# Patient Record
Sex: FEMALE | Race: OTHER | HISPANIC OR LATINO | Employment: OTHER | URBAN - METROPOLITAN AREA
[De-identification: names, ages, dates, MRNs, and addresses within clinical notes are randomized per-mention and may not be internally consistent; named-entity substitution may affect disease eponyms.]

---

## 2017-12-20 ENCOUNTER — APPOINTMENT (EMERGENCY)
Dept: RADIOLOGY | Facility: HOSPITAL | Age: 82
DRG: 637 | End: 2017-12-20
Payer: MEDICARE

## 2017-12-20 ENCOUNTER — APPOINTMENT (EMERGENCY)
Dept: CT IMAGING | Facility: HOSPITAL | Age: 82
DRG: 637 | End: 2017-12-20
Payer: MEDICARE

## 2017-12-20 ENCOUNTER — HOSPITAL ENCOUNTER (INPATIENT)
Facility: HOSPITAL | Age: 82
LOS: 2 days | Discharge: HOME HEALTH | DRG: 637 | End: 2017-12-22
Attending: EMERGENCY MEDICINE | Admitting: INTERNAL MEDICINE
Payer: MEDICARE

## 2017-12-20 DIAGNOSIS — T68.XXXA HYPOTHERMIA, INITIAL ENCOUNTER: ICD-10-CM

## 2017-12-20 DIAGNOSIS — E16.2 HYPOGLYCEMIA: Primary | ICD-10-CM

## 2017-12-20 PROBLEM — I10 HYPERTENSION: Status: ACTIVE | Noted: 2017-12-20

## 2017-12-20 PROBLEM — E11.9 TYPE 2 DIABETES MELLITUS (HCC): Status: ACTIVE | Noted: 2017-12-20

## 2017-12-20 PROBLEM — G93.41 ACUTE METABOLIC ENCEPHALOPATHY: Status: ACTIVE | Noted: 2017-12-20

## 2017-12-20 LAB
ALBUMIN SERPL BCP-MCNC: 3.3 G/DL (ref 3.5–5)
ALP SERPL-CCNC: 75 U/L (ref 46–116)
ALT SERPL W P-5'-P-CCNC: 21 U/L (ref 12–78)
AMMONIA PLAS-SCNC: 22 UMOL/L (ref 11–35)
AMPHETAMINES SERPL QL SCN: NEGATIVE
ANION GAP BLD CALC-SCNC: 15 MMOL/L (ref 4–13)
ANION GAP SERPL CALCULATED.3IONS-SCNC: 5 MMOL/L (ref 4–13)
APAP SERPL-MCNC: <2 UG/ML (ref 10–30)
APTT PPP: 26 SECONDS (ref 23–35)
AST SERPL W P-5'-P-CCNC: 18 U/L (ref 5–45)
ATRIAL RATE: 60 BPM
BACTERIA UR QL AUTO: ABNORMAL /HPF
BARBITURATES UR QL: NEGATIVE
BASE EX.OXY STD BLDV CALC-SCNC: 83.3 % (ref 60–80)
BASE EXCESS BLDV CALC-SCNC: 0.8 MMOL/L
BASOPHILS # BLD AUTO: 0.05 THOUSANDS/ΜL (ref 0–0.1)
BASOPHILS NFR BLD AUTO: 0 % (ref 0–1)
BENZODIAZ UR QL: NEGATIVE
BILIRUB SERPL-MCNC: 0.39 MG/DL (ref 0.2–1)
BILIRUB UR QL STRIP: NEGATIVE
BUN BLD-MCNC: 19 MG/DL (ref 5–25)
BUN SERPL-MCNC: 17 MG/DL (ref 5–25)
CA-I BLD-SCNC: 1.22 MMOL/L (ref 1.12–1.32)
CALCIUM SERPL-MCNC: 8.9 MG/DL (ref 8.3–10.1)
CHLORIDE BLD-SCNC: 98 MMOL/L (ref 100–108)
CHLORIDE SERPL-SCNC: 102 MMOL/L (ref 100–108)
CK MB SERPL-MCNC: 1.6 % (ref 0–2.5)
CK MB SERPL-MCNC: 4.3 NG/ML (ref 0–5)
CK SERPL-CCNC: 263 U/L (ref 26–192)
CLARITY UR: CLEAR
CO2 SERPL-SCNC: 29 MMOL/L (ref 21–32)
COCAINE UR QL: NEGATIVE
COLOR UR: YELLOW
CREAT BLD-MCNC: 1.2 MG/DL (ref 0.6–1.3)
CREAT SERPL-MCNC: 1.14 MG/DL (ref 0.6–1.3)
EOSINOPHIL # BLD AUTO: 0.18 THOUSAND/ΜL (ref 0–0.61)
EOSINOPHIL NFR BLD AUTO: 2 % (ref 0–6)
ERYTHROCYTE [DISTWIDTH] IN BLOOD BY AUTOMATED COUNT: 14.5 % (ref 11.6–15.1)
ETHANOL SERPL-MCNC: <3 MG/DL (ref 0–3)
GFR SERPL CREATININE-BSD FRML MDRD: 42 ML/MIN/1.73SQ M
GFR SERPL CREATININE-BSD FRML MDRD: 45 ML/MIN/1.73SQ M
GLUCOSE SERPL-MCNC: 107 MG/DL (ref 65–140)
GLUCOSE SERPL-MCNC: 110 MG/DL (ref 65–140)
GLUCOSE SERPL-MCNC: 113 MG/DL (ref 65–140)
GLUCOSE SERPL-MCNC: 113 MG/DL (ref 65–140)
GLUCOSE SERPL-MCNC: 119 MG/DL (ref 65–140)
GLUCOSE SERPL-MCNC: 128 MG/DL (ref 65–140)
GLUCOSE SERPL-MCNC: 153 MG/DL (ref 65–140)
GLUCOSE SERPL-MCNC: 75 MG/DL (ref 65–140)
GLUCOSE SERPL-MCNC: 77 MG/DL (ref 65–140)
GLUCOSE SERPL-MCNC: 83 MG/DL (ref 65–140)
GLUCOSE SERPL-MCNC: <20 MG/DL (ref 65–140)
GLUCOSE UR STRIP-MCNC: ABNORMAL MG/DL
HCO3 BLDV-SCNC: 26.7 MMOL/L (ref 24–30)
HCT VFR BLD AUTO: 42.6 % (ref 34.8–46.1)
HCT VFR BLD CALC: 42 % (ref 34.8–46.1)
HGB BLD-MCNC: 14.3 G/DL (ref 11.5–15.4)
HGB BLDA-MCNC: 14.3 G/DL (ref 11.5–15.4)
HGB UR QL STRIP.AUTO: ABNORMAL
INR PPP: 1.05 (ref 0.86–1.16)
KETONES UR STRIP-MCNC: NEGATIVE MG/DL
LACTATE SERPL-SCNC: 1.4 MMOL/L (ref 0.5–2)
LEUKOCYTE ESTERASE UR QL STRIP: NEGATIVE
LIPASE SERPL-CCNC: 121 U/L (ref 73–393)
LYMPHOCYTES # BLD AUTO: 2.29 THOUSANDS/ΜL (ref 0.6–4.47)
LYMPHOCYTES NFR BLD AUTO: 19 % (ref 14–44)
MCH RBC QN AUTO: 31 PG (ref 26.8–34.3)
MCHC RBC AUTO-ENTMCNC: 33.6 G/DL (ref 31.4–37.4)
MCV RBC AUTO: 92 FL (ref 82–98)
METHADONE UR QL: NEGATIVE
MONOCYTES # BLD AUTO: 0.9 THOUSAND/ΜL (ref 0.17–1.22)
MONOCYTES NFR BLD AUTO: 8 % (ref 4–12)
NEUTROPHILS # BLD AUTO: 8.62 THOUSANDS/ΜL (ref 1.85–7.62)
NEUTS SEG NFR BLD AUTO: 71 % (ref 43–75)
NITRITE UR QL STRIP: NEGATIVE
NON-SQ EPI CELLS URNS QL MICRO: ABNORMAL /HPF
NRBC BLD AUTO-RTO: 0 /100 WBCS
NT-PROBNP SERPL-MCNC: 430 PG/ML
O2 CT BLDV-SCNC: 18.2 ML/DL
OPIATES UR QL SCN: NEGATIVE
P AXIS: 36 DEGREES
PCO2 BLD: 29 MMOL/L (ref 21–32)
PCO2 BLDV: 47 MM HG (ref 42–50)
PCP UR QL: NEGATIVE
PH BLDV: 7.37 [PH] (ref 7.3–7.4)
PH UR STRIP.AUTO: 7.5 [PH] (ref 4.5–8)
PLATELET # BLD AUTO: 193 THOUSANDS/UL (ref 149–390)
PLATELET # BLD AUTO: 207 THOUSANDS/UL (ref 149–390)
PMV BLD AUTO: 11.1 FL (ref 8.9–12.7)
PMV BLD AUTO: 11.3 FL (ref 8.9–12.7)
PO2 BLDV: 48.6 MM HG (ref 35–45)
POTASSIUM BLD-SCNC: 3.6 MMOL/L (ref 3.5–5.3)
POTASSIUM SERPL-SCNC: 3.9 MMOL/L (ref 3.5–5.3)
PR INTERVAL: 120 MS
PROT SERPL-MCNC: 5.9 G/DL (ref 6.4–8.2)
PROT UR STRIP-MCNC: ABNORMAL MG/DL
PROTHROMBIN TIME: 13.7 SECONDS (ref 12.1–14.4)
QRS AXIS: 26 DEGREES
QRSD INTERVAL: 86 MS
QT INTERVAL: 420 MS
QTC INTERVAL: 420 MS
RBC # BLD AUTO: 4.61 MILLION/UL (ref 3.81–5.12)
RBC #/AREA URNS AUTO: ABNORMAL /HPF
SALICYLATES SERPL-MCNC: <3 MG/DL (ref 3–20)
SODIUM BLD-SCNC: 137 MMOL/L (ref 136–145)
SODIUM SERPL-SCNC: 136 MMOL/L (ref 136–145)
SP GR UR STRIP.AUTO: 1.02 (ref 1–1.03)
SPECIMEN SOURCE: ABNORMAL
SPECIMEN SOURCE: NORMAL
T WAVE AXIS: 89 DEGREES
THC UR QL: NEGATIVE
TROPONIN I BLD-MCNC: 0 NG/ML (ref 0–0.08)
TROPONIN I SERPL-MCNC: 0.02 NG/ML
TSH SERPL DL<=0.05 MIU/L-ACNC: 1.26 UIU/ML (ref 0.36–3.74)
UROBILINOGEN UR QL STRIP.AUTO: 0.2 E.U./DL
VENTRICULAR RATE: 60 BPM
WBC # BLD AUTO: 12.04 THOUSAND/UL (ref 4.31–10.16)
WBC #/AREA URNS AUTO: ABNORMAL /HPF

## 2017-12-20 PROCEDURE — 80320 DRUG SCREEN QUANTALCOHOLS: CPT | Performed by: EMERGENCY MEDICINE

## 2017-12-20 PROCEDURE — 71010 HB CHEST X-RAY 1 VIEW FRONTAL (PORTABLE): CPT

## 2017-12-20 PROCEDURE — 84443 ASSAY THYROID STIM HORMONE: CPT | Performed by: EMERGENCY MEDICINE

## 2017-12-20 PROCEDURE — 85025 COMPLETE CBC W/AUTO DIFF WBC: CPT | Performed by: EMERGENCY MEDICINE

## 2017-12-20 PROCEDURE — 82948 REAGENT STRIP/BLOOD GLUCOSE: CPT

## 2017-12-20 PROCEDURE — 83605 ASSAY OF LACTIC ACID: CPT | Performed by: EMERGENCY MEDICINE

## 2017-12-20 PROCEDURE — 82550 ASSAY OF CK (CPK): CPT | Performed by: EMERGENCY MEDICINE

## 2017-12-20 PROCEDURE — 99285 EMERGENCY DEPT VISIT HI MDM: CPT

## 2017-12-20 PROCEDURE — 87040 BLOOD CULTURE FOR BACTERIA: CPT | Performed by: EMERGENCY MEDICINE

## 2017-12-20 PROCEDURE — 80053 COMPREHEN METABOLIC PANEL: CPT | Performed by: EMERGENCY MEDICINE

## 2017-12-20 PROCEDURE — 82805 BLOOD GASES W/O2 SATURATION: CPT | Performed by: EMERGENCY MEDICINE

## 2017-12-20 PROCEDURE — 85049 AUTOMATED PLATELET COUNT: CPT | Performed by: NURSE PRACTITIONER

## 2017-12-20 PROCEDURE — 96361 HYDRATE IV INFUSION ADD-ON: CPT

## 2017-12-20 PROCEDURE — 83880 ASSAY OF NATRIURETIC PEPTIDE: CPT | Performed by: EMERGENCY MEDICINE

## 2017-12-20 PROCEDURE — 80047 BASIC METABLC PNL IONIZED CA: CPT

## 2017-12-20 PROCEDURE — 81003 URINALYSIS AUTO W/O SCOPE: CPT

## 2017-12-20 PROCEDURE — 70450 CT HEAD/BRAIN W/O DYE: CPT

## 2017-12-20 PROCEDURE — 85730 THROMBOPLASTIN TIME PARTIAL: CPT | Performed by: EMERGENCY MEDICINE

## 2017-12-20 PROCEDURE — 82140 ASSAY OF AMMONIA: CPT | Performed by: EMERGENCY MEDICINE

## 2017-12-20 PROCEDURE — 84484 ASSAY OF TROPONIN QUANT: CPT | Performed by: NURSE PRACTITIONER

## 2017-12-20 PROCEDURE — 36415 COLL VENOUS BLD VENIPUNCTURE: CPT | Performed by: EMERGENCY MEDICINE

## 2017-12-20 PROCEDURE — 81001 URINALYSIS AUTO W/SCOPE: CPT

## 2017-12-20 PROCEDURE — 83690 ASSAY OF LIPASE: CPT | Performed by: EMERGENCY MEDICINE

## 2017-12-20 PROCEDURE — 96360 HYDRATION IV INFUSION INIT: CPT

## 2017-12-20 PROCEDURE — 80329 ANALGESICS NON-OPIOID 1 OR 2: CPT | Performed by: EMERGENCY MEDICINE

## 2017-12-20 PROCEDURE — 93005 ELECTROCARDIOGRAM TRACING: CPT

## 2017-12-20 PROCEDURE — 80307 DRUG TEST PRSMV CHEM ANLYZR: CPT | Performed by: NURSE PRACTITIONER

## 2017-12-20 PROCEDURE — 85014 HEMATOCRIT: CPT

## 2017-12-20 PROCEDURE — 96374 THER/PROPH/DIAG INJ IV PUSH: CPT

## 2017-12-20 PROCEDURE — 85610 PROTHROMBIN TIME: CPT | Performed by: EMERGENCY MEDICINE

## 2017-12-20 PROCEDURE — 82553 CREATINE MB FRACTION: CPT | Performed by: EMERGENCY MEDICINE

## 2017-12-20 PROCEDURE — 81002 URINALYSIS NONAUTO W/O SCOPE: CPT | Performed by: EMERGENCY MEDICINE

## 2017-12-20 PROCEDURE — 84484 ASSAY OF TROPONIN QUANT: CPT

## 2017-12-20 RX ORDER — PRAVASTATIN SODIUM 40 MG
40 TABLET ORAL
Status: DISCONTINUED | OUTPATIENT
Start: 2017-12-20 | End: 2017-12-22 | Stop reason: HOSPADM

## 2017-12-20 RX ORDER — DEXTROSE MONOHYDRATE 25 G/50ML
50 INJECTION, SOLUTION INTRAVENOUS ONCE
Status: COMPLETED | OUTPATIENT
Start: 2017-12-20 | End: 2017-12-20

## 2017-12-20 RX ORDER — HYDRALAZINE HYDROCHLORIDE 20 MG/ML
10 INJECTION INTRAMUSCULAR; INTRAVENOUS ONCE
Status: DISCONTINUED | OUTPATIENT
Start: 2017-12-20 | End: 2017-12-22 | Stop reason: HOSPADM

## 2017-12-20 RX ORDER — FUROSEMIDE 40 MG/1
40 TABLET ORAL DAILY
Status: DISCONTINUED | OUTPATIENT
Start: 2017-12-21 | End: 2017-12-22 | Stop reason: HOSPADM

## 2017-12-20 RX ORDER — ASPIRIN 81 MG/1
81 TABLET ORAL DAILY
COMMUNITY
End: 2018-07-05 | Stop reason: SDUPTHER

## 2017-12-20 RX ORDER — TRAMADOL HYDROCHLORIDE 50 MG/1
50 TABLET ORAL EVERY 6 HOURS PRN
COMMUNITY
End: 2018-07-05

## 2017-12-20 RX ORDER — BUDESONIDE AND FORMOTEROL FUMARATE DIHYDRATE 160; 4.5 UG/1; UG/1
2 AEROSOL RESPIRATORY (INHALATION) 2 TIMES DAILY
Status: DISCONTINUED | OUTPATIENT
Start: 2017-12-20 | End: 2017-12-22 | Stop reason: HOSPADM

## 2017-12-20 RX ORDER — POLYMYXIN B SULFATE AND TRIMETHOPRIM 1; 10000 MG/ML; [USP'U]/ML
1 SOLUTION OPHTHALMIC EVERY 4 HOURS
COMMUNITY
End: 2018-08-07 | Stop reason: ALTCHOICE

## 2017-12-20 RX ORDER — CARVEDILOL 6.25 MG/1
6.25 TABLET ORAL 2 TIMES DAILY WITH MEALS
Status: DISCONTINUED | OUTPATIENT
Start: 2017-12-21 | End: 2017-12-22 | Stop reason: HOSPADM

## 2017-12-20 RX ORDER — CARVEDILOL 6.25 MG/1
6.25 TABLET ORAL 2 TIMES DAILY WITH MEALS
COMMUNITY
End: 2018-08-07 | Stop reason: ALTCHOICE

## 2017-12-20 RX ORDER — FUROSEMIDE 40 MG/1
40 TABLET ORAL DAILY
COMMUNITY
End: 2018-07-05 | Stop reason: SDUPTHER

## 2017-12-20 RX ORDER — CLOPIDOGREL BISULFATE 75 MG/1
75 TABLET ORAL DAILY
Status: DISCONTINUED | OUTPATIENT
Start: 2017-12-21 | End: 2017-12-22 | Stop reason: HOSPADM

## 2017-12-20 RX ORDER — DEXTROSE MONOHYDRATE 25 G/50ML
INJECTION, SOLUTION INTRAVENOUS
Status: COMPLETED
Start: 2017-12-20 | End: 2017-12-20

## 2017-12-20 RX ORDER — CLOPIDOGREL BISULFATE 75 MG/1
75 TABLET ORAL DAILY
COMMUNITY
End: 2018-08-07 | Stop reason: ALTCHOICE

## 2017-12-20 RX ORDER — ASPIRIN 81 MG/1
81 TABLET ORAL DAILY
Status: DISCONTINUED | OUTPATIENT
Start: 2017-12-21 | End: 2017-12-22 | Stop reason: HOSPADM

## 2017-12-20 RX ORDER — DEXTROSE MONOHYDRATE 100 MG/ML
50 INJECTION, SOLUTION INTRAVENOUS CONTINUOUS
Status: DISCONTINUED | OUTPATIENT
Start: 2017-12-20 | End: 2017-12-21

## 2017-12-20 RX ORDER — ONDANSETRON 2 MG/ML
4 INJECTION INTRAMUSCULAR; INTRAVENOUS EVERY 6 HOURS PRN
Status: DISCONTINUED | OUTPATIENT
Start: 2017-12-20 | End: 2017-12-22 | Stop reason: HOSPADM

## 2017-12-20 RX ORDER — HEPARIN SODIUM 5000 [USP'U]/ML
5000 INJECTION, SOLUTION INTRAVENOUS; SUBCUTANEOUS EVERY 8 HOURS SCHEDULED
Status: DISCONTINUED | OUTPATIENT
Start: 2017-12-20 | End: 2017-12-22 | Stop reason: HOSPADM

## 2017-12-20 RX ORDER — LISINOPRIL 20 MG/1
40 TABLET ORAL DAILY
Status: DISCONTINUED | OUTPATIENT
Start: 2017-12-21 | End: 2017-12-22 | Stop reason: HOSPADM

## 2017-12-20 RX ORDER — PRAVASTATIN SODIUM 40 MG
40 TABLET ORAL DAILY
COMMUNITY
End: 2018-07-05 | Stop reason: SDUPTHER

## 2017-12-20 RX ORDER — BUDESONIDE AND FORMOTEROL FUMARATE DIHYDRATE 160; 4.5 UG/1; UG/1
1-2 AEROSOL RESPIRATORY (INHALATION) 2 TIMES DAILY
COMMUNITY
End: 2018-08-07 | Stop reason: ALTCHOICE

## 2017-12-20 RX ORDER — HYDRALAZINE HYDROCHLORIDE 20 MG/ML
10 INJECTION INTRAMUSCULAR; INTRAVENOUS EVERY 6 HOURS PRN
Status: DISCONTINUED | OUTPATIENT
Start: 2017-12-20 | End: 2017-12-22 | Stop reason: HOSPADM

## 2017-12-20 RX ORDER — LISINOPRIL 40 MG/1
40 TABLET ORAL DAILY
COMMUNITY
End: 2018-07-05 | Stop reason: CLARIF

## 2017-12-20 RX ADMIN — HEPARIN SODIUM 5000 UNITS: 5000 INJECTION, SOLUTION INTRAVENOUS; SUBCUTANEOUS at 22:09

## 2017-12-20 RX ADMIN — SODIUM CHLORIDE 1000 ML: 0.9 INJECTION, SOLUTION INTRAVENOUS at 15:04

## 2017-12-20 RX ADMIN — DEXTROSE 50 ML/HR: 10 SOLUTION INTRAVENOUS at 15:14

## 2017-12-20 RX ADMIN — DEXTROSE MONOHYDRATE: 25 INJECTION, SOLUTION INTRAVENOUS at 16:29

## 2017-12-20 RX ADMIN — PRAVASTATIN SODIUM 40 MG: 40 TABLET ORAL at 22:09

## 2017-12-20 RX ADMIN — DEXTROSE MONOHYDRATE: 500 INJECTION PARENTERAL at 16:29

## 2017-12-20 NOTE — ED ATTENDING ATTESTATION
Agustina Bernard MD, saw and evaluated the patient  I have discussed the patient with the resident/non-physician practitioner and agree with the resident's/non-physician practitioner's findings, Plan of Care, and MDM as documented in the resident's/non-physician practitioner's note, except where noted  All available labs and Radiology studies were reviewed  At this point I agree with the current assessment done in the Emergency Department  I have conducted an independent evaluation of this patient a history and physical is as follows:  Patient with hx DM, Type 1, comes in with complaint of altered mental status, was noted to have hypoglycemia, EMS gave the D50W, blood sugar came up above 200; patient, to be alert, no focal neuro deficit, lungs clear, abdomen soft nontender, vital signs stable  Labs and CT scan chest x-ray done, no acute intracranial abnormality, patient developed recurrent hypoglycemia requiring IV dextrose, and hypothermia, septic workup did not show any acute abnormality, normal lactic acid, normal white blood cell count is no clear source of sepsis  Hypothermia possibly related to hypoglycemia  We will admit to ICU as patient required frequent blood sugar monitoring and continuous dextrose infusion  Critical Care Time  The patient presented with a condition in which there was a high probability of imminent or life-threatening deterioration, and critical care services (excluding separately billable procedures) totalled 30-74 minutes          Procedures

## 2017-12-20 NOTE — ED PROVIDER NOTES
daily   UNKNOWN TO PATIENT   Yes Yes   Sig: Administer 1 drop into both ears 2 (two) times a day Ear drops-cerumenolytic   aspirin (ECOTRIN LOW STRENGTH) 81 mg EC tablet   Yes Yes   Sig: Take 81 mg by mouth daily   budesonide-formoterol (SYMBICORT) 160-4 5 mcg/act inhaler   Yes Yes   Sig: Inhale 1-2 puffs 2 (two) times a day   carvedilol (COREG) 6 25 mg tablet   Yes Yes   Sig: Take 6 25 mg by mouth 2 (two) times a day with meals   clopidogrel (PLAVIX) 75 mg tablet   Yes Yes   Sig: Take 75 mg by mouth daily   furosemide (LASIX) 40 mg tablet   Yes Yes   Sig: Take 40 mg by mouth daily   insulin aspart (NovoLOG) 100 units/mL injection   Yes Yes   Sig: Inject 2 Units under the skin 2 (two) times a day with meals   lisinopril (ZESTRIL) 40 mg tablet   Yes Yes   Sig: Take 40 mg by mouth daily   polymyxin b-trimethoprim (POLYTRIM) ophthalmic solution   Yes Yes   Sig: Administer 1 drop to both eyes every 4 (four) hours   pravastatin (PRAVACHOL) 40 mg tablet   Yes Yes   Sig: Take 40 mg by mouth daily   sitaGLIPtin (JANUVIA) 50 mg tablet   Yes Yes   Sig: Take 50 mg by mouth daily   traMADol (ULTRAM) 50 mg tablet   Yes Yes   Sig: Take 50 mg by mouth every 6 (six) hours as needed for moderate pain      Facility-Administered Medications: None       Past Medical History:   Diagnosis Date    Alzheimer's dementia     Diabetes mellitus ([address] )     Hyperlipidemia     Hypertension     Kidney stones        Past Surgical History:   Procedure Laterality Date    APPENDECTOMY      LITHOTRIPSY      TONSILLECTOMY         Family History   Problem Relation Age of Onset    Diabetes Mother      I have reviewed and agree with the history as documented      Social History   Substance Use Topics    Smoking status: Current Every Day Smoker     Packs/day: 1 00     Years: 40 00     Types: Cigarettes    Smokeless tobacco: Never Used      Comment: states she quit but family living with her states she smokes    Alcohol use Yes      Comment: OCCASIONAL        Review of Systems    Constitutional: Negative for appetite change, chills and fever  HENT: Negative for congestion, rhinorrhea and sore throat  Eyes: Negative for photophobia, pain and visual disturbance  Respiratory: Negative for cough, chest tightness and shortness of breath  Cardiovascular: Negative for chest pain, palpitations and leg swelling  Gastrointestinal: Negative for abdominal pain, diarrhea, nausea and vomiting  Genitourinary: Negative for dysuria, flank pain and hematuria  Musculoskeletal: Negative for back pain, neck pain and neck stiffness  Skin: Negative for color change, rash and wound  Neurological: Negative for dizziness, numbness  positive for headaches  All other systems reviewed and are negative      Physical Exam  ED Triage Vitals   Temperature Pulse Respirations Blood Pressure SpO2   12/20/17 1505 12/20/17 1452 12/20/17 1452 12/20/17 1452 12/20/17 1452   (!) 96 6 °F (35 9 °C) 64 16 (!) 190/91 98 %      Temp Source Heart Rate Source Patient Position - Orthostatic VS BP Location FiO2 (%)   12/20/17 1505 12/20/17 1452 12/20/17 1452 12/20/17 1452 --   Rectal Monitor Lying Right arm       Pain Score       12/20/17 2147       No Pain           Orthostatic Vital Signs  Vitals:    12/21/17 1100 12/21/17 1200 12/21/17 1300 12/21/17 1400   BP: 116/67 100/72 107/60 114/60   Pulse: 76 62 60 64   Patient Position - Orthostatic VS:           Physical Exam  /60   Pulse 64   Temp 97 7 °F (36 5 °C) (Temporal)   Resp 20   Ht 5' 7" (1 702 m)   Wt 69 1 kg (152 lb 5 4 oz)   LMP  (LMP Unknown)   SpO2 95%   BMI 23 86 kg/m²     General Appearance:  Sleepy but easy arousable, cooperative, no distress, appears stated age   Head:  Normocephalic, without obvious abnormality, atraumatic   Eyes:  PERRL, conjunctiva/corneas clear, EOM's intact       Nose: Nares normal, septum midline,mucosa normal, no drainage or sinus tenderness   Throat: Lips, mucosa, and tongue normal; teeth and gums normal   Neck: Supple, symmetrical, trachea midline, no adenopathy   Back:   Symmetric, no curvature, ROM normal, no CVA tenderness   Lungs:   Clear to auscultation bilaterally, respirations unlabored   Heart:  Regular rate and rhythm, S1 and S2 normal, no murmur, rub, or gallop   Abdomen:   Soft, non-tender, bowel sounds active all four quadrants   Pelvic: Deferred   Extremities: Extremities normal, atraumatic, no cyanosis or edema, no tenderness on palpation of lower extremities   Pulses: 2+ and symmetric   Skin: Skin color, texture, turgor normal, no rashes or lesions   Neurologic:      Psychiatric: Moves all extremities, 5/5 strength in all extremities, equal on both sides  Decreased sensation to bilateral lower extremities which is chronic    Sleepy but arousable by voice         ED Medications  Medications   hydrALAZINE (APRESOLINE) injection 10 mg (0 mg Intravenous Hold 12/20/17 1631)   aspirin (ECOTRIN LOW STRENGTH) EC tablet 81 mg (81 mg Oral Given 12/21/17 0842)   budesonide-formoterol (SYMBICORT) 160-4 5 mcg/act inhaler 2 puff (2 puffs Inhalation Not Given 12/20/17 2205)   carvedilol (COREG) tablet 6 25 mg (6 25 mg Oral Given 12/21/17 0842)   clopidogrel (PLAVIX) tablet 75 mg (75 mg Oral Given 12/21/17 0842)   furosemide (LASIX) tablet 40 mg (40 mg Oral Given 12/21/17 0842)   Linaclotide CAPS 1 capsule (1 capsule Oral Not Given 12/21/17 1145)   lisinopril (ZESTRIL) tablet 40 mg (40 mg Oral Given 12/21/17 0842)   pravastatin (PRAVACHOL) tablet 40 mg (40 mg Oral Given 12/20/17 2209)   heparin (porcine) subcutaneous injection 5,000 Units (5,000 Units Subcutaneous Given 12/21/17 0639)   ondansetron (ZOFRAN) injection 4 mg (not administered)   hydrALAZINE (APRESOLINE) injection 10 mg (not administered)   sodium chloride 0 9 % bolus 1,000 mL (0 mL Intravenous Stopped 12/20/17 1606)   dextrose 50 % IV solution 50 mL ( Intravenous Given 12/20/17 1629)   morphine injection 1 mg (1 mg Intravenous Given 12/21/17 0322)       Diagnostic Studies  Results Reviewed     Procedure Component Value Units Date/Time    Rapid drug screen, urine [[id number]]  (Normal) Collected:  12/20/17 1827    Lab Status:  Final result Specimen:  Urine Updated:  12/20/17 2108     Amph/Meth UR Negative     Barbiturate Ur Negative     Benzodiazepine Urine Negative     Cocaine Urine Negative     Methadone Urine Negative     Opiate Urine Negative     PCP Ur Negative     THC Urine Negative    Narrative:         FOR MEDICAL PURPOSES ONLY  IF CONFIRMATION NEEDED PLEASE CONTACT THE LAB WITHIN 5 DAYS      Drug Screen Cutoff Levels:  AMPHETAMINE/METHAMPHETAMINES  1000 ng/mL  BARBITURATES     200 ng/mL  BENZODIAZEPINES     200 ng/mL  COCAINE      300 ng/mL  METHADONE      300 ng/mL  OPIATES      300 ng/mL  PHENCYCLIDINE     25 ng/mL  THC       50 ng/mL    Fingerstick Glucose (POCT) [[id number]]  (Normal) Collected:  12/20/17 2019    Lab Status:  Final result Updated:  12/20/17 2040     POC Glucose 107 mg/dl     Fingerstick Glucose (POCT) [[id number]]  (Normal) Collected:  12/20/17 1843    Lab Status:  Final result Updated:  12/20/17 1845     POC Glucose 75 mg/dl     Urine Microscopic [[id number]]  (Abnormal) Collected:  12/20/17 1827    Lab Status:  Final result Specimen:  Urine from Urine, Other Updated:  12/20/17 1824     RBC, UA 1-2 (A) /hpf      WBC, UA 1-2 (A) /hpf      Epithelial Cells Moderate (A) /hpf      Bacteria, UA None Seen /hpf     POCT urinalysis dipstick [[id number]]  (Abnormal) Resulted:  12/20/17 1810    Lab Status:  Final result Specimen:  Urine Updated:  12/20/17 1810    ED Urine Macroscopic [[id number]]  (Abnormal) Collected:  12/20/17 1827    Lab Status:  Final result Specimen:  Urine Updated:  12/20/17 1809     Color, UA Yellow     Clarity, UA Clear     pH, UA 7 5     Leukocytes, UA Negative     Nitrite, UA Negative     Protein,  (2+) (A) mg/dl      Glucose,  (1/10%) (A) mg/dl      Ketones, UA Negative mg/dl Urobilinogen, UA 0 2 E U /dl      Bilirubin, UA Negative     Blood, UA Trace (A)     Specific [place], UA 1 020    Narrative:       CLINITEK RESULT    Fingerstick Glucose (POCT) [[id number]]  (Normal) Collected:  12/20/17 1701    Lab Status:  Final result Updated:  12/20/17 1714     POC Glucose 119 mg/dl     Fingerstick Glucose (POCT) [[id number]]  (Abnormal) Collected:  12/20/17 1616    Lab Status:  Final result Updated:  12/20/17 1646     POC Glucose <20 (LL) mg/dl     Fingerstick Glucose (POCT) [[id number]]  (Normal) Collected:  12/20/17 1451    Lab Status:  Final result Updated:  12/20/17 1646     POC Glucose 110 mg/dl     B-type natriuretic peptide [[id number]]  (Normal) Collected:  12/20/17 1502    Lab Status:  Final result Specimen:  Blood from Arm, Left Updated:  12/20/17 1611     NT-proBNP 430 pg/mL     Acetaminophen level [[id number]]  (Abnormal) Collected:  12/20/17 1502    Lab Status:  Final result Specimen:  Blood from Arm, Left Updated:  12/20/17 1601     Acetaminophen Level <2 (L) ug/mL     TSH [[id number]]  (Normal) Collected:  12/20/17 1502    Lab Status:  Final result Specimen:  Blood from Arm, Left Updated:  12/20/17 1546     TSH 3RD GENERATON 1 259 uIU/mL     Narrative:         Patients undergoing fluorescein dye angiography may retain small amounts of fluorescein in the body for 48-72 hours post procedure  Samples containing fluorescein can produce falsely depressed TSH values  If the patient had this procedure,a specimen should be resubmitted post fluorescein clearance            The recommended reference ranges for TSH during pregnancy are as follows:  First trimester 0 1 to 2 5 uIU/mL  Second trimester  0 2 to 3 0 uIU/mL  Third trimester 0 3 to 3 0 uIU/m      Lipase [[id number]]  (Normal) Collected:  12/20/17 1502    Lab Status:  Final result Specimen:  Blood from Arm, Left Updated:  12/20/17 1546     Lipase 780 u/L     Salicylate level [[id number]]  (Abnormal) Collected:  12/20/17 1502    Lab Status: Final result Specimen:  Blood from Arm, Left Updated:  32/38/00 0682     Salicylate Lvl <3 (L) mg/dL     CKMB [[id number]]  (Normal) Collected:  12/20/17 1502    Lab Status:  Final result Specimen:  Blood from Arm, Left Updated:  12/20/17 1546     CK-MB Index 1 6 %      CK-MB FRACTION 4 3 ng/mL     CK Total with Reflex CKMB [[id number]]  (Abnormal) Collected:  12/20/17 1502    Lab Status:  Final result Specimen:  Blood from Arm, Left Updated:  12/20/17 1545     Total  (H) U/L     Ethanol [[id number]]  (Normal) Collected:  12/20/17 1502    Lab Status:  Final result Specimen:  Blood from Arm, Left Updated:  12/20/17 1533     Ethanol Lvl <3 mg/dL     Comprehensive metabolic panel [[id number]]  (Abnormal) Collected:  12/20/17 1502    Lab Status:  Final result Specimen:  Blood from Arm, Left Updated:  12/20/17 1532     Sodium 136 mmol/L      Potassium 3 9 mmol/L      Chloride 102 mmol/L      CO2 29 mmol/L      Anion Gap 5 mmol/L      BUN 17 mg/dL      Creatinine 1 14 mg/dL      Glucose 113 mg/dL      Calcium 8 9 mg/dL      AST 18 U/L      ALT 21 U/L      Alkaline Phosphatase 75 U/L      Total Protein 5 9 (L) g/dL      Albumin 3 3 (L) g/dL      Total Bilirubin 0 39 mg/dL      eGFR 45 ml/min/1 73sq m     Narrative:         National Kidney Disease Education Program recommendations are as follows:  GFR calculation is accurate only with a steady state creatinine  Chronic Kidney disease less than 60 ml/min/1 73 sq  meters  Kidney failure less than 15 ml/min/1 73 sq  meters  Ammonia [[id number]]  (Normal) Collected:  12/20/17 1502    Lab Status:  Final result Specimen:  Blood from Arm, Left Updated:  12/20/17 1532     Ammonia 22 umol/L     Lactic acid, plasma [[id number]]  (Normal) Collected:  12/20/17 1502    Lab Status:  Final result Specimen:  Blood from Arm, Left Updated:  12/20/17 1527     LACTIC ACID 1 4 mmol/L     Narrative:         Result may be elevated if tourniquet was used during collection      Volodymyr Loomis [[id number]] (Normal) Collected:  12/20/17 1502    Lab Status:  Final result Specimen:  Blood from Arm, Left Updated:  12/20/17 1525     Protime 13 7 seconds      INR 1 05    APTT [[id number]]  (Normal) Collected:  12/20/17 1502    Lab Status:  Final result Specimen:  Blood from Arm, Left Updated:  12/20/17 1525     PTT 26 seconds     Narrative: Therapeutic Heparin Range = 60-90 seconds    CBC and differential [[id number]]  (Abnormal) Collected:  12/20/17 1503    Lab Status:  Final result Specimen:  Blood from Arm, Left Updated:  12/20/17 1515     WBC 12 04 (H) Thousand/uL      RBC 4 61 Million/uL      Hemoglobin 14 3 g/dL      Hematocrit 42 6 %      MCV 92 fL      MCH 31 0 pg      MCHC 33 6 g/dL      RDW 14 5 %      MPV 11 3 fL      Platelets 929 Thousands/uL      nRBC 0 /100 WBCs      Neutrophils Relative 71 %      Lymphocytes Relative 19 %      Monocytes Relative 8 %      Eosinophils Relative 2 %      Basophils Relative 0 %      Neutrophils Absolute 8 62 (H) Thousands/µL      Lymphocytes Absolute 2 29 Thousands/µL      Monocytes Absolute 0 90 Thousand/µL      Eosinophils Absolute 0 18 Thousand/µL      Basophils Absolute 0 05 Thousands/µL     Blood gas, venous [[id number]]  (Abnormal) Collected:  12/20/17 1503    Lab Status:  Final result Specimen:  Blood from Arm, Left Updated:  12/20/17 1513     pH, Esdras 7 372     pCO2, Esdras 47 0 mm Hg      pO2, Esdras 48 6 (H) mm Hg      HCO3, Esdras 26 7 mmol/L      Base Excess, Esdras 0 8 mmol/L      O2 Content, Esdras 18 2 ml/dL      O2 HGB, VENOUS 83 3 (H) %     Blood culture #1 [[id number]] Collected:  12/20/17 1455    Lab Status: In process Specimen:  Blood from Arm, Left Updated:  12/20/17 1506    Blood culture #2 [[id number]] Collected:  12/20/17 1500    Lab Status:   In process Specimen:  Blood from Arm, Left Updated:  12/20/17 1506    POCT troponin [[id number]]  (Normal) Collected:  12/20/17 1450    Lab Status:  Final result Updated:  12/20/17 1504     POC Troponin I 0 00 ng/ml      Specimen Type VENOUS    Narrative:         [employer] i-Stat handheld analyzer 99% cutoff is > 0 08ng/mL in network Emergency Departments    o cTnI 99% cutoff is useful only when applied to patients in the clinical setting of myocardial ischemia  o cTnI 99% cutoff should be interpreted in the context of clinical history, ECG findings and possibly cardiac imaging to establish correct diagnosis  o cTnI 99% cutoff may be suggestive but clearly not indicative of a coronary event without the clinical setting of myocardial ischemia  POCT Chem 8+ [[id number]]  (Abnormal) Collected:  12/20/17 1454    Lab Status:  Final result Updated:  12/20/17 1458     SODIUM, I-STAT 137 mmol/l      Potassium, i-STAT 3 6 mmol/L      Chloride, istat 98 (L) mmol/L      CO2, i-STAT 29 mmol/L      Anion Gap, Istat 15 (H) mmol/L      Calcium, Ionized i-STAT 1 22 mmol/L      BUN, I-STAT 19 mg/dl      Creatinine, i-STAT 1 2 mg/dl      eGFR 42 ml/min/1 73sq m      Glucose, i-STAT 113 mg/dl      Hct, i-STAT 42 %      Hgb, i-STAT 14 3 g/dl      Specimen Type VENOUS                 CT head without contrast   Final Result by Trish Sears MD (12/20 1534)      No acute intracranial abnormality  Mild right periorbital swelling is suggested  Significance uncertain  Workstation performed: [id number]         XR chest 1 view portable   ED Interpretation by Broderick Lundy MD (12/20 1521)   No consolidation, no effusions, right hilar congestion      Final Result by Karolina Caballero MD (12/20 4711)      No active pulmonary disease           Workstation performed: [id number]               Procedures  ECG 12 Lead Documentation  Date/Time: 12/20/2017 3:09 PM  Performed by: Gillian Caballero  Authorized by: Noreen Hernandez     Indications / Diagnosis:  Unresponsiveness  ECG reviewed by me, the ED Provider: yes    Patient location:  ED  Previous ECG:     Previous ECG:  Compared to current    Comparison ECG info:  Twave inversions, ST changes  Interpretation: Interpretation: non-specific    Rate:     ECG rate:  60    ECG rate assessment: normal    Rhythm:     Rhythm: sinus rhythm    Ectopy:     Ectopy: none    QRS:     QRS axis:  Normal    QRS intervals:  Normal  Conduction:     Conduction: normal    ST segments:     ST segments:  Abnormal    Depression:  V4 and V5  T waves:     T waves: inverted      Inverted:  V4, V5 and V6          Phone Consults  ED Phone Contact    ED Course  ED Course as of Dec 21 1445   Wed Dec 20, 2017   1537 XR chest 1 view portable         MDM   Patient with altered mental status secondary to hypoglycemia  Patient is more alert now, however she still sleepy  We will get a CT scan of head to evaluate for intracranial hemorrhage  We will check, panel, check cardiac workup, will check for possible infectious source with blood cultures, urinalysis and culture, chest x-ray  Repeat glucose checks  CritCare Time    Disposition  Final diagnoses:   Hypoglycemia   Hypothermia, initial encounter     Time reflects when diagnosis was documented in both MDM as applicable and the Disposition within this note     Time User Action Codes Description Comment    12/20/2017  4:42 PM Naa Garcia Add [E16 2] Hypoglycemia     12/20/2017  4:42 PM Poppy Wagner  XXXA] Hypothermia, initial encounter       ED Disposition     ED Disposition Condition Comment    Admit  Case was discussed with Critical Care and the patient's admission status was agreed to be Admission Status: inpatient status to the service of Dr Darcy Stevenson          Follow-up Information    None       Current Discharge Medication List      CONTINUE these medications which have NOT CHANGED    Details   aspirin (ECOTRIN LOW STRENGTH) 81 mg EC tablet Take 81 mg by mouth daily      budesonide-formoterol (SYMBICORT) 160-4 5 mcg/act inhaler Inhale 1-2 puffs 2 (two) times a day      carvedilol (COREG) 6 25 mg tablet Take 6 25 mg by mouth 2 (two) times a day with meals      clopidogrel (PLAVIX) 75 mg tablet Take 75 mg by mouth daily      furosemide (LASIX) 40 mg tablet Take 40 mg by mouth daily      insulin aspart (NovoLOG) 100 units/mL injection Inject 2 Units under the skin 2 (two) times a day with meals      Linaclotide (LINZESS) 290 MCG CAPS Take 290 mcg by mouth daily      lisinopril (ZESTRIL) 40 mg tablet Take 40 mg by mouth daily      polymyxin b-trimethoprim (POLYTRIM) ophthalmic solution Administer 1 drop to both eyes every 4 (four) hours      pravastatin (PRAVACHOL) 40 mg tablet Take 40 mg by mouth daily      sitaGLIPtin (JANUVIA) 50 mg tablet Take 50 mg by mouth daily      traMADol (ULTRAM) 50 mg tablet Take 50 mg by mouth every 6 (six) hours as needed for moderate pain      UNKNOWN TO PATIENT Administer 1 drop into both ears 2 (two) times a day Ear drops-cerumenolytic           No discharge procedures on file  ED Provider  Attending physically available and evaluated Kacey Vasquez  I managed the patient along with the ED Attending      Electronically Signed by         Len Lan MD  Resident  12/21/17 3118

## 2017-12-20 NOTE — H&P
History & Physical Exam - Critical Care   Emerson Ramires 81 y o  female MRN: [id number]  Unit/Bed#: ED 32 Encounter: [id number]      Assessment/Plan:  1  Acute metabolic encephalopathy due to hypoglycemia  · Will admit patient to stepdown unit for closer monitoring, will require greater than 2 midnight hospitalization stay  · Encephalopathy thought to be related to hypoglycemia  Patient has history of dementia and providing own medication  There was documentation from EMS that insulin and needles were found surrounding patient- unsure if overdose of insulin  · Check urine drug screen  · Will need case management consult due to medication confusion  2  Hypoglycemia  · On Januvia and Insulin at home, took medications this morning  · Hold off on medications  · Check blood glucose q 1 hour  · Continue on D 10 infusion until blood glucose improves and able to tolerate PO diet  3  Diabetes mellitus type 2  · Hold on Januvia and SSI coverage    4  Hypertension  · Will resume on home Lisinopril and Coreg  5  Dementia  · Hold on ultram until mental status improves  · May need more assistance at home with care  6  Tobacco abuse  · Smoking cessation      _____________________________________________________________________      HPI:    Emerson Ramires is a 81 y o  female who presents to the emergency department after being found unresponsive by family members  Patient is Japanese speaking only history is obtain by family members at bedside via translation  They state that she was in her normal state of health this morning with no complaints  Around 1 pm they went to make food and the patient started complaining that she was not feeling well, but did not describe symptoms  Shortly after when they looked back at her she was making movements with her arms that they describe as rhythmic  At that time they mentioned her eyes were open but she was unresponsive, denies incontinence    EMS was activated when they arrived they noted a blood glucose of 27  300 ml of D 10 was administered and on recheck blood glucose improved to 200's  Prior to arrival to the ER was noted to drop to 79, then in the ER was less than 20  Was started on a D10 infusion with improvement of blood glucose  Initially when her blood glucose improved she remained with an altered mental status and fluctuating consciousness  Now her mental status is improved and she is able to state that she is in the hospital   She does have a past medical history significant for hypertension, diabetes mellitus type 2, dementia, and hyperlipidemia  Family admits she takes her medications on her own but does have a visiting nurse at times  When asked if patient is able to take her medications appropriately they admit she is able to but has a history of dementia  The patient herself is complaining of chronic shortness of breath, but no acute issues  Review of Systems:  Review of Systems   Constitutional: Negative  Negative for chills and fever  HENT: Negative  Eyes: Negative  Respiratory: Positive for shortness of breath  Cardiovascular: Negative  Negative for chest pain  Gastrointestinal: Negative  Negative for abdominal pain, diarrhea, nausea and vomiting  Endocrine: Negative  Genitourinary: Negative  Negative for frequency and urgency  Musculoskeletal: Negative  Allergic/Immunologic: Negative  Neurological: Negative  Negative for dizziness  Hematological: Negative  Psychiatric/Behavioral: Negative  Full 14 point review of systems was performed  Aside from what was mentioned in the HPI, it is otherwise negative        Historical Information   Past Medical History:   Diagnosis Date    Alzheimer's dementia     Diabetes mellitus ([facility] [address] )     Hyperlipidemia     Hypertension     Kidney stones      Past Surgical History:   Procedure Laterality Date    APPENDECTOMY      LITHOTRIPSY      TONSILLECTOMY       Social History   History   Alcohol Use  Yes     Comment: OCCASIONAL     History   Drug Use No     History   Smoking Status    Current Every Day Smoker    Packs/day: 1 00    Years: 40 00    Types: Cigarettes   Smokeless Tobacco    Never Used     Comment: states she quit but family living with her states she smokes       Family History:   Family History   Problem Relation Age of Onset    Diabetes Mother        Medications:  Pertinent medications were reviewed    hydrALAZINE 10 mg Intravenous Once         Allergies   Allergen Reactions    Acetaminophen     Oxycodone          Vitals:   /67   Pulse (!) 54   Temp (!) 94 9 °F (34 9 °C) (Rectal)   Resp 16   Wt 73 5 kg (162 lb 0 6 oz)   LMP  (LMP Unknown)   SpO2 94%   There is no height or weight on file to calculate BMI  SpO2: 94 %,   SpO2 Activity: At Rest,   O2 Device: None (Room air)      Intake/Output Summary (Last 24 hours) at 12/20/17 1757  Last data filed at 12/20/17 1606   Gross per 24 hour   Intake             1300 ml   Output                0 ml   Net             1300 ml     Invasive Devices     Peripheral Intravenous Line            Peripheral IV 12/20/17 Left Antecubital less than 1 day    Peripheral IV 12/20/17 Right Antecubital less than 1 day                Physical Exam:  Gen: Alert and oriented to person, unable to provide year  HEENT: PERRL, EOMI, normocephalic, atraumatic, o/p clear  Neck/lymph: Supple, no JVD, no adenopathy  Chest: CTA  Cor: RRR, no murmurs, rubs, or gallops  Abd: Soft, non-tender, non-distended, bowel sounds presetn  Ext: ARGUELLES, no edema  Neuro: CN II-XII grossly intact  Skin: Warm, dry, right buttock stage I-II, questionable skin tear      Diagnostic Data:  Lab: I have personally reviewed pertinent lab results  ,   CBC:    Results from last 7 days  Lab Units 12/20/17  1503   WBC Thousand/uL 12 04*   HEMOGLOBIN g/dL 14 3   HEMATOCRIT % 42 6   PLATELETS Thousands/uL 193      CMP: Lab Results   Component Value Date     12/20/2017    K 3 9 12/20/2017  12/20/2017    CO2 29 12/20/2017    ANIONGAP 5 12/20/2017    BUN 17 12/20/2017    CREATININE 1 14 12/20/2017    GLUCOSE 113 12/20/2017    GLUCOSE 113 12/20/2017    CALCIUM 8 9 12/20/2017    AST 18 12/20/2017    ALT 21 12/20/2017    ALKPHOS 75 12/20/2017    PROT 5 9 (L) 12/20/2017    ALBUMIN 3 3 (L) 12/20/2017    BILITOT 0 39 12/20/2017    EGFR 45 12/20/2017    EGFR 42 12/20/2017   ,   PT/INR:   Lab Results   Component Value Date    INR 1 05 12/20/2017   ,   Troponin: No results found for: TROPONINI,   Magnesium: No components found for: MAG,  Phosphorous: No results found for: PHOS    ABG: No results found for: PHART, LWO7LGK, PO2ART, BOY1XDS, E8AJFCWL, BEART, SOURCE,     Microbiology:      Imaging: I have personally reviewed the pertinent imaging studies on the PACS system  Ct head: no intracranial abnormality  Mild right periorbital swelling    Chest x-ray: No acute pulmonary disease    EKG/telemetry/Echo:    EKG: T wave inversion AVL, V1-V2      VTE Prophylaxis: Heparin    Code Status: No Order    Portions of the record may have been created with voice recognition software  Occasional wrong word or "sound a like" substitutions may have occurred due to the inherent limitations of voice recognition software  Read the chart carefully and recognize, using context, where substitutions have occurred

## 2017-12-21 LAB
ANION GAP SERPL CALCULATED.3IONS-SCNC: 4 MMOL/L (ref 4–13)
BASOPHILS # BLD AUTO: 0.06 THOUSANDS/ΜL (ref 0–0.1)
BASOPHILS NFR BLD AUTO: 1 % (ref 0–1)
BUN SERPL-MCNC: 12 MG/DL (ref 5–25)
CALCIUM SERPL-MCNC: 8.6 MG/DL (ref 8.3–10.1)
CHLORIDE SERPL-SCNC: 104 MMOL/L (ref 100–108)
CO2 SERPL-SCNC: 29 MMOL/L (ref 21–32)
CREAT SERPL-MCNC: 1.15 MG/DL (ref 0.6–1.3)
EOSINOPHIL # BLD AUTO: 0.31 THOUSAND/ΜL (ref 0–0.61)
EOSINOPHIL NFR BLD AUTO: 3 % (ref 0–6)
ERYTHROCYTE [DISTWIDTH] IN BLOOD BY AUTOMATED COUNT: 14.6 % (ref 11.6–15.1)
GFR SERPL CREATININE-BSD FRML MDRD: 45 ML/MIN/1.73SQ M
GLUCOSE SERPL-MCNC: 122 MG/DL (ref 65–140)
GLUCOSE SERPL-MCNC: 123 MG/DL (ref 65–140)
GLUCOSE SERPL-MCNC: 123 MG/DL (ref 65–140)
GLUCOSE SERPL-MCNC: 125 MG/DL (ref 65–140)
GLUCOSE SERPL-MCNC: 134 MG/DL (ref 65–140)
GLUCOSE SERPL-MCNC: 134 MG/DL (ref 65–140)
GLUCOSE SERPL-MCNC: 136 MG/DL (ref 65–140)
GLUCOSE SERPL-MCNC: 144 MG/DL (ref 65–140)
GLUCOSE SERPL-MCNC: 167 MG/DL (ref 65–140)
GLUCOSE SERPL-MCNC: 209 MG/DL (ref 65–140)
GLUCOSE SERPL-MCNC: 228 MG/DL (ref 65–140)
GLUCOSE SERPL-MCNC: 237 MG/DL (ref 65–140)
GLUCOSE SERPL-MCNC: 300 MG/DL (ref 65–140)
HCT VFR BLD AUTO: 42 % (ref 34.8–46.1)
HGB BLD-MCNC: 14.1 G/DL (ref 11.5–15.4)
LYMPHOCYTES # BLD AUTO: 3.01 THOUSANDS/ΜL (ref 0.6–4.47)
LYMPHOCYTES NFR BLD AUTO: 25 % (ref 14–44)
MCH RBC QN AUTO: 31.2 PG (ref 26.8–34.3)
MCHC RBC AUTO-ENTMCNC: 33.6 G/DL (ref 31.4–37.4)
MCV RBC AUTO: 93 FL (ref 82–98)
MONOCYTES # BLD AUTO: 0.88 THOUSAND/ΜL (ref 0.17–1.22)
MONOCYTES NFR BLD AUTO: 7 % (ref 4–12)
NEUTROPHILS # BLD AUTO: 7.83 THOUSANDS/ΜL (ref 1.85–7.62)
NEUTS SEG NFR BLD AUTO: 64 % (ref 43–75)
NRBC BLD AUTO-RTO: 0 /100 WBCS
PLATELET # BLD AUTO: 199 THOUSANDS/UL (ref 149–390)
PMV BLD AUTO: 11.1 FL (ref 8.9–12.7)
POTASSIUM SERPL-SCNC: 4.2 MMOL/L (ref 3.5–5.3)
RBC # BLD AUTO: 4.52 MILLION/UL (ref 3.81–5.12)
SODIUM SERPL-SCNC: 137 MMOL/L (ref 136–145)
TROPONIN I SERPL-MCNC: 0.02 NG/ML
TROPONIN I SERPL-MCNC: <0.02 NG/ML
WBC # BLD AUTO: 12.09 THOUSAND/UL (ref 4.31–10.16)

## 2017-12-21 PROCEDURE — 82948 REAGENT STRIP/BLOOD GLUCOSE: CPT

## 2017-12-21 PROCEDURE — 80048 BASIC METABOLIC PNL TOTAL CA: CPT | Performed by: NURSE PRACTITIONER

## 2017-12-21 PROCEDURE — 85025 COMPLETE CBC W/AUTO DIFF WBC: CPT | Performed by: NURSE PRACTITIONER

## 2017-12-21 PROCEDURE — 84484 ASSAY OF TROPONIN QUANT: CPT | Performed by: NURSE PRACTITIONER

## 2017-12-21 RX ORDER — MORPHINE SULFATE 2 MG/ML
1 INJECTION, SOLUTION INTRAMUSCULAR; INTRAVENOUS ONCE
Status: COMPLETED | OUTPATIENT
Start: 2017-12-21 | End: 2017-12-21

## 2017-12-21 RX ADMIN — MORPHINE SULFATE 1 MG: 2 INJECTION, SOLUTION INTRAMUSCULAR; INTRAVENOUS at 03:22

## 2017-12-21 RX ADMIN — HEPARIN SODIUM 5000 UNITS: 5000 INJECTION, SOLUTION INTRAVENOUS; SUBCUTANEOUS at 06:39

## 2017-12-21 RX ADMIN — HEPARIN SODIUM 5000 UNITS: 5000 INJECTION, SOLUTION INTRAVENOUS; SUBCUTANEOUS at 15:20

## 2017-12-21 RX ADMIN — ASPIRIN 81 MG: 81 TABLET ORAL at 08:42

## 2017-12-21 RX ADMIN — LISINOPRIL 40 MG: 20 TABLET ORAL at 08:42

## 2017-12-21 RX ADMIN — BUDESONIDE AND FORMOTEROL FUMARATE DIHYDRATE 2 PUFF: 160; 4.5 AEROSOL RESPIRATORY (INHALATION) at 17:03

## 2017-12-21 RX ADMIN — INSULIN LISPRO 2 UNITS: 100 INJECTION, SOLUTION INTRAVENOUS; SUBCUTANEOUS at 21:18

## 2017-12-21 RX ADMIN — FUROSEMIDE 40 MG: 40 TABLET ORAL at 08:42

## 2017-12-21 RX ADMIN — CLOPIDOGREL BISULFATE 75 MG: 75 TABLET ORAL at 08:42

## 2017-12-21 RX ADMIN — CARVEDILOL 6.25 MG: 6.25 TABLET, FILM COATED ORAL at 08:42

## 2017-12-21 RX ADMIN — HEPARIN SODIUM 5000 UNITS: 5000 INJECTION, SOLUTION INTRAVENOUS; SUBCUTANEOUS at 21:19

## 2017-12-21 RX ADMIN — PRAVASTATIN SODIUM 40 MG: 40 TABLET ORAL at 17:00

## 2017-12-21 RX ADMIN — CARVEDILOL 6.25 MG: 6.25 TABLET, FILM COATED ORAL at 17:00

## 2017-12-21 NOTE — PROGRESS NOTES
Progress Note - Critical Care   Danielle Borjas 81 y o  female MRN: [id number]  Unit/Bed#: ICU 13 Encounter: [id number]    Assessment/Plan:  1  Acute metabolic encephalopathy secondary to hypoglycemia  · Improved with normalization of blood sugar  Per family patient appears back to her  2  Hypoglycemia, suspect secondary to accidental medication overdose  · Improved  Patient initially on D10 infusion which has now been stopped  She is tolerating a regular diet and her blood sugar has been stable  3  Dm type 2  · Currently holding medications due to hypoglycemia  If blood sugar remains stable will restart home regimen consisting of Januvia and NovoLog  4  Hypertension  · Continue home regimen of lisinopril and Coreg  5  Dementia  · Apparently the patient has been managing her own medications at home  Case management consult has been placed to assess needs given concern over medication confusion and possible accidental overdose  6  Current smoker  · Encourage cessation    _____________________________________________________________________    HPI/24hr events:   Afebrile  Mental status improved  Blood sugar stable  No acute events overnight  Medications:    aspirin 81 mg Oral Daily   budesonide-formoterol 2 puff Inhalation BID   carvedilol 6 25 mg Oral BID With Meals   clopidogrel 75 mg Oral Daily   furosemide 40 mg Oral Daily   heparin (porcine) 5,000 Units Subcutaneous Q8H [address]   hydrALAZINE 10 mg Intravenous Once   Linaclotide 290 mcg Oral Daily   lisinopril 40 mg Oral Daily   pravastatin 40 mg Oral After Dinner         dextrose 50 mL/hr Last Rate: Stopped (12/21/17 0009)         Physical exam:  Vitals: Body mass index is 25 35 kg/m²  Blood pressure 136/89, pulse 66, temperature 98 1 °F (36 7 °C), temperature source Temporal, resp  rate 17, height 5' 5" (1 651 m), weight 69 1 kg (152 lb 5 4 oz), SpO2 97 %  ,  Temp  Min: 94 9 °F (34 9 °C)  Max: 98 2 °F (36 8 °C)  IBW: 57 kg    SpO2: 97 %  SpO2 Activity: At Rest  O2 Device: None (Room air)      Intake/Output Summary (Last 24 hours) at 12/21/17 0524  Last data filed at 12/21/17 0145   Gross per 24 hour   Intake          1961 67 ml   Output              750 ml   Net          1211 67 ml       Invasive/non-invasive ventilation settings:   Respiratory    Lab Data (Last 4 hours)    None         O2/Vent Data (Last 4 hours)    None              Invasive Devices     Peripheral Intravenous Line            Peripheral IV 12/20/17 Right Antecubital 1 day    Peripheral IV 12/20/17 Left Antecubital less than 1 day                  Physical Exam:  Gen:  Sleeping but easily arousable, no acute distress  HEENT:  Atraumatic, normocephalic, extraocular movements intact, pupils 4 mm equal and reactive, oropharynx clear  Neck:  Supple, trachea midline, no JVD, no lymphadenopathy  Chest:  Diminished with fine bibasilar crackles posteriorly  Cor:  Single S1/S2, no murmurs, rubs, gallops, regular rate and rhythm  Abd:  Soft, nontender, nondistended, bowel sounds normoactive  Ext:  No edema, no clubbing or cyanosis  Neuro:  Oriented x3, cranial nerves 2-12 grossly intact, following commands, no focal deficits  Skin:  Warm, dry, abrasion/blister to right buttock POA      Diagnostic Data:  Lab: I have personally reviewed pertinent lab results     CBC:     Results from last 7 days  Lab Units 12/21/17  0457 12/20/17  2047 12/20/17  1503 12/20/17  1454   WBC Thousand/uL 12 09*  --  12 04*  --    HEMOGLOBIN g/dL 14 1  --  14 3  --    I STAT HEMOGLOBIN g/dl  --   --   --  14 3   HEMATOCRIT % 42 0  --  42 6  --    PLATELETS Thousands/uL 199 207 193  --        CMP:     Results from last 7 days  Lab Units 12/21/17  0457 12/20/17  1502 12/20/17  1454   SODIUM mmol/L 137 136  --    POTASSIUM mmol/L 4 2 3 9  --    CHLORIDE mmol/L 104 102  --    CO2 mmol/L 29 29  --    BUN mg/dL 12 17  --    CREATININE mg/dL 1 15 1 14  --    CALCIUM mg/dL 8 6 8 9  --    TOTAL PROTEIN g/dL  --  5 9*  --    BILIRUBIN TOTAL mg/dL  --  0 39  --    ALK PHOS U/L  --  75  --    ALT U/L  --  21  --    AST U/L  --  18  --    GLUCOSE RANDOM mg/dL 134 113  --    GLUCOSE, ISTAT mg/dl  --   --  113     PT/INR:   Lab Results   Component Value Date    INR 1 05 12/20/2017   ,   Magnesium:     Phosphorous:       Microbiology:  Blood cultures x2 - pending    Imaging:  None new    Cardiac lab/EKG/telemetry/ECHO:   Sinus rhythm on telemetry    VTE Prophylaxis: Heparin, SCDs    Code Status: Level 1 - Full Code    Marvelyn Beauvais Spatzer, CRNP    Portions of the record may have been created with voice recognition software  Occasional wrong word or "sound a like" substitutions may have occurred due to the inherent limitations of voice recognition software  Read the chart carefully and recognize, using context, where substitutions have occurred

## 2017-12-21 NOTE — SOCIAL WORK
CM called the patients daughter, Faustino Edwards [phone], to discuss patients home situation  [id number]  Per Louisa Sierra, the patient was cooking at home alone while the other daughter was out at an appointment  Usually the patient is never alone, however, Anahy's car is not working right now so she is unable to stay with the patient when her sister has to step out of the home  The patient was recently discharged from [facility] [phone] on 12/15  Unfortunately, Louisa Sierra was unable to provide much information to CM  CM attempted to call the patients listed home telephone number [phone] to discuss home set up, however the phone went straight to VM  CM did not leave a VM as both daughters are [address]  speaking only  CM following

## 2017-12-21 NOTE — PLAN OF CARE
DISCHARGE PLANNING     Discharge to home or other facility with appropriate resources Progressing        Knowledge Deficit     Patient/family/caregiver demonstrates understanding of disease process, treatment plan, medications, and discharge instructions Progressing        METABOLIC, FLUID AND ELECTROLYTES - ADULT     Electrolytes maintained within normal limits Progressing     Fluid balance maintained Progressing     Glucose maintained within target range Progressing        MUSCULOSKELETAL - ADULT     Maintain or return mobility to safest level of function Progressing        NEUROSENSORY - ADULT     Achieves stable or improved neurological status Progressing        PAIN - ADULT     Verbalizes/displays adequate comfort level or baseline comfort level Progressing        Potential for Falls     Patient will remain free of falls Progressing        Prexisting or High Potential for Compromised Skin Integrity     Skin integrity is maintained or improved Progressing        SAFETY ADULT     Maintain or return to baseline ADL function Progressing     Maintain or return mobility status to optimal level Progressing

## 2017-12-21 NOTE — CASE MANAGEMENT
Initial Clinical Review    Admission: Date/Time/Statement: 12/20/17 @ 1644     Orders Placed This Encounter   Procedures    Inpatient Admission (expected length of stay for this patient is greater than two midnights)     Standing Status:   Standing     Number of Occurrences:   1     Order Specific Question:   Admitting Physician     Answer:   Laurel Tian     Order Specific Question:   Level of Care     Answer:   Level 1 Stepdown [13]     Order Specific Question:   Bed Type     Answer:   Maddyn [4]     Order Specific Question:   Estimated length of stay     Answer:   More than 2 Midnights     Order Specific Question:   Certification     Answer:   I certify that inpatient services are medically necessary for this patient for a duration of greater than two midnights  See H&P and MD Progress Notes for additional information about the patient's course of treatment  ED: Date/Time/Mode of Arrival:   ED Arrival Information     Expected Arrival Acuity Means of Arrival Escorted By Service Admission Type    - 12/20/2017 14:46 Emergent Ambulance [facility] [facility] Critical Care/ICU Emergency    Arrival Complaint    Unrepsonsive          Chief Complaint:   Chief Complaint   Patient presents with    Hypoglycemia - Symptomatic     pt comes from home  family was last seen fine earlier this am  family left for 2 hours and when they came back saw pt in couch twitching  they checked her blood sugar and it was 27  EMS called pt received  total 300ml of D10  pt also noted to be hypothermic with temperature of 94 9       History of Illness:   Carson Belcher is a 81 y o  female who presents to the emergency department after being found unresponsive by family members  Patient is Kenyan speaking only history is obtain by family members at bedside via translation  They state that she was in her normal state of health this morning with no complaints   Around 1 pm they went to make food and the patient started complaining that she was not feeling well, but did not describe symptoms  Shortly after when they looked back at her she was making movements with her arms that they describe as rhythmic  At that time they mentioned her eyes were open but she was unresponsive, denies incontinence  EMS was activated when they arrived they noted a blood glucose of 27  300 ml of D 10 was administered and on recheck blood glucose improved to 200's  Prior to arrival to the ER was noted to drop to 79, then in the ER was less than 20  Was started on a D10 infusion with improvement of blood glucose  Initially when her blood glucose improved she remained with an altered mental status and fluctuating consciousness  Now her mental status is improved and she is able to state that she is in the hospital   She does have a past medical history significant for hypertension, diabetes mellitus type 2, dementia, and hyperlipidemia  Family admits she takes her medications on her own but does have a visiting nurse at times  When asked if patient is able to take her medications appropriately they admit she is able to but has a history of dementia  The patient herself is complaining of chronic shortness of breath, but no acute issues         ED Vital Signs:   ED Triage Vitals   Temperature Pulse Respirations Blood Pressure SpO2   12/20/17 1505 12/20/17 1452 12/20/17 1452 12/20/17 1452 12/20/17 1452   (!) 96 6 °F (35 9 °C) 64 16 (!) 190/91 98 %      Temp Source Heart Rate Source Patient Position - Orthostatic VS BP Location FiO2 (%)   12/20/17 1505 12/20/17 1452 12/20/17 1452 12/20/17 1452 --   Rectal Monitor Lying Right arm       Pain Score       12/20/17 2147       No Pain        Wt Readings from Last 1 Encounters:   12/20/17 69 1 kg (152 lb 5 4 oz)       Vital Signs (abnormal):    above    Abnormal Labs/Diagnostic Test Results:    Total CK    263  Albumin    3 3  WBC   12 04  ABG    PO2    48 6        O2  hgb    83 3  Ct  Head;  No acute  Intracranial abnormality  CXR:  NAD    ED Treatment:   Medication Administration from 12/20/2017 1446 to 12/20/2017 2032       Date/Time Order Dose Route Action Action by Comments     12/20/2017 1606 sodium chloride 0 9 % bolus 1,000 mL 0 mL Intravenous Stopped Nita Ward RN      12/20/2017 1504 sodium chloride 0 9 % bolus 1,000 mL 1,000 mL Intravenous New Bag Bryn Sheridan, ROYER      12/20/2017 1620 dextrose infusion 10 % 100 mL/hr Intravenous Rate/Dose Change Darryl Nogueira RN      12/20/2017 1514 dextrose infusion 10 % 50 mL/hr Intravenous [address] Jovon Arenas RN      12/20/2017 1631 hydrALAZINE (APRESOLINE) injection 10 mg 0 mg Intravenous Hold Christos Qiu RN Per Dr Lucrecia Segal to hold at this time due to pts BP being 145/69     12/20/2017 1629 dextrose 50 % IV solution 50 mL   Intravenous Given Christos Qiu RN per Dr Lucrecia Segal to administer whole amp  Past Medical/Surgical History: Active Ambulatory Problems     Diagnosis Date Noted    No Active Ambulatory Problems     Resolved Ambulatory Problems     Diagnosis Date Noted    No Resolved Ambulatory Problems     Past Medical History:   Diagnosis Date    Alzheimer's dementia     Diabetes mellitus ([facility] [address]     Hyperlipidemia     Hypertension     Kidney stones        Admitting Diagnosis: Hypoglycemia [E16 2]  Hyperglycemia [R73 9]  Hypothermia, initial encounter [T68  XXXA]    Age/Sex: 81 y o  female    Assessment/Plan:   Acute metabolic encephalopathy due to hypoglycemia  ? Will admit patient to stepdown unit for closer monitoring, will require greater than 2 midnight hospitalization stay  ? Encephalopathy thought to be related to hypoglycemia  Patient has history of dementia and providing own medication  There was documentation from EMS that insulin and needles were found surrounding patient- unsure if overdose of insulin  ? Check urine drug screen  ? Will need case management consult due to medication confusion  2  Hypoglycemia  ?  On Januvia and Insulin at home, took medications this morning  ? Hold off on medications  ? Check blood glucose q 1 hour  ? Continue on D 10 infusion until blood glucose improves and able to tolerate PO diet  3  Diabetes mellitus type 2  ? Hold on Januvia and SSI coverage                        4  Hypertension  ? Will resume on home Lisinopril and Coreg  5  Dementia  ? Hold on ultram until mental status improves  ? May need more assistance at home with care  6  Tobacco abuse  ?  Smoking cessation       Admission Orders:   IP    12/20    @   9524  Scheduled Meds:   aspirin 81 mg Oral Daily   budesonide-formoterol 2 puff Inhalation BID   carvedilol 6 25 mg Oral BID With Meals   clopidogrel 75 mg Oral Daily   furosemide 40 mg Oral Daily   heparin (porcine) 5,000 Units Subcutaneous Q8H [address]   hydrALAZINE 10 mg Intravenous Once   Linaclotide 290 mcg Oral Daily   lisinopril 40 mg Oral Daily   pravastatin 40 mg Oral After Dinner     Continuous Infusions:    PRN Meds: hydrALAZINE    ondansetron    Reg diet  Serial troponin  Neuro checks  Q 2 hrs

## 2017-12-22 VITALS
WEIGHT: 152.34 LBS | SYSTOLIC BLOOD PRESSURE: 117 MMHG | RESPIRATION RATE: 20 BRPM | DIASTOLIC BLOOD PRESSURE: 64 MMHG | HEART RATE: 70 BPM | TEMPERATURE: 98.7 F | OXYGEN SATURATION: 100 % | HEIGHT: 67 IN | BODY MASS INDEX: 23.91 KG/M2

## 2017-12-22 LAB
ANION GAP SERPL CALCULATED.3IONS-SCNC: 7 MMOL/L (ref 4–13)
BUN SERPL-MCNC: 20 MG/DL (ref 5–25)
CALCIUM SERPL-MCNC: 9 MG/DL (ref 8.3–10.1)
CHLORIDE SERPL-SCNC: 101 MMOL/L (ref 100–108)
CO2 SERPL-SCNC: 30 MMOL/L (ref 21–32)
CREAT SERPL-MCNC: 1.32 MG/DL (ref 0.6–1.3)
GFR SERPL CREATININE-BSD FRML MDRD: 38 ML/MIN/1.73SQ M
GLUCOSE SERPL-MCNC: 168 MG/DL (ref 65–140)
GLUCOSE SERPL-MCNC: 195 MG/DL (ref 65–140)
GLUCOSE SERPL-MCNC: 267 MG/DL (ref 65–140)
POTASSIUM SERPL-SCNC: 4.5 MMOL/L (ref 3.5–5.3)
SODIUM SERPL-SCNC: 138 MMOL/L (ref 136–145)

## 2017-12-22 PROCEDURE — 80048 BASIC METABOLIC PNL TOTAL CA: CPT | Performed by: NURSE PRACTITIONER

## 2017-12-22 PROCEDURE — 82948 REAGENT STRIP/BLOOD GLUCOSE: CPT

## 2017-12-22 RX ADMIN — FUROSEMIDE 40 MG: 40 TABLET ORAL at 09:15

## 2017-12-22 RX ADMIN — LISINOPRIL 40 MG: 20 TABLET ORAL at 11:01

## 2017-12-22 RX ADMIN — INSULIN LISPRO 1 UNITS: 100 INJECTION, SOLUTION INTRAVENOUS; SUBCUTANEOUS at 09:01

## 2017-12-22 RX ADMIN — HEPARIN SODIUM 5000 UNITS: 5000 INJECTION, SOLUTION INTRAVENOUS; SUBCUTANEOUS at 06:04

## 2017-12-22 RX ADMIN — CLOPIDOGREL BISULFATE 75 MG: 75 TABLET ORAL at 09:16

## 2017-12-22 RX ADMIN — INSULIN LISPRO 2 UNITS: 100 INJECTION, SOLUTION INTRAVENOUS; SUBCUTANEOUS at 11:39

## 2017-12-22 RX ADMIN — ASPIRIN 81 MG: 81 TABLET ORAL at 09:15

## 2017-12-22 RX ADMIN — BUDESONIDE AND FORMOTEROL FUMARATE DIHYDRATE 2 PUFF: 160; 4.5 AEROSOL RESPIRATORY (INHALATION) at 09:16

## 2017-12-22 RX ADMIN — CARVEDILOL 6.25 MG: 6.25 TABLET, FILM COATED ORAL at 09:15

## 2017-12-22 NOTE — DISCHARGE SUMMARY
Discharge Summary - [facility]    Patient Information: Lois Egan 81 y o  female MRN: [id number]  Unit/Bed#: ICU 13 Encounter: [id number]    Discharging Physician / Practitioner: Brina Ham MD  PCP: Cristina Paredes MD  Admission Date: 12/20/2017  Discharge Date: 12/22/17    Reason for Admission:  Acute metabolic encephalopathy due to hypoglycemia    Discharge Diagnoses:  Hypoglycemia    Principal Problem:    Hypoglycemia  Active Problems:    Acute metabolic encephalopathy    Type 2 diabetes mellitus ([facility] [address] )    Hypertension  Resolved Problems:    * No resolved hospital problems  *      Consultations During Hospital Stay:  · Critical care  Procedures Performed:     Xr Chest 1 View Portable    Result Date: 12/20/2017  Narrative: CHEST INDICATION:  Altered mental status COMPARISON:  March 3, 2014 and prior VIEWS:   AP frontal IMAGES:  1 FINDINGS:     The heart mediastinum are stable  Hilar regions unremarkable  The lungs are clear  No pneumothorax or pleural effusion  Visualized osseous structures appear within normal limits for the patient's age  Impression: No active pulmonary disease  Workstation performed: [id number]     Ct Head Without Contrast    Result Date: 12/20/2017  Narrative: CT BRAIN - WITHOUT CONTRAST INDICATION:  Altered mental status COMPARISON:  3/3/2014 TECHNIQUE:  CT examination of the brain was performed  In addition to axial images, coronal reformatted images were created and submitted for interpretation  Radiation dose length product (DLP) for this visit:  1025 mGy-cm   This examination, like all CT scans performed in the [facility], was performed utilizing techniques to minimize radiation dose exposure, including the use of iterative reconstruction and automated exposure control  IMAGE QUALITY:  Diagnostic   FINDINGS:  PARENCHYMA:  Decreased attenuation is noted in the supratentorial white matter demonstrating an appearance most consistent with moderate microangiopathic change  No intracranial mass, mass effect or midline shift  No CT signs of acute infarction  There is no parenchymal hemorrhage  VENTRICLES AND EXTRA-AXIAL SPACES:  Normal for patient's age  VISUALIZED ORBITS AND PARANASAL SINUSES:  Orbits intact  There seems to be some right periorbital swelling  Sinuses are clear  CALVARIUM AND EXTRACRANIAL SOFT TISSUES:   Normal      Impression: No acute intracranial abnormality  Mild right periorbital swelling is suggested  Significance uncertain  Workstation performed: [id number]         Significant Findings:     · 27-year-old female after being found unresponsive by family members  Prior to presentation patient had been complaining of not feeling well  Days then saw arrhythmic arm movements and noticed that her eyes were open but she was unresponsive but denied any incontinence  EMS was called noted blood sugar on arrival of 27 in this known type 2 diabetic on insulin and Januvia  She received D10 with resultant blood sugars in the 200s and transported to ED where on presentation blood sugar was 79  Again noted further depressed blood sugar thereafter at down to 20 and received another D10 infusion  On arrival to intensive care unit patient reached baseline cognition and has stable vitals  On further history obtained by myself interview in the following morning as were unable to obtain family members for transport last night for discharge apparent that the patient may have taken a an overdose of her insulin unknown quantity  Patient apparently also has [facility] on board  Already  But family members admit that the patient takes medications on her own  · After observation overnight in the ICU she maintained stable blood sugars in the high 100 to 200 range in the absence of Januvia or insulin maintenance other than coverage    She is not considered in deemed stable for discharge but feel we have to make arrangements for ongoing visiting nurse associations at least in the short-term reason more frequent visits  And home health aide and  along with nursing will be contracted to see  · She can resume her dosage of Januvia 50 mg daily and insulin NovoLog 2 units with meals have instructed her not to adjust her insulin dosing based on blood sugars until evaluated by [facility]  · There is no change in other medications including lisinopril 40 mg daily, Linzess 290 mcg for ruled bowel, Lasix 40 mg daily, Plavix 75 mg daily, carvedilol 6 25 mg b i d  in Symbicort inhaler along with aspirin as prior    Incidental Findings:   · * none    Test Results Pending at Discharge (will require follow up): · None     Outpatient Tests Requested:  · None    Complications:  None    Hospital Course:     Joe Hinds is a 81 y o  female patient who originally presented to the hospital on 12/20/2017 due period of unresponsiveness  to please see above significant findings for hospital course and treatment plan      Condition at Discharge: good     Discharge Day Visit / Exam:     * Please refer to separate progress for these details *    Discharge instructions/Information to patient and family:   See after visit summary for information provided to patient and family  Provisions for Follow-Up Care:  See after visit summary for information related to follow-up care and any pertinent home health orders  Disposition:     Home with [facility] Services (Reminder: Complete face to face encounter)    For Discharges to [facility]:   · Not Applicable to this Patient - Not Applicable to this Patient      Discharge Statement:  I spent 56* minutes discharging the patient  This time was spent on the day of discharge  I had direct contact with the patient on the day of discharge   Greater than 50% of the total time was spent examining patient, answering all patient questions, arranging and discussing plan of care with patient as well as directly providing post-discharge instructions  Additional time then spent on discharge activities  Discharge Medications:  See after visit summary for reconciled discharge medications provided to patient and family  ** Please Note: Dragon 360 Dictation voice to text software may have been used in the creation of this document   **

## 2017-12-22 NOTE — DISCHARGE INSTR - APPOINTMENTS
[address]  Phone: [phone]  Fax: [address] [facility]  Phone: [phone]

## 2017-12-22 NOTE — SOCIAL WORK
CM met with the patient and her family today to address home concerns  The patient lives with her daughter in a single story apartment, 12 steps to enter  She is independent with adls and ambulation  Her family transports her to medical appointments  PCP is with [facility]  Pharmacy is also through [facility]  We discussed dangers of the patient being left home alone, per the granddaughter Amaya Novoa [phone] she will switch around her work schedule to help caring for her grandmother at home  There are concerns over meds being mixed up and not taken correctly  CM sent a referral to [facility] to provide medication teaching further at home  [facility] closed to new referrals  The patient was set up with [facility]  The granddaughter was agreeable to an Aging referral which was placed today with [facility], CM spoke with Jayant Eaton in intake  CM also provided the family with information on [facility]  Family to transport home

## 2017-12-25 LAB
BACTERIA BLD CULT: NORMAL
BACTERIA BLD CULT: NORMAL

## 2018-02-06 ENCOUNTER — DOCTOR'S OFFICE (OUTPATIENT)
Dept: URBAN - METROPOLITAN AREA CLINIC 136 | Facility: CLINIC | Age: 83
Setting detail: OPHTHALMOLOGY
End: 2018-02-06
Payer: COMMERCIAL

## 2018-02-06 ENCOUNTER — RX ONLY (RX ONLY)
Age: 83
End: 2018-02-06

## 2018-02-06 DIAGNOSIS — H35.371: ICD-10-CM

## 2018-02-06 DIAGNOSIS — H52.4: ICD-10-CM

## 2018-02-06 DIAGNOSIS — H40.89: ICD-10-CM

## 2018-02-06 PROCEDURE — 92020 GONIOSCOPY: CPT | Performed by: OPTOMETRIST

## 2018-02-06 PROCEDURE — 92015 DETERMINE REFRACTIVE STATE: CPT | Performed by: OPTOMETRIST

## 2018-02-06 PROCEDURE — 92004 COMPRE OPH EXAM NEW PT 1/>: CPT | Performed by: OPTOMETRIST

## 2018-02-06 ASSESSMENT — REFRACTION_MANIFEST
OS_VA1: 20/50
OS_VA2: 20/
OS_VA3: 20/
OD_VA3: 20/
OD_VA3: 20/
OS_ADD: +3.00
OU_VA: 20/
OD_VA1: 20/600
OS_CYLINDER: -3.00
OD_VA1: 20/
OD_VA1: 20/
OD_VA2: 20/
OS_AXIS: 090
OS_VA2: 20/
OS_VA1: 20/
OD_VA3: 20/
OS_SPHERE: +3.00
OS_VA3: 20/
OU_VA: 20/
OD_VA2: 20/
OD_VA2: 20/
OD_CYLINDER: -2.50
OD_AXIS: 085
OS_VA3: 20/
OU_VA: 20/50
OS_VA2: 20/
OS_VA1: 20/
OD_SPHERE: +1.00
OD_ADD: +3.00

## 2018-02-06 ASSESSMENT — CONFRONTATIONAL VISUAL FIELD TEST (CVF)
OS_COMMENTS: UNABLE
OD_COMMENTS: UNABLE

## 2018-02-06 ASSESSMENT — SPHEQUIV_DERIVED
OD_SPHEQUIV: -0.25
OS_SPHEQUIV: 1.5

## 2018-02-08 ASSESSMENT — REFRACTION_CURRENTRX
OD_OVR_VA: 20/
OS_OVR_VA: 20/
OS_OVR_VA: 20/
OD_OVR_VA: 20/
OS_OVR_VA: 20/
OD_OVR_VA: 20/

## 2018-02-08 ASSESSMENT — KERATOMETRY
OS_K2POWER_DIOPTERS: 47.74
OD_AXISANGLE_DEGREES: 47.25
OS_AXISANGLE_DEGREES: 096
OD_K2POWER_DIOPTERS: 48.00
OS_K1POWER_DIOPTERS: 46.25
OD_K1POWER_DIOPTERS: 46.50

## 2018-02-08 ASSESSMENT — REFRACTION_AUTOREFRACTION
OS_AXIS: 091
OD_SPHERE: +1.00
OS_SPHERE: +3.00
OD_AXIS: 085
OS_CYLINDER: -3.50
OD_CYLINDER: -3.50

## 2018-02-08 ASSESSMENT — AXIALLENGTH_DERIVED
OD_AL: 22.5544
OS_AL: 21.94

## 2018-02-08 ASSESSMENT — SPHEQUIV_DERIVED
OS_SPHEQUIV: 1.25
OD_SPHEQUIV: -0.75

## 2018-02-08 ASSESSMENT — VISUAL ACUITY
OS_BCVA: 20/400
OD_BCVA: 20/60

## 2018-04-18 LAB
ABSOL LYMPHOCYTES (HISTORICAL): 2.2 K/UL (ref 0.5–4)
ALBUMIN SERPL BCP-MCNC: 3.3 G/DL (ref 3–5.2)
ALP SERPL-CCNC: 98 U/L (ref 43–122)
ALT SERPL W P-5'-P-CCNC: 41 U/L (ref 9–52)
ANION GAP SERPL CALCULATED.3IONS-SCNC: 9 MMOL/L (ref 5–14)
AST SERPL W P-5'-P-CCNC: 15 U/L (ref 14–36)
BASOPHILS # BLD AUTO: 0 K/UL (ref 0–0.1)
BASOPHILS # BLD AUTO: 1 % (ref 0–1)
BILIRUB SERPL-MCNC: 0.3 MG/DL
BUN SERPL-MCNC: 15 MG/DL (ref 5–25)
CALCIUM SERPL-MCNC: 9.1 MG/DL (ref 8.4–10.2)
CHLORIDE SERPL-SCNC: 90 MEQ/L (ref 97–108)
CHOLEST SERPL-MCNC: 140 MG/DL
CHOLEST/HDLC SERPL: 3.9 {RATIO}
CO2 SERPL-SCNC: 29 MMOL/L (ref 22–30)
CREATINE, SERUM (HISTORICAL): 0.95 MG/DL (ref 0.6–1.2)
DEPRECATED RDW RBC AUTO: 16.2 %
EGFR (HISTORICAL): 56 ML/MIN/1.73 M2
EOSINOPHIL # BLD AUTO: 0.2 K/UL (ref 0–0.4)
EOSINOPHIL NFR BLD AUTO: 2 % (ref 0–6)
GLUCOSE SERPL-MCNC: 465 MG/DL (ref 70–99)
HCT VFR BLD AUTO: 40.8 % (ref 36–46)
HDLC SERPL-MCNC: 36 MG/DL
HGB BLD-MCNC: 13.4 G/DL (ref 12–16)
LDL/HDL RATIO (HISTORICAL): 2
LDLC SERPL CALC-MCNC: 72 MG/DL
LYMPHOCYTES NFR BLD AUTO: 24 % (ref 25–45)
MCH RBC QN AUTO: 31 PG (ref 26–34)
MCHC RBC AUTO-ENTMCNC: 33 % (ref 31–36)
MCV RBC AUTO: 94 FL (ref 80–100)
MONOCYTES # BLD AUTO: 0.4 K/UL (ref 0.2–0.9)
MONOCYTES NFR BLD AUTO: 5 % (ref 1–10)
NEUTROPHILS ABS COUNT (HISTORICAL): 6.2 K/UL (ref 1.8–7.8)
NEUTS SEG NFR BLD AUTO: 68 % (ref 45–65)
PLATELET # BLD AUTO: 222 K/MCL (ref 150–450)
POTASSIUM SERPL-SCNC: 5 MEQ/L (ref 3.6–5)
RBC # BLD AUTO: 4.33 M/MCL (ref 4–5.2)
SODIUM SERPL-SCNC: 129 MEQ/L (ref 137–147)
TOTAL PROTEIN (HISTORICAL): 5.5 G/DL (ref 5.9–8.4)
TRIGL SERPL-MCNC: 160 MG/DL
TSH SERPL DL<=0.05 MIU/L-ACNC: 2.09 UIU/ML (ref 0.47–4.68)
VLDLC SERPL CALC-MCNC: 32 MG/DL (ref 0–40)
WBC # BLD AUTO: 9.1 K/MCL (ref 4.5–11)

## 2018-04-19 LAB
EST. AVERAGE GLUCOSE BLD GHB EST-MCNC: 346 MG/DL
HBA1C MFR BLD HPLC: 13.7 %

## 2018-05-09 ENCOUNTER — CONVERSION ENCOUNTER (OUTPATIENT)
Dept: MAMMOGRAPHY | Facility: CLINIC | Age: 83
End: 2018-05-09

## 2018-06-07 ENCOUNTER — RX ONLY (RX ONLY)
Age: 83
End: 2018-06-07

## 2018-06-07 ENCOUNTER — DOCTOR'S OFFICE (OUTPATIENT)
Dept: URBAN - METROPOLITAN AREA CLINIC 136 | Facility: CLINIC | Age: 83
Setting detail: OPHTHALMOLOGY
End: 2018-06-07
Payer: COMMERCIAL

## 2018-06-07 DIAGNOSIS — H35.379: ICD-10-CM

## 2018-06-07 DIAGNOSIS — Z79.4: ICD-10-CM

## 2018-06-07 DIAGNOSIS — H40.89: ICD-10-CM

## 2018-06-07 DIAGNOSIS — H34.11: ICD-10-CM

## 2018-06-07 DIAGNOSIS — E11.9: ICD-10-CM

## 2018-06-07 PROCEDURE — 92134 CPTRZ OPH DX IMG PST SGM RTA: CPT | Performed by: OPHTHALMOLOGY

## 2018-06-07 PROCEDURE — 92014 COMPRE OPH EXAM EST PT 1/>: CPT | Performed by: OPHTHALMOLOGY

## 2018-06-07 PROCEDURE — 67028 INJECTION EYE DRUG: CPT | Performed by: OPHTHALMOLOGY

## 2018-06-07 PROCEDURE — 92133 CPTRZD OPH DX IMG PST SGM ON: CPT | Performed by: OPHTHALMOLOGY

## 2018-06-07 PROCEDURE — 92020 GONIOSCOPY: CPT | Performed by: OPHTHALMOLOGY

## 2018-06-07 ASSESSMENT — CONFRONTATIONAL VISUAL FIELD TEST (CVF)
OD_COMMENTS: UNABLE
OS_COMMENTS: UNABLE

## 2018-06-11 ENCOUNTER — RX ONLY (RX ONLY)
Age: 83
End: 2018-06-11

## 2018-06-11 ASSESSMENT — REFRACTION_MANIFEST
OS_VA2: 20/
OS_VA3: 20/
OU_VA: 20/50
OD_VA2: 20/
OD_VA1: 20/
OS_ADD: +3.00
OS_VA3: 20/
OD_AXIS: 085
OU_VA: 20/
OD_SPHERE: +1.00
OD_VA3: 20/
OS_VA2: 20/
OS_VA1: 20/50
OS_SPHERE: +3.00
OS_AXIS: 090
OD_VA2: 20/
OS_VA1: 20/
OD_CYLINDER: -2.50
OS_VA2: 20/
OD_VA2: 20/
OU_VA: 20/
OS_VA1: 20/
OD_VA3: 20/
OS_VA3: 20/
OD_VA1: 20/600
OD_VA3: 20/
OD_ADD: +3.00
OD_VA1: 20/
OS_CYLINDER: -3.00

## 2018-06-11 ASSESSMENT — AXIALLENGTH_DERIVED
OS_AL: 21.99
OD_AL: 22.3778
OD_AL: 23.1483
OS_AL: 21.86

## 2018-06-11 ASSESSMENT — REFRACTION_CURRENTRX
OS_OVR_VA: 20/
OD_OVR_VA: 20/
OS_OVR_VA: 20/
OD_OVR_VA: 20/
OS_OVR_VA: 20/
OD_OVR_VA: 20/

## 2018-06-11 ASSESSMENT — SPHEQUIV_DERIVED
OS_SPHEQUIV: 1.125
OD_SPHEQUIV: -2.375
OS_SPHEQUIV: 1.5
OD_SPHEQUIV: -0.25

## 2018-06-11 ASSESSMENT — KERATOMETRY
OD_K1POWER_DIOPTERS: 46.50
OS_K2POWER_DIOPTERS: 47.74
OD_K2POWER_DIOPTERS: 48.00
OS_K1POWER_DIOPTERS: 46.25
OS_AXISANGLE_DEGREES: 096
OD_AXISANGLE_DEGREES: 47.25

## 2018-06-11 ASSESSMENT — REFRACTION_AUTOREFRACTION
OS_AXIS: 098
OS_SPHERE: +2.75
OD_SPHERE: -1.00
OS_CYLINDER: -3.25
OD_AXIS: 060
OD_CYLINDER: -2.75

## 2018-06-11 ASSESSMENT — VISUAL ACUITY
OD_BCVA: 20/40-2
OS_BCVA: 20/NLP

## 2018-06-14 ENCOUNTER — DOCTOR'S OFFICE (OUTPATIENT)
Dept: URBAN - METROPOLITAN AREA CLINIC 136 | Facility: CLINIC | Age: 83
Setting detail: OPHTHALMOLOGY
End: 2018-06-14
Payer: COMMERCIAL

## 2018-06-14 DIAGNOSIS — F03.91: ICD-10-CM

## 2018-06-14 DIAGNOSIS — H40.89: ICD-10-CM

## 2018-06-14 DIAGNOSIS — E11.9: ICD-10-CM

## 2018-06-14 DIAGNOSIS — H35.379: ICD-10-CM

## 2018-06-14 DIAGNOSIS — H34.11: ICD-10-CM

## 2018-06-14 DIAGNOSIS — Z79.4: ICD-10-CM

## 2018-06-14 PROCEDURE — 92012 INTRM OPH EXAM EST PATIENT: CPT | Performed by: OPHTHALMOLOGY

## 2018-06-14 ASSESSMENT — CONFRONTATIONAL VISUAL FIELD TEST (CVF)
OS_COMMENTS: UNABLE
OD_COMMENTS: UNABLE

## 2018-06-15 ASSESSMENT — REFRACTION_MANIFEST
OS_VA2: 20/
OD_CYLINDER: -2.50
OD_ADD: +3.00
OU_VA: 20/
OS_VA1: 20/
OS_ADD: +3.00
OD_VA1: 20/
OU_VA: 20/50
OD_VA3: 20/
OD_VA2: 20/
OS_AXIS: 090
OS_VA2: 20/
OD_VA1: 20/600
OU_VA: 20/
OS_CYLINDER: -3.00
OD_VA1: 20/
OS_VA3: 20/
OD_VA2: 20/
OD_VA3: 20/
OS_VA1: 20/
OS_VA1: 20/50
OS_VA3: 20/
OD_VA2: 20/
OD_VA3: 20/
OS_VA2: 20/
OS_SPHERE: +3.00
OD_AXIS: 085
OD_SPHERE: +1.00
OS_VA3: 20/

## 2018-06-15 ASSESSMENT — SPHEQUIV_DERIVED
OS_SPHEQUIV: 1.125
OS_SPHEQUIV: 1.5
OD_SPHEQUIV: -0.25
OD_SPHEQUIV: -2.375

## 2018-06-15 ASSESSMENT — REFRACTION_AUTOREFRACTION
OS_AXIS: 098
OD_SPHERE: -1.00
OD_AXIS: 060
OS_SPHERE: +2.75
OD_CYLINDER: -2.75
OS_CYLINDER: -3.25

## 2018-06-15 ASSESSMENT — REFRACTION_CURRENTRX
OS_OVR_VA: 20/
OS_OVR_VA: 20/
OD_OVR_VA: 20/
OS_OVR_VA: 20/

## 2018-06-15 ASSESSMENT — VISUAL ACUITY
OD_BCVA: 20/60
OS_BCVA: 20/NLP

## 2018-06-19 LAB
ANION GAP SERPL CALCULATED.3IONS-SCNC: 9 MMOL/L (ref 5–14)
BUN SERPL-MCNC: 28 MG/DL (ref 5–25)
CALCIUM SERPL-MCNC: 9.4 MG/DL (ref 8.4–10.2)
CHLORIDE SERPL-SCNC: 101 MEQ/L (ref 97–108)
CO2 SERPL-SCNC: 29 MMOL/L (ref 22–30)
CREATINE, SERUM (HISTORICAL): 1.26 MG/DL (ref 0.6–1.2)
EGFR (HISTORICAL): 41 ML/MIN/1.73 M2
GLUCOSE SERPL-MCNC: 136 MG/DL (ref 70–99)
POTASSIUM SERPL-SCNC: 5.2 MEQ/L (ref 3.6–5)
SODIUM SERPL-SCNC: 139 MEQ/L (ref 137–147)

## 2018-07-05 ENCOUNTER — OFFICE VISIT (OUTPATIENT)
Dept: FAMILY MEDICINE CLINIC | Facility: CLINIC | Age: 83
End: 2018-07-05
Payer: MEDICARE

## 2018-07-05 VITALS
BODY MASS INDEX: 25.44 KG/M2 | SYSTOLIC BLOOD PRESSURE: 120 MMHG | OXYGEN SATURATION: 96 % | RESPIRATION RATE: 16 BRPM | WEIGHT: 149 LBS | HEART RATE: 73 BPM | TEMPERATURE: 97 F | DIASTOLIC BLOOD PRESSURE: 60 MMHG | HEIGHT: 64 IN

## 2018-07-05 DIAGNOSIS — Z79.4 TYPE 2 DIABETES MELLITUS WITH DIABETIC NEPHROPATHY, WITH LONG-TERM CURRENT USE OF INSULIN (HCC): Primary | ICD-10-CM

## 2018-07-05 DIAGNOSIS — E11.21 TYPE 2 DIABETES MELLITUS WITH DIABETIC NEPHROPATHY, WITH LONG-TERM CURRENT USE OF INSULIN (HCC): Primary | ICD-10-CM

## 2018-07-05 DIAGNOSIS — G93.41 ACUTE METABOLIC ENCEPHALOPATHY: ICD-10-CM

## 2018-07-05 PROCEDURE — 99214 OFFICE O/P EST MOD 30 MIN: CPT | Performed by: FAMILY MEDICINE

## 2018-07-05 RX ORDER — CARVEDILOL 6.25 MG/1
1 TABLET ORAL EVERY 12 HOURS
COMMUNITY
Start: 2018-02-12 | End: 2018-07-05 | Stop reason: SDUPTHER

## 2018-07-05 RX ORDER — AMITRIPTYLINE HYDROCHLORIDE 50 MG/1
1 TABLET, FILM COATED ORAL EVERY 24 HOURS
COMMUNITY
Start: 2018-02-12 | End: 2018-07-05

## 2018-07-05 RX ORDER — PREGABALIN 100 MG/1
1 CAPSULE ORAL EVERY 12 HOURS
COMMUNITY
Start: 2018-04-18 | End: 2018-07-05 | Stop reason: ALTCHOICE

## 2018-07-05 RX ORDER — BRIMONIDINE TARTRATE 1.5 MG/ML
SOLUTION/ DROPS OPHTHALMIC
Refills: 6 | COMMUNITY
Start: 2018-07-03 | End: 2018-08-07 | Stop reason: SDUPTHER

## 2018-07-05 RX ORDER — ATORVASTATIN CALCIUM 40 MG/1
1 TABLET, FILM COATED ORAL EVERY 24 HOURS
COMMUNITY
Start: 2018-02-12 | End: 2018-08-07 | Stop reason: SDUPTHER

## 2018-07-05 RX ORDER — FUROSEMIDE 20 MG/1
1 TABLET ORAL
Refills: 1 | COMMUNITY
Start: 2018-06-19 | End: 2018-08-07 | Stop reason: ALTCHOICE

## 2018-07-05 RX ORDER — LANCETS 32 GAUGE
EACH MISCELLANEOUS
COMMUNITY
Start: 2018-02-12 | End: 2018-08-13 | Stop reason: SDUPTHER

## 2018-07-05 RX ORDER — ACETAZOLAMIDE 500 MG/1
CAPSULE, EXTENDED RELEASE ORAL
Refills: 3 | COMMUNITY
Start: 2018-06-15 | End: 2018-07-05

## 2018-07-05 RX ORDER — ASPIRIN 81 MG/1
1 TABLET, CHEWABLE ORAL EVERY 24 HOURS
COMMUNITY
Start: 2018-02-12 | End: 2018-08-07 | Stop reason: SDUPTHER

## 2018-07-05 RX ORDER — INSULIN ASPART 100 [IU]/ML
INJECTION, SOLUTION INTRAVENOUS; SUBCUTANEOUS
Refills: 3 | COMMUNITY
Start: 2018-06-04 | End: 2018-08-07 | Stop reason: SDUPTHER

## 2018-07-05 RX ORDER — OMEPRAZOLE 20 MG/1
1 CAPSULE, DELAYED RELEASE ORAL
COMMUNITY
Start: 2018-02-12 | End: 2018-08-07 | Stop reason: SDUPTHER

## 2018-07-05 RX ORDER — PEN NEEDLE, DIABETIC 32GX 5/32"
NEEDLE, DISPOSABLE MISCELLANEOUS
Refills: 5 | COMMUNITY
Start: 2018-05-20 | End: 2018-08-13 | Stop reason: SDUPTHER

## 2018-07-05 NOTE — ASSESSMENT & PLAN NOTE
Lab Results   Component Value Date    HGBA1C 13 7 (H) 04/18/2018       No results for input(s): POCGLU in the last 72 hours  Blood Sugar Average: Last 72 hrs:  170        Her blood sugars seems bit been in better control continue with the same treatment, there is no change for diabetes at this time

## 2018-07-05 NOTE — PROGRESS NOTES
Assessment/Plan:    Type 2 diabetes mellitus (HCC)  Lab Results   Component Value Date    HGBA1C 13 7 (H) 04/18/2018       No results for input(s): POCGLU in the last 72 hours  Blood Sugar Average: Last 72 hrs:  170  Her blood sugars seems bit been in better control continue with the same treatment, there is no change for diabetes at this time      Acute metabolic encephalopathy   This possible related to combination of medications like Lyrica and amitriptyline  I am discontinue those to medications and reassess her in one week  Other medication discontinued at this time ais acethazolamide, and diclofenac  Diagnoses and all orders for this visit:    Type 2 diabetes mellitus with diabetic nephropathy, with long-term current use of insulin (HCC)    Acute metabolic encephalopathy    Other orders  -     Discontinue: acetaZOLAMIDE (DIAMOX) 500 mg capsule; TAKE ONE CAPSULE BY MOUTH TWICE DAILY ROSITA TOMASA CAPSULA POR VIA ORAL DOS VECES AL MAC  -     Discontinue: amitriptyline (ELAVIL) 50 mg tablet; Take 1 tablet by mouth every 24 hours  -     aspirin (ASPIRIN 81) 81 mg chewable tablet; Chew 1 tablet every 24 hours  -     atorvastatin (LIPITOR) 40 mg tablet; Take 1 tablet by mouth every 24 hours  -     ipratropium (ATROVENT HFA) 17 mcg/act inhaler; Every 6 hours  -     ALPHAGAN P 0 15 % ophthalmic solution; INSTILL 1 DROP INTO BOTH EYES TWICE DAILY INSTILL 1 DROP EN AMBOS OJOS DOS VECES AL MAC  -     Discontinue: diclofenac sodium (VOLTAREN) 50 mg EC tablet; Take 1 tablet by mouth Every 12 hours  -     furosemide (LASIX) 20 mg tablet;  Take 1 tablet by mouth  -     NOVOLOG FLEXPEN 100 units/mL injection pen; INJECT BY SUBCUTANEOUSLY 10 UNITS 3 TIMES A DAY WITH A MEAL INJECT BY SUBCUTANEOUSLY 10 UNITS JONAH VECES AL MAC WITH A MEAL  -     GLOBAL EASY GLIDE PEN NEEDLES 32G X 4 MM MISC; USE TO INJECT INSULIN 5 TIMES A DAY USE A / HASTA INJECT INSULIN 5 VECES A DAY  -     Discontinue: pregabalin (LYRICA) 100 mg capsule; Take 1 capsule by mouth Every 12 hours  -     omeprazole (PriLOSEC) 20 mg delayed release capsule; Take 1 capsule by mouth  -     denosumab (PROLIA) 60 mg/mL; inject 1 milliliter by subcutaneous route  every 6 months in the upper arm, upper thigh or abdomen  -     Discontinue: carvedilol (COREG) 6 25 mg tablet; Take 1 tablet by mouth Every 12 hours  -     Discontinue: sitaGLIPtin (JANUVIA) 50 mg tablet; Take 1 tablet by mouth every 24 hours  -     EASY TOUCH LANCETS 32G MISC; CHECK 3 times per DAY  -     glucose blood test strip; check blood sugar by Subcutaneous route 4 times per day, every am fasting and 2 hrs after each meal   -     Discontinue: Insulin Pen Needle 32G X 4 MM MISC; USE TO INJECT INSULIN 5 TIMES A DAYUSE A / HASTA INJECT INSULIN 5 VECES A DAY          Subjective:      Patient ID: Nadene Ganser is a 81 y o  female  Pt brought as a walk in by family  PT having multiple falls at home  Pt has problem with gait  Family states pt having increased memory loss and weakness in her legs  Her daughter states that this problem is going on for two weeks after change of medication which include with increased doses of Lyrica and I in amitriptyline to her regular regimen she is taking both of the medications in a m  And p m  And also taking in Voltaren 50 mg twice a day  She is a diabetic patient and I review her records of home blood sugar which seems very well controlled  She had an episode of 66 in I explained to the family that lower blood sugar was more than higher numbers of 200 to 300 hundreds for that episode of hypoglycemia can cause brain damage, More than hyperglycemia which not at the range of hyperosmolar state  Diabetes   She presents for her follow-up diabetic visit  She has type 2 diabetes mellitus  Hypoglycemia symptoms include confusion and mood changes  Associated symptoms include weakness  Symptoms are worsening   Risk factors for coronary artery disease include dyslipidemia and diabetes mellitus  The following portions of the patient's history were reviewed and updated as appropriate: allergies, current medications, past family history, past surgical history and problem list     Review of Systems   Constitutional: Positive for activity change and fever  Musculoskeletal: Positive for gait problem (  She is unable to walk,  wheelchair-bound today)  Neurological: Positive for weakness  Psychiatric/Behavioral: Positive for confusion  Objective:      /60 (BP Location: Left arm, Patient Position: Sitting, Cuff Size: Standard)   Pulse 73   Temp (!) 97 °F (36 1 °C) (Oral)   Resp 16   Ht 5' 4" (1 626 m)   Wt 67 6 kg (149 lb)   LMP  (LMP Unknown)   SpO2 96%   Breastfeeding? No   BMI 25 58 kg/m²          Physical Exam   Constitutional: She appears well-developed and well-nourished  She appears lethargic  Cardiovascular: Normal rate, regular rhythm and normal heart sounds  Neurological: She appears lethargic  No cranial nerve deficit or sensory deficit  She exhibits abnormal muscle tone ( she has a low tone, Her strength is four but symmetrical)  Gait ( unable to stand ) abnormal    Skin: Skin is warm and dry  Psychiatric:    She is not herself anymore which she is to be frightful  She has some comments today that he make any sense she states having no pain but she  is disoriented

## 2018-07-05 NOTE — ASSESSMENT & PLAN NOTE
This possible related to combination of medications like Lyrica and amitriptyline  I am discontinue those to medications and reassess her in one week  Other medication discontinued at this time ais acethazolamide, and diclofenac

## 2018-07-08 ENCOUNTER — APPOINTMENT (EMERGENCY)
Dept: RADIOLOGY | Facility: HOSPITAL | Age: 83
End: 2018-07-08
Payer: MEDICARE

## 2018-07-08 ENCOUNTER — HOSPITAL ENCOUNTER (EMERGENCY)
Facility: HOSPITAL | Age: 83
Discharge: HOME | End: 2018-07-08
Attending: EMERGENCY MEDICINE | Admitting: EMERGENCY MEDICINE
Payer: MEDICARE

## 2018-07-08 ENCOUNTER — APPOINTMENT (EMERGENCY)
Dept: CT IMAGING | Facility: HOSPITAL | Age: 83
End: 2018-07-08
Payer: MEDICARE

## 2018-07-08 VITALS
WEIGHT: 140 LBS | SYSTOLIC BLOOD PRESSURE: 162 MMHG | TEMPERATURE: 97.5 F | DIASTOLIC BLOOD PRESSURE: 96 MMHG | RESPIRATION RATE: 16 BRPM | HEART RATE: 79 BPM | BODY MASS INDEX: 24.03 KG/M2 | OXYGEN SATURATION: 98 %

## 2018-07-08 DIAGNOSIS — F05: ICD-10-CM

## 2018-07-08 DIAGNOSIS — R51.9 HEADACHE: Primary | ICD-10-CM

## 2018-07-08 DIAGNOSIS — G30.9: ICD-10-CM

## 2018-07-08 LAB
ALBUMIN SERPL BCP-MCNC: 4.1 G/DL (ref 3–5.2)
ALP SERPL-CCNC: 122 U/L (ref 43–122)
ALT SERPL W P-5'-P-CCNC: 31 U/L (ref 9–52)
ANION GAP SERPL CALCULATED.3IONS-SCNC: 12 MMOL/L (ref 5–14)
APTT PPP: 24 SECONDS (ref 23–34)
AST SERPL W P-5'-P-CCNC: 23 U/L (ref 14–36)
BACTERIA UR QL AUTO: ABNORMAL /HPF
BASOPHILS # BLD AUTO: 0 THOUSANDS/ΜL (ref 0–0.1)
BASOPHILS NFR BLD AUTO: 0 % (ref 0–1)
BILIRUB SERPL-MCNC: 0.5 MG/DL
BILIRUB UR QL STRIP: NEGATIVE
BUN SERPL-MCNC: 24 MG/DL (ref 5–25)
CALCIUM SERPL-MCNC: 9.4 MG/DL (ref 8.4–10.2)
CHLORIDE SERPL-SCNC: 103 MMOL/L (ref 97–108)
CLARITY UR: CLEAR
CO2 SERPL-SCNC: 23 MMOL/L (ref 22–30)
COLOR UR: YELLOW
CREAT SERPL-MCNC: 1.13 MG/DL (ref 0.6–1.2)
EOSINOPHIL # BLD AUTO: 0 THOUSAND/ΜL (ref 0–0.4)
EOSINOPHIL NFR BLD AUTO: 0 % (ref 0–6)
ERYTHROCYTE [DISTWIDTH] IN BLOOD BY AUTOMATED COUNT: 14.2 %
ERYTHROCYTE [SEDIMENTATION RATE] IN BLOOD: 33 MM/HOUR (ref 1–20)
GFR SERPL CREATININE-BSD FRML MDRD: 46 ML/MIN/1.73SQ M
GLUCOSE SERPL-MCNC: 118 MG/DL (ref 70–99)
GLUCOSE UR STRIP-MCNC: NEGATIVE MG/DL
HCT VFR BLD AUTO: 45.2 % (ref 36–46)
HGB BLD-MCNC: 15 G/DL (ref 12–16)
HGB UR QL STRIP.AUTO: NEGATIVE
INR PPP: 1.05 (ref 0.89–1.1)
KETONES UR STRIP-MCNC: NEGATIVE MG/DL
LEUKOCYTE ESTERASE UR QL STRIP: NEGATIVE
LYMPHOCYTES # BLD AUTO: 2 THOUSANDS/ΜL (ref 0.5–4)
LYMPHOCYTES NFR BLD AUTO: 16 % (ref 20–50)
MCH RBC QN AUTO: 31.1 PG (ref 26–34)
MCHC RBC AUTO-ENTMCNC: 33.2 G/DL (ref 31–36)
MCV RBC AUTO: 94 FL (ref 80–100)
MONOCYTES # BLD AUTO: 0.6 THOUSAND/ΜL (ref 0.2–0.9)
MONOCYTES NFR BLD AUTO: 5 % (ref 1–10)
NEUTROPHILS # BLD AUTO: 9.4 THOUSANDS/ΜL (ref 1.8–7.8)
NEUTS SEG NFR BLD AUTO: 78 % (ref 45–65)
NITRITE UR QL STRIP: NEGATIVE
NON-SQ EPI CELLS URNS QL MICRO: ABNORMAL /HPF
PH UR STRIP.AUTO: 7 [PH] (ref 4.5–8)
PLATELET # BLD AUTO: 175 THOUSANDS/UL (ref 150–450)
PMV BLD AUTO: 10.5 FL (ref 8.9–12.7)
POTASSIUM SERPL-SCNC: 4.1 MMOL/L (ref 3.6–5)
PROT SERPL-MCNC: 7.3 G/DL (ref 5.9–8.4)
PROT UR STRIP-MCNC: ABNORMAL MG/DL
PROTHROMBIN TIME: 11.1 SECONDS (ref 9.5–11.6)
RBC # BLD AUTO: 4.83 MILLION/UL (ref 4–5.2)
RBC #/AREA URNS AUTO: ABNORMAL /HPF
SODIUM SERPL-SCNC: 138 MMOL/L (ref 137–147)
SP GR UR STRIP.AUTO: 1.01 (ref 1–1.04)
UROBILINOGEN UA: NEGATIVE MG/DL
WBC # BLD AUTO: 12.1 THOUSAND/UL (ref 4.5–11)
WBC #/AREA URNS AUTO: ABNORMAL /HPF

## 2018-07-08 PROCEDURE — 99284 EMERGENCY DEPT VISIT MOD MDM: CPT

## 2018-07-08 PROCEDURE — 85730 THROMBOPLASTIN TIME PARTIAL: CPT | Performed by: EMERGENCY MEDICINE

## 2018-07-08 PROCEDURE — 81001 URINALYSIS AUTO W/SCOPE: CPT | Performed by: EMERGENCY MEDICINE

## 2018-07-08 PROCEDURE — 96360 HYDRATION IV INFUSION INIT: CPT

## 2018-07-08 PROCEDURE — 85652 RBC SED RATE AUTOMATED: CPT | Performed by: EMERGENCY MEDICINE

## 2018-07-08 PROCEDURE — 71045 X-RAY EXAM CHEST 1 VIEW: CPT

## 2018-07-08 PROCEDURE — 36415 COLL VENOUS BLD VENIPUNCTURE: CPT | Performed by: EMERGENCY MEDICINE

## 2018-07-08 PROCEDURE — 80053 COMPREHEN METABOLIC PANEL: CPT | Performed by: EMERGENCY MEDICINE

## 2018-07-08 PROCEDURE — 85610 PROTHROMBIN TIME: CPT | Performed by: EMERGENCY MEDICINE

## 2018-07-08 PROCEDURE — 85025 COMPLETE CBC W/AUTO DIFF WBC: CPT | Performed by: EMERGENCY MEDICINE

## 2018-07-08 PROCEDURE — 96361 HYDRATE IV INFUSION ADD-ON: CPT

## 2018-07-08 PROCEDURE — 70450 CT HEAD/BRAIN W/O DYE: CPT

## 2018-07-08 RX ORDER — SODIUM CHLORIDE 9 MG/ML
125 INJECTION, SOLUTION INTRAVENOUS CONTINUOUS
Status: DISCONTINUED | OUTPATIENT
Start: 2018-07-08 | End: 2018-07-08 | Stop reason: HOSPADM

## 2018-07-08 RX ORDER — PREGABALIN 100 MG/1
50 CAPSULE ORAL 3 TIMES DAILY
COMMUNITY
End: 2018-08-07 | Stop reason: SDUPTHER

## 2018-07-08 RX ORDER — LISINOPRIL 10 MG/1
TABLET ORAL DAILY
COMMUNITY
End: 2018-08-07 | Stop reason: SDUPTHER

## 2018-07-08 RX ADMIN — SODIUM CHLORIDE 125 ML/HR: 9 INJECTION, SOLUTION INTRAVENOUS at 14:16

## 2018-07-08 NOTE — ED NOTES
Attempt x 1 for IV to [facility] - unsuccessful - blood obtained - then unable to advance cath      Maury Jon, ROYER  07/08/18 [id number]

## 2018-07-08 NOTE — ED PROVIDER NOTES
History  Chief Complaint   Patient presents with    Vomiting     via  pt  states that she has chronic  pain to rt  side of forehead  - is being treated for " rt eye loss and some medication that is eating the rt side of her  brain "  2 days ago started with vomiting  - pt  moaning on arrival      Reportedly having vomiting and right-sided headache  Initiate a unable to obtain accurate history secondary to language barier  Initially only the patient herself and patient's sister was at the bedside present in [facility] spoke [place]  History provided by:  Patient and relative (Patient's sister who only speaks Czech)  History limited by: Initially language barrier  Vomiting   Severity:  Unable to specify  Timing:  Constant  Quality:  Unable to specify  Progression:  Unable to specify  Recent urination:  Decreased  Relieved by:  Nothing  Worsened by:  Nothing  Ineffective treatments:  None tried  Associated symptoms: headaches    Associated symptoms: no abdominal pain, no arthralgias, no chills, no cough, no diarrhea, no fever, no myalgias, no sore throat and no URI    Risk factors: diabetes    Risk factors: no alcohol use, not pregnant, no prior abdominal surgery, no sick contacts, no suspect food intake and no travel to endemic areas        Prior to Admission Medications   Prescriptions Last Dose Informant Patient Reported? Taking?    ALPHAGAN P 0 15 % ophthalmic solution   Yes No   Sig: INSTILL 1 DROP INTO BOTH EYES TWICE DAILY INSTILL 1 DROP EN AMBOS OJOS DOS VECES AL MAC   EASY TOUCH LANCETS 32G MISC   Yes No   Sig: CHECK 3 times per DAY   GLOBAL EASY GLIDE PEN NEEDLES 32G X 4 MM MISC   Yes No   Sig: USE TO INJECT INSULIN 5 TIMES A DAY USE A / HASTA INJECT INSULIN 5 VECES A DAY   NOVOLOG FLEXPEN 100 units/mL injection pen   Yes No   Sig: INJECT BY SUBCUTANEOUSLY 10 UNITS 3 TIMES A DAY WITH A MEAL INJECT BY SUBCUTANEOUSLY 10 UNITS JONAH VECES AL MAC WITH A MEAL   aspirin (ASPIRIN 81) 81 mg chewable tablet   Yes Yes   Sig: Chew 1 tablet every 24 hours   atorvastatin (LIPITOR) 40 mg tablet   Yes Yes   Sig: Take 1 tablet by mouth every 24 hours   budesonide-formoterol (SYMBICORT) 160-4 5 mcg/act inhaler   Yes No   Sig: Inhale 1-2 puffs 2 (two) times a day   carvedilol (COREG) 6 25 mg tablet   Yes Yes   Sig: Take 6 25 mg by mouth 2 (two) times a day with meals   clopidogrel (PLAVIX) 75 mg tablet   Yes No   Sig: Take 75 mg by mouth daily   denosumab (PROLIA) 60 mg/mL   Yes No   Sig: inject 1 milliliter by subcutaneous route  every 6 months in the upper arm, upper thigh or abdomen   furosemide (LASIX) 20 mg tablet   Yes No   Sig: Take 1 tablet by mouth   glucose blood test strip   Yes No   Sig: check blood sugar by Subcutaneous route 4 times per day, every am fasting and 2 hrs after each meal    ipratropium (ATROVENT HFA) 17 mcg/act inhaler   Yes Yes   Sig: Every 6 hours   lisinopril (ZESTRIL) 10 mg tablet   Yes Yes   Sig: Take by mouth daily   omeprazole (PriLOSEC) 20 mg delayed release capsule   Yes Yes   Sig: Take 1 capsule by mouth   polymyxin b-trimethoprim (POLYTRIM) ophthalmic solution   Yes No   Sig: Administer 1 drop to both eyes every 4 (four) hours   pregabalin (LYRICA) 100 mg capsule   Yes Yes   Sig: Take 50 mg by mouth 3 (three) times a day   sitaGLIPtin (JANUVIA) 50 mg tablet   Yes Yes   Sig: Take 50 mg by mouth daily      Facility-Administered Medications: None       Past Medical History:   Diagnosis Date    Diabetes mellitus ([facility] )     Hyperlipidemia     Hyperlipidemia     Hypertension     Kidney stones     Osteoporosis        Past Surgical History:   Procedure Laterality Date    APPENDECTOMY      LITHOTRIPSY      TONSILLECTOMY         Family History   Problem Relation Age of Onset    Diabetes Mother      I have reviewed and agree with the history as documented      Social History   Substance Use Topics    Smoking status: Former Smoker     Packs/day: 1 00     Years: 40 00     Types: Cigarettes    Smokeless tobacco: Never Used      Comment: states she quit but family living with her states she smokes    Alcohol use No      Comment: OCCASIONAL        Review of Systems   Constitutional: Negative  Negative for chills and fever  HENT: Negative  Negative for sore throat  Eyes: Negative  Respiratory: Negative  Negative for cough  Cardiovascular: Negative  Gastrointestinal: Positive for vomiting  Negative for abdominal pain and diarrhea  Endocrine: Negative  Genitourinary: Negative  Musculoskeletal: Negative  Negative for arthralgias and myalgias  Skin: Negative  Allergic/Immunologic: Negative  Neurological: Positive for headaches  Hematological: Negative  Psychiatric/Behavioral: Negative  Physical Exam  Physical Exam   Constitutional: She is oriented to person, place, and time  She appears well-developed and well-nourished  She appears distressed (appears in distress)  HENT:   Head: Normocephalic and atraumatic  Right Ear: External ear normal    Left Ear: External ear normal    Nose: Nose normal    Mouth/Throat: Oropharynx is clear and moist    Eyes: Conjunctivae and EOM are normal  Pupils are equal, round, and reactive to light  Neck: Normal range of motion  Neck supple  No JVD present  No tracheal deviation present  No thyromegaly present  Cardiovascular: Normal rate, regular rhythm, normal heart sounds and intact distal pulses  Exam reveals no gallop and no friction rub  No murmur heard  Pulmonary/Chest: Effort normal and breath sounds normal  No stridor  No respiratory distress  She has no wheezes  She has no rales  She exhibits no tenderness  Abdominal: Soft  Bowel sounds are normal  She exhibits no distension and no mass  There is no tenderness  There is no rebound and no guarding  No hernia  Genitourinary: Rectal exam shows guaiac negative stool  Musculoskeletal: Normal range of motion   She exhibits no edema, tenderness or deformity  Lymphadenopathy:     She has no cervical adenopathy  Neurological: She is alert and oriented to person, place, and time  She displays normal reflexes  No cranial nerve deficit or sensory deficit  She exhibits normal muscle tone  Coordination normal    Skin: Skin is warm and dry  Capillary refill takes less than 2 seconds  No rash noted  She is not diaphoretic  No erythema  No pallor  Psychiatric: She has a normal mood and affect  Her behavior is normal  Judgment and thought content normal    Nursing note and vitals reviewed        Vital Signs  ED Triage Vitals [07/08/18 1333]   Temperature Pulse Respirations Blood Pressure SpO2   97 5 °F (36 4 °C) 79 16 162/96 98 %      Temp Source Heart Rate Source Patient Position - Orthostatic VS BP Location FiO2 (%)   Temporal Monitor Sitting Left arm --      Pain Score       --           Vitals:    07/08/18 1333   BP: 162/96   Pulse: 79   Patient Position - Orthostatic VS: Sitting       Visual Acuity      ED Medications  Medications   sodium chloride 0 9 % infusion (0 mL/hr Intravenous Stopped 7/8/18 1710)       Diagnostic Studies  Results Reviewed     Procedure Component Value Units Date/Time    Urine Microscopic [[id number]]  (Abnormal) Collected:  07/08/18 1615    Lab Status:  Final result Specimen:  Urine from Urine, Clean Catch Updated:  07/08/18 1648     RBC, UA 0-1 (A) /hpf      WBC, UA 0-1 (A) /hpf      Epithelial Cells Occasional /hpf      Bacteria, UA None Seen /hpf     UA w Reflex to Microscopic [[id number]]  (Abnormal) Collected:  07/08/18 1615    Lab Status:  Final result Specimen:  Urine from Urine, Clean Catch Updated:  07/08/18 1636     Color, UA Yellow     Clarity, UA Clear     Specific Gravity, UA 1 010     pH, UA 7 0     Leukocytes, UA Negative     Nitrite, UA Negative     Protein,  (2+) (A) mg/dl      Glucose, UA Negative mg/dl      Ketones, UA Negative mg/dl      Bilirubin, UA Negative     Blood, UA Negative     UROBILINOGEN UA Negative mg/dL     Comprehensive metabolic panel [[id number]]  (Abnormal) Collected:  07/08/18 1542    Lab Status:  Final result Specimen:  Blood from Arm, Right Updated:  07/08/18 1618     Sodium 138 mmol/L      Potassium 4 1 mmol/L      Chloride 103 mmol/L      CO2 23 mmol/L      Anion Gap 12 mmol/L      BUN 24 mg/dL      Creatinine 1 13 mg/dL      Glucose 118 (H) mg/dL      Calcium 9 4 mg/dL      AST 23 U/L      ALT 31 U/L      Alkaline Phosphatase 122 U/L      Total Protein 7 3 g/dL      Albumin 4 1 g/dL      Total Bilirubin 0 50 mg/dL      eGFR 46 (L) ml/min/1 73sq m     Narrative:         National Kidney Disease Education Program recommendations are as follows:  GFR calculation is accurate only with a steady state creatinine  Chronic Kidney disease less than 60 ml/min/1 73 sq  meters  Kidney failure less than 15 ml/min/1 73 sq  meters      Protime-INR [[id number]]  (Normal) Collected:  07/08/18 1403    Lab Status:  Final result Specimen:  Blood from Arm, Right Updated:  07/08/18 1455     Protime 11 1 seconds      INR 1 05    Narrative:       INR:  ,PROTIME:      APTT [[id number]]  (Normal) Collected:  07/08/18 1403    Lab Status:  Final result Specimen:  Blood from Arm, Right Updated:  07/08/18 1455     PTT 24 seconds     Narrative:       PTT:      Sedimentation rate, automated [[id number]]  (Abnormal) Collected:  07/08/18 1403    Lab Status:  Final result Specimen:  Blood from Arm, Right Updated:  07/08/18 1438     Sed Rate 33 (H) mm/hour     CBC and differential [[id number]]  (Abnormal) Collected:  07/08/18 1403    Lab Status:  Final result Specimen:  Blood from Arm, Right Updated:  07/08/18 1433     WBC 12 10 (H) Thousand/uL      RBC 4 83 Million/uL      Hemoglobin 15 0 g/dL      Hematocrit 45 2 %      MCV 94 fL      MCH 31 1 pg      MCHC 33 2 g/dL      RDW 14 2 %      MPV 10 5 fL      Platelets 289 Thousands/uL      Neutrophils Relative 78 (H) %      Lymphocytes Relative 16 (L) %      Monocytes Relative 5 %      Eosinophils Relative 0 %      Basophils Relative 0 %      Neutrophils Absolute 9 40 (H) Thousands/µL      Lymphocytes Absolute 2 00 Thousands/µL      Monocytes Absolute 0 60 Thousand/µL      Eosinophils Absolute 0 00 Thousand/µL      Basophils Absolute 0 00 Thousands/µL                  CT head without contrast   Final Result by Guillermina Collins MD (07/08 1444)      No acute intracranial abnormality  Stable CT appearance of the brain  No intracranial hemorrhage  Workstation performed: [id number]         XR chest 1 view portable    (Results Pending)              Procedures  Procedures       Phone Contacts  ED Phone Contact    ED Course  ED Course as of Jul 08 1731   Catrina Zepeda Jul 08, 2018   1605 Spoke with the patient's daughter was at the bedside  She is fluent in both [place] and [address]  Patient's daughter knows her mother well  States that her mom says she is doing well at the moment  Her mother has severe Alzheimer's disease  She states that the condition has been worsening over time  Mother thinks that she is talking to her son who is passed away  Patient is self apparently has lost vision in the right eye for approximately 1/3 to 1/2 months now  So this is not a new condition  Informed the daughter wall the current results clean the CT scan report  Will check patient's urine to make sure that this is not the source of any other issues today  Daughter is in agreement and is very happy and comfortable taking her home today  1642 Discuss the results of the urinalysis also with the daughter  Mother still doing well  Sleep problem that the patient has sister took the patient's clothes home  Will discharge the patient home in the hospital gown                                  [facility]  CritCare Time    Disposition  Final diagnoses:   Headache   Alzheimer's disease with delirium     Time reflects when diagnosis was documented in both MDM as applicable and the Disposition within this note     Time User Action Codes Description Comment    7/8/2018  4:41 PM Lili Farris Add [R51] Headache     7/8/2018  4:45 PM Lili Farris Add [G30 9,  F05] Alzheimer's disease with delirium       ED Disposition     ED Disposition Condition Comment    Discharge  Kari Gave discharge to home/self care      Condition at discharge: Stable        Follow-up Information     Follow up With Specialties Details Why Contact Info    Lawrence Yun MD Family Medicine Schedule an appointment as soon as possible for a visit  [address] [facility] [address]  [address] [place] [address]            Discharge Medication List as of 7/8/2018  4:45 PM      CONTINUE these medications which have NOT CHANGED    Details   aspirin (ASPIRIN 81) 81 mg chewable tablet Chew 1 tablet every 24 hours, Starting Mon 2/12/2018, Historical Med      atorvastatin (LIPITOR) 40 mg tablet Take 1 tablet by mouth every 24 hours, Starting Mon 2/12/2018, Historical Med      carvedilol (COREG) 6 25 mg tablet Take 6 25 mg by mouth 2 (two) times a day with meals, Historical Med      ipratropium (ATROVENT HFA) 17 mcg/act inhaler Every 6 hours, Starting Mon 5/14/2018, Historical Med      lisinopril (ZESTRIL) 10 mg tablet Take by mouth daily, Historical Med      omeprazole (PriLOSEC) 20 mg delayed release capsule Take 1 capsule by mouth, Starting Mon 2/12/2018, Historical Med      pregabalin (LYRICA) 100 mg capsule Take 50 mg by mouth 3 (three) times a day, Historical Med      sitaGLIPtin (JANUVIA) 50 mg tablet Take 50 mg by mouth daily, Historical Med      ALPHAGAN P 0 15 % ophthalmic solution INSTILL 1 DROP INTO BOTH EYES TWICE DAILY INSTILL 1 DROP EN AMBOS OJOS DOS VECES AL MAC, Historical Med      budesonide-formoterol (SYMBICORT) 160-4 5 mcg/act inhaler Inhale 1-2 puffs 2 (two) times a day, Historical Med      clopidogrel (PLAVIX) 75 mg tablet Take 75 mg by mouth daily, Historical Med      denosumab (PROLIA) 60 mg/mL inject 1 milliliter by subcutaneous route  every 6 months in the upper arm, upper thigh or abdomen, Historical Med      EASY TOUCH LANCETS 32G MISC CHECK 3 times per DAY, Historical Med      furosemide (LASIX) 20 mg tablet Take 1 tablet by mouth, Starting Tue 6/19/2018, Historical Med      GLOBAL EASY GLIDE PEN NEEDLES 32G X 4 MM MISC USE TO INJECT INSULIN 5 TIMES A DAY USE A / HASTA INJECT INSULIN 5 VECES A DAY, Historical Med      glucose blood test strip check blood sugar by Subcutaneous route 4 times per day, every am fasting and 2 hrs after each meal , Historical Med      NOVOLOG FLEXPEN 100 units/mL injection pen INJECT BY SUBCUTANEOUSLY 10 UNITS 3 TIMES A DAY WITH A MEAL INJECT BY SUBCUTANEOUSLY 10 UNITS JONAH VECES AL MAC WITH A MEAL, Historical Med      polymyxin b-trimethoprim (POLYTRIM) ophthalmic solution Administer 1 drop to both eyes every 4 (four) hours, Historical Med           No discharge procedures on file      ED Provider  Electronically Signed by           June Baltazar MD  07/08/18 0276

## 2018-07-08 NOTE — DISCHARGE INSTRUCTIONS
Dolor de richard serafin   LO QUE NECESITA SABER:   El dolor de [place] serafin es un dolor o molestia que comienza de repeshahla y GERARD rápidamente  Usted puede tener un dolor de richard serafin sólo cuando siente estrés o come ciertos alimentos  Otro tipo dolor de richard serafin puede producirse todos los días y a veces varias veces al día  INSTRUCCIONES SOBRE EL ESTRELLA HOSPITALARIA:   Regrese a la jessy de emergencias si:   · Usted tiene dolor intenso  · Usted tiene entumecimiento en un lado de [place] jessy o cuerpo  · Usted tiene un dolor de richard que ocurre después de un golpe en la richard, ghazal caída u otro trauma  · Tiene dolor de [place], está olvidadizo o confundido o tiene dificultad para hablar  · Tiene dolor de [place], rigidez en el amrik y [place]  Pregúntele a [place] Berneice Penta vitaminas y minerales son adecuados para usted  · Usted tiene un dolor de richard ely y está vomitando  · Usted tiene dolor de richard todos los días y no se [place] aun después de tratarlo  · [place] zane de [facility] u ocurren nuevos síntomas cuando tiene dolor de [place]  · Usted tiene preguntas o inquietudes acerca de [place] condición o cuidado  Medicamentos:  Es posible que usted necesite alguno de los siguientes:  · Un medicamento con receta para el dolor  podrían ser Jim Clutter  El medicamento que recomienda [place] médico dependerá del tipo de dolor de richard que tenga  Usted necesitará rob medicamentos para el dolor de richard según las indicaciones para evitar un problema llamado dolor de richard de rebote  Estos zane de [place] ocurren con el uso regular de analgésicos para los trastornos de dolor de [place]  · AINEs (Analgésicos antiinflamatorios no esteroides) dank el ibuprofeno, ayudan a disminuir la inflamación, el dolor y la [place]  [place] medicamento esta disponible con o sin ghazal receta médica  Los AINEs pueden causar sangrado estomacal o problemas renales en ciertas personas   Si usted danae un medicamento anticoagulante, siempre pregúntele a [place] médico si los MINESH son seguros para usted  Siempre katerina la etiqueta de [place]ak medicamento y [place] instrucciones  · El acetaminofén  [place] el dolor y baja la fiebre  Está disponible sin receta médica  Pregunte la cantidad y la frecuencia con que debe tomarlos  [address]  Katerina las etiquetas de todos los demás medicamentos que esté usando para saber si también contienen acetaminofén, o pregunte a [place] médico o farmacéutico  El acetaminofén puede causar daño en el hígado cuando no se danae de forma correcta  No use más de 3 gramos (3,000 miligramos) en total de acetaminofeno en un día  · Antidepresivos  se pueden administrar para algunos tipos de zane de [place]  · [place] indu medicamentos dank se le haya indicado  Consulte con [place] médico si usted deyanira que [place] medicamento no le está ayudando o si presenta efectos secundarios  Infórmele si es alérgico a cualquier medicamento  Mantenga ghazal lista actualizada de los [place], las vitaminas y los productos herbales que danae  Incluya los siguientes datos de los medicamentos: cantidad, frecuencia y motivo de administración  Traiga con usted la lista o los envases de la píldoras a indu citas de seguimiento  Lleve la lista de los medicamentos con usted en reese de ghazal emergencia  El manejo de [place] síntomas:   · Aplique hielo o calor  en la janneth donde [place] hijo siente el dolor de richard  Utilice un paquete (compresa) de hielo o calor  Para un paquete de hielo, también puede colocar hielo molido en ghazal bolsa plástica  Cubra el paquete de hielo o la bolsa con ghazal toalla pequeña antes de aplicarla en la piel  Tanto el hielo dank el calor ayudan a reducir el dolor, y el calor también contribuye a reducir los C [employer]  Aplique calor elvis 20 a 30 minutos cada 2 horas  Aplique hielo elvis 15 a 20 minutos cada hora  Aplique calor o hielo elvis el tiempo y la cantidad de días que se le indique   Usted puede alternar el calor y el hielo  · Relaje indu músculos  Acuéstese en ghazal posición cómoda y cierre indu ojos  Relaje indu músculos lentamente  Comience por los dedos de los pies y avance hacia arriba al vida de [place] cuerpo  · Registre en un diario indu zane de [place]  Escriba cuándo comienzan y terminan indu migrañas  Kyree Eisenmenger y qué estaba haciendo cuando comenzó la migraña  Registre lo que comió y lo que tomó las 24 horas previas al comienzo de [place] migraña  [place] dolor y dónde le duele: Lleve un registro de lo que hizo para tratar ol[place] Kali Sly y si obtuvo un resultado satisfactorio  Cómo prevenir un dolor de richard serafin:   · Evite cualquier cosa que provoque un dolor de richard serafin  Los ejemplos incluyen la exposición a sustancias químicas, las grandes altitudes o no dormir lo suficiente  Loren ghazal rutina para dormir  Acuéstese y [place] días a la misma hora  No utilice aparatos electrónicos antes de acostarse  Pueden provocarle un dolor de richard o impedirle dormir eliel  · No fume  La nicotina y otras sustancias químicas en los cigarrillos y puros pueden desencadenar un dolor de richard serafin o [place]  Pida información a [place] médico si usted actualmente fuma y necesita ayuda para dejar de fumar  Los cigarrillos electrónicos o tabaco sin humo todavía contienen nicotina  Consulte con [place] médico antes de [employer]  · Limite el consumo de alcohol según le indicaron  El alcohol puede provocar un dolor de richard serafin o empeorarlo  Si usted tiene zane de [place] de racimo, no ryan alcohol elvis un episodio  Para otros tipos de zane de [place], pregúntele a [place] proveedor de atención médica si es seguro para usted beber alcohol  Pregunte cuál es la cantidad gordon que puede beber y con qué frecuencia  · Ejercítese según indicaciones  El ejercicio puede reducir la tensión y [place] a aliviar el dolor de [place]   Propóngase hacer 30 minutos de [place] todos los días de la [place]  [place] médico puede ayudarle a crear un plan de ejercicios  · Consuma alimentos saludables y variados  [place] [address] frutas, verduras, productos lácteos bajos en grasa, brandee [place], pescado y frijoles cocidos  [place] médico o dietista puede ayudarle a crear planes de comidas si desea evitar los alimentos que provocan zane de [place]  Acuda a indu consultas de control con [place] médico según le indicaron  Traiga ol[place]stead registro de zane de richard con usted cuando visite a ol[place] médico  Anote indu preguntas para que se acuerde de hacerlas elvis indu visitas  © 2017 [address] [employer] Information is for End User's use only and may not be sold, redistributed or otherwise used for commercial purposes  All illustrations and images included in CareNotes® are the copyrighted property of A D A M , Inc  or Kal Strong  Esta información es sólo para uso en educación  [place] intención no es darle un consejo médico sobre enfermedades o tratamientos  Colsulte con ol[place] Ann Monika farmacéutico antes de seguir cualquier régimen médico para saber si es seguro y efectivo para usted

## 2018-07-12 ENCOUNTER — DOCTOR'S OFFICE (OUTPATIENT)
Dept: URBAN - METROPOLITAN AREA CLINIC 136 | Facility: CLINIC | Age: 83
Setting detail: OPHTHALMOLOGY
End: 2018-07-12
Payer: COMMERCIAL

## 2018-07-12 DIAGNOSIS — Z79.4: ICD-10-CM

## 2018-07-12 DIAGNOSIS — H35.379: ICD-10-CM

## 2018-07-12 DIAGNOSIS — F03.91: ICD-10-CM

## 2018-07-12 DIAGNOSIS — H40.89: ICD-10-CM

## 2018-07-12 DIAGNOSIS — H34.11: ICD-10-CM

## 2018-07-12 DIAGNOSIS — E11.9: ICD-10-CM

## 2018-07-12 PROCEDURE — 92134 CPTRZ OPH DX IMG PST SGM RTA: CPT | Performed by: OPHTHALMOLOGY

## 2018-07-12 PROCEDURE — 92014 COMPRE OPH EXAM EST PT 1/>: CPT | Performed by: OPHTHALMOLOGY

## 2018-07-12 ASSESSMENT — CONFRONTATIONAL VISUAL FIELD TEST (CVF)
OS_COMMENTS: UNABLE
OD_COMMENTS: UNABLE

## 2018-07-13 ASSESSMENT — REFRACTION_MANIFEST
OD_SPHERE: +1.00
OD_VA3: 20/
OU_VA: 20/50
OD_VA2: 20/
OS_VA3: 20/
OS_VA3: 20/
OS_VA1: 20/
OS_SPHERE: +3.00
OU_VA: 20/
OD_AXIS: 085
OS_VA2: 20/
OS_VA2: 20/
OD_VA1: 20/
OD_VA1: 20/
OD_VA2: 20/
OS_VA3: 20/
OS_VA1: 20/
OD_CYLINDER: -2.50
OD_ADD: +3.00
OS_AXIS: 090
OD_VA2: 20/
OS_CYLINDER: -3.00
OD_VA3: 20/
OD_VA3: 20/
OU_VA: 20/
OS_ADD: +3.00
OS_VA2: 20/
OS_VA1: 20/50
OD_VA1: 20/600

## 2018-07-13 ASSESSMENT — REFRACTION_AUTOREFRACTION
OD_SPHERE: -1.00
OD_AXIS: 060
OD_CYLINDER: -2.75
OS_AXIS: 098
OS_CYLINDER: -3.25
OS_SPHERE: +2.75

## 2018-07-13 ASSESSMENT — VISUAL ACUITY
OD_BCVA: 20/50-2
OS_BCVA: 20/NLP

## 2018-07-13 ASSESSMENT — REFRACTION_CURRENTRX
OS_OVR_VA: 20/
OD_OVR_VA: 20/
OS_OVR_VA: 20/
OD_OVR_VA: 20/
OD_OVR_VA: 20/
OS_OVR_VA: 20/

## 2018-07-13 ASSESSMENT — SPHEQUIV_DERIVED
OS_SPHEQUIV: 1.5
OD_SPHEQUIV: -0.25
OD_SPHEQUIV: -2.375
OS_SPHEQUIV: 1.125

## 2018-07-19 ENCOUNTER — TELEPHONE (OUTPATIENT)
Dept: FAMILY MEDICINE CLINIC | Facility: CLINIC | Age: 83
End: 2018-07-19

## 2018-07-19 NOTE — TELEPHONE ENCOUNTER
Pts vising nursed called stating yesterday morning the pts BS was 350 and this morning it was 370  I asked how the pt was taking her insulin and she stated 25 units BID  I informed the nurse that the Dr Laine Whitt changed her dosage to 10 units TID   After speaking with Pj Madrid I let the visiting nurse know she has to start following the directions given at her last OV and to call back if her BS continues to range in those high numbers

## 2018-08-07 ENCOUNTER — OFFICE VISIT (OUTPATIENT)
Dept: FAMILY MEDICINE CLINIC | Facility: CLINIC | Age: 83
End: 2018-08-07
Payer: MEDICARE

## 2018-08-07 VITALS
DIASTOLIC BLOOD PRESSURE: 70 MMHG | OXYGEN SATURATION: 97 % | HEIGHT: 64 IN | HEART RATE: 96 BPM | WEIGHT: 143 LBS | RESPIRATION RATE: 16 BRPM | BODY MASS INDEX: 24.41 KG/M2 | TEMPERATURE: 98.1 F | SYSTOLIC BLOOD PRESSURE: 140 MMHG

## 2018-08-07 DIAGNOSIS — J44.9 COPD MIXED TYPE (HCC): ICD-10-CM

## 2018-08-07 DIAGNOSIS — D72.829 LEUKOCYTOSIS, UNSPECIFIED TYPE: Primary | ICD-10-CM

## 2018-08-07 DIAGNOSIS — Z79.4 TYPE 2 DIABETES MELLITUS WITH HYPERGLYCEMIA, WITH LONG-TERM CURRENT USE OF INSULIN (HCC): ICD-10-CM

## 2018-08-07 DIAGNOSIS — E11.65 TYPE 2 DIABETES MELLITUS WITH HYPERGLYCEMIA, WITH LONG-TERM CURRENT USE OF INSULIN (HCC): ICD-10-CM

## 2018-08-07 DIAGNOSIS — E11.21 TYPE 2 DIABETES MELLITUS WITH DIABETIC NEPHROPATHY, WITH LONG-TERM CURRENT USE OF INSULIN (HCC): ICD-10-CM

## 2018-08-07 DIAGNOSIS — E88.9 PERIPHERAL NEUROPATHY DUE TO METABOLIC DISORDER (HCC): ICD-10-CM

## 2018-08-07 DIAGNOSIS — G63 PERIPHERAL NEUROPATHY DUE TO METABOLIC DISORDER (HCC): ICD-10-CM

## 2018-08-07 DIAGNOSIS — K29.60 OTHER GASTRITIS WITHOUT HEMORRHAGE, UNSPECIFIED CHRONICITY: ICD-10-CM

## 2018-08-07 DIAGNOSIS — Z79.4 TYPE 2 DIABETES MELLITUS WITH DIABETIC NEPHROPATHY, WITH LONG-TERM CURRENT USE OF INSULIN (HCC): ICD-10-CM

## 2018-08-07 PROBLEM — K29.70 GASTRITIS WITHOUT BLEEDING: Status: ACTIVE | Noted: 2018-08-07

## 2018-08-07 PROCEDURE — 99214 OFFICE O/P EST MOD 30 MIN: CPT | Performed by: FAMILY MEDICINE

## 2018-08-07 RX ORDER — AMITRIPTYLINE HYDROCHLORIDE 50 MG/1
1 TABLET, FILM COATED ORAL EVERY 24 HOURS
COMMUNITY
Start: 2018-02-12 | End: 2018-08-07 | Stop reason: ALTCHOICE

## 2018-08-07 RX ORDER — ASPIRIN 81 MG/1
81 TABLET, CHEWABLE ORAL EVERY 24 HOURS
Qty: 30 TABLET | Refills: 5 | Status: SHIPPED | OUTPATIENT
Start: 2018-08-07 | End: 2018-08-13 | Stop reason: SDUPTHER

## 2018-08-07 RX ORDER — BRIMONIDINE TARTRATE/TIMOLOL 0.2%-0.5%
DROPS OPHTHALMIC (EYE)
Refills: 0 | COMMUNITY
Start: 2018-07-14 | End: 2018-08-13 | Stop reason: SDUPTHER

## 2018-08-07 RX ORDER — OMEPRAZOLE 20 MG/1
20 CAPSULE, DELAYED RELEASE ORAL DAILY
Qty: 30 CAPSULE | Refills: 5 | Status: SHIPPED | OUTPATIENT
Start: 2018-08-07 | End: 2018-08-13 | Stop reason: SDUPTHER

## 2018-08-07 RX ORDER — INSULIN ASPART 100 [IU]/ML
INJECTION, SOLUTION INTRAVENOUS; SUBCUTANEOUS
Qty: 9 PEN | Refills: 5 | Status: SHIPPED | OUTPATIENT
Start: 2018-08-07 | End: 2018-08-13 | Stop reason: SDUPTHER

## 2018-08-07 RX ORDER — LISINOPRIL 10 MG/1
10 TABLET ORAL DAILY
Qty: 30 TABLET | Refills: 5 | Status: SHIPPED | OUTPATIENT
Start: 2018-08-07 | End: 2018-08-13 | Stop reason: SDUPTHER

## 2018-08-07 RX ORDER — DORZOLAMIDE HCL 20 MG/ML
SOLUTION/ DROPS OPHTHALMIC
Refills: 0 | COMMUNITY
Start: 2018-07-14 | End: 2018-11-13 | Stop reason: ALTCHOICE

## 2018-08-07 RX ORDER — ATORVASTATIN CALCIUM 40 MG/1
40 TABLET, FILM COATED ORAL EVERY 24 HOURS
Qty: 30 TABLET | Refills: 5 | Status: SHIPPED | OUTPATIENT
Start: 2018-08-07 | End: 2018-08-13 | Stop reason: SDUPTHER

## 2018-08-07 RX ORDER — PREGABALIN 100 MG/1
CAPSULE ORAL
Qty: 60 CAPSULE | Refills: 5 | Status: SHIPPED | OUTPATIENT
Start: 2018-08-07 | End: 2018-08-13 | Stop reason: SDUPTHER

## 2018-08-07 NOTE — ASSESSMENT & PLAN NOTE
Lab Results   Component Value Date    HGBA1C 13 7 (H) 04/18/2018       No results for input(s): POCGLU in the last 72 hours  Blood Sugar Average: Last 72 hrs:   very poor control blood sugar with hemoglobin A1c of 13 7    Titrating insulin to 25 U with each meal

## 2018-08-07 NOTE — PROGRESS NOTES
Assessment/Plan:    Type 2 diabetes mellitus (HCC)  Lab Results   Component Value Date    HGBA1C 13 7 (H) 04/18/2018       No results for input(s): POCGLU in the last 72 hours  Blood Sugar Average: Last 72 hrs:   very poor control blood sugar with hemoglobin A1c of 13 7  Titrating insulin to 25 U with each meal        Diagnoses and all orders for this visit:    Leukocytosis, unspecified type  -     CBC and differential; Future    Type 2 diabetes mellitus with diabetic nephropathy, with long-term current use of insulin ([facility])  -     Hemoglobin A1C; Future    COPD mixed type ([facility] [address] )  Comments:  She is stable in current condition, we continue with the same treatment  Orders:  -     ipratropium (ATROVENT HFA) 17 mcg/act inhaler; Inhale 2 puffs 4 (four) times a day    Type 2 diabetes mellitus with hyperglycemia, with long-term current use of insulin ([facility])  -     sitaGLIPtin (JANUVIA) 50 mg tablet; Take 1 tablet (50 mg total) by mouth daily  -     NOVOLOG FLEXPEN 100 units/mL injection pen; UNITS 3 TIMES A DAY WITH MEALS  -     lisinopril (ZESTRIL) 10 mg tablet; Take 1 tablet (10 mg total) by mouth daily  -     aspirin (ASPIRIN 81) 81 mg chewable tablet; Chew 1 tablet (81 mg total) every 24 hours  -     atorvastatin (LIPITOR) 40 mg tablet; Take 1 tablet (40 mg total) by mouth every 24 hours    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 [facility])  Comments:  Patient is suffering of peripheral neuropathy secondary to diabetes  Left legs at is worse in the right  She has poor compliance with the therapy  Orders:  -     pregabalin (LYRICA) 100 mg capsule; ONE CAPSULE TWICE A DAY    Other gastritis without hemorrhage, unspecified chronicity  -     omeprazole (PriLOSEC) 20 mg delayed release capsule; Take 1 capsule (20 mg total) by mouth daily    Other orders  -     Discontinue: amitriptyline (ELAVIL) 50 mg tablet;  Take 1 tablet by mouth every 24 hours  -     COMBIGAN 0 2-0 5 %; instill 1 drop into right eye twice a day  -     dorzolamide (TRUSOPT) 2 % ophthalmic solution; instill 1 drop into right eye twice a day          Subjective:      Patient ID: Briana Bonilla is a 81 y o  female  Pt here post ER for headaches  Per pt headache has resolved  PT having lower back pain radiating to the legs  The following portions of the patient's history were reviewed and updated as appropriate: allergies, current medications, past family history, past medical history, past social history, past surgical history and problem list     Review of Systems   Musculoskeletal: Positive for back pain and gait problem  Psychiatric/Behavioral: Positive for confusion  Objective:      /70 (BP Location: Left arm, Patient Position: Sitting, Cuff Size: Standard)   Pulse 96   Temp 98 1 °F (36 7 °C) (Oral)   Resp 16   Ht 5' 4" (1 626 m)   Wt 64 9 kg (143 lb)   LMP  (LMP Unknown)   SpO2 97%   Breastfeeding? No   BMI 24 55 kg/m²          Physical Exam   Constitutional: She appears well-developed and well-nourished  Cardiovascular: Normal rate, regular rhythm and normal heart sounds  Pulmonary/Chest: Effort normal and breath sounds normal    Abdominal: Soft  Bowel sounds are normal    Musculoskeletal: She exhibits tenderness  Neurological: She is alert  Skin: Skin is warm and dry

## 2018-08-08 ENCOUNTER — RX ONLY (RX ONLY)
Age: 83
End: 2018-08-08

## 2018-08-08 ENCOUNTER — DOCTOR'S OFFICE (OUTPATIENT)
Dept: URBAN - METROPOLITAN AREA CLINIC 136 | Facility: CLINIC | Age: 83
Setting detail: OPHTHALMOLOGY
End: 2018-08-08
Payer: COMMERCIAL

## 2018-08-08 DIAGNOSIS — F03.91: ICD-10-CM

## 2018-08-08 DIAGNOSIS — Z79.4: ICD-10-CM

## 2018-08-08 DIAGNOSIS — H40.89: ICD-10-CM

## 2018-08-08 DIAGNOSIS — H34.11: ICD-10-CM

## 2018-08-08 DIAGNOSIS — E11.9: ICD-10-CM

## 2018-08-08 DIAGNOSIS — H35.379: ICD-10-CM

## 2018-08-08 PROCEDURE — 92014 COMPRE OPH EXAM EST PT 1/>: CPT | Performed by: OPHTHALMOLOGY

## 2018-08-08 ASSESSMENT — REFRACTION_MANIFEST
OD_VA1: 20/
OD_CYLINDER: -2.50
OD_VA1: 20/600
OS_VA2: 20/
OS_ADD: +3.00
OD_VA3: 20/
OD_AXIS: 085
OD_VA2: 20/
OS_SPHERE: +3.00
OD_ADD: +3.00
OD_VA1: 20/
OS_CYLINDER: -3.00
OD_VA3: 20/
OU_VA: 20/
OS_VA3: 20/
OS_VA1: 20/
OS_VA1: 20/50
OD_VA2: 20/
OS_VA3: 20/
OU_VA: 20/
OD_VA3: 20/
OU_VA: 20/50
OD_VA2: 20/
OS_VA2: 20/
OS_VA1: 20/
OD_SPHERE: +1.00
OS_VA2: 20/
OS_VA3: 20/
OS_AXIS: 090

## 2018-08-08 ASSESSMENT — VISUAL ACUITY
OS_BCVA: 20/NLP
OD_BCVA: 20/50-2

## 2018-08-08 ASSESSMENT — REFRACTION_CURRENTRX
OD_OVR_VA: 20/
OS_OVR_VA: 20/
OS_OVR_VA: 20/
OD_OVR_VA: 20/
OD_OVR_VA: 20/
OS_OVR_VA: 20/

## 2018-08-08 ASSESSMENT — REFRACTION_AUTOREFRACTION
OD_SPHERE: -1.00
OS_AXIS: 098
OD_CYLINDER: -2.75
OS_SPHERE: +2.75
OD_AXIS: 060
OS_CYLINDER: -3.25

## 2018-08-08 ASSESSMENT — SPHEQUIV_DERIVED
OS_SPHEQUIV: 1.125
OD_SPHEQUIV: -0.25
OD_SPHEQUIV: -2.375
OS_SPHEQUIV: 1.5

## 2018-08-09 ENCOUNTER — APPOINTMENT (OUTPATIENT)
Dept: LAB | Facility: HOSPITAL | Age: 83
End: 2018-08-09
Payer: MEDICARE

## 2018-08-09 ENCOUNTER — TRANSCRIBE ORDERS (OUTPATIENT)
Dept: ADMINISTRATIVE | Facility: HOSPITAL | Age: 83
End: 2018-08-09

## 2018-08-09 DIAGNOSIS — E11.21 TYPE 2 DIABETES MELLITUS WITH DIABETIC NEPHROPATHY, WITH LONG-TERM CURRENT USE OF INSULIN (HCC): ICD-10-CM

## 2018-08-09 DIAGNOSIS — Z79.4 TYPE 2 DIABETES MELLITUS WITH DIABETIC NEPHROPATHY, WITH LONG-TERM CURRENT USE OF INSULIN (HCC): ICD-10-CM

## 2018-08-09 DIAGNOSIS — D72.829 LEUKOCYTOSIS, UNSPECIFIED TYPE: ICD-10-CM

## 2018-08-09 LAB
BASOPHILS # BLD AUTO: 0.1 THOUSANDS/ΜL (ref 0–0.1)
BASOPHILS NFR BLD AUTO: 1 % (ref 0–1)
EOSINOPHIL # BLD AUTO: 0.2 THOUSAND/ΜL (ref 0–0.4)
EOSINOPHIL NFR BLD AUTO: 2 % (ref 0–6)
ERYTHROCYTE [DISTWIDTH] IN BLOOD BY AUTOMATED COUNT: 14.6 %
EST. AVERAGE GLUCOSE BLD GHB EST-MCNC: 237 MG/DL
HBA1C MFR BLD: 9.9 % (ref 4.2–6.3)
HCT VFR BLD AUTO: 42.6 % (ref 36–46)
HGB BLD-MCNC: 14.3 G/DL (ref 12–16)
LYMPHOCYTES # BLD AUTO: 2 THOUSANDS/ΜL (ref 0.5–4)
LYMPHOCYTES NFR BLD AUTO: 25 % (ref 20–50)
MCH RBC QN AUTO: 31.2 PG (ref 26–34)
MCHC RBC AUTO-ENTMCNC: 33.6 G/DL (ref 31–36)
MCV RBC AUTO: 93 FL (ref 80–100)
MONOCYTES # BLD AUTO: 0.4 THOUSAND/ΜL (ref 0.2–0.9)
MONOCYTES NFR BLD AUTO: 5 % (ref 1–10)
NEUTROPHILS # BLD AUTO: 5.4 THOUSANDS/ΜL (ref 1.8–7.8)
NEUTS SEG NFR BLD AUTO: 67 % (ref 45–65)
PLATELET # BLD AUTO: 194 THOUSANDS/UL (ref 150–450)
PMV BLD AUTO: 9.5 FL (ref 8.9–12.7)
RBC # BLD AUTO: 4.59 MILLION/UL (ref 4–5.2)
WBC # BLD AUTO: 8 THOUSAND/UL (ref 4.5–11)

## 2018-08-09 PROCEDURE — 83036 HEMOGLOBIN GLYCOSYLATED A1C: CPT

## 2018-08-09 PROCEDURE — 36415 COLL VENOUS BLD VENIPUNCTURE: CPT

## 2018-08-09 PROCEDURE — 85025 COMPLETE CBC W/AUTO DIFF WBC: CPT

## 2018-08-13 ENCOUNTER — CONSULT (OUTPATIENT)
Dept: FAMILY MEDICINE CLINIC | Facility: CLINIC | Age: 83
End: 2018-08-13
Payer: MEDICARE

## 2018-08-13 VITALS
SYSTOLIC BLOOD PRESSURE: 130 MMHG | DIASTOLIC BLOOD PRESSURE: 70 MMHG | HEIGHT: 64 IN | WEIGHT: 141 LBS | BODY MASS INDEX: 24.07 KG/M2 | TEMPERATURE: 98.1 F | HEART RATE: 83 BPM | RESPIRATION RATE: 16 BRPM | OXYGEN SATURATION: 97 %

## 2018-08-13 DIAGNOSIS — H54.7: ICD-10-CM

## 2018-08-13 DIAGNOSIS — E11.65 TYPE 2 DIABETES MELLITUS WITH HYPERGLYCEMIA, WITH LONG-TERM CURRENT USE OF INSULIN (HCC): ICD-10-CM

## 2018-08-13 DIAGNOSIS — E08.3299: ICD-10-CM

## 2018-08-13 DIAGNOSIS — M54.42 CHRONIC BILATERAL LOW BACK PAIN WITH BILATERAL SCIATICA: ICD-10-CM

## 2018-08-13 DIAGNOSIS — M54.41 CHRONIC BILATERAL LOW BACK PAIN WITH BILATERAL SCIATICA: ICD-10-CM

## 2018-08-13 DIAGNOSIS — G89.29 CHRONIC BILATERAL LOW BACK PAIN WITH BILATERAL SCIATICA: ICD-10-CM

## 2018-08-13 DIAGNOSIS — G63 PERIPHERAL NEUROPATHY DUE TO METABOLIC DISORDER (HCC): ICD-10-CM

## 2018-08-13 DIAGNOSIS — K29.60 OTHER GASTRITIS WITHOUT HEMORRHAGE, UNSPECIFIED CHRONICITY: ICD-10-CM

## 2018-08-13 DIAGNOSIS — E88.9 PERIPHERAL NEUROPATHY DUE TO METABOLIC DISORDER (HCC): ICD-10-CM

## 2018-08-13 DIAGNOSIS — J44.9 COPD MIXED TYPE (HCC): ICD-10-CM

## 2018-08-13 DIAGNOSIS — R26.9 GAIT DIFFICULTY: ICD-10-CM

## 2018-08-13 DIAGNOSIS — Z79.4 TYPE 2 DIABETES MELLITUS WITH HYPERGLYCEMIA, WITH LONG-TERM CURRENT USE OF INSULIN (HCC): ICD-10-CM

## 2018-08-13 DIAGNOSIS — Z01.818 PREOP EXAMINATION: Primary | ICD-10-CM

## 2018-08-13 PROCEDURE — 99214 OFFICE O/P EST MOD 30 MIN: CPT | Performed by: FAMILY MEDICINE

## 2018-08-13 RX ORDER — PREDNISOLONE ACETATE 10 MG/ML
SUSPENSION/ DROPS OPHTHALMIC
Refills: 0 | COMMUNITY
Start: 2018-08-08 | End: 2018-11-13 | Stop reason: ALTCHOICE

## 2018-08-13 RX ORDER — ATORVASTATIN CALCIUM 40 MG/1
40 TABLET, FILM COATED ORAL EVERY 24 HOURS
Qty: 30 TABLET | Refills: 0 | Status: SHIPPED | OUTPATIENT
Start: 2018-08-13 | End: 2018-11-13 | Stop reason: SDUPTHER

## 2018-08-13 RX ORDER — INSULIN ASPART 100 [IU]/ML
INJECTION, SOLUTION INTRAVENOUS; SUBCUTANEOUS
Qty: 9 PEN | Refills: 5 | Status: SHIPPED | OUTPATIENT
Start: 2018-08-13 | End: 2018-08-13 | Stop reason: SDUPTHER

## 2018-08-13 RX ORDER — LISINOPRIL 10 MG/1
10 TABLET ORAL DAILY
Qty: 30 TABLET | Refills: 5 | Status: SHIPPED | OUTPATIENT
Start: 2018-08-13 | End: 2018-11-13 | Stop reason: SDUPTHER

## 2018-08-13 RX ORDER — ASPIRIN 81 MG/1
81 TABLET, CHEWABLE ORAL EVERY 24 HOURS
Qty: 30 TABLET | Refills: 0 | Status: SHIPPED | OUTPATIENT
Start: 2018-08-13 | End: 2018-11-07 | Stop reason: SDUPTHER

## 2018-08-13 RX ORDER — PREGABALIN 100 MG/1
CAPSULE ORAL
Qty: 60 CAPSULE | Refills: 5 | Status: SHIPPED | OUTPATIENT
Start: 2018-08-13 | End: 2018-11-13 | Stop reason: SDUPTHER

## 2018-08-13 RX ORDER — OMEPRAZOLE 20 MG/1
20 CAPSULE, DELAYED RELEASE ORAL DAILY
Qty: 30 CAPSULE | Refills: 5 | Status: SHIPPED | OUTPATIENT
Start: 2018-08-13 | End: 2018-11-13 | Stop reason: SDUPTHER

## 2018-08-13 RX ORDER — PEN NEEDLE, DIABETIC 32GX 5/32"
NEEDLE, DISPOSABLE MISCELLANEOUS
Qty: 100 EACH | Refills: 5 | Status: SHIPPED | OUTPATIENT
Start: 2018-08-13 | End: 2018-10-11 | Stop reason: SDUPTHER

## 2018-08-13 RX ORDER — MORPHINE SULFATE 30 MG/1
30 CAPSULE, EXTENDED RELEASE ORAL DAILY
Qty: 30 CAPSULE | Refills: 0 | Status: SHIPPED | OUTPATIENT
Start: 2018-08-13 | End: 2018-08-16 | Stop reason: ALTCHOICE

## 2018-08-13 RX ORDER — BRIMONIDINE TARTRATE 1.5 MG/ML
SOLUTION/ DROPS OPHTHALMIC
Refills: 6 | COMMUNITY
Start: 2018-08-10 | End: 2018-08-13 | Stop reason: SDUPTHER

## 2018-08-13 RX ORDER — LANCETS 32 GAUGE
EACH MISCELLANEOUS
Qty: 100 EACH | Refills: 5 | Status: SHIPPED | OUTPATIENT
Start: 2018-08-13 | End: 2018-11-13 | Stop reason: SDUPTHER

## 2018-08-13 RX ORDER — INSULIN ASPART 100 [IU]/ML
INJECTION, SOLUTION INTRAVENOUS; SUBCUTANEOUS
Qty: 9 PEN | Refills: 5 | Status: SHIPPED | OUTPATIENT
Start: 2018-08-13 | End: 2018-11-13 | Stop reason: SDUPTHER

## 2018-08-13 NOTE — PROGRESS NOTES
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 ([facility] )  Lab Results   Component Value Date    HGBA1C 9 9 (H) 08/09/2018       No results for input(s): POCGLU in the last 72 hours  The goal is to reduce hemoglobin A1c below seven  She has will compliance with the therapy  Titrating insulin, locating regarding the need for compliance  Patient is medically clear for right eye  Micropulse Cyclophotocoagulation  Gastritis without bleeding  She will continue on PPIs treatment and reassess in one month  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 (HCC)  Continue on pregabalin  Preop examination  Patient is medically clear for right eye surgery  Chronic bilateral low back pain with bilateral sciatica  To her complains of chronic low back pain with sciatica, and after injections done by Pain Management, a trial of morphine to start since failure acetaminophen and oxycodone in the past   Pain is disrupting her daily life in daily activities causing insomnia and depression  Diagnoses and all orders for this visit:    Preop examination  -     POCT ECG    COPD mixed type ([facility] )  Comments:  She is stable in current condition, we continue with the same treatment  Orders:  -     ipratropium (ATROVENT HFA) 17 mcg/act inhaler; Inhale 2 puffs 4 (four) times a day    Other gastritis without hemorrhage, unspecified chronicity  -     omeprazole (PriLOSEC) 20 mg delayed release capsule; Take 1 capsule (20 mg total) by mouth daily    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 [facility])  Comments:  Patient is suffering of peripheral neuropathy secondary to diabetes  Left legs at is worse in the right  She has poor compliance with the therapy  Orders:  -     pregabalin (LYRICA) 100 mg capsule; ONE CAPSULE TWICE A DAY  -     morphine (AVINza) 30 MG 24 hr capsule;  Take 1 capsule (30 mg total) by mouth daily Max Daily Amount: 30 mg    Type 2 diabetes mellitus with hyperglycemia, with long-term current use of insulin (HCC)  -     sitaGLIPtin (JANUVIA) 50 mg tablet; Take 1 tablet (50 mg total) by mouth daily  -     Discontinue: NOVOLOG FLEXPEN 100 units/mL injection pen; UNITS 3 TIMES A DAY WITH MEALS  -     lisinopril (ZESTRIL) 10 mg tablet; Take 1 tablet (10 mg total) by mouth daily  -     atorvastatin (LIPITOR) 40 mg tablet; Take 1 tablet (40 mg total) by mouth every 24 hours  -     aspirin (ASPIRIN 81) 81 mg chewable tablet; Chew 1 tablet (81 mg total) every 24 hours  -     glucose blood test strip; To use 4 times a day  -     GLOBAL EASY GLIDE PEN NEEDLES 32G X 4 MM MISC; To use 4 times a day  -     EASY TOUCH LANCETS 32G MISC; To use 4 times a day  -     insulin degludec (TRESIBA FLEXTOUCH) 100 units/mL injection pen; To use 30 U at bedtime   -     NOVOLOG FLEXPEN 100 units/mL injection pen; 30 UNITS 3 TIMES A DAY WITH MEALS    Gait difficulty    Chronic bilateral low back pain with bilateral sciatica  -     morphine (AVINza) 30 MG 24 hr capsule; Take 1 capsule (30 mg total) by mouth daily Max Daily Amount: 30 mg    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 ([address] )    Other orders  -     Discontinue: ALPHAGAN P 0 15 % ophthalmic solution; INSTILL 1 DROP INTO BOTH EYES TWICE DAILY INSTILL 1 DROP EN AMBOS OJOS DOS VECES AL MAC  -     prednisoLONE acetate (PRED FORTE) 1 % ophthalmic suspension; INSTILL 1 DROP IN RIGHT EYE FOUR TIMES DAILY AFTER SURGERY          Subjective:      Patient ID: Binh Galvez is a 81 y o  female  PT having right eye micropulse cyclophotocoagulation done DR Malia Wylie  Pt here for clearance  Surgery 8/23/18  She is a diabetic patient was complaining today of severe low back pain with radiation down both legs, this is a chronic problem that she has been going on the treatment for          The following portions of the patient's history were reviewed and updated as appropriate: allergies, current medications, past family history, past medical history, past social history, past surgical history and problem list     Review of Systems   Constitutional: Positive for unexpected weight change  Negative for fatigue and fever  HENT: Negative for sore throat and trouble swallowing  Eyes: Negative for photophobia  Respiratory: Negative for cough, chest tightness, shortness of breath and wheezing  Cardiovascular: Negative for chest pain, palpitations and leg swelling  Gastrointestinal: Negative for abdominal pain, blood in stool, nausea and vomiting  Genitourinary: Negative for hematuria  Neurological: Negative for dizziness, syncope, light-headedness and headaches  Hematological: Does not bruise/bleed easily  Objective:      /70 (BP Location: Left arm, Patient Position: Sitting, Cuff Size: Standard)   Pulse 83   Temp 98 1 °F (36 7 °C) (Oral)   Resp 16   Ht 5' 4" (1 626 m)   Wt 64 kg (141 lb)   LMP  (LMP Unknown)   SpO2 97%   Breastfeeding? No   BMI 24 20 kg/m²          Physical Exam   Constitutional:   Obese looking  HENT:   Head: Normocephalic  Eyes: EOM are normal  Pupils are equal, round, and reactive to light  Neck: Neck supple  Cardiovascular: Normal rate and regular rhythm  Pulmonary/Chest: Effort normal    Abdominal: Soft  Musculoskeletal: Normal range of motion  Neurological: She is alert  Skin: Skin is warm and dry  Psychiatric: She has a normal mood and affect   Her behavior is normal

## 2018-08-14 PROBLEM — M54.41 CHRONIC BILATERAL LOW BACK PAIN WITH BILATERAL SCIATICA: Status: ACTIVE | Noted: 2018-08-14

## 2018-08-14 PROBLEM — M54.42 CHRONIC BILATERAL LOW BACK PAIN WITH BILATERAL SCIATICA: Status: ACTIVE | Noted: 2018-08-14

## 2018-08-14 PROBLEM — G89.29 CHRONIC BILATERAL LOW BACK PAIN WITH BILATERAL SCIATICA: Status: ACTIVE | Noted: 2018-08-14

## 2018-08-14 PROBLEM — H54.7: Status: ACTIVE | Noted: 2018-08-14

## 2018-08-14 PROBLEM — E08.3299: Status: ACTIVE | Noted: 2018-08-14

## 2018-08-14 PROBLEM — E11.9 TYPE 2 DIABETES MELLITUS (HCC): Status: RESOLVED | Noted: 2017-12-20 | Resolved: 2018-08-14

## 2018-08-14 PROCEDURE — 93000 ELECTROCARDIOGRAM COMPLETE: CPT | Performed by: FAMILY MEDICINE

## 2018-08-14 NOTE — ASSESSMENT & PLAN NOTE
To her complains of chronic low back pain with sciatica, and after injections done by Pain Management, a trial of morphine to start since failure acetaminophen and oxycodone in the past   Pain is disrupting her daily life in daily activities causing insomnia and depression

## 2018-08-14 NOTE — ASSESSMENT & PLAN NOTE
Lab Results   Component Value Date    HGBA1C 9 9 (H) 08/09/2018       No results for input(s): POCGLU in the last 72 hours  The goal is to reduce hemoglobin A1c below seven  She has will compliance with the therapy  Titrating insulin, locating regarding the need for compliance  Patient is medically clear for right eye  Micropulse Cyclophotocoagulation

## 2018-08-16 ENCOUNTER — TELEPHONE (OUTPATIENT)
Dept: FAMILY MEDICINE CLINIC | Facility: CLINIC | Age: 83
End: 2018-08-16

## 2018-08-16 DIAGNOSIS — G62.9 NEUROPATHY: Primary | ICD-10-CM

## 2018-08-16 RX ORDER — HYDROCODONE BITARTRATE AND ACETAMINOPHEN 5; 325 MG/1; MG/1
TABLET ORAL
Qty: 15 TABLET | Refills: 0 | Status: SHIPPED | OUTPATIENT
Start: 2018-08-16 | End: 2018-09-04 | Stop reason: SDUPTHER

## 2018-08-23 ENCOUNTER — AMBUL SURGICAL CARE (OUTPATIENT)
Dept: URBAN - METROPOLITAN AREA SURGERY 32 | Facility: SURGERY | Age: 83
Setting detail: OPHTHALMOLOGY
End: 2018-08-23
Payer: COMMERCIAL

## 2018-08-23 DIAGNOSIS — H40.89: ICD-10-CM

## 2018-08-23 PROCEDURE — 66710 CILIARY TRANSSLERAL THERAPY: CPT | Performed by: OPHTHALMOLOGY

## 2018-08-23 PROCEDURE — G8918 PT W/O PREOP ORDER IV AB PRO: HCPCS | Performed by: OPHTHALMOLOGY

## 2018-08-23 PROCEDURE — G8907 PT DOC NO EVENTS ON DISCHARG: HCPCS | Performed by: OPHTHALMOLOGY

## 2018-08-24 ENCOUNTER — DOCTOR'S OFFICE (OUTPATIENT)
Dept: URBAN - METROPOLITAN AREA CLINIC 136 | Facility: CLINIC | Age: 83
Setting detail: OPHTHALMOLOGY
End: 2018-08-24

## 2018-08-24 ENCOUNTER — RX ONLY (RX ONLY)
Age: 83
End: 2018-08-24

## 2018-08-24 DIAGNOSIS — H40.89: ICD-10-CM

## 2018-08-24 PROCEDURE — 99024 POSTOP FOLLOW-UP VISIT: CPT | Performed by: PHYSICIAN ASSISTANT

## 2018-08-27 ASSESSMENT — REFRACTION_MANIFEST
OU_VA: 20/50
OS_VA3: 20/
OS_VA3: 20/
OS_ADD: +3.00
OS_VA1: 20/50
OD_VA3: 20/
OD_ADD: +3.00
OD_VA1: 20/600
OS_AXIS: 090
OU_VA: 20/
OS_VA1: 20/
OS_CYLINDER: -3.00
OS_SPHERE: +3.00
OD_CYLINDER: -2.50
OS_VA2: 20/
OD_VA2: 20/
OD_VA3: 20/
OD_AXIS: 085
OD_VA1: 20/
OD_SPHERE: +1.00
OS_VA2: 20/
OD_VA2: 20/

## 2018-08-27 ASSESSMENT — REFRACTION_CURRENTRX
OS_OVR_VA: 20/
OD_OVR_VA: 20/

## 2018-08-27 ASSESSMENT — REFRACTION_AUTOREFRACTION
OD_CYLINDER: -2.75
OD_AXIS: 060
OD_SPHERE: -1.00
OS_CYLINDER: -3.25
OS_AXIS: 098
OS_SPHERE: +2.75

## 2018-08-27 ASSESSMENT — SPHEQUIV_DERIVED
OD_SPHEQUIV: -0.25
OS_SPHEQUIV: 1.125
OD_SPHEQUIV: -2.375
OS_SPHEQUIV: 1.5

## 2018-08-27 ASSESSMENT — VISUAL ACUITY
OS_BCVA: 20/NLP
OD_BCVA: 20/50-2

## 2018-09-04 ENCOUNTER — OFFICE VISIT (OUTPATIENT)
Dept: FAMILY MEDICINE CLINIC | Facility: CLINIC | Age: 83
End: 2018-09-04
Payer: MEDICARE

## 2018-09-04 VITALS
HEIGHT: 64 IN | SYSTOLIC BLOOD PRESSURE: 140 MMHG | HEART RATE: 78 BPM | WEIGHT: 150 LBS | DIASTOLIC BLOOD PRESSURE: 70 MMHG | RESPIRATION RATE: 16 BRPM | TEMPERATURE: 98 F | BODY MASS INDEX: 25.61 KG/M2 | OXYGEN SATURATION: 97 %

## 2018-09-04 DIAGNOSIS — M54.42 CHRONIC BILATERAL LOW BACK PAIN WITH BILATERAL SCIATICA: ICD-10-CM

## 2018-09-04 DIAGNOSIS — G89.29 CHRONIC BILATERAL LOW BACK PAIN WITH BILATERAL SCIATICA: ICD-10-CM

## 2018-09-04 DIAGNOSIS — G62.9 NEUROPATHY: ICD-10-CM

## 2018-09-04 DIAGNOSIS — M54.41 CHRONIC BILATERAL LOW BACK PAIN WITH BILATERAL SCIATICA: ICD-10-CM

## 2018-09-04 DIAGNOSIS — R26.9 GAIT DIFFICULTY: Primary | ICD-10-CM

## 2018-09-04 PROCEDURE — 99214 OFFICE O/P EST MOD 30 MIN: CPT | Performed by: FAMILY MEDICINE

## 2018-09-04 RX ORDER — HYDROCODONE BITARTRATE AND ACETAMINOPHEN 5; 325 MG/1; MG/1
TABLET ORAL
Qty: 60 TABLET | Refills: 0 | Status: SHIPPED | OUTPATIENT
Start: 2018-09-04 | End: 2018-10-02 | Stop reason: ALTCHOICE

## 2018-09-04 RX ORDER — BRIMONIDINE TARTRATE 1.5 MG/ML
SOLUTION/ DROPS OPHTHALMIC
Refills: 6 | COMMUNITY
Start: 2018-08-28 | End: 2019-01-22 | Stop reason: ALTCHOICE

## 2018-09-04 RX ORDER — BRIMONIDINE TARTRATE/TIMOLOL 0.2%-0.5%
DROPS OPHTHALMIC (EYE)
Refills: 0 | COMMUNITY
Start: 2018-09-01 | End: 2018-11-13 | Stop reason: ALTCHOICE

## 2018-09-04 NOTE — PROGRESS NOTES
Assessment/Plan:    Chronic bilateral low back pain with bilateral sciatica  Patient finish course of injection with pain management, this is  A derangement of Lumbar disc with clinically radiculopath pain in her right  Due to severity of pain I am deciding given her a narcotic treatment, The side effect and possible dependency on this treatment was analized with patient and her Daughter Mary  I reviewed [address] and i spoke with patient regarding use of narcotic, current issues with diversion and abuse of drugs  Patient understood the facts of missuse of prescribed medication may cause death of herself or other in diversion is the case  We will monitor medications with a ramdonized call to bring pills to be counted and make sure there is no diversion  Urine screnn also might be requested  Diagnoses and all orders for this visit:    Gait difficulty    Chronic bilateral low back pain with bilateral sciatica    Neuropathy  -     HYDROcodone-acetaminophen (NORCO) 5-325 mg per tablet; Take one tablet p o  In the morning and night as need for severe pain    Other orders  -     ALPHAGAN P 0 15 % ophthalmic solution; INSTILL 1 DROP INTO BOTH EYES TWICE DAILY INSTILL 1 DROP EN AMBOS OJOS DOS VECES AL MAC  -     COMBIGAN 0 2-0 5 %; Subjective:      Patient ID: Binh Galvez is a 81 y o  female  PT walked in to office with complaints of severe bilateral leg pain from the hips down  Leg Pain    There was no injury mechanism  The pain is present in the left leg and right leg  The quality of the pain is described as shooting and aching  The pain is at a severity of 6/10  The pain is severe  The pain has been worsening since onset  Associated symptoms include an inability to bear weight and a loss of motion  She reports no foreign bodies present  The symptoms are aggravated by movement  The treatment provided no relief         The following portions of the patient's history were reviewed and updated as appropriate: allergies, current medications, past family history, past medical history, past social history, past surgical history and problem list     Review of Systems   Constitutional: Positive for unexpected weight change (gaining weight)  Negative for fatigue and fever  HENT: Negative for sore throat and trouble swallowing  Eyes: Negative for photophobia  Respiratory: Negative for cough, chest tightness, shortness of breath and wheezing  Cardiovascular: Negative for chest pain, palpitations and leg swelling  Gastrointestinal: Negative for abdominal pain, blood in stool, nausea and vomiting  Genitourinary: Negative for hematuria  Musculoskeletal: Positive for arthralgias, back pain, gait problem and myalgias  Bilateral leg pain   Neurological: Negative for dizziness, syncope, light-headedness and headaches  Hematological: Does not bruise/bleed easily  Objective:      /70 (BP Location: Left arm, Patient Position: Sitting, Cuff Size: Standard)   Pulse 78   Temp 98 °F (36 7 °C) (Oral)   Resp 16   Ht 5' 4" (1 626 m)   Wt 68 kg (150 lb)   LMP  (LMP Unknown)   SpO2 97%   Breastfeeding? No   BMI 25 75 kg/m²          Physical Exam   Constitutional: She is oriented to person, place, and time  She appears well-developed and well-nourished  Cardiovascular: Normal rate, regular rhythm and normal heart sounds  Pulmonary/Chest: Effort normal and breath sounds normal    Abdominal: Soft  Bowel sounds are normal    Musculoskeletal: She exhibits edema (mild in her LE's  ) and tenderness (of right leg with weakness, difficulty of walking  )  Neurological: She is alert and oriented to person, place, and time  She displays abnormal reflex (right patellar is +, left side is +++  )  Skin: Skin is warm and dry  Psychiatric: She has a normal mood and affect   Her behavior is normal  Judgment and thought content normal

## 2018-09-06 NOTE — ASSESSMENT & PLAN NOTE
Patient finish course of injection with pain management, this is  A derangement of Lumbar disc with clinically radiculopath pain in her right  Due to severity of pain I am deciding given her a narcotic treatment, The side effect and possible dependency on this treatment was analized with patient and her Daughter Mary  I reviewed [address] and i spoke with patient regarding use of narcotic, current issues with diversion and abuse of drugs  Patient understood the facts of missuse of prescribed medication may cause death of herself or other in diversion is the case  We will monitor medications with a ramdonized call to bring pills to be counted and make sure there is no diversion  Urine screnn also might be requested

## 2018-09-11 ENCOUNTER — DOCTOR'S OFFICE (OUTPATIENT)
Dept: URBAN - METROPOLITAN AREA CLINIC 136 | Facility: CLINIC | Age: 83
Setting detail: OPHTHALMOLOGY
End: 2018-09-11
Payer: COMMERCIAL

## 2018-09-11 DIAGNOSIS — H40.89: ICD-10-CM

## 2018-09-11 PROCEDURE — 99024 POSTOP FOLLOW-UP VISIT: CPT | Performed by: OPHTHALMOLOGY

## 2018-09-11 ASSESSMENT — REFRACTION_MANIFEST
OS_VA2: 20/
OU_VA: 20/
OD_VA3: 20/
OD_VA1: 20/
OD_CYLINDER: -2.50
OS_VA3: 20/
OD_AXIS: 085
OS_ADD: +3.00
OS_VA1: 20/
OD_VA1: 20/600
OS_AXIS: 090
OS_VA2: 20/
OS_CYLINDER: -3.00
OD_ADD: +3.00
OS_SPHERE: +3.00
OD_VA2: 20/
OS_VA3: 20/
OD_VA3: 20/
OD_SPHERE: +1.00
OD_VA2: 20/
OS_VA1: 20/50
OU_VA: 20/50

## 2018-09-11 ASSESSMENT — REFRACTION_CURRENTRX
OS_OVR_VA: 20/
OD_OVR_VA: 20/
OS_OVR_VA: 20/
OD_OVR_VA: 20/
OD_OVR_VA: 20/
OS_OVR_VA: 20/

## 2018-09-11 ASSESSMENT — REFRACTION_AUTOREFRACTION
OS_AXIS: 098
OS_CYLINDER: -3.25
OS_SPHERE: +2.75
OD_CYLINDER: -2.75
OD_SPHERE: -1.00
OD_AXIS: 060

## 2018-09-11 ASSESSMENT — VISUAL ACUITY
OD_BCVA: 20/50-2
OS_BCVA: 20/NLP

## 2018-09-11 ASSESSMENT — SPHEQUIV_DERIVED
OD_SPHEQUIV: -2.375
OS_SPHEQUIV: 1.5
OD_SPHEQUIV: -0.25
OS_SPHEQUIV: 1.125

## 2018-10-01 ENCOUNTER — RX ONLY (RX ONLY)
Age: 83
End: 2018-10-01

## 2018-10-01 ENCOUNTER — DOCTOR'S OFFICE (OUTPATIENT)
Dept: URBAN - METROPOLITAN AREA CLINIC 136 | Facility: CLINIC | Age: 83
Setting detail: OPHTHALMOLOGY
End: 2018-10-01
Payer: COMMERCIAL

## 2018-10-01 DIAGNOSIS — H40.89: ICD-10-CM

## 2018-10-01 DIAGNOSIS — H02.002: ICD-10-CM

## 2018-10-01 PROCEDURE — 99024 POSTOP FOLLOW-UP VISIT: CPT | Performed by: OPHTHALMOLOGY

## 2018-10-01 ASSESSMENT — VISUAL ACUITY
OS_BCVA: 20/NLP
OD_BCVA: 20/70

## 2018-10-01 ASSESSMENT — REFRACTION_CURRENTRX
OD_OVR_VA: 20/
OS_OVR_VA: 20/

## 2018-10-01 ASSESSMENT — REFRACTION_MANIFEST
OD_SPHERE: +1.00
OS_CYLINDER: -3.00
OU_VA: 20/50
OS_AXIS: 090
OS_ADD: +3.00
OD_VA2: 20/
OS_VA1: 20/
OS_VA1: 20/50
OD_AXIS: 085
OS_VA3: 20/
OD_CYLINDER: -2.50
OD_VA3: 20/
OD_VA1: 20/600
OD_VA3: 20/
OS_VA2: 20/
OD_ADD: +3.00
OU_VA: 20/
OS_VA2: 20/
OS_VA3: 20/
OD_VA2: 20/
OD_VA1: 20/
OS_SPHERE: +3.00

## 2018-10-01 ASSESSMENT — REFRACTION_AUTOREFRACTION
OS_SPHERE: +2.75
OD_SPHERE: -1.00
OS_AXIS: 098
OS_CYLINDER: -3.25
OD_AXIS: 060
OD_CYLINDER: -2.75

## 2018-10-01 ASSESSMENT — SPHEQUIV_DERIVED
OS_SPHEQUIV: 1.5
OS_SPHEQUIV: 1.125
OD_SPHEQUIV: -2.375
OD_SPHEQUIV: -0.25

## 2018-10-01 ASSESSMENT — CONFRONTATIONAL VISUAL FIELD TEST (CVF)
OS_FINDINGS: FULL
OD_FINDINGS: FULL

## 2018-10-02 ENCOUNTER — OFFICE VISIT (OUTPATIENT)
Dept: FAMILY MEDICINE CLINIC | Facility: CLINIC | Age: 83
End: 2018-10-02
Payer: MEDICARE

## 2018-10-02 VITALS
RESPIRATION RATE: 16 BRPM | OXYGEN SATURATION: 96 % | HEIGHT: 64 IN | HEART RATE: 71 BPM | BODY MASS INDEX: 25.95 KG/M2 | TEMPERATURE: 98 F | SYSTOLIC BLOOD PRESSURE: 160 MMHG | WEIGHT: 152 LBS | DIASTOLIC BLOOD PRESSURE: 70 MMHG

## 2018-10-02 DIAGNOSIS — M54.42 CHRONIC BILATERAL LOW BACK PAIN WITH BILATERAL SCIATICA: ICD-10-CM

## 2018-10-02 DIAGNOSIS — Z23 NEEDS FLU SHOT: Primary | ICD-10-CM

## 2018-10-02 DIAGNOSIS — M54.41 CHRONIC BILATERAL LOW BACK PAIN WITH BILATERAL SCIATICA: ICD-10-CM

## 2018-10-02 DIAGNOSIS — G89.29 CHRONIC BILATERAL LOW BACK PAIN WITH BILATERAL SCIATICA: ICD-10-CM

## 2018-10-02 PROCEDURE — 90662 IIV NO PRSV INCREASED AG IM: CPT | Performed by: FAMILY MEDICINE

## 2018-10-02 PROCEDURE — G0008 ADMIN INFLUENZA VIRUS VAC: HCPCS | Performed by: FAMILY MEDICINE

## 2018-10-02 PROCEDURE — 99213 OFFICE O/P EST LOW 20 MIN: CPT | Performed by: FAMILY MEDICINE

## 2018-10-02 PROCEDURE — 96372 THER/PROPH/DIAG INJ SC/IM: CPT | Performed by: FAMILY MEDICINE

## 2018-10-02 RX ORDER — MORPHINE SULFATE 15 MG/1
TABLET ORAL
Qty: 30 TABLET | Refills: 0 | Status: SHIPPED | OUTPATIENT
Start: 2018-10-02 | End: 2018-10-12 | Stop reason: SINTOL

## 2018-10-02 NOTE — PROGRESS NOTES
Assessment/Plan:  1  Needs flu shot    - influenza vaccine, 7080-7282, high-dose, PF 0 5 mL, for patients 65 yr+ (FLUZONE HIGH-DOSE)    2  Chronic bilateral low back pain with bilateral sciatica   multiple the times of injections and medications the past had been failing to help her with worsening pain that she is suffering, this is related to degenerative disc disease and radiated neuropathy  She also has no pulses in lower extremities and needs to follow up with vascular surgeon as before  As per Dr Akhil Dhaliwal there was not no more studies needed treatment  - morphine (MSIR) 15 mg tablet; 1 tablet twice a day as needed for severe pain  Dispense: 30 tablet; Refill: 0    No problem-specific Assessment & Plan notes found for this encounter  Diagnoses and all orders for this visit:    Needs flu shot  -     influenza vaccine, 5079-9940, high-dose, PF 0 5 mL, for patients 65 yr+ (FLUZONE HIGH-DOSE)          Subjective:      Patient ID: Joanna Mckinney is a 81 y o  female  HPI    The following portions of the patient's history were reviewed and updated as appropriate: allergies, current medications, past family history, past medical history, past social history, past surgical history and problem list     Review of Systems   Constitutional: Negative for fatigue, fever and unexpected weight change  HENT: Negative for sore throat and trouble swallowing  Eyes: Negative for photophobia  Respiratory: Negative for cough, chest tightness, shortness of breath and wheezing  Cardiovascular: Negative for chest pain, palpitations and leg swelling  Gastrointestinal: Negative for abdominal pain, blood in stool, nausea and vomiting  Genitourinary: Negative for hematuria  Musculoskeletal: Positive for arthralgias, back pain (  Lower back pain with radicular pain in both sides) and myalgias  Neurological: Negative for dizziness, syncope, light-headedness and headaches     Hematological: Does not bruise/bleed easily  Objective:      /70 (BP Location: Left arm, Patient Position: Sitting, Cuff Size: Standard)   Pulse 71   Temp 98 °F (36 7 °C) (Oral)   Resp 16   Ht 5' 4" (1 626 m)   Wt 68 9 kg (152 lb)   LMP  (LMP Unknown)   SpO2 96%   Breastfeeding? No   BMI 26 09 kg/m²          Physical Exam   HENT:   Head: Normocephalic  Eyes: Pupils are equal, round, and reactive to light  EOM are normal    Neck: Neck supple  Cardiovascular: Normal rate and regular rhythm  Pulmonary/Chest: Effort normal    Abdominal: Soft  Musculoskeletal: Normal range of motion  She exhibits tenderness ( tenderness to passive motion of legs increase in the lower back  Weakness of both legs  Lack of   Sensation the left leg )  Neurological: She is alert  Skin: Skin is warm and dry  Psychiatric: She has a normal mood and affect   Her behavior is normal

## 2018-10-11 RX ORDER — PEN NEEDLE, DIABETIC 33 GX5/32"
NEEDLE, DISPOSABLE MISCELLANEOUS
Refills: 5 | COMMUNITY
Start: 2018-10-10 | End: 2018-11-13 | Stop reason: SDUPTHER

## 2018-10-12 ENCOUNTER — OFFICE VISIT (OUTPATIENT)
Dept: FAMILY MEDICINE CLINIC | Facility: CLINIC | Age: 83
End: 2018-10-12
Payer: MEDICARE

## 2018-10-12 VITALS
SYSTOLIC BLOOD PRESSURE: 120 MMHG | RESPIRATION RATE: 16 BRPM | WEIGHT: 150 LBS | TEMPERATURE: 98.1 F | DIASTOLIC BLOOD PRESSURE: 70 MMHG | HEART RATE: 76 BPM | HEIGHT: 64 IN | OXYGEN SATURATION: 96 % | BODY MASS INDEX: 25.61 KG/M2

## 2018-10-12 DIAGNOSIS — M54.41 CHRONIC RIGHT-SIDED LOW BACK PAIN WITH RIGHT-SIDED SCIATICA: ICD-10-CM

## 2018-10-12 DIAGNOSIS — E16.2 HYPOGLYCEMIA: Primary | ICD-10-CM

## 2018-10-12 DIAGNOSIS — G89.29 CHRONIC RIGHT-SIDED LOW BACK PAIN WITH RIGHT-SIDED SCIATICA: ICD-10-CM

## 2018-10-12 DIAGNOSIS — I73.9 PAD (PERIPHERAL ARTERY DISEASE) (HCC): ICD-10-CM

## 2018-10-12 LAB — SL AMB POCT GLUCOSE BLD: 131

## 2018-10-12 PROCEDURE — 99215 OFFICE O/P EST HI 40 MIN: CPT | Performed by: FAMILY MEDICINE

## 2018-10-12 PROCEDURE — 82948 REAGENT STRIP/BLOOD GLUCOSE: CPT | Performed by: FAMILY MEDICINE

## 2018-10-12 RX ORDER — IBUPROFEN 800 MG/1
800 TABLET ORAL EVERY 8 HOURS PRN
Qty: 90 TABLET | Refills: 0 | Status: SHIPPED | OUTPATIENT
Start: 2018-10-12 | End: 2018-11-13 | Stop reason: ALTCHOICE

## 2018-10-12 RX ORDER — PENTOXIFYLLINE 400 MG/1
400 TABLET, EXTENDED RELEASE ORAL
Qty: 90 TABLET | Refills: 0 | Status: SHIPPED | OUTPATIENT
Start: 2018-10-12 | End: 2018-11-13 | Stop reason: SDUPTHER

## 2018-10-12 RX ORDER — KETOROLAC TROMETHAMINE 30 MG/ML
30 INJECTION, SOLUTION INTRAMUSCULAR; INTRAVENOUS ONCE
Status: SHIPPED | OUTPATIENT
Start: 2018-10-12 | End: 2018-10-17

## 2018-10-12 NOTE — PROGRESS NOTES
Assessment/Plan:  1  Hypoglycemia  Patient has poor intake, his lower back with radicular pain over the leg is worsening and her PAD is also worsening, She has no complication from PAD but is pulseless in bot feet  I Asked patient to have more frequent meals and continue same treatment, use insuline only if she eats  - POCT blood glucose    2  Chronic right-sided low back pain with right-sided sciatica  I gave her a injection of Ketorolac in office, IM  She has lot of side effect on all Narcotic, except tramadol  We will attempt to control her severe pain again with additional NSAID's and tramadol combo  She has difficulty walking, she uses a can and her Home attendance help with safety     - ibuprofen (MOTRIN) 800 mg tablet; Take 1 tablet (800 mg total) by mouth every 8 (eight) hours as needed for mild pain  Dispense: 90 tablet; Refill: 0    3  PAD (peripheral artery disease) ([address] )  As above, worsening, poor prognosiss, follows up with Vascular surgeon in 3 months  Adding a trial of :  - pentoxifylline (TRENtal) 400 mg ER tablet; Take 1 tablet (400 mg total) by mouth 3 (three) times a day with meals  Dispense: 90 tablet; Refill: 0  4  Upper Respiratory Disease:  Common cold, to use home remedies and tylenol, call if worsening  I gave instruction to home care giver to watch any signs of hypoglycemia  Her Daughter in admited in the Hospital for "cancer", she was the one that was taking care of patient during her free time  No problem-specific Assessment & Plan notes found for this encounter  Diagnoses and all orders for this visit:    Hypoglycemia  -     POCT blood glucose      126     Subjective:      Patient ID: Christiano Josue is a 81 y o  female  Pt here with complaints of increased leg pain      Cough   This is a new problem  The current episode started in the past 7 days  The problem has been unchanged  The cough is non-productive  Associated symptoms include headaches   Pertinent negatives include no chest pain, fever, sore throat, shortness of breath or wheezing  The symptoms are aggravated by lying down  She has tried nothing for the symptoms  Leg Pain    There was no injury mechanism  The pain is present in the left leg and right leg  The quality of the pain is described as aching  The pain is at a severity of 6/10  The pain is severe  The pain has been constant since onset  Associated symptoms include an inability to bear weight  The symptoms are aggravated by movement  Diabetes   Hypoglycemia symptoms include confusion, headaches, nervousness/anxiousness (due to her daughter in [facility] going under studies for cancer ) and pallor  Pertinent negatives for hypoglycemia include no dizziness  Pertinent negatives for diabetes include no chest pain and no fatigue  The following portions of the patient's history were reviewed and updated as appropriate: allergies, current medications, past family history, past medical history, past social history, past surgical history and problem list     Review of Systems   Constitutional: Negative for fatigue, fever and unexpected weight change  HENT: Positive for congestion  Negative for sore throat and trouble swallowing  Eyes: Negative for photophobia  Respiratory: Positive for cough (no chills  )  Negative for chest tightness, shortness of breath and wheezing  Cardiovascular: Negative for chest pain, palpitations and leg swelling  Gastrointestinal: Negative for abdominal pain, blood in stool, nausea and vomiting  Genitourinary: Negative for hematuria  Musculoskeletal: Positive for back pain (worsening) and gait problem  Bilateral leg pain   Skin: Positive for pallor  Neurological: Positive for headaches  Negative for dizziness, syncope and light-headedness  Hematological: Does not bruise/bleed easily  Psychiatric/Behavioral: Positive for confusion  Negative for hallucinations   The patient is nervous/anxious (due to her daughter in [facility] going under studies for cancer  )  Objective:      /70 (BP Location: Left arm, Patient Position: Sitting, Cuff Size: Standard)   Pulse 76   Temp 98 1 °F (36 7 °C) (Oral)   Resp 16   Ht 5' 4" (1 626 m)   Wt 68 kg (150 lb)   LMP  (LMP Unknown)   SpO2 96%   Breastfeeding? No   BMI 25 75 kg/m²          Physical Exam   Constitutional: She is oriented to person, place, and time  Conically ill looking    HENT:   Head: Normocephalic  Eyes: Pupils are equal, round, and reactive to light  EOM are normal    Neck: Neck supple  Cardiovascular: Normal rate, regular rhythm and normal heart sounds  Pulse pedial are absent bilaterally  Pulmonary/Chest: Effort normal and breath sounds normal    Abdominal: Soft  Bowel sounds are normal    Musculoskeletal: Normal range of motion  She exhibits tenderness (difficlty to exam due to pain, to pressor in sacro-iliac joint or lower of right side pain worsened radiating down posteriorly to her right knee  She unable to walk without assistance  Range of motion of back poor  )  Neurological: She is alert and oriented to person, place, and time  She has normal reflexes  Skin: Skin is warm and dry  Psychiatric: She has a normal mood and affect   Her behavior is normal  Judgment and thought content normal

## 2018-10-12 NOTE — PATIENT INSTRUCTIONS
Enfermedad arterial periférica   CUIDADO AMBULATORIO:   La enfermedad arterial periférica (EAP)  se refiere a las arterias estrechas, débiles u obstruidas  Puede afectar cualquier arteria fuera del corazón y el cerebro  La enfermedad arterial periférica generalmente es el resultado de la acumulación de grasmary y [place], (también se le denomina placa), alrededor de las aquino de la arteria  Ghazal inflamación, un coágulo de kris o un crecimiento anormal de las células puede también obstruir las arterias  La enfermedad arterial periférica impide el flujo normal de kris a indu piernas y brazos  Usted corre el riesgo de ghazal amputación si la falta de circulación de la kris impide que las heridas cicatricen o produce grangrena (muerte del tejido)  Sin tratamiento, la enfermedad arterial periférica también puede causar un ataque cardíaco o un derrame cerebral   Los síntomas comunes incluyen:  La enfermedad arterial periférica leve por lo general no causa síntomas  A medida que el tiempo pasa y la enfermedad sandeep GEE puede tener cualquiera de lo siguiente:  · Dolor o calambres en las piernas o en las caderas mientras usted camina     · Ghazal sensación de entumecimiento, debilidad o pesadez en indu piernas     · La piel de indu piernas está seca, escamosa, enrojecida o pálida     · Uñas gruesas o quebradizas o la caída del vello en indu brazos y piernas     · Llagas en los pies que no cicatrizan     · Sensación quemante o dolorosa en indu pies o dedos de los pies mientras está en reposo (se puede empeorar al acostarse)  Llame al 911 en reese de presentar lo siguiente:   · Usted tiene alguno de los siguientes signos de un ataque cardíaco:      ¨ Estornudos, presión, o dolor en [place] pecho que dura mas de 5 minutos o regresa      ¨ Malestar o dolor en ol[place] espalda, amrik, mandíbula, abdomen, o brazo     ¨ Dificultad para respirar    ¨ Náuseas o vómito    ¨ Siente un desvanecimiento o tiene sudores fríos especialmente en el Polina [place]u para respirar  · Usted tiene alguno de los siguientes signos de derrame cerebral:      ¨ Adormecimiento o caída de un lado de olmstead jessy     ¨ Debilidad en un brazo o ghazal pierna    ¨ Confusión o debilidad para hablar    ¨ Mareos o dolor de richard intenso, o pérdida de la visión  Busque atención médica de inmediato si:   · Usted tiene llagas o heridas que no cicatrizan  · Usted nota que la piel en [place] brazos o piernas tiene ghazal tonalidad maurice o está descolorida  · [place] piel se siente fría al tacto  Pregúntele a olmstead [place] vitaminas y minerales son adecuados para usted  · Usted tiene [employer] piernas cuando camina 1/8 yudelka (200 metros) o menos, incluso con [place]  · [place] piernas están enrojecidas, resecas o pálidas, incluso con el tratamiento  · Usted tiene preguntas o inquietudes acerca de ol[place]stead condición o cuidado  El tratamiento para la enfermedad arterial periférica  puede ayudar a reducir el riesgo de un ataque cardíaco, un accidente cerebrovascular o ghazal amputación  Usted podría necesitar más de sorin de los siguientes:  · Medicamentos,  que se administran para prevenir coágulos de kris y reducir el riesgo de un ataque cardíaco o accidente cerebrovascular  Es posible que le administren medicamento para evitar que empeore la enfermedad arterial periférica  · Un programa de ejercicio supervisado  lo ayuda a mantenerse [employer] de la antonio cotidiana y podría prevenir la discapacidad  Los médicos le van ayudar a caminar o realizar ejercicios de resistencia en un entorno seguro 3 veces a la semana elvis 30 a 60 minutos  Usted va hacer esto por varios meses, luego hace la transición de caminar por sí mismo  · Angioplastia  es un procedimiento para abrir [place] arteria para que la kris pueda circular normalmente  Un catéter, es ghazal sonda diminuta, que se [place] para introducir un globo en olmstead arteria   El globo se infla para abrir [place] arteria bloqueada y luego se procede a extraerlo  Un tubo de andre metálica que se conoce dank stent se coloca en el interior de la arteria para mantenerla abierta  · Cirugía de bypass o derivación  se utiliza para crear ghazal nueva conexión a ol[place] arteria con ghazal vena de otra parte de olmstead cuerpo, o un injerto artificial  La vena o el injerto se conecta a [place] arteria por encima o por debajo de la obstrucción  Lo cual permite que la kris fluya alrededor del tramo de la arteria bloqueada  Controle y evite la enfermedad arterial periférica:   · El control de la enfermedad arterial periférica:Camine de 30 a 60 minutos por lo menos 4 veces a la semana  [place] médico también podría remitirlo a un programa de ejercicio supervisado  El programa ayuda a aumentar la distancia que usted puede caminar sin sentir dolor  El programa lo ayuda a mantenerse [employer] actividades de la antonio cotidiana y podría prevenir la discapacidad provocada por la enfermedad arterial periférica  · No fume  La nicotina y otros químicos en los cigarrillos y cigarros pueden empeorar la enfermedad arterial periférica  El tabaquismo también aumenta el riesgo de un ataque cardíaco o accidente cerebrovascular  Pida información a olmstead médico si usted actualmente fuma y necesita ayuda para dejar de fumar  Los cigarrillos electrónicos o tabaco sin humo todavía contienen nicotina  Consulte con olmstead médico antes de [employer]  · Controle otras afecciones de [place] indu medicamentos según lo indicado  Siga las instrucciones de ol[place] médico si usted sufre de hipertensión o colesterol alto  Cuide de indu pies y revise indu niveles de azúcar en la kris según indicaciones y si tiene diabetes  · Consuma alimentos saludables para olmstead corazón  Consuma granos enteros, frutas y vegetales diariamente  Limite la sal y los alimentos altos en grasas  Consulte a olmstead médico si desea obtener más información acerca de ghazal dieta saludable para el corazón  Pregunte si es necesario que baje de [place]  [place] médico puede ayudarle a crear un plan para bajar de peso de manera saludable  Acuda a indu consultas de control con olmstead médico según le indicaron  Anote indu preguntas para que se acuerde de hacerlas elvis indu visitas  © 2017 [address] Information is for End User's use only and may not be sold, redistributed or otherwise used for commercial purposes  All illustrations and images included in CareNotes® are the copyrighted property of A D A M , Inc  or Kal Strong  Esta información es sólo para uso en educación  [place] intención no es darle un consejo médico sobre enfermedades o tratamientos  Colsulte con olmstead [place] farmacéutico antes de seguir cualquier régimen médico para saber si es seguro y efectivo para usted  Astringent (On the skin)   Relieves minor skin irritations  Brand Name(s): A E R, A E R Traveler, [facility] and the [facility], [place] mGaadi [address], [place] Witch Hazel Formula, [place], Good Neighbor Medicated Wipes, [facility] Hygienic Cleansing Pad, Good Sense Medicated Pads, Crunchyrolls Boro-Packs, Hazeletes, Hemorrhoidal Hygiene, Pedi-Boro Soak Paks, [employer] Care Medicated Pads, [facility] Hemorrhoid Relief Medicated Wipes   There may be other brand names for this medicine  When This Medicine Should Not Be Used: You should not use this medicine if you have had an allergic reaction to any of the ingredients  How to Use This Medicine:   Liquid, Pad, Ointment, Lotion, Fizzy Tablet, Powder, Packet  · This medicine is for use on the skin only  Do not get it in your eyes, nose, or mouth  If it does get on these areas, rinse it off right away  Do not use this medicine inside your rectum unless your doctor tells you to  Ask your pharmacist if you are not sure what body areas you can use this medicine on    · Follow the instructions on the medicine label if you are using this medicine without a prescription  · Wash your hands with soap and water before and after you use this medicine  · Dissolve the effervescent tablet or powder in water before using it  Most tablets and powders must be dissolved in at least 12 ounces (1 1/2 cups) of water  Follow the directions on the package  Ask your pharmacist if you have any questions  · Do not cover the treated area with a bandage unless directed by your doctor  If a dose is missed:   · Apply a dose as soon as you can  If it is almost time for your next dose, wait until then and apply a regular dose  Do not apply extra medicine to make up for a missed dose  How to Store and Dispose of This Medicine:   · Store the medicine in a closed container at room temperature, away from heat, moisture, and direct light  · Ask your pharmacist or doctor how to dispose of the medicine container and any leftover or  medicine  · Keep all medicine out of the reach of children  Never share your medicine with anyone  Drugs and Foods to Avoid:   Ask your doctor or pharmacist before using any other medicine, including over-the-counter medicines, vitamins, and herbal products  · Do not put cosmetics or skin care products on the treated skin  Warnings While Using This Medicine:   · Call your doctor if your symptoms do not improve or if they get worse  · Do not use this medicine to treat a skin problem your doctor has not examined  · You should not use this medicine more often or in larger amounts than your doctor ordered  Possible Side Effects While Using This Medicine: If you notice these less serious side effects, talk with your doctor:  · New or continued redness, swelling, burning, or itching  · Skin dryness  If you notice other side effects that you think are caused by this medicine, tell your doctor  Call your doctor for medical advice about side effects   You may report side effects to FDA at 6-382-FDA-8735  ©  260 [address]unes Information is for End User's use only and may not be sold, redistributed or otherwise used for commercial purposes  The above information is an  only  It is not intended as medical advice for individual conditions or treatments  Talk to your doctor, nurse or pharmacist before following any medical regimen to see if it is safe and effective for you

## 2018-10-23 ENCOUNTER — OFFICE VISIT (OUTPATIENT)
Dept: FAMILY MEDICINE CLINIC | Facility: CLINIC | Age: 83
End: 2018-10-23
Payer: MEDICARE

## 2018-10-23 VITALS
SYSTOLIC BLOOD PRESSURE: 160 MMHG | DIASTOLIC BLOOD PRESSURE: 80 MMHG | BODY MASS INDEX: 26.12 KG/M2 | WEIGHT: 153 LBS | OXYGEN SATURATION: 98 % | HEIGHT: 64 IN | RESPIRATION RATE: 16 BRPM | HEART RATE: 78 BPM | TEMPERATURE: 98 F

## 2018-10-23 DIAGNOSIS — M25.551 HIP PAIN, ACUTE, RIGHT: ICD-10-CM

## 2018-10-23 DIAGNOSIS — R26.9 GAIT DIFFICULTY: Primary | ICD-10-CM

## 2018-10-23 PROCEDURE — 99213 OFFICE O/P EST LOW 20 MIN: CPT | Performed by: FAMILY MEDICINE

## 2018-10-23 PROCEDURE — 20610 DRAIN/INJ JOINT/BURSA W/O US: CPT | Performed by: FAMILY MEDICINE

## 2018-10-23 RX ORDER — TRIAMCINOLONE ACETONIDE 40 MG/ML
40 INJECTION, SUSPENSION INTRA-ARTICULAR; INTRAMUSCULAR
Status: COMPLETED | OUTPATIENT
Start: 2018-10-23 | End: 2018-10-23

## 2018-10-23 RX ORDER — LIDOCAINE HYDROCHLORIDE 10 MG/ML
2 INJECTION, SOLUTION INFILTRATION; PERINEURAL
Status: COMPLETED | OUTPATIENT
Start: 2018-10-23 | End: 2018-10-23

## 2018-10-23 RX ADMIN — TRIAMCINOLONE ACETONIDE 40 MG: 40 INJECTION, SUSPENSION INTRA-ARTICULAR; INTRAMUSCULAR at 14:56

## 2018-10-23 RX ADMIN — LIDOCAINE HYDROCHLORIDE 2 ML: 10 INJECTION, SOLUTION INFILTRATION; PERINEURAL at 14:56

## 2018-10-23 NOTE — PATIENT INSTRUCTIONS
Dolor de Antony Sweet   LO QUE NECESITA SABER:   ¿Qué provoca el dolor de cadera? El dolor de cadera puede ser causado por varias condiciones, dank la bursitis, artritis o un esguince en el músculo o tendón  Es posible que usted presente inflamación en los sacos llenos de líquido que protegen a indu músculos y tendones  El dolor de cadera también puede ser a causa de un problema en la parte inferior de la espalda  El dolor de cadera podría ser causado por un trauma, la práctica de deportes o correr  [place] dolor podría comenzar en olmstead Alpha Darrick y pasarse a olmstead muslo, glúteo o pily  ¿Cómo se trata el dolor de cadera? Es posible que usted necesite radiografías para asegurarse que no haya huesos fracturados  Es posible que le den AINEs para ayudar a disminuir el dolor y la inflamación  ¿Cómo puedo controlar el dolor de cadera? · Descanse  olmstead cadera lesionada para que pueda sanar  Posiblemente necesite evitar poner peso sobre olmstead cadera elvis al menos 48 horas  Regrese a indu actividades cotidianas según las indicaciones  · El hielo  a la lesión por 20 minutos cada 4 horas o según le indicaron  Use un paquete de hielo o ponga hielo molido dentro de [employer]  Cúbrala con ghazal toalla para proteger olmstead piel  El hielo ayuda a evitar daño al tejido y a disminuir la inflamación y el dolor  · Eleve  olmstead cadera lesionada arriba del nivel de olmstead corazón con la mayor frecuencia posible  [place] va a disminuir inflamación y el dolor  Si es posible, apoye olmstead cadera y pierna sobre almohadas o cobijas para mantener el área elevada cómodamente  · Mantenga un peso saludable  El peso corporal adicional puede provocar presión y dolor en las articulaciones de olmstead Evalina Fallen y tobillo  Consulte con olmstead médico cuánto debería pesar  Pida que le ayude a crear un plan para bajar de peso si usted tiene sobrepeso  · Use dispositivos de apoyo dank se le indique  Es posible que usted necesite usar un bastón o Jason moore   Los dispositivos ortopédicos ayudan a disminuir el dolor y la presión en olmstead cadera cuando usted camina  Pregunte a olmstead médico por más [employer] dispositivos de asistencia y cómo se usan de forma correcta  ¿Cuándo vinay buscar atención inmediata? · [place] dolor empeora  · Usted tiene entumecimiento en indu piernas o dedos de los pies  · Usted no puede poner Arnulfo Lesa olmstead cadera o no puede moverla  ¿Cuándo vinay comunicarme con mi médico?   · Usted tiene fiebre  · [place] dolor no disminuye, aún después del [address]  · Usted tiene preguntas o inquietudes acerca de olmstead condición o cuidado  ACUERDOS SOBRE OLMSTEAD CUIDADO:   Usted tiene el derecho de ayudar a planear olmstead cuidado  Aprenda todo lo que pueda sobre olmstead condición y dank darle tratamiento  Discuta indu opciones de tratamiento con indu médicos para decidir el cuidado que usted desea recibir  Usted siempre tiene el derecho de rechazar el tratamiento  Esta información es sólo para uso en educación  [place] intención no es darle un consejo médico sobre enfermedades o tratamientos  Colsulte con olmstead [place]id Pinta farmacéutico antes de seguir cualquier régimen médico para saber si es seguro y efectivo para usted  © 2017 [facility] Information is for End User's use only and may not be sold, redistributed or otherwise used for commercial purposes  All illustrations and images included in CareNotes® are the copyrighted property of A D A M , Inc  or Kal Strong

## 2018-10-23 NOTE — PROGRESS NOTES
Assessment/Plan:  1  Gait difficulty  IS SECONDARY TO BILATERAL HIP PAIN  RIGHT WORSE THAN LEFT  2  Hip pain, acute, right    Inject the right hip with 80 mg of triamcinolone and 2 cc of lidocaine 2%    No problem-specific Assessment & Plan notes found for this encounter  There are no diagnoses linked to this encounter  Subjective:      Patient ID: Adolfo Lujan is a 81 y o  female  Pt walked in to office with complaints of severe lower back pain radiating down her legs  Back Pain   This is a chronic problem  The current episode started more than 1 year ago  The problem occurs constantly  The problem has been gradually worsening since onset  The pain is present in the lumbar spine and gluteal  The quality of the pain is described as shooting, aching and burning  The pain radiates to the right thigh and left thigh  The pain is at a severity of 8/10  The pain is severe  The symptoms are aggravated by position, sitting, twisting, lying down, standing and bending  Pertinent negatives include no abdominal pain, chest pain, fever or headaches  Risk factors include history of osteoporosis  The treatment provided no relief  Diabetes   Pertinent negatives for hypoglycemia include no dizziness or headaches  Pertinent negatives for diabetes include no chest pain and no fatigue  The following portions of the patient's history were reviewed and updated as appropriate: allergies, current medications, past family history, past medical history, past social history, past surgical history and problem list     Review of Systems   Constitutional: Negative for fatigue, fever and unexpected weight change  HENT: Negative for sore throat and trouble swallowing  Eyes: Negative for photophobia  Respiratory: Negative for cough, chest tightness, shortness of breath and wheezing  Cardiovascular: Negative for chest pain, palpitations and leg swelling     Gastrointestinal: Negative for abdominal pain, blood in stool, nausea and vomiting  Genitourinary: Negative for hematuria  Musculoskeletal: Positive for arthralgias (Bilateral hips pain) and back pain  Neurological: Negative for dizziness, syncope, light-headedness and headaches  Hematological: Does not bruise/bleed easily  Objective:      /80 (BP Location: Left arm, Patient Position: Sitting, Cuff Size: Standard)   Pulse 78   Temp 98 °F (36 7 °C) (Oral)   Resp 16   Ht 5' 4" (1 626 m)   Wt 69 4 kg (153 lb)   LMP  (LMP Unknown)   SpO2 98%   Breastfeeding? No   BMI 26 26 kg/m²          Physical Exam   Constitutional: She is oriented to person, place, and time  She appears well-developed and well-nourished  Cardiovascular: Normal rate, regular rhythm and normal heart sounds  Pulmonary/Chest: Effort normal and breath sounds normal    Abdominal: Soft  Bowel sounds are normal    Musculoskeletal: She exhibits tenderness (Difficulty walking, she needs a wheelchair to come in and out of this office  The range of motion of the hips are very decreased there are clicks in the right hip and very tender to the passive exam )  Neurological: She is alert and oriented to person, place, and time  She has normal reflexes  Skin: Skin is warm and dry  Psychiatric: She has a normal mood and affect   Her behavior is normal  Judgment and thought content normal      Large joint arthrocentesis  Date/Time: 10/23/2018 2:56 PM  Consent given by: patient  Supporting Documentation  Indications: pain   Procedure Details  Location: hip - R hip joint  Needle size: 25 G  Ultrasound guidance: no  Medications administered: 2 mL lidocaine 1 %; 40 mg triamcinolone acetonide 40 mg/mL    Patient tolerance: patient tolerated the procedure well with no immediate complications  Dressing:  Sterile dressing applied

## 2018-10-24 DIAGNOSIS — M81.0 AGE-RELATED OSTEOPOROSIS WITHOUT CURRENT PATHOLOGICAL FRACTURE: Primary | ICD-10-CM

## 2018-10-29 ENCOUNTER — PROCEDURE VISIT (OUTPATIENT)
Dept: FAMILY MEDICINE CLINIC | Facility: CLINIC | Age: 83
End: 2018-10-29
Payer: MEDICARE

## 2018-10-29 VITALS
RESPIRATION RATE: 16 BRPM | HEIGHT: 64 IN | OXYGEN SATURATION: 98 % | SYSTOLIC BLOOD PRESSURE: 140 MMHG | HEART RATE: 78 BPM | WEIGHT: 152 LBS | DIASTOLIC BLOOD PRESSURE: 60 MMHG | TEMPERATURE: 98 F | BODY MASS INDEX: 25.95 KG/M2

## 2018-10-29 DIAGNOSIS — G89.29 CHRONIC BILATERAL LOW BACK PAIN WITH BILATERAL SCIATICA: Primary | ICD-10-CM

## 2018-10-29 DIAGNOSIS — M54.42 CHRONIC BILATERAL LOW BACK PAIN WITH BILATERAL SCIATICA: Primary | ICD-10-CM

## 2018-10-29 DIAGNOSIS — M16.9 HIP ARTHROSIS: ICD-10-CM

## 2018-10-29 DIAGNOSIS — M54.41 CHRONIC BILATERAL LOW BACK PAIN WITH BILATERAL SCIATICA: Primary | ICD-10-CM

## 2018-10-29 PROCEDURE — 99213 OFFICE O/P EST LOW 20 MIN: CPT | Performed by: FAMILY MEDICINE

## 2018-10-29 RX ORDER — OXYCODONE HYDROCHLORIDE 5 MG/1
5 TABLET ORAL EVERY 6 HOURS PRN
Qty: 30 TABLET | Refills: 0 | Status: SHIPPED | OUTPATIENT
Start: 2018-10-29 | End: 2018-11-13 | Stop reason: SDUPTHER

## 2018-10-29 NOTE — PATIENT INSTRUCTIONS
Oxicodona , liberación rápida (Por la boca)   Se [place] para tratar el dolor de moderado a severo  Farida medicamento es un narcótico para el dolor  [place](s) : Oxaydo, Oxy IR, Roxicodone   Existen muchas otras marcas de [place]  Farida medicamento no debe ser usado cuando:   Farida medicamento no es adecuado para todas las personas  No lo use si ha tenido elida reacción alérgica a la oxicodona, la codeína, la hidrocodona, la dihidrocodeína o la morfina, o si usted sufre de un bloqueo estomacal o intestinal   Forma de usar farida medicamento:   [place], L[place]qu[place], Tableta  · Normal indu medicamentos dank se le haya indicado  Es probable que sea necesario cambiar olmstead dosis varias veces hasta encontrar la que funciona mejor para usted  · Elida sobredosis puede ser Ether Dennison  Siga las instrucciones con cuidado para no rob [facility] cantidad del medicamento de elida deonte vez  · Solución oral: Mida el líquido oral con richelle Savage para uso oral o taza especialmente marcadas para medir medicamentos  · Tableta de Oxaydo®: Trague la tableta entera con elida cantidad suficiente de agua  No la parta, triture, mastique, ni la disuelva  No humedezca la tableta antes de ponérsela en la boca  · [place] debe venir con Margarita Mcgregor Guía del medicamento  Solicite elida copia con olmstead farmacéutico en reese de no tener la guía  · Si olvida elida dosis: Si olvida elida dosis de olmstead medicamento, tómelo lo más pronto posible  Si es hernandez la hora para olmstead próxima dosis, espere hasta entonces para rob olmstead dosis regular  No use medicamento adicional para reponer la dosis olvidada  · Guarde el medicamento en un recipiente cerrado a temperatura ambiente y alejado del calor, la humedad y la joe directa  Guarde farida medicamento en un lugar seguro para prevenir que otros lo tomen  Consulte con olmstead farmacéutico sobre la mejor forma de botar el medicamento que no haya usado    Medicamentos y [employer] que debe evitar:   Consulte con olmstead médico o farmacéutico antes de usar Belen Donaldo, [employer] Stuart que compra sin receta médica, las vitaminas y los productos herbales  · No use farida medicamento si usted ha usado un inhibidor de la monoaminooxidasa (IMAO) en los últimos 14 días  · Algunos medicamentos pueden afectar la eficacia de oxicodona  Informe a olmstead médico si está usando cualquiera de los siguientes:  ¨ Amiodarona, Cegdel, eritromicina, ketoconazol, fenitoína, quinidina, rifampicina, ritonavir  ¨ Diurético  ¨ Medicamentos para tratar la depresión o la ansiedad  ¨ Medicamento para tratar las migrañas  ¨ Medicamentos con fenotiazina  · Informe a olmstead médico si usted [place] cualquier cosa que le provoca sueño  Valeen Law son medicamentos para alergia o medicamentos narcóticos para el dolor y el alcohol  Informe a olmstead médico si usted [employer] buprenorfina, butorfanol, nalbufina, pentazocina o un relajante muscular  · No consuma alcohol mientras esté   Precauciones elvis el uso de farida medicamento:   · Informe a olmstead médico si usted está embarazada o amamantando, o si tiene enfermedad renal, enfermedad hepática, enfermedad cardíaca, presión arterial baja, enfermedad pulmonar o problemas respiratorios (dank asma, EPOC), escoliosis, agrandamiento de la próstata o dificultad para orinar, hipotiroidismo, enfermedad de [place]elo, problemas en la vesícula biliar o el páncreas, o problemas digestivos  Infórmele a olmstead médico si usted tiene antecedentes de lesiones en la richard, tumores cerebrales, problemas mentales, convulsiones o adicción a las drogas o el alcohol  · Farida medicamento puede causar los siguientes problemas:  ¨ Mayor riesgo de sobredosis, que puede conducir a la muerte  ¨ La depresión respiratoria (problema respiratorio grave que puede ser mortal)  ¨ Síndrome de la serotonina, cuando se [place] con otros medicamentos  · [place] puede causar que usted sienta nadia shook o James   No maneje un vehículo ni reinier ninguna tarea que pueda ser peligrosa hasta que usted sepa cómo lo afecta farida medicamento  Siéntese o acuéstese si se siente mareado  Póngase de pie con cuidado  · Farida medicamento puede convertirse en un hábito  No use más de la dosis prescrita  Llame a olmstead médico si usted siente que el medicamento no le está funcionando  · No suspenda el uso de farida medicamento súbitamente  [place] médico necesitará disminuir olmstead dosis poco a poco antes de suspender el medicamento por completo  · Farida medicamento puede causar el estreñimiente, especialmente si usted lo [place] elvis [employer]  Pregúntele a olmstead médico si usted también debería usar un laxante para prevenir y tratar el estreñimiento  [place] muchos líquidos para evitar el estreñimiento  · Puede que farida medicamento cause infertilidad  Hable con olmstead médico antes de usar farida medicamento si usted planea tener hijos  · Guarde todos los medicamentos fuera del alcance de los niños  Nunca comparta indu medicamentos con [employer]    Efectos secundarios que pueden presentarse elvis el uso de farida medicamento:   Consulte inmediatamente con el médico si nota cualquiera de estos efectos secundarios:  · Reacción alérgica: Comezón o ronchas, hinchazón del baltazar o las lulu, hinchazón u hormigueo en la boca o garganta, opresión en el pecho, dificultad para respirar  · Ansiedad, inquietud, ritmo cardíaco acelerado, fiebre, transpiración, espasmos musculares, estremecimientos, náusea, vómito, diarrea, scott o escuchar cosas inexistentes  · Labios, uñas o piel azulado, dificultad para respirar  · Mareo y debilidad extremos, respiración superficial, ritmo cardíaco lento, sudor, piel fría o pegajosa, convulsiones  · Desvanecimientos, mareos, desmayos  · Estreñimiento severo, dolor estomacal  Consulte con el médico si nota los siguientes efectos secundarios menos graves:   · Estreñimiento leve  · Somnolencia, cansancio  Consulte con el médico si nota otros efectos secundarios que deyanira son causados por deepak medicamento  Llame a gomez médico para consultarle Kendell Agustin puede notificar indu efectos secundarios al FDA al [id number]  © 2017 [address] Information is for End User's use only and may not be sold, redistributed or otherwise used for commercial purposes  Esta información es sólo para uso en educación  Gomez intención no es darle un consejo médico sobre enfermedades o tratamientos  Colsulte con gomez [place] Yo farmacéutico antes de seguir cualquier régimen médico para saber si es seguro y efectivo para usted

## 2018-10-29 NOTE — PROGRESS NOTES
Assessment/Plan:  1  Chronic bilateral low back pain with bilateral sciatica  Worsening,   - oxyCODONE (ROXICODONE) 5 mg immediate release tablet; Take 1 tablet (5 mg total) by mouth every 6 (six) hours as needed for moderate pain Max Daily Amount: 20 mg  Dispense: 30 tablet; Refill: 0      2  Hip arthrosis, right  Right hip injected  - Large joint arthrocentesis    No problem-specific Assessment & Plan notes found for this encounter  There are no diagnoses linked to this encounter  Subjective:      Patient ID: Drew Mallory is a 81 y o  female  Pt here with complaints of lower back and bilateral leg pain  Pt is no better since last week      Back Pain   This is a chronic problem  The current episode started more than 1 year ago  The problem occurs constantly  The problem has been gradually worsening since onset  The pain is present in the lumbar spine and gluteal  The quality of the pain is described as aching and shooting  The pain radiates to the left thigh and right thigh  The pain is at a severity of 8/10  The pain is severe  The pain is the same all the time  The symptoms are aggravated by bending, lying down, sitting, position, standing and twisting  Associated symptoms include leg pain  Pertinent negatives include no abdominal pain, chest pain, fever or headaches  Risk factors include history of osteoporosis  She has tried analgesics for the symptoms  The treatment provided no relief  Leg Pain    There was no injury mechanism  The pain is present in the left leg and right leg  The quality of the pain is described as burning, aching and shooting  The pain is at a severity of 8/10  The pain is severe  Associated symptoms include an inability to bear weight  She reports no foreign bodies present  The symptoms are aggravated by movement         The following portions of the patient's history were reviewed and updated as appropriate: allergies, current medications, past family history, past medical history, past social history, past surgical history and problem list     Review of Systems   Constitutional: Negative for fatigue, fever and unexpected weight change  HENT: Negative for sore throat and trouble swallowing  Eyes: Negative for photophobia  Respiratory: Negative for cough, chest tightness, shortness of breath and wheezing  Cardiovascular: Negative for chest pain, palpitations and leg swelling  Gastrointestinal: Negative for abdominal pain, blood in stool, nausea and vomiting  Genitourinary: Negative for hematuria  Musculoskeletal: Positive for back pain, gait problem, joint swelling and myalgias  Bilateral leg pain   Neurological: Negative for dizziness, syncope, light-headedness and headaches  Hematological: Does not bruise/bleed easily  Objective:      /60 (BP Location: Left arm, Patient Position: Sitting, Cuff Size: Standard)   Pulse 78   Temp 98 °F (36 7 °C) (Oral)   Resp 16   Ht 5' 4" (1 626 m)   Wt 68 9 kg (152 lb)   LMP  (LMP Unknown)   SpO2 98%   Breastfeeding? No   BMI 26 09 kg/m²            Physical Exam   Constitutional: She is oriented to person, place, and time  She appears well-developed and well-nourished  Cardiovascular: Normal rate, regular rhythm and normal heart sounds  Pulmonary/Chest: Effort normal and breath sounds normal    Abdominal: Soft  Bowel sounds are normal    Musculoskeletal: She exhibits tenderness (unable to exam, patient in severe pain  She has maxim pain ofn right hip )  Neurological: She is alert and oriented to person, place, and time  She has normal reflexes  Skin: Skin is warm and dry  Psychiatric: She has a normal mood and affect   Her behavior is normal  Judgment and thought content normal      Large joint arthrocentesis  Date/Time: 11/1/2018 6:55 AM  Consent given by: patient  Supporting Documentation  Indications: pain   Procedure Details  Location: hip - R hip joint  Needle size: 25 G  Ultrasound guidance: no  Medications administered: 40 mg triamcinolone acetonide 40 mg/mL    Patient tolerance: patient tolerated the procedure well with no immediate complications  Dressing:  Sterile dressing applied

## 2018-11-01 PROCEDURE — 20610 DRAIN/INJ JOINT/BURSA W/O US: CPT | Performed by: FAMILY MEDICINE

## 2018-11-01 RX ORDER — TRIAMCINOLONE ACETONIDE 40 MG/ML
40 INJECTION, SUSPENSION INTRA-ARTICULAR; INTRAMUSCULAR
Status: COMPLETED | OUTPATIENT
Start: 2018-11-01 | End: 2018-11-01

## 2018-11-01 RX ADMIN — TRIAMCINOLONE ACETONIDE 40 MG: 40 INJECTION, SUSPENSION INTRA-ARTICULAR; INTRAMUSCULAR at 06:55

## 2018-11-05 ENCOUNTER — OFFICE VISIT (OUTPATIENT)
Dept: FAMILY MEDICINE CLINIC | Facility: CLINIC | Age: 83
End: 2018-11-05
Payer: MEDICARE

## 2018-11-05 VITALS
WEIGHT: 145 LBS | HEIGHT: 64 IN | TEMPERATURE: 98 F | HEART RATE: 87 BPM | SYSTOLIC BLOOD PRESSURE: 140 MMHG | DIASTOLIC BLOOD PRESSURE: 70 MMHG | BODY MASS INDEX: 24.75 KG/M2 | RESPIRATION RATE: 16 BRPM | OXYGEN SATURATION: 96 %

## 2018-11-05 DIAGNOSIS — M54.41 CHRONIC BILATERAL LOW BACK PAIN WITH BILATERAL SCIATICA: ICD-10-CM

## 2018-11-05 DIAGNOSIS — G89.29 CHRONIC BILATERAL LOW BACK PAIN WITH BILATERAL SCIATICA: ICD-10-CM

## 2018-11-05 DIAGNOSIS — L20.9 ATOPIC DERMATITIS, UNSPECIFIED TYPE: ICD-10-CM

## 2018-11-05 DIAGNOSIS — M54.42 CHRONIC BILATERAL LOW BACK PAIN WITH BILATERAL SCIATICA: ICD-10-CM

## 2018-11-05 DIAGNOSIS — M81.0 AGE-RELATED OSTEOPOROSIS WITHOUT CURRENT PATHOLOGICAL FRACTURE: Primary | ICD-10-CM

## 2018-11-05 PROCEDURE — 96372 THER/PROPH/DIAG INJ SC/IM: CPT | Performed by: FAMILY MEDICINE

## 2018-11-05 PROCEDURE — 99213 OFFICE O/P EST LOW 20 MIN: CPT | Performed by: FAMILY MEDICINE

## 2018-11-05 RX ORDER — HYDROXYZINE HYDROCHLORIDE 10 MG/1
10 TABLET, FILM COATED ORAL 3 TIMES DAILY
Qty: 90 TABLET | Refills: 0 | Status: SHIPPED | OUTPATIENT
Start: 2018-11-05 | End: 2018-11-13 | Stop reason: ALTCHOICE

## 2018-11-05 NOTE — PATIENT INSTRUCTIONS
Oxicodona , liberación rápida (Por la boca)   Se [place] para tratar el dolor de moderado a severo  Farida medicamento es un narcótico para el dolor  [place](s) : Oxaydo, Oxy IR, Roxicodone   Existen muchas otras marcas de [facility]  Farida medicamento no debe ser usado cuando:   Farida medicamento no es adecuado para todas las personas  No lo use si ha tenido elida reacción alérgica a la oxicodona, la codeína, la hidrocodona, la dihidrocodeína o la morfina, o si usted sufre de un bloqueo estomacal o intestinal   Forma de usar farida medicamento:   [place], Líquido, Tableta  · Normal indu medicamentos dank se le haya indicado  Es probable que sea necesario cambiar [place] dosis varias veces hasta encontrar la que funciona mejor para usted  · Elida sobredosis puede ser Easter Dashawn  Siga las instrucciones con cuidado para no rob [facility] ([facility]) cantidad del medicamento de elida deonte vez  · Solución oral: Mida el líquido oral con [place], jeringa para uso oral o taza especialmente marcadas para medir medicamentos  · Tableta de Oxaydo®: [place] la tableta entera con elida cantidad suficiente de agua  No la parta, triture, mastique, ni la disuelva  No humedezca la tableta antes de ponérsela en la boca  · [facility] debe venir con [place] Guía del medicamento  Solicite elida copia con olmstead farmacéutico en reese de no tener la guía  · Si olvida elida dosis: Si olvida elida dosis de olmstead medicamento, tómelo lo más pronto posible  Si es hernandez la hora para olmstead próxima dosis, espere hasta entonces para rob olmstead dosis regular  No use medicamento adicional para reponer la dosis olvidada  · Guarde el medicamento en un recipiente cerrado a temperatura ambiente y alejado del calor, la humedad y la joe directa  Guarde farida medicamento en un lugar seguro para prevenir que otros lo tomen  Consulte con olmstead farmacéutico sobre la mejor forma de botar el medicamento que no haya usado    Medicamentos y [employer] que debe evitar:   Consulte con olmstead médico o farmacéutico antes de usar Belen Donaldo, [employer] que compra sin receta médica, las vitaminas y los productos herbales  · No use farida medicamento si usted ha usado un inhibidor de la monoaminooxidasa (IMAO) en los últimos 14 días  · Algunos medicamentos pueden afectar la eficacia de oxicodona  Informe a olmstead médico si está usando cualquiera de los siguientes:  ¨ Amiodarona, Cegdel, eritromicina, ketoconazol, fenitoína, quinidina, rifampicina, ritonavir  ¨ Diurético  ¨ Medicamentos para tratar la depresión o la ansiedad  ¨ Medicamento para tratar las migrañas  ¨ Medicamentos con fenotiazina  · Informe a olmstead médico si usted [place] cualquier cosa que le provoca sueño  [employer] son medicamentos para alergia o medicamentos narcóticos para el dolor y el alcohol  Informe a olmstead médico si usted [employer] buprenorfina, butorfanol, nalbufina, pentazocina o un relajante muscular  · No consuma alcohol mientras esté   Precauciones elvis el uso de farida medicamento:   · Informe a olmstead médico si usted está embarazada o amamantando, o si tiene enfermedad renal, enfermedad hepática, enfermedad cardíaca, presión arterial baja, enfermedad pulmonar o problemas respiratorios (dank asma, EPOC), escoliosis, agrandamiento de la próstata o dificultad para orinar, hipotiroidismo, enfermedad de [place], problemas en la vesícula biliar o el páncreas, o problemas digestivos  Infórmele a olmstead médico si usted tiene antecedentes de lesiones en la richard, tumores cerebrales, problemas mentales, convulsiones o adicción a las drogas o el alcohol  · Farida medicamento puede causar los siguientes problemas:  ¨ Mayor riesgo de sobredosis, que puede conducir a la muerte  ¨ La depresión respiratoria (problema respiratorio grave que puede ser mortal)  ¨ Síndrome de la serotonina, cuando se [place] con otros medicamentos  · [employer] puede causar que usted sienta mareos, nadia o James   No maneje un vehículo ni reinier ninguna tarea que pueda ser peligrosa hasta que usted sepa cómo lo afecta farida medicamento  Siéntese o acuéstese si se siente mareado  Póngase de pie con cuidado  · Farida medicamento puede convertirse en un hábito  No use más de la dosis prescrita  Llame a olmstead médico si usted siente que el medicamento no le está funcionando  · No suspenda el uso de farida medicamento súbitamente  [place] médico necesitará disminuir olmstead dosis poco a poco antes de suspender el medicamento por completo  · Farida medicamento puede causar el estreñimiente, especialmente si usted lo [place] elvis [employer]  Pregúntele a olmstead médico si usted también debería usar un laxante para prevenir y tratar el estreñimiento  [place] muchos líquidos para evitar el estreñimiento  · Puede que farida medicamento cause infertilidad  Hable con olmstead médico antes de usar farida medicamento si usted planea tener hijos  · Guarde todos los medicamentos fuera del alcance de los niños  Nunca comparta indu medicamentos con [employer]    Efectos secundarios que pueden presentarse elvis el uso de farida medicamento:   Consulte inmediatamente con el médico si nota cualquiera de estos efectos secundarios:  · Reacción alérgica: Comezón o ronchas, hinchazón del baltazar o las lulu, hinchazón u hormigueo en la boca o garganta, opresión en el pecho, dificultad para respirar  · Ansiedad, inquietud, ritmo cardíaco acelerado, fiebre, transpiración, espasmos musculares, estremecimientos, náusea, vómito, diarrea, scott o escuchar cosas inexistentes  · Labios, uñas o piel azulado, dificultad para respirar  · Mareo y debilidad extremos, respiración superficial, ritmo cardíaco lento, sudor, piel fría o pegajosa, convulsiones  · Desvanecimientos, mareos, desmayos  · Estreñimiento severo, dolor estomacal  Consulte con el médico si nota los siguientes efectos secundarios menos graves:   · Estreñimiento leve  · Somnolencia, cansancio  Consulte con el médico si nota otros efectos secundarios que deyanira son causados por deepak medicamento  Llame a gomez médico para consultarle Kendell Agustin puede notificar indu efectos secundarios al FDA al [id number]  © 2017 [address] Information is for End User's use only and may not be sold, redistributed or otherwise used for commercial purposes  Esta información es sólo para uso en educación  [place]omez intención no es darle un consejo médico sobre enfermedades o tratamientos  Colsulte con gomez Carol Ann Revering farmacéutico antes de seguir cualquier régimen médico para saber si es seguro y efectivo para usted

## 2018-11-05 NOTE — PROGRESS NOTES
Assessment/Plan:  1  Age-related osteoporosis without current pathological fracture   she got prolia  today as treatment for osteoporosis  - denosumab (PROLIA) subcutaneous injection 60 mg; Inject 1 mL (60 mg total) under the skin once     2  Atopic dermatitis, unspecified type  Related to the use of oxycodone, patient is improving greatly with this medication and will continue the same on to further evaluation  She will need to take:  - hydrOXYzine HCL (ATARAX) 10 mg tablet; Take 1 tablet (10 mg total) by mouth 3 (three) times a day  Dispense: 90 tablet; Refill: 0  3  Chronic bilateral low back pain with bilateral sciatica  After multiple treatment patient has been improved under oxycodone 5 mg twice a day or once a day  But she is getting reaction of dermatitis with pre to see and rash  I am treating that reaction but asking the patient to take the oxycodone along with hydroxyzine  I will follow her up in 12 days  The next visit will be related to control of her back pain       Diagnoses and all orders for this visit:    Age-related osteoporosis without current pathological fracture  -     denosumab (PROLIA) subcutaneous injection 60 mg; Inject 1 mL (60 mg total) under the skin once     Atopic dermatitis, unspecified type  -     hydrOXYzine HCL (ATARAX) 10 mg tablet; Take 1 tablet (10 mg total) by mouth 3 (three) times a day    Chronic bilateral low back pain with bilateral sciatica          Subjective:      Patient ID: Jackie Sawant is a 81 y o  female  PT here with complaints of continued hip pain and a rash  Hip Pain    There was no injury mechanism  The pain is present in the right hip  The pain is at a severity of 6/10  The pain is moderate  The pain has been improving since onset  Associated symptoms include an inability to bear weight  The symptoms are aggravated by movement  Rash   This is a new problem  The current episode started in the past 7 days  The problem is unchanged   The rash is diffuse  The rash is characterized by redness and itchiness  She was exposed to a new medication  Past treatments include nothing  The following portions of the patient's history were reviewed and updated as appropriate: allergies, current medications, past family history, past medical history, past social history, past surgical history and problem list     Review of Systems   Constitutional: Positive for unexpected weight change  Musculoskeletal: Positive for back pain  Skin: Positive for rash  Objective:      /70 (BP Location: Left arm, Patient Position: Sitting, Cuff Size: Standard)   Pulse 87   Temp 98 °F (36 7 °C) (Oral)   Resp 16   Ht 5' 4" (1 626 m)   Wt 65 8 kg (145 lb)   LMP  (LMP Unknown)   SpO2 96%   Breastfeeding? No   BMI 24 89 kg/m²          Physical Exam   Constitutional: She is oriented to person, place, and time  She appears distressed  Cardiovascular: Normal rate, regular rhythm and normal heart sounds  Pulmonary/Chest: Effort normal and breath sounds normal    Abdominal: Soft  Bowel sounds are normal    Musculoskeletal: She exhibits tenderness (Of lumbar spine pain improved from before)  Neurological: She is alert and oriented to person, place, and time  She has normal reflexes  Skin: Skin is warm and dry  Rash noted  Psychiatric: She has a normal mood and affect   Her behavior is normal  Judgment and thought content normal

## 2018-11-05 NOTE — ASSESSMENT & PLAN NOTE
After multiple treatment patient has been improved under oxycodone 5 mg twice a day or once a day  But she is getting reaction of dermatitis with pre to see and rash  I am treating that reaction but asking the patient to take the oxycodone along with hydroxyzine  I will follow her up in 12 days    The next visit will be related to control of her back pain

## 2018-11-07 DIAGNOSIS — E11.65 TYPE 2 DIABETES MELLITUS WITH HYPERGLYCEMIA, WITH LONG-TERM CURRENT USE OF INSULIN (HCC): ICD-10-CM

## 2018-11-07 DIAGNOSIS — Z79.4 TYPE 2 DIABETES MELLITUS WITH HYPERGLYCEMIA, WITH LONG-TERM CURRENT USE OF INSULIN (HCC): ICD-10-CM

## 2018-11-07 RX ORDER — ASPIRIN 81 MG/1
81 TABLET, CHEWABLE ORAL DAILY
Qty: 90 TABLET | Refills: 0 | Status: SHIPPED | OUTPATIENT
Start: 2018-11-07 | End: 2018-11-13 | Stop reason: SDUPTHER

## 2018-11-08 ENCOUNTER — DOCTOR'S OFFICE (OUTPATIENT)
Dept: URBAN - METROPOLITAN AREA CLINIC 136 | Facility: CLINIC | Age: 83
Setting detail: OPHTHALMOLOGY
End: 2018-11-08
Payer: COMMERCIAL

## 2018-11-08 DIAGNOSIS — H40.89: ICD-10-CM

## 2018-11-08 PROCEDURE — 99024 POSTOP FOLLOW-UP VISIT: CPT | Performed by: OPTOMETRIST

## 2018-11-08 ASSESSMENT — CONFRONTATIONAL VISUAL FIELD TEST (CVF)
OD_FINDINGS: FULL
OS_FINDINGS: FULL

## 2018-11-08 ASSESSMENT — REFRACTION_MANIFEST
OS_VA1: 20/
OS_VA2: 20/
OD_SPHERE: +1.00
OS_VA2: 20/
OD_ADD: +3.00
OS_ADD: +3.00
OD_VA2: 20/
OS_CYLINDER: -3.00
OD_VA1: 20/
OS_VA3: 20/
OD_VA3: 20/
OD_AXIS: 085
OD_VA1: 20/600
OS_VA1: 20/50
OD_VA3: 20/
OS_AXIS: 090
OU_VA: 20/
OD_VA2: 20/
OU_VA: 20/50
OS_VA3: 20/
OD_CYLINDER: -2.50
OS_SPHERE: +3.00

## 2018-11-08 ASSESSMENT — REFRACTION_AUTOREFRACTION
OS_SPHERE: +2.75
OS_AXIS: 098
OS_CYLINDER: -3.25
OD_SPHERE: -1.00
OD_AXIS: 060
OD_CYLINDER: -2.75

## 2018-11-08 ASSESSMENT — REFRACTION_CURRENTRX
OD_OVR_VA: 20/
OS_OVR_VA: 20/
OD_OVR_VA: 20/
OD_OVR_VA: 20/

## 2018-11-08 ASSESSMENT — SPHEQUIV_DERIVED
OS_SPHEQUIV: 1.125
OS_SPHEQUIV: 1.5
OD_SPHEQUIV: -0.25
OD_SPHEQUIV: -2.375

## 2018-11-08 ASSESSMENT — VISUAL ACUITY
OS_BCVA: 20/NLP
OD_BCVA: 20/70

## 2018-11-09 ENCOUNTER — DOCTOR'S OFFICE (OUTPATIENT)
Dept: URBAN - METROPOLITAN AREA CLINIC 136 | Facility: CLINIC | Age: 83
Setting detail: OPHTHALMOLOGY
End: 2018-11-09
Payer: COMMERCIAL

## 2018-11-09 DIAGNOSIS — H40.89: ICD-10-CM

## 2018-11-09 PROCEDURE — 99024 POSTOP FOLLOW-UP VISIT: CPT | Performed by: OPTOMETRIST

## 2018-11-09 ASSESSMENT — REFRACTION_AUTOREFRACTION
OS_AXIS: 098
OS_SPHERE: +2.75
OD_CYLINDER: -2.75
OS_CYLINDER: -3.25
OD_SPHERE: -1.00
OD_AXIS: 060

## 2018-11-09 ASSESSMENT — REFRACTION_MANIFEST
OS_VA2: 20/
OD_VA3: 20/
OS_VA2: 20/
OU_VA: 20/
OD_VA2: 20/
OS_VA1: 20/
OU_VA: 20/50
OD_VA2: 20/
OS_AXIS: 090
OS_VA3: 20/
OD_CYLINDER: -2.50
OS_SPHERE: +3.00
OS_CYLINDER: -3.00
OD_VA1: 20/600
OD_AXIS: 085
OS_VA1: 20/50
OD_SPHERE: +1.00
OD_ADD: +3.00
OD_VA1: 20/
OS_ADD: +3.00
OS_VA3: 20/
OD_VA3: 20/

## 2018-11-09 ASSESSMENT — SPHEQUIV_DERIVED
OD_SPHEQUIV: -2.375
OS_SPHEQUIV: 1.5
OS_SPHEQUIV: 1.125
OD_SPHEQUIV: -0.25

## 2018-11-09 ASSESSMENT — REFRACTION_CURRENTRX
OS_OVR_VA: 20/
OD_OVR_VA: 20/
OS_OVR_VA: 20/
OS_OVR_VA: 20/

## 2018-11-09 ASSESSMENT — VISUAL ACUITY
OS_BCVA: 20/NLP
OD_BCVA: 20/70

## 2018-11-09 ASSESSMENT — CONFRONTATIONAL VISUAL FIELD TEST (CVF)
OD_FINDINGS: FULL
OS_FINDINGS: FULL

## 2018-11-13 ENCOUNTER — OFFICE VISIT (OUTPATIENT)
Dept: FAMILY MEDICINE CLINIC | Facility: CLINIC | Age: 83
End: 2018-11-13
Payer: MEDICARE

## 2018-11-13 VITALS
DIASTOLIC BLOOD PRESSURE: 70 MMHG | HEART RATE: 107 BPM | SYSTOLIC BLOOD PRESSURE: 140 MMHG | HEIGHT: 64 IN | TEMPERATURE: 98.2 F | OXYGEN SATURATION: 99 % | RESPIRATION RATE: 16 BRPM | BODY MASS INDEX: 24.24 KG/M2 | WEIGHT: 142 LBS

## 2018-11-13 DIAGNOSIS — G63 PERIPHERAL NEUROPATHY DUE TO METABOLIC DISORDER (HCC): ICD-10-CM

## 2018-11-13 DIAGNOSIS — J44.9 COPD MIXED TYPE (HCC): ICD-10-CM

## 2018-11-13 DIAGNOSIS — K29.60 OTHER GASTRITIS WITHOUT HEMORRHAGE, UNSPECIFIED CHRONICITY: ICD-10-CM

## 2018-11-13 DIAGNOSIS — I73.9 PAD (PERIPHERAL ARTERY DISEASE) (HCC): ICD-10-CM

## 2018-11-13 DIAGNOSIS — Z79.4 TYPE 2 DIABETES MELLITUS WITH HYPERGLYCEMIA, WITH LONG-TERM CURRENT USE OF INSULIN (HCC): ICD-10-CM

## 2018-11-13 DIAGNOSIS — E88.9 PERIPHERAL NEUROPATHY DUE TO METABOLIC DISORDER (HCC): ICD-10-CM

## 2018-11-13 DIAGNOSIS — M54.41 CHRONIC BILATERAL LOW BACK PAIN WITH BILATERAL SCIATICA: ICD-10-CM

## 2018-11-13 DIAGNOSIS — M54.42 CHRONIC BILATERAL LOW BACK PAIN WITH BILATERAL SCIATICA: ICD-10-CM

## 2018-11-13 DIAGNOSIS — G25.81 RESTLESS LEGS SYNDROME: Primary | ICD-10-CM

## 2018-11-13 DIAGNOSIS — E11.65 TYPE 2 DIABETES MELLITUS WITH HYPERGLYCEMIA, WITH LONG-TERM CURRENT USE OF INSULIN (HCC): ICD-10-CM

## 2018-11-13 DIAGNOSIS — G89.29 CHRONIC BILATERAL LOW BACK PAIN WITH BILATERAL SCIATICA: ICD-10-CM

## 2018-11-13 PROCEDURE — 99215 OFFICE O/P EST HI 40 MIN: CPT | Performed by: FAMILY MEDICINE

## 2018-11-13 RX ORDER — ASPIRIN 81 MG/1
81 TABLET, CHEWABLE ORAL DAILY
Qty: 90 TABLET | Refills: 3 | Status: SHIPPED | OUTPATIENT
Start: 2018-11-13 | End: 2019-01-22 | Stop reason: SDUPTHER

## 2018-11-13 RX ORDER — OMEPRAZOLE 20 MG/1
20 CAPSULE, DELAYED RELEASE ORAL DAILY
Qty: 90 CAPSULE | Refills: 3 | Status: SHIPPED | OUTPATIENT
Start: 2018-11-13 | End: 2019-02-05 | Stop reason: ALTCHOICE

## 2018-11-13 RX ORDER — LISINOPRIL 10 MG/1
10 TABLET ORAL DAILY
Qty: 90 TABLET | Refills: 3 | Status: SHIPPED | OUTPATIENT
Start: 2018-11-13 | End: 2019-02-05 | Stop reason: SDUPTHER

## 2018-11-13 RX ORDER — PENTOXIFYLLINE 400 MG/1
400 TABLET, EXTENDED RELEASE ORAL
Qty: 270 TABLET | Refills: 3 | Status: SHIPPED | OUTPATIENT
Start: 2018-11-13 | End: 2019-11-25 | Stop reason: SDUPTHER

## 2018-11-13 RX ORDER — OXYCODONE HYDROCHLORIDE 5 MG/1
5 TABLET ORAL EVERY 4 HOURS PRN
Qty: 150 TABLET | Refills: 0 | Status: SHIPPED | OUTPATIENT
Start: 2018-11-13 | End: 2018-12-20 | Stop reason: SDUPTHER

## 2018-11-13 RX ORDER — ROPINIROLE 0.25 MG/1
0.5 TABLET, FILM COATED ORAL
Qty: 90 TABLET | Refills: 3 | Status: SHIPPED | OUTPATIENT
Start: 2018-11-13 | End: 2019-01-07 | Stop reason: HOSPADM

## 2018-11-13 RX ORDER — LANCETS 32 GAUGE
EACH MISCELLANEOUS
Qty: 300 EACH | Refills: 3 | Status: SHIPPED | OUTPATIENT
Start: 2018-11-13 | End: 2019-06-11 | Stop reason: ALTCHOICE

## 2018-11-13 RX ORDER — ATORVASTATIN CALCIUM 40 MG/1
40 TABLET, FILM COATED ORAL EVERY 24 HOURS
Qty: 90 TABLET | Refills: 3 | Status: SHIPPED | OUTPATIENT
Start: 2018-11-13 | End: 2019-09-24 | Stop reason: SDUPTHER

## 2018-11-13 RX ORDER — PREGABALIN 100 MG/1
CAPSULE ORAL
Qty: 180 CAPSULE | Refills: 3 | Status: SHIPPED | OUTPATIENT
Start: 2018-11-13 | End: 2019-01-07 | Stop reason: HOSPADM

## 2018-11-13 RX ORDER — INSULIN ASPART 100 [IU]/ML
INJECTION, SOLUTION INTRAVENOUS; SUBCUTANEOUS
Qty: 25 PEN | Refills: 0 | Status: ON HOLD | OUTPATIENT
Start: 2018-11-13 | End: 2019-01-04

## 2018-11-13 RX ORDER — PEN NEEDLE, DIABETIC 33 GX5/32"
NEEDLE, DISPOSABLE MISCELLANEOUS
Qty: 300 EACH | Refills: 3 | Status: SHIPPED | OUTPATIENT
Start: 2018-11-13 | End: 2019-06-11 | Stop reason: ALTCHOICE

## 2018-11-13 NOTE — PATIENT INSTRUCTIONS
Restless legs  Síndrome de las piernas inquietas (SPI)   CUIDADO AMBULATORIO:   El síndrome de las piernas inquietas (SPI)  es ghazal condición que causa ghazal urgencia muy rebekah de  las piernas y los pies  También puede tener sensación de hormigueo, arrastramiento, picazón o sensación punzante en las piernas  Puede tener molestias o dolor  El movimiento calma los síntomas por un corto [place]  El SPI generalmente empeora al final del día y por las noches  [place] síntomas pueden incluso ir y venir por días o semanas a la vez y hasta empeorar elvis periodos de estrés  Pregúntele a [place] vitaminas y minerales son adecuados para usted  · No puede dormir debido a amer[place] síntomas  · [place] síntomas están empeorando  · Usted tiene preguntas o inquietudes acerca de [place] condición o cuidado  Medicamentos:   · Medicamentos,  podrían ayudar a disminuir los síntomas del SPI  · [place] am[place] medicamentos dank se le haya indicado  Consulte con [place] médico si usted deyanira que [place] medicamento no le está ayudando o si presenta efectos secundarios  Infórmele si es alérgico a algún medicamento  Mantenga ghazal lista actualizada de los [employer], las vitaminas y los productos herbales que danae  Incluya los siguientes datos de los medicamentos: cantidad, frecuencia y motivo de administración  Traiga con usted la lista o los envases de la píldoras a america citas de seguimiento  Lleve la lista de los medicamentos con usted en reese de ghazal emergencia  [place] de [place] síntomas:   · Mantenga amer[place] piernas cálidas  Use calcetines gruesos o ghazal cobija eléctrica  También puede ser de [place] rob un baño de tyler caliente o hacerse un masaje en las piernas antes de [place]  · Realice actividad física con regularidad  La actividad física moderada dank caminar y estrechar los músculos podrían aliviar america síntomas  Pregunte a [place] médico acerca del mejor plan de ejercicio para usted  · Duerma lo suficiente    Bridget Soler temprano para dormir lo necesario  Acuéstese y levántese de gomez cama al mismo tiempo todos los días  · No ryan cafeína ni alcohol en la noche  No fume ni use productos de tabaco en la noche  La cafeína, el alcohol y el tabaco pueden impedir que usted duerma eliel  Acuda a indu consultas de control con gomez médico según le indicaron  Anote indu preguntas para que se acuerde de hacerlas elvis indu visitas  © 2017 [address] Information is for End User's use only and may not be sold, redistributed or otherwise used for commercial purposes  All illustrations and images included in CareNotes® are the copyrighted property of A D A M , Inc  or Kal Strong  Esta información es sólo para uso en educación  Gomez intención no es darle un consejo médico sobre enfermedades o tratamientos  Colsulte con gomez Classie Oliver farmacéutico antes de seguir cualquier régimen médico para saber si es seguro y efectivo para usted

## 2018-11-13 NOTE — PROGRESS NOTES
Assessment/Plan:  1  Chronic bilateral low back pain with bilateral sciatica  Condition is stable with current treatment, to  continue the same  I reviewed [address] and i spoke with patient regarding use of narcotic, current issues with diversion and abuse of drugs  Patient understood the facts of missuse of prescribed medication may cause death of herself or other in diversion is the case  We will monitor medications with a ramdonized call to bring pills to be counted and make sure there is no diversion  Urine screnn also might be requested  - oxyCODONE (ROXICODONE) 5 mg immediate release tablet; Take 1 tablet (5 mg total) by mouth every 4 (four) hours as needed for moderate pain Max Daily Amount: 30 mg  Dispense: 150 tablet; Refill: 0    2  Restless legs syndrome  Patient has chronic persistent restless legs syndrome wi symptoms occur in the daily basis  Ropirinole is a dopamine agonist agent that  can help patient to improve the night symptoms  Patient had no history of alcohol of drug abuse  Patient renal function is in normal limits  Patient's  comorbidity may be related to his present problem  There is no history of anemia of iron deficiency                        - rOPINIRole (REQUIP) 0 25 mg tablet; Take 2 tablets (0 5 mg total) by mouth daily at bedtime  Dispense: 90 tablet; Refill: 3    3  COPD mixed type ([address] )  Condition is stable with current treatment, to  continue the same    - ipratropium (ATROVENT HFA) 17 mcg/act inhaler; Inhale 2 puffs 4 (four) times a day Ninety day supplies please  Dispense: 3 Inhaler; Refill: 3    4  Other gastritis without hemorrhage, unspecified chronicity  Condition is stable with current treatment, to  continue the same    - omeprazole (PriLOSEC) 20 mg delayed release capsule; Take 1 capsule (20 mg total) by mouth daily Ninety day supplies please  Dispense: 90 capsule; Refill: 3    5   PAD (peripheral artery disease) ([facility] [address] )  Condition is stable with current treatment, to  continue the same    - pentoxifylline (TRENtal) 400 mg ER tablet; Take 1 tablet (400 mg total) by mouth 3 (three) times a day with meals Ninety day supplies please  Dispense: 270 tablet; Refill: 3    6  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 ([facility])  Symptoms of neuropathy are stable will continue same treatment  - pregabalin (LYRICA) 100 mg capsule; ONE CAPSULE TWICE A DAY  Ninety day supplies please  Dispense: 180 capsule; Refill: 3    7  Type 2 diabetes mellitus with hyperglycemia, with long-term current use of insulin ([facility])  Advised the patient about compliance with the therapy no changes were made today   - CLEVER CHOICE COMFORT EZ 33G X 4 MM MISC; Ninety day supplies please  Dispense: 300 each; Refill: 3  - insulin degludec (TRESIBA FLEXTOUCH) 100 units/mL injection pen; To use 30 U at bedtime  Ninety day supplies please  Dispense: 9 pen; Refill: 3  - sitaGLIPtin (JANUVIA) 50 mg tablet; Take 1 tablet (50 mg total) by mouth daily Ninety day supplies please  Dispense: 90 tablet; Refill: 3  - aspirin (ASPIRIN 81) 81 mg chewable tablet; Chew 1 tablet (81 mg total) daily Ninety day supplies please  Dispense: 90 tablet; Refill: 3  - NOVOLOG FLEXPEN 100 units/mL injection pen; 30 UNITS 3 TIMES A DAY WITH MEALS    Ninety day supplies please  Dispense: 25 pen; Refill: 0  - glucose blood test strip; To use 4 times a day  Ninety day supplies please  Dispense: 300 each; Refill: 3  - atorvastatin (LIPITOR) 40 mg tablet; Take 1 tablet (40 mg total) by mouth every 24 hours  Dispense: 90 tablet; Refill: 3  - EASY TOUCH LANCETS 32G MISC; To use 4 times a day  Dispense: 300 each; Refill: 3  - lisinopril (ZESTRIL) 10 mg tablet; Take 1 tablet (10 mg total) by mouth daily  Dispense: 90 tablet; Refill: 3    No problem-specific Assessment & Plan notes found for this encounter  There are no diagnoses linked to this encounter        Subjective: Patient ID: Adolfo Lujan is a 81 y o  female  Patient is here for follow-up of right hip pain and neuropathy  She also states continue symptoms of restless legs mostly during the night has been going on for three month  Will review patient's records about renal or liver impairment, the level of anion and anemia  And decide her chronic conditions of diabetes and neuropathy we in find explanation for her restless leg syndrome in the past   Patient has a polypharmacy and adding a agent was discussed in the past with patient and patient's daughter  She continues with increased trouble sometimes related to legs multiple and uncontrollable symptoms  She is traveling to the [facility] in the next few days and wants to have 90 day supplies of all her medications  She is upset about daughter suffering of timus neoplasma  The following portions of the patient's history were reviewed and updated as appropriate: allergies, current medications, past family history, past medical history, past social history, past surgical history and problem list     Review of Systems   Constitutional: Negative for fatigue, fever and unexpected weight change  HENT: Negative for sore throat and trouble swallowing  Eyes: Negative for photophobia  Respiratory: Negative for cough, chest tightness, shortness of breath and wheezing  Cardiovascular: Negative for chest pain, palpitations and leg swelling  Gastrointestinal: Negative for abdominal pain, blood in stool, nausea and vomiting  Genitourinary: Negative for hematuria  Musculoskeletal: Positive for arthralgias, back pain, gait problem and myalgias (and restless legs during nights  )  Neurological: Negative for dizziness, syncope, light-headedness and headaches  Hematological: Does not bruise/bleed easily           Objective:      /70 (BP Location: Left arm, Patient Position: Sitting, Cuff Size: Standard)   Pulse (!) 107   Temp 98 2 °F (36 8 °C) (Oral)   Resp 16   Ht 5' 4" (1 626 m)   Wt 64 4 kg (142 lb)   LMP  (LMP Unknown)   SpO2 99%   Breastfeeding? No   BMI 24 37 kg/m²          Physical Exam   HENT:   Head: Normocephalic  Eyes: Pupils are equal, round, and reactive to light  EOM are normal    Neck: Neck supple  Cardiovascular: Normal rate and regular rhythm  Pulmonary/Chest: Effort normal    Abdominal: Soft  Musculoskeletal: Normal range of motion  Neurological: She is alert  Skin: Skin is warm and dry  Psychiatric: She has a normal mood and affect   Her behavior is normal

## 2018-11-14 ENCOUNTER — RX ONLY (RX ONLY)
Age: 83
End: 2018-11-14

## 2018-11-14 ENCOUNTER — DOCTOR'S OFFICE (OUTPATIENT)
Dept: URBAN - METROPOLITAN AREA CLINIC 136 | Facility: CLINIC | Age: 83
Setting detail: OPHTHALMOLOGY
End: 2018-11-14
Payer: COMMERCIAL

## 2018-11-14 DIAGNOSIS — H34.11: ICD-10-CM

## 2018-11-14 DIAGNOSIS — H40.89: ICD-10-CM

## 2018-11-14 PROCEDURE — 99024 POSTOP FOLLOW-UP VISIT: CPT | Performed by: OPHTHALMOLOGY

## 2018-11-14 ASSESSMENT — REFRACTION_MANIFEST
OD_VA1: 20/
OD_VA3: 20/
OS_CYLINDER: -3.00
OS_SPHERE: +3.00
OD_AXIS: 085
OS_VA3: 20/
OD_CYLINDER: -2.50
OS_ADD: +3.00
OU_VA: 20/
OU_VA: 20/50
OS_AXIS: 090
OS_VA3: 20/
OD_VA3: 20/
OD_SPHERE: +1.00
OS_VA1: 20/50
OS_VA1: 20/
OD_VA2: 20/
OS_VA2: 20/
OD_ADD: +3.00
OS_VA2: 20/
OD_VA2: 20/
OD_VA1: 20/600

## 2018-11-14 ASSESSMENT — REFRACTION_CURRENTRX
OS_OVR_VA: 20/
OD_OVR_VA: 20/
OD_OVR_VA: 20/
OS_OVR_VA: 20/
OD_OVR_VA: 20/
OS_OVR_VA: 20/

## 2018-11-14 ASSESSMENT — VISUAL ACUITY
OD_BCVA: 20/80
OS_BCVA: 20/NLP

## 2018-11-14 ASSESSMENT — KERATOMETRY
OD_K1POWER_DIOPTERS: 46.50
OS_K1POWER_DIOPTERS: 46.25
OD_K2POWER_DIOPTERS: 48.00
OD_AXISANGLE_DEGREES: 47.25
OS_K2POWER_DIOPTERS: 47.74
OS_AXISANGLE_DEGREES: 096

## 2018-11-14 ASSESSMENT — SPHEQUIV_DERIVED
OD_SPHEQUIV: -0.875
OS_SPHEQUIV: 1.5
OD_SPHEQUIV: -0.25
OS_SPHEQUIV: 1.5

## 2018-11-14 ASSESSMENT — REFRACTION_AUTOREFRACTION
OS_SPHERE: +3.25
OD_AXIS: 77
OS_CYLINDER: -3.50
OS_AXIS: 95
OD_SPHERE: +1.75
OD_CYLINDER: -5.25

## 2018-11-14 ASSESSMENT — AXIALLENGTH_DERIVED
OD_AL: 22.599
OD_AL: 22.3778
OS_AL: 21.86
OS_AL: 21.86

## 2018-11-14 ASSESSMENT — LID POSITION - ENTROPION: OD_ENTROPION: RLL

## 2018-12-20 ENCOUNTER — OFFICE VISIT (OUTPATIENT)
Dept: FAMILY MEDICINE CLINIC | Facility: CLINIC | Age: 83
End: 2018-12-20
Payer: MEDICARE

## 2018-12-20 ENCOUNTER — TRANSCRIBE ORDERS (OUTPATIENT)
Dept: ADMINISTRATIVE | Facility: HOSPITAL | Age: 83
End: 2018-12-20

## 2018-12-20 ENCOUNTER — APPOINTMENT (OUTPATIENT)
Dept: LAB | Facility: HOSPITAL | Age: 83
End: 2018-12-20
Payer: MEDICARE

## 2018-12-20 VITALS
TEMPERATURE: 98 F | HEART RATE: 80 BPM | OXYGEN SATURATION: 98 % | WEIGHT: 143 LBS | HEIGHT: 64 IN | BODY MASS INDEX: 24.41 KG/M2 | SYSTOLIC BLOOD PRESSURE: 140 MMHG | DIASTOLIC BLOOD PRESSURE: 80 MMHG | RESPIRATION RATE: 16 BRPM

## 2018-12-20 DIAGNOSIS — I10 ESSENTIAL HYPERTENSION, BENIGN: ICD-10-CM

## 2018-12-20 DIAGNOSIS — G89.29 CHRONIC BILATERAL LOW BACK PAIN WITH BILATERAL SCIATICA: ICD-10-CM

## 2018-12-20 DIAGNOSIS — E11.42 TYPE 2 DIABETES MELLITUS WITH DIABETIC POLYNEUROPATHY, WITH LONG-TERM CURRENT USE OF INSULIN (HCC): Primary | ICD-10-CM

## 2018-12-20 DIAGNOSIS — M54.42 CHRONIC BILATERAL LOW BACK PAIN WITH BILATERAL SCIATICA: ICD-10-CM

## 2018-12-20 DIAGNOSIS — Z79.4 TYPE 2 DIABETES MELLITUS WITH DIABETIC POLYNEUROPATHY, WITH LONG-TERM CURRENT USE OF INSULIN (HCC): ICD-10-CM

## 2018-12-20 DIAGNOSIS — M54.41 CHRONIC BILATERAL LOW BACK PAIN WITH BILATERAL SCIATICA: ICD-10-CM

## 2018-12-20 DIAGNOSIS — Z79.4 TYPE 2 DIABETES MELLITUS WITH DIABETIC POLYNEUROPATHY, WITH LONG-TERM CURRENT USE OF INSULIN (HCC): Primary | ICD-10-CM

## 2018-12-20 DIAGNOSIS — E11.42 TYPE 2 DIABETES MELLITUS WITH DIABETIC POLYNEUROPATHY, WITH LONG-TERM CURRENT USE OF INSULIN (HCC): ICD-10-CM

## 2018-12-20 LAB
ALBUMIN SERPL BCP-MCNC: 4 G/DL (ref 3–5.2)
ALP SERPL-CCNC: 115 U/L (ref 43–122)
ALT SERPL W P-5'-P-CCNC: 44 U/L (ref 9–52)
ANION GAP SERPL CALCULATED.3IONS-SCNC: 9 MMOL/L (ref 5–14)
AST SERPL W P-5'-P-CCNC: 22 U/L (ref 14–36)
BASOPHILS # BLD AUTO: 0.1 THOUSANDS/ΜL (ref 0–0.1)
BASOPHILS NFR BLD AUTO: 1 % (ref 0–1)
BILIRUB SERPL-MCNC: 0.7 MG/DL
BUN SERPL-MCNC: 21 MG/DL (ref 5–25)
CALCIUM SERPL-MCNC: 9.8 MG/DL (ref 8.4–10.2)
CHLORIDE SERPL-SCNC: 98 MMOL/L (ref 97–108)
CHOLEST SERPL-MCNC: 170 MG/DL
CO2 SERPL-SCNC: 29 MMOL/L (ref 22–30)
CREAT SERPL-MCNC: 1.16 MG/DL (ref 0.6–1.2)
CREAT UR-MCNC: 56.5 MG/DL
EOSINOPHIL # BLD AUTO: 0.2 THOUSAND/ΜL (ref 0–0.4)
EOSINOPHIL NFR BLD AUTO: 2 % (ref 0–6)
ERYTHROCYTE [DISTWIDTH] IN BLOOD BY AUTOMATED COUNT: 16.3 %
EST. AVERAGE GLUCOSE BLD GHB EST-MCNC: 197 MG/DL
GFR SERPL CREATININE-BSD FRML MDRD: 44 ML/MIN/1.73SQ M
GLUCOSE SERPL-MCNC: 260 MG/DL (ref 70–99)
HBA1C MFR BLD: 8.5 % (ref 4.2–6.3)
HCT VFR BLD AUTO: 44.7 % (ref 36–46)
HDLC SERPL-MCNC: 48 MG/DL (ref 40–59)
HGB BLD-MCNC: 14.3 G/DL (ref 12–16)
LDLC SERPL CALC-MCNC: 89 MG/DL
LYMPHOCYTES # BLD AUTO: 2.2 THOUSANDS/ΜL (ref 0.5–4)
LYMPHOCYTES NFR BLD AUTO: 21 % (ref 25–45)
MCH RBC QN AUTO: 30.6 PG (ref 26–34)
MCHC RBC AUTO-ENTMCNC: 32.1 G/DL (ref 31–36)
MCV RBC AUTO: 96 FL (ref 80–100)
MICROALBUMIN UR-MCNC: 192 MG/L (ref 0–20)
MICROALBUMIN/CREAT 24H UR: 340 MG/G CREATININE (ref 0–30)
MONOCYTES # BLD AUTO: 0.7 THOUSAND/ΜL (ref 0.2–0.9)
MONOCYTES NFR BLD AUTO: 6 % (ref 1–10)
NEUTROPHILS # BLD AUTO: 7.6 THOUSANDS/ΜL (ref 1.8–7.8)
NEUTS SEG NFR BLD AUTO: 71 % (ref 45–65)
NONHDLC SERPL-MCNC: 122 MG/DL
PLATELET # BLD AUTO: 175 THOUSANDS/UL (ref 150–450)
PMV BLD AUTO: 10.6 FL (ref 8.9–12.7)
POTASSIUM SERPL-SCNC: 5.5 MMOL/L (ref 3.6–5)
PROT SERPL-MCNC: 6.9 G/DL (ref 5.9–8.4)
RBC # BLD AUTO: 4.68 MILLION/UL (ref 4–5.2)
SODIUM SERPL-SCNC: 136 MMOL/L (ref 137–147)
TRIGL SERPL-MCNC: 165 MG/DL
WBC # BLD AUTO: 10.8 THOUSAND/UL (ref 4.5–11)

## 2018-12-20 PROCEDURE — 80061 LIPID PANEL: CPT

## 2018-12-20 PROCEDURE — 82570 ASSAY OF URINE CREATININE: CPT | Performed by: FAMILY MEDICINE

## 2018-12-20 PROCEDURE — 36415 COLL VENOUS BLD VENIPUNCTURE: CPT

## 2018-12-20 PROCEDURE — 80053 COMPREHEN METABOLIC PANEL: CPT

## 2018-12-20 PROCEDURE — 82043 UR ALBUMIN QUANTITATIVE: CPT | Performed by: FAMILY MEDICINE

## 2018-12-20 PROCEDURE — 85025 COMPLETE CBC W/AUTO DIFF WBC: CPT

## 2018-12-20 PROCEDURE — 99214 OFFICE O/P EST MOD 30 MIN: CPT | Performed by: FAMILY MEDICINE

## 2018-12-20 PROCEDURE — 83036 HEMOGLOBIN GLYCOSYLATED A1C: CPT

## 2018-12-20 RX ORDER — DORZOLAMIDE HCL 20 MG/ML
SOLUTION/ DROPS OPHTHALMIC
Refills: 0 | COMMUNITY
Start: 2018-11-14 | End: 2019-01-22 | Stop reason: SDUPTHER

## 2018-12-20 RX ORDER — BRIMONIDINE TARTRATE/TIMOLOL 0.2%-0.5%
DROPS OPHTHALMIC (EYE)
Refills: 0 | COMMUNITY
Start: 2018-11-15 | End: 2019-01-22 | Stop reason: ALTCHOICE

## 2018-12-20 RX ORDER — OXYCODONE HYDROCHLORIDE 5 MG/1
5 TABLET ORAL 2 TIMES DAILY
Qty: 60 TABLET | Refills: 0 | Status: SHIPPED | OUTPATIENT
Start: 2018-12-20 | End: 2019-01-07 | Stop reason: HOSPADM

## 2018-12-20 NOTE — PROGRESS NOTES
Assessment/Plan:  1  Chronic bilateral low back pain with bilateral sciatica  This is a radiculopathy from back damage  - oxyCODONE (ROXICODONE) 5 mg immediate release tablet; Take 1 tablet (5 mg total) by mouth 2 (two) times a day Max Daily Amount: 10 mg  Dispense: 60 tablet; Refill: 0    2  Type 2 diabetes mellitus with diabetic polyneuropathy, with long-term current use of insulin ([facility])  For control diabetes encourage the use insulin and p  o  medication as before  High risk for complications his predicted  - HEMOGLOBIN A1C W/ EAG ESTIMATION; Future  - Microalbumin / creatinine urine ratio    3  Essential hypertension, benign  Blood pressure is suboptimal control, questions about compliance raced during this visit  - CBC and differential; Future  - Comprehensive metabolic panel; Future  - Lipid panel; Future    No problem-specific Assessment & Plan notes found for this encounter  Diagnoses and all orders for this visit:    Type 2 diabetes mellitus with diabetic polyneuropathy, with long-term current use of insulin ([facility] [address] )  -     HEMOGLOBIN A1C W/ EAG ESTIMATION; Future  -     Microalbumin / creatinine urine ratio    Chronic bilateral low back pain with bilateral sciatica  -     oxyCODONE (ROXICODONE) 5 mg immediate release tablet; Take 1 tablet (5 mg total) by mouth 2 (two) times a day Max Daily Amount: 10 mg    Essential hypertension, benign  -     CBC and differential; Future  -     Comprehensive metabolic panel; Future  -     Lipid panel; Future    Other orders  -     COMBIGAN 0 2-0 5 %;   -     dorzolamide (TRUSOPT) 2 % ophthalmic solution; instill 1 drop into right eye twice a day          Subjective:      Patient ID: Drew Mallory is a 81 y o  female  Patient with back pain and lower extremities pain 8/10 for the past two weeks  Worsening after returning from [facility]  She has both lower extremities change of temperature feeling, she reports legs as"ice"    She is a diabetic patient is suffering also for hypertension  Poor compliance with the therapy as we recommended the past         The following portions of the patient's history were reviewed and updated as appropriate: allergies, current medications, past family history, past medical history, past social history, past surgical history and problem list     Review of Systems   Constitutional: Negative for fatigue, fever and unexpected weight change  HENT: Negative for sore throat and trouble swallowing  Eyes: Negative for photophobia  Respiratory: Negative for cough, chest tightness, shortness of breath and wheezing  Cardiovascular: Negative for chest pain, palpitations and leg swelling  Gastrointestinal: Negative for abdominal pain, blood in stool, nausea and vomiting  Genitourinary: Negative for hematuria  Neurological: Negative for dizziness, syncope, light-headedness and headaches  Hematological: Does not bruise/bleed easily  Objective:      /80 (BP Location: Left arm, Patient Position: Sitting, Cuff Size: Standard)   Pulse 80   Temp 98 °F (36 7 °C) (Oral)   Resp 16   Ht 5' 4" (1 626 m)   Wt 64 9 kg (143 lb)   LMP  (LMP Unknown)   SpO2 98%   Breastfeeding? No   BMI 24 55 kg/m²          Physical Exam   HENT:   Head: Normocephalic  Eyes: Pupils are equal, round, and reactive to light  EOM are normal    Neck: Neck supple  Cardiovascular: Normal rate and regular rhythm  Pulmonary/Chest: Effort normal    Abdominal: Soft  Musculoskeletal: Normal range of motion  Neurological: She is alert  Skin: Skin is warm and dry  Psychiatric: She has a normal mood and affect   Her behavior is normal

## 2018-12-30 ENCOUNTER — APPOINTMENT (INPATIENT)
Dept: RADIOLOGY | Facility: HOSPITAL | Age: 83
DRG: 872 | End: 2018-12-30
Payer: MEDICARE

## 2018-12-30 ENCOUNTER — HOSPITAL ENCOUNTER (INPATIENT)
Facility: HOSPITAL | Age: 83
LOS: 8 days | Discharge: HOME HEALTH | DRG: 872 | End: 2019-01-07
Attending: EMERGENCY MEDICINE | Admitting: INTERNAL MEDICINE
Payer: MEDICARE

## 2018-12-30 ENCOUNTER — APPOINTMENT (INPATIENT)
Dept: NON INVASIVE DIAGNOSTICS | Facility: HOSPITAL | Age: 83
DRG: 872 | End: 2018-12-30
Payer: MEDICARE

## 2018-12-30 ENCOUNTER — APPOINTMENT (EMERGENCY)
Dept: RADIOLOGY | Facility: HOSPITAL | Age: 83
DRG: 872 | End: 2018-12-30
Payer: MEDICARE

## 2018-12-30 DIAGNOSIS — H54.7: ICD-10-CM

## 2018-12-30 DIAGNOSIS — E08.3299: ICD-10-CM

## 2018-12-30 DIAGNOSIS — L02.414 ABSCESS OF ARM, LEFT: ICD-10-CM

## 2018-12-30 DIAGNOSIS — Z79.4 TYPE 2 DIABETES MELLITUS WITH HYPERGLYCEMIA, WITH LONG-TERM CURRENT USE OF INSULIN (HCC): ICD-10-CM

## 2018-12-30 DIAGNOSIS — E11.65 TYPE 2 DIABETES MELLITUS WITH HYPERGLYCEMIA, WITH LONG-TERM CURRENT USE OF INSULIN (HCC): ICD-10-CM

## 2018-12-30 DIAGNOSIS — L03.114 LEFT ARM CELLULITIS: Primary | ICD-10-CM
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
## 2018-12-30 PROBLEM — A41.9 SEPSIS (HCC): Status: ACTIVE | Noted: 2018-12-30

## 2018-12-30 PROBLEM — M79.606 LEG PAIN: Status: ACTIVE | Noted: 2018-12-30

## 2018-12-30 PROBLEM — M79.604 PAIN OF RIGHT LOWER EXTREMITY: Status: ACTIVE | Noted: 2018-12-30

## 2018-12-30 PROBLEM — N17.9 ACUTE RENAL FAILURE SUPERIMPOSED ON STAGE 3 CHRONIC KIDNEY DISEASE (HCC): Status: ACTIVE | Noted: 2018-12-30

## 2018-12-30 PROBLEM — E16.2 HYPOGLYCEMIA: Status: RESOLVED | Noted: 2017-12-20 | Resolved: 2018-12-30

## 2018-12-30 PROBLEM — N18.30 ACUTE RENAL FAILURE SUPERIMPOSED ON STAGE 3 CHRONIC KIDNEY DISEASE (HCC): Status: ACTIVE | Noted: 2018-12-30

## 2018-12-30 LAB
ALBUMIN SERPL BCP-MCNC: 2.6 G/DL (ref 3.5–5)
ALP SERPL-CCNC: 135 U/L (ref 46–116)
ALT SERPL W P-5'-P-CCNC: 23 U/L (ref 12–78)
ANION GAP SERPL CALCULATED.3IONS-SCNC: 8 MMOL/L (ref 4–13)
ANION GAP SERPL CALCULATED.3IONS-SCNC: 9 MMOL/L (ref 4–13)
APTT PPP: 24 SECONDS (ref 26–38)
AST SERPL W P-5'-P-CCNC: 11 U/L (ref 5–45)
BACTERIA UR QL AUTO: NORMAL /HPF
BASOPHILS # BLD AUTO: 0.09 THOUSANDS/ΜL (ref 0–0.1)
BASOPHILS NFR BLD AUTO: 1 % (ref 0–1)
BILIRUB SERPL-MCNC: 0.62 MG/DL (ref 0.2–1)
BILIRUB UR QL STRIP: NEGATIVE
BUN SERPL-MCNC: 13 MG/DL (ref 5–25)
BUN SERPL-MCNC: 19 MG/DL (ref 5–25)
CALCIUM SERPL-MCNC: 5.9 MG/DL (ref 8.3–10.1)
CALCIUM SERPL-MCNC: 9 MG/DL (ref 8.3–10.1)
CHLORIDE SERPL-SCNC: 112 MMOL/L (ref 100–108)
CHLORIDE SERPL-SCNC: 97 MMOL/L (ref 100–108)
CLARITY UR: CLEAR
CO2 SERPL-SCNC: 19 MMOL/L (ref 21–32)
CO2 SERPL-SCNC: 25 MMOL/L (ref 21–32)
COLOR UR: YELLOW
CREAT SERPL-MCNC: 0.86 MG/DL (ref 0.6–1.3)
CREAT SERPL-MCNC: 1.55 MG/DL (ref 0.6–1.3)
EOSINOPHIL # BLD AUTO: 0.1 THOUSAND/ΜL (ref 0–0.61)
EOSINOPHIL NFR BLD AUTO: 1 % (ref 0–6)
ERYTHROCYTE [DISTWIDTH] IN BLOOD BY AUTOMATED COUNT: 14.8 % (ref 11.6–15.1)
GFR SERPL CREATININE-BSD FRML MDRD: 31 ML/MIN/1.73SQ M
GFR SERPL CREATININE-BSD FRML MDRD: 63 ML/MIN/1.73SQ M
GLUCOSE SERPL-MCNC: 185 MG/DL (ref 65–140)
GLUCOSE SERPL-MCNC: 248 MG/DL (ref 65–140)
GLUCOSE SERPL-MCNC: 296 MG/DL (ref 65–140)
GLUCOSE SERPL-MCNC: 426 MG/DL (ref 65–140)
GLUCOSE SERPL-MCNC: 493 MG/DL (ref 65–140)
GLUCOSE UR STRIP-MCNC: ABNORMAL MG/DL
HCT VFR BLD AUTO: 37.6 % (ref 34.8–46.1)
HGB BLD-MCNC: 12.3 G/DL (ref 11.5–15.4)
HGB UR QL STRIP.AUTO: NEGATIVE
HYALINE CASTS #/AREA URNS LPF: NORMAL /LPF
IMM GRANULOCYTES # BLD AUTO: 0.22 THOUSAND/UL (ref 0–0.2)
IMM GRANULOCYTES NFR BLD AUTO: 2 % (ref 0–2)
INR PPP: 1.06 (ref 0.86–1.17)
KETONES UR STRIP-MCNC: NEGATIVE MG/DL
LACTATE SERPL-SCNC: 1.4 MMOL/L (ref 0.5–2)
LACTATE SERPL-SCNC: 1.4 MMOL/L (ref 0.5–2)
LEUKOCYTE ESTERASE UR QL STRIP: ABNORMAL
LYMPHOCYTES # BLD AUTO: 1.71 THOUSANDS/ΜL (ref 0.6–4.47)
LYMPHOCYTES NFR BLD AUTO: 12 % (ref 14–44)
MAGNESIUM SERPL-MCNC: 1.1 MG/DL (ref 1.6–2.6)
MCH RBC QN AUTO: 30.8 PG (ref 26.8–34.3)
MCHC RBC AUTO-ENTMCNC: 32.7 G/DL (ref 31.4–37.4)
MCV RBC AUTO: 94 FL (ref 82–98)
MONOCYTES # BLD AUTO: 1.09 THOUSAND/ΜL (ref 0.17–1.22)
MONOCYTES NFR BLD AUTO: 8 % (ref 4–12)
NEUTROPHILS # BLD AUTO: 10.67 THOUSANDS/ΜL (ref 1.85–7.62)
NEUTS SEG NFR BLD AUTO: 76 % (ref 43–75)
NITRITE UR QL STRIP: NEGATIVE
NON-SQ EPI CELLS URNS QL MICRO: NORMAL /HPF
NRBC BLD AUTO-RTO: 0 /100 WBCS
PH UR STRIP.AUTO: 6 [PH] (ref 4.5–8)
PHOSPHATE SERPL-MCNC: 1.7 MG/DL (ref 2.3–4.1)
PLATELET # BLD AUTO: 193 THOUSANDS/UL (ref 149–390)
PLATELET # BLD AUTO: 242 THOUSANDS/UL (ref 149–390)
PMV BLD AUTO: 10.9 FL (ref 8.9–12.7)
PMV BLD AUTO: 11.5 FL (ref 8.9–12.7)
POTASSIUM SERPL-SCNC: 3.4 MMOL/L (ref 3.5–5.3)
POTASSIUM SERPL-SCNC: 4.6 MMOL/L (ref 3.5–5.3)
PROCALCITONIN SERPL-MCNC: 0.1 NG/ML
PROCALCITONIN SERPL-MCNC: 0.2 NG/ML
PROT SERPL-MCNC: 6.6 G/DL (ref 6.4–8.2)
PROT UR STRIP-MCNC: ABNORMAL MG/DL
PROTHROMBIN TIME: 13.9 SECONDS (ref 11.8–14.2)
RBC # BLD AUTO: 4 MILLION/UL (ref 3.81–5.12)
RBC #/AREA URNS AUTO: NORMAL /HPF
SODIUM SERPL-SCNC: 131 MMOL/L (ref 136–145)
SODIUM SERPL-SCNC: 139 MMOL/L (ref 136–145)
SP GR UR STRIP.AUTO: 1.02 (ref 1–1.03)
UROBILINOGEN UR QL STRIP.AUTO: 1 E.U./DL
WBC # BLD AUTO: 13.88 THOUSAND/UL (ref 4.31–10.16)
WBC #/AREA URNS AUTO: NORMAL /HPF

## 2018-12-30 PROCEDURE — 83735 ASSAY OF MAGNESIUM: CPT | Performed by: INTERNAL MEDICINE

## 2018-12-30 PROCEDURE — 84100 ASSAY OF PHOSPHORUS: CPT | Performed by: INTERNAL MEDICINE

## 2018-12-30 PROCEDURE — 73090 X-RAY EXAM OF FOREARM: CPT

## 2018-12-30 PROCEDURE — 99223 1ST HOSP IP/OBS HIGH 75: CPT | Performed by: INTERNAL MEDICINE

## 2018-12-30 PROCEDURE — 85025 COMPLETE CBC W/AUTO DIFF WBC: CPT | Performed by: EMERGENCY MEDICINE

## 2018-12-30 PROCEDURE — 84145 PROCALCITONIN (PCT): CPT | Performed by: INTERNAL MEDICINE

## 2018-12-30 PROCEDURE — 85610 PROTHROMBIN TIME: CPT | Performed by: EMERGENCY MEDICINE

## 2018-12-30 PROCEDURE — 87040 BLOOD CULTURE FOR BACTERIA: CPT | Performed by: EMERGENCY MEDICINE

## 2018-12-30 PROCEDURE — 73502 X-RAY EXAM HIP UNI 2-3 VIEWS: CPT

## 2018-12-30 PROCEDURE — 83605 ASSAY OF LACTIC ACID: CPT | Performed by: EMERGENCY MEDICINE

## 2018-12-30 PROCEDURE — 96367 TX/PROPH/DG ADDL SEQ IV INF: CPT

## 2018-12-30 PROCEDURE — 73080 X-RAY EXAM OF ELBOW: CPT

## 2018-12-30 PROCEDURE — 81001 URINALYSIS AUTO W/SCOPE: CPT | Performed by: EMERGENCY MEDICINE

## 2018-12-30 PROCEDURE — 96365 THER/PROPH/DIAG IV INF INIT: CPT

## 2018-12-30 PROCEDURE — 93005 ELECTROCARDIOGRAM TRACING: CPT

## 2018-12-30 PROCEDURE — 80048 BASIC METABOLIC PNL TOTAL CA: CPT | Performed by: INTERNAL MEDICINE

## 2018-12-30 PROCEDURE — 85049 AUTOMATED PLATELET COUNT: CPT | Performed by: INTERNAL MEDICINE

## 2018-12-30 PROCEDURE — 80053 COMPREHEN METABOLIC PANEL: CPT | Performed by: EMERGENCY MEDICINE

## 2018-12-30 PROCEDURE — 93971 EXTREMITY STUDY: CPT

## 2018-12-30 PROCEDURE — 36415 COLL VENOUS BLD VENIPUNCTURE: CPT | Performed by: EMERGENCY MEDICINE

## 2018-12-30 PROCEDURE — 85730 THROMBOPLASTIN TIME PARTIAL: CPT | Performed by: EMERGENCY MEDICINE

## 2018-12-30 PROCEDURE — 73200 CT UPPER EXTREMITY W/O DYE: CPT

## 2018-12-30 PROCEDURE — 99285 EMERGENCY DEPT VISIT HI MDM: CPT

## 2018-12-30 PROCEDURE — 82948 REAGENT STRIP/BLOOD GLUCOSE: CPT

## 2018-12-30 PROCEDURE — 84145 PROCALCITONIN (PCT): CPT | Performed by: EMERGENCY MEDICINE

## 2018-12-30 RX ORDER — POTASSIUM CHLORIDE 20 MEQ/1
40 TABLET, EXTENDED RELEASE ORAL ONCE
Status: COMPLETED | OUTPATIENT
Start: 2018-12-30 | End: 2018-12-31

## 2018-12-30 RX ORDER — 0.9 % SODIUM CHLORIDE 0.9 %
3 VIAL (ML) INJECTION AS NEEDED
Status: DISCONTINUED | OUTPATIENT
Start: 2018-12-30 | End: 2018-12-30

## 2018-12-30 RX ORDER — SODIUM CHLORIDE 9 MG/ML
75 INJECTION, SOLUTION INTRAVENOUS CONTINUOUS
Status: DISCONTINUED | OUTPATIENT
Start: 2018-12-30 | End: 2019-01-01

## 2018-12-30 RX ORDER — ONDANSETRON 2 MG/ML
4 INJECTION INTRAMUSCULAR; INTRAVENOUS EVERY 4 HOURS PRN
Status: DISCONTINUED | OUTPATIENT
Start: 2018-12-30 | End: 2019-01-07 | Stop reason: HOSPADM

## 2018-12-30 RX ORDER — SENNOSIDES 8.6 MG
1 TABLET ORAL DAILY
Status: DISCONTINUED | OUTPATIENT
Start: 2018-12-31 | End: 2019-01-07 | Stop reason: HOSPADM

## 2018-12-30 RX ORDER — DORZOLAMIDE HCL 20 MG/ML
1 SOLUTION/ DROPS OPHTHALMIC 2 TIMES DAILY
Status: DISCONTINUED | OUTPATIENT
Start: 2018-12-30 | End: 2019-01-07 | Stop reason: HOSPADM

## 2018-12-30 RX ORDER — DOCUSATE SODIUM 100 MG/1
100 CAPSULE, LIQUID FILLED ORAL 2 TIMES DAILY
Status: DISCONTINUED | OUTPATIENT
Start: 2018-12-30 | End: 2019-01-07 | Stop reason: HOSPADM

## 2018-12-30 RX ORDER — VANCOMYCIN HYDROCHLORIDE 1 G/200ML
15 INJECTION, SOLUTION INTRAVENOUS EVERY 24 HOURS
Status: DISCONTINUED | OUTPATIENT
Start: 2018-12-30 | End: 2018-12-30

## 2018-12-30 RX ORDER — BRIMONIDINE TARTRATE 0.15 %
1 DROPS OPHTHALMIC (EYE) 2 TIMES DAILY
Status: DISCONTINUED | OUTPATIENT
Start: 2018-12-30 | End: 2019-01-07 | Stop reason: HOSPADM

## 2018-12-30 RX ORDER — ATORVASTATIN CALCIUM 40 MG/1
40 TABLET, FILM COATED ORAL EVERY 24 HOURS
Status: DISCONTINUED | OUTPATIENT
Start: 2018-12-31 | End: 2019-01-07 | Stop reason: HOSPADM

## 2018-12-30 RX ORDER — VANCOMYCIN HYDROCHLORIDE 1 G/200ML
15 INJECTION, SOLUTION INTRAVENOUS ONCE
Status: COMPLETED | OUTPATIENT
Start: 2018-12-30 | End: 2018-12-30

## 2018-12-30 RX ORDER — PANTOPRAZOLE SODIUM 20 MG/1
20 TABLET, DELAYED RELEASE ORAL
Status: DISCONTINUED | OUTPATIENT
Start: 2018-12-31 | End: 2019-01-07 | Stop reason: HOSPADM

## 2018-12-30 RX ORDER — BRIMONIDINE TARTRATE, TIMOLOL MALEATE 2; 5 MG/ML; MG/ML
1 SOLUTION/ DROPS OPHTHALMIC EVERY 12 HOURS SCHEDULED
Status: DISCONTINUED | OUTPATIENT
Start: 2018-12-30 | End: 2018-12-30

## 2018-12-30 RX ORDER — OXYCODONE HYDROCHLORIDE 5 MG/1
5 TABLET ORAL EVERY 4 HOURS PRN
Status: DISCONTINUED | OUTPATIENT
Start: 2018-12-30 | End: 2019-01-07 | Stop reason: HOSPADM

## 2018-12-30 RX ORDER — HEPARIN SODIUM 5000 [USP'U]/ML
5000 INJECTION, SOLUTION INTRAVENOUS; SUBCUTANEOUS EVERY 8 HOURS SCHEDULED
Status: DISCONTINUED | OUTPATIENT
Start: 2018-12-30 | End: 2019-01-07 | Stop reason: HOSPADM

## 2018-12-30 RX ORDER — ROPINIROLE 0.25 MG/1
0.5 TABLET, FILM COATED ORAL
Status: DISCONTINUED | OUTPATIENT
Start: 2018-12-30 | End: 2019-01-07 | Stop reason: HOSPADM

## 2018-12-30 RX ORDER — MAGNESIUM SULFATE HEPTAHYDRATE 40 MG/ML
4 INJECTION, SOLUTION INTRAVENOUS ONCE
Status: COMPLETED | OUTPATIENT
Start: 2018-12-30 | End: 2018-12-31

## 2018-12-30 RX ORDER — PREGABALIN 100 MG/1
100 CAPSULE ORAL DAILY
Status: DISCONTINUED | OUTPATIENT
Start: 2018-12-31 | End: 2019-01-07 | Stop reason: HOSPADM

## 2018-12-30 RX ORDER — ASPIRIN 81 MG/1
81 TABLET, CHEWABLE ORAL DAILY
Status: DISCONTINUED | OUTPATIENT
Start: 2018-12-31 | End: 2019-01-07 | Stop reason: HOSPADM

## 2018-12-30 RX ORDER — MAGNESIUM SULFATE HEPTAHYDRATE 40 MG/ML
2 INJECTION, SOLUTION INTRAVENOUS ONCE
Status: COMPLETED | OUTPATIENT
Start: 2018-12-30 | End: 2018-12-31

## 2018-12-30 RX ORDER — LIDOCAINE 50 MG/G
1 PATCH TOPICAL DAILY
Status: DISCONTINUED | OUTPATIENT
Start: 2018-12-31 | End: 2019-01-07 | Stop reason: HOSPADM

## 2018-12-30 RX ORDER — OXYCODONE HYDROCHLORIDE 5 MG/1
2.5 TABLET ORAL EVERY 4 HOURS PRN
Status: DISCONTINUED | OUTPATIENT
Start: 2018-12-30 | End: 2019-01-07 | Stop reason: HOSPADM

## 2018-12-30 RX ORDER — TIMOLOL MALEATE 5 MG/ML
1 SOLUTION/ DROPS OPHTHALMIC 2 TIMES DAILY
Status: DISCONTINUED | OUTPATIENT
Start: 2018-12-30 | End: 2019-01-07 | Stop reason: HOSPADM

## 2018-12-30 RX ADMIN — HEPARIN SODIUM 5000 UNITS: 5000 INJECTION INTRAVENOUS; SUBCUTANEOUS at 21:14

## 2018-12-30 RX ADMIN — ROPINIROLE 0.5 MG: 0.25 TABLET, FILM COATED ORAL at 21:14

## 2018-12-30 RX ADMIN — TIMOLOL MALEATE 1 DROP: 5 SOLUTION/ DROPS OPHTHALMIC at 21:14

## 2018-12-30 RX ADMIN — SODIUM CHLORIDE 12 UNITS/HR: 9 INJECTION, SOLUTION INTRAVENOUS at 20:12

## 2018-12-30 RX ADMIN — SODIUM PHOSPHATE, MONOBASIC, MONOHYDRATE 21 MMOL: 276; 142 INJECTION, SOLUTION INTRAVENOUS at 23:48

## 2018-12-30 RX ADMIN — CEFTRIAXONE SODIUM 1000 MG: 10 INJECTION, POWDER, FOR SOLUTION INTRAVENOUS at 14:51

## 2018-12-30 RX ADMIN — SODIUM CHLORIDE 75 ML/HR: 0.9 INJECTION, SOLUTION INTRAVENOUS at 17:13

## 2018-12-30 RX ADMIN — DOCUSATE SODIUM 100 MG: 100 CAPSULE, LIQUID FILLED ORAL at 21:13

## 2018-12-30 RX ADMIN — VANCOMYCIN HYDROCHLORIDE 1000 MG: 1 INJECTION, SOLUTION INTRAVENOUS at 15:48

## 2018-12-30 RX ADMIN — BRIMONIDINE TARTRATE 1 DROP: 1.5 SOLUTION OPHTHALMIC at 21:13

## 2018-12-30 RX ADMIN — CALCIUM GLUCONATE 1 G: 98 INJECTION, SOLUTION INTRAVENOUS at 23:47

## 2018-12-30 RX ADMIN — MAGNESIUM SULFATE IN WATER 2 G: 40 INJECTION, SOLUTION INTRAVENOUS at 23:47

## 2018-12-30 NOTE — ASSESSMENT & PLAN NOTE
Hold lisinopril secondary to acute kidney injury  For now will monitor only  Could add hydralazine p r n  If blood pressure remains elevated or consider to switch to p o   Amlodipine

## 2018-12-30 NOTE — ASSESSMENT & PLAN NOTE
Status post what seems to be mechanical fall at home    X-ray done in the emergency department without any clear fracture  Fall precautions, PT- OT  Consult orthopedic  Patient reported multiple intolerance to opiates, prescribed oxycodone 2 5 and 5 mg based upon pain scale and premedicated with Zofran  Consult geriatrician for pain control

## 2018-12-30 NOTE — ASSESSMENT & PLAN NOTE
Likely related to hypovolemia secondary to sepsis together with hyperglycemia  Normal saline 75 cc an hour  BMP in the morning  Hold lisinopril

## 2018-12-30 NOTE — H&P
H&P- Mati Lozada 1935, 81 y o  female MRN: [id number]    Unit/Bed#: ED 10 Encounter: [id number]    Primary Care Provider: Artis Cabrera MD   Date and time admitted to hospital: 12/30/2018 12:20 PM        Type 2 diabetes mellitus with hyperglycemia, with long-term current use of insulin [facility])   Assessment & Plan    Lab Results   Component Value Date    HGBA1C 8 5 (H) 12/20/2018       No results for input(s): POCGLU in the last 72 hours  Blood Sugar Average: Last 72 hrs:   with severe hyperglycemia likely related to sepsis, for now keep the patient on insulin drip non DKA protocol and consult Endocrinology  Carb controlled diet     Accident due to mechanical fall without injury   Assessment & Plan    Fall precautions, PT and OT     Cellulitis of left upper extremity   Assessment & Plan    Please refer to sepsis     Acute renal failure superimposed on stage 3 chronic kidney disease ([facility] [address] )   Assessment & Plan    Likely related to hypovolemia secondary to sepsis together with hyperglycemia  Normal saline 75 cc an hour  BMP in the morning  Hold lisinopril     Pain of right lower extremity   Assessment & Plan    Status post what seems to be mechanical fall at home  X-ray done in the emergency department without any clear fracture  Fall precautions, PT- OT  Consult orthopedic  Patient reported multiple intolerance to opiates, prescribed oxycodone 2 5 and 5 mg based upon pain scale and premedicated with Zofran  Consult geriatrician for pain control     Hypertension   Assessment & Plan    Hold lisinopril secondary to acute kidney injury  For now will monitor only  Could add hydralazine p r n  If blood pressure remains elevated or consider to switch to p o  Amlodipine     * Sepsis ([facility] [address] )   Assessment & Plan    Present on admission, tachycardia and elevated white count with extensive left upper extremity cellulitis involving the elbow  Clear induration at the posterior part of the elbow with associated hematoma?   The cellulitis involves the area going from the  to about an inch and a how for below the axilla  Obtain CT scan of left upper extremity  Consult orthopedic  For now continue with vancomycin, if no improvement in the morning consider to consult Infectious Disease or consider Infectious Disease consult if deep tissue infection on the CT scan  Elevate the arm for now  Monitor clinically  Vascular Doppler to rule out DVT           VTE Prophylaxis: Heparin  / sequential compression device   Code Status: full code  POLST: POLST is not applicable to this patient  Discussion with family:  Daughter, grandson    Anticipated Length of Stay:  Patient will be admitted on an Inpatient basis with an anticipated length of stay of  greater than 2 midnights  Justification for Hospital Stay:  Refer to above    Total Time for Visit, including Counseling / Coordination of Care: 1 hour  Greater than 50% of this total time spent on direct patient counseling and coordination of care  Chief Complaint:   Unable to obtain, patient currently in pain    History of Present Illness:    Lily Amezquita is a 81 y o  female who presents with right leg pain  History difficult to obtain, patient speaks only Greek in currently she is in pain  History is obtained by grandson over the phone  Patient is history of diabetes mellitus, hypertension, COPD, glaucoma, diabetic retinopathy in came to the hospital today for evaluation of right hip pain after mechanical fall at home  In the ER she was found to have right hip pain with associated left upper extremity swelling, erythema, tenderness in keeping with cellulitis  As far as the family is aware patient had no trauma to the left upper extremity, no previous skin lesions  Unclear if fever or chills at home  No other history can be obtained even with cooperation of the family        Review of Systems:    Review of Systems   Unable to perform ROS: Acuity of condition       Past Medical and Surgical History:     Past Medical History:   Diagnosis Date    COPD (chronic obstructive pulmonary disease) ([facility] [address] [facility]     Diabetes mellitus ([facility] [address] [facility]     Hyperlipidemia     Hyperlipidemia     Hypertension     Kidney stones     Osteoporosis        Past Surgical History:   Procedure Laterality Date    APPENDECTOMY      HAND SURGERY Right     LITHOTRIPSY      MASS EXCISION      [place] of back wall mass    TONSILLECTOMY      TONSILLECTOMY         Meds/Allergies:    Prior to Admission medications    Medication Sig Start Date End Date Taking? Authorizing Provider   ALPHAGAN P 0 15 % ophthalmic solution INSTILL 1 DROP INTO BOTH EYES TWICE DAILY INSTILL 1 DROP EN AMBOS OJOS DOS VECES AL MAC 8/28/18  Yes Historical Provider, MD   aspirin (ASPIRIN 81) 81 mg chewable tablet Chew 1 tablet (81 mg total) daily Ninety day supplies please 11/13/18  Yes Carlos Eudardo Stewart MD   atorvastatin (LIPITOR) 40 mg tablet Take 1 tablet (40 mg total) by mouth every 24 hours 11/13/18  Yes Carlos Eduardo Stewart MD   CLEVER CHOICE COMFORT EZ 33G X 4 MM MISC Ninety day supplies please 11/13/18  Yes Carlos Eduardo Stewart MD   COMBIGAN 0 2-0 5 %  11/15/18  Yes Historical Provider, MD   dorzolamide (TRUSOPT) 2 % ophthalmic solution instill 1 drop into right eye twice a day 11/14/18  Yes Historical Provider, MD   EASY TOUCH LANCETS 32G MISC To use 4 times a day 11/13/18  Yes Tahir Myers MD   glucose blood test strip To use 4 times a day  Ninety day supplies please 11/13/18  Yes Tahir Myers MD   insulin degludec (TRESIBA FLEXTOUCH) 100 units/mL injection pen To use 30 U at bedtime      Ninety day supplies please 11/13/18  Yes Carlos Eduardo Stewart MD   ipratropium (ATROVENT HFA) 17 mcg/act inhaler Inhale 2 puffs 4 (four) times a day Ninety day supplies please 11/13/18  Yes Carlos Eduardo Stewart MD   lisinopril (ZESTRIL) 10 mg tablet Take 1 tablet (10 mg total) by mouth daily 11/13/18  Yes Tahir Myers MD   NOVOLOG FLEXPEN 100 units/mL injection pen 30 UNITS 3 TIMES A DAY WITH MEALS    Ninety day supplies please 11/13/18  Yes Spike Akers MD   omeprazole (PriLOSEC) 20 mg delayed release capsule Take 1 capsule (20 mg total) by mouth daily Ninety day supplies please 11/13/18  Yes Spike Akers MD   oxyCODONE (ROXICODONE) 5 mg immediate release tablet Take 1 tablet (5 mg total) by mouth 2 (two) times a day Max Daily Amount: 10 mg 12/20/18  Yes Spike Akers MD   pentoxifylline (TRENtal) 400 mg ER tablet Take 1 tablet (400 mg total) by mouth 3 (three) times a day with meals Ninety day supplies please 11/13/18  Yes Spike Akers MD   pregabalin (LYRICA) 100 mg capsule ONE CAPSULE TWICE A DAY  Ninety day supplies please 11/13/18  Yes Spike Akers MD   rOPINIRole (REQUIP) 0 25 mg tablet Take 2 tablets (0 5 mg total) by mouth daily at bedtime 11/13/18  Yes Spike Akers MD   sitaGLIPtin (JANUVIA) 50 mg tablet Take 1 tablet (50 mg total) by mouth daily Ninety day supplies please 11/13/18  Yes Spike Akers MD         Allergies: Allergies   Allergen Reactions    Acetaminophen      Listen on patient's paperwork from [facility] and Staffing  Family unable to confirm      Morphine And Related Vomiting    Oxycodone GI Intolerance     Listed on patient's paperwork from [facility] and Staffing   Family unable to confirm      Tramadol Vomiting and Abdominal Pain     Other       Social History:     Marital Status: Single     Substance Use History:   History   Alcohol Use    Yes     Comment: OCCASIONAL     History   Smoking Status    Former Smoker    Packs/day: 1 00    Years: 40 00    Types: Cigarettes    Quit date: 1/1/2017   Smokeless Tobacco    Never Used     Comment: states she quit but family living with her states she smokes     History   Drug Use No       Family History:    non-contributory    Physical Exam:     Vitals:   Blood Pressure: 159/77 (12/30/18 1630)  Pulse: 96 (12/30/18 1630)  Temperature: 98 1 °F (36 7 °C) (12/30/18 1219)  Temp Source: Oral (12/30/18 1219)  Respirations: 18 (12/30/18 1630)  Weight - Scale: 64 9 kg (143 lb 1 3 oz) (12/30/18 1218)  SpO2: 100 % (12/30/18 1630)    Physical Exam   Constitutional: She appears well-developed  In pain but not in distress   Cardiovascular: Normal rate, regular rhythm and normal heart sounds  Exam reveals no friction rub  No murmur heard  Pulmonary/Chest: Effort normal  No respiratory distress  She has no wheezes  She has no rales  Abdominal: Soft  She exhibits no distension  There is no tenderness  There is no rebound  Musculoskeletal: She exhibits edema (Edema, erythema and warmth involving the left upper extremity from the wrist to about 1 hospice inches below the axilla, post him the rated involved area is in the posterior portion of the elbow)  Neurological: She is alert  Difficult to assess, patient is in pain   Skin: There is erythema  Additional Data:     Lab Results: I have personally reviewed pertinent reports  Results from last 7 days  Lab Units 12/30/18  1315   WBC Thousand/uL 13 88*   HEMOGLOBIN g/dL 12 3   HEMATOCRIT % 37 6   PLATELETS Thousands/uL 242   NEUTROS PCT % 76*   LYMPHS PCT % 12*   MONOS PCT % 8   EOS PCT % 1       Results from last 7 days  Lab Units 12/30/18  1315   SODIUM mmol/L 131*   POTASSIUM mmol/L 4 6   CHLORIDE mmol/L 97*   CO2 mmol/L 25   BUN mg/dL 19   CREATININE mg/dL 1 55*   ANION GAP mmol/L 9   CALCIUM mg/dL 9 0   ALBUMIN g/dL 2 6*   TOTAL BILIRUBIN mg/dL 0 62   ALK PHOS U/L 135*   ALT U/L 23   AST U/L 11   GLUCOSE RANDOM mg/dL 426*       Results from last 7 days  Lab Units 12/30/18  1315   INR  1 06               Results from last 7 days  Lab Units 12/30/18  1419   LACTIC ACID mmol/L 1 4   PROCALCITONIN ng/ml 0 20       Imaging: I have personally reviewed pertinent reports        XR hip/pelv 2-3 vws right   ED Interpretation by Leia Jenkins MD (12/30 8847)   No fracture      XR elbow 3+ views LEFT   ED Interpretation by Mary Robison MD (12/30 1452)   No fracture      XR forearm 2 views LEFT   ED Interpretation by Mary Robison MD (12/30 1452)   No fracture      CT forearm left wo contrast    (Results Pending)   VAS upper limb venous duplex scan, unilateral/limited    (Results Pending)       EKG, Pathology, and Other Studies Reviewed on Admission:   · EKG:  Obtain baseline now    Allscripts / Epic Records Reviewed: Yes     ** Please Note: This note has been constructed using a voice recognition system   **

## 2018-12-30 NOTE — ASSESSMENT & PLAN NOTE
Present on admission, tachycardia and elevated white count with extensive left upper extremity cellulitis involving the elbow  Clear induration at the posterior part of the elbow with associated hematoma?   The cellulitis involves the area going from the  to about an inch and a how for below the axilla  Obtain CT scan of left upper extremity  Consult orthopedic  For now continue with vancomycin, if no improvement in the morning consider to consult Infectious Disease or consider Infectious Disease consult if deep tissue infection on the CT scan  Elevate the arm for now  Monitor clinically  Vascular Doppler to rule out DVT

## 2018-12-30 NOTE — ED ATTENDING ATTESTATION
Luis Johnson MD, saw and evaluated the patient  I have discussed the patient with the resident/non-physician practitioner and agree with the resident's/non-physician practitioner's findings, Plan of Care, and MDM as documented in the resident's/non-physician practitioner's note, except where noted  All available labs and Radiology studies were reviewed  At this point I agree with the current assessment done in the Emergency Department  I have conducted an independent evaluation of this patient including a focused history and a physical exam     36-year-old female, presenting to the emergency department for evaluation of left arm erythema and pain  The patient stated that she fell approximately 1 week ago, mechanical fall, did not hit her head and had no loss of consciousness  The patient had a hematoma at the left elbow, and starting 3-4 days ago developed progressive redness and pain of the left forearm  Patient denies any fevers, chest pain, cough, shortness of breath, abdominal pain, nausea, vomiting, diarrhea  Ten systems reviewed negative except as noted in the history of present illness  The patient is resting comfortably on a stretcher in no acute respiratory distress  The patient appears nontoxic  HEENT reveals moist mucous membranes  Head is normocephalic and atraumatic  Conjunctiva and sclera are normal  Neck is nontender and supple with full range of motion to flexion, extension, lateral rotation  No meningismus appreciated  No masses are appreciated  Lungs are clear to auscultation bilaterally without any wheezes, rales or rhonchi  Heart is regular rate and rhythm without any murmurs, rubs or gallops  Abdomen is soft and nontender without any rebound or guarding  The patient has moderate swelling posterior over the left elbow  The patient has diffuse erythema of the left forearm  The patient has full active and passive range of motion of the fingers on the left hand  Bilateral lower extremities were ranged without significant pain on range of motion  Patient is awake, alert, and oriented x3  The patient has normal interaction  Motor is 5 out of 5 bilateral upper lower extremities  Assessment and plan:  Patient has erythema of the left forearm with multiple SIRS criteria, indicating sepsis  Patient will be administered antibiotics, x-ray, lab work, admission for IV antibiotics      Labs Reviewed   CBC AND DIFFERENTIAL - Abnormal        Result Value Ref Range Status    WBC 13 88 (*) 4 31 - 10 16 Thousand/uL Final    RBC 4 00  3 81 - 5 12 Million/uL Final    Hemoglobin 12 3  11 5 - 15 4 g/dL Final    Hematocrit 37 6  34 8 - 46 1 % Final    MCV 94  82 - 98 fL Final    MCH 30 8  26 8 - 34 3 pg Final    MCHC 32 7  31 4 - 37 4 g/dL Final    RDW 14 8  11 6 - 15 1 % Final    MPV 11 5  8 9 - 12 7 fL Final    Platelets 614  538 - 390 Thousands/uL Final    nRBC 0  /100 WBCs Final    Neutrophils Relative 76 (*) 43 - 75 % Final    Immat GRANS % 2  0 - 2 % Final    Lymphocytes Relative 12 (*) 14 - 44 % Final    Monocytes Relative 8  4 - 12 % Final    Eosinophils Relative 1  0 - 6 % Final    Basophils Relative 1  0 - 1 % Final    Neutrophils Absolute 10 67 (*) 1 85 - 7 62 Thousands/µL Final    Immature Grans Absolute 0 22 (*) 0 00 - 0 20 Thousand/uL Final    Lymphocytes Absolute 1 71  0 60 - 4 47 Thousands/µL Final    Monocytes Absolute 1 09  0 17 - 1 22 Thousand/µL Final    Eosinophils Absolute 0 10  0 00 - 0 61 Thousand/µL Final    Basophils Absolute 0 09  0 00 - 0 10 Thousands/µL Final   APTT - Abnormal     PTT 24 (*) 26 - 38 seconds Final    Comment: Therapeutic Heparin Range =  60-90 seconds   COMPREHENSIVE METABOLIC PANEL - Abnormal     Sodium 131 (*) 136 - 145 mmol/L Final    Potassium 4 6  3 5 - 5 3 mmol/L Final    Chloride 97 (*) 100 - 108 mmol/L Final    CO2 25  21 - 32 mmol/L Final    ANION GAP 9  4 - 13 mmol/L Final    BUN 19  5 - 25 mg/dL Final    Creatinine 1 55 (*) 0 60 - 1 30 mg/dL Final    Comment: Standardized to IDMS reference method    Glucose 426 (*) 65 - 140 mg/dL Final    Comment:   If the patient is fasting, the ADA then defines impaired fasting glucose as > 100 mg/dL and diabetes as > or equal to 123 mg/dL  Specimen collection should occur prior to Sulfasalazine administration due to the potential for falsely depressed results  Specimen collection should occur prior to Sulfapyridine administration due to the potential for falsely elevated results  Calcium 9 0  8 3 - 10 1 mg/dL Final    AST 11  5 - 45 U/L Final    Comment:   Specimen collection should occur prior to Sulfasalazine administration due to the potential for falsely depressed results  ALT 23  12 - 78 U/L Final    Comment:   Specimen collection should occur prior to Sulfasalazine and/or Sulfapyridine administration due to the potential for falsely depressed results  Alkaline Phosphatase 135 (*) 46 - 116 U/L Final    Total Protein 6 6  6 4 - 8 2 g/dL Final    Albumin 2 6 (*) 3 5 - 5 0 g/dL Final    Total Bilirubin 0 62  0 20 - 1 00 mg/dL Final    eGFR 31  ml/min/1 73sq m Final    Narrative:     National Kidney Disease Education Program recommendations are as follows:  GFR calculation is accurate only with a steady state creatinine  Chronic Kidney disease less than 60 ml/min/1 73 sq  meters  Kidney failure less than 15 ml/min/1 73 sq  meters  PROTIME-INR - Normal    Protime 13 9  11 8 - 14 2 seconds Final    INR 1 06  0 86 - 1 17 Final   LACTIC ACID, PLASMA - Normal    LACTIC ACID 1 4  0 5 - 2 0 mmol/L Final    Narrative:     Result may be elevated if tourniquet was used during collection     PROCALCITONIN TEST - Normal    Procalcitonin 0 20  <=0 25 ng/ml Final    Comment: Suspected Lower Respiratory Tract Infection (LRTI):  - LESS than or EQUAL to 0 25 ng/mL:   low likelihood for bacterial LRTI; antibiotics DISCOURAGED   - GREATER than 0 25 ng/mL:   increased likelihood for bacterial LRTI; antibiotics ENCOURAGED  Suspected Sepsis:  - Strongly consider initiating antibiotics in ALL UNSTABLE patients  - LESS than or EQUAL to 0 5 ng/mL:   low likelihood for bacterial sepsis; antibiotics DISCOURAGED   - GREATER than 0 5 ng/mL:   increased likelihood for bacterial sepsis; antibiotics ENCOURAGED   - GREATER than 2 ng/mL:   high risk for severe sepsis / septic shock; antibiotics strongly ENCOURAGED  Decisions on antibiotic use should not be based solely on Procalcitonin (PCT) levels  If PCT is low but uncertainty exists with stopping antibiotics, repeat PCT in 6-24 hours to confirm the low level  If antibiotics are administered (regardless if initial PCT was high or low), repeat PCT every 1-2 days to consider early antibiotic cessation (when GREATER than 80% decrease from the peak OR when PCT drops below designated cutoffs, whichever comes first), so long as the infection is NOT one that typically requires prolonged treatment durations (e g , bone/joint infections, endocarditis, Staph  aureus bacteremia)      Situations of FALSE-POSITIVE Procalcitonin values:  1) Newborns < 67 hours old  2) Massive stress from severe trauma / burns, major surgery, acute pancreatitis, cardiogenic / hemorrhagic shock, sickle cell crisis, or other organ perfusion abnormalities  3) Malaria and some Candidal infections  4) Treatment with agents that stimulate cytokines (e g , OKT3, anti-lymphocyte globulins, alemtuzumab, IL-2, granulocyte transfusion [NOT GCSFs])  5) Chronic renal disease causes elevated baseline levels (consider GREATER than 0 75 ng/mL as an abnormal cut-off); initiating HD/CRRT may cause transient decreases  6) Paraneoplastic syndromes from medullary thyroid or SCLC, some forms of vasculitis, and acute [id number] disease    Situations of FALSE-NEGATIVE Procalcitonin values:  1) Too early in clinical course for PCT to have reached its peak (may repeat in 6-24 hours to confirm low level)  2) Localized infection WITHOUT systemic (SIRS / sepsis) response (e g , an abscess, osteomyelitis, cystitis)  3) Mycobacteria (e g , Tuberculosis, MAC)  4) Cystic fibrosis exacerbations     BLOOD CULTURE   BLOOD CULTURE   LACTIC ACID, PLASMA   UA W REFLEX TO MICROSCOPIC WITH REFLEX TO CULTURE   PROCALCITONIN TEST   BASIC METABOLIC PANEL   MAGNESIUM   PHOSPHORUS   PLATELET COUNT       XR hip/pelv 2-3 vws right   ED Interpretation   No fracture      XR elbow 3+ views LEFT   ED Interpretation   No fracture      XR forearm 2 views LEFT   ED Interpretation   No fracture      VAS upper limb venous duplex scan, unilateral/limited    (Results Pending)   CT forearm left wo contrast    (Results Pending)     DVT US negative per tech

## 2018-12-30 NOTE — CONSULTS
Orthopedics   Cyndee Ren 81 y o  female MRN: [id number]  Unit/Bed#: Cat Scan      Chief Complaint:   left arm pain     HPI:   81 y  o female who presented sp fall who initially complained of r hip pain and also left arm erythema and pain  Patient is only Serbian speaking so history is obtained from use of  phone  Patient however is poor historian and unable to provide many answers  Patient with left arm swelling and redness for undetermined period of time, patient denies fever and chills at home, she denies numbness/tingling of the extremity  Patient denies any recent trauma to the arm  Patient denies any numbness/tingling of the right leg as well        Review Of Systems:   · Skin: Normal  · Neuro: See HPI  · Musculoskeletal: See HPI  · 14 point review of systems R hip pain    Past Medical History:   Past Medical History:   Diagnosis Date    COPD [facility]chronic obstructive pulmonary disease) ([facility] )     Diabetes mellitus ([facility] )     Hyperlipidemia     Hyperlipidemia     Hypertension     Kidney stones     Osteoporosis        Past Surgical History:   Past Surgical History:   Procedure Laterality Date    APPENDECTOMY      HAND SURGERY Right     LITHOTRIPSY      MASS EXCISION      remova of back wall mass    TONSILLECTOMY      TONSILLECTOMY         Family History:  Family history reviewed and non-contributory  Family History   Problem Relation Age of Onset    Diabetes Mother        Social History:  Social History     Social History    Marital status: Single     Spouse name: N/A    Number of children: N/A    Years of education: N/A     Social History Main Topics    Smoking status: Former Smoker     Packs/day: 1 00     Years: 40 00     Types: Cigarettes     Quit date: 1/1/2017    Smokeless tobacco: Never Used      Comment: states she quit but family living with her states she smokes    Alcohol use Yes      Comment: OCCASIONAL    Drug use: No    Sexual activity: Yes     Partners: Male     Other Topics Concern    None     Social History Narrative    None       Allergies: Allergies   Allergen Reactions    Acetaminophen      Listen on patient's paperwork from [facility] and Staffing  Family unable to confirm      Morphine And Related Vomiting    Oxycodone GI Intolerance     Listed on patient's paperwork from [facility] and Staffing   Family unable to confirm      Tramadol Vomiting and Abdominal Pain     Other           Labs:    0  Lab Value Date/Time   HCT 37 6 12/30/2018 1315   HCT 44 7 12/20/2018 1352   HCT 42 6 08/09/2018 0904   HCT 40 8 04/18/2018 1844   HCT 42 6 03/04/2014 0600   HCT 43 5 03/03/2014 2005   HGB 12 3 12/30/2018 1315   HGB 14 3 12/20/2018 1352   HGB 14 3 08/09/2018 0904   HGB 13 4 04/18/2018 1844   HGB 14 3 03/04/2014 0600   HGB 14 9 03/03/2014 2005   INR 1 06 12/30/2018 1315   INR 1 03 03/03/2014 2005   WBC 13 88 (H) 12/30/2018 1315   WBC 10 80 12/20/2018 1352   WBC 8 00 08/09/2018 0904   WBC 9 1 04/18/2018 1844   WBC 10 08 03/04/2014 0600   WBC 11 86 (H) 03/03/2014 2005   ESR 33 (H) 07/08/2018 1403       Meds:    Current Facility-Administered Medications:     [START ON 12/31/2018] aspirin chewable tablet 81 mg, 81 mg, Oral, Daily, Eugune Libman, MD    atorvastatin (LIPITOR) tablet 40 mg, 40 mg, Oral, Q24H, Eugune Libman, MD    brimonidine (ALPHAGAN P) 0 15 % ophthalmic solution 1 drop, 1 drop, Both Eyes, BID, Eugune Libman, MD    brimonidine-timolol (COMBIGAN) 0 2-0 5 % ophthalmic solution 1 drop, 1 drop, Right Eye, Q12H [facility], Eugune Libman, MD    docusate sodium (COLACE) capsule 100 mg, 100 mg, Oral, BID, Eugune Libman, MD    dorzolamide (TRUSOPT) ophthalmic solution 1 drop, 1 drop, Right Eye, BID, Eugune Libman, MD    heparin (porcine) subcutaneous injection 5,000 Units, 5,000 Units, Subcutaneous, Q8H [address] **AND** Platelet count, , , Once, Eugune Libman, MD    ipratropium (ATROVENT HFA) inhaler 2 puff, 2 puff, Inhalation, 4x Daily, MD Luis Allison ON 12/31/2018] lidocaine (LIDODERM) 5 % patch 1 patch, 1 patch, Topical, Daily, Gabbie Jessica MD    naloxone (NARCAN) 0 04 mg/mL syringe 0 04 mg, 0 04 mg, Intravenous, Q1MIN PRN, Gabbie Jessica MD    [facility]ansetron [facility]) injection 4 mg, 4 mg, Intravenous, Q4H PRN, Gabbie Jessica MD    oxyCODONE (ROXICODONE) IR tablet 2 5 mg, 2 5 mg, Oral, Q4H PRN, Gabbie Jessica MD    oxyCODONE (ROXICODONE) IR tablet 5 mg, 5 mg, Oral, Q4H PRN, MD Luis Allison ON 12/31/2018] pantoprazole (PROTONIX) EC tablet 20 mg, 20 mg, Oral, Early Morning, MD Luis Allison ON 12/31/2018] pregabalin (LYRICA) capsule 100 mg, 100 mg, Oral, Daily, Gabbie Jessica MD    rOPINIRole (REQUIP) tablet 0 5 mg, 0 5 mg, Oral, HS, MD Luis Allison ON 12/31/2018] senna (SENOKOT) tablet 8 6 mg, 1 tablet, Oral, Daily, Gabbie Jessica MD    sodium chloride 0 9 % infusion, 75 mL/hr, Intravenous, Continuous, Gabbie Jessica MD, Last Rate: 75 mL/hr at 12/30/18 1713, 75 mL/hr at 12/30/18 1713    vancomycin (VANCOCIN) IVPB (premix) 1,000 mg, 15 mg/kg, Intravenous, Q24H, Gabbie Jessica MD, Stopped at 12/30/18 1700    Current Outpatient Prescriptions:     ALPHAGAN P 0 15 % ophthalmic solution, INSTILL 1 DROP INTO BOTH EYES TWICE DAILY INSTILL 1 DROP EN AMBOS OJOS DOS VECES AL MAC, Disp: , Rfl: 6    aspirin (ASPIRIN 81) 81 mg chewable tablet, Chew 1 tablet (81 mg total) daily Ninety day supplies please, Disp: 90 tablet, Rfl: 3    atorvastatin (LIPITOR) 40 mg tablet, Take 1 tablet (40 mg total) by mouth every 24 hours, Disp: 90 tablet, Rfl: 3    CLEVER CHOICE COMFORT EZ 33G X 4 MM MISC, Ninety day supplies please, Disp: 300 each, Rfl: 3    COMBIGAN 0 2-0 5 %, , Disp: , Rfl: 0    dorzolamide (TRUSOPT) 2 % ophthalmic solution, instill 1 drop into right eye twice a day, Disp: , Rfl: 0    EASY TOUCH LANCETS 32G MISC, To use 4 times a day, Disp: 300 each, Rfl: 3    glucose blood test strip, To use 4 times a day Ninety day supplies please, Disp: 300 each, Rfl: 3    insulin degludec (TRESIBA FLEXTOUCH) 100 units/mL injection pen, To use 30 U at bedtime  Ninety day supplies please, Disp: 9 pen, Rfl: 3    ipratropium (ATROVENT HFA) 17 mcg/act inhaler, Inhale 2 puffs 4 (four) times a day Ninety day supplies please, Disp: 3 Inhaler, Rfl: 3    lisinopril (ZESTRIL) 10 mg tablet, Take 1 tablet (10 mg total) by mouth daily, Disp: 90 tablet, Rfl: 3    NOVOLOG FLEXPEN 100 units/mL injection pen, 30 UNITS 3 TIMES A DAY WITH MEALS  Ninety day supplies please, Disp: 25 pen, Rfl: 0    omeprazole (PriLOSEC) 20 mg delayed release capsule, Take 1 capsule (20 mg total) by mouth daily Ninety day supplies please, Disp: 90 capsule, Rfl: 3    oxyCODONE (ROXICODONE) 5 mg immediate release tablet, Take 1 tablet (5 mg total) by mouth 2 (two) times a day Max Daily Amount: 10 mg, Disp: 60 tablet, Rfl: 0    pentoxifylline (TRENtal) 400 mg ER tablet, Take 1 tablet (400 mg total) by mouth 3 (three) times a day with meals Ninety day supplies please, Disp: 270 tablet, Rfl: 3    pregabalin (LYRICA) 100 mg capsule, ONE CAPSULE TWICE A DAY Ninety day supplies please, Disp: 180 capsule, Rfl: 3    rOPINIRole (REQUIP) 0 25 mg tablet, Take 2 tablets (0 5 mg total) by mouth daily at bedtime, Disp: 90 tablet, Rfl: 3    sitaGLIPtin (JANUVIA) 50 mg tablet, Take 1 tablet (50 mg total) by mouth daily Ninety day supplies please, Disp: 90 tablet, Rfl: 3    Blood Culture:   Lab Results   Component Value Date    BLOODCX No Growth After 5 Days  12/20/2017       Wound Culture:   No results found for: WOUNDCULT    Ins and Outs:  No intake/output data recorded            Physical Exam:   /77 (BP Location: Right arm)   Pulse 96   Temp 98 1 °F (36 7 °C) (Oral)   Resp 18   Wt 64 9 kg (143 lb 1 3 oz)   LMP  (LMP Unknown)   SpO2 100%   BMI 24 56 kg/m²   Gen: Alert and oriented to person, place, time  HEENT: EOMI, eyes clear, moist mucus membranes, hearing intact  Respiratory: Bilateral chest rise  No audible wheezing found  Cardiovascular: Regular Rate and Rhythm  Abdomen: soft nontender/nondistended  · Musculoskeletal: leftUpper Extremity  · Skin: Erythema over the entire arm from wrist to axilla, no open wounds, no areas of overt fluctuance noted  · Non painful range of motion of the elbow and wrist  · Sensation intact to axillary, median, radial, and ulnar distributions  · 5/5 motor to Axillary, Musculocutaneous, Radial, Ulna and Median     RLE  Mild TTP over greater trochanter   Negative log roll  Able to actively flex and extend the knee  Present ankle plantar and dorsiflextion  Sensation intact throughout the extremity  Palpable DP pulse    Radiology:   I personally reviewed the films  CT images of L arm pending     _*_*_*_*_*_*_*_*_*_*_*_*_*_*_*_*_*_*_*_*_*_*_*_*_*_*_*_*_*_*_*_*_*_*_*_*_*_*_*_*_*    Assessment:  81 y  o female with  left arm cellulitis  Plan:   · Cont   abx per primary team  · FU CT images in am  · Nonweight bearing to left upper extremity  · Heat to affected area  · Analgesics for pain  · Dispo: Ortho will follow      Janine Urrutia MD

## 2018-12-30 NOTE — ASSESSMENT & PLAN NOTE
Lab Results   Component Value Date    HGBA1C 8 5 (H) 12/20/2018       No results for input(s): POCGLU in the last 72 hours      Blood Sugar Average: Last 72 hrs:   with severe hyperglycemia likely related to sepsis, for now keep the patient on insulin drip non DKA protocol and consult Endocrinology  Carb controlled diet

## 2018-12-31 LAB
ANION GAP SERPL CALCULATED.3IONS-SCNC: 9 MMOL/L (ref 4–13)
ANION GAP SERPL CALCULATED.3IONS-SCNC: 9 MMOL/L (ref 4–13)
ATRIAL RATE: 82 BPM
BUN SERPL-MCNC: 13 MG/DL (ref 5–25)
BUN SERPL-MCNC: 15 MG/DL (ref 5–25)
CALCIUM SERPL-MCNC: 8.7 MG/DL (ref 8.3–10.1)
CALCIUM SERPL-MCNC: 9.1 MG/DL (ref 8.3–10.1)
CHLORIDE SERPL-SCNC: 100 MMOL/L (ref 100–108)
CHLORIDE SERPL-SCNC: 102 MMOL/L (ref 100–108)
CO2 SERPL-SCNC: 22 MMOL/L (ref 21–32)
CO2 SERPL-SCNC: 23 MMOL/L (ref 21–32)
CREAT SERPL-MCNC: 1.25 MG/DL (ref 0.6–1.3)
CREAT SERPL-MCNC: 1.38 MG/DL (ref 0.6–1.3)
ERYTHROCYTE [DISTWIDTH] IN BLOOD BY AUTOMATED COUNT: 14.8 % (ref 11.6–15.1)
GFR SERPL CREATININE-BSD FRML MDRD: 36 ML/MIN/1.73SQ M
GFR SERPL CREATININE-BSD FRML MDRD: 40 ML/MIN/1.73SQ M
GLUCOSE SERPL-MCNC: 146 MG/DL (ref 65–140)
GLUCOSE SERPL-MCNC: 151 MG/DL (ref 65–140)
GLUCOSE SERPL-MCNC: 157 MG/DL (ref 65–140)
GLUCOSE SERPL-MCNC: 168 MG/DL (ref 65–140)
GLUCOSE SERPL-MCNC: 173 MG/DL (ref 65–140)
GLUCOSE SERPL-MCNC: 178 MG/DL (ref 65–140)
GLUCOSE SERPL-MCNC: 183 MG/DL (ref 65–140)
GLUCOSE SERPL-MCNC: 219 MG/DL (ref 65–140)
GLUCOSE SERPL-MCNC: 68 MG/DL (ref 65–140)
GLUCOSE SERPL-MCNC: 74 MG/DL (ref 65–140)
HCT VFR BLD AUTO: 36.6 % (ref 34.8–46.1)
HGB BLD-MCNC: 12 G/DL (ref 11.5–15.4)
MAGNESIUM SERPL-MCNC: 2.9 MG/DL (ref 1.6–2.6)
MAGNESIUM SERPL-MCNC: 4.3 MG/DL (ref 1.6–2.6)
MCH RBC QN AUTO: 30.8 PG (ref 26.8–34.3)
MCHC RBC AUTO-ENTMCNC: 32.8 G/DL (ref 31.4–37.4)
MCV RBC AUTO: 94 FL (ref 82–98)
P AXIS: 59 DEGREES
PHOSPHATE SERPL-MCNC: 3.2 MG/DL (ref 2.3–4.1)
PHOSPHATE SERPL-MCNC: 4.7 MG/DL (ref 2.3–4.1)
PLATELET # BLD AUTO: 253 THOUSANDS/UL (ref 149–390)
PMV BLD AUTO: 10.9 FL (ref 8.9–12.7)
POTASSIUM SERPL-SCNC: 4.3 MMOL/L (ref 3.5–5.3)
POTASSIUM SERPL-SCNC: 5 MMOL/L (ref 3.5–5.3)
PR INTERVAL: 118 MS
QRS AXIS: 22 DEGREES
QRSD INTERVAL: 74 MS
QT INTERVAL: 360 MS
QTC INTERVAL: 420 MS
RBC # BLD AUTO: 3.89 MILLION/UL (ref 3.81–5.12)
SODIUM SERPL-SCNC: 132 MMOL/L (ref 136–145)
SODIUM SERPL-SCNC: 133 MMOL/L (ref 136–145)
T WAVE AXIS: 107 DEGREES
VENTRICULAR RATE: 82 BPM
WBC # BLD AUTO: 13.99 THOUSAND/UL (ref 4.31–10.16)

## 2018-12-31 PROCEDURE — 93010 ELECTROCARDIOGRAM REPORT: CPT | Performed by: INTERNAL MEDICINE

## 2018-12-31 PROCEDURE — 99221 1ST HOSP IP/OBS SF/LOW 40: CPT | Performed by: ORTHOPAEDIC SURGERY

## 2018-12-31 PROCEDURE — 99222 1ST HOSP IP/OBS MODERATE 55: CPT | Performed by: NURSE PRACTITIONER

## 2018-12-31 PROCEDURE — G8988 SELF CARE GOAL STATUS: HCPCS

## 2018-12-31 PROCEDURE — 82948 REAGENT STRIP/BLOOD GLUCOSE: CPT

## 2018-12-31 PROCEDURE — 84100 ASSAY OF PHOSPHORUS: CPT | Performed by: INTERNAL MEDICINE

## 2018-12-31 PROCEDURE — 99223 1ST HOSP IP/OBS HIGH 75: CPT | Performed by: INTERNAL MEDICINE

## 2018-12-31 PROCEDURE — 83735 ASSAY OF MAGNESIUM: CPT | Performed by: INTERNAL MEDICINE

## 2018-12-31 PROCEDURE — 80048 BASIC METABOLIC PNL TOTAL CA: CPT | Performed by: INTERNAL MEDICINE

## 2018-12-31 PROCEDURE — G8987 SELF CARE CURRENT STATUS: HCPCS

## 2018-12-31 PROCEDURE — 97167 OT EVAL HIGH COMPLEX 60 MIN: CPT

## 2018-12-31 PROCEDURE — 93971 EXTREMITY STUDY: CPT | Performed by: SURGERY

## 2018-12-31 PROCEDURE — 87081 CULTURE SCREEN ONLY: CPT | Performed by: INTERNAL MEDICINE

## 2018-12-31 PROCEDURE — G8978 MOBILITY CURRENT STATUS: HCPCS

## 2018-12-31 PROCEDURE — 97163 PT EVAL HIGH COMPLEX 45 MIN: CPT

## 2018-12-31 PROCEDURE — 85027 COMPLETE CBC AUTOMATED: CPT | Performed by: INTERNAL MEDICINE

## 2018-12-31 PROCEDURE — 99232 SBSQ HOSP IP/OBS MODERATE 35: CPT | Performed by: INTERNAL MEDICINE

## 2018-12-31 PROCEDURE — G8979 MOBILITY GOAL STATUS: HCPCS

## 2018-12-31 RX ORDER — POLYETHYLENE GLYCOL 3350 17 G/17G
17 POWDER, FOR SOLUTION ORAL DAILY PRN
Status: DISCONTINUED | OUTPATIENT
Start: 2018-12-31 | End: 2018-12-31

## 2018-12-31 RX ORDER — INSULIN GLARGINE 100 [IU]/ML
20 INJECTION, SOLUTION SUBCUTANEOUS EVERY MORNING
Status: DISCONTINUED | OUTPATIENT
Start: 2019-01-01 | End: 2019-01-01

## 2018-12-31 RX ORDER — LACTULOSE 20 G/30ML
30 SOLUTION ORAL ONCE
Status: COMPLETED | OUTPATIENT
Start: 2018-12-31 | End: 2018-12-31

## 2018-12-31 RX ORDER — VANCOMYCIN HYDROCHLORIDE 1 G/200ML
15 INJECTION, SOLUTION INTRAVENOUS EVERY 24 HOURS
Status: DISCONTINUED | OUTPATIENT
Start: 2018-12-31 | End: 2019-01-01

## 2018-12-31 RX ORDER — INSULIN GLARGINE 100 [IU]/ML
30 INJECTION, SOLUTION SUBCUTANEOUS
Status: DISCONTINUED | OUTPATIENT
Start: 2018-12-31 | End: 2018-12-31

## 2018-12-31 RX ORDER — HYDRALAZINE HYDROCHLORIDE 20 MG/ML
10 INJECTION INTRAMUSCULAR; INTRAVENOUS EVERY 6 HOURS PRN
Status: DISCONTINUED | OUTPATIENT
Start: 2018-12-31 | End: 2019-01-07 | Stop reason: HOSPADM

## 2018-12-31 RX ORDER — POLYETHYLENE GLYCOL 3350 17 G/17G
17 POWDER, FOR SOLUTION ORAL
Status: DISCONTINUED | OUTPATIENT
Start: 2018-12-31 | End: 2019-01-07 | Stop reason: HOSPADM

## 2018-12-31 RX ADMIN — MAGNESIUM SULFATE HEPTAHYDRATE 4 G: 40 INJECTION, SOLUTION INTRAVENOUS at 00:17

## 2018-12-31 RX ADMIN — INSULIN LISPRO 5 UNITS: 100 INJECTION, SOLUTION INTRAVENOUS; SUBCUTANEOUS at 18:41

## 2018-12-31 RX ADMIN — DOCUSATE SODIUM 100 MG: 100 CAPSULE, LIQUID FILLED ORAL at 08:19

## 2018-12-31 RX ADMIN — IPRATROPIUM BROMIDE 2 PUFF: 17 AEROSOL, METERED RESPIRATORY (INHALATION) at 12:34

## 2018-12-31 RX ADMIN — DORZOLAMIDE HYDROCHLORIDE 1 DROP: 20 SOLUTION/ DROPS OPHTHALMIC at 08:19

## 2018-12-31 RX ADMIN — BRIMONIDINE TARTRATE 1 DROP: 1.5 SOLUTION OPHTHALMIC at 18:43

## 2018-12-31 RX ADMIN — POTASSIUM CHLORIDE 40 MEQ: 1500 TABLET, EXTENDED RELEASE ORAL at 00:01

## 2018-12-31 RX ADMIN — SODIUM CHLORIDE 250 ML: 0.9 INJECTION, SOLUTION INTRAVENOUS at 05:03

## 2018-12-31 RX ADMIN — SENNOSIDES 8.6 MG: 8.6 TABLET, FILM COATED ORAL at 08:19

## 2018-12-31 RX ADMIN — PREGABALIN 100 MG: 100 CAPSULE ORAL at 08:19

## 2018-12-31 RX ADMIN — ASPIRIN 81 MG 81 MG: 81 TABLET ORAL at 08:18

## 2018-12-31 RX ADMIN — LIDOCAINE 1 PATCH: 50 PATCH CUTANEOUS at 10:22

## 2018-12-31 RX ADMIN — IPRATROPIUM BROMIDE 2 PUFF: 17 AEROSOL, METERED RESPIRATORY (INHALATION) at 21:59

## 2018-12-31 RX ADMIN — INSULIN LISPRO 2 UNITS: 100 INJECTION, SOLUTION INTRAVENOUS; SUBCUTANEOUS at 21:58

## 2018-12-31 RX ADMIN — ROPINIROLE 0.5 MG: 0.25 TABLET, FILM COATED ORAL at 21:59

## 2018-12-31 RX ADMIN — DOCUSATE SODIUM 100 MG: 100 CAPSULE, LIQUID FILLED ORAL at 18:40

## 2018-12-31 RX ADMIN — VANCOMYCIN HYDROCHLORIDE 1000 MG: 1 INJECTION, SOLUTION INTRAVENOUS at 15:55

## 2018-12-31 RX ADMIN — INSULIN LISPRO 1 UNITS: 100 INJECTION, SOLUTION INTRAVENOUS; SUBCUTANEOUS at 09:18

## 2018-12-31 RX ADMIN — HEPARIN SODIUM 5000 UNITS: 5000 INJECTION INTRAVENOUS; SUBCUTANEOUS at 21:58

## 2018-12-31 RX ADMIN — TIMOLOL MALEATE 1 DROP: 5 SOLUTION/ DROPS OPHTHALMIC at 18:42

## 2018-12-31 RX ADMIN — IPRATROPIUM BROMIDE 2 PUFF: 17 AEROSOL, METERED RESPIRATORY (INHALATION) at 08:19

## 2018-12-31 RX ADMIN — ATORVASTATIN CALCIUM 40 MG: 40 TABLET, FILM COATED ORAL at 18:40

## 2018-12-31 RX ADMIN — PANTOPRAZOLE SODIUM 20 MG: 20 TABLET, DELAYED RELEASE ORAL at 05:28

## 2018-12-31 RX ADMIN — POLYETHYLENE GLYCOL 3350 17 G: 17 POWDER, FOR SOLUTION ORAL at 18:42

## 2018-12-31 RX ADMIN — HEPARIN SODIUM 5000 UNITS: 5000 INJECTION INTRAVENOUS; SUBCUTANEOUS at 13:23

## 2018-12-31 RX ADMIN — IPRATROPIUM BROMIDE 2 PUFF: 17 AEROSOL, METERED RESPIRATORY (INHALATION) at 18:41

## 2018-12-31 RX ADMIN — LACTULOSE 30 G: 10 SOLUTION ORAL at 12:31

## 2018-12-31 RX ADMIN — TIMOLOL MALEATE 1 DROP: 5 SOLUTION/ DROPS OPHTHALMIC at 08:20

## 2018-12-31 RX ADMIN — HEPARIN SODIUM 5000 UNITS: 5000 INJECTION INTRAVENOUS; SUBCUTANEOUS at 05:28

## 2018-12-31 RX ADMIN — INSULIN LISPRO 1 UNITS: 100 INJECTION, SOLUTION INTRAVENOUS; SUBCUTANEOUS at 18:41

## 2018-12-31 RX ADMIN — INSULIN GLARGINE 30 UNITS: 100 INJECTION, SOLUTION SUBCUTANEOUS at 05:28

## 2018-12-31 RX ADMIN — BRIMONIDINE TARTRATE 1 DROP: 1.5 SOLUTION OPHTHALMIC at 08:19

## 2018-12-31 RX ADMIN — DORZOLAMIDE HYDROCHLORIDE 1 DROP: 20 SOLUTION/ DROPS OPHTHALMIC at 18:42

## 2018-12-31 NOTE — PLAN OF CARE
METABOLIC, FLUID AND ELECTROLYTES - ADULT     Electrolytes maintained within normal limits Progressing     Fluid balance maintained Progressing     Glucose maintained within target range Progressing        MUSCULOSKELETAL - ADULT     Maintain or return mobility to safest level of function Progressing     Maintain proper alignment of affected body part Progressing        Potential for Falls     Patient will remain free of falls Progressing        Prexisting or High Potential for Compromised Skin Integrity     Skin integrity is maintained or improved Progressing        SKIN/TISSUE INTEGRITY - ADULT     Skin integrity remains intact Progressing     Incision(s), wounds(s) or drain site(s) healing without S/S of infection Progressing     Oral mucous membranes remain intact Progressing

## 2018-12-31 NOTE — PROGRESS NOTES
Vancomycin Assessment    Zoie Kim is a 81 y o  female who is currently receiving vancomycin   for       Relevant clinical data and objective history reviewed:  Creatinine   Date Value Ref Range Status   12/30/2018 1 55 (H) 0 60 - 1 30 mg/dL Final     Comment:     Standardized to IDMS reference method   12/20/2018 1 16 0 60 - 1 20 mg/dL Final     Comment:     Standardized to IDMS reference method   07/08/2018 1 13 0 60 - 1 20 mg/dL Final     Comment:     Standardized to IDMS reference method   03/04/2014 1 18 0 60 - 1 30 mg/dL Final     Comment:     Standardized to IDMS reference method   03/03/2014 1 23 0 60 - 1 30 mg/dL Final     Comment:     Standardized to IDMS reference method     /87 (BP Location: Right arm)   Pulse 82   Temp 98 1 °F (36 7 °C) (Oral)   Resp 17   Wt 64 9 kg (143 lb 1 3 oz)   LMP  (LMP Unknown)   SpO2 99%   BMI 24 56 kg/m²   No intake/output data recorded  Lab Results   Component Value Date/Time    BUN 19 12/30/2018 01:15 PM    BUN 28 (H) 06/19/2018 10:59 AM    WBC 13 88 (H) 12/30/2018 01:15 PM    WBC 9 1 04/18/2018 06:44 PM    HGB 12 3 12/30/2018 01:15 PM    HGB 13 4 04/18/2018 06:44 PM    HCT 37 6 12/30/2018 01:15 PM    HCT 40 8 04/18/2018 06:44 PM    MCV 94 12/30/2018 01:15 PM    MCV 94 04/18/2018 06:44 PM     12/30/2018 01:15 PM     04/18/2018 06:44 PM     Temp Readings from Last 3 Encounters:   12/30/18 98 1 °F (36 7 °C) (Oral)   12/20/18 98 °F (36 7 °C) (Oral)   11/13/18 98 2 °F (36 8 °C) (Oral)     Vancomycin Days of Therapy: 1    Assessment/Plan  The patient is currently on vancomycin utilizing scheduled dosing based on actual body weight  Baseline risks associated with therapy include: pre-existing renal impairment and advanced age  The patient is currently receiving 1000 mg q24 and after clinical evaluation will be changed to 750 mg q24     Pharmacy will also follow closely for s/sx of nephrotoxicity, infusion reactions and appropriateness of therapy  BMP and CBC will be ordered per protocol  Plan for trough as patient approaches steady state, prior to the 4th  dose at approximately 1500 01/02   Due to infection severity, will target a trough of 15-20   Pharmacy will continue to follow the patients culture results and clinical progress daily      Paul Nuñez, Pharmacist

## 2018-12-31 NOTE — PHYSICAL THERAPY NOTE
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 )    Accident due to mechanical fall without injury    Type 2 diabetes mellitus with hyperglycemia, with long-term current use of insulin ([facility])       Past Medical History  Past Medical History:   Diagnosis Date    COPD (chronic obstructive pulmonary disease) ([facility] )     Diabetes mellitus [facility] )     Hyperlipidemia     Hyperlipidemia     Hypertension     Kidney stones     Osteoporosis        Past Surgical History  Past Surgical History:   Procedure Laterality Date    APPENDECTOMY      HAND SURGERY Right     LITHOTRIPSY      MASS EXCISION      remova of back wall mass    TONSILLECTOMY      TONSILLECTOMY          12/31/18 1051   Note Type   Note type Eval only   Pain Assessment   Pain Assessment 0-10   Pain Score 7   Pain Type Chronic pain   Pain Location Hip   Pain Orientation Bilateral   Home Living   Type of Home Apartment   Home Layout One level  (0 HERBER)   Bathroom Shower/Tub Tub/shower unit   Bathroom Toilet Standard   Bathroom Equipment (None)   Home Equipment (None)   Prior Function   Level of [place] Independent with ADLs and functional mobility   Lives With Daughter Receives Help From Family   ADL Assistance Independent   IADLs Independent   Falls in the last 6 months 1 to 4   Vocational Retired   Comments Pt is primarily [place] speaking and [place]  present to translate interview  Pt primarily resides in [facility] and travels back and forth to spend time with family  For the last year, she has been staying in the [address] [place] [address] with her daughter in a 1st floor apartment  PTA, pt was I with ADLs, IADLs, and functional mobility without the use of an AD  She enjoys cooking and cleaning  Restrictions/Precautions   Weight Bearing Precautions Per Order Yes   LUE Weight Bearing Per Order NWB   General   Additional Pertinent History Pt reports that the pain in her hip has been occurring prior to her fall for the last 5-6 months  The pain radiates distally bilaterally from her buttocks down to the posterior knee, and is made better with lumbar flexion  Family/Caregiver Present No   Cognition   Overall Cognitive Status Impaired   Arousal/Participation Alert   Orientation Level Oriented to person;Oriented to situation;Disoriented to time;Disoriented to place  (Knew name and Mo/day of , not year  Disoriented to date )   Memory Decreased recall of precautions  (Unable to keep NWB of LUE)   Following Commands Follows one step commands with increased time or repetition   RLE Assessment   RLE Assessment WFL  (4/5)   LLE Assessment   LLE Assessment WFL  (4/5)   Bed Mobility   Supine to Sit 3  Moderate assistance   Additional items Assist x 1   Transfers   Sit to Stand 4  Minimal assistance   Additional items Assist x 1   Stand to Sit 4  Minimal assistance   Additional items Assist x 1   Stand pivot 4  Minimal assistance   Additional items Assist x 1   Ambulation/Elevation   Gait pattern Excessively slow; Short stride   Gait Assistance 4  Minimal assist   Additional items Assist x 1; Tactile cues   Assistive Device (HHA on RUE only)   Distance 3'  (to bedside chair)   Balance Static Sitting Fair   Dynamic Sitting Fair   Static Standing Poor +   Dynamic Standing Poor +   Ambulatory Poor +   Endurance Deficit   Endurance Deficit Yes   Endurance Deficit Description pain   Activity Tolerance   Activity Tolerance Patient limited by pain   Nurse Made Aware Yes, RN cleared pt for PT eval   Assessment   Prognosis Good   Problem List Decreased strength;Decreased endurance; Impaired balance;Decreased mobility;Pain   Assessment Pt is 81 y o  female seen for PT evaluation s/p admit to [facility] on 12/30/2018 w/ Sepsis ([address] )  PT consulted to assess pt's functional mobility and d/c needs  Order placed for PT eval and tx, w/ up w/ A order  Comorbidities affecting pt's physical performance at time of assessment include: DM II, gait difficulty, neuropathy, DM  PTA, pt was ambulates community distances and elevations and retired  Personal factors affecting pt at time of IE include: inability to ambulate household distances, limited home support, positive fall history, inability to perform IADLs and inability to perform ADLs  Please find objective findings from PT assessment regarding body systems outlined above with impairments and limitations including weakness, decreased ROM, impaired balance, gait deviations, pain, decreased activity tolerance, decreased functional mobility tolerance and fall risk  The following objective measures performed on IE also reveal limitations: Barthel Index: 45/100  Pt's clinical presentation is currently unstable/unpredictable seen in pt's presentation of WBS, pain, abnormal labs, telemetry  Pt reported buttock pain radiating posteriorly to above knee bilaterally that has been occurring for approximately 5-6 months, which may be lumbar radiculopathy  LE strength was equal bilaterally  Pt reported slight decrease in sensation that may be associated with hx of neuropathy and unrelated to hip, buttocks, and leg pain  SLIM notified of findings    Pt demonstrated difficulty with maintaining NWB of LUE with max vc in [place]  Pt to benefit from continued PT tx to address deficits as defined above and maximize level of functional independent mobility and consistency  From PT/mobility standpoint, recommendation at time of d/c would be STR vs  Home PT pending progress in order to facilitate return to PLOF  Barriers to Discharge None   Goals   Patient Goals No more pain   STG Expiration Date 01/10/19   Short Term Goal #1 Pt will demonstrate: 1) increased BLE strength >/= 1 grade for improved transfers 2) supine <> sit transfer with mod I 3) sit <> stand transfer with mod I 4) increased dynamic balance >/= 1 grade to decrease risk for falls 5) Amb >/= 300' with mod I using least restrictive AD   Treatment Day 0   Plan   Treatment/Interventions Functional transfer training;LE strengthening/ROM; Therapeutic exercise; Endurance training;Patient/family training;Equipment eval/education; Bed mobility;Gait training;Spoke to nursing;OT   PT Frequency (3-5x/wk)   Recommendation   Recommendation (STR vs HHPT)   Equipment Recommended Cane  (may progress to no AD)   PT - OK to Discharge No   Additional Comments pending full ambulation trial   Barthel Index   Feeding 5   Bathing 0   Grooming Score 0   Dressing Score 5   Bladder Score 10   Bowels Score 10   Toilet Use Score 5   Transfers (Bed/Chair) Score 10   Mobility (Level Surface) Score 0   Stairs Score 0   Barthel Index Score 45         Howie Gillis, PT, DPT

## 2018-12-31 NOTE — SOCIAL WORK
Cm met with pt to discuss DCP and cm role  Pt resting contacted daughter Nick Vargas however she is [place] speaking so grand daughter Citlali Grace was able to interpret  Pt has been staying with Nick Vargas in a 2sh with 8ste  Prior to admission pt used a SPC with ambulation and was independent with ADLS  Per Nick Vargas there is a nurse that comes out to see pt by the name of Julio Mercado, unsure which agency she is with  Pt's preference for pharmacy is [facility]  Per Citlali Grace pt does not have a hx of ETOH/drug or inpt psych stays  CM reviewed d/c planning process including the following: identifying help at home, patient preference for d/c planning needs, Discharge Lounge, [facility] Meds to Bed program, availability of treatment team to discuss questions or concerns patient and/or family may have regarding understanding medications and recognizing signs and symptoms once discharged  CM also encouraged patient to follow up with all recommended appointments after discharge  Patient advised of importance for patient and family to participate in managing patients medical well being

## 2018-12-31 NOTE — CONSULTS
Consultation - Mati Lozdaa 81 y o  female MRN: [id number]    Unit/Bed#: [facility] 511-01 Encounter: [id number]      Assessment/Plan     Assessment: This is a 81y o -year-old female with type 2 diabetes with hyperglycemia on long-term insulin therapy, hypoglycemia with admitted for sepsis and cellulitis of left upper extremity and acute kidney injury on chronic kidney disease    Plan:  Hemoglobin A1c 8 6 consistent with poor glycemic control-hemoglobin A1c was above 13 earlier this year-patient's daughter reporting blood sugars of 300 to 400s at home  For now given the degree of hyperglycemia basal bolus insulin therapy is the optimal choice  I am going to decrease Lantus to 20 units-to be given in the morning  Discontinue the 30 units of Lantus at bedtime  Will start Humalog 5 units before meals  As she starts eating better she will need adjustment in that dose  Monitor blood sugars before meals and bedtime adjust accordingly  Sepsis/cellulitis of left upper extremity-currently on IV antibiotics-management as per primary team    Acute kidney injury/chronic kidney disease-on IV fluids- creatinine improved  Upon discharge, if Lantus is not the preferred basal insulin for the patient's insurance company, we can use Tresiba, Basaglar, Levemir, Toujeo at the same dose instead  Upon discharge, if Humalog is not the preferred mealtime insulin for the patient's insurance, we can use NovoLog, Fiasp, Admelog, or Apidra at the same dose instead  CC: Diabetes Consult    History of Present Illness     HPI: Mati Lozada is a 81y o  year old female with type 2 diabetes on long-term insulin therapy was currently admitted for sepsis and cellulitis of left upper extremity-labs on admission showed severe hyperglycemia with a blood glucose of 426-she was started on IV insulin infusion and switched to subcu insulin therapy earlier today  Endocrine consult is requested for management of diabetes    History is obtained from the patient, daughter by her bedside utilizing the translation phone  She has had diabetes for many years and is on insulin at home  According to the daughter she takes 1 shot of insulin a day which is 30 units  She is not sure about the name of the insulin that she takes  Her blood sugar is checked 4 to 6 times a day and usually her sugars are between 200 to 400s  She denies any recent hypoglycemic episodes however was admitted more than a year back with severe hypoglycemia  She complains of occasional polyuria, polydipsia and blurry vision  Denies any recent changes in her weight  She was on IV insulin infusion overnight-received 30 units of Lantus this morning and IV insulin infusion was discontinued  Fingerstick at 11:00 a m  Was 76  She has not had much to eat today as she does not like hospital food  According to the daughter she eats well at home    Consults    Review of Systems   Constitutional: Positive for appetite change and fatigue  Negative for unexpected weight change  Respiratory: Negative for cough and shortness of breath  Cardiovascular: Negative for palpitations and leg swelling  Gastrointestinal: Positive for constipation  Negative for diarrhea, nausea and vomiting  Endocrine: Negative for polydipsia and polyuria  Musculoskeletal: Positive for arthralgias  Skin: Positive for color change  Negative for wound  Psychiatric/Behavioral: Negative for confusion  All other systems reviewed and are negative        Historical Information   Past Medical History:   Diagnosis Date    COPD (chronic obstructive pulmonary disease) ([facility] [place] [address] [facility]     Diabetes mellitus ([facility] [place] [address] [facility]     Hyperlipidemia     Hyperlipidemia     Hypertension     Kidney stones     Osteoporosis      Past Surgical History:   Procedure Laterality Date    APPENDECTOMY      HAND SURGERY Right     LITHOTRIPSY      MASS EXCISION      [place] of back wall mass    TONSILLECTOMY      TONSILLECTOMY       Social History   History   Alcohol Use    Yes     Comment: OCCASIONAL     History   Drug Use No     History   Smoking Status    Former Smoker    Packs/day: 1 00    Years: 40 00    Types: Cigarettes    Quit date: 1/1/2017   Smokeless Tobacco    Never Used     Comment: states she quit but family living with her states she smokes     Family History:   Family History   Problem Relation Age of Onset    Diabetes Mother        Meds/Allergies   Current Facility-Administered Medications   Medication Dose Route Frequency Provider Last Rate Last Dose    aspirin chewable tablet 81 mg  81 mg Oral Daily La Baum MD   81 mg at 12/31/18 0818    atorvastatin (LIPITOR) tablet 40 mg  40 mg Oral Q24H La Baum MD        brimonidine (ALPHAGAN P) 0 15 % ophthalmic solution 1 drop  1 drop Both Eyes BID La Baum MD   1 drop at 12/31/18 0819    docusate sodium (COLACE) capsule 100 mg  100 mg Oral BID La Baum MD   100 mg at 12/31/18 0819    dorzolamide (TRUSOPT) ophthalmic solution 1 drop  1 drop Right Eye BID La Baum MD   1 drop at 12/31/18 0819    heparin (porcine) subcutaneous injection 5,000 Units  5,000 Units Subcutaneous [facility] La Baum MD   5,000 Units at 12/31/18 0528    hydrALAZINE (APRESOLINE) injection 10 mg  10 mg Intravenous Q6H PRN Carlo Zepeda MD        [START ON 1/1/2019] insulin glargine (LANTUS) subcutaneous injection 20 Units 0 2 mL  20 Units Subcutaneous QAM Luciana Romero MD        insulin lispro (HumaLOG) 100 units/mL subcutaneous injection 1-5 Units  1-5 Units Subcutaneous TID [facility] Maddy Hoang PA-C   1 Units at 12/31/18 5154    insulin lispro (HumaLOG) 100 units/mL subcutaneous injection 1-5 Units  1-5 Units Subcutaneous HS Rogerio Calle PA-C        insulin lispro (HumaLOG) 100 units/mL subcutaneous injection 5 Units  5 Units Subcutaneous TID With Meals Luciana Romero MD        ipratropium (ATROVENT HFA) inhaler 2 puff  2 puff Inhalation 4x Daily Claudine Humphrey MD   2 puff at 12/31/18 1234    lidocaine (LIDODERM) 5 % patch 1 patch  1 patch Topical Daily Claudine Humphrey MD   1 patch at 12/31/18 1022    naloxone (NARCAN) 0 04 mg/mL syringe 0 04 mg  0 04 mg Intravenous Q1MIN PRN Claudine Humphrey MD        ondansetron [facility]) injection 4 mg  4 mg Intravenous Q4H PRN Claudine Humphrey MD        oxyCODONE (ROXICODONE) IR tablet 2 5 mg  2 5 mg Oral Q4H PRN Claudine Humphrey MD        oxyCODONE (ROXICODONE) IR tablet 5 mg  5 mg Oral Q4H PRN Claudine Humphrey MD        pantoprazole (PROTONIX) EC tablet 20 mg  20 mg Oral Early Morning Claudine Humphrey MD   20 mg at 12/31/18 0528    pregabalin (LYRICA) capsule 100 mg  100 mg Oral Daily Claudine Humphrey MD   100 mg at 12/31/18 4507    rOPINIRole (REQUIP) tablet 0 5 mg  0 5 mg Oral HS Claudine Humphrey MD   0 5 mg at 12/30/18 2114    senna (SENOKOT) tablet 8 6 mg  1 tablet Oral Daily Claudine Humphrey MD   8 6 mg at 12/31/18 4831    sodium chloride 0 9 % infusion  75 mL/hr Intravenous Continuous Claudine Humphrey MD   Stopped at 12/31/18 4805    timolol (TIMOPTIC) 0 5 % ophthalmic solution 1 drop  1 drop Both Eyes BID Claudine Humphrey MD   1 drop at 12/31/18 0820    vancomycin (VANCOCIN) IVPB (premix) 1,000 mg  15 mg/kg Intravenous Q24H Claudine Humphrey MD         Allergies   Allergen Reactions    Acetaminophen      Listen on patient's paperwork from [facility] and Staffing  Family unable to confirm      Morphine And Related Vomiting    Oxycodone GI Intolerance     Listed on patient's paperwork from [facility] and Staffing  Family unable to confirm      Tramadol Vomiting and Abdominal Pain     Other       Objective   Vitals: Blood pressure 122/78, pulse 74, temperature 98 °F (36 7 °C), temperature source Oral, resp  rate 20, height 5' 3" (1 6 m), weight 66 1 kg (145 lb 11 6 oz), SpO2 97 %, not currently breastfeeding      Intake/Output Summary (Last 24 hours) at 12/31/18 1242  Last data filed at 12/31/18 1100   Gross per 24 hour   Intake          1886 67 ml   Output              750 ml   Net          1136 67 ml     Invasive Devices     Peripheral Intravenous Line            Peripheral IV 12/30/18 Right Forearm less than 1 day    Peripheral IV 12/31/18 Right Antecubital less than 1 day    Peripheral IV 12/31/18 Right;Upper Arm less than 1 day                Physical Exam   Constitutional: She is oriented to person, place, and time  She appears well-developed and well-nourished  No distress  HENT:   Head: Normocephalic and atraumatic  Mouth/Throat: No oropharyngeal exudate  Eyes: EOM are normal  No scleral icterus  Neck: Normal range of motion  Neck supple  No thyromegaly present  Cardiovascular: Normal rate, regular rhythm and normal heart sounds  No murmur heard  Pulmonary/Chest: Effort normal and breath sounds normal  No respiratory distress  She has no wheezes  She has no rales  Abdominal: Soft  Bowel sounds are normal  She exhibits no distension  There is no tenderness  There is no rebound  Musculoskeletal: Normal range of motion  She exhibits no edema or deformity  Lymphadenopathy:     She has no cervical adenopathy  Neurological: She is alert and oriented to person, place, and time  Skin:   Erythema left forearm   Psychiatric: She has a normal mood and affect  Her behavior is normal        The history was obtained from the review of the chart, patient and family  Lab Results:      HEMOGLOBIN A1C W/ EAG ESTIMATION     [facility]      Ref Range & Units 12/20/18 1352 8/9/18 0904 4/18/18 1844    Hemoglobin A1C 4 2 - 6 3 % 8 5   9 9   <5 7 %" class="[id number]">  13 7R, CM      EAG mg/dl 197  237  <154 MG/DL" class="z1wb [id number]">  346R               Lab Results   Component Value Date    WBC 13 99 (H) 12/31/2018    HGB 12 0 12/31/2018    HCT 36 6 12/31/2018    MCV 94 12/31/2018     12/31/2018     Lab Results   Component Value Date/Time    BUN 13 12/31/2018 11:56 AM    BUN 28 (H) 06/19/2018 10:59 AM     06/19/2018 10:59 AM    K 4 3 12/31/2018 11:56 AM    K 5 2 (H) 06/19/2018 10:59 AM     12/31/2018 11:56 AM     06/19/2018 10:59 AM    CO2 22 12/31/2018 11:56 AM    CO2 29 06/19/2018 10:59 AM    CO2 29 12/20/2017 02:54 PM    CREATININE 1 38 (H) 12/31/2018 11:56 AM    CREATININE 1 18 03/04/2014 06:00 AM    AST 11 12/30/2018 01:15 PM    AST 15 04/18/2018 06:44 PM    ALT 23 12/30/2018 01:15 PM    ALT 41 04/18/2018 06:44 PM    ALB 2 6 (L) 12/30/2018 01:15 PM    ALB 3 3 04/18/2018 06:44 PM     No results for input(s): CHOL, HDL, LDL, TRIG, VLDL in the last 72 hours  No results found for: Jace Childers  POC Glucose (mg/dl)   Date Value   12/31/2018 183 (H)   12/31/2018 68   12/31/2018 151 (H)   12/31/2018 168 (H)   12/31/2018 173 (H)   12/31/2018 74   12/30/2018 185 (H)   12/30/2018 248 (H)   12/30/2018 493 (H)   12/22/2017 267 (H)       Imaging Studies: I have personally reviewed pertinent reports  CT left humerus and left forearm without IV contrast     INDICATION: Severe cellulitis      COMPARISON: Left forearm and elbow plain films from 12/30/2018      TECHNIQUE: CT examination of the left humerus and left forearm was performed  This examination, like all CT scans performed in the [facility], was performed utilizing techniques to minimize radiation dose exposure, including the use of   iterative reconstruction and automated exposure control software  Sagittal and coronal two dimensional reconstructed images were also submitted for interpretation      Rad dose  1014 mGy-cm      FINDINGS:     OSSEOUS STRUCTURES:  No fracture, dislocation or destructive osseous lesion    There are no bony erosions to suggest osteomyelitis      VISUALIZED MUSCULATURE:  There is no evidence of intramuscular edema was intramuscular gas      SOFT TISSUES:  There is subcutaneous edema over the elbow and forearm in keeping with history of cellulitis  Evaluation for abscess is limited without IV contrast, but there is no gross evidence of abscess formation      OTHER PERTINENT FINDINGS:  None      IMPRESSION:     There is subcutaneous edema over the elbow and forearm in keeping with history of cellulitis  Evaluation for abscess is limited without IV contrast, but there is no gross evidence of abscess formation  There is also no evidence of deeper infection  Portions of the record may have been created with voice recognition software

## 2018-12-31 NOTE — PROGRESS NOTES
Progress note- Shahzad Crane 1935, 81 y o  female MRN: [id number]    Unit/Bed#: [facility] 511-01 Encounter: [id number]    Primary Care Provider: Riki Gould MD   Date and time admitted to hospital: 12/30/2018 12:20 PM        Cellulitis of left upper extremity   Assessment & Plan    Improving  Continue to monitor temps, WBC count daily  Continue ceftriaxone  Check MRSA screen if negative discontinue vancomycin  Left arm elevation  Orthopedics following, CT negative for abscess  * Sepsis ([facility] [address] )   Assessment & Plan    As above     Acute renal failure superimposed on stage 3 chronic kidney disease ([facility] [address] )   Assessment & Plan    Improving but renal function is still fluctuating  Continue IV fluids  Continue to monitor  Avoid nephrotoxins     Accident due to mechanical fall without injury   Assessment & Plan    Fall precautions, PT and OT     Hypertension   Assessment & Plan    Continue to Hold lisinopril secondary to acute kidney injury  Continue to monitor       Pain of right lower extremity   Assessment & Plan    Appears to be consistent right-sided radiculopathy  Neurological intact otherwise  Continue Lyrica, when renal function stabilized possibly increase  P r n   Tylenol     Type 2 diabetes mellitus with hyperglycemia, with long-term current use of insulin [facility])   Assessment & Plan    Lab Results   Component Value Date    HGBA1C 8 5 (H) 12/20/2018       Recent Labs      12/31/18   0356  12/31/18   0544  12/31/18   0736  12/31/18   1059   POCGLU  173*  168*  151*  68       Blood Sugar Average: Last 72 hrs:  (P) 195 with severe hyperglycemia likely related to sepsis, for now keep the patient on insulin drip non DKA protocol and consult Endocrinology  Diabetic diet  Basal and prandial insulin  Endocrinology to see             VTE Pharmacologic Prophylaxis:   Pharmacologic: Heparin  Mechanical VTE Prophylaxis in Place: Yes    Patient Centered Rounds: I have performed bedside rounds with nursing staff today     Education and Discussions with Family / Patient:  Patient, plan of care    Time Spent for Care: 15 minutes  More than 50% of total time spent on counseling and coordination of care as described above  Current Length of Stay: 1 day(s)    Current Patient Status: Inpatient   Certification Statement: The patient will continue to require additional inpatient hospital stay due to IV antibiotics    Discharge Plan:  Home when stable    Code Status: Level 1 - Full Code      Subjective:   Reports continued but improved pain in the left upper extremity  Also reports constipation, and poor appetite  Reports right-sided lower back pain radiating down to the leg    Objective:     Vitals:   Temp (24hrs), Av 2 °F (36 8 °C), Min:98 °F (36 7 °C), Max:98 5 °F (36 9 °C)    Temp:  [98 °F (36 7 °C)-98 5 °F (36 9 °C)] 98 °F (36 7 °C)  HR:  [68-96] 74  Resp:  [17-20] 20  BP: (130-172)/(63-91) 159/91  SpO2:  [96 %-100 %] 97 %  Body mass index is 25 81 kg/m²  Input and Output Summary (last 24 hours): Intake/Output Summary (Last 24 hours) at 18 1146  Last data filed at 18 0648   Gross per 24 hour   Intake          1766 67 ml   Output              750 ml   Net          1016 67 ml       Physical Exam:     Physical Exam   Constitutional: She is oriented to person, place, and time  She appears well-developed and well-nourished  HENT:   Head: Normocephalic and atraumatic  Eyes: Pupils are equal, round, and reactive to light  No scleral icterus  Neck: Neck supple  No JVD present  Cardiovascular: Normal rate and regular rhythm  Pulmonary/Chest: Effort normal    Abdominal: Soft  There is no tenderness  Musculoskeletal:   Stable erythema and edema of the left upper extremity   Neurological: She is alert and oriented to person, place, and time  Skin: Skin is warm  There is erythema       Additional Data:     Labs:      Results from last 7 days  Lab Units 18  0354  18  1315   WBC Thousand/uL 13 99*  --  13 88*   HEMOGLOBIN g/dL 12 0  --  12 3   HEMATOCRIT % 36 6  --  37 6   PLATELETS Thousands/uL 253  < > 242   NEUTROS PCT %  --   --  76*   LYMPHS PCT %  --   --  12*   MONOS PCT %  --   --  8   EOS PCT %  --   --  1   < > = values in this interval not displayed  Results from last 7 days  Lab Units 12/31/18  0354  12/30/18  1315   SODIUM mmol/L 132*  < > 131*   POTASSIUM mmol/L 5 0  < > 4 6   CHLORIDE mmol/L 100  < > 97*   CO2 mmol/L 23  < > 25   BUN mg/dL 15  < > 19   CREATININE mg/dL 1 25  < > 1 55*   ANION GAP mmol/L 9  < > 9   CALCIUM mg/dL 9 1  < > 9 0   ALBUMIN g/dL  --   --  2 6*   TOTAL BILIRUBIN mg/dL  --   --  0 62   ALK PHOS U/L  --   --  135*   ALT U/L  --   --  23   AST U/L  --   --  11   GLUCOSE RANDOM mg/dL 146*  < > 426*   < > = values in this interval not displayed  Results from last 7 days  Lab Units 12/30/18  1315   INR  1 06       Results from last 7 days  Lab Units 12/31/18  1059 12/31/18  0736 12/31/18  0544 12/31/18  0356 12/31/18  0147 12/30/18  2349 12/30/18  2210 12/30/18  1909   POC GLUCOSE mg/dl 68 151* 168* 173* 74 185* 248* 493*           Results from last 7 days  Lab Units 12/30/18 2009 12/30/18  1419   LACTIC ACID mmol/L 1 4 1 4   PROCALCITONIN ng/ml 0 10 0 20           * I Have Reviewed All Lab Data Listed Above  * Additional Pertinent Lab Tests Reviewed:  All Labs Within Last 24 Hours Reviewed    Imaging:        Recent Cultures (last 7 days):           Last 24 Hours Medication List:     Current Facility-Administered Medications:  aspirin 81 mg Oral Daily Lucy Cueto MD    atorvastatin 40 mg Oral Q24H Lucy Cueto MD    brimonidine 1 drop Both Eyes BID Lucy Cueto MD    docusate sodium 100 mg Oral BID Lucy Cueto MD    dorzolamide 1 drop Right Eye BID Lucy Cueto MD    heparin (porcine) 5,000 Units Subcutaneous [facility] Lucy Cueto MD    hydrALAZINE 10 mg Intravenous Q6H PRN Jewels Jeter MD    insulin glargine 30 Units Subcutaneous HS Ruth Vega PA-C    insulin lispro 1-5 Units Subcutaneous TID AC Rogerio Calle PA-C    insulin lispro 1-5 Units Subcutaneous HS Rogerio Calle PA-C    insulin lispro 30 Units Subcutaneous Daily With Breakfast Ruth Vega PA-C    ipratropium 2 puff Inhalation 4x Daily Eugune Libman, MD    lactulose 30 g Oral Once Giselle Ng MD    lidocaine 1 patch Topical Daily Eugune Libman, MD    naloxone 0 04 mg Intravenous Q1MIN PRN Eugune Libman, MD    ondansetron 4 mg Intravenous Q4H PRN Eugune Libman, MD    oxyCODONE 2 5 mg Oral Q4H PRN Eugune Libman, MD    oxyCODONE 5 mg Oral Q4H PRN Eugune Libman, MD    pantoprazole 20 mg Oral Early Morning Eugune Libman, MD    pregabalin 100 mg Oral Daily Eugune Libman, MD    rOPINIRole 0 5 mg Oral HS Eugune Libman, MD    senna 1 tablet Oral Daily Eugune Libman, MD    sodium chloride 75 mL/hr Intravenous Continuous Eugune Libman, MD Last Rate: Stopped (12/31/18 5176)   timolol 1 drop Both Eyes BID Eugune Libman, MD    vancomycin 15 mg/kg Intravenous Q24H Eugune Libman, MD         Today, Patient Was Seen By: Jabari Coyne MD    ** Please Note: Dictation voice to text software may have been used in the creation of this document   **

## 2018-12-31 NOTE — PROGRESS NOTES
Notified JOSÉ LUIS Mcgrath Mc about morning lab results  Ordered to give home dose of 30 units of lantus now and d/c insulin gtt in an hour  Also okay to d/c continuous fluids after 250cc bolus  Pt  Stable will continue to monitor

## 2018-12-31 NOTE — ASSESSMENT & PLAN NOTE
Appears to be consistent right-sided radiculopathy  Neurological intact otherwise  Continue Lyrica, when renal function stabilized possibly increase  P r n   Tylenol

## 2018-12-31 NOTE — PLAN OF CARE
Problem: PHYSICAL THERAPY ADULT  Goal: Performs mobility at highest level of function for planned discharge setting  See evaluation for individualized goals  Treatment/Interventions: Functional transfer training, LE strengthening/ROM, Therapeutic exercise, Endurance training, Patient/family training, Equipment eval/education, Bed mobility, Gait training, Spoke to nursing, OT  Equipment Recommended: Pavan Paulino (may progress to no AD)       See flowsheet documentation for full assessment, interventions and recommendations  Prognosis: Good  Problem List: Decreased strength, Decreased endurance, Impaired balance, Decreased mobility, Pain  Assessment: Pt is 81 y o  female seen for PT evaluation s/p admit to [facility] on 12/30/2018 w/ Sepsis ([facility] [address] )  PT consulted to assess pt's functional mobility and d/c needs  Order placed for PT eval and tx, w/ up w/ A order  Comorbidities affecting pt's physical performance at time of assessment include: DM II, gait difficulty, neuropathy, DM  PTA, pt was ambulates community distances and elevations and retired  Personal factors affecting pt at time of IE include: inability to ambulate household distances, limited home support, positive fall history, inability to perform IADLs and inability to perform ADLs  Please find objective findings from PT assessment regarding body systems outlined above with impairments and limitations including weakness, decreased ROM, impaired balance, gait deviations, pain, decreased activity tolerance, decreased functional mobility tolerance and fall risk  The following objective measures performed on IE also reveal limitations: Barthel Index: 45/100  Pt's clinical presentation is currently unstable/unpredictable seen in pt's presentation of WBS, pain, abnormal labs, telemetry  Pt reported buttock pain radiating posteriorly to above knee bilaterally that has been occurring for approximately 5-6 months, which may be lumbar radiculopathy    LE strength was equal bilaterally  Pt reported slight decrease in sensation that may be associated with hx of neuropathy and unrelated to hip, buttocks, and leg pain  SLIM notified of findings  Pt demonstrated difficulty with maintaining NWB of LUE with max vc in Salv[place]  Pt to benefit from continued PT tx to address deficits as defined above and maximize level of functional independent mobility and consistency  From PT/mobility standpoint, recommendation at time of d/c would be STR vs  Home PT pending progress in order to facilitate return to PLOF  Barriers to Discharge: None     Recommendation:  (STR vs HHPT)     PT - OK to Discharge: No    See flowsheet documentation for full assessment

## 2018-12-31 NOTE — ASSESSMENT & PLAN NOTE
Improving but renal function is still fluctuating  Continue IV fluids  Continue to monitor  Avoid nephrotoxins

## 2018-12-31 NOTE — PLAN OF CARE
Problem: OCCUPATIONAL THERAPY ADULT  Goal: Performs self-care activities at highest level of function for planned discharge setting  See evaluation for individualized goals  Treatment Interventions: ADL retraining, Functional transfer training, Endurance training, Patient/family training, Equipment evaluation/education, Compensatory technique education, Energy conservation, Activityengagement          See flowsheet documentation for full assessment, interventions and recommendations  Limitation: Decreased ADL status, Decreased Safe judgement during ADL, Decreased endurance, Decreased self-care trans, Decreased high-level ADLs  Prognosis: Good  Assessment: Pt is a 81 y o  female who was admitted to [facility] on 12/30/2018 with Sepsis ([facility] [address] )  Pt presented s/p a fall  Ct of LUE and R hip (-) for acute fx  Imaging of L humerus revealed edema over the elbow and forearm w/ cellulitis  Pt is NWB on LUE, per ortho note  Pt's comorbidities include DM2, acute metabolic encephalopathy, peripheral neuropathy, retinopathy 2* DM, chronic low back pain w/ B/L sciatica, and CKD III  At baseline pt was I w/ ADLs, IADLs, and mobility, per pt  Pt is a questionable historian  According to dtr in chart review, pt uses a quad cane at home and family assists w/ IADLs  Pt lives w/ dtr in one level apt, per pt, however chart review reports that pt lives w/ dtr in a 2 SH w/ 8 HERBER  Currently pt requires mod-max A for overall ADLS and min-mod Ax1 for functional mobility/transfers  Educated pt on NWB status of LUE but pt demonstrated poor carry over t/o session  Pt currently presents with impairments in the following categories -limited home support, difficulty performing ADLS, difficulty performing IADLS, limited insight into deficits, activity tolerance, endurance, standing balance/tolerance, sitting balance/tolerance, memory, insight, safety  and judgement    These impairments, as well as pt's fatigue, pain, WBS , decreased caregiver support and risk for falls  limit pt's ability to safely engage in all baseline areas of occupation, including grooming, bathing, dressing, toileting, functional mobility/transfers and leisure activities  From OT standpoint, recommend inpatient rehab vs home w/ home OT pending progress upon D/C  OT will continue to follow to address the below stated goals        OT Discharge Recommendation: Other (Comment) (STR vs home pending progress)

## 2018-12-31 NOTE — UTILIZATION REVIEW
Initial Clinical Review    Admission: Date/Time/Statement: 12/30/18 AT 1624 ADMIT INPATIENT TO LEVEL 2 STEPDOWN    Orders Placed This Encounter   Procedures    Inpatient Admission (expected length of stay for this patient is greater than two midnights)     Standing Status:   Standing     Number of Occurrences:   1     Order Specific Question:   Admitting Physician     Answer:   Chance Husain [[id number]]     Order Specific Question:   Level of Care     Answer:   Med Surg [[id number]]     Order Specific Question:   Estimated length of stay     Answer:   More than 2 Midnights     Order Specific Question:   Certification     Answer:   I certify that inpatient services are medically necessary for this patient for a duration of greater than two midnights  See H&P and MD Progress Notes for additional information about the patient's course of treatment  ED: Date/Time/Mode of Arrival:   ED Arrival Information     Expected Arrival Acuity Means of Arrival Escorted By Service Admission Type    - 12/30/2018 12:10 Urgent Walk-In Other Hospitalist Urgent    Arrival Complaint    right leg pain          Chief Complaint:   Chief Complaint   Patient presents with    Leg Pain     Pt with hx of hip pain woke up with increased pain in left hip  Pt usually takes pain meds for pain but states she stopped because they make her sick  Pt states she fell about 1 week ago on left side  Pt also has swollen and painful left arm  Chief Complaint:   Unable to obtain, patient currently in pain     History of Present Illness:   Aisha Zamarripa is a 81 y o  female who presents with right leg pain  History difficult to obtain, patient speaks only Ch[place] in currently she is in pain  History is obtained by grandson over the phone  Patient is history of diabetes mellitus, hypertension, COPD, glaucoma, diabetic retinopathy in came to the hospital today for evaluation of right hip pain after mechanical fall at home    In the ER she was found to have right hip pain with associated left upper extremity swelling, erythema, tenderness in keeping with cellulitis  As far as the family is aware patient had no trauma to the left upper extremity, no previous skin lesions  Unclear if fever or chills at home  No other history can be obtained even with cooperation of the family         Review of Systems:   Unable to perform ROS: Acuity of condition       Physical Exam   Constitutional: She appears well-developed  In pain    Musculoskeletal: She exhibits edema (Edema, erythema and warmth involving the left upper extremity from the wrist to about 1 hospice inches below the axilla, post him the rated involved area is in the posterior portion of the elbow)  Neurological: She is alert  Difficult to assess, patient is in pain   Skin: There is erythema         ED Vital Signs:   ED Triage Vitals   Temperature Pulse Respirations Blood Pressure SpO2   18 1219 18 1218 18 1218 18 1218 18 1218   98 1 °F (36 7 °C) 95 18 (!) 172/79 96 %      Temp Source Heart Rate Source Patient Position - Orthostatic VS BP Location FiO2 (%)   18 1219 18 1455 18 1455 18 1455 --   Oral Monitor Lying Right arm       Pain Score       18 1455       No Pain        Wt Readings from Last 1 Encounters:   18 66 1 kg (145 lb 11 6 oz)     Vitals:   Temp (24hrs), Av 2 °F (36 8 °C), Min:98 °F (36 7 °C), Max:98 5 °F (36 9 °C)   Temp:  [98 °F (36 7 °C)-98 5 °F (36 9 °C)] 98 °F (36 7 °C)  HR:  [68-96] 74  Resp:  [17-20] 20  BP: (130-172)/(63-91) 159/91  SpO2:  [96 %-100 %] 97 %  Body mass index is 25 81 kg/m²         Input and Output Summary (last 24 hours):   Last data filed at 18 1146    Gross per 24 hour   Intake          1766 67 ml   Output              750 ml   Net          1016 67 ml             LABS/Diagnostic Test Results:    Results from last 7 days  Lab Units 18  0354   18  1315   WBC Thousand/uL 13 99*  --  13 88* HEMOGLOBIN g/dL 12 0  --  12 3   HEMATOCRIT % 36 6  --  37 6   PLATELETS Thousands/uL 253  < > 242   NEUTROS PCT %  --   --  76*   LYMPHS PCT %  --   --  12*   MONOS PCT %  --   --  8   EOS PCT %  --   --  1      Results from last 7 days  Lab Units 12/31/18  0354   12/30/18  1315   SODIUM mmol/L 132*  < > 131*   POTASSIUM mmol/L 5 0  < > 4 6   CHLORIDE mmol/L 100  < > 97*   CO2 mmol/L 23  < > 25   BUN mg/dL 15  < > 19   CREATININE mg/dL 1 25  < > 1 55*   ANION GAP mmol/L 9  < > 9   CALCIUM mg/dL 9 1  < > 9 0   ALBUMIN g/dL  --   --  2 6*   TOTAL BILIRUBIN mg/dL  --   --  0 62   ALK PHOS U/L  --   --  135*   ALT U/L  --   --  23   AST U/L  --   --  11   GLUCOSE RANDOM mg/dL 146*  < > 426*      Magnesium [[id number]] (Abnormal) Collected: 12/30/18 2009   Lab Status: Final result Specimen: Blood from Arm, Right Updated: 12/30/18 2111    Magnesium 1 1 (L) 1 6 - 2 6 mg/dL    Phosphorus [[id number]] (Abnormal) Collected: 12/30/18 2009   Lab Status: Final result Specimen: Blood from Arm, Right Updated: 12/30/18 2111    Phosphorus 1 7 (L) 2 3 - 4 1 mg/dL        Results from last 7 days  Lab Units 12/30/18  1315   INR   1 06     UA w Reflex to Microscopic w Reflex to Culture [[id number]] (Abnormal) Collected: 12/30/18 1715   Lab Status: Final result Specimen: Urine from Urine, Other Updated: 12/30/18 1738    Color, UA Yellow    Clarity, UA Clear    Specific Gravity, UA 1 017 1 003 - 1 030     pH, UA 6 0 4 5 - 8 0     Leukocytes, UA      Elevated glucose may cause decreased leukocyte values   See urine microscopic for [facility] result/ (A)    Ref Range: Negative    Nitrite, UA Negative Negative     Protein, UA Trace (A) Negative mg/dl     Glucose, UA >=1000 (1%) (A) Negative mg/dl     Ketones, UA Negative Negative mg/dl     Urobilinogen, UA 1 0 0 2, 1 0 E U /dl E U /dl     Bilirubin, UA Negative Negative     Blood, UA Negative Negative    Urine Microscopic [[id number]] (Normal) Collected: 12/30/18 7391   Lab Status: Final result Specimen: Urine from Urine, Other Updated: 12/30/18 1740    RBC, UA None Seen None Seen, 0-5 /hpf     WBC, UA None Seen None Seen, 0-5, 5-55, 5-65 /hpf     Epithelial Cells None Seen None Seen, Occasional /hpf     Bacteria, UA Occasional None Seen, Occasional /hpf     Hyaline Casts, UA None Seen None Seen /lpf        BLOOD CULTURES X 2 PENDING      CT LEFT HUMERUS -  There is subcutaneous edema over the elbow and forearm in keeping with history of cellulitis   Evaluation for abscess is limited without IV contrast, but there is no gross evidence of abscess formation   There is also no evidence of deeper infection  ED Treatment:   Medication Administration from 12/30/2018 1210 to 12/30/2018 1859       Date/Time Order Dose Route Action Action by Comments     12/30/2018 1548 ceftriaxone (ROCEPHIN) 1 g/50 mL in dextrose IVPB 0 mg Intravenous Stopped Sita Lopez RN      12/30/2018 1451 ceftriaxone (ROCEPHIN) 1 g/50 mL in dextrose IVPB 1,000 mg Intravenous [address] Sita Lopez RN      12/30/2018 1712 vancomycin (VANCOCIN) IVPB (premix) 1,000 mg 0 mg/kg Intravenous Stopped Sita Lopez RN      12/30/2018 1548 vancomycin (VANCOCIN) IVPB (premix) 1,000 mg 1,000 mg Intravenous [address] Sita Lopez RN      12/30/2018 1700 vancomycin (VANCOCIN) IVPB (premix) 1,000 mg 0 mg Intravenous Hold Sita Lpoez RN Patient just finished Vanco     12/30/2018 1800 ipratropium (ATROVENT HFA) inhaler 2 puff 2 puff Inhalation Not Given Digna Cardenas RN      12/30/2018 1713 sodium chloride 0 9 % infusion 75 mL/hr Intravenous New Bag Sita Lopez RN           Past Medical/Surgical History:    Active Ambulatory Problems     Diagnosis Date Noted    Acute metabolic encephalopathy 51/89/3087    Hypertension 12/20/2017    Shoulder joint pain 08/19/2014    Gastritis without bleeding 08/07/2018    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 ([facility] [address] ) 56/53/5222    Preop examination 08/13/2018    Gait difficulty 08/13/2018    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 ([facility] ) 08/14/2018    Chronic bilateral low back pain with bilateral sciatica 08/14/2018     Resolved Ambulatory Problems     Diagnosis Date Noted    Hypoglycemia 12/20/2017    [facility] ([facility] ) 12/20/2017     Past Medical History:   Diagnosis Date    [facility]chronic obstructive [facility] disease) [facility]     Diabetes mellitus ([facility] )     Hyperlipidemia     Hyperlipidemia     Hypertension     Kidney stones     Osteoporosis        Admitting Diagnosis: Leg pain [M79 606]  Left arm cellulitis [L03 114]  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 ([place] [address] ) [O44 3702, H54 7]    Age/Sex: 81 y o  female      Assessment/Plan:  Type 2 diabetes mellitus with hyperglycemia, with long-term current use of insulin ([facility])   Assessment & Plan             Lab Results   Component Value Date     HGBA1C 8 5 (H) 12/20/2018       Blood Sugar Average: Last 72 hrs:   with severe hyperglycemia likely related to sepsis, for now keep the patient on insulin drip non DKA protocol and consult Endocrinology  Carb controlled diet      Accident due to mechanical fall without injury   Assessment & Plan     Fall precautions, PT and OT      Cellulitis of left upper extremity   Assessment & Plan     Please refer to sepsis      Acute renal failure superimposed on stage 3 chronic kidney disease ([facility] )   Assessment & Plan     Likely related to hypovolemia secondary to sepsis together with hyperglycemia  Normal saline 75 cc an hour  BMP in the morning  Hold lisinopril      Pain of right lower extremity   Assessment & Plan     Status post what seems to be mechanical fall at home    X-ray done in the emergency department without any clear fracture  Fall precautions, PT- OT  Consult orthopedic  Patient reported multiple intolerance to opiates, prescribed oxycodone 2 5 and 5 mg based upon pain scale and premedicated with Zofran  Consult geriatrician for pain control      Hypertension   Assessment & Plan     Hold lisinopril secondary to acute kidney injury  For now will monitor only  Could add hydralazine p r n  If blood pressure remains elevated or consider to switch to p o  Amlodipine      * Sepsis ([facility] [address] )   Assessment & Plan     Present on admission, tachycardia and elevated white count with extensive left upper extremity cellulitis involving the elbow  Clear induration at the posterior part of the elbow with associated hematoma?   The cellulitis involves the area going from the  to about an inch and a how for below the axilla  Obtain CT scan of left upper extremity  Consult orthopedic  For now continue with vancomycin, if no improvement in the morning consider to consult Infectious Disease or consider Infectious Disease consult if deep tissue infection on the CT scan  Elevate the arm for now  Monitor clinically  Vascular Doppler to rule out DVT          VTE Prophylaxis: Heparin  / sequential compression device     Code Status: full code  Anticipated Length of Stay:  Patient will be admitted on an Inpatient basis with an anticipated length of stay of  greater than 2 midnights         Current Patient Status: Inpatient   Certification Statement: The patient will continue to require additional inpatient hospital stay due to IV antibiotics        Admission Orders:  12/30/18 AT 1624 ADMIT INPATIENT TO LEVEL 2 STEPDOWN  VS Q4HRS    FALL PRECAUTIONS    Elevate LUE  Up with Assistance  SCD    Diet Van/CHO Controlled; Consistent Carbohydrate Diet Level 2 (5 carb servings/75 grams CHO/meal)    Continuous IV Infusions:   sodium chloride 75 mL/hr Last Rate: Stopped (12/31/18 9511)       Scheduled Meds:   Current Facility-Administered Medications:  aspirin 81 mg Oral Daily Dru Pollard MD    atorvastatin 40 mg Oral Q24H Dru Pollard MD    brimonidine 1 drop Both Eyes BID Dru Pollard MD    docusate sodium 100 mg Oral BID Eugune Libman, MD    dorzolamide 1 drop Right Eye BID Eugune Libman, MD    heparin (porcine) 5,000 Units Subcutaneous [facility] Eugune Libman, MD    hydrALAZINE 10 mg Intravenous Q6H PRN Giselle Ng MD    [START ON 1/1/2019] insulin glargine 20 Units Subcutaneous QAM Keanu Keys MD    insulin lispro 1-5 Units Subcutaneous TID AC Rogerio Calle PA-C    insulin lispro 1-5 Units Subcutaneous HS Rogerio Calle PA-C    insulin lispro 5 Units Subcutaneous TID With Meals Keanu Keys MD    ipratropium 2 puff Inhalation 4x Daily Eugune Libman, MD    lidocaine 1 patch Topical Daily Eugune Libman, MD    naloxone 0 04 mg Intravenous Q1MIN PRN Eugune Libman, MD    ondansetron 4 mg Intravenous Q4H PRN Eugune Libman, MD    oxyCODONE 2 5 mg Oral Q4H PRN Eugune Libman, MD    oxyCODONE 5 mg Oral Q4H PRN Eugune Libman, MD    pantoprazole 20 mg Oral Early Morning Eugune Libman, MD    polyethylene glycol 17 g Oral BID AC Giselle Ng MD    pregabalin 100 mg Oral Daily Eugune Libman, MD    rOPINIRole 0 5 mg Oral HS Eugune Libman, MD    senna 1 tablet Oral Daily Eugune Libman, MD    sodium chloride 75 mL/hr Intravenous Continuous Eugune Libman, MD Last Rate: Stopped (12/31/18 5149)   timolol 1 drop Both Eyes BID Eugune Libman, MD    vancomycin 15 mg/kg Intravenous Q24H Eugune Libman, MD        PRN Meds:      hydrALAZINE    naloxone    ondansetron    oxyCODONE      CONSULT ENDOCRINE    CONSULT GERIATRIC MEDICINE

## 2018-12-31 NOTE — H&P
Progress note- Carolyn Hardin 1935, 81 y o  female MRN: [id number]    Unit/Bed#: [facility] 511-01 Encounter: [id number]    Primary Care Provider: Bri Mccracken MD   Date and time admitted to hospital: 12/30/2018 12:20 PM        Cellulitis of left upper extremity   Assessment & Plan    Improving  Continue to monitor temps, WBC count daily  Continue ceftriaxone  Check MRSA screen if negative discontinue vancomycin  Left arm elevation  Orthopedics following, CT negative for abscess  * Sepsis ([facility] [address] )   Assessment & Plan    As above     Acute renal failure superimposed on stage 3 chronic kidney disease ([facility] [address] )   Assessment & Plan    Improving but renal function is still fluctuating  Continue IV fluids  Continue to monitor  Avoid nephrotoxins     Accident due to mechanical fall without injury   Assessment & Plan    Fall precautions, PT and OT     Hypertension   Assessment & Plan    Continue to Hold lisinopril secondary to acute kidney injury  Continue to monitor       Pain of right lower extremity   Assessment & Plan    Appears to be consistent right-sided radiculopathy  Neurological intact otherwise  Continue Lyrica, when renal function stabilized possibly increase  P r n   Tylenol     Type 2 diabetes mellitus with hyperglycemia, with long-term current use of insulin [facility])   Assessment & Plan    Lab Results   Component Value Date    HGBA1C 8 5 (H) 12/20/2018       Recent Labs      12/31/18   0356  12/31/18   0544  12/31/18   0736  12/31/18   1059   POCGLU  173*  168*  151*  68       Blood Sugar Average: Last 72 hrs:  (P) 195 with severe hyperglycemia likely related to sepsis, for now keep the patient on insulin drip non DKA protocol and consult Endocrinology  Diabetic diet  Basal and prandial insulin  Endocrinology to see             VTE Pharmacologic Prophylaxis:   Pharmacologic: Heparin  Mechanical VTE Prophylaxis in Place: Yes    Patient Centered Rounds: I have performed bedside rounds with nursing staff today     Education and Discussions with Family / Patient:  Patient, plan of care    Time Spent for Care: 15 minutes  More than 50% of total time spent on counseling and coordination of care as described above  Current Length of Stay: 1 day(s)    Current Patient Status: Inpatient   Certification Statement: The patient will continue to require additional inpatient hospital stay due to IV antibiotics    Discharge Plan:  Home when stable    Code Status: Level 1 - Full Code      Subjective:   Reports continued but improved pain in the left upper extremity  Also reports constipation, and poor appetite  Reports right-sided lower back pain radiating down to the leg    Objective:     Vitals:   Temp (24hrs), Av 2 °F (36 8 °C), Min:98 °F (36 7 °C), Max:98 5 °F (36 9 °C)    Temp:  [98 °F (36 7 °C)-98 5 °F (36 9 °C)] 98 °F (36 7 °C)  HR:  [68-96] 74  Resp:  [17-20] 20  BP: (130-172)/(63-91) 159/91  SpO2:  [96 %-100 %] 97 %  Body mass index is 25 81 kg/m²  Input and Output Summary (last 24 hours): Intake/Output Summary (Last 24 hours) at 18 1146  Last data filed at 18 0648   Gross per 24 hour   Intake          1766 67 ml   Output              750 ml   Net          1016 67 ml       Physical Exam:     Physical Exam   Constitutional: She is oriented to person, place, and time  She appears well-developed and well-nourished  HENT:   Head: Normocephalic and atraumatic  Eyes: Pupils are equal, round, and reactive to light  No scleral icterus  Neck: Neck supple  No JVD present  Cardiovascular: Normal rate and regular rhythm  Pulmonary/Chest: Effort normal    Abdominal: Soft  There is no tenderness  Musculoskeletal:   Stable erythema and edema of the left upper extremity   Neurological: She is alert and oriented to person, place, and time  Skin: Skin is warm  There is erythema       Additional Data:     Labs:      Results from last 7 days  Lab Units 18  0354  18  1315   WBC Thousand/uL 13 99*  --  13 88*   HEMOGLOBIN g/dL 12 0  --  12 3   HEMATOCRIT % 36 6  --  37 6   PLATELETS Thousands/uL 253  < > 242   NEUTROS PCT %  --   --  76*   LYMPHS PCT %  --   --  12*   MONOS PCT %  --   --  8   EOS PCT %  --   --  1   < > = values in this interval not displayed  Results from last 7 days  Lab Units 12/31/18  0354  12/30/18  1315   SODIUM mmol/L 132*  < > 131*   POTASSIUM mmol/L 5 0  < > 4 6   CHLORIDE mmol/L 100  < > 97*   CO2 mmol/L 23  < > 25   BUN mg/dL 15  < > 19   CREATININE mg/dL 1 25  < > 1 55*   ANION GAP mmol/L 9  < > 9   CALCIUM mg/dL 9 1  < > 9 0   ALBUMIN g/dL  --   --  2 6*   TOTAL BILIRUBIN mg/dL  --   --  0 62   ALK PHOS U/L  --   --  135*   ALT U/L  --   --  23   AST U/L  --   --  11   GLUCOSE RANDOM mg/dL 146*  < > 426*   < > = values in this interval not displayed  Results from last 7 days  Lab Units 12/30/18  1315   INR  1 06       Results from last 7 days  Lab Units 12/31/18  1059 12/31/18  0736 12/31/18  0544 12/31/18  0356 12/31/18  0147 12/30/18  2349 12/30/18  2210 12/30/18  1909   POC GLUCOSE mg/dl 68 151* 168* 173* 74 185* 248* 493*           Results from last 7 days  Lab Units 12/30/18 2009 12/30/18  1419   LACTIC ACID mmol/L 1 4 1 4   PROCALCITONIN ng/ml 0 10 0 20           * I Have Reviewed All Lab Data Listed Above  * Additional Pertinent Lab Tests Reviewed:  All Labs Within Last 24 Hours Reviewed    Imaging:        Recent Cultures (last 7 days):           Last 24 Hours Medication List:     Current Facility-Administered Medications:  aspirin 81 mg Oral Daily Cesario Manzo MD    atorvastatin 40 mg Oral Q24H Cesario Manzo MD    brimonidine 1 drop Both Eyes BID Cesario Manzo MD    docusate sodium 100 mg Oral BID Cesario Manzo MD    dorzolamide 1 drop Right Eye BID Cesario Manzo MD    heparin (porcine) 5,000 Units Subcutaneous [facility] Cesario Manzo MD    hydrALAZINE 10 mg Intravenous Q6H PRN Echols MD Nima    insulin glargine 30 Units Subcutaneous HS Bandar Milligan PA-C    insulin lispro 1-5 Units Subcutaneous TID AC Rogerio Calle PA-C    insulin lispro 1-5 Units Subcutaneous HS Rogerio Calle PA-C    insulin lispro 30 Units Subcutaneous Daily With Breakfast Bandar Milligan PA-C    ipratropium 2 puff Inhalation 4x Daily Elis De Paz MD    lactulose 30 g Oral Once Karlos Sage MD    lidocaine 1 patch Topical Daily Elis De Paz MD    naloxone 0 04 mg Intravenous Q1MIN PRN Elis De Paz MD    ondansetron 4 mg Intravenous Q4H PRN Elis De Paz MD    oxyCODONE 2 5 mg Oral Q4H PRN Elis De Paz MD    oxyCODONE 5 mg Oral Q4H PRN Elis De Paz MD    pantoprazole 20 mg Oral Early Morning Elis De Paz MD    pregabalin 100 mg Oral Daily Elis De Paz MD    rOPINIRole 0 5 mg Oral HS Elis De Paz MD    senna 1 tablet Oral Daily Elis De Paz MD    sodium chloride 75 mL/hr Intravenous Continuous Elis De Paz MD Last Rate: Stopped (12/31/18 4085)   timolol 1 drop Both Eyes BID Elis De Paz MD    vancomycin 15 mg/kg Intravenous Q24H Elis De Paz MD         Today, Patient Was Seen By: Maria Eugenia Grimm MD    ** Please Note: Dictation voice to text software may have been used in the creation of this document   **

## 2018-12-31 NOTE — PROGRESS NOTES
Progress Note - Orthopedics   Jordan Oms 81 y o  female MRN: [id number]  Unit/Bed#: [facility] 511-01      Subjective:    81 y  o female with left arm cellulitis and infection  Continues to complain of pain and is irritable this morning      Labs:    0  Lab Value Date/Time   HCT 36 6 12/31/2018 0354   HCT 37 6 12/30/2018 1315   HCT 44 7 12/20/2018 1352   HCT 40 8 04/18/2018 1844   HCT 42 6 03/04/2014 0600   HCT 43 5 03/03/2014 2005   HGB 12 0 12/31/2018 0354   HGB 12 3 12/30/2018 1315   HGB 14 3 12/20/2018 1352   HGB 13 4 04/18/2018 1844   HGB 14 3 03/04/2014 0600   HGB 14 9 03/03/2014 2005   INR 1 06 12/30/2018 1315   INR 1 03 03/03/2014 2005   WBC 13 99 (H) 12/31/2018 0354   WBC 13 88 (H) 12/30/2018 1315   WBC 10 80 12/20/2018 1352   WBC 9 1 04/18/2018 1844   WBC 10 08 03/04/2014 0600   WBC 11 86 (H) 03/03/2014 2005   ESR 33 (H) 07/08/2018 1403       Meds:    Current Facility-Administered Medications:     aspirin chewable tablet 81 mg, 81 mg, Oral, Daily, Lucy Cueto MD    atorvastatin (LIPITOR) tablet 40 mg, 40 mg, Oral, Q24H, Lucy Cueto MD    brimonidine (ALPHAGAN P) 0 15 % ophthalmic solution 1 drop, 1 drop, Both Eyes, BID, Lucy Cueto MD, 1 drop at 12/30/18 2113    docusate sodium (COLACE) capsule 100 mg, 100 mg, Oral, BID, Lucy Cueto MD, 100 mg at 12/30/18 2113    dorzolamide (TRUSOPT) ophthalmic solution 1 drop, 1 drop, Right Eye, BID, Lucy Cueto MD    heparin (porcine) subcutaneous injection 5,000 Units, 5,000 Units, Subcutaneous, Q8H [address], 5,000 Units at 12/31/18 0528 **AND** Platelet count, , , Once, Lucy Cueto MD    insulin glargine (LANTUS) subcutaneous injection 30 Units 0 3 mL, 30 Units, Subcutaneous, HS, Ethan Gomez PA-C, 30 Units at 12/31/18 0528    insulin lispro (HumaLOG) 100 units/mL subcutaneous injection 1-5 Units, 1-5 Units, Subcutaneous, TID AC **AND** Fingerstick Glucose (POCT), , , TID AC, Rogerio Calle PA-C    insulin lispro (HumaLOG) 100 units/mL subcutaneous injection 1-5 Units, 1-5 Units, Subcutaneous, HS, Rogerio Calle PA-C    insulin lispro (HumaLOG) 100 units/mL subcutaneous injection 30 Units, 30 Units, Subcutaneous, Daily With Breakfast, Ayleen Calle PA-C    insulin regular (HumuLIN R,NovoLIN R) 1 Units/mL in sodium chloride 0 9 % 100 mL infusion, 0 3-21 Units/hr, Intravenous, Titrated, Claudine Humphrey MD, Stopped at 12/31/18 6211    ipratropium (ATROVENT HFA) inhaler 2 puff, 2 puff, Inhalation, 4x Daily, Claudine Humphrey MD    lidocaine (LIDODERM) 5 % patch 1 patch, 1 patch, Topical, Daily, Claudine Humphrey MD    naloxone (NARCAN) 0 04 mg/mL syringe 0 04 mg, 0 04 mg, Intravenous, Q1MIN PRN, Claudine Humphrey MD    ondansetron [facility]) injection 4 mg, 4 mg, Intravenous, Q4H PRN, Claudine Humphrey MD    oxyCODONE (ROXICODONE) IR tablet 2 5 mg, 2 5 mg, Oral, Q4H PRN, Claudine Humphrey MD    oxyCODONE (ROXICODONE) IR tablet 5 mg, 5 mg, Oral, Q4H PRN, Claudine Humphrey MD    pantoprazole (PROTONIX) EC tablet 20 mg, 20 mg, Oral, Early Morning, Claudine Humphrey MD, 20 mg at 12/31/18 2583    pregabalin (LYRICA) capsule 100 mg, 100 mg, Oral, Daily, Claudine Humphrey MD    rOPINIRole (REQUIP) tablet 0 5 mg, 0 5 mg, Oral, HS, Claudine Humphrey MD, 0 5 mg at 12/30/18 2114    senna (SENOKOT) tablet 8 6 mg, 1 tablet, Oral, Daily, Claudine Humphrey MD    sodium chloride 0 9 % infusion, 75 mL/hr, Intravenous, Continuous, Claudine Humphrey MD, Stopped at 12/31/18 5453    timolol (TIMOPTIC) 0 5 % ophthalmic solution 1 drop, 1 drop, Both Eyes, BID, Claudine Humphrey MD, 1 drop at 12/30/18 2114    vancomycin (VANCOCIN) IVPB (premix) 1,000 mg, 15 mg/kg, Intravenous, Q24H, Claudine Humphrey MD    Blood Culture:   Lab Results   Component Value Date    BLOODCX No Growth After 5 Days  12/20/2017       Wound Culture:   No results found for: WOUNDCULT    Ins and Outs:  I/O last 24 hours: In: 1766 7 [P O :80; I V :936 7;  IV Piggyback:750]  Out: 750 [Urine:750]          Physical:  Vitals:    12/31/18 0756   BP: 159/91   Pulse: 74   Resp: 20   Temp: 98 °F (36 7 °C)   SpO2: 97%     Musculoskeletal: left Upper Extremity  · Skin intact with erythema from wrist to upper arm distal to shoulder  · TTP mostly over olecranon with no palpable fluid collection    Assessment:    81 y  o female with left arm cellulitis     Plan:  · NWB LUE  · Continue antibiotics  · Discuss CT scan with team  · PT/OT  · Pain control  · Dispo: Ortho will follow    Deshawn Ruiz MD

## 2018-12-31 NOTE — DISCHARGE INSTRUCTIONS
Discharge Instructions - John Trevino 81 y o  female MRN: [id number]  Unit/Bed#: [facility] 511-01    Weight Bearing Status:                                           As tolerated to left arm    Pain:  Continue analgesics as directed    PT/OT:  Continue PT/OT on outpatient basis as directed    Appt Instructions: If you do not have your appointment, please call the clinic at [phone] to f/u with Dr Sarai Nayak as needed    Contact the office sooner if you experience any increased numbness/tingling in the extremities  Miscellaneous:        Wound care instructions  Remove packing daily from elbow  Irrigate with Normal saline  Repack with 1/2 inch plain packing gauze    Cover with dry dressing

## 2018-12-31 NOTE — PROGRESS NOTES
Vancomycin Assessment    Cyndee Mcclure is a 81 y o  female who is currently receiving vancomycin 750 mg q24h for skin-soft tissue infection  Relevant clinical data and objective history reviewed:  Creatinine   Date Value Ref Range Status   12/31/2018 1 25 0 60 - 1 30 mg/dL Final     Comment:     Standardized to IDMS reference method   12/30/2018 0 86 0 60 - 1 30 mg/dL Final     Comment:     Standardized to IDMS reference method   12/30/2018 1 55 (H) 0 60 - 1 30 mg/dL Final     Comment:     Standardized to IDMS reference method   03/04/2014 1 18 0 60 - 1 30 mg/dL Final     Comment:     Standardized to IDMS reference method   03/03/2014 1 23 0 60 - 1 30 mg/dL Final     Comment:     Standardized to IDMS reference method     /72   Pulse 68   Temp 98 5 °F (36 9 °C)   Resp 18   Ht 5' 3" (1 6 m)   Wt 66 1 kg (145 lb 11 6 oz)   LMP  (LMP Unknown)   SpO2 97%   BMI 25 81 kg/m²   I/O last 3 completed shifts: In: 1766 7 [P O :80; I V :936 7; IV Piggyback:750]  Out: 750 [Urine:750]  Lab Results   Component Value Date/Time    BUN 15 12/31/2018 03:54 AM    BUN 28 (H) 06/19/2018 10:59 AM    WBC 13 99 (H) 12/31/2018 03:54 AM    WBC 9 1 04/18/2018 06:44 PM    HGB 12 0 12/31/2018 03:54 AM    HGB 13 4 04/18/2018 06:44 PM    HCT 36 6 12/31/2018 03:54 AM    HCT 40 8 04/18/2018 06:44 PM    MCV 94 12/31/2018 03:54 AM    MCV 94 04/18/2018 06:44 PM     12/31/2018 03:54 AM     04/18/2018 06:44 PM     Temp Readings from Last 3 Encounters:   12/31/18 98 5 °F (36 9 °C)   12/20/18 98 °F (36 7 °C) (Oral)   11/13/18 98 2 °F (36 8 °C) (Oral)     Vancomycin Days of Therapy: 2    Assessment/Plan  The patient is currently on vancomycin utilizing scheduled dosing  Baseline risks associated with therapy include: pre-existing renal impairment and advanced age  The dose was originally changed to 750 mg q24h but the patient did not receive any doses besides the 1000 mg on 12/30   Dose looks like it was adjusted due to creatinine of 1 55 yesterday which has since gone down to 1 25 (~1 2 appears to be the patient's baseline creatinine)  The goal trough is 15-20 (appropriate for most indications) ; therefore, after clinical evaluation dose will be changed back to 1000 mg q24h  Pharmacy will continue to follow closely for s/sx of nephrotoxicity, infusion reactions and appropriateness of therapy  BMP and CBC will be ordered per protocol  Plan for trough as patient approaches steady state, prior to the 4th  dose which will still be at approximately 1500 on 1/2/19  Trough order was not changed from the original one entered  Pharmacy will continue to follow the patients culture results and clinical progress daily      Jenifer Spatz, Pharmacist

## 2018-12-31 NOTE — ASSESSMENT & PLAN NOTE
Improving  Continue to monitor temps, WBC count daily  Continue cefazolin for now  Check MRSA screen if negative discontinue vancomycin  Left arm elevation    Patient has developed a abscess on the left arm now  Acute surgery is consulted, she might need drainage, await wound cultures  ID will be consulted

## 2018-12-31 NOTE — PHYSICIAN ADVISOR
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 ([facility] [address] ) [R25 4213, H54 7]       There is documentation in the medical record of an expected length of stay of at least 2 midnights  The patient is therefore expected to satisfy the 2 midnight benchmark and given the 2 midnight presumption is appropriate for INPATIENT ADMISSION  Given this expectation of a satisfying stay, CMS instructs us that the patient is most often appropriate for inpatient admission under part A provided medical necessity is documented in the chart  After review of the relevant documentation, labs, vital signs and test results, the patient is appropriate for INPATIENT ADMISSION  Admission to the hospital as an inpatient is a complex decision making process which requires the practitioner to consider the patients presenting complaint, history and physical examination and all relevant testing  With this in mind, in this case, the patient was deemed appropriate for INPATIENT ADMISSION  After review of the documentation and testing available at the time of the admission I concur with this clinical determination of medical necessity  Rationale is as follows: The patient is a 81 yrs old Female who presented to the ED at 12/30/2018 12:20 PM with a chief complaint of Leg Pain (Pt with hx of hip pain woke up with increased pain in left hip  Pt usually takes pain meds for pain but states she stopped because they make her sick  Pt states she fell about 1 week ago on left side   Pt also has swollen and painful left arm )     Given the need for further hospitalization, and along with the documentation of medical necessity present in the chart, the patient is appropriate for inpatient admission  The patient is expected to satisfy the 2 midnight benchmark, and will require further acute medical care  The patient does have comorbid conditions which increases the risk for significant adverse outcome  Given this the patient is appropriate for inpatient admission        The patients vitals on arrival were ED Triage Vitals   Temperature Pulse Respirations Blood Pressure SpO2   12/30/18 1219 12/30/18 1218 12/30/18 1218 12/30/18 1218 12/30/18 1218   98 1 °F (36 7 °C) 95 18 (!) 172/79 96 %      Temp Source Heart Rate Source Patient Position - Orthostatic VS BP Location FiO2 (%)   12/30/18 1219 12/30/18 1455 12/30/18 1455 12/30/18 1455 --   Oral Monitor Lying Right arm       Pain Score       12/30/18 1455       No Pain           Past Medical History:   Diagnosis Date    COPD (chronic obstructive pulmonary disease) ([facility] [address] )     Diabetes mellitus ([facility] [address] )     Hyperlipidemia     Hyperlipidemia     Hypertension     Kidney stones     Osteoporosis      Past Surgical History:   Procedure Laterality Date    APPENDECTOMY      HAND SURGERY Right     LITHOTRIPSY      MASS EXCISION      remova of back wall mass    TONSILLECTOMY      TONSILLECTOMY             Consults have been placed to:   IP CONSULT TO GERONTOLOGY  IP CONSULT TO ENDOCRINOLOGY  IP CONSULT TO ORTHOPEDIC SURGERY  IP CONSULT TO CASE MANAGEMENT  IP CONSULT TO PHARMACY    Vitals:    12/31/18 0547 12/31/18 0756 12/31/18 1100 12/31/18 1500   BP:  159/91 122/78 145/86   BP Location:    Right arm   Pulse:  74  77   Resp:  20  16   Temp:  98 °F (36 7 °C)  98 5 °F (36 9 °C)   TempSrc:  Oral  Oral   SpO2:  97%  96%   Weight: 66 1 kg (145 lb 11 6 oz)      Height:           Most recent labs:    Recent Labs      12/30/18   1315   12/31/18   0354  12/31/18   1156   WBC  13 88*   --   13 99*   --    HGB  12 3   --   12 0   --    HCT  37 6   --   36 6   --    PLT  242   < >  253   --    K  4 6   < > 5 0  4 3   CALCIUM  9 0   < >  9 1  8 7   BUN  19   < >  15  13   CREATININE  1 55*   < >  1 25  1 38*   INR  1 06   --    --    --    AST  11   --    --    --    ALT  23   --    --    --    ALKPHOS  135*   --    --    --     < > = values in this interval not displayed         Scheduled Meds:  Current Facility-Administered Medications:  aspirin 81 mg Oral Daily Elliot Cerrato MD    atorvastatin 40 mg Oral Q24H Elliot Cerrato MD    brimonidine 1 drop Both Eyes BID Elliot Cerrato MD    docusate sodium 100 mg Oral BID Elliot Cerrato MD    dorzolamide 1 drop Right Eye BID Elliot Cerrato MD    heparin (porcine) 5,000 Units Subcutaneous [facility] Elliot Cerrato MD    hydrALAZINE 10 mg Intravenous Q6H PRN Osvaldo Butler MD    [START ON 1/1/2019] insulin glargine 20 Units Subcutaneous QAM Taylor Quijano MD    insulin lispro 1-5 Units Subcutaneous TID AC Rogerio Calle PA-C    insulin lispro 1-5 Units Subcutaneous HS Rogerio Calle PA-C    insulin lispro 5 Units Subcutaneous TID With Meals Taylor Quijano MD    ipratropium 2 puff Inhalation 4x Daily Elliot Cerrato MD    lidocaine 1 patch Topical Daily Elliot Cerrato MD    naloxone 0 04 mg Intravenous Q1MIN PRN Elliot Cerrato MD    ondansetron 4 mg Intravenous Q4H PRN Elliot Cerrato MD    oxyCODONE 2 5 mg Oral Q4H PRN Elliot Cerrato MD    oxyCODONE 5 mg Oral Q4H PRN Elliot Cerrato MD    pantoprazole 20 mg Oral Early Morning Elliot Cerrato MD    polyethylene glycol 17 g Oral BID AC Osvaldo Butler MD    pregabalin 100 mg Oral Daily Elliot Cerrato MD    rOPINIRole 0 5 mg Oral HS Elliot Cerrato MD    senna 1 tablet Oral Daily Elliot Cerrato MD    sodium chloride 75 mL/hr Intravenous Continuous Elliot Cerrato MD Last Rate: Stopped (12/31/18 6270)   timolol 1 drop Both Eyes BID Elliot Cerrato MD    vancomycin 15 mg/kg Intravenous Q24H Elliot Cerrato MD Last Rate: 1,000 mg (12/31/18 8321)     Continuous Infusions:  sodium chloride 75 mL/hr Last Rate: Stopped (12/31/18 0649)     PRN Meds: hydrALAZINE    naloxone    ondansetron    oxyCODONE    oxyCODONE    Surgical procedures (if appropriate):

## 2018-12-31 NOTE — CONSULTS
Consultation - Geriatrics   Piedad Pagan 81 y o  female MRN: [id number]  Unit/Bed#: [facility] 511-01 Encounter: [id number]      Assessment/Plan  1  Ambulatory dysfunction/fall  Multifactorial  Secondary to underlying chronic illnesses, visual impairment, cognitive impairment, urinary frequency, pain, impairment with ADLs, poor balance, deconditioning, anemia,  environmental  Fall precautions  PT/OT  Use of assistive device  Recommend rehab post hospitalization  Recommend Vitamin D3 1000 iu daily    2  Pain  Patient has multiple allergies states she is allergic to Tylenol uncertain what the reaction is and allergic to codeine  Continue with scheduled Lidoderm patch for pain    3  Constipation  Patient complaining of no bowel movement since admission  Continue with scheduled Colace  Will order p r n  MiraLax    4  DM2  HGA1c 8 5  Goal in the Geriatric population is greater than 7 5  Endocrine following    5  Acute on chronic kidney disease  Continue gentle IV rehydration  Continue to monitor  Management by primary team  A void nephro toxic medications  Lyrica and lisinopril on hold    6  Cellulitis  Continue with ceftriaxone  Orthopedics and Internal Medicine following    7  Deconditioning  Recommend nutrition consult  Albumin is 2 6  PT/OT consult pending  Recommend short-term rehab      History of Present Illness   Physician Requesting Consult: Александр Prieto MD  Reason for Consult / Principal Problem:  Ambulatory dysfunction  Hx and PE limited by:  Not applicable  HPI: Piedad Pagan is a 81y o  year old female who presents with fall  She has a history of COPD, diabetes mellitus, hyperlipidemia, hypertension, osteoporosis  Patient and daughter are Austrian speaking only so interview assessment was used via the language line interpretation services  Per the daughter patient does not have a history of falling  She ambulates at home with a quad cane  The daughter is requesting a roller walker to take home    PT consult is pending  The patient states that she is allergic to Tylenol she does not know why just that she has a reaction  She does not use pain medication at home  She states that she does not like the food here and she is not eating  She is complaining of constipation and not having a bowel movement since she came in  She lives with her family who take care of the shopping and cooking and cleaning  The patient performs her own ADLs  She does ambulate up the steps into the bathroom  She wears glasses she denies difficulty with hearing or dentition  She denies any weight loss  The daughter feels that her mother is overall doing well and does not need any further assistance other than a roller walker at home  Consults    Review of Systems   Constitutional: Negative for fatigue  HENT: Negative for dental problem  Eyes: Negative for visual disturbance  Respiratory: Negative for cough  Cardiovascular: Negative for chest pain  Gastrointestinal: Positive for constipation  Negative for nausea  Endocrine: Negative for polyuria  Genitourinary: Negative for frequency  Musculoskeletal: Positive for gait problem  Neurological: Negative for headaches  Psychiatric/Behavioral: Negative for agitation         Historical Information   Past Medical History:   Diagnosis Date    [facility]) [facility] )     Diabetes mellitus ([facility] )     Hyperlipidemia     Hyperlipidemia     Hypertension     Kidney stones     Osteoporosis      Past Surgical History:   Procedure Laterality Date    APPENDECTOMY      HAND SURGERY Right     LITHOTRIPSY      MASS EXCISION      rem[place] of back wall mass    TONSILLECTOMY      TONSILLECTOMY       Social History   History   Alcohol Use    Yes     Comment: OCCASIONAL     History   Drug Use No     History   Smoking Status    Former Smoker    Packs/day: 1 00    Years: 40 00    Types: Cigarettes    Quit date: 1/1/2017   Smokeless Tobacco    Never Used     Comment: states she quit but family living with her states she smokes         Family History: non-contributory    Meds/Allergies   Current meds:   Current Facility-Administered Medications   Medication Dose Route Frequency    aspirin chewable tablet 81 mg  81 mg Oral Daily    atorvastatin (LIPITOR) tablet 40 mg  40 mg Oral Q24H    brimonidine (ALPHAGAN P) 0 15 % ophthalmic solution 1 drop  1 drop Both Eyes BID    docusate sodium (COLACE) capsule 100 mg  100 mg Oral BID    dorzolamide (TRUSOPT) ophthalmic solution 1 drop  1 drop Right Eye BID    heparin (porcine) subcutaneous injection 5,000 Units  5,000 Units Subcutaneous Q8H [address]    hydrALAZINE (APRESOLINE) injection 10 mg  10 mg Intravenous Q6H PRN    [START ON 1/1/2019] insulin glargine (LANTUS) subcutaneous injection 20 Units 0 2 mL  20 Units Subcutaneous QAM    insulin lispro (HumaLOG) 100 units/mL subcutaneous injection 1-5 Units  1-5 Units Subcutaneous TID AC    insulin lispro (HumaLOG) 100 units/mL subcutaneous injection 1-5 Units  1-5 Units Subcutaneous HS    insulin lispro (HumaLOG) 100 units/mL subcutaneous injection 5 Units  5 Units Subcutaneous TID With Meals    ipratropium (ATROVENT HFA) inhaler 2 puff  2 puff Inhalation 4x Daily    lidocaine (LIDODERM) 5 % patch 1 patch  1 patch Topical Daily    naloxone (NARCAN) 0 04 mg/mL syringe 0 04 mg  0 04 mg Intravenous Q1MIN PRN    ondansetron (ZOFRAN) injection 4 mg  4 mg Intravenous Q4H PRN    oxyCODONE (ROXICODONE) IR tablet 2 5 mg  2 5 mg Oral Q4H PRN    oxyCODONE (ROXICODONE) IR tablet 5 mg  5 mg Oral Q4H PRN    pantoprazole (PROTONIX) EC tablet 20 mg  20 mg Oral Early Morning    pregabalin (LYRICA) capsule 100 mg  100 mg Oral Daily    rOPINIRole (REQUIP) tablet 0 5 mg  0 5 mg Oral HS    senna (SENOKOT) tablet 8 6 mg  1 tablet Oral Daily    sodium chloride 0 9 % infusion  75 mL/hr Intravenous Continuous    timolol (TIMOPTIC) 0 5 % ophthalmic solution 1 drop  1 drop Both Eyes BID    vancomycin (VANCOCIN) IVPB (premix) 1,000 mg  15 mg/kg Intravenous Q24H      Current PTA meds:  Prescriptions Prior to Admission   Medication    ALPHAGAN P 0 15 % ophthalmic solution    aspirin (ASPIRIN 81) 81 mg chewable tablet    atorvastatin (LIPITOR) 40 mg tablet    CLEVER CHOICE COMFORT EZ 33G X 4 MM MISC    COMBIGAN 0 2-0 5 %    dorzolamide (TRUSOPT) 2 % ophthalmic solution    EASY TOUCH LANCETS 32G MISC    glucose blood test strip    insulin degludec (TRESIBA FLEXTOUCH) 100 units/mL injection pen    ipratropium (ATROVENT HFA) 17 mcg/act inhaler    lisinopril (ZESTRIL) 10 mg tablet    NOVOLOG FLEXPEN 100 units/mL injection pen    omeprazole (PriLOSEC) 20 mg delayed release capsule    oxyCODONE (ROXICODONE) 5 mg immediate release tablet    pentoxifylline (TRENtal) 400 mg ER tablet    pregabalin (LYRICA) 100 mg capsule    rOPINIRole (REQUIP) 0 25 mg tablet    sitaGLIPtin (JANUVIA) 50 mg tablet        Allergies   Allergen Reactions    Acetaminophen      Listen on patient's paperwork from [facility] and Staffing  Family unable to confirm      Morphine And Related Vomiting    Oxycodone GI Intolerance     Listed on patient's paperwork from [facility] and Staffing  Family unable to confirm      Tramadol Vomiting and Abdominal Pain     Other       Objective   Vitals: Blood pressure 122/78, pulse 74, temperature 98 °F (36 7 °C), temperature source Oral, resp  rate 20, height 5' 3" (1 6 m), weight 66 1 kg (145 lb 11 6 oz), SpO2 97 %, not currently breastfeeding  ,Body mass index is 25 81 kg/m²  Physical Exam   Constitutional: She is oriented to person, place, and time  She appears well-developed and well-nourished  No distress  HENT:   Head: Normocephalic  Eyes: Right eye exhibits no discharge  Left eye exhibits no discharge  Neck: Normal range of motion  Cardiovascular: Normal rate, regular rhythm and normal heart sounds    Exam reveals no gallop and no friction rub  No murmur heard  Pulmonary/Chest: Effort normal and breath sounds normal  No respiratory distress  She has no wheezes  She has no rales  Abdominal: Soft  Bowel sounds are normal  She exhibits no distension  There is no tenderness  There is no rebound  Musculoskeletal: Normal range of motion  Neurological: She is alert and oriented to person, place, and time  Skin: Skin is warm and dry  She is not diaphoretic  No erythema  Psychiatric: She has a normal mood and affect  Nursing note and vitals reviewed  Lab Results:   Results from last 7 days  Lab Units 12/31/18  0354   WBC Thousand/uL 13 99*   HEMOGLOBIN g/dL 12 0   HEMATOCRIT % 36 6   PLATELETS Thousands/uL 253        Results from last 7 days  Lab Units 12/31/18  1156  12/30/18  1315   POTASSIUM mmol/L 4 3  < > 4 6   CHLORIDE mmol/L 102  < > 97*   CO2 mmol/L 22  < > 25   BUN mg/dL 13  < > 19   CREATININE mg/dL 1 38*  < > 1 55*   CALCIUM mg/dL 8 7  < > 9 0   ALK PHOS U/L  --   --  135*   ALT U/L  --   --  23   AST U/L  --   --  11   < > = values in this interval not displayed  Imaging Studies: I have personally reviewed pertinent reports  EKG, Pathology, and Other Studies: I have personally reviewed pertinent reports  VTE Prophylaxis: Sequential compression device (Venodyne)     Code Status: Level 1 - Full Code      Counseling/Coordination of Care: Total floor / unit time spent today 35 minutes  Greater than 50% of total time was spent with the patient and / or family counseling and / or coordination of care   A description of the counseling / coordination of care: Geriatric consult

## 2018-12-31 NOTE — ASSESSMENT & PLAN NOTE
Lab Results   Component Value Date    HGBA1C 8 5 (H) 12/20/2018       Recent Labs      12/31/18   0356  12/31/18   0544  12/31/18   0736  12/31/18   1059   POCGLU  173*  168*  151*  68       Blood Sugar Average: Last 72 hrs:  (P) 195 with severe hyperglycemia likely related to sepsis, for now keep the patient on insulin drip non DKA protocol and  Endocrinology was following  Diabetic diet  Insulin lantus at 35 units, lispro 13 units premeals, check fingerstick glucose  SSI  Januvia restarted

## 2019-01-01 LAB
GLUCOSE SERPL-MCNC: 103 MG/DL (ref 65–140)
GLUCOSE SERPL-MCNC: 281 MG/DL (ref 65–140)
GLUCOSE SERPL-MCNC: 319 MG/DL (ref 65–140)
GLUCOSE SERPL-MCNC: 349 MG/DL (ref 65–140)
MRSA NOSE QL CULT: NORMAL

## 2019-01-01 PROCEDURE — 82948 REAGENT STRIP/BLOOD GLUCOSE: CPT

## 2019-01-01 PROCEDURE — 99233 SBSQ HOSP IP/OBS HIGH 50: CPT | Performed by: INTERNAL MEDICINE

## 2019-01-01 RX ORDER — INSULIN GLARGINE 100 [IU]/ML
24 INJECTION, SOLUTION SUBCUTANEOUS EVERY MORNING
Status: DISCONTINUED | OUTPATIENT
Start: 2019-01-02 | End: 2019-01-02

## 2019-01-01 RX ADMIN — ATORVASTATIN CALCIUM 40 MG: 40 TABLET, FILM COATED ORAL at 17:24

## 2019-01-01 RX ADMIN — INSULIN LISPRO 5 UNITS: 100 INJECTION, SOLUTION INTRAVENOUS; SUBCUTANEOUS at 08:38

## 2019-01-01 RX ADMIN — HEPARIN SODIUM 5000 UNITS: 5000 INJECTION INTRAVENOUS; SUBCUTANEOUS at 13:33

## 2019-01-01 RX ADMIN — TIMOLOL MALEATE 1 DROP: 5 SOLUTION/ DROPS OPHTHALMIC at 08:37

## 2019-01-01 RX ADMIN — INSULIN GLARGINE 20 UNITS: 100 INJECTION, SOLUTION SUBCUTANEOUS at 08:36

## 2019-01-01 RX ADMIN — IPRATROPIUM BROMIDE 2 PUFF: 17 AEROSOL, METERED RESPIRATORY (INHALATION) at 08:37

## 2019-01-01 RX ADMIN — INSULIN LISPRO 4 UNITS: 100 INJECTION, SOLUTION INTRAVENOUS; SUBCUTANEOUS at 17:25

## 2019-01-01 RX ADMIN — BRIMONIDINE TARTRATE 1 DROP: 1.5 SOLUTION OPHTHALMIC at 17:26

## 2019-01-01 RX ADMIN — PREGABALIN 100 MG: 100 CAPSULE ORAL at 08:35

## 2019-01-01 RX ADMIN — INSULIN LISPRO 4 UNITS: 100 INJECTION, SOLUTION INTRAVENOUS; SUBCUTANEOUS at 08:38

## 2019-01-01 RX ADMIN — BRIMONIDINE TARTRATE 1 DROP: 1.5 SOLUTION OPHTHALMIC at 08:37

## 2019-01-01 RX ADMIN — SENNOSIDES 8.6 MG: 8.6 TABLET, FILM COATED ORAL at 08:35

## 2019-01-01 RX ADMIN — ROPINIROLE 0.5 MG: 0.25 TABLET, FILM COATED ORAL at 21:28

## 2019-01-01 RX ADMIN — VANCOMYCIN HYDROCHLORIDE 1000 MG: 1 INJECTION, SOLUTION INTRAVENOUS at 17:27

## 2019-01-01 RX ADMIN — CEFTRIAXONE 1000 MG: 1 INJECTION, POWDER, FOR SOLUTION INTRAMUSCULAR; INTRAVENOUS at 20:31

## 2019-01-01 RX ADMIN — DOCUSATE SODIUM 100 MG: 100 CAPSULE, LIQUID FILLED ORAL at 08:36

## 2019-01-01 RX ADMIN — IPRATROPIUM BROMIDE 2 PUFF: 17 AEROSOL, METERED RESPIRATORY (INHALATION) at 21:28

## 2019-01-01 RX ADMIN — LIDOCAINE 1 PATCH: 50 PATCH CUTANEOUS at 08:36

## 2019-01-01 RX ADMIN — ASPIRIN 81 MG 81 MG: 81 TABLET ORAL at 08:36

## 2019-01-01 RX ADMIN — HEPARIN SODIUM 5000 UNITS: 5000 INJECTION INTRAVENOUS; SUBCUTANEOUS at 06:31

## 2019-01-01 RX ADMIN — HEPARIN SODIUM 5000 UNITS: 5000 INJECTION INTRAVENOUS; SUBCUTANEOUS at 21:28

## 2019-01-01 RX ADMIN — PANTOPRAZOLE SODIUM 20 MG: 20 TABLET, DELAYED RELEASE ORAL at 06:31

## 2019-01-01 RX ADMIN — DORZOLAMIDE HYDROCHLORIDE 1 DROP: 20 SOLUTION/ DROPS OPHTHALMIC at 08:37

## 2019-01-01 RX ADMIN — IPRATROPIUM BROMIDE 2 PUFF: 17 AEROSOL, METERED RESPIRATORY (INHALATION) at 12:37

## 2019-01-01 RX ADMIN — DORZOLAMIDE HYDROCHLORIDE 1 DROP: 20 SOLUTION/ DROPS OPHTHALMIC at 17:27

## 2019-01-01 RX ADMIN — IPRATROPIUM BROMIDE 2 PUFF: 17 AEROSOL, METERED RESPIRATORY (INHALATION) at 17:26

## 2019-01-01 RX ADMIN — INSULIN LISPRO 5 UNITS: 100 INJECTION, SOLUTION INTRAVENOUS; SUBCUTANEOUS at 12:38

## 2019-01-01 RX ADMIN — TIMOLOL MALEATE 1 DROP: 5 SOLUTION/ DROPS OPHTHALMIC at 17:27

## 2019-01-01 RX ADMIN — INSULIN LISPRO 5 UNITS: 100 INJECTION, SOLUTION INTRAVENOUS; SUBCUTANEOUS at 12:39

## 2019-01-01 NOTE — PROGRESS NOTES
Progress Note - Tanvir Baeza 81 y o  female MRN: [id number]    Unit/Bed#: [facility] 604-01 Encounter: [id number]      CC: diabetes f/u    Subjective:   Tanvir Baeza is a 81y o  year old female with type 2  diabetes  Chart reviewed-no acute events overnight  Objective:     Vitals: Blood pressure 155/80, pulse 81, temperature 98 96 °F (37 2 °C), resp  rate 20, height 5' 3" (1 6 m), weight 65 8 kg (145 lb 1 oz), SpO2 98 %, not currently breastfeeding  ,Body mass index is 25 7 kg/m²  Intake/Output Summary (Last 24 hours) at 01/01/19 1227  Last data filed at 01/01/19 1001   Gross per 24 hour   Intake              480 ml   Output             1070 ml   Net             -590 ml       Physical Exam:  General Appearance: awake, appears stated age and cooperative  Head: Normocephalic, without obvious abnormality, atraumatic  Extremities: moves all extremities  Skin:  Erythema left forearm  Pulm: no labored breathing    Lab, Imaging and other studies: I have personally reviewed pertinent reports  Results from last 7 days  Lab Units 12/31/18  1156   SODIUM mmol/L 133*   POTASSIUM mmol/L 4 3   CHLORIDE mmol/L 102   CO2 mmol/L 22   BUN mg/dL 13   CREATININE mg/dL 1 38*   CALCIUM mg/dL 8 7       POC Glucose (mg/dl)   Date Value   01/01/2019 319 (H)   01/01/2019 349 (H)   12/31/2018 219 (H)   12/31/2018 178 (H)   12/31/2018 183 (H)   12/31/2018 68   12/31/2018 151 (H)   12/31/2018 168 (H)   12/31/2018 173 (H)   12/31/2018 74       Assessment:  Type 2 diabetes with hyperglycemia on long-term insulin therapy  Left forearm cellulitis  Acute/chronic kidney    Plan:  Sugars higher today-hypoglycemia has resolved  For now increase Lantus to 24 units in the morning  Increase Humalog to 8 units before meals if she eats  Adjust the coverage scale    Monitor blood sugars before meals and bedtime adjust accordingly    Left forearm cellulitis-she continues on IV antibiotics  Portions of the record may have been created with voice recognition software

## 2019-01-01 NOTE — PROGRESS NOTES
Subjective: No acute events overnight  No acute distress  Objective:  Lab Results   Component Value Date/Time    WBC 13 99 (H) 12/31/2018 03:54 AM    WBC 9 1 04/18/2018 06:44 PM    HGB 12 0 12/31/2018 03:54 AM    HGB 13 4 04/18/2018 06:44 PM       Vitals:    12/31/18 2138   BP: 155/80   Pulse: 81   Resp: 20   Temp: 98 96 °F (37 2 °C)   SpO2: 98%     Left upper extremity  Erythema and swelling over dorsum of forearm and over olecranon  Motor & sensation grossly intact  Fingers WWO    Assessment: left arm cellulitis   No palpable fluctuance    Plan:  WBAT LUE  Vanc per primary team  Pain control  DVT ppx  PT  Dispo

## 2019-01-02 LAB
ANION GAP SERPL CALCULATED.3IONS-SCNC: 8 MMOL/L (ref 4–13)
BASOPHILS # BLD AUTO: 0.14 THOUSANDS/ΜL (ref 0–0.1)
BASOPHILS NFR BLD AUTO: 1 % (ref 0–1)
BUN SERPL-MCNC: 16 MG/DL (ref 5–25)
CALCIUM SERPL-MCNC: 9.4 MG/DL (ref 8.3–10.1)
CHLORIDE SERPL-SCNC: 96 MMOL/L (ref 100–108)
CO2 SERPL-SCNC: 26 MMOL/L (ref 21–32)
CREAT SERPL-MCNC: 1.22 MG/DL (ref 0.6–1.3)
EOSINOPHIL # BLD AUTO: 0.24 THOUSAND/ΜL (ref 0–0.61)
EOSINOPHIL NFR BLD AUTO: 2 % (ref 0–6)
ERYTHROCYTE [DISTWIDTH] IN BLOOD BY AUTOMATED COUNT: 14.8 % (ref 11.6–15.1)
GFR SERPL CREATININE-BSD FRML MDRD: 41 ML/MIN/1.73SQ M
GLUCOSE SERPL-MCNC: 201 MG/DL (ref 65–140)
GLUCOSE SERPL-MCNC: 212 MG/DL (ref 65–140)
GLUCOSE SERPL-MCNC: 233 MG/DL (ref 65–140)
GLUCOSE SERPL-MCNC: 257 MG/DL (ref 65–140)
GLUCOSE SERPL-MCNC: 298 MG/DL (ref 65–140)
HCT VFR BLD AUTO: 42.2 % (ref 34.8–46.1)
HGB BLD-MCNC: 13.9 G/DL (ref 11.5–15.4)
IMM GRANULOCYTES # BLD AUTO: 0.28 THOUSAND/UL (ref 0–0.2)
IMM GRANULOCYTES NFR BLD AUTO: 2 % (ref 0–2)
LYMPHOCYTES # BLD AUTO: 2.6 THOUSANDS/ΜL (ref 0.6–4.47)
LYMPHOCYTES NFR BLD AUTO: 21 % (ref 14–44)
MCH RBC QN AUTO: 30.5 PG (ref 26.8–34.3)
MCHC RBC AUTO-ENTMCNC: 32.9 G/DL (ref 31.4–37.4)
MCV RBC AUTO: 93 FL (ref 82–98)
MONOCYTES # BLD AUTO: 1.03 THOUSAND/ΜL (ref 0.17–1.22)
MONOCYTES NFR BLD AUTO: 8 % (ref 4–12)
NEUTROPHILS # BLD AUTO: 8.34 THOUSANDS/ΜL (ref 1.85–7.62)
NEUTS SEG NFR BLD AUTO: 66 % (ref 43–75)
NRBC BLD AUTO-RTO: 0 /100 WBCS
PLATELET # BLD AUTO: 353 THOUSANDS/UL (ref 149–390)
PMV BLD AUTO: 10.5 FL (ref 8.9–12.7)
POTASSIUM SERPL-SCNC: 4.4 MMOL/L (ref 3.5–5.3)
RBC # BLD AUTO: 4.55 MILLION/UL (ref 3.81–5.12)
SODIUM SERPL-SCNC: 130 MMOL/L (ref 136–145)
WBC # BLD AUTO: 12.63 THOUSAND/UL (ref 4.31–10.16)

## 2019-01-02 PROCEDURE — 99233 SBSQ HOSP IP/OBS HIGH 50: CPT | Performed by: INTERNAL MEDICINE

## 2019-01-02 PROCEDURE — 82948 REAGENT STRIP/BLOOD GLUCOSE: CPT

## 2019-01-02 PROCEDURE — 80048 BASIC METABOLIC PNL TOTAL CA: CPT | Performed by: INTERNAL MEDICINE

## 2019-01-02 PROCEDURE — 85025 COMPLETE CBC W/AUTO DIFF WBC: CPT | Performed by: INTERNAL MEDICINE

## 2019-01-02 RX ORDER — CEPHALEXIN 500 MG/1
500 CAPSULE ORAL EVERY 8 HOURS SCHEDULED
Status: DISCONTINUED | OUTPATIENT
Start: 2019-01-02 | End: 2019-01-04

## 2019-01-02 RX ORDER — INSULIN GLARGINE 100 [IU]/ML
30 INJECTION, SOLUTION SUBCUTANEOUS EVERY MORNING
Status: DISCONTINUED | OUTPATIENT
Start: 2019-01-03 | End: 2019-01-03

## 2019-01-02 RX ADMIN — BRIMONIDINE TARTRATE 1 DROP: 1.5 SOLUTION OPHTHALMIC at 18:00

## 2019-01-02 RX ADMIN — DORZOLAMIDE HYDROCHLORIDE 1 DROP: 20 SOLUTION/ DROPS OPHTHALMIC at 18:00

## 2019-01-02 RX ADMIN — SENNOSIDES 8.6 MG: 8.6 TABLET, FILM COATED ORAL at 09:08

## 2019-01-02 RX ADMIN — DOCUSATE SODIUM 100 MG: 100 CAPSULE, LIQUID FILLED ORAL at 17:58

## 2019-01-02 RX ADMIN — PANTOPRAZOLE SODIUM 20 MG: 20 TABLET, DELAYED RELEASE ORAL at 05:49

## 2019-01-02 RX ADMIN — IPRATROPIUM BROMIDE 2 PUFF: 17 AEROSOL, METERED RESPIRATORY (INHALATION) at 09:10

## 2019-01-02 RX ADMIN — POLYETHYLENE GLYCOL 3350 17 G: 17 POWDER, FOR SOLUTION ORAL at 17:59

## 2019-01-02 RX ADMIN — TIMOLOL MALEATE 1 DROP: 5 SOLUTION/ DROPS OPHTHALMIC at 17:59

## 2019-01-02 RX ADMIN — HEPARIN SODIUM 5000 UNITS: 5000 INJECTION INTRAVENOUS; SUBCUTANEOUS at 13:33

## 2019-01-02 RX ADMIN — SITAGLIPTIN 50 MG: 50 TABLET, FILM COATED ORAL at 17:58

## 2019-01-02 RX ADMIN — CEPHALEXIN 500 MG: 500 CAPSULE ORAL at 21:12

## 2019-01-02 RX ADMIN — ASPIRIN 81 MG 81 MG: 81 TABLET ORAL at 09:08

## 2019-01-02 RX ADMIN — DOCUSATE SODIUM 100 MG: 100 CAPSULE, LIQUID FILLED ORAL at 09:08

## 2019-01-02 RX ADMIN — PREGABALIN 100 MG: 100 CAPSULE ORAL at 09:08

## 2019-01-02 RX ADMIN — IPRATROPIUM BROMIDE 2 PUFF: 17 AEROSOL, METERED RESPIRATORY (INHALATION) at 13:33

## 2019-01-02 RX ADMIN — IPRATROPIUM BROMIDE 2 PUFF: 17 AEROSOL, METERED RESPIRATORY (INHALATION) at 17:59

## 2019-01-02 RX ADMIN — LIDOCAINE 1 PATCH: 50 PATCH CUTANEOUS at 09:08

## 2019-01-02 RX ADMIN — TIMOLOL MALEATE 1 DROP: 5 SOLUTION/ DROPS OPHTHALMIC at 09:10

## 2019-01-02 RX ADMIN — INSULIN LISPRO 3 UNITS: 100 INJECTION, SOLUTION INTRAVENOUS; SUBCUTANEOUS at 09:09

## 2019-01-02 RX ADMIN — INSULIN LISPRO 1 UNITS: 100 INJECTION, SOLUTION INTRAVENOUS; SUBCUTANEOUS at 21:13

## 2019-01-02 RX ADMIN — HEPARIN SODIUM 5000 UNITS: 5000 INJECTION INTRAVENOUS; SUBCUTANEOUS at 05:50

## 2019-01-02 RX ADMIN — DORZOLAMIDE HYDROCHLORIDE 1 DROP: 20 SOLUTION/ DROPS OPHTHALMIC at 09:11

## 2019-01-02 RX ADMIN — CEPHALEXIN 500 MG: 500 CAPSULE ORAL at 15:01

## 2019-01-02 RX ADMIN — OXYCODONE HYDROCHLORIDE 5 MG: 5 TABLET ORAL at 15:21

## 2019-01-02 RX ADMIN — BRIMONIDINE TARTRATE 1 DROP: 1.5 SOLUTION OPHTHALMIC at 09:11

## 2019-01-02 RX ADMIN — ROPINIROLE 0.5 MG: 0.25 TABLET, FILM COATED ORAL at 21:13

## 2019-01-02 RX ADMIN — INSULIN LISPRO 3 UNITS: 100 INJECTION, SOLUTION INTRAVENOUS; SUBCUTANEOUS at 13:33

## 2019-01-02 RX ADMIN — ATORVASTATIN CALCIUM 40 MG: 40 TABLET, FILM COATED ORAL at 17:58

## 2019-01-02 RX ADMIN — HEPARIN SODIUM 5000 UNITS: 5000 INJECTION INTRAVENOUS; SUBCUTANEOUS at 21:13

## 2019-01-02 RX ADMIN — POLYETHYLENE GLYCOL 3350 17 G: 17 POWDER, FOR SOLUTION ORAL at 06:16

## 2019-01-02 RX ADMIN — INSULIN LISPRO 2 UNITS: 100 INJECTION, SOLUTION INTRAVENOUS; SUBCUTANEOUS at 17:59

## 2019-01-02 RX ADMIN — OXYCODONE HYDROCHLORIDE 5 MG: 5 TABLET ORAL at 22:38

## 2019-01-02 RX ADMIN — INSULIN GLARGINE 24 UNITS: 100 INJECTION, SOLUTION SUBCUTANEOUS at 09:08

## 2019-01-02 RX ADMIN — IPRATROPIUM BROMIDE 2 PUFF: 17 AEROSOL, METERED RESPIRATORY (INHALATION) at 21:13

## 2019-01-02 NOTE — PROGRESS NOTES
Reviewed sugars with primary team   Increase Lantus to 30 units daily and Humalog to 11 units with each meal   Temporary plan for discharge tomorrow as long as blood sugars are better controlled      Recent Results (from the past 24 hour(s))   Fingerstick Glucose (POCT)    Collection Time: 01/01/19  4:47 PM   Result Value Ref Range    POC Glucose 281 (H) 65 - 140 mg/dl   Fingerstick Glucose (POCT)    Collection Time: 01/01/19  8:48 PM   Result Value Ref Range    POC Glucose 103 65 - 140 mg/dl   CBC and differential    Collection Time: 01/02/19  6:28 AM   Result Value Ref Range    WBC 12 63 (H) 4 31 - 10 16 Thousand/uL    RBC 4 55 3 81 - 5 12 Million/uL    Hemoglobin 13 9 11 5 - 15 4 g/dL    Hematocrit 42 2 34 8 - 46 1 %    MCV 93 82 - 98 fL    MCH 30 5 26 8 - 34 3 pg    MCHC 32 9 31 4 - 37 4 g/dL    RDW 14 8 11 6 - 15 1 %    MPV 10 5 8 9 - 12 7 fL    Platelets 580 406 - 226 Thousands/uL    nRBC 0 /100 WBCs    Neutrophils Relative 66 43 - 75 %    Immat GRANS % 2 0 - 2 %    Lymphocytes Relative 21 14 - 44 %    Monocytes Relative 8 4 - 12 %    Eosinophils Relative 2 0 - 6 %    Basophils Relative 1 0 - 1 %    Neutrophils Absolute 8 34 (H) 1 85 - 7 62 Thousands/µL    Immature Grans Absolute 0 28 (H) 0 00 - 0 20 Thousand/uL    Lymphocytes Absolute 2 60 0 60 - 4 47 Thousands/µL    Monocytes Absolute 1 03 0 17 - 1 22 Thousand/µL    Eosinophils Absolute 0 24 0 00 - 0 61 Thousand/µL    Basophils Absolute 0 14 (H) 0 00 - 0 10 Thousands/µL   Basic metabolic panel    Collection Time: 01/02/19  6:28 AM   Result Value Ref Range    Sodium 130 (L) 136 - 145 mmol/L    Potassium 4 4 3 5 - 5 3 mmol/L    Chloride 96 (L) 100 - 108 mmol/L    CO2 26 21 - 32 mmol/L    ANION GAP 8 4 - 13 mmol/L    BUN 16 5 - 25 mg/dL    Creatinine 1 22 0 60 - 1 30 mg/dL    Glucose 212 (H) 65 - 140 mg/dL    Calcium 9 4 8 3 - 10 1 mg/dL    eGFR 41 ml/min/1 73sq m   Fingerstick Glucose (POCT)    Collection Time: 01/02/19  7:35 AM   Result Value Ref Range    POC Glucose 233 (H) 65 - 140 mg/dl   Fingerstick Glucose (POCT)    Collection Time: 01/02/19 11:25 AM   Result Value Ref Range    POC Glucose 257 (H) 65 - 140 mg/dl

## 2019-01-02 NOTE — CONSULTS
MRSA swab negative  Dr Yahir Thompson agreed to d/c shira  Thank you for the consult and pharmacy will sign off now

## 2019-01-02 NOTE — SOCIAL WORK
Met with patient and daughter, utilized   Family would prefer patient to be discharged to home with therapy services through  [facility]    Referral entered in [facility]

## 2019-01-02 NOTE — ED PROVIDER NOTES
History  Chief Complaint   Patient presents with    Leg Pain     Pt with hx of hip pain woke up with increased pain in left hip  Pt usually takes pain meds for pain but states she stopped because they make her sick  Pt states she fell about 1 week ago on left side  Pt also has swollen and painful left arm  HPI  81-year-old woman presents for evaluation of left hip pain  Patient is [place]i-speaking only, and history was obtained by using the blue  phone  Patient fell about 1 week ago on her left side  Patient doing okay until she woke up this morning when she had pain in her left hip  Of note patient also has redness of her left arm  The redness started with a hematoma the patient's elbow then progressively worsened  Patient remembers the whole event denies hitting her head  Patient has pain with movement of her left arm but can still move it  Denies any fevers chills sweats, denies chest pain shortness of breath abdominal pain nausea vomiting  Prior to Admission Medications   Prescriptions Last Dose Informant Patient Reported? Taking?    ALPHAGAN P 0 15 % ophthalmic solution   Yes Yes   Sig: INSTILL 1 DROP INTO BOTH EYES TWICE DAILY INSTILL 1 DROP EN AMBOS OJOS DOS VECES AL MAC   CLEVER CHOICE COMFORT EZ 33G X 4 MM MISC   No Yes   Sig: Ninety day supplies please   COMBIGAN 0 2-0 5 %   Yes Yes   EASY TOUCH LANCETS 32G MISC   No Yes   Sig: To use 4 times a day   NOVOLOG FLEXPEN 100 units/mL injection pen   No Yes   Si UNITS 3 TIMES A DAY WITH MEALS    Ninety day supplies please   aspirin (ASPIRIN 81) 81 mg chewable tablet   No Yes   Sig: Chew 1 tablet (81 mg total) daily Ninety day supplies please   atorvastatin (LIPITOR) 40 mg tablet   No Yes   Sig: Take 1 tablet (40 mg total) by mouth every 24 hours   dorzolamide (TRUSOPT) 2 % ophthalmic solution   Yes Yes   Sig: instill 1 drop into right eye twice a day   glucose blood test strip   No Yes   Sig: To use 4 times a day  Ninety day supplies please   insulin degludec (TRESIBA FLEXTOUCH) 100 units/mL injection pen   No Yes   Sig: To use 30 U at bedtime  Ninety day supplies please   ipratropium (ATROVENT HFA) 17 mcg/act inhaler   No Yes   Sig: Inhale 2 puffs 4 (four) times a day Ninety day supplies please   lisinopril (ZESTRIL) 10 mg tablet   No Yes   Sig: Take 1 tablet (10 mg total) by mouth daily   omeprazole (PriLOSEC) 20 mg delayed release capsule   No Yes   Sig: Take 1 capsule (20 mg total) by mouth daily Ninety day supplies please   oxyCODONE (ROXICODONE) 5 mg immediate release tablet   No Yes   Sig: Take 1 tablet (5 mg total) by mouth 2 (two) times a day Max Daily Amount: 10 mg   pentoxifylline (TRENtal) 400 mg ER tablet   No Yes   Sig: Take 1 tablet (400 mg total) by mouth 3 (three) times a day with meals Ninety day supplies please   pregabalin (LYRICA) 100 mg capsule   No Yes   Sig: ONE CAPSULE TWICE A DAY  Ninety day supplies please   rOPINIRole (REQUIP) 0 25 mg tablet   No Yes   Sig: Take 2 tablets (0 5 mg total) by mouth daily at bedtime   sitaGLIPtin (JANUVIA) 50 mg tablet   No Yes   Sig: Take 1 tablet (50 mg total) by mouth daily Ninety day supplies please      Facility-Administered Medications: None       Past Medical History:   Diagnosis Date    COPD (chronic obstructive pulmonary disease) (HCC)     Diabetes mellitus (HCC)     Hyperlipidemia     Hyperlipidemia     Hypertension     Kidney stones     Osteoporosis        Past Surgical History:   Procedure Laterality Date    APPENDECTOMY      HAND SURGERY Right     LITHOTRIPSY      MASS EXCISION      [place] of back wall mass    TONSILLECTOMY      TONSILLECTOMY         Family History   Problem Relation Age of Onset    Diabetes Mother      I have reviewed and agree with the history as documented      Social History   Substance Use Topics    Smoking status: Former Smoker     Packs/day: 1 00     Years: 40 00     Types: Cigarettes     Quit date: 1/1/2017    Smokeless tobacco: Never Used      Comment: states she quit but family living with her states she smokes    Alcohol use Yes      Comment: OCCASIONAL        Review of Systems   Constitutional: Negative  HENT: Negative  Eyes: Negative  Respiratory: Negative  Cardiovascular: Negative  Gastrointestinal: Negative  Endocrine: Negative  Genitourinary: Negative  Musculoskeletal:        Hip pain the left arm swelling   Skin: Negative  Allergic/Immunologic: Negative  Neurological: Negative  Hematological: Negative  Psychiatric/Behavioral: Negative  Physical Exam  ED Triage Vitals   Temperature Pulse Respirations Blood Pressure SpO2   12/30/18 1219 12/30/18 1218 12/30/18 1218 12/30/18 1218 12/30/18 1218   98 1 °F (36 7 °C) 95 18 (!) 172/79 96 %      Temp Source Heart Rate Source Patient Position - Orthostatic VS BP Location FiO2 (%)   12/30/18 1219 12/30/18 1455 12/30/18 1455 12/30/18 1455 --   Oral Monitor Lying Right arm       Pain Score       12/30/18 1455       No Pain           Orthostatic Vital Signs  Vitals:    01/01/19 1534 01/01/19 2220 01/01/19 2221 01/02/19 0654   BP:  121/66 121/66 148/69   Pulse: 69 84 83 75   Patient Position - Orthostatic VS:    Lying       Physical Exam   Constitutional: She is oriented to person, place, and time  She appears well-developed and well-nourished  No distress  HENT:   Head: Normocephalic and atraumatic  Right Ear: External ear normal    Left Ear: External ear normal    Mouth/Throat: Oropharynx is clear and moist    Eyes: Pupils are equal, round, and reactive to light  Conjunctivae and EOM are normal  Right eye exhibits no discharge  Left eye exhibits no discharge  No scleral icterus  Neck: Normal range of motion  Neck supple  No tracheal deviation present  No thyromegaly present  Cardiovascular: Normal rate, regular rhythm and intact distal pulses  Exam reveals no gallop and no friction rub  No murmur heard    Pulmonary/Chest: Effort normal and breath sounds normal  No stridor  No respiratory distress  She has no wheezes  She has no rales  Abdominal: Soft  Bowel sounds are normal  She exhibits no distension  There is no tenderness  There is no rebound and no guarding  Musculoskeletal: She exhibits edema  She exhibits no deformity  Patient has full range of motion of her bilateral hips and knees  Patient does have pain with movement of her left elbow  Patient has full range of motion of her left shoulder  Radial pulses 2+, hand is neurovascularly intact  Neurological: She is alert and oriented to person, place, and time  No cranial nerve deficit  Skin: Skin is warm and dry  No rash noted  She is not diaphoretic  No erythema  Psychiatric: She has a normal mood and affect  Her behavior is normal  Thought content normal    Nursing note and vitals reviewed            ED Medications  Medications   brimonidine (ALPHAGAN P) 0 15 % ophthalmic solution 1 drop (1 drop Both Eyes Given 1/2/19 0911)   aspirin chewable tablet 81 mg (81 mg Oral Given 1/2/19 0908)   atorvastatin (LIPITOR) tablet 40 mg (40 mg Oral Given 1/1/19 1724)   dorzolamide (TRUSOPT) ophthalmic solution 1 drop (1 drop Right Eye Given 1/2/19 0911)   ipratropium (ATROVENT HFA) inhaler 2 puff (2 puffs Inhalation Given 1/2/19 0910)   pantoprazole (PROTONIX) EC tablet 20 mg (20 mg Oral Given 1/2/19 0549)   pregabalin (LYRICA) capsule 100 mg (100 mg Oral Given 1/2/19 0908)   rOPINIRole (REQUIP) tablet 0 5 mg (0 5 mg Oral Given 1/1/19 2128)   docusate sodium (COLACE) capsule 100 mg (100 mg Oral Given 1/2/19 0908)   senna (SENOKOT) tablet 8 6 mg (8 6 mg Oral Given 1/2/19 0908)   heparin (porcine) subcutaneous injection 5,000 Units (5,000 Units Subcutaneous Given 1/2/19 0550)   oxyCODONE (ROXICODONE) IR tablet 2 5 mg (not administered)   oxyCODONE (ROXICODONE) IR tablet 5 mg (not administered)   ondansetron (ZOFRAN) injection 4 mg (not administered)   naloxone (NARCAN) 0 04 mg/mL syringe 0 04 mg (not administered)   lidocaine (LIDODERM) 5 % patch 1 patch (1 patch Topical Medication Applied 1/2/19 0908)   timolol (TIMOPTIC) 0 5 % ophthalmic solution 1 drop (1 drop Both Eyes Given 1/2/19 0910)   insulin lispro (HumaLOG) 100 units/mL subcutaneous injection 1-5 Units (1 Units Subcutaneous Not Given 1/1/19 2106)   hydrALAZINE (APRESOLINE) injection 10 mg (not administered)   polyethylene glycol (MIRALAX) packet 17 g (17 g Oral Given 1/2/19 0616)   insulin glargine (LANTUS) subcutaneous injection 24 Units 0 24 mL (24 Units Subcutaneous Given 1/2/19 0908)   insulin lispro (HumaLOG) 100 units/mL subcutaneous injection 8 Units (8 Units Subcutaneous Given 1/2/19 0909)   insulin lispro (HumaLOG) 100 units/mL subcutaneous injection 1-6 Units (3 Units Subcutaneous Given 1/2/19 0909)   cefTRIAXone (ROCEPHIN) 1,000 mg in dextrose 5 % 50 mL IVPB (1,000 mg Intravenous New Bag 1/1/19 2031)   ceftriaxone (ROCEPHIN) 1 g/50 mL in dextrose IVPB (0 mg Intravenous Stopped 12/30/18 1548)   vancomycin (VANCOCIN) IVPB (premix) 1,000 mg (0 mg/kg × 64 9 kg Intravenous Stopped 12/30/18 1712)   potassium chloride (K-DUR,KLOR-CON) CR tablet 40 mEq (40 mEq Oral Given 12/31/18 0001)   sodium phosphate 21 mmol in dextrose 5 % 250 mL Infusion (21 mmol Intravenous New Bag 12/30/18 2348)   magnesium sulfate 4 g/100 mL IVPB (premix) 4 g (4 g Intravenous New Bag 12/31/18 0017)     Followed by   magnesium sulfate 2 g/50 mL IVPB (premix) 2 g (2 g Intravenous New Bag 12/30/18 2347)   calcium gluconate 1 g in sodium chloride 0 9 % 100 mL IVPB (1 g Intravenous New Bag 12/30/18 2347)   sodium chloride 0 9 % bolus 250 mL (250 mL Intravenous New Bag 12/31/18 0503)   lactulose 20 g/30 mL oral solution 30 g (30 g Oral Given 12/31/18 1231)       Diagnostic Studies  Results Reviewed     Procedure Component Value Units Date/Time    Blood culture #1 [[id number]] Collected:  12/30/18 3129    Lab Status:  Preliminary result Specimen:  Blood from Arm, Left Updated:  01/01/19 1701     Blood Culture No Growth at 48 hrs  Blood culture #2 [[id number]] Collected:  12/30/18 1315    Lab Status:  Preliminary result Specimen:  Blood from Arm, Left Updated:  01/01/19 1701     Blood Culture No Growth at 48 hrs  Basic metabolic panel [[id number]]  (Abnormal) Collected:  12/31/18 1156    Lab Status:  Final result Specimen:  Blood from Arm, Right Updated:  12/31/18 1226     Sodium 133 (L) mmol/L      Potassium 4 3 mmol/L      Chloride 102 mmol/L      CO2 22 mmol/L      ANION GAP 9 mmol/L      BUN 13 mg/dL      Creatinine 1 38 (H) mg/dL      Glucose 157 (H) mg/dL      Calcium 8 7 mg/dL      eGFR 36 ml/min/1 73sq m     Narrative:         National Kidney Disease Education Program recommendations are as follows:  GFR calculation is accurate only with a steady state creatinine  Chronic Kidney disease less than 60 ml/min/1 73 sq  meters  Kidney failure less than 15 ml/min/1 73 sq  meters      Magnesium [[id number]]  (Abnormal) Collected:  12/31/18 1156    Lab Status:  Final result Specimen:  Blood from Arm, Right Updated:  12/31/18 1226     Magnesium 2 9 (H) mg/dL     Phosphorus [[id number]]  (Normal) Collected:  12/31/18 1156    Lab Status:  Final result Specimen:  Blood from Arm, Right Updated:  12/31/18 1226     Phosphorus 3 2 mg/dL     Basic metabolic panel [[id number]]  (Abnormal) Collected:  12/31/18 0354    Lab Status:  Final result Specimen:  Blood from Arm, Right Updated:  12/31/18 0438     Sodium 132 (L) mmol/L      Potassium 5 0 mmol/L      Chloride 100 mmol/L      CO2 23 mmol/L      ANION GAP 9 mmol/L      BUN 15 mg/dL      Creatinine 1 25 mg/dL      Glucose 146 (H) mg/dL      Calcium 9 1 mg/dL      eGFR 40 ml/min/1 73sq m     Narrative:         National Kidney Disease Education Program recommendations are as follows:  GFR calculation is accurate only with a steady state creatinine  Chronic Kidney disease less than 60 ml/min/1 73 sq  meters  Kidney failure less than 15 ml/min/1 73 sq  meters  Magnesium [[id number]]  (Abnormal) Collected:  12/31/18 0354    Lab Status:  Final result Specimen:  Blood from Arm, Right Updated:  12/31/18 0438     Magnesium 4 3 (H) mg/dL     Phosphorus [[id number]]  (Abnormal) Collected:  12/31/18 0354    Lab Status:  Final result Specimen:  Blood from Arm, Right Updated:  12/31/18 0438     Phosphorus 4 7 (H) mg/dL     Basic metabolic panel [[id number]]  (Abnormal) Collected:  12/30/18 2009    Lab Status:  Final result Specimen:  Blood from Arm, Right Updated:  12/30/18 2215     Sodium 139 mmol/L      Potassium 3 4 (L) mmol/L      Chloride 112 (H) mmol/L      CO2 19 (L) mmol/L      ANION GAP 8 mmol/L      BUN 13 mg/dL      Creatinine 0 86 mg/dL      Glucose 296 (H) mg/dL      Calcium 5 9 (LL) mg/dL      eGFR 63 ml/min/1 73sq m     Narrative:         National Kidney Disease Education Program recommendations are as follows:  GFR calculation is accurate only with a steady state creatinine  Chronic Kidney disease less than 60 ml/min/1 73 sq  meters  Kidney failure less than 15 ml/min/1 73 sq  meters  Magnesium [[id number]]  (Abnormal) Collected:  12/30/18 2009    Lab Status:  Final result Specimen:  Blood from Arm, Right Updated:  12/30/18 2111     Magnesium 1 1 (L) mg/dL     Phosphorus [[id number]]  (Abnormal) Collected:  12/30/18 2009    Lab Status:  Final result Specimen:  Blood from Arm, Right Updated:  12/30/18 2111     Phosphorus 1 7 (L) mg/dL     Procalcitonin [[id number]]  (Normal) Collected:  12/30/18 2009    Lab Status:  Final result Specimen:  Blood from Arm, Right Updated:  12/30/18 2051     Procalcitonin 0 10 ng/ml     Lactic Acid x2 [[id number]]  (Normal) Collected:  12/30/18 2009    Lab Status:  Final result Specimen:  Blood from Arm, Right Updated:  12/30/18 2043     LACTIC ACID 1 4 mmol/L     Narrative:         Result may be elevated if tourniquet was used during collection      Platelet count [[id number]]  (Normal) Collected:  12/30/18 2009 Lab Status:  Final result Specimen:  Blood from Arm, Right Updated:  12/30/18 2021     Platelets 938 Thousands/uL      MPV 10 9 fL     Urine Microscopic [[id number]]  (Normal) Collected:  12/30/18 1715    Lab Status:  Final result Specimen:  Urine from Urine, Other Updated:  12/30/18 1740     RBC, UA None Seen /hpf      WBC, UA None Seen /hpf      Epithelial Cells None Seen /hpf      Bacteria, UA Occasional /hpf      Hyaline Casts, UA None Seen /lpf     UA w Reflex to Microscopic w Reflex to Culture [[id number]]  (Abnormal) Collected:  12/30/18 1715    Lab Status:  Final result Specimen:  Urine from Urine, Other Updated:  12/30/18 1738     Color, UA Yellow     Clarity, UA Clear     Specific Gravity, UA 1 017     pH, UA 6 0     Leukocytes, UA Elevated glucose may cause decreased leukocyte values  See urine microscopic for [facility] result/ (A)     Nitrite, UA Negative     Protein, UA Trace (A) mg/dl      Glucose, UA >=1000 (1%) (A) mg/dl      Ketones, UA Negative mg/dl      Urobilinogen, UA 1 0 E U /dl      Bilirubin, UA Negative     Blood, UA Negative    Lactic Acid x2 [[id number]]  (Normal) Collected:  12/30/18 1419    Lab Status:  Final result Specimen:  Blood from Arm, Left Updated:  12/30/18 1532     LACTIC ACID 1 4 mmol/L     Narrative:         Result may be elevated if tourniquet was used during collection      Procalcitonin [[id number]]  (Normal) Collected:  12/30/18 1419    Lab Status:  Final result Specimen:  Blood from Arm, Left Updated:  12/30/18 1523     Procalcitonin 0 20 ng/ml     Protime-INR [[id number]]  (Normal) Collected:  12/30/18 1315    Lab Status:  Final result Specimen:  Blood from Arm, Right Updated:  12/30/18 1350     Protime 13 9 seconds      INR 1 06    APTT [[id number]]  (Abnormal) Collected:  12/30/18 1315    Lab Status:  Final result Specimen:  Blood from Arm, Right Updated:  12/30/18 1350     PTT 24 (L) seconds     Comprehensive metabolic panel [[id number]]  (Abnormal) Collected:  12/30/18 1315 Lab Status:  Final result Specimen:  Blood from Arm, Right Updated:  12/30/18 1337     Sodium 131 (L) mmol/L      Potassium 4 6 mmol/L      Chloride 97 (L) mmol/L      CO2 25 mmol/L      ANION GAP 9 mmol/L      BUN 19 mg/dL      Creatinine 1 55 (H) mg/dL      Glucose 426 (H) mg/dL      Calcium 9 0 mg/dL      AST 11 U/L      ALT 23 U/L      Alkaline Phosphatase 135 (H) U/L      Total Protein 6 6 g/dL      Albumin 2 6 (L) g/dL      Total Bilirubin 0 62 mg/dL      eGFR 31 ml/min/1 73sq m     Narrative:         National Kidney Disease Education Program recommendations are as follows:  GFR calculation is accurate only with a steady state creatinine  Chronic Kidney disease less than 60 ml/min/1 73 sq  meters  Kidney failure less than 15 ml/min/1 73 sq  meters  CBC and differential [[id number]]  (Abnormal) Collected:  12/30/18 1315    Lab Status:  Final result Specimen:  Blood from Arm, Right Updated:  12/30/18 1322     WBC 13 88 (H) Thousand/uL      RBC 4 00 Million/uL      Hemoglobin 12 3 g/dL      Hematocrit 37 6 %      MCV 94 fL      MCH 30 8 pg      MCHC 32 7 g/dL      RDW 14 8 %      MPV 11 5 fL      Platelets 231 Thousands/uL      nRBC 0 /100 WBCs      Neutrophils Relative 76 (H) %      Immat GRANS % 2 %      Lymphocytes Relative 12 (L) %      Monocytes Relative 8 %      Eosinophils Relative 1 %      Basophils Relative 1 %      Neutrophils Absolute 10 67 (H) Thousands/µL      Immature Grans Absolute 0 22 (H) Thousand/uL      Lymphocytes Absolute 1 71 Thousands/µL      Monocytes Absolute 1 09 Thousand/µL      Eosinophils Absolute 0 10 Thousand/µL      Basophils Absolute 0 09 Thousands/µL                  XR forearm 2 views LEFT   ED Interpretation by Alysa Noriega MD (12/30 7070)   No fracture      Final Result by Kaveh Orozco MD (12/31 2222)      Swelling    No fracture            Workstation performed: [id number]         XR hip/pelv 2-3 vws right   ED Interpretation by Alysa Noriega MD (12/30 1452)   No fracture      Final Result by Wilma Mathis MD (12/31 3318)      No acute osseous abnormality  Workstation performed: [id number]         XR elbow 3+ views LEFT   ED Interpretation by Khushbu Hernández MD (12/30 1452)   No fracture      Final Result by Wilma Mathis MD (12/31 8088)      No acute osseous abnormality  Workstation performed: [id number]         VAS upper limb venous duplex scan, unilateral/limited   Final Result by Zenon Rodriguez MD (31/53 3562)      CT forearm left wo contrast   Final Result by Ivette Graf MD (12/30 1851)      There is subcutaneous edema over the elbow and forearm in keeping with history of cellulitis  Evaluation for abscess is limited without IV contrast, but there is no gross evidence of abscess formation  There is also no evidence of deeper infection  Workstation performed: [id number]         CT humerus left wo contrast   Final Result by Ivette Graf MD (12/30 1851)      There is subcutaneous edema over the elbow and forearm in keeping with history of cellulitis  Evaluation for abscess is limited without IV contrast, but there is no gross evidence of abscess formation  There is also no evidence of deeper infection  Workstation performed: [id number]               Procedures  Procedures      Phone Consults  ED Phone Contact    ED Course                         Initial Sepsis Screening     [address] Name 12/30/18 1322                Is the patient's history suggestive of a new or worsening infection? (!)  Yes (Proceed)  -KN        Suspected source of infection soft tissue  -KN        Are two or more of the following signs & symptoms of infection both present and new to the patient?  (!)  Yes (Proceed)  -KN        Indicate SIRS criteria Tachycardia > 90 bpm;Leukocytosis (WBC > 67[address] IJL)  -KN        If the answer is yes to both questions, suspicion of sepsis is present          If severe sepsis is present AND tissue hypoperfusion perists in the hour after fluid resuscitation or lactate > 4, the patient meets criteria for SEPTIC SHOCK          Are any of the following organ dysfunction criteria present within 6 hours of suspected infection and SIRS criteria that are NOT considered to be chronic conditions? (!)  Yes  -KN        Organ dysfunction   -KN        Date of presentation of severe sepsis          Time of presentation of severe sepsis          Tissue hypoperfusion persists in the hour after crystalloid fluid administration, evidenced, by either:          Was hypotension present within one hour of the conclusion of crystalloid fluid administration?         Date of presentation of septic shock          Time of presentation of septic shock            User Key  (r) = Recorded By, (t) = Taken By, (c) = Cosigned By    [address] Name Provider Mariella Lambert MD Resident                  MDM  Number of Diagnoses or Management Options  Left arm cellulitis:   Diagnosis management comments: A 4year-old woman presents with left arm redness and swelling consistent with cellulitis  Will do a full septic workup    Will start antibiotics admit to the hospital     [facility] Time    Disposition  Final diagnoses:   Left arm cellulitis     Time reflects when diagnosis was documented in both MDM as applicable and the Disposition within this note     Time User Action Codes Description Comment    12/30/2018  4:23 PM Gunnar Cooper Add [Y54 020] Left arm cellulitis     12/30/2018  4:46 PM [place] Add [I20 [id number],  H54 7] 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 ([facility] [address] )     12/30/2018  4:46 PM [facility] Modify [M41 [id number],  H54 7] 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 [facility])       ED Disposition     ED Disposition Condition Comment    Admit  Case was discussed with JOSÉ LUIS and the patient's admission status was agreed to be Admission Status: inpatient status to the service of Dr Rony Campbell   Follow-up Information     Follow up With Specialties Details Why Shwetaceli Zay Green MD Orthopedic Surgery Follow up as needed [address]            Current Discharge Medication List      CONTINUE these medications which have NOT CHANGED    Details   ALPHAGAN P 0 15 % ophthalmic solution INSTILL 1 DROP INTO BOTH EYES TWICE DAILY INSTILL 1 DROP EN AMBOS OJOS DOS VECES AL MAC  Refills: 6      aspirin (ASPIRIN 81) 81 mg chewable tablet Chew 1 tablet (81 mg total) daily Ninety day supplies please  Qty: 90 tablet, Refills: 3    Associated Diagnoses: Type 2 diabetes mellitus with hyperglycemia, with long-term current use of insulin ([facility])      atorvastatin (LIPITOR) 40 mg tablet Take 1 tablet (40 mg total) by mouth every 24 hours  Qty: 90 tablet, Refills: 3    Associated Diagnoses: Type 2 diabetes mellitus with hyperglycemia, with long-term current use of insulin ([facility])      CLEVER CHOICE COMFORT EZ 33G X 4 MM MISC Ninety day supplies please  Qty: 300 each, Refills: 3    Associated Diagnoses: Type 2 diabetes mellitus with hyperglycemia, with long-term current use of insulin ([facility])      COMBIGAN 0 2-0 5 % Refills: 0      dorzolamide (TRUSOPT) 2 % ophthalmic solution instill 1 drop into right eye twice a day  Refills: 0      EASY TOUCH LANCETS 32G MISC To use 4 times a day  Qty: 300 each, Refills: 3    Associated Diagnoses: Type 2 diabetes mellitus with hyperglycemia, with long-term current use of insulin ([facility])      glucose blood test strip To use 4 times a day  Ninety day supplies please  Qty: 300 each, Refills: 3    Associated Diagnoses: Type 2 diabetes mellitus with hyperglycemia, with long-term current use of insulin ([facility])      insulin degludec (TRESIBA FLEXTOUCH) 100 units/mL injection pen To use 30 U at bedtime      Ninety day supplies please  Qty: 9 pen, Refills: 3 Associated Diagnoses: Type 2 diabetes mellitus with hyperglycemia, with long-term current use of insulin ([facility])      ipratropium (ATROVENT HFA) 17 mcg/act inhaler Inhale 2 puffs 4 (four) times a day Ninety day supplies please  Qty: 3 Inhaler, Refills: 3    Associated Diagnoses: COPD mixed type ([facility])      lisinopril (ZESTRIL) 10 mg tablet Take 1 tablet (10 mg total) by mouth daily  Qty: 90 tablet, Refills: 3    Associated Diagnoses: Type 2 diabetes mellitus with hyperglycemia, with long-term current use of insulin ([facility])      NOVOLOG FLEXPEN 100 units/mL injection pen 30 UNITS 3 TIMES A DAY WITH MEALS    Ninety day supplies please  Qty: 25 pen, Refills: 0    Associated Diagnoses: Type 2 diabetes mellitus with hyperglycemia, with long-term current use of insulin ([facility])      omeprazole (PriLOSEC) 20 mg delayed release capsule Take 1 capsule (20 mg total) by mouth daily Ninety day supplies please  Qty: 90 capsule, Refills: 3    Associated Diagnoses: Other gastritis without hemorrhage, unspecified chronicity      oxyCODONE (ROXICODONE) 5 mg immediate release tablet Take 1 tablet (5 mg total) by mouth 2 (two) times a day Max Daily Amount: 10 mg  Qty: 60 tablet, Refills: 0    Associated Diagnoses: Chronic bilateral low back pain with bilateral sciatica      pentoxifylline (TRENtal) 400 mg ER tablet Take 1 tablet (400 mg total) by mouth 3 (three) times a day with meals Ninety day supplies please  Qty: 270 tablet, Refills: 3    Associated Diagnoses: PAD (peripheral artery disease) ([facility])      pregabalin (LYRICA) 100 mg capsule ONE CAPSULE TWICE A DAY  Ninety day supplies please  Qty: 180 capsule, Refills: 3    Associated Diagnoses: 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 ([facility])      rOPINIRole (REQUIP) 0 25 mg tablet Take 2 tablets (0 5 mg total) by mouth daily at bedtime  Qty: 90 tablet, Refills: 3    Associated Diagnoses: Restless legs syndrome      sitaGLIPtin (JANUVIA) 50 mg tablet Take 1 tablet (50 mg total) by mouth daily Ninety day supplies please  Qty: 90 tablet, Refills: 3    Associated Diagnoses: Type 2 diabetes mellitus with hyperglycemia, with long-term current use of insulin (HCC)           No discharge procedures on file  ED Provider  Attending physically available and evaluated Дмитрий Ya I managed the patient along with the ED Attending      Electronically Signed by         Tharon Sandhoff, MD  01/02/19 2413

## 2019-01-02 NOTE — RESTORATIVE TECHNICIAN NOTE
Restorative Specialist Mobility Note       Activity: Ambulate in mandujano, Ambulate in room, Bathroom privileges, Dangle, Stand at bedside (Educated/encouraged pt to ambulate with assistance 3-4 x's/day  Bed alarm on   Pt callbell, phone/tray within reach )     Assistive Device: Front wheel walker    Ana CLAUDIO, Restorative Technician, [employer]

## 2019-01-02 NOTE — NURSING NOTE
Pt resting in bed, family at bedside preparing to leave for the night  Pt family requesting pain medicine for hip pain 5/10  Given PRN oxycodone  Pt resting with pillow support under hip at this time

## 2019-01-02 NOTE — PROGRESS NOTES
Progress note- Adolfo Kill 1935, 81 y o  female MRN: [id number]    Unit/Bed#: [facility] 511-01 Encounter: [id number]    Primary Care Provider: Benjamin Briscoe MD   Date and time admitted to hospital: 12/30/2018 12:20 PM        Cellulitis of left upper extremity   Assessment & Plan    Improving, from my discussion with patient on the phone with Yi interpretor  monitor temps, WBC count    ceftriaxone  Check MRSA screen if negative discontinue vancomycin,started ceftriaxone  Left arm elevation  Orthopedics is following, CT negative for abscess  Blood cultures were negative for 48 hrs     * Sepsis ([facility] [address] )   Assessment & Plan    As above  resolved     Acute renal failure superimposed on stage 3 chronic kidney disease ([facility] [address] )   Assessment & Plan    Improving renal function  Continue IV fluids  Continue to monitor  Avoid nephrotoxins       Accident due to mechanical fall without injury   Assessment & Plan    Fall precautions, PT and OT     Hypertension   Assessment & Plan    Continue to Hold lisinopril secondary to acute kidney injury  Continue to monitor       Pain of right lower extremity   Assessment & Plan    Appears to be consistent right-sided radiculopathy  Neurological intact otherwise  Continue Lyrica, when renal function stabilized possibly increase  P r n   Tylenol     Type 2 diabetes mellitus with hyperglycemia, with long-term current use of insulin [facility])   Assessment & Plan    Lab Results   Component Value Date    HGBA1C 8 5 (H) 12/20/2018       Recent Labs      12/31/18   0356  12/31/18   0544  12/31/18   0736  12/31/18   1059   POCGLU  173*  168*  151*  68       Blood Sugar Average: Last 72 hrs:  (P) 195 with severe hyperglycemia likely related to sepsis, for now keep the patient on insulin drip non DKA protocol and consult Endocrinology  Diabetic diet  Basal and prandial insulin  Endocrinology increased her lantus to 30 units in the morning  11 units of lispro with meals  Restarted januvia  Need to monitor fingerstick glucose one more day for improvement before discharge tomorrow             VTE Pharmacologic Prophylaxis:   Pharmacologic: Heparin  Mechanical VTE Prophylaxis in Place: Yes    Patient Centered Rounds: I have performed bedside rounds with nursing staff today  Education and Discussions with Family / Patient:  Patient, plan of care    Time Spent for Care: 15 minutes  More than 50% of total time spent on counseling and coordination of care as described above  Current Length of Stay: 1 day(s)    Current Patient Status: Inpatient   Certification Statement: The patient will continue to require additional inpatient hospital stay due to IV antibiotics    Discharge Plan:  Home when stable    Code Status: Level 1 - Full Code      Subjective:   She says improvement of her arm pain  She wants to go home  Objective:     Vitals:   Temp (24hrs), Av 2 °F (36 8 °C), Min:98 °F (36 7 °C), Max:98 5 °F (36 9 °C)    Temp:  [98 °F (36 7 °C)-98 5 °F (36 9 °C)] 98 °F (36 7 °C)  HR:  [68-96] 74  Resp:  [17-20] 20  BP: (130-172)/(63-91) 159/91  SpO2:  [96 %-100 %] 97 %  Body mass index is 25 81 kg/m²  Input and Output Summary (last 24 hours): Intake/Output Summary (Last 24 hours) at 18 1146  Last data filed at 18 0648   Gross per 24 hour   Intake          1766 67 ml   Output              750 ml   Net          1016 67 ml       Physical Exam:     Physical Exam   Constitutional: She is oriented to person, place, and time  She appears well-developed and well-nourished  HENT:   Head: Normocephalic and atraumatic  Eyes: Pupils are equal, round, and reactive to light  No scleral icterus  Neck: Neck supple  No JVD present  Cardiovascular: Normal rate and regular rhythm  Pulmonary/Chest: Effort normal    Abdominal: Soft  There is no tenderness  Musculoskeletal:   Stable erythema and edema of the left upper extremity   Neurological: She is alert and oriented to person, place, and time  Skin: Skin is warm  There is erythema  Additional Data:     Labs:      Results from last 7 days  Lab Units 12/31/18  0354  12/30/18  1315   WBC Thousand/uL 13 99*  --  13 88*   HEMOGLOBIN g/dL 12 0  --  12 3   HEMATOCRIT % 36 6  --  37 6   PLATELETS Thousands/uL 253  < > 242   NEUTROS PCT %  --   --  76*   LYMPHS PCT %  --   --  12*   MONOS PCT %  --   --  8   EOS PCT %  --   --  1   < > = values in this interval not displayed  Results from last 7 days  Lab Units 12/31/18  0354  12/30/18  1315   SODIUM mmol/L 132*  < > 131*   POTASSIUM mmol/L 5 0  < > 4 6   CHLORIDE mmol/L 100  < > 97*   CO2 mmol/L 23  < > 25   BUN mg/dL 15  < > 19   CREATININE mg/dL 1 25  < > 1 55*   ANION GAP mmol/L 9  < > 9   CALCIUM mg/dL 9 1  < > 9 0   ALBUMIN g/dL  --   --  2 6*   TOTAL BILIRUBIN mg/dL  --   --  0 62   ALK PHOS U/L  --   --  135*   ALT U/L  --   --  23   AST U/L  --   --  11   GLUCOSE RANDOM mg/dL 146*  < > 426*   < > = values in this interval not displayed  Results from last 7 days  Lab Units 12/30/18  1315   INR  1 06       Results from last 7 days  Lab Units 12/31/18  1059 12/31/18  0736 12/31/18  0544 12/31/18  0356 12/31/18  0147 12/30/18  2349 12/30/18  2210 12/30/18  1909   POC GLUCOSE mg/dl 68 151* 168* 173* 74 185* 248* 493*           Results from last 7 days  Lab Units 12/30/18  2009 12/30/18  1419   LACTIC ACID mmol/L 1 4 1 4   PROCALCITONIN ng/ml 0 10 0 20           * I Have Reviewed All Lab Data Listed Above  * Additional Pertinent Lab Tests Reviewed:  All Labs Within Last 24 Hours Reviewed    Imaging:        Recent Cultures (last 7 days):           Last 24 Hours Medication List:     Current Facility-Administered Medications:  aspirin 81 mg Oral Daily Petty Cooley MD    atorvastatin 40 mg Oral Q24H Petty Cooley MD    brimonidine 1 drop Both Eyes BID Petty Cooley MD    docusate sodium 100 mg Oral BID Petty Cooley MD    dorzolamide 1 drop Right Eye BID Petty Cooley MD    heparin (porcine) 5,000 Units Subcutaneous [facility] Nika Jesus MD    hydrALAZINE 10 mg Intravenous Q6H PRN Antoinette Galloway MD    insulin glargine 30 Units Subcutaneous HS Rogerio Calle PA-C    insulin lispro 1-5 Units Subcutaneous TID AC Rogerio Calle PA-C    insulin lispro 1-5 Units Subcutaneous HS Rogerio Calle PA-C    insulin lispro 30 Units Subcutaneous Daily With Breakfast Valentina Mendez PA-C    ipratropium 2 puff Inhalation 4x Daily Nika Jesus MD    lactulose 30 g Oral Once Antoinette Galloway MD    lidocaine 1 patch Topical Daily Nika Jesus MD    naloxone 0 04 mg Intravenous Q1MIN PRN Nika Jesus MD    ondansetron 4 mg Intravenous Q4H PRN Nika Jesus MD    oxyCODONE 2 5 mg Oral Q4H PRN Nika Jesus MD    oxyCODONE 5 mg Oral Q4H PRN Nika Jesus MD    pantoprazole 20 mg Oral Early Morning Nika Jesus MD    pregabalin 100 mg Oral Daily Nika Jesus MD    rOPINIRole 0 5 mg Oral HS Nika Jesus MD    senna 1 tablet Oral Daily Nika Jesus MD    sodium chloride 75 mL/hr Intravenous Continuous Nika Jesus MD Last Rate: Stopped (12/31/18 5822)   timolol 1 drop Both Eyes BID Nika Jesus MD    vancomycin 15 mg/kg Intravenous Q24H Nika Jesus MD         Today, Patient Was Seen By: Shruthi Mason MD    ** Please Note: Dictation voice to text software may have been used in the creation of this document   **

## 2019-01-03 LAB
GLUCOSE SERPL-MCNC: 133 MG/DL (ref 65–140)
GLUCOSE SERPL-MCNC: 140 MG/DL (ref 65–140)
GLUCOSE SERPL-MCNC: 289 MG/DL (ref 65–140)
GLUCOSE SERPL-MCNC: 429 MG/DL (ref 65–140)

## 2019-01-03 PROCEDURE — 97116 GAIT TRAINING THERAPY: CPT

## 2019-01-03 PROCEDURE — 82948 REAGENT STRIP/BLOOD GLUCOSE: CPT

## 2019-01-03 PROCEDURE — 99232 SBSQ HOSP IP/OBS MODERATE 35: CPT | Performed by: INTERNAL MEDICINE

## 2019-01-03 PROCEDURE — 99233 SBSQ HOSP IP/OBS HIGH 50: CPT | Performed by: INTERNAL MEDICINE

## 2019-01-03 RX ORDER — INSULIN GLARGINE 100 [IU]/ML
35 INJECTION, SOLUTION SUBCUTANEOUS EVERY MORNING
Status: DISCONTINUED | OUTPATIENT
Start: 2019-01-04 | End: 2019-01-05

## 2019-01-03 RX ADMIN — CEPHALEXIN 500 MG: 500 CAPSULE ORAL at 05:17

## 2019-01-03 RX ADMIN — ASPIRIN 81 MG 81 MG: 81 TABLET ORAL at 09:55

## 2019-01-03 RX ADMIN — POLYETHYLENE GLYCOL 3350 17 G: 17 POWDER, FOR SOLUTION ORAL at 16:13

## 2019-01-03 RX ADMIN — ROPINIROLE 0.5 MG: 0.25 TABLET, FILM COATED ORAL at 21:55

## 2019-01-03 RX ADMIN — TIMOLOL MALEATE 1 DROP: 5 SOLUTION/ DROPS OPHTHALMIC at 18:31

## 2019-01-03 RX ADMIN — LIDOCAINE 1 PATCH: 50 PATCH CUTANEOUS at 09:55

## 2019-01-03 RX ADMIN — INSULIN LISPRO 6 UNITS: 100 INJECTION, SOLUTION INTRAVENOUS; SUBCUTANEOUS at 13:04

## 2019-01-03 RX ADMIN — IPRATROPIUM BROMIDE 2 PUFF: 17 AEROSOL, METERED RESPIRATORY (INHALATION) at 18:32

## 2019-01-03 RX ADMIN — IPRATROPIUM BROMIDE 2 PUFF: 17 AEROSOL, METERED RESPIRATORY (INHALATION) at 09:51

## 2019-01-03 RX ADMIN — DORZOLAMIDE HYDROCHLORIDE 1 DROP: 20 SOLUTION/ DROPS OPHTHALMIC at 18:31

## 2019-01-03 RX ADMIN — PREGABALIN 100 MG: 100 CAPSULE ORAL at 09:55

## 2019-01-03 RX ADMIN — HEPARIN SODIUM 5000 UNITS: 5000 INJECTION INTRAVENOUS; SUBCUTANEOUS at 05:16

## 2019-01-03 RX ADMIN — DORZOLAMIDE HYDROCHLORIDE 1 DROP: 20 SOLUTION/ DROPS OPHTHALMIC at 09:51

## 2019-01-03 RX ADMIN — SENNOSIDES 8.6 MG: 8.6 TABLET, FILM COATED ORAL at 09:55

## 2019-01-03 RX ADMIN — SODIUM CHLORIDE 500 ML: 0.9 INJECTION, SOLUTION INTRAVENOUS at 10:08

## 2019-01-03 RX ADMIN — CEPHALEXIN 500 MG: 500 CAPSULE ORAL at 21:55

## 2019-01-03 RX ADMIN — IPRATROPIUM BROMIDE 2 PUFF: 17 AEROSOL, METERED RESPIRATORY (INHALATION) at 21:55

## 2019-01-03 RX ADMIN — TIMOLOL MALEATE 1 DROP: 5 SOLUTION/ DROPS OPHTHALMIC at 09:51

## 2019-01-03 RX ADMIN — SITAGLIPTIN 50 MG: 50 TABLET, FILM COATED ORAL at 09:56

## 2019-01-03 RX ADMIN — BRIMONIDINE TARTRATE 1 DROP: 1.5 SOLUTION OPHTHALMIC at 09:51

## 2019-01-03 RX ADMIN — ATORVASTATIN CALCIUM 40 MG: 40 TABLET, FILM COATED ORAL at 18:30

## 2019-01-03 RX ADMIN — IPRATROPIUM BROMIDE 2 PUFF: 17 AEROSOL, METERED RESPIRATORY (INHALATION) at 13:05

## 2019-01-03 RX ADMIN — HEPARIN SODIUM 5000 UNITS: 5000 INJECTION INTRAVENOUS; SUBCUTANEOUS at 21:55

## 2019-01-03 RX ADMIN — HEPARIN SODIUM 5000 UNITS: 5000 INJECTION INTRAVENOUS; SUBCUTANEOUS at 13:03

## 2019-01-03 RX ADMIN — INSULIN LISPRO 4 UNITS: 100 INJECTION, SOLUTION INTRAVENOUS; SUBCUTANEOUS at 09:56

## 2019-01-03 RX ADMIN — INSULIN GLARGINE 30 UNITS: 100 INJECTION, SOLUTION SUBCUTANEOUS at 09:54

## 2019-01-03 RX ADMIN — DOCUSATE SODIUM 100 MG: 100 CAPSULE, LIQUID FILLED ORAL at 09:55

## 2019-01-03 RX ADMIN — BRIMONIDINE TARTRATE 1 DROP: 1.5 SOLUTION OPHTHALMIC at 18:31

## 2019-01-03 RX ADMIN — CEPHALEXIN 500 MG: 500 CAPSULE ORAL at 13:04

## 2019-01-03 RX ADMIN — DOCUSATE SODIUM 100 MG: 100 CAPSULE, LIQUID FILLED ORAL at 18:30

## 2019-01-03 RX ADMIN — PANTOPRAZOLE SODIUM 20 MG: 20 TABLET, DELAYED RELEASE ORAL at 05:17

## 2019-01-03 NOTE — PLAN OF CARE
Problem: PHYSICAL THERAPY ADULT  Goal: Performs mobility at highest level of function for planned discharge setting  See evaluation for individualized goals  Treatment/Interventions: Functional transfer training, LE strengthening/ROM, Therapeutic exercise, Endurance training, Patient/family training, Equipment eval/education, Bed mobility, Gait training, Spoke to nursing, OT  Equipment Recommended: [employer] (may progress to no AD)       See flowsheet documentation for full assessment, interventions and recommendations  Outcome: Progressing  Prognosis: Good  Problem List: Decreased strength, Decreased endurance, Impaired balance, Decreased mobility, Impaired judgement, Decreased safety awareness, Decreased cognition, Decreased skin integrity, Pain  Assessment: Pt seen for physical therapy treatment consisting of completion of bed mobility activities, ambulation on level surfaces and ambulation on stairs with assistance  [facility]  phone and PCA who speaks fluent Micronesian were assisting with translation throughout entire PT rx  Pt does have 8 steps into her home but is assisted by her daughter for all mobility activities for safety as per pt report  Pt did well with ambulation with close supervision to CG assist with use of SPC  Recommend d/c to home with 24/7 assist for all  Mobility activities as needed when pt is medically ready for d/c  Pt would also benefit from home PT rx's as long as there is someone available who can translate or PT is fluent in [place] as well  Barriers to Discharge: None     Recommendation: Home with family support, Home PT     PT - OK to Discharge: Yes    See flowsheet documentation for full assessment

## 2019-01-03 NOTE — PROGRESS NOTES
Progress note- Buffy Hirsch 1935, 81 y o  female MRN: [id number]    Unit/Bed#: [facility] 511-01 Encounter: [id number]    Primary Care Provider: Michel Monae MD   Date and time admitted to hospital: 12/30/2018 12:20 PM        Cellulitis of left upper extremity   Assessment & Plan    Improving, from my discussion with patient on the phone with Bulgarian interpretor  monitor temps, WBC count    MRSA screen if negative discontinue vancomycin,started ceftriaxone  Ceftriaxone, de-scalated to keflex  Encourage Left arm elevation  Orthopedics is following, CT negative for abscess  Blood cultures were negative for more than 48 hrs     * Sepsis ([facility] [address] )   Assessment & Plan    As above  resolved     Acute renal failure superimposed on stage 3 chronic kidney disease ([facility] [address] )   Assessment & Plan    Improving renal function  Continue IV fluids  Continue to monitor  Avoid nephrotoxins       Accident due to mechanical fall without injury   Assessment & Plan    Fall precautions, PT and OT     Hypertension   Assessment & Plan    Continue to Hold lisinopril secondary to acute kidney injury  Continue to monitor       Pain of right lower extremity   Assessment & Plan    Appears to be consistent right-sided radiculopathy  Neurological intact otherwise  Continue Lyrica, when renal function stabilized possibly increase  P r n   Tylenol     Type 2 diabetes mellitus with hyperglycemia, with long-term current use of insulin [facility])   Assessment & Plan    Lab Results   Component Value Date    HGBA1C 8 5 (H) 12/20/2018       Recent Labs      12/31/18   0356  12/31/18   0544  12/31/18   0736  12/31/18   1059   POCGLU  173*  168*  151*  68       Blood Sugar Average: Last 72 hrs:  (P) 195 with severe hyperglycemia likely related to sepsis, for now keep the patient on insulin drip non DKA protocol and consult Endocrinology  Diabetic diet  Basal and prandial insulin  Endocrinology increased her lantus to 30 units in the morning, increased lispro to 13 from 11 units before meals  Restarted januvia 50 mg home dose at bedtime  Need to monitor fingerstick glucose one more day for improvement before discharge today  Discuss with endocrinology               VTE Pharmacologic Prophylaxis:   Pharmacologic: Heparin  Mechanical VTE Prophylaxis in Place: Yes    Patient Centered Rounds: I have performed bedside rounds with nursing staff today  Education and Discussions with Family / Patient:  Patient, plan of care    Time Spent for Care: 15 minutes  More than 50% of total time spent on counseling and coordination of care as described above  Current Length of Stay: 1 day(s)    Current Patient Status: Inpatient   Certification Statement: The patient will continue to require additional inpatient hospital stay due to IV antibiotics    Discharge Plan:  Home when stable    Code Status: Level 1 - Full Code      Subjective:   She says improvement of her arm pain  She wants to go home  Objective:     Vitals:   Temp (24hrs), Av 2 °F (36 8 °C), Min:98 °F (36 7 °C), Max:98 5 °F (36 9 °C)    Temp:  [98 °F (36 7 °C)-98 5 °F (36 9 °C)] 98 °F (36 7 °C)  HR:  [68-96] 74  Resp:  [17-20] 20  BP: (130-172)/(63-91) 159/91  SpO2:  [96 %-100 %] 97 %  Body mass index is 25 81 kg/m²  Input and Output Summary (last 24 hours): Intake/Output Summary (Last 24 hours) at 18 1146  Last data filed at 18 0648   Gross per 24 hour   Intake          1766 67 ml   Output              750 ml   Net          1016 67 ml       Physical Exam:     Physical Exam   Constitutional: She is oriented to person, place, and time  She appears well-developed and well-nourished  HENT:   Head: Normocephalic and atraumatic  Eyes: Pupils are equal, round, and reactive to light  No scleral icterus  Neck: Neck supple  No JVD present  Cardiovascular: Normal rate and regular rhythm  Pulmonary/Chest: Effort normal    Abdominal: Soft  There is no tenderness     Musculoskeletal:   Stable erythema and edema of the left upper extremity   Neurological: She is alert and oriented to person, place, and time  Skin: Skin is warm  There is erythema  Additional Data:     Labs:      Results from last 7 days  Lab Units 12/31/18  0354  12/30/18  1315   WBC Thousand/uL 13 99*  --  13 88*   HEMOGLOBIN g/dL 12 0  --  12 3   HEMATOCRIT % 36 6  --  37 6   PLATELETS Thousands/uL 253  < > 242   NEUTROS PCT %  --   --  76*   LYMPHS PCT %  --   --  12*   MONOS PCT %  --   --  8   EOS PCT %  --   --  1   < > = values in this interval not displayed  Results from last 7 days  Lab Units 12/31/18  0354  12/30/18  1315   SODIUM mmol/L 132*  < > 131*   POTASSIUM mmol/L 5 0  < > 4 6   CHLORIDE mmol/L 100  < > 97*   CO2 mmol/L 23  < > 25   BUN mg/dL 15  < > 19   CREATININE mg/dL 1 25  < > 1 55*   ANION GAP mmol/L 9  < > 9   CALCIUM mg/dL 9 1  < > 9 0   ALBUMIN g/dL  --   --  2 6*   TOTAL BILIRUBIN mg/dL  --   --  0 62   ALK PHOS U/L  --   --  135*   ALT U/L  --   --  23   AST U/L  --   --  11   GLUCOSE RANDOM mg/dL 146*  < > 426*   < > = values in this interval not displayed  Results from last 7 days  Lab Units 12/30/18  1315   INR  1 06       Results from last 7 days  Lab Units 12/31/18  1059 12/31/18  0736 12/31/18  0544 12/31/18  0356 12/31/18  0147 12/30/18  2349 12/30/18  2210 12/30/18  1909   POC GLUCOSE mg/dl 68 151* 168* 173* 74 185* 248* 493*           Results from last 7 days  Lab Units 12/30/18  2009 12/30/18  1419   LACTIC ACID mmol/L 1 4 1 4   PROCALCITONIN ng/ml 0 10 0 20           * I Have Reviewed All Lab Data Listed Above  * Additional Pertinent Lab Tests Reviewed:  All Labs Within Last 24 Hours Reviewed    Imaging:        Recent Cultures (last 7 days):           Last 24 Hours Medication List:     Current Facility-Administered Medications:  aspirin 81 mg Oral Daily Fernando Mccarty MD    atorvastatin 40 mg Oral Q24H Fernando Mccarty MD    brimonidine 1 drop Both Eyes BID Fernando Mccarty MD docusate sodium 100 mg Oral BID Dariel Garcia MD    dorzolamide 1 drop Right Eye BID Dariel Garcia MD    heparin (porcine) 5,000 Units Subcutaneous [facility] Dariel Garcia MD    hydrALAZINE 10 mg Intravenous Q6H PRN Mel Blake MD    insulin glargine 30 Units Subcutaneous HS Rogerio Calle PA-C    insulin lispro 1-5 Units Subcutaneous TID AC Rogerio Calle PA-C    insulin lispro 1-5 Units Subcutaneous HS Rogerio Calle PA-C    insulin lispro 30 Units Subcutaneous Daily With Breakfast Jam Salamanca PA-C    ipratropium 2 puff Inhalation 4x Daily Dariel Garcia MD    lactulose 30 g Oral Once Mel Blake MD    lidocaine 1 patch Topical Daily Dariel Garcia MD    naloxone 0 04 mg Intravenous Q1MIN PRN Dariel Garcia MD    ondansetron 4 mg Intravenous Q4H PRN Dariel Garcia MD    oxyCODONE 2 5 mg Oral Q4H PRN Dariel Garcia MD    oxyCODONE 5 mg Oral Q4H PRN Dariel Garcia MD    pantoprazole 20 mg Oral Early Morning Dariel Garcia MD    pregabalin 100 mg Oral Daily Dariel Garcia MD    rOPINIRole 0 5 mg Oral HS Dariel Garcia MD    senna 1 tablet Oral Daily Dariel Garcia MD    sodium chloride 75 mL/hr Intravenous Continuous Dariel Garcia MD Last Rate: Stopped (12/31/18 8352)   timolol 1 drop Both Eyes BID Dariel Garcia MD    vancomycin 15 mg/kg Intravenous Q24H Dariel Garcia MD         Today, Patient Was Seen By: Miguelina Gann MD    ** Please Note: Dictation voice to text software may have been used in the creation of this document   **

## 2019-01-03 NOTE — PHYSICAL THERAPY NOTE
Physical Therapy Treatment  Federico Cardneas, PT   01/03/19 1422   Pain Assessment   Pain Assessment 0-10   Pain Score (unable to articulate actual number for pain  )   Pain Type Chronic pain   Pain Location Buttocks;Leg   Pain Orientation Bilateral   Pain Radiating Towards mid-[facility]  Hospital Pain Intervention(s) Repositioned; Ambulation/increased activity; Emotional support   Response to Interventions unchanged  Restrictions/Precautions   Weight Bearing Precautions Per Order Yes   LUE Weight Bearing Per Order NWB   Other Precautions Telemetry; Fall Risk;Pain; Chair Alarm  (? cog deficit? )   General   Chart Reviewed Yes   Response to Previous Treatment Patient with no complaints from previous session  Family/Caregiver Present No  ([employer]  phone and PCA who speaks Polish assisted  )   Cognition   Overall Cognitive Status Impaired   Arousal/Participation Alert; Responsive; Cooperative   Attention Within functional limits   Orientation Level Oriented to person;Oriented to place;Oriented to situation   Memory Unable to assess   Following Commands Follows one step commands with increased time or repetition   Comments Again, pt with difficulty remembering NWB status L UE orders  Bed Mobility   Supine to Sit 4  Minimal assistance   Additional items Assist x 1; Increased time required;Verbal cues   Additional Comments Pt remained seated in recliner with instructions not to get up unassisted  Call bell on lap  Chair alarm engaged   used to assure pt full understanding  Transfers   Sit to Stand 4  Minimal assistance  (CG assist  )   Additional items Assist x 1; Increased time required;Verbal cues  (2nd trial with [facility] and close supervision  )   Stand to Sit 5  Supervision   Additional items Assist x 1; Increased time required;Verbal cues   Additional Comments HHA used 1st time  Then SPC used  Ambulation/Elevation   Gait pattern Excessively slow; Short stride; Ataxia   Gait Assistance 4  Minimal assist   Additional items Assist x 1;Verbal cues   Assistive Device Other (Comment)  (1st walk with HHA, 2nd and 3rd with SPC  )   Distance 4'x1, 120'x2   Stair Management Assistance 4  Minimal assist   Additional items Assist x 1;Verbal cues  (VC's to avoid wt-bearing on L UE and to slow down  )   Stair Management Technique One rail R;One rail L;Nonreciprocal   Number of Stairs 7   Balance   Static Sitting Good   Dynamic Sitting Fair +   Static Standing Fair   Dynamic Standing Fair   Ambulatory Fair -   Endurance Deficit   Endurance Deficit Yes   Endurance Deficit Description pain, slight fatigue  Activity Tolerance   Activity Tolerance Patient tolerated treatment well;Patient limited by fatigue   Nurse Made Aware RN consented to allow pt to participate in PT rx  Exercises   Knee AROM Long Arc Quad (Pt refused indicating she was having back/leg pain  )   Assessment   Prognosis Good   Problem List Decreased strength;Decreased endurance; Impaired balance;Decreased mobility; Impaired judgement;Decreased safety awareness;Decreased cognition;Decreased skin integrity;Pain   Assessment Pt seen for physical therapy treatment consisting of completion of bed mobility activities, ambulation on level surfaces and ambulation on stairs with assistance  [facility]  phone and PCA who speaks fluent Czech were assisting with translation throughout entire PT rx  Pt does have 8 steps into her home but is assisted by her daughter for all mobility activities for safety as per pt report  Pt did well with ambulation with close supervision to CG assist with use of SPC  Recommend d/c to home with 24/7 assist for all  Mobility activities as needed when pt is medically ready for d/c  Pt would also benefit from home PT rx's as long as there is someone available who can translate or PT is fluent in [address] as well  Barriers to Discharge None   Goals   Patient Goals Pt did not express goals for PT rx's       STG Expiration Date 01/13/19   Short Term Goal #2 Goals remain appropriate  Treatment Day 1   Plan   Treatment/Interventions Functional transfer training;LE strengthening/ROM; Patient/family training;Equipment eval/education; Bed mobility;Gait training;Spoke to nursing;Spoke to case management   Progress Progressing toward goals   PT Frequency Other (Comment)  (3-5x/wk)   Recommendation   Recommendation Home with family support;Home PT   Equipment Recommended Cane   PT - OK to Discharge Yes   Additional Comments Yes when pt is medically ready for d/c

## 2019-01-03 NOTE — PROGRESS NOTES
Progress Note - Gideon Garcia 81 y o  female MRN: [id number]    Unit/Bed#: [facility] 604-01 Encounter: [id number]      CC: diabetes f/u    Subjective:   Gideon Garcia is a 81y o  year old female with type 2 diabetes  No hypoglycemia  Eating well  Objective:     Vitals: Blood pressure 129/64, pulse 77, temperature 98 °F (36 7 °C), temperature source Oral, resp  rate 20, height 5' 3" (1 6 m), weight 65 4 kg (144 lb 2 9 oz), SpO2 98 %, not currently breastfeeding  ,Body mass index is 25 54 kg/m²  Intake/Output Summary (Last 24 hours) at 01/03/19 1412  Last data filed at 01/03/19 1244   Gross per 24 hour   Intake              240 ml   Output              620 ml   Net             -380 ml       Physical Exam:  General: No acute distress  HEENT: Normocephalic, atraumatic  Heart: Regular rate and rhythm  Lungs:  No respiratory distress  Extremities: No edema  Skin: Warm, dry  No rash  Neuro: Moves all 4 extremities spontaneously  Psych: Appropriate mood and affect  Cooperative  Lab, Imaging and other studies: I have personally reviewed pertinent reports  POC Glucose (mg/dl)   Date Value   01/03/2019 429 (H)   01/03/2019 289 (H)   01/02/2019 201 (H)   01/02/2019 298 (H)   01/02/2019 257 (H)   01/02/2019 233 (H)   01/01/2019 103   01/01/2019 281 (H)   01/01/2019 319 (H)   01/01/2019 349 (H)       Assessment/Plan:  1  Type 2 DM with hyperglycemia: Humalog dose missed this morning so hyperglycemia at lunch  Would increase Lantus to 35 units qam   Agree with increase in Humalog to 13 ac  Continue scale for correction  Will continue to follow and adjust regimen as needed  Monitor for hypoglycemia  Portions of the record may have been created with voice recognition software  Occasional wrong word or "sound a like" substitutions may have occurred due to the inherent limitations of voice recognition software   Read the chart carefully and recognize, using context, where substitutions have occurred

## 2019-01-03 NOTE — UTILIZATION REVIEW
Continued Stay Review    Date: 1/3/19    Vital Signs: /64 (BP Location: Right arm)   Pulse 77   Temp 98 °F (36 7 °C) (Oral)   Resp 20   Ht 5' 3" (1 6 m)   Wt 65 4 kg (144 lb 2 9 oz)   LMP  (LMP Unknown)   SpO2 98%   BMI 25 54 kg/m²     Medications:   Scheduled Meds:   Current Facility-Administered Medications:  aspirin 81 mg Oral Daily La Baum MD    atorvastatin 40 mg Oral Q24H La Baum MD    brimonidine 1 drop Both Eyes BID La Baum MD    cephalexin 500 mg Oral [facility] Deyanira Barlow MD    docusate sodium 100 mg Oral BID La Baum MD    dorzolamide 1 drop Right Eye BID La Baum MD    heparin (porcine) 5,000 Units Subcutaneous [facility] La Baum MD    hydrALAZINE 10 mg Intravenous Q6H PRN Carlo Zepeda MD    insulin glargine 30 Units Subcutaneous QAM Deyanira Barlow MD    insulin lispro 1-5 Units Subcutaneous HS Rogerio Calle PA-C    insulin lispro 1-6 Units Subcutaneous TID AC Luciana Romero MD    insulin lispro 13 Units Subcutaneous TID With Meals Deyanira Barlow MD    ipratropium 2 puff Inhalation 4x Daily La Baum MD    lidocaine 1 patch Topical Daily La Baum MD    naloxone 0 04 mg Intravenous Q1MIN PRN La Baum MD    ondansetron 4 mg Intravenous Q4H PRN La Baum MD    oxyCODONE 2 5 mg Oral Q4H PRN La Baum MD    oxyCODONE 5 mg Oral Q4H PRN La Baum MD    pantoprazole 20 mg Oral Early Morning La Baum MD    polyethylene glycol 17 g Oral BID AC Carlo Zepeda MD    pregabalin 100 mg Oral Daily La Baum MD    rOPINIRole 0 5 mg Oral HS La Baum MD    senna 1 tablet Oral Daily La Baum MD    sitaGLIPtin 50 mg Oral Daily Deyanira Barlow MD    sodium chloride 500 mL Intravenous Once Deyanira Barlow MD Last Rate: 500 mL (01/03/19 1008)   timolol 1 drop Both Eyes BID La Baum MD      Continuous Infusions:    PRN Meds: hydrALAZINE   naloxone    ondansetron    oxyCODONE    oxyCODONE    Abnormal Labs/Diagnostic Results:   Wbc 12 63  Na 130  Glu 212    Age/Sex: 81 y o  female     Assessment/Plan: 80 yo female with cellulitis of LUE, wbc, monitor temps, Ceftriaxone, ortho, elevate L arm, Ct neg for abscess, bld cx neg    Certification Statement: The patient will continue to require additional inpatient hospital stay due to IV antibiotics     Discharge Plan:TBD

## 2019-01-04 ENCOUNTER — APPOINTMENT (INPATIENT)
Dept: RADIOLOGY | Facility: HOSPITAL | Age: 84
DRG: 872 | End: 2019-01-04
Payer: MEDICARE

## 2019-01-04 PROBLEM — M79.604 PAIN OF RIGHT LOWER EXTREMITY: Status: RESOLVED | Noted: 2018-12-30 | Resolved: 2019-01-04

## 2019-01-04 PROBLEM — A41.9 SEPSIS (HCC): Status: RESOLVED | Noted: 2018-12-30 | Resolved: 2019-01-04

## 2019-01-04 LAB
BACTERIA BLD CULT: NORMAL
BACTERIA BLD CULT: NORMAL
GLUCOSE SERPL-MCNC: 115 MG/DL (ref 65–140)
GLUCOSE SERPL-MCNC: 233 MG/DL (ref 65–140)
GLUCOSE SERPL-MCNC: 263 MG/DL (ref 65–140)
GLUCOSE SERPL-MCNC: 275 MG/DL (ref 65–140)

## 2019-01-04 PROCEDURE — 99233 SBSQ HOSP IP/OBS HIGH 50: CPT | Performed by: INTERNAL MEDICINE

## 2019-01-04 PROCEDURE — 99232 SBSQ HOSP IP/OBS MODERATE 35: CPT | Performed by: PHYSICIAN ASSISTANT

## 2019-01-04 PROCEDURE — 99221 1ST HOSP IP/OBS SF/LOW 40: CPT | Performed by: SURGERY

## 2019-01-04 PROCEDURE — 97535 SELF CARE MNGMENT TRAINING: CPT

## 2019-01-04 PROCEDURE — 82948 REAGENT STRIP/BLOOD GLUCOSE: CPT

## 2019-01-04 PROCEDURE — 73201 CT UPPER EXTREMITY W/DYE: CPT

## 2019-01-04 RX ORDER — OXYCODONE HYDROCHLORIDE 5 MG/1
2.5 TABLET ORAL EVERY 4 HOURS PRN
Qty: 20 TABLET | Refills: 0 | Status: SHIPPED | OUTPATIENT
Start: 2019-01-04 | End: 2019-01-07

## 2019-01-04 RX ORDER — LANOLIN ALCOHOL/MO/W.PET/CERES
3 CREAM (GRAM) TOPICAL ONCE
Status: COMPLETED | OUTPATIENT
Start: 2019-01-04 | End: 2019-01-04

## 2019-01-04 RX ORDER — LIDOCAINE 50 MG/G
1 PATCH TOPICAL DAILY
Qty: 30 PATCH | Refills: 0 | Status: SHIPPED | OUTPATIENT
Start: 2019-01-05 | End: 2019-01-16 | Stop reason: ALTCHOICE

## 2019-01-04 RX ORDER — CEFAZOLIN SODIUM 2 G/50ML
2000 SOLUTION INTRAVENOUS EVERY 8 HOURS
Status: DISCONTINUED | OUTPATIENT
Start: 2019-01-04 | End: 2019-01-05

## 2019-01-04 RX ORDER — INSULIN ASPART 100 [IU]/ML
INJECTION, SOLUTION INTRAVENOUS; SUBCUTANEOUS
Qty: 25 PEN | Refills: 0 | Status: SHIPPED | OUTPATIENT
Start: 2019-01-04 | End: 2019-01-07

## 2019-01-04 RX ORDER — ROPINIROLE 0.5 MG/1
0.5 TABLET, FILM COATED ORAL
Qty: 30 TABLET | Refills: 0 | Status: SHIPPED | OUTPATIENT
Start: 2019-01-04 | End: 2019-01-07

## 2019-01-04 RX ORDER — CEPHALEXIN 500 MG/1
500 CAPSULE ORAL EVERY 8 HOURS SCHEDULED
Qty: 14 CAPSULE | Refills: 0 | Status: SHIPPED | OUTPATIENT
Start: 2019-01-04 | End: 2019-01-09

## 2019-01-04 RX ORDER — POLYETHYLENE GLYCOL 3350 17 G/17G
17 POWDER, FOR SOLUTION ORAL
Qty: 14 EACH | Refills: 0 | Status: SHIPPED | OUTPATIENT
Start: 2019-01-04 | End: 2019-01-22 | Stop reason: ALTCHOICE

## 2019-01-04 RX ORDER — TIMOLOL MALEATE 5 MG/ML
1 SOLUTION/ DROPS OPHTHALMIC 2 TIMES DAILY
Qty: 30 ML | Refills: 0 | Status: SHIPPED | OUTPATIENT
Start: 2019-01-04 | End: 2019-01-22 | Stop reason: ALTCHOICE

## 2019-01-04 RX ORDER — PREGABALIN 100 MG/1
100 CAPSULE ORAL DAILY
Qty: 10 CAPSULE | Refills: 0 | Status: SHIPPED | OUTPATIENT
Start: 2019-01-05 | End: 2019-01-07

## 2019-01-04 RX ORDER — SACCHAROMYCES BOULARDII 250 MG
250 CAPSULE ORAL 2 TIMES DAILY
Status: DISCONTINUED | OUTPATIENT
Start: 2019-01-04 | End: 2019-01-07 | Stop reason: HOSPADM

## 2019-01-04 RX ADMIN — POLYETHYLENE GLYCOL 3350 17 G: 17 POWDER, FOR SOLUTION ORAL at 06:00

## 2019-01-04 RX ADMIN — IPRATROPIUM BROMIDE 2 PUFF: 17 AEROSOL, METERED RESPIRATORY (INHALATION) at 09:57

## 2019-01-04 RX ADMIN — CEFAZOLIN SODIUM 2000 MG: 2 SOLUTION INTRAVENOUS at 23:52

## 2019-01-04 RX ADMIN — ASPIRIN 81 MG 81 MG: 81 TABLET ORAL at 09:56

## 2019-01-04 RX ADMIN — INSULIN LISPRO 2 UNITS: 100 INJECTION, SOLUTION INTRAVENOUS; SUBCUTANEOUS at 21:43

## 2019-01-04 RX ADMIN — HEPARIN SODIUM 5000 UNITS: 5000 INJECTION INTRAVENOUS; SUBCUTANEOUS at 21:43

## 2019-01-04 RX ADMIN — IOHEXOL 85 ML: 350 INJECTION, SOLUTION INTRAVENOUS at 19:58

## 2019-01-04 RX ADMIN — INSULIN LISPRO 4 UNITS: 100 INJECTION, SOLUTION INTRAVENOUS; SUBCUTANEOUS at 13:07

## 2019-01-04 RX ADMIN — LIDOCAINE 1 PATCH: 50 PATCH CUTANEOUS at 09:55

## 2019-01-04 RX ADMIN — IPRATROPIUM BROMIDE 2 PUFF: 17 AEROSOL, METERED RESPIRATORY (INHALATION) at 17:35

## 2019-01-04 RX ADMIN — OXYCODONE HYDROCHLORIDE 5 MG: 5 TABLET ORAL at 23:52

## 2019-01-04 RX ADMIN — DOCUSATE SODIUM 100 MG: 100 CAPSULE, LIQUID FILLED ORAL at 09:55

## 2019-01-04 RX ADMIN — IPRATROPIUM BROMIDE 2 PUFF: 17 AEROSOL, METERED RESPIRATORY (INHALATION) at 13:06

## 2019-01-04 RX ADMIN — Medication 250 MG: at 17:35

## 2019-01-04 RX ADMIN — BRIMONIDINE TARTRATE 1 DROP: 1.5 SOLUTION OPHTHALMIC at 17:36

## 2019-01-04 RX ADMIN — PREGABALIN 100 MG: 100 CAPSULE ORAL at 09:56

## 2019-01-04 RX ADMIN — SITAGLIPTIN 50 MG: 50 TABLET, FILM COATED ORAL at 09:58

## 2019-01-04 RX ADMIN — SENNOSIDES 8.6 MG: 8.6 TABLET, FILM COATED ORAL at 09:55

## 2019-01-04 RX ADMIN — CEPHALEXIN 500 MG: 500 CAPSULE ORAL at 13:10

## 2019-01-04 RX ADMIN — DORZOLAMIDE HYDROCHLORIDE 1 DROP: 20 SOLUTION/ DROPS OPHTHALMIC at 09:57

## 2019-01-04 RX ADMIN — CEFAZOLIN SODIUM 2000 MG: 2 SOLUTION INTRAVENOUS at 16:32

## 2019-01-04 RX ADMIN — BRIMONIDINE TARTRATE 1 DROP: 1.5 SOLUTION OPHTHALMIC at 09:57

## 2019-01-04 RX ADMIN — TIMOLOL MALEATE 1 DROP: 5 SOLUTION/ DROPS OPHTHALMIC at 17:36

## 2019-01-04 RX ADMIN — HEPARIN SODIUM 5000 UNITS: 5000 INJECTION INTRAVENOUS; SUBCUTANEOUS at 13:04

## 2019-01-04 RX ADMIN — HEPARIN SODIUM 5000 UNITS: 5000 INJECTION INTRAVENOUS; SUBCUTANEOUS at 05:59

## 2019-01-04 RX ADMIN — TIMOLOL MALEATE 1 DROP: 5 SOLUTION/ DROPS OPHTHALMIC at 09:57

## 2019-01-04 RX ADMIN — INSULIN LISPRO 3 UNITS: 100 INJECTION, SOLUTION INTRAVENOUS; SUBCUTANEOUS at 09:56

## 2019-01-04 RX ADMIN — MELATONIN 3 MG: at 23:52

## 2019-01-04 RX ADMIN — ROPINIROLE 0.5 MG: 0.25 TABLET, FILM COATED ORAL at 21:43

## 2019-01-04 RX ADMIN — ATORVASTATIN CALCIUM 40 MG: 40 TABLET, FILM COATED ORAL at 17:35

## 2019-01-04 RX ADMIN — IPRATROPIUM BROMIDE 2 PUFF: 17 AEROSOL, METERED RESPIRATORY (INHALATION) at 21:43

## 2019-01-04 RX ADMIN — CEPHALEXIN 500 MG: 500 CAPSULE ORAL at 05:59

## 2019-01-04 RX ADMIN — INSULIN GLARGINE 35 UNITS: 100 INJECTION, SOLUTION SUBCUTANEOUS at 09:55

## 2019-01-04 RX ADMIN — PANTOPRAZOLE SODIUM 20 MG: 20 TABLET, DELAYED RELEASE ORAL at 05:59

## 2019-01-04 RX ADMIN — DORZOLAMIDE HYDROCHLORIDE 1 DROP: 20 SOLUTION/ DROPS OPHTHALMIC at 17:36

## 2019-01-04 RX ADMIN — DOCUSATE SODIUM 100 MG: 100 CAPSULE, LIQUID FILLED ORAL at 17:35

## 2019-01-04 NOTE — PROGRESS NOTES
Progress Note - Orthopedics   Wes Bolivar 81 y o  female MRN: [id number]  Unit/Bed#: [facility] 604-01      Subjective:    81 y  o female with left upper extremity cellulitis and possible abscess  Patient denies pain  She has previously been treated conservatively with IV antibiotics  Over the past 2 days there has been increasing size of the left dorsal forearm  Patient denies any numbness or tingling  No acute events, no complaints  Pt doing well  Pain controlled   Denies fevers chills, CP, SOB    Labs:    0  Lab Value Date/Time   HCT 42 2 01/02/2019 0628   HCT 36 6 12/31/2018 0354   HCT 37 6 12/30/2018 1315   HCT 40 8 04/18/2018 1844   HCT 42 6 03/04/2014 0600   HCT 43 5 03/03/2014 2005   HGB 13 9 01/02/2019 0628   HGB 12 0 12/31/2018 0354   HGB 12 3 12/30/2018 1315   HGB 13 4 04/18/2018 1844   HGB 14 3 03/04/2014 0600   HGB 14 9 03/03/2014 2005   INR 1 06 12/30/2018 1315   INR 1 03 03/03/2014 2005   WBC 12 63 (H) 01/02/2019 0628   WBC 13 99 (H) 12/31/2018 0354   WBC 13 88 (H) 12/30/2018 1315   WBC 9 1 04/18/2018 1844   WBC 10 08 03/04/2014 0600   WBC 11 86 (H) 03/03/2014 2005   ESR 33 (H) 07/08/2018 1403       Meds:    Current Facility-Administered Medications:     aspirin chewable tablet 81 mg, 81 mg, Oral, Daily, La Baum MD, 81 mg at 01/04/19 0956    atorvastatin (LIPITOR) tablet 40 mg, 40 mg, Oral, Q24H, La Baum MD, 40 mg at 01/03/19 1830    brimonidine (ALPHAGAN P) 0 15 % ophthalmic solution 1 drop, 1 drop, Both Eyes, BID, La Baum MD, 1 drop at 01/04/19 0957    cephalexin (KEFLEX) capsule 500 mg, 500 mg, Oral, Q8H [address], Deyanira Barlow MD, 500 mg at 01/04/19 1310    docusate sodium (COLACE) capsule 100 mg, 100 mg, Oral, BID, La Baum MD, 100 mg at 01/04/19 0955    dorzolamide (TRUSOPT) ophthalmic solution 1 drop, 1 drop, Right Eye, BID, La Baum MD, 1 drop at 01/04/19 0957    heparin (porcine) subcutaneous injection 5,000 Units, 5,000 Units, Subcutaneous, Q8H [address], 5,000 Units at 01/04/19 1304 **AND** Platelet count, , , Once, Franne Nyhan, MD    hydrALAZINE (APRESOLINE) injection 10 mg, 10 mg, Intravenous, Q6H PRN, Mayra Rodgers MD    insulin glargine (LANTUS) subcutaneous injection 35 Units 0 35 mL, 35 Units, Subcutaneous, QAM, Ival Carolina, DO, 35 Units at 01/04/19 0955    insulin lispro (HumaLOG) 100 units/mL subcutaneous injection 1-5 Units, 1-5 Units, Subcutaneous, HS, Tor Brisksalo, PA-C, 1 Units at 01/02/19 2113    insulin lispro (HumaLOG) 100 units/mL subcutaneous injection 1-6 Units, 1-6 Units, Subcutaneous, TID AC, 4 Units at 01/04/19 1307 **AND** Fingerstick Glucose (POCT), , , TID AC, Emely Lynn MD    insulin lispro (HumaLOG) 100 units/mL subcutaneous injection 13 Units, 13 Units, Subcutaneous, TID With Meals, Leona Rae MD, 13 Units at 01/04/19 1307    ipratropium (ATROVENT HFA) inhaler 2 puff, 2 puff, Inhalation, 4x Daily, Franne Nyhan, MD, 2 puff at 01/04/19 1306    lidocaine (LIDODERM) 5 % patch 1 patch, 1 patch, Topical, Daily, Franne Nyhan, MD, 1 patch at 01/04/19 0955    naloxone (NARCAN) 0 04 mg/mL syringe 0 04 mg, 0 04 mg, Intravenous, Q1MIN PRN, Franne Nyhan, MD    [facility]ron [facility]) injection 4 mg, 4 mg, Intravenous, Q4H PRN, Franne Nyhan, MD    oxyCODONE (ROXICODONE) IR tablet 2 5 mg, 2 5 mg, Oral, Q4H PRN, Franne Nyhan, MD    oxyCODONE (ROXICODONE) IR tablet 5 mg, 5 mg, Oral, Q4H PRN, Franne Nyhan, MD, 5 mg at 01/02/19 2238    pantoprazole (PROTONIX) EC tablet 20 mg, 20 mg, Oral, Early Morning, Franne Nyhan, MD, 20 mg at 01/04/19 0559    polyethylene glycol (MIRALAX) packet 17 g, 17 g, Oral, BID AC, Mayra Rodgers MD, 17 g at 01/04/19 0600    pregabalin (LYRICA) capsule 100 mg, 100 mg, Oral, Daily, Franne Nyhan, MD, 100 mg at 01/04/19 0956    rOPINIRole (REQUIP) tablet 0 5 mg, 0 5 mg, Oral, HS, Franne Nyhan, MD, 0 5 mg at 01/03/19 2155    senna (SENOKOT) tablet 8 6 mg, 1 tablet, Oral, Daily, Petty Cooley MD, 8 6 mg at 01/04/19 0955    sitaGLIPtin (JANUVIA) tablet 50 mg, 50 mg, Oral, Daily, Yefri Rubio MD, 50 mg at 01/04/19 0958    timolol (TIMOPTIC) 0 5 % ophthalmic solution 1 drop, 1 drop, Both Eyes, BID, Petty Cooley MD, 1 drop at 01/04/19 0957    Blood Culture:   Lab Results   Component Value Date    BLOODCX No Growth After 4 Days  12/30/2018       Wound Culture:   No results found for: WOUNDCULT    Ins and Outs:  I/O last 24 hours: In: 240 [P O :240]  Out: 1040 [Urine:1040]          Physical:  Vitals:    01/04/19 0658   BP: 148/70   Pulse: 74   Resp: 20   Temp: 98 3 °F (36 8 °C)   SpO2: 98%     Musculoskeletal: left Upper Extremity  · Skin minimal erythema, no tenderness to palpation, small 1 x 2 cm region of fluctuance noted at the proximal aspect of the dorsal forearm, no abscess  · Full range of motion left elbow full flexion greater than 120° full supination pronation no pain with motion  · SILT m/r/u  Motor intact ain/pin/m/r/u, 2+ rad pulse      _*_*_*_*_*_*_*_*_*_*_*_*_*_*_*_*_*_*_*_*_*_*_*_*_*_*_*_*_*_*_*_*_*_*_*_*_*_*_*_*_*    Assessment:    81 y  o female left forearm cellulitis possible collection     Plan:  · Non weight-bearing left upper extremity  · Compression wrapping left upper extremity  · Continue antibiotics  · Will discuss with attending surgeon  · Dispo: Ortho will follow    Sugey Hernandez PA-C

## 2019-01-04 NOTE — RESTORATIVE TECHNICIAN NOTE
Restorative Specialist Mobility Note       Activity: Ambulate in mandujano, Ambulate in room, Bathroom privileges, Dangle, Stand at bedside (Educated/encouraged pt to ambulate with assistance 3-4 x's/day  Bed alarm on   Pt callbell, phone/tray within reach )     Assistive Device: Front wheel walker          Sammy CLAUDIO, Restorative Technician, [employer]

## 2019-01-04 NOTE — PROGRESS NOTES
Received page that Patient to be discharged home  Ok with d/c but would discharge on Tresiba 35 units daily, Novolog 18 units with each meal, and home dose of Januvia with outpatient follow up with her pcp

## 2019-01-04 NOTE — CONSULTS
Consultation - General Surgery   Piedad Pagan 81 y o  female MRN: [id number]  Unit/Bed#: [facility] 604-01 Encounter: [id number]    Assessment/Plan     Assessment:  81 y o  F w/ abscess x2 of LUE; there is a larger, fluctuant collection involving the forearm extending to elbow joint, a second smaller collection is present on the upper arm; patient is nontoxic and afebrile however WBC is mildly elevated    Plan:   Would repeat CT scan of LUE w/ contrast to evaluate for possible bursitis given proximity of collection to elbow joint  If no evidence of joint involvement will proceed w/ incision and drainage  Cont abx  Cont ortho recs    History of Present Illness     HPI:  Piedad Pagan is a 81 y o  female who presents with fluid collection x2 of the LUE  PAtient initially presented with R hip pain, but was also found on admission to have a tender, erythematous and swollen left arm  There was concern for possible burisitis at the time and orthopedics was consulted  Initially on CT scan there was no evidence of a fluid collection  Over the past 5 days however the proximal forearm has become increasingly fluctuant  There appears to be a collection at this time  She has pain with flexion of the arm, however range of motion appears intact  She is afebrile  She has not been bacteremic  She has history significant for poorly controlled DM, HTN, HLD, COPD            Inpatient consult to Acute Care Surgery     Performed by  Po Smith     Authorized by Melanie GEORGE              Review of Systems  A complete 12 point review of systems was performed, all findings are negative unless otherwise noted by the HPI    Historical Information   Past Medical History:   Diagnosis Date    [facility] [address] [facility]     [facility]     Hyperlipidemia     Hyperlipidemia     Hypertension     Kidney stones     Osteoporosis      Past Surgical History:   Procedure Laterality Date    APPENDECTOMY      HAND SURGERY Right     LITHOTRIPSY      MASS EXCISION      [place] of back wall mass    TONSILLECTOMY      TONSILLECTOMY       Social History   History   Alcohol Use    Yes     Comment: OCCASIONAL     History   Drug Use No     History   Smoking Status    Former Smoker    Packs/day: 1 00    Years: 40 00    Types: Cigarettes    Quit date: 1/1/2017   Smokeless Tobacco    Never Used     Comment: states she quit but family living with her states she smokes     Family History: non-contributory    Meds/Allergies   all current active meds have been reviewed, current meds:   Current Facility-Administered Medications   Medication Dose Route Frequency    aspirin chewable tablet 81 mg  81 mg Oral Daily    atorvastatin (LIPITOR) tablet 40 mg  40 mg Oral Q24H    brimonidine (ALPHAGAN P) 0 15 % ophthalmic solution 1 drop  1 drop Both Eyes BID    ceFAZolin (ANCEF) IVPB (premix) 2,000 mg  2,000 mg Intravenous Q8H    docusate sodium (COLACE) capsule 100 mg  100 mg Oral BID    dorzolamide (TRUSOPT) ophthalmic solution 1 drop  1 drop Right Eye BID    heparin (porcine) subcutaneous injection 5,000 Units  5,000 Units Subcutaneous Q8H [address]    hydrALAZINE (APRESOLINE) injection 10 mg  10 mg Intravenous Q6H PRN    insulin glargine (LANTUS) subcutaneous injection 35 Units 0 35 mL  35 Units Subcutaneous QAM    insulin lispro (HumaLOG) 100 units/mL subcutaneous injection 1-5 Units  1-5 Units Subcutaneous HS    insulin lispro (HumaLOG) 100 units/mL subcutaneous injection 1-6 Units  1-6 Units Subcutaneous TID AC    insulin lispro (HumaLOG) 100 units/mL subcutaneous injection 13 Units  13 Units Subcutaneous TID With Meals    ipratropium (ATROVENT HFA) inhaler 2 puff  2 puff Inhalation 4x Daily    lidocaine (LIDODERM) 5 % patch 1 patch  1 patch Topical Daily    naloxone (NARCAN) 0 04 mg/mL syringe 0 04 mg  0 04 mg Intravenous Q1MIN PRN    ondansetron (ZOFRAN) injection 4 mg  4 mg Intravenous Q4H PRN    oxyCODONE (ROXICODONE) IR tablet 2 5 mg  2 5 mg Oral Q4H PRN    oxyCODONE (ROXICODONE) IR tablet 5 mg  5 mg Oral Q4H PRN    pantoprazole (PROTONIX) EC tablet 20 mg  20 mg Oral Early Morning    polyethylene glycol (MIRALAX) packet 17 g  17 g Oral BID AC    pregabalin (LYRICA) capsule 100 mg  100 mg Oral Daily    rOPINIRole (REQUIP) tablet 0 5 mg  0 5 mg Oral HS    saccharomyces boulardii (FLORASTOR) capsule 250 mg  250 mg Oral BID    senna (SENOKOT) tablet 8 6 mg  1 tablet Oral Daily    sitaGLIPtin (JANUVIA) tablet 50 mg  50 mg Oral Daily    timolol (TIMOPTIC) 0 5 % ophthalmic solution 1 drop  1 drop Both Eyes BID    and PTA meds:   Prior to Admission Medications   Prescriptions Last Dose Informant Patient Reported? Taking? ALPHAGAN P 0 15 % ophthalmic solution 1/3/2019 at Unknown time  Yes Yes   Sig: INSTILL 1 DROP INTO BOTH EYES TWICE DAILY INSTILL 1 DROP EN AMBOS OJOS DOS VECES AL MAC   CLEVER CHOICE COMFORT EZ 33G X 4 MM MISC   No Yes   Sig: Ninety day supplies please   COMBIGAN 0 2-0 5 % 1/3/2019 at Unknown time  Yes Yes   EASY TOUCH LANCETS 32G MISC   No Yes   Sig: To use 4 times a day   NOVOLOG FLEXPEN 100 units/mL injection pen   No Yes   Si UNITS 3 TIMES A DAY WITH MEALS    Ninety day supplies please   aspirin (ASPIRIN 81) 81 mg chewable tablet 1/3/2019 at Unknown time  No Yes   Sig: Chew 1 tablet (81 mg total) daily Ninety day supplies please   atorvastatin (LIPITOR) 40 mg tablet 2019 at Unknown time  No Yes   Sig: Take 1 tablet (40 mg total) by mouth every 24 hours   dorzolamide (TRUSOPT) 2 % ophthalmic solution 1/3/2019 at Unknown time  Yes Yes   Sig: instill 1 drop into right eye twice a day   glucose blood test strip   No Yes   Sig: To use 4 times a day  Ninety day supplies please   insulin degludec (TRESIBA FLEXTOUCH) 100 units/mL injection pen   No Yes   Sig: To use 30 U at bedtime      Ninety day supplies please   ipratropium (ATROVENT HFA) 17 mcg/act inhaler 1/3/2019 at Unknown time  No Yes Sig: Inhale 2 puffs 4 (four) times a day Ninety day supplies please   lisinopril (ZESTRIL) 10 mg tablet 1/3/2019 at Unknown time  No Yes   Sig: Take 1 tablet (10 mg total) by mouth daily   omeprazole (PriLOSEC) 20 mg delayed release capsule   No Yes   Sig: Take 1 capsule (20 mg total) by mouth daily Ninety day supplies please   oxyCODONE (ROXICODONE) 5 mg immediate release tablet 1/2/2019 at Unknown time  No Yes   Sig: Take 1 tablet (5 mg total) by mouth 2 (two) times a day Max Daily Amount: 10 mg   pentoxifylline (TRENtal) 400 mg ER tablet   No Yes   Sig: Take 1 tablet (400 mg total) by mouth 3 (three) times a day with meals Ninety day supplies please   pregabalin (LYRICA) 100 mg capsule 1/3/2019 at Unknown time  No Yes   Sig: ONE CAPSULE TWICE A DAY  Ninety day supplies please   rOPINIRole (REQUIP) 0 25 mg tablet   No Yes   Sig: Take 2 tablets (0 5 mg total) by mouth daily at bedtime   sitaGLIPtin (JANUVIA) 50 mg tablet 1/3/2019 at Unknown time  No Yes   Sig: Take 1 tablet (50 mg total) by mouth daily Ninety day supplies please      Facility-Administered Medications: None     Allergies   Allergen Reactions    Acetaminophen      Listen on patient's paperwork from [facility] and Staffing  Family unable to confirm      Morphine And Related Vomiting    Oxycodone GI Intolerance     Listed on patient's paperwork from [facility] and Staffing   Family unable to confirm      Tramadol Vomiting and Abdominal Pain     Other       Objective   First Vitals:   Blood Pressure: (!) 172/79 (12/30/18 1218)  Pulse: 95 (12/30/18 1218)  Temperature: 98 1 °F (36 7 °C) (12/30/18 1219)  Temp Source: Oral (12/30/18 1219)  Respirations: 18 (12/30/18 1218)  Height: 5' 3" (160 cm) (12/30/18 1947)  Weight - Scale: 64 9 kg (143 lb 1 3 oz) (12/30/18 1218)  SpO2: 96 % (12/30/18 1218)    Current Vitals:   Blood Pressure: 110/65 (01/04/19 1521)  Pulse: 74 (01/04/19 0658)  Temperature: 99 32 °F (37 4 °C) (01/04/19 1518)  Temp Source: Oral (01/04/19 3472)  Respirations: 20 (01/04/19 1518)  Height: 5' 3" (160 cm) (12/30/18 1947)  Weight - Scale: 65 kg (143 lb 4 8 oz) (01/04/19 0554)  SpO2: 98 % (01/04/19 0658)      Intake/Output Summary (Last 24 hours) at 01/04/19 1704  Last data filed at 01/04/19 1501   Gross per 24 hour   Intake              120 ml   Output              720 ml   Net             -600 ml       Invasive Devices     Peripheral Intravenous Line            Peripheral IV 12/31/18 Right Antecubital 4 days                Physical Exam   Constitutional: She is oriented to person, place, and time  She appears well-developed  No distress  Cardiovascular: Normal rate and regular rhythm  Pulmonary/Chest: Effort normal    Abdominal: Soft  She exhibits no distension  There is no tenderness  Musculoskeletal: She exhibits tenderness  Tenderless L forearm, elbow   Neurological: She is alert and oriented to person, place, and time  Skin: Skin is warm  Tender area of erythema and fluctuance overlying left forearm on dorsal surface extending from proximal forearm to elbow; there is a second collection along        Lab Results: I have personally reviewed pertinent lab results  , CBC: No results found for: WBC, HGB, HCT, MCV, PLT, ADJUSTEDWBC, MCH, MCHC, RDW, MPV, NRBC, CMP: No results found for: SODIUM, K, CL, CO2, ANIONGAP, BUN, CREATININE, GLUCOSE, CALCIUM, AST, ALT, ALKPHOS, PROT, BILITOT, EGFR, Coagulation: No results found for: PT, INR, APTT  Imaging: I have personally reviewed pertinent reports  and I have personally reviewed pertinent films in PACS  EKG, Pathology, and Other Studies: I have personally reviewed pertinent reports

## 2019-01-04 NOTE — OCCUPATIONAL THERAPY NOTE
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 (HCC)    Chronic bilateral low back pain with bilateral sciatica    Pain of right lower extremity    Acute renal failure superimposed on stage 3 chronic kidney disease ([facility] )    Left arm cellulitis    Sepsis ([facility] )    Accident due to mechanical fall without injury    Type 2 diabetes mellitus with hyperglycemia, with long-term current use of insulin ([place] [id number] )           01/04/19 0845   Restrictions/Precautions   Weight Bearing Precautions Per Order Yes   LUE Weight Bearing Per Order NWB   Other Precautions Fall Risk;Telemetry   General   Response to Previous Treatment Patient with no complaints from previous session   Lifestyle   Autonomy Pt I w/ ADLs, IADLs, and mobility  No DME use  Reciprocal Relationships Pt lives w/ dtr   Service to Others retired   Intrinsic Gratification Enjoys cooking and cleaning    Pain Assessment   Pain Assessment No/denies pain   Pain Score No Pain   ADL   Grooming Assistance 5  Supervision/Setup   Grooming Deficit Wash/dry hands; Wash/dry face   UB Bathing Assistance 5  Supervision/Setup   UB Bathing Deficit Chest;Right arm;Left arm; Abdomen   LB Bathing Assistance 5  Supervision/Setup   LB Bathing Deficit Perineal area; Buttocks   UB Dressing Assistance 5  Supervision/Setup   UB Dressing Deficit Thread RUE; Thread LUE   LB Dressing Assistance 5  Supervision/Setup   LB Dressing Deficit Don/doff R sock; Don/doff L sock   Transfers   Sit to Stand 5  Supervision   Additional items Impulsive   Stand to Sit 5  Supervision   Additional items Impulsive   Toilet transfer 5  Supervision   Additional items Standard toilet   Functional Mobility   Functional Mobility 5  Supervision   Cognition   Overall Cognitive Status Impaired   Arousal/Participation Alert; Responsive; Cooperative   Attention Attends with cues to redirect   Orientation Level Oriented to person;Oriented to place;Oriented to situation;Disoriented to time   Memory Unable to assess   Following Commands Follows one step commands with increased time or repetition  (2* blue phone )   Activity Tolerance   Activity Tolerance Patient tolerated treatment well   Medical Staff Made Aware ROYER Bartlett informed of urine output that went over the hat   Assessment   Assessment Patient participated in Skilled OT session this date with interventions consisting of ADL re training with the use of correct body mechnaics, safety awareness and fall prevention techniques,  therapeutic activities to: increase activity tolerance, increase standing tolerance time with unilateral UE support to complete sink level ADLs, increase dynamic sit/ stand balance during functional activity , increase postural control, increase trunk control and increase OOB/ sitting tolerance  Patient agreeable to OT treatment session, upon arrival patient was found seated OOB to Chair  In comparison to previous session, patient with improvements in transfers, mobility, and ADLs*   Patient requiring ocassional safety reminders  Patient has attained all treatment goals and is now demonstrating ability to complete UB/LB ADLs at supervision  Patient was educated on fall safety using the blue phone for translation , patient verbalized understanding and demonstrated recommended plan correctly  Patient appears to be functioning at baseline  No further acute OT needs identified at this time to warrant continuation of services  D/C OT services  From OT standpoint, recommendation at time of d/c would be Home with family support       Plan Treatment Interventions ADL retraining;Functional transfer training; Endurance training;Continued evaluation; Energy conservation; Activityengagement   Goal Expiration Date 01/10/18   Treatment Day 1   OT Frequency 3-5x/wk   Recommendation   OT Discharge Recommendation Home with family support   OT - OK to Discharge Yes   Barthel Index   Feeding 10   Bathing 0   Grooming Score 5   Dressing Score 10   Bladder Score 10   Bowels Score 10   Toilet Use Score 5   Transfers (Bed/Chair) Score 15   Mobility (Level Surface) Score 10   Stairs Score 5   Barthel Index Score 80   Modified Gray Scale   Modified Natalio Scale 3

## 2019-01-04 NOTE — PLAN OF CARE
DISCHARGE PLANNING - CARE MANAGEMENT     Discharge to post-acute care or home with appropriate resources Progressing        METABOLIC, FLUID AND ELECTROLYTES - ADULT     Electrolytes maintained within normal limits Progressing     Fluid balance maintained Progressing     Glucose maintained within target range Progressing        MUSCULOSKELETAL - ADULT     Maintain or return mobility to safest level of function Progressing     Maintain proper alignment of affected body part Progressing        Potential for Falls     Patient will remain free of falls Progressing        Prexisting or High Potential for Compromised Skin Integrity     Skin integrity is maintained or improved Progressing        SKIN/TISSUE INTEGRITY - ADULT     Skin integrity remains intact Progressing     Incision(s), wounds(s) or drain site(s) healing without S/S of infection Progressing     Oral mucous membranes remain intact Progressing

## 2019-01-04 NOTE — PLAN OF CARE
Problem: OCCUPATIONAL THERAPY ADULT  Goal: Performs self-care activities at highest level of function for planned discharge setting  See evaluation for individualized goals  Treatment Interventions: ADL retraining, Functional transfer training, Endurance training, Patient/family training, Equipment evaluation/education, Compensatory technique education, Energy conservation, Activityengagement          See flowsheet documentation for full assessment, interventions and recommendations  Outcome: Completed Date Met: 01/04/19  Limitation: Decreased ADL status, Decreased Safe judgement during ADL, Decreased endurance, Decreased self-care trans, Decreased high-level ADLs  Prognosis: Good  Assessment: Patient participated in Skilled OT session this date with interventions consisting of ADL re training with the use of correct body mechnaics, safety awareness and fall prevention techniques,  therapeutic activities to: increase activity tolerance, increase standing tolerance time with unilateral UE support to complete sink level ADLs, increase dynamic sit/ stand balance during functional activity , increase postural control, increase trunk control and increase OOB/ sitting tolerance  Patient agreeable to OT treatment session, upon arrival patient was found seated OOB to Chair  In comparison to previous session, patient with improvements in transfers, mobility, and ADLs*   Patient requiring ocassional safety reminders  Patient has attained all treatment goals and is now demonstrating ability to complete UB/LB ADLs at supervision  Patient was educated on fall safety using the blue phone for translation , patient verbalized understanding and demonstrated recommended plan correctly  Patient appears to be functioning at baseline  No further acute OT needs identified at this time to warrant continuation of services  D/C OT services  From OT standpoint, recommendation at time of d/c would be Home with family support         OT Discharge Recommendation: Home with family support  OT - OK to Discharge:  Yes

## 2019-01-04 NOTE — PROGRESS NOTES
Progress note- Wes Bolivar 1935, 81 y o  female MRN: [id number]    Unit/Bed#: [facility] 511-01 Encounter: [id number]    Primary Care Provider: Marcos Hudson MD   Date and time admitted to hospital: 12/30/2018 12:20 PM        Cellulitis of left upper extremity   Assessment & Plan    Improving, from my discussion with patient on the phone with Gambian interpretor  monitor temps, WBC count    MRSA screen if negative discontinue vancomycin,started ceftriaxone  Cefazolin iv restarted, de-scalated to keflex once drain is done  Encourage Left arm elevation  Orthopedics is following, CT negative for abscess  Blood cultures were negative for more than 48 hrs  Now has abscess developed     * Sepsis ([facility] )   Assessment & Plan    As above  resolved     Acute renal failure superimposed on stage 3 chronic kidney disease ([facility] )   Assessment & Plan    Improving renal function  Continue IV fluids  Continue to monitor  Avoid nephrotoxins       Accident due to mechanical fall without injury   Assessment & Plan    Fall precautions, PT and OT     Hypertension   Assessment & Plan    Continue to Hold lisinopril secondary to acute kidney injury  Continue to monitor       Pain of right lower extremity   Assessment & Plan    Appears to be consistent right-sided radiculopathy  Neurological intact otherwise  Continue Lyrica, when renal function stabilized possibly increase  P r n   Tylenol     Type 2 diabetes mellitus with hyperglycemia, with long-term current use of insulin [facility])   Assessment & Plan    Lab Results   Component Value Date    HGBA1C 8 5 (H) 12/20/2018       Recent Labs      12/31/18   0356  12/31/18   0544  12/31/18   0736  12/31/18   1059   POCGLU  173*  168*  151*  68       Blood Sugar Average: Last 72 hrs:  (P) 195 with severe hyperglycemia likely related to sepsis, for now keep the patient on insulin drip non DKA protocol and consult Endocrinology  Diabetic diet  Basal and prandial insulin  Endocrinology increased her lantus to 35 units in the at bedtime   increased lispro to 13 from 11 units before meals  Restarted januvia 50 mg home dose at bedtime  Need to monitor fingerstick glucose one more day for improvement before discharge today  Discuss with endocrinology         She has DM, abscess of the forearm  She is moderate risk for moderate risk surgery  Go ahead with I and D per surgery, medically stable  If she is going to surgery tonight, would give half the dose of her insulin glarine  Hold lispro tomorrow when she is NPO  Continue with fingerstick glucose  Start her on D5 normal saline as well  VTE Pharmacologic Prophylaxis:   Pharmacologic: Heparin  Mechanical VTE Prophylaxis in Place: Yes    Patient Centered Rounds: I have performed bedside rounds with nursing staff today  Education and Discussions with Family / Patient:  Patient, plan of care    Time Spent for Care: 15 minutes  More than 50% of total time spent on counseling and coordination of care as described above  Current Length of Stay: 1 day(s)    Current Patient Status: Inpatient   Certification Statement: The patient will continue to require additional inpatient hospital stay due to IV antibiotics    Discharge Plan:  Home when stable    Code Status: Level 1 - Full Code      Subjective:   She says improvement of her arm pain  She wants to go home  Objective:     Vitals:   Temp (24hrs), Av 2 °F (36 8 °C), Min:98 °F (36 7 °C), Max:98 5 °F (36 9 °C)    Temp:  [98 °F (36 7 °C)-98 5 °F (36 9 °C)] 98 °F (36 7 °C)  HR:  [68-96] 74  Resp:  [17-20] 20  BP: (130-172)/(63-91) 159/91  SpO2:  [96 %-100 %] 97 %  Body mass index is 25 81 kg/m²  Input and Output Summary (last 24 hours):        Intake/Output Summary (Last 24 hours) at 18 1146  Last data filed at 18 0648   Gross per 24 hour   Intake          1766 67 ml   Output              750 ml   Net          1016 67 ml       Physical Exam:     Physical Exam   Constitutional: She is oriented to person, place, and time  She appears well-developed and well-nourished  HENT:   Head: Normocephalic and atraumatic  Eyes: Pupils are equal, round, and reactive to light  No scleral icterus  Neck: Neck supple  No JVD present  Cardiovascular: Normal rate and regular rhythm  Pulmonary/Chest: Effort normal    Abdominal: Soft  There is no tenderness  Musculoskeletal:   Stable erythema and edema of the left upper extremity   Neurological: She is alert and oriented to person, place, and time  Skin: Skin is warm  There is erythema  Additional Data:     Labs:      Results from last 7 days  Lab Units 12/31/18  0354  12/30/18  1315   WBC Thousand/uL 13 99*  --  13 88*   HEMOGLOBIN g/dL 12 0  --  12 3   HEMATOCRIT % 36 6  --  37 6   PLATELETS Thousands/uL 253  < > 242   NEUTROS PCT %  --   --  76*   LYMPHS PCT %  --   --  12*   MONOS PCT %  --   --  8   EOS PCT %  --   --  1   < > = values in this interval not displayed  Results from last 7 days  Lab Units 12/31/18  0354  12/30/18  1315   SODIUM mmol/L 132*  < > 131*   POTASSIUM mmol/L 5 0  < > 4 6   CHLORIDE mmol/L 100  < > 97*   CO2 mmol/L 23  < > 25   BUN mg/dL 15  < > 19   CREATININE mg/dL 1 25  < > 1 55*   ANION GAP mmol/L 9  < > 9   CALCIUM mg/dL 9 1  < > 9 0   ALBUMIN g/dL  --   --  2 6*   TOTAL BILIRUBIN mg/dL  --   --  0 62   ALK PHOS U/L  --   --  135*   ALT U/L  --   --  23   AST U/L  --   --  11   GLUCOSE RANDOM mg/dL 146*  < > 426*   < > = values in this interval not displayed      Results from last 7 days  Lab Units 12/30/18  1315   INR  1 06       Results from last 7 days  Lab Units 12/31/18  1059 12/31/18  0736 12/31/18  0544 12/31/18  0356 12/31/18  0147 12/30/18  2349 12/30/18  2210 12/30/18  1909   POC GLUCOSE mg/dl 68 151* 168* 173* 74 185* 248* 493*           Results from last 7 days  Lab Units 12/30/18  2009 12/30/18  1419   LACTIC ACID mmol/L 1 4 1 4   PROCALCITONIN ng/ml 0 10 0 20           * I Have Reviewed All Lab Data Listed Above   * Additional Pertinent Lab Tests Reviewed: All Labs Within Last 24 Hours Reviewed    Imaging:        Recent Cultures (last 7 days):           Last 24 Hours Medication List:     Current Facility-Administered Medications:  aspirin 81 mg Oral Daily Cesario Manzo MD    atorvastatin 40 mg Oral Q24H Cesario Manzo MD    brimonidine 1 drop Both Eyes BID Cesario Manzo MD    docusate sodium 100 mg Oral BID Cesario Manzo MD    dorzolamide 1 drop Right Eye BID Cesario Manzo MD    heparin (porcine) 5,000 Units Subcutaneous [facility] Cesario Manzo MD    hydrALAZINE 10 mg Intravenous Q6H PRN Onesimo Herring MD    insulin glargine 30 Units Subcutaneous HS Rogerio Calle PA-C    insulin lispro 1-5 Units Subcutaneous TID AC Rogerio Calle PA-C    insulin lispro 1-5 Units Subcutaneous HS Rogerio Calle PA-C    insulin lispro 30 Units Subcutaneous Daily With Breakfast Fernandez Holcomb PA-C    ipratropium 2 puff Inhalation 4x Daily Cesario Manzo MD    lactulose 30 g Oral Once Onesimo Herring MD    lidocaine 1 patch Topical Daily Cesario Manzo MD    naloxone 0 04 mg Intravenous Q1MIN PRN Cesario Manzo MD    ondansetron 4 mg Intravenous Q4H PRN Cesario Manzo MD    oxyCODONE 2 5 mg Oral Q4H PRN Cesario Manzo MD    oxyCODONE 5 mg Oral Q4H PRN Cesario Manzo MD    pantoprazole 20 mg Oral Early Morning Cesario Manzo MD    pregabalin 100 mg Oral Daily Cesario Manzo MD    rOPINIRole 0 5 mg Oral HS Cesario Manzo MD    senna 1 tablet Oral Daily Cesario Manzo MD    sodium chloride 75 mL/hr Intravenous Continuous Cesario Manzo MD Last Rate: Stopped (12/31/18 4000)   timolol 1 drop Both Eyes BID Cesario Manzo MD    vancomycin 15 mg/kg Intravenous Q24H Cesario Manzo MD         Today, Patient Was Seen By: Giorgi Ramey MD    ** Please Note: Dictation voice to text software may have been used in the creation of this document   **

## 2019-01-05 ENCOUNTER — ANESTHESIA (INPATIENT)
Dept: PERIOP | Facility: HOSPITAL | Age: 84
DRG: 872 | End: 2019-01-05
Payer: MEDICARE

## 2019-01-05 ENCOUNTER — ANESTHESIA EVENT (INPATIENT)
Dept: PERIOP | Facility: HOSPITAL | Age: 84
DRG: 872 | End: 2019-01-05
Payer: MEDICARE

## 2019-01-05 PROBLEM — Z79.4 TYPE 2 DIABETES MELLITUS WITH HYPERGLYCEMIA, WITH LONG-TERM CURRENT USE OF INSULIN (HCC): Status: RESOLVED | Noted: 2018-12-30 | Resolved: 2019-01-05

## 2019-01-05 PROBLEM — L02.414 ABSCESS OF ARM, LEFT: Status: ACTIVE | Noted: 2018-12-30

## 2019-01-05 PROBLEM — E11.65 TYPE 2 DIABETES MELLITUS WITH HYPERGLYCEMIA, WITH LONG-TERM CURRENT USE OF INSULIN (HCC): Status: RESOLVED | Noted: 2018-12-30 | Resolved: 2019-01-05

## 2019-01-05 LAB
ANION GAP SERPL CALCULATED.3IONS-SCNC: 10 MMOL/L (ref 4–13)
BASOPHILS # BLD AUTO: 0.09 THOUSANDS/ΜL (ref 0–0.1)
BASOPHILS NFR BLD AUTO: 1 % (ref 0–1)
BUN SERPL-MCNC: 19 MG/DL (ref 5–25)
CALCIUM SERPL-MCNC: 8.9 MG/DL (ref 8.3–10.1)
CHLORIDE SERPL-SCNC: 96 MMOL/L (ref 100–108)
CO2 SERPL-SCNC: 22 MMOL/L (ref 21–32)
CREAT SERPL-MCNC: 1.43 MG/DL (ref 0.6–1.3)
EOSINOPHIL # BLD AUTO: 0.11 THOUSAND/ΜL (ref 0–0.61)
EOSINOPHIL NFR BLD AUTO: 1 % (ref 0–6)
ERYTHROCYTE [DISTWIDTH] IN BLOOD BY AUTOMATED COUNT: 14.7 % (ref 11.6–15.1)
GFR SERPL CREATININE-BSD FRML MDRD: 34 ML/MIN/1.73SQ M
GLUCOSE SERPL-MCNC: 191 MG/DL (ref 65–140)
GLUCOSE SERPL-MCNC: 208 MG/DL (ref 65–140)
GLUCOSE SERPL-MCNC: 212 MG/DL (ref 65–140)
GLUCOSE SERPL-MCNC: 226 MG/DL (ref 65–140)
GLUCOSE SERPL-MCNC: 321 MG/DL (ref 65–140)
HCT VFR BLD AUTO: 35.4 % (ref 34.8–46.1)
HGB BLD-MCNC: 11.4 G/DL (ref 11.5–15.4)
IMM GRANULOCYTES # BLD AUTO: 0.23 THOUSAND/UL (ref 0–0.2)
IMM GRANULOCYTES NFR BLD AUTO: 2 % (ref 0–2)
LYMPHOCYTES # BLD AUTO: 2.45 THOUSANDS/ΜL (ref 0.6–4.47)
LYMPHOCYTES NFR BLD AUTO: 19 % (ref 14–44)
MCH RBC QN AUTO: 30.2 PG (ref 26.8–34.3)
MCHC RBC AUTO-ENTMCNC: 32.2 G/DL (ref 31.4–37.4)
MCV RBC AUTO: 94 FL (ref 82–98)
MONOCYTES # BLD AUTO: 1.04 THOUSAND/ΜL (ref 0.17–1.22)
MONOCYTES NFR BLD AUTO: 8 % (ref 4–12)
NEUTROPHILS # BLD AUTO: 9.01 THOUSANDS/ΜL (ref 1.85–7.62)
NEUTS SEG NFR BLD AUTO: 69 % (ref 43–75)
NRBC BLD AUTO-RTO: 0 /100 WBCS
PLATELET # BLD AUTO: 308 THOUSANDS/UL (ref 149–390)
PMV BLD AUTO: 9.9 FL (ref 8.9–12.7)
POTASSIUM SERPL-SCNC: 4.4 MMOL/L (ref 3.5–5.3)
RBC # BLD AUTO: 3.78 MILLION/UL (ref 3.81–5.12)
SODIUM SERPL-SCNC: 128 MMOL/L (ref 136–145)
WBC # BLD AUTO: 12.93 THOUSAND/UL (ref 4.31–10.16)

## 2019-01-05 PROCEDURE — 82948 REAGENT STRIP/BLOOD GLUCOSE: CPT

## 2019-01-05 PROCEDURE — 99232 SBSQ HOSP IP/OBS MODERATE 35: CPT | Performed by: SURGERY

## 2019-01-05 PROCEDURE — 99233 SBSQ HOSP IP/OBS HIGH 50: CPT | Performed by: INTERNAL MEDICINE

## 2019-01-05 PROCEDURE — 87176 TISSUE HOMOGENIZATION CULTR: CPT | Performed by: SURGERY

## 2019-01-05 PROCEDURE — 80048 BASIC METABOLIC PNL TOTAL CA: CPT | Performed by: INTERNAL MEDICINE

## 2019-01-05 PROCEDURE — 87186 SC STD MICRODIL/AGAR DIL: CPT | Performed by: SURGERY

## 2019-01-05 PROCEDURE — 87075 CULTR BACTERIA EXCEPT BLOOD: CPT | Performed by: SURGERY

## 2019-01-05 PROCEDURE — 87147 CULTURE TYPE IMMUNOLOGIC: CPT | Performed by: SURGERY

## 2019-01-05 PROCEDURE — 85025 COMPLETE CBC W/AUTO DIFF WBC: CPT | Performed by: INTERNAL MEDICINE

## 2019-01-05 PROCEDURE — 0H9EXZZ DRAINAGE OF LEFT LOWER ARM SKIN, EXTERNAL APPROACH: ICD-10-PCS | Performed by: SURGERY

## 2019-01-05 PROCEDURE — 87070 CULTURE OTHR SPECIMN AEROBIC: CPT | Performed by: SURGERY

## 2019-01-05 PROCEDURE — 99223 1ST HOSP IP/OBS HIGH 75: CPT | Performed by: INTERNAL MEDICINE

## 2019-01-05 PROCEDURE — 10061 I&D ABSCESS COMP/MULTIPLE: CPT | Performed by: SURGERY

## 2019-01-05 PROCEDURE — 87205 SMEAR GRAM STAIN: CPT | Performed by: SURGERY

## 2019-01-05 RX ORDER — ONDANSETRON 2 MG/ML
4 INJECTION INTRAMUSCULAR; INTRAVENOUS ONCE AS NEEDED
Status: DISCONTINUED | OUTPATIENT
Start: 2019-01-05 | End: 2019-01-05 | Stop reason: HOSPADM

## 2019-01-05 RX ORDER — FENTANYL CITRATE/PF 50 MCG/ML
25 SYRINGE (ML) INJECTION
Status: DISCONTINUED | OUTPATIENT
Start: 2019-01-05 | End: 2019-01-05 | Stop reason: HOSPADM

## 2019-01-05 RX ORDER — MAGNESIUM HYDROXIDE 1200 MG/15ML
LIQUID ORAL AS NEEDED
Status: DISCONTINUED | OUTPATIENT
Start: 2019-01-05 | End: 2019-01-05 | Stop reason: HOSPADM

## 2019-01-05 RX ORDER — LIDOCAINE HYDROCHLORIDE 10 MG/ML
INJECTION, SOLUTION INFILTRATION; PERINEURAL AS NEEDED
Status: DISCONTINUED | OUTPATIENT
Start: 2019-01-05 | End: 2019-01-05 | Stop reason: HOSPADM

## 2019-01-05 RX ORDER — MIDAZOLAM HYDROCHLORIDE 1 MG/ML
INJECTION INTRAMUSCULAR; INTRAVENOUS AS NEEDED
Status: DISCONTINUED | OUTPATIENT
Start: 2019-01-05 | End: 2019-01-05 | Stop reason: SURG

## 2019-01-05 RX ORDER — SODIUM CHLORIDE 9 MG/ML
50 INJECTION, SOLUTION INTRAVENOUS CONTINUOUS
Status: DISCONTINUED | OUTPATIENT
Start: 2019-01-05 | End: 2019-01-06

## 2019-01-05 RX ORDER — SODIUM CHLORIDE, SODIUM LACTATE, POTASSIUM CHLORIDE, CALCIUM CHLORIDE 600; 310; 30; 20 MG/100ML; MG/100ML; MG/100ML; MG/100ML
INJECTION, SOLUTION INTRAVENOUS CONTINUOUS PRN
Status: DISCONTINUED | OUTPATIENT
Start: 2019-01-05 | End: 2019-01-05 | Stop reason: SURG

## 2019-01-05 RX ORDER — ALBUTEROL SULFATE 2.5 MG/3ML
2.5 SOLUTION RESPIRATORY (INHALATION) ONCE AS NEEDED
Status: DISCONTINUED | OUTPATIENT
Start: 2019-01-05 | End: 2019-01-05 | Stop reason: HOSPADM

## 2019-01-05 RX ORDER — DEXTROSE AND SODIUM CHLORIDE 5; .9 G/100ML; G/100ML
75 INJECTION, SOLUTION INTRAVENOUS CONTINUOUS
Status: DISCONTINUED | OUTPATIENT
Start: 2019-01-05 | End: 2019-01-05

## 2019-01-05 RX ORDER — HYDROMORPHONE HCL/PF 1 MG/ML
0.5 SYRINGE (ML) INJECTION
Status: DISCONTINUED | OUTPATIENT
Start: 2019-01-05 | End: 2019-01-05 | Stop reason: HOSPADM

## 2019-01-05 RX ORDER — SODIUM CHLORIDE 9 MG/ML
100 INJECTION, SOLUTION INTRAVENOUS CONTINUOUS
Status: DISCONTINUED | OUTPATIENT
Start: 2019-01-05 | End: 2019-01-05

## 2019-01-05 RX ORDER — INSULIN GLARGINE 100 [IU]/ML
35 INJECTION, SOLUTION SUBCUTANEOUS EVERY MORNING
Status: DISCONTINUED | OUTPATIENT
Start: 2019-01-06 | End: 2019-01-06

## 2019-01-05 RX ORDER — BUPIVACAINE HYDROCHLORIDE 5 MG/ML
INJECTION, SOLUTION PERINEURAL AS NEEDED
Status: DISCONTINUED | OUTPATIENT
Start: 2019-01-05 | End: 2019-01-05 | Stop reason: HOSPADM

## 2019-01-05 RX ORDER — FENTANYL CITRATE 50 UG/ML
INJECTION, SOLUTION INTRAMUSCULAR; INTRAVENOUS AS NEEDED
Status: DISCONTINUED | OUTPATIENT
Start: 2019-01-05 | End: 2019-01-05 | Stop reason: SURG

## 2019-01-05 RX ORDER — PROPOFOL 10 MG/ML
INJECTION, EMULSION INTRAVENOUS CONTINUOUS PRN
Status: DISCONTINUED | OUTPATIENT
Start: 2019-01-05 | End: 2019-01-05 | Stop reason: SURG

## 2019-01-05 RX ORDER — MEPERIDINE HYDROCHLORIDE 25 MG/ML
12.5 INJECTION INTRAMUSCULAR; INTRAVENOUS; SUBCUTANEOUS AS NEEDED
Status: DISCONTINUED | OUTPATIENT
Start: 2019-01-05 | End: 2019-01-05 | Stop reason: HOSPADM

## 2019-01-05 RX ORDER — LABETALOL 20 MG/4 ML (5 MG/ML) INTRAVENOUS SYRINGE
5
Status: DISCONTINUED | OUTPATIENT
Start: 2019-01-05 | End: 2019-01-05 | Stop reason: HOSPADM

## 2019-01-05 RX ORDER — INSULIN GLARGINE 100 [IU]/ML
17 INJECTION, SOLUTION SUBCUTANEOUS EVERY MORNING
Status: DISCONTINUED | OUTPATIENT
Start: 2019-01-05 | End: 2019-01-05

## 2019-01-05 RX ORDER — CEFAZOLIN SODIUM 2 G/50ML
2000 SOLUTION INTRAVENOUS EVERY 12 HOURS
Status: DISCONTINUED | OUTPATIENT
Start: 2019-01-05 | End: 2019-01-07 | Stop reason: HOSPADM

## 2019-01-05 RX ADMIN — OXYCODONE HYDROCHLORIDE 5 MG: 5 TABLET ORAL at 12:46

## 2019-01-05 RX ADMIN — BRIMONIDINE TARTRATE 1 DROP: 1.5 SOLUTION OPHTHALMIC at 11:02

## 2019-01-05 RX ADMIN — SODIUM CHLORIDE 50 ML/HR: 0.9 INJECTION, SOLUTION INTRAVENOUS at 12:53

## 2019-01-05 RX ADMIN — ROPINIROLE 0.5 MG: 0.25 TABLET, FILM COATED ORAL at 21:46

## 2019-01-05 RX ADMIN — DOCUSATE SODIUM 100 MG: 100 CAPSULE, LIQUID FILLED ORAL at 17:17

## 2019-01-05 RX ADMIN — MIDAZOLAM 2 MG: 1 INJECTION INTRAMUSCULAR; INTRAVENOUS at 11:20

## 2019-01-05 RX ADMIN — ATORVASTATIN CALCIUM 40 MG: 40 TABLET, FILM COATED ORAL at 17:16

## 2019-01-05 RX ADMIN — TIMOLOL MALEATE 1 DROP: 5 SOLUTION/ DROPS OPHTHALMIC at 17:16

## 2019-01-05 RX ADMIN — TIMOLOL MALEATE 1 DROP: 5 SOLUTION/ DROPS OPHTHALMIC at 11:02

## 2019-01-05 RX ADMIN — FENTANYL CITRATE 50 MCG: 50 INJECTION, SOLUTION INTRAMUSCULAR; INTRAVENOUS at 11:24

## 2019-01-05 RX ADMIN — Medication 250 MG: at 17:17

## 2019-01-05 RX ADMIN — DEXTROSE AND SODIUM CHLORIDE 75 ML/HR: 5; .9 INJECTION, SOLUTION INTRAVENOUS at 03:23

## 2019-01-05 RX ADMIN — IPRATROPIUM BROMIDE 2 PUFF: 17 AEROSOL, METERED RESPIRATORY (INHALATION) at 12:47

## 2019-01-05 RX ADMIN — HEPARIN SODIUM 5000 UNITS: 5000 INJECTION INTRAVENOUS; SUBCUTANEOUS at 21:46

## 2019-01-05 RX ADMIN — SODIUM CHLORIDE, SODIUM LACTATE, POTASSIUM CHLORIDE, AND CALCIUM CHLORIDE: .6; .31; .03; .02 INJECTION, SOLUTION INTRAVENOUS at 11:09

## 2019-01-05 RX ADMIN — INSULIN LISPRO 13 UNITS: 100 INJECTION, SOLUTION INTRAVENOUS; SUBCUTANEOUS at 18:06

## 2019-01-05 RX ADMIN — LIDOCAINE 1 PATCH: 50 PATCH CUTANEOUS at 09:53

## 2019-01-05 RX ADMIN — HEPARIN SODIUM 5000 UNITS: 5000 INJECTION INTRAVENOUS; SUBCUTANEOUS at 15:21

## 2019-01-05 RX ADMIN — BRIMONIDINE TARTRATE 1 DROP: 1.5 SOLUTION OPHTHALMIC at 17:16

## 2019-01-05 RX ADMIN — CEFAZOLIN SODIUM 2000 MG: 2 SOLUTION INTRAVENOUS at 21:46

## 2019-01-05 RX ADMIN — IPRATROPIUM BROMIDE 2 PUFF: 17 AEROSOL, METERED RESPIRATORY (INHALATION) at 21:46

## 2019-01-05 RX ADMIN — INSULIN LISPRO 2 UNITS: 100 INJECTION, SOLUTION INTRAVENOUS; SUBCUTANEOUS at 20:02

## 2019-01-05 RX ADMIN — INSULIN GLARGINE 17 UNITS: 100 INJECTION, SOLUTION SUBCUTANEOUS at 09:52

## 2019-01-05 RX ADMIN — IPRATROPIUM BROMIDE 2 PUFF: 17 AEROSOL, METERED RESPIRATORY (INHALATION) at 17:16

## 2019-01-05 RX ADMIN — PREGABALIN 100 MG: 100 CAPSULE ORAL at 09:52

## 2019-01-05 RX ADMIN — DORZOLAMIDE HYDROCHLORIDE 1 DROP: 20 SOLUTION/ DROPS OPHTHALMIC at 17:16

## 2019-01-05 RX ADMIN — CEFAZOLIN SODIUM 2000 MG: 2 SOLUTION INTRAVENOUS at 09:52

## 2019-01-05 RX ADMIN — INSULIN LISPRO 1 UNITS: 100 INJECTION, SOLUTION INTRAVENOUS; SUBCUTANEOUS at 12:47

## 2019-01-05 RX ADMIN — OXYCODONE HYDROCHLORIDE 5 MG: 5 TABLET ORAL at 20:01

## 2019-01-05 RX ADMIN — OXYCODONE HYDROCHLORIDE 5 MG: 5 TABLET ORAL at 05:41

## 2019-01-05 RX ADMIN — PROPOFOL 50 MCG/KG/MIN: 10 INJECTION, EMULSION INTRAVENOUS at 11:24

## 2019-01-05 RX ADMIN — DORZOLAMIDE HYDROCHLORIDE 1 DROP: 20 SOLUTION/ DROPS OPHTHALMIC at 12:48

## 2019-01-05 NOTE — PROGRESS NOTES
Progress Note - Christiano Salas 1935, 81 y o  female MRN: [id number]    Unit/Bed#: [facility] 604-01 Encounter: [id number]    Primary Care Provider: Alejandra Jacome MD   Date and time admitted to hospital: 12/30/2018 12:20 PM        Abscess of arm, left   Assessment & Plan    CT shows abscess of the left upper arm, improving cellulitis  Surgery has taken patient to OR today for Incision and Drainage  Continue with current antibiotic management  ID consulted for the duration of antibiotics and to see there is any de-escalation of antibiotics         Type 2 diabetes mellitus with hyperglycemia, with long-term current use of insulin [facility])   Assessment & Plan    Lab Results   Component Value Date    HGBA1C 8 5 (H) 12/20/2018       Recent Labs      12/31/18   0356  12/31/18   0544  12/31/18   0736  12/31/18   1059   POCGLU  173*  168*  151*  68       Blood Sugar Average: Last 72 hrs:  (P) 195 with severe hyperglycemia likely related to sepsis, for now keep the patient on insulin drip non DKA protocol and  Endocrinology was following  Diabetic diet  Insulin lantus at 35 units, lispro 13 units premeals, check fingerstick glucose  SSI  Januvia restarted     Accident due to mechanical fall without injury   Assessment & Plan    Fall precautions, PT and OT     Left arm cellulitis   Assessment & Plan    Improving  Continue to monitor temps, WBC count daily  Continue cefazolin for now  Check MRSA screen if negative discontinue vancomycin  Left arm elevation    Patient has developed a abscess on the left arm now  Acute surgery is consulted, she might need drainage, await wound cultures  ID will be consulted       Acute renal failure superimposed on stage 3 chronic kidney disease ([facility] )   Assessment & Plan    Improving but renal function is still fluctuating  Continue IV fluids  Continue to monitor  Avoid nephrotoxins     Hypertension   Assessment & Plan    Continue to Hold lisinopril secondary to acute kidney injury  Continue to monitor       Pain of right lower extremityresolved as of 2019   Assessment & Plan    Appears to be consistent right-sided radiculopathy  Neurological intact otherwise  Continue Lyrica, when renal function stabilized possibly increase  P r n  Tylenol     * Sepsis (HCC)resolved as of 2019   Assessment & Plan    As above            VTE Pharmacologic Prophylaxis:   Pharmacologic: Heparin  Mechanical VTE Prophylaxis in Place: Yes    Patient Centered Rounds: I have performed bedside rounds with nursing staff today  Discussions with Specialists or Other Care Team Provider: ID, surgery yesterday, orthopedics yesterday regarding abscess    Education and Discussions with Family / Patient: patient    Time Spent for Care: 45 minutes  More than 50% of total time spent on counseling and coordination of care as described above  Current Length of Stay: 6 day(s)    Current Patient Status: Inpatient   Certification Statement: The patient will continue to require additional inpatient hospital stay due to abscess, drainage, glucose adjustment    Discharge Plan: next few days    Code Status: Level 1 - Full Code      Subjective: She is complaining of pain of arm  Objective:     Vitals:   Temp (24hrs), Av 8 °F (37 1 °C), Min:97 4 °F (36 3 °C), Max:99 9 °F (37 7 °C)    Temp:  [97 4 °F (36 3 °C)-99 9 °F (37 7 °C)] 97 4 °F (36 3 °C)  HR:  [60-70] 60  Resp:  [14-20] 15  BP: (110-124)/(60-72) 124/70  SpO2:  [97 %-100 %] 97 %  Body mass index is 25 38 kg/m²  Input and Output Summary (last 24 hours): Intake/Output Summary (Last 24 hours) at 19 1254  Last data filed at 19 1147   Gross per 24 hour   Intake           1037 5 ml   Output              970 ml   Net             67 5 ml       Physical Exam:     Physical Exam   Constitutional: She appears well-developed and well-nourished  Eyes: Pupils are equal, round, and reactive to light   Conjunctivae and EOM are normal    Neck: Normal range of motion  Neck supple  No thyromegaly present  Cardiovascular: Normal rate, regular rhythm and intact distal pulses  Exam reveals no gallop and no friction rub  No murmur heard  Pulmonary/Chest: Effort normal and breath sounds normal  No respiratory distress  She has no wheezes  She has no rales  She exhibits no tenderness  Abdominal: Soft  Bowel sounds are normal  She exhibits no distension  There is no tenderness  Musculoskeletal: Normal range of motion  She exhibits edema and deformity  Neurological: She is alert  No cranial nerve deficit  Vietnamese speaking   Skin: There is erythema  Psychiatric: She has a normal mood and affect  Nursing note and vitals reviewed  Additional Data:     Labs:      Results from last 7 days  Lab Units 01/02/19  0628   WBC Thousand/uL 12 63*   HEMOGLOBIN g/dL 13 9   HEMATOCRIT % 42 2   PLATELETS Thousands/uL 353   NEUTROS PCT % 66   LYMPHS PCT % 21   MONOS PCT % 8   EOS PCT % 2       Results from last 7 days  Lab Units 01/05/19  1055  12/30/18  1315   POTASSIUM mmol/L 4 4  < > 4 6   CHLORIDE mmol/L 96*  < > 97*   CO2 mmol/L 22  < > 25   BUN mg/dL 19  < > 19   CREATININE mg/dL 1 43*  < > 1 55*   CALCIUM mg/dL 8 9  < > 9 0   ALK PHOS U/L  --   --  135*   ALT U/L  --   --  23   AST U/L  --   --  11   < > = values in this interval not displayed  Results from last 7 days  Lab Units 12/30/18  1315   INR  1 06       * I Have Reviewed All Lab Data Listed Above  * Additional Pertinent Lab Tests Reviewed: [facility]    Imaging:    Imaging Reports Reviewed Today Include:    Imaging Personally Reviewed by Myself Includes:      Recent Cultures (last 7 days):       Results from last 7 days  Lab Units 12/30/18  1419 12/30/18  1315   BLOOD CULTURE  No Growth After 5 Days  No Growth After 5 Days         Last 24 Hours Medication List:     Current Facility-Administered Medications:  aspirin 81 mg Oral Daily Stacey Paris MD    atorvastatin 40 mg Oral Q24H Linnea Rivera MD    brimonidine 1 drop Both Eyes BID Linnea Rivera MD    cefazolin 2,000 mg Intravenous Q8H Patricia Cortez MD Last Rate: 2,000 mg (01/05/19 0210)   docusate sodium 100 mg Oral BID Linnea Rivera MD    dorzolamide 1 drop Right Eye BID Linnea Rivera MD    heparin (porcine) 5,000 Units Subcutaneous Q8H [address] Linnea Rivera MD    hydrALAZINE 10 mg Intravenous Q6H PRN Burke Earl MD    [START ON 1/6/2019] insulin glargine 35 Units Subcutaneous QAM Patricia Cortez MD    insulin lispro 1-5 Units Subcutaneous 4 times day Patricia Cortez MD    insulin lispro 13 Units Subcutaneous TID With Meals Patricia Cortez MD    ipratropium 2 puff Inhalation 4x Daily Linnea Rivera MD    lidocaine 1 patch Topical Daily Linnea Rivera MD    naloxone 0 04 mg Intravenous Q1MIN PRN Linnea Rivera MD    ondansetron 4 mg Intravenous Q4H PRN Linnea Rivera MD    oxyCODONE 2 5 mg Oral Q4H PRN Linnea Rivera MD    oxyCODONE 5 mg Oral Q4H PRN Linnea Rivera MD    pantoprazole 20 mg Oral Early Morning Linnea Rivera MD    polyethylene glycol 17 g Oral BID AC Burke Earl MD    pregabalin 100 mg Oral Daily Linnea Rivera MD    rOPINIRole 0 5 mg Oral HS Linnea Rivera MD    saccharomyces boulardii 250 mg Oral BID Patricia Cortez MD    senna 1 tablet Oral Daily Linnea Rivera MD    sitaGLIPtin 50 mg Oral Daily Patricia Cortez MD    sodium chloride 50 mL/hr Intravenous Continuous Patricia Cortez MD Last Rate: 50 mL/hr (01/05/19 1253)   timolol 1 drop Both Eyes BID Linnea Rivera MD         Today, Patient Was Seen By: Patricia Cortez MD    ** Please Note: Dictation voice to text software may have been used in the creation of this document   **

## 2019-01-05 NOTE — PROGRESS NOTES
Progress Note - General Surgery   Carolyn Hardin 81 y o  female MRN: [id number]  Unit/Bed#: [facility] 604-01 Encounter: [id number]    Assessment:  81 y o  F w/ left upper extremity abscess     Plan:  Plan for I&D in OR today under sedation  Discussed w/ patient  NPO  Care otherwise per primary    Subjective/Objective   Chief Complaint:     Subjective:     Objective:     Blood pressure 120/60, pulse 70, temperature 99 9 °F (37 7 °C), resp  rate 20, height 5' 3" (1 6 m), weight 65 kg (143 lb 4 8 oz), SpO2 98 %, not currently breastfeeding  ,Body mass index is 25 38 kg/m²        Intake/Output Summary (Last 24 hours) at 01/05/19 0911  Last data filed at 01/05/19 0553   Gross per 24 hour   Intake            637 5 ml   Output              970 ml   Net           -332 5 ml       Invasive Devices     Peripheral Intravenous Line            Peripheral IV 12/31/18 Right Antecubital 5 days                Physical Exam:   NAD  CV RRR  Skin tender area over dorsal left forearm distal to elbow joint, fluctuance unchanged     Lab, Imaging and other studies:CBC: No results found for: WBC, HGB, HCT, MCV, PLT, ADJUSTEDWBC, MCH, MCHC, RDW, MPV, NRBC, CMP: No results found for: SODIUM, K, CL, CO2, ANIONGAP, BUN, CREATININE, GLUCOSE, CALCIUM, AST, ALT, ALKPHOS, PROT, BILITOT, EGFR, Coagulation: No results found for: PT, INR, APTT  VTE Pharmacologic Prophylaxis: Heparin  VTE Mechanical Prophylaxis: sequential compression device

## 2019-01-05 NOTE — CONSULTS
Consultation - Infectious Disease   Lily Holm 81 y o  female MRN: [id number]  Unit/Bed#: [facility] 604-01 Encounter: [id number]      IMPRESSION & RECOMMENDATIONS:   Impression/Recommendations:  1  Left upper extremity cellulitis with subcutaneous abscess  Now status post I and D on 01/05  Abscess was superficial with no joint or bone involvement  Cellulitis had improved while patient was on oral Keflex suggesting that this may be secondary to MSSA  Patient currently on IV cefazolin     -continue with IV cefazolin  Will change to 2 g q 12 hours  -follow up OR cultures  -serial arm exams  -close surgery follow-up ongoing    2  Acute kidney injury  On CKD  Creatinine a little more elevated today  Will dose adjust antibiotics based on current renal function  Check BMP in a m     3  Sepsis, POA  Leukocytosis, tachycardia  Likely all secondary to #1  No fevers  Mild leukocytosis persistent, likely secondary to undrained abscess  Patient is hemodynamically stable, nontoxic  Admission blood cultures were negative     -antibiotic plan as above  -monitor temperatures and hemodynamics  -check CBC in a m     4  Diabetes mellitus  With hyperglycemia  Likely precipitating factor for development of severe arm infection  Blood sugar management per primary team     Antibiotics:  Antibiotic D7  Cefazolin  POD0    Discussed above plan with Dr Ray Leon  Thank you for this consultation  We will follow along with you  HISTORY OF PRESENT ILLNESS:  Reason for Consult:  Left arm cellulitis and abscess    HPI: Lily Holm is a 81 y o  female with history of uncontrolled diabetes who initially presented on 12/30/2018 with redness, pain, swelling involving left upper extremity around the elbow  Patient was noted to have tachycardia and leukocytosis on admission  A CT was obtained of upper extremity which was consistent with cellulitis but no clear evidence of drainable collection    Patient was maintained on IV vancomycin with some improvement noted and ultimately transition to oral Keflex with continued improvement  However, yesterday she was noted to have new development of fluctuant lesion near her left elbow  A repeat CT revealed a subcutaneous abscess superficial to the ulna  Today, patient went to the OR for drainage of left forearm abscess with copious amounts of pus noted intraoperatively  Culture was sent  Patient seen postoperatively  She appears comfortable  No fevers, chills or other focal complaints  REVIEW OF SYSTEMS:  A complete system-based review of systems is otherwise negative  PAST MEDICAL HISTORY:  Past Medical History:   Diagnosis Date    [facility] [address] [facility]     [facility] mellitus [facility] )     Hyperlipidemia     Hyperlipidemia     Hypertension     Kidney stones     Osteoporosis      Past Surgical History:   Procedure Laterality Date    APPENDECTOMY      HAND SURGERY Right     LITHOTRIPSY      MASS EXCISION      remova of back wall mass    TONSILLECTOMY      TONSILLECTOMY         FAMILY HISTORY:  Non-contributory    SOCIAL HISTORY:  History   Alcohol Use    Yes     Comment: OCCASIONAL     History   Drug Use No     History   Smoking Status    Former Smoker    Packs/day: 1 00    Years: 40 00    Types: Cigarettes    Quit date: 1/1/2017   Smokeless Tobacco    Never Used     Comment: states she quit but family living with her states she smokes       ALLERGIES:  Allergies   Allergen Reactions    Acetaminophen      Listen on patient's paperwork from [facility] and Staffing  Family unable to confirm      Morphine And Related Vomiting    Oxycodone GI Intolerance     Listed on patient's paperwork from [facility] and Staffing  Family unable to confirm      Tramadol Vomiting and Abdominal Pain     Other       MEDICATIONS:  All current active medications have been reviewed      PHYSICAL EXAM:  Vitals:  Temp:  [97 4 °F (36 3 °C)-99 9 °F (37 7 °C)] 97 4 °F (36 3 °C)  HR:  [60-70] 60  Resp:  [14-20] 15  BP: (110-124)/(60-72) 124/70  SpO2:  [97 %-100 %] 97 %  Temp (24hrs), Av 8 °F (37 1 °C), Min:97 4 °F (36 3 °C), Max:99 9 °F (37 7 °C)  Current: Temperature: (!) 97 4 °F (36 3 °C)     Physical Exam:  General:  No acute distress, resting comfortably  Eyes:  Conjunctive clear with no hemorrhages or effusions  Oropharynx:  No ulcers, no lesions  Neck:  Supple, no lymphadenopathy  Lungs:  Clear to auscultation bilaterally, no accessory muscle use  Cardiac:  Regular rate and rhythm, no murmurs  Abdomen:  Soft, non-tender, non-distended  Extremities:  No peripheral cyanosis, clubbing, or edema  Left upper extremity dressing intact  Skin:  No rashes, no ulcers  Neurological:  Moves all four extremities spontaneously, sensation grossly intact      LABS, IMAGING, & OTHER STUDIES:  Lab Results:  I have personally reviewed pertinent labs  Results from last 7 days  Lab Units 19  1055 19  0628 18  1156  18  1315   POTASSIUM mmol/L 4 4 4 4 4 3  < > 4 6   CHLORIDE mmol/L 96* 96* 102  < > 97*   CO2 mmol/L 22 26 22  < > 25   BUN mg/dL 19 16 13  < > 19   CREATININE mg/dL 1 43* 1 22 1 38*  < > 1 55*   EGFR ml/min/1 73sq m 34 41 36  < > 31   CALCIUM mg/dL 8 9 9 4 8 7  < > 9 0   AST U/L  --   --   --   --  11   ALT U/L  --   --   --   --  23   ALK PHOS U/L  --   --   --   --  135*   < > = values in this interval not displayed  Results from last 7 days  Lab Units 19  0628 18  0354 18  2009 18  1315   WBC Thousand/uL 12 63* 13 99*  --  13 88*   HEMOGLOBIN g/dL 13 9 12 0  --  12 3   PLATELETS Thousands/uL 353 253 193 242       Results from last 7 days  Lab Units 18  1240 18  1419 18  1315   BLOOD CULTURE   --  No Growth After 5 Days  No Growth After 5 Days     MRSA CULTURE ONLY  No Methicillin Resistant Staphlyococcus aureus (MRSA) isolated  --   --        Imaging Studies:   I have personally reviewed pertinent imaging study reports and images in PACS  Initial CT of forearm showed subcutaneous edema over the elbow and forearm  No clear evidence of abscess or deeper infection  Repeat CT of forearm showed subcutaneous abscess superficial to the ulna  No evidence of deeper infection  EKG, Pathology, and Other Studies:   I have personally reviewed pertinent reports

## 2019-01-05 NOTE — NURSING NOTE
SLIM notified that pt is refusing CHG bath, lab work, some meds and her IV is now leaking and is refusing to have another placed

## 2019-01-05 NOTE — NURSING NOTE
Pt refusing to use blue phone and refusing CHG bath  PCA used as   Pt in pain and wants sleep medication  SLIM paged and ordered melatonin and given PRN oxycodone  Will attempt CHG bath at later time

## 2019-01-05 NOTE — PROGRESS NOTES
CT report shows there is an abscess, for surgical intervention  Note: Lantus is decreased by half this morning  Stopped lispro 13 units with meals, as she is NPO  We need to restart lispro 13 units once she restarts her diet  PLease give iv fluids with dextrose for hydration and as she had dye given for CT scan  We will obtain morning labs for creatinine and wbc count

## 2019-01-05 NOTE — RESTORATIVE TECHNICIAN NOTE
Restorative Specialist Mobility Note       Activity: Ambulate in mandujano, Ambulate in room, Bathroom privileges, Dangle, Stand at bedside (Educated/encouraged pt to ambulate with assistance 3-4 x's/day   Pt callbell, phone/tray within reach )     Assistive Device: Front wheel walker       Azul CLAUDIO, Restorative Technician, [employer] [facility]

## 2019-01-05 NOTE — ASSESSMENT & PLAN NOTE
CT shows abscess of the left upper arm, improving cellulitis  Surgery has taken patient to OR today for Incision and Drainage  Continue with current antibiotic management  ID consulted for the duration of antibiotics and to see there is any de-escalation of antibiotics

## 2019-01-05 NOTE — ANESTHESIA PREPROCEDURE EVALUATION
Review of Systems/Medical History          Cardiovascular  Hyperlipidemia, Hypertension controlled,    Pulmonary  COPD ,        GI/Hepatic  Negative GI/hepatic ROS          Kidney stones, Kidney disease CKD, Chronic kidney disease stage 3,        Endo/Other  Diabetes poorly controlled type 2 Insulin,      GYN  Negative gynecology ROS          Hematology  Negative hematology ROS      Musculoskeletal  Negative musculoskeletal ROS        Neurology  Negative neurology ROS      Psychology   Negative psychology ROS              Physical Exam    Airway    Mallampati score: IV  TM Distance: <3 FB  Neck ROM: limited     Dental   upper dentures and lower dentures,     Cardiovascular      Pulmonary      Other Findings        Anesthesia Plan  ASA Score- 3 Emergent    Anesthesia Type- IV sedation with anesthesia with ASA Monitors  Additional Monitors:   Airway Plan:     Comment: I have seen the patient and reviewed the history  Patient to receive IV sedation with full ASA monitors  Risks discussed with the patient, consent signed        Plan Factors-    Induction- intravenous  Postoperative Plan- Plan for postoperative opioid use  Informed Consent- Anesthetic plan and risks discussed with patient  I personally reviewed this patient with the CRNA  Discussed and agreed on the Anesthesia Plan with the CRNA  Michael Colon

## 2019-01-05 NOTE — OP NOTE
OPERATIVE REPORT  PATIENT NAME: Piedad Pagan    :  1935  MRN: [id number]  Pt Location: BE OR ROOM 07    SURGERY DATE: 2019    Surgeon(s) and Role:     * Kofi Hutchinson MD - Primary    Preop Diagnosis:  Abscess of arm, left [L02 414]    Post-Op Diagnosis Codes:     * Abscess of arm, left [L02 414]    Procedure(s) (LRB):  INCISION AND DRAINAGE (I&D) EXTREMITY (Left)    Specimen(s):  ID Type Source Tests Collected by Time Destination   A : left arm abscess Tissue Arm, Left ANAEROBIC CULTURE AND GRAM STAIN, CULTURE, TISSUE AND GRAM STAIN Dragan Kay MD 2019 1141        Estimated Blood Loss:   0 mL    Drains:       Anesthesia Type:   General    Operative Indications:  Abscess of arm, left [L02 414]    Operative Findings:  Copious amounts of pus from large left forearm abscess  Cultures sent  Packing placed    Complications:   None    Procedure and Technique:  The patient was identified by name and armband in the preoperative holding area  Her left upper extremity is marked as the site of operation  She was then taken to the operating room, where procedural sedation was induced by the anesthesiology team   Her left upper extremity was then prepped and draped in the usual sterile fashion  A brief time-out was called  All in the room were in agreement  A 15 blade was used to make a linear incision over the point of maximal fluctuance  Immediately, pus was evacuated from the wound  The fluid was cultured  The wound was then probed and appeared to track towards the elbow joint, but did not track to the bone itself  Loculations were broken up bluntly  The wound was then irrigated using approximately 1 L of normal saline via bulb syringe  There was no active bleeding at this time  The wound was then packed using 1 inch plain packing  The area was dressed with 4x4s and an ABD pad  All sponge, instrument, needle counts were correct x2        Penelope Jimenez was present for the entire procedure      Patient Disposition:  PACU     SIGNATURE: Erich Hernández  DATE: January 5, 2019  TIME: 11:57 AM

## 2019-01-05 NOTE — NURSING NOTE
Pt continuing to refuse CHG bath, IV change and labs  Dr Adonis Dugan at bedside with  and pt continuing to refuse

## 2019-01-06 LAB
ANION GAP SERPL CALCULATED.3IONS-SCNC: 8 MMOL/L (ref 4–13)
BASOPHILS # BLD MANUAL: 0.12 THOUSAND/UL (ref 0–0.1)
BASOPHILS NFR MAR MANUAL: 1 % (ref 0–1)
BUN SERPL-MCNC: 21 MG/DL (ref 5–25)
CALCIUM SERPL-MCNC: 8.5 MG/DL (ref 8.3–10.1)
CHLORIDE SERPL-SCNC: 102 MMOL/L (ref 100–108)
CO2 SERPL-SCNC: 25 MMOL/L (ref 21–32)
CREAT SERPL-MCNC: 1.19 MG/DL (ref 0.6–1.3)
EOSINOPHIL # BLD MANUAL: 0.12 THOUSAND/UL (ref 0–0.4)
EOSINOPHIL NFR BLD MANUAL: 1 % (ref 0–6)
ERYTHROCYTE [DISTWIDTH] IN BLOOD BY AUTOMATED COUNT: 14.7 % (ref 11.6–15.1)
GFR SERPL CREATININE-BSD FRML MDRD: 43 ML/MIN/1.73SQ M
GIANT PLATELETS BLD QL SMEAR: PRESENT
GLUCOSE SERPL-MCNC: 124 MG/DL (ref 65–140)
GLUCOSE SERPL-MCNC: 134 MG/DL (ref 65–140)
GLUCOSE SERPL-MCNC: 213 MG/DL (ref 65–140)
GLUCOSE SERPL-MCNC: 217 MG/DL (ref 65–140)
GLUCOSE SERPL-MCNC: 228 MG/DL (ref 65–140)
GLUCOSE SERPL-MCNC: 232 MG/DL (ref 65–140)
GLUCOSE SERPL-MCNC: 74 MG/DL (ref 65–140)
HCT VFR BLD AUTO: 37.7 % (ref 34.8–46.1)
HGB BLD-MCNC: 12.2 G/DL (ref 11.5–15.4)
LYMPHOCYTES # BLD AUTO: 19 % (ref 14–44)
LYMPHOCYTES # BLD AUTO: 2.26 THOUSAND/UL (ref 0.6–4.47)
MCH RBC QN AUTO: 31 PG (ref 26.8–34.3)
MCHC RBC AUTO-ENTMCNC: 32.4 G/DL (ref 31.4–37.4)
MCV RBC AUTO: 96 FL (ref 82–98)
METAMYELOCYTES NFR BLD MANUAL: 1 % (ref 0–1)
MONOCYTES # BLD AUTO: 0.71 THOUSAND/UL (ref 0–1.22)
MONOCYTES NFR BLD: 6 % (ref 4–12)
NEUTROPHILS # BLD MANUAL: 8.55 THOUSAND/UL (ref 1.85–7.62)
NEUTS SEG NFR BLD AUTO: 72 % (ref 43–75)
NRBC BLD AUTO-RTO: 0 /100 WBCS
PLATELET # BLD AUTO: 333 THOUSANDS/UL (ref 149–390)
PLATELET BLD QL SMEAR: ADEQUATE
PMV BLD AUTO: 10.7 FL (ref 8.9–12.7)
POTASSIUM SERPL-SCNC: 4.4 MMOL/L (ref 3.5–5.3)
RBC # BLD AUTO: 3.94 MILLION/UL (ref 3.81–5.12)
RBC MORPH BLD: NORMAL
SODIUM SERPL-SCNC: 135 MMOL/L (ref 136–145)
WBC # BLD AUTO: 11.88 THOUSAND/UL (ref 4.31–10.16)

## 2019-01-06 PROCEDURE — 82948 REAGENT STRIP/BLOOD GLUCOSE: CPT

## 2019-01-06 PROCEDURE — 85007 BL SMEAR W/DIFF WBC COUNT: CPT | Performed by: INTERNAL MEDICINE

## 2019-01-06 PROCEDURE — 99232 SBSQ HOSP IP/OBS MODERATE 35: CPT | Performed by: INTERNAL MEDICINE

## 2019-01-06 PROCEDURE — 99233 SBSQ HOSP IP/OBS HIGH 50: CPT | Performed by: INTERNAL MEDICINE

## 2019-01-06 PROCEDURE — 80048 BASIC METABOLIC PNL TOTAL CA: CPT | Performed by: INTERNAL MEDICINE

## 2019-01-06 PROCEDURE — 85027 COMPLETE CBC AUTOMATED: CPT | Performed by: INTERNAL MEDICINE

## 2019-01-06 PROCEDURE — 99024 POSTOP FOLLOW-UP VISIT: CPT | Performed by: SURGERY

## 2019-01-06 RX ORDER — INSULIN GLARGINE 100 [IU]/ML
35 INJECTION, SOLUTION SUBCUTANEOUS EVERY MORNING
Status: DISCONTINUED | OUTPATIENT
Start: 2019-01-07 | End: 2019-01-07 | Stop reason: HOSPADM

## 2019-01-06 RX ORDER — INSULIN GLARGINE 100 [IU]/ML
40 INJECTION, SOLUTION SUBCUTANEOUS EVERY MORNING
Status: DISCONTINUED | OUTPATIENT
Start: 2019-01-07 | End: 2019-01-06

## 2019-01-06 RX ADMIN — DORZOLAMIDE HYDROCHLORIDE 1 DROP: 20 SOLUTION/ DROPS OPHTHALMIC at 08:38

## 2019-01-06 RX ADMIN — BRIMONIDINE TARTRATE 1 DROP: 1.5 SOLUTION OPHTHALMIC at 08:38

## 2019-01-06 RX ADMIN — BRIMONIDINE TARTRATE 1 DROP: 1.5 SOLUTION OPHTHALMIC at 17:24

## 2019-01-06 RX ADMIN — SODIUM CHLORIDE 50 ML/HR: 0.9 INJECTION, SOLUTION INTRAVENOUS at 05:33

## 2019-01-06 RX ADMIN — HEPARIN SODIUM 5000 UNITS: 5000 INJECTION INTRAVENOUS; SUBCUTANEOUS at 13:22

## 2019-01-06 RX ADMIN — SENNOSIDES 8.6 MG: 8.6 TABLET, FILM COATED ORAL at 08:35

## 2019-01-06 RX ADMIN — INSULIN LISPRO 2 UNITS: 100 INJECTION, SOLUTION INTRAVENOUS; SUBCUTANEOUS at 14:19

## 2019-01-06 RX ADMIN — DOCUSATE SODIUM 100 MG: 100 CAPSULE, LIQUID FILLED ORAL at 08:35

## 2019-01-06 RX ADMIN — PANTOPRAZOLE SODIUM 20 MG: 20 TABLET, DELAYED RELEASE ORAL at 05:28

## 2019-01-06 RX ADMIN — ROPINIROLE 0.5 MG: 0.25 TABLET, FILM COATED ORAL at 22:11

## 2019-01-06 RX ADMIN — INSULIN LISPRO 2 UNITS: 100 INJECTION, SOLUTION INTRAVENOUS; SUBCUTANEOUS at 11:20

## 2019-01-06 RX ADMIN — ASPIRIN 81 MG 81 MG: 81 TABLET ORAL at 08:36

## 2019-01-06 RX ADMIN — DORZOLAMIDE HYDROCHLORIDE 1 DROP: 20 SOLUTION/ DROPS OPHTHALMIC at 17:25

## 2019-01-06 RX ADMIN — IPRATROPIUM BROMIDE 2 PUFF: 17 AEROSOL, METERED RESPIRATORY (INHALATION) at 11:30

## 2019-01-06 RX ADMIN — Medication 250 MG: at 08:35

## 2019-01-06 RX ADMIN — ATORVASTATIN CALCIUM 40 MG: 40 TABLET, FILM COATED ORAL at 17:25

## 2019-01-06 RX ADMIN — SITAGLIPTIN 50 MG: 50 TABLET, FILM COATED ORAL at 08:37

## 2019-01-06 RX ADMIN — INSULIN LISPRO 2 UNITS: 100 INJECTION, SOLUTION INTRAVENOUS; SUBCUTANEOUS at 08:37

## 2019-01-06 RX ADMIN — CEFAZOLIN SODIUM 2000 MG: 2 SOLUTION INTRAVENOUS at 22:21

## 2019-01-06 RX ADMIN — TIMOLOL MALEATE 1 DROP: 5 SOLUTION/ DROPS OPHTHALMIC at 17:24

## 2019-01-06 RX ADMIN — INSULIN GLARGINE 35 UNITS: 100 INJECTION, SOLUTION SUBCUTANEOUS at 08:35

## 2019-01-06 RX ADMIN — POLYETHYLENE GLYCOL 3350 17 G: 17 POWDER, FOR SOLUTION ORAL at 06:34

## 2019-01-06 RX ADMIN — IPRATROPIUM BROMIDE 2 PUFF: 17 AEROSOL, METERED RESPIRATORY (INHALATION) at 17:24

## 2019-01-06 RX ADMIN — TIMOLOL MALEATE 1 DROP: 5 SOLUTION/ DROPS OPHTHALMIC at 08:37

## 2019-01-06 RX ADMIN — IPRATROPIUM BROMIDE 2 PUFF: 17 AEROSOL, METERED RESPIRATORY (INHALATION) at 22:01

## 2019-01-06 RX ADMIN — OXYCODONE HYDROCHLORIDE 5 MG: 5 TABLET ORAL at 05:28

## 2019-01-06 RX ADMIN — OXYCODONE HYDROCHLORIDE 5 MG: 5 TABLET ORAL at 22:21

## 2019-01-06 RX ADMIN — HEPARIN SODIUM 5000 UNITS: 5000 INJECTION INTRAVENOUS; SUBCUTANEOUS at 05:29

## 2019-01-06 RX ADMIN — IPRATROPIUM BROMIDE 2 PUFF: 17 AEROSOL, METERED RESPIRATORY (INHALATION) at 08:36

## 2019-01-06 RX ADMIN — CEFAZOLIN SODIUM 2000 MG: 2 SOLUTION INTRAVENOUS at 09:11

## 2019-01-06 RX ADMIN — OXYCODONE HYDROCHLORIDE 5 MG: 5 TABLET ORAL at 09:11

## 2019-01-06 RX ADMIN — Medication 250 MG: at 17:25

## 2019-01-06 RX ADMIN — HEPARIN SODIUM 5000 UNITS: 5000 INJECTION INTRAVENOUS; SUBCUTANEOUS at 22:21

## 2019-01-06 RX ADMIN — PREGABALIN 100 MG: 100 CAPSULE ORAL at 08:36

## 2019-01-06 NOTE — ASSESSMENT & PLAN NOTE
Improving  Continue to monitor temps, WBC count daily   Cellulitis improved, now abscess noted  Elbow is not involved per CT scan  Now daily cleaning, on cefazolin, await OR cultures, staph in cultures

## 2019-01-06 NOTE — QUICK NOTE
FRANCISCO abscess I&D packing changed at bedside  No drainage, purulence, or bleeding visualized at wound site  New 1-inch packing inserted into wound cavity               Danielle Ontiveros MD  PGY-1, General Surgery

## 2019-01-06 NOTE — ASSESSMENT & PLAN NOTE
Lab Results   Component Value Date    HGBA1C 8 5 (H) 12/20/2018       Recent Labs      01/05/19   1956  01/06/19   0820  01/06/19   1103  01/06/19   1410   POCGLU  212*  213*  232*  217*       Blood Sugar Average: Last 72 hrs:  (P) 232 3125 with severe hyperglycemia likely related to sepsis, for now keep the patient on insulin drip non DKA protocol and  Endocrinology was following  Diabetic diet  Insulin lantus at 40 units, lispro 13 units premeals, check fingerstick glucose  SSI  Januvia restarted

## 2019-01-06 NOTE — PROGRESS NOTES
Progress Note - General Surgery   Cyndee Mcclure 81 y o  female MRN: [id number]  Unit/Bed#: [facility] 604-01 Encounter: [id number]    Assessment:  81 y o  F w/ left upper extremity abscess s/p I&D (1/5, Tally Brine)    Plan:  Ancef for cellulitis  F/u Cxs  Trend WBC, monitor for fevers  Dressing change/repacking  Diet as tolerated  Care otherwise per primary    Subjective/Objective   Chief Complaint:     Subjective: No acute events overnight  Patient slept comfortably per nursing, but on awakening during my exam she is complaining of increased pain  I spoke with her nurse and requested the patient receive a PRN dose  Denies fever/chills  Objective:     Blood pressure 123/70, pulse 67, temperature 98 42 °F (36 9 °C), resp  rate 20, height 5' 3" (1 6 m), weight 68 kg (149 lb 14 6 oz), SpO2 97 %, not currently breastfeeding  ,Body mass index is 26 56 kg/m²  Intake/Output Summary (Last 24 hours) at 01/06/19 0636  Last data filed at 01/06/19 0500   Gross per 24 hour   Intake          1061 67 ml   Output              300 ml   Net           761 67 ml       Invasive Devices     Peripheral Intravenous Line            Peripheral IV 01/05/19 Right Arm less than 1 day                Physical Exam:   GEN: NAD  HEENT: MMM  CV: RRR  Palpable L radial pulse  Lung: normal effort  Ab: Soft, NT/ND  Extrem: Wound packed  No purulent discharge or foul odor  Erythema improving  Appropriately tender   strength 5/5  Neuro: awake/alert  Following commands   Answering questions appropriately    Lab, Imaging and other studies:CBC:   Lab Results   Component Value Date    WBC 12 93 (H) 01/05/2019    HGB 11 4 (L) 01/05/2019    HCT 35 4 01/05/2019    MCV 94 01/05/2019     01/05/2019    MCH 30 2 01/05/2019    MCHC 32 2 01/05/2019    RDW 14 7 01/05/2019    MPV 9 9 01/05/2019    NRBC 0 01/05/2019   , CMP:   Lab Results   Component Value Date    SODIUM 128 (L) 01/05/2019    K 4 4 01/05/2019    CL 96 (L) 01/05/2019    CO2 22 01/05/2019    BUN 19 01/05/2019    CREATININE 1 43 (H) 01/05/2019    CALCIUM 8 9 01/05/2019    EGFR 34 01/05/2019   , Coagulation: No results found for: PT, INR, APTT  VTE Pharmacologic Prophylaxis: Heparin  VTE Mechanical Prophylaxis: sequential compression device

## 2019-01-06 NOTE — ASSESSMENT & PLAN NOTE
Improving but renal function is still fluctuating, probably related to dye for CT scan   Discontinue IV fluids  Improved   Avoid nephrotoxins

## 2019-01-06 NOTE — PROGRESS NOTES
Progress Note - Aric Dowling 1935, 81 y o  female MRN: [id number]    Unit/Bed#: [facility] 604-01 Encounter: [id number]    Primary Care Provider: Salima Cummings MD   Date and time admitted to hospital: 12/30/2018 12:20 PM        Abscess of arm, left   Assessment & Plan    CT shows abscess of the left upper arm, improving cellulitis  Surgery has taken patient to OR today for Incision and Drainage  Continue with current antibiotic management  ID consulted for the duration of antibiotics and to see there is any de-escalation of antibiotics pending OR cultures         Type 2 diabetes mellitus with hyperglycemia, with long-term current use of insulin [facility])   Assessment & Plan    Lab Results   Component Value Date    HGBA1C 8 5 (H) 12/20/2018       Recent Labs      01/05/19   1956  01/06/19   0820  01/06/19   1103  01/06/19   1410   POCGLU  212*  213*  232*  217*       Blood Sugar Average: Last 72 hrs:  (P) 232 3125 with severe hyperglycemia likely related to sepsis, for now keep the patient on insulin drip non DKA protocol and  Endocrinology was following  Diabetic diet  Insulin lantus at 40 units, lispro 13 units premeals, check fingerstick glucose  SSI  Januvia restarted     Accident due to mechanical fall without injury   Assessment & Plan    Fall precautions, PT and OT     Left arm cellulitis   Assessment & Plan    Improving  Continue to monitor temps, WBC count daily   Cellulitis improved, now abscess noted  Elbow is not involved per CT scan  Now daily cleaning, on cefazolin, await OR cultures, staph in cultures          Acute renal failure superimposed on stage 3 chronic kidney disease ([facility] [address] )   Assessment & Plan    Improving but renal function is still fluctuating, probably related to dye for CT scan   Discontinue IV fluids  Improved   Avoid nephrotoxins     Hypertension   Assessment & Plan    Continue to Hold lisinopril secondary to acute kidney injury  Continue to monitor              VTE Pharmacologic Prophylaxis:   Pharmacologic: Heparin  Mechanical VTE Prophylaxis in Place: Yes    Patient Centered Rounds: I have performed bedside rounds with nursing staff today  Discussions with Specialists or Other Care Team Provider: ID, surgery yesterday, orthopedics yesterday regarding abscess    Education and Discussions with Family / Patient: patient    Time Spent for Care: 45 minutes  More than 50% of total time spent on counseling and coordination of care as described above  Current Length of Stay: 7 day(s)    Current Patient Status: Inpatient   Certification Statement: The patient will continue to require additional inpatient hospital stay due to abscess, drainage, glucose adjustment    Discharge Plan: next few days    Code Status: Level 1 - Full Code      Subjective: She is complaining of pain of arm  Objective:     Vitals:   Temp (24hrs), Av 1 °F (36 7 °C), Min:97 7 °F (36 5 °C), Max:98 42 °F (36 9 °C)    Temp:  [97 7 °F (36 5 °C)-98 42 °F (36 9 °C)] 97 7 °F (36 5 °C)  HR:  [67] 67  Resp:  [18-20] 18  BP: (116-123)/(59-70) 116/59  Body mass index is 26 56 kg/m²  Input and Output Summary (last 24 hours): Intake/Output Summary (Last 24 hours) at 19 1454  Last data filed at 19 1300   Gross per 24 hour   Intake          1349 18 ml   Output              300 ml   Net          1049 18 ml       Physical Exam:     Physical Exam   Constitutional: She appears well-developed and well-nourished  Eyes: Pupils are equal, round, and reactive to light  Conjunctivae and EOM are normal    Neck: Normal range of motion  Neck supple  No thyromegaly present  Cardiovascular: Normal rate, regular rhythm and intact distal pulses  Exam reveals no gallop and no friction rub  No murmur heard  Pulmonary/Chest: Effort normal and breath sounds normal  No respiratory distress  She has no wheezes  She has no rales  She exhibits no tenderness  Abdominal: Soft   Bowel sounds are normal  She exhibits no distension  There is no tenderness  Musculoskeletal: Normal range of motion  She exhibits edema and deformity  Neurological: She is alert  No cranial nerve deficit  Romansh speaking   Skin: There is erythema  Psychiatric: She has a normal mood and affect  Nursing note and vitals reviewed  Additional Data:     Labs:      Results from last 7 days  Lab Units 01/06/19  0501 01/05/19  1749   WBC Thousand/uL 11 88* 12 93*   HEMOGLOBIN g/dL 12 2 11 4*   HEMATOCRIT % 37 7 35 4   PLATELETS Thousands/uL 333 308   NEUTROS PCT %  --  69   LYMPHS PCT %  --  19   LYMPHO PCT % 19  --    MONOS PCT %  --  8   MONO PCT % 6  --    EOS PCT % 1 1       Results from last 7 days  Lab Units 01/06/19  0501   POTASSIUM mmol/L 4 4   CHLORIDE mmol/L 102   CO2 mmol/L 25   BUN mg/dL 21   CREATININE mg/dL 1 19   CALCIUM mg/dL 8 5           * I Have Reviewed All Lab Data Listed Above  * Additional Pertinent Lab Tests Reviewed:  Almas Judge Admission Reviewed    Imaging:    Imaging Reports Reviewed Today Include:    Imaging Personally Reviewed by Myself Includes:      Recent Cultures (last 7 days):       Results from last 7 days  Lab Units 01/05/19  1141   GRAM STAIN RESULT  No Polys or Bacteria seen       Last 24 Hours Medication List:     Current Facility-Administered Medications:  aspirin 81 mg Oral Daily Eugune Libman, MD    atorvastatin 40 mg Oral Q24H Eugune Libman, MD    brimonidine 1 drop Both Eyes BID Eugune Libman, MD    cefazolin 2,000 mg Intravenous Q12H Stefanie DO Carolina Last Rate: 2,000 mg (01/06/19 0911)   docusate sodium 100 mg Oral BID Eugune Libman, MD    dorzolamide 1 drop Right Eye BID Eugune Libman, MD    heparin (porcine) 5,000 Units Subcutaneous Q8H [address] Eugune Libman, MD    hydrALAZINE 10 mg Intravenous Q6H PRN Giselle Ng MD    [START ON 1/7/2019] insulin glargine 40 Units Subcutaneous KODAK Lees MD    insulin lispro 1-5 Units Subcutaneous 4 times day Ramo GEORGE Brian Diaz MD    insulin lispro 13 Units Subcutaneous TID With Meals Patricia Cortez MD    ipratropium 2 puff Inhalation 4x Daily Linnea Rivera MD    lidocaine 1 patch Topical Daily Linnea Rivera MD    naloxone 0 04 mg Intravenous Q1MIN PRN Linnea Rivera MD    ondansetron 4 mg Intravenous Q4H PRN Linnea Rivera MD    oxyCODONE 2 5 mg Oral Q4H PRN Linnea Rivera MD    oxyCODONE 5 mg Oral Q4H PRN Linnea Rivera MD    pantoprazole 20 mg Oral Early Morning Linnea Rivera MD    polyethylene glycol 17 g Oral BID AC Alcainsley Earl MD    pregabalin 100 mg Oral Daily Linnea Rivera MD    rOPINIRole 0 5 mg Oral HS Linnea Rivera MD    saccharomyces boulardii 250 mg Oral BID Patricia Cortez MD    senna 1 tablet Oral Daily Linnea Rivera MD    sitaGLIPtin 50 mg Oral Daily Patricia Cortez MD    timolol 1 drop Both Eyes BID Linnea Rivera MD         Today, Patient Was Seen By: Patricia Cortez MD    ** Please Note: Dictation voice to text software may have been used in the creation of this document   **

## 2019-01-06 NOTE — PROGRESS NOTES
Progress Note - Infectious Disease   Chinmay Torres 81 y o  female MRN: [id number]  Unit/Bed#: [facility] 604-01 Encounter: [id number]      Impression/Recommendations:  1  Left upper extremity cellulitis with subcutaneous abscess  Now status post I and D on   Abscess was superficial with no joint or bone involvement  Cellulitis had improved while patient was on oral Keflex suggesting that this may be secondary to MSSA  Patient currently on IV cefazolin  OR culture shows Staph aureus      -continue with IV cefazolin for now  -follow up final OR cultures  -will hopefully transition to oral antibiotic in the next 24 hours  -serial arm exams  -close surgery follow-up ongoing     2  Acute kidney injury  On CKD  Creatinine a little more elevated today  Will dose adjust antibiotics based on current renal function  Check BMP in a m      3  Sepsis, POA  Leukocytosis, tachycardia  Likely all secondary to #1  No fevers  Mild leukocytosis persistent, likely secondary to undrained abscess  Patient is hemodynamically stable, nontoxic  Admission blood cultures were negative      -antibiotic plan as above  -monitor temperatures and hemodynamics  -check CBC in a m      4  Diabetes mellitus  With hyperglycemia  Likely precipitating factor for development of severe arm infection  Blood sugar management per primary team      Antibiotics:  Antibiotic D8  Cefazolin  POD1    Subjective:  Patient feels well  No new complaints  Denies fevers, chills, or sweats  Denies nausea, vomiting, or diarrhea        Objective:  Vitals:  Temp:  [97 7 °F (36 5 °C)-98 42 °F (36 9 °C)] 97 7 °F (36 5 °C)  HR:  [67] 67  Resp:  [18-20] 18  BP: (116-123)/(59-70) 116/59  Temp (24hrs), Av 1 °F (36 7 °C), Min:97 7 °F (36 5 °C), Max:98 42 °F (36 9 °C)  Current: Temperature: 97 7 °F (36 5 °C)    Physical Exam:   General:  No acute distress  Psychiatric:  Awake and alert  Pulmonary:  Normal respiratory excursion without accessory muscle use  Abdomen:  Soft, nontender  Extremities:  No edema  Left upper extremity dressing intact  Personally reviewed images from arm obtained this morning by surgery  Skin:  No rashes    Lab Results:  I have personally reviewed pertinent labs  Results from last 7 days  Lab Units 01/06/19  0501 01/05/19  1055 01/02/19  0628   POTASSIUM mmol/L 4 4 4 4 4 4   CHLORIDE mmol/L 102 96* 96*   CO2 mmol/L 25 22 26   BUN mg/dL 21 19 16   CREATININE mg/dL 1 19 1 43* 1 22   EGFR ml/min/1 73sq m 43 34 41   CALCIUM mg/dL 8 5 8 9 9 4       Results from last 7 days  Lab Units 01/06/19  0501 01/05/19  1749 01/02/19  0628   WBC Thousand/uL 11 88* 12 93* 12 63*   HEMOGLOBIN g/dL 12 2 11 4* 13 9   PLATELETS Thousands/uL 333 308 353       Results from last 7 days  Lab Units 01/05/19  1141 12/31/18  1240 12/30/18  1419   BLOOD CULTURE   --   --  No Growth After 5 Days  GRAM STAIN RESULT  No Polys or Bacteria seen  --   --    MRSA CULTURE ONLY   --  No Methicillin Resistant Staphlyococcus aureus (MRSA) isolated  --        Imaging Studies:   I have personally reviewed pertinent imaging study reports and images in PACS  EKG, Pathology, and Other Studies:   I have personally reviewed pertinent reports

## 2019-01-06 NOTE — ASSESSMENT & PLAN NOTE
CT shows abscess of the left upper arm, improving cellulitis  Surgery has taken patient to OR today for Incision and Drainage  Continue with current antibiotic management  ID consulted for the duration of antibiotics and to see there is any de-escalation of antibiotics pending OR cultures

## 2019-01-06 NOTE — PROGRESS NOTES
Progress Note - Saad Zuñiga 81 y o  female MRN: [id number]    Unit/Bed#: [facility] 604-01 Encounter: [id number]      CC: diabetes f/u    Subjective:   Saad Zuñiga is a 81y o  year old female with type 2 diabetes  Feels well  No complaints  No hypoglycemia  Objective:     Vitals: Blood pressure 116/59, pulse 67, temperature 97 7 °F (36 5 °C), resp  rate 18, height 5' 3" (1 6 m), weight 68 kg (149 lb 14 6 oz), SpO2 97 %, not currently breastfeeding  ,Body mass index is 26 56 kg/m²  Intake/Output Summary (Last 24 hours) at 01/06/19 1251  Last data filed at 01/06/19 1200   Gross per 24 hour   Intake          1444 18 ml   Output              300 ml   Net          1144 18 ml       Physical Exam:  General Appearance: awake, appears stated age and cooperative  Head: Normocephalic, without obvious abnormality, atraumatic  Extremities: moves all extremities  Skin: Skin color and temperature normal    Pulm: no labored breathing    Lab, Imaging and other studies: I have personally reviewed pertinent reports  POC Glucose (mg/dl)   Date Value   01/06/2019 232 (H)   01/06/2019 213 (H)   01/05/2019 212 (H)   01/05/2019 321 (H)   01/05/2019 228 (H)   01/05/2019 191 (H)   01/05/2019 226 (H)   01/04/2019 263 (H)   01/04/2019 115   01/04/2019 275 (H)       Assessment:  diabetes with hyperglycemia, left arm cellulitis, status post abscess incision and drainage on January 5th, currently managed on antibiotics    Plan:  -continue Lantus 35 units in the morning  -increase Humalog to 14 units with meals  -start Humalog scale with algorithm 4 with meals and algorithm, and algorithm 2 at bedtime          Portions of the record may have been created with voice recognition software

## 2019-01-06 NOTE — RESTORATIVE TECHNICIAN NOTE
Restorative Specialist Mobility Note       Activity: Ambulate in mandujano, Ambulate in room, Bathroom privileges, Dangle, Stand at bedside (Educated/encouraged pt to ambulate with assistance 3-4 x's/day  Bed alarm on   Pt callbell, phone/tray within reach )     Assistive Device: Front wheel walker       Felix CLAUDIO, Restorative Technician, [employer]

## 2019-01-07 VITALS
WEIGHT: 149.91 LBS | HEART RATE: 74 BPM | RESPIRATION RATE: 18 BRPM | HEIGHT: 63 IN | TEMPERATURE: 97.9 F | SYSTOLIC BLOOD PRESSURE: 135 MMHG | DIASTOLIC BLOOD PRESSURE: 69 MMHG | OXYGEN SATURATION: 100 % | BODY MASS INDEX: 26.56 KG/M2

## 2019-01-07 LAB
ANION GAP SERPL CALCULATED.3IONS-SCNC: 8 MMOL/L (ref 4–13)
BACTERIA SPEC ANAEROBE CULT: NORMAL
BACTERIA TISS AEROBE CULT: ABNORMAL
BASOPHILS # BLD AUTO: 0.12 THOUSANDS/ΜL (ref 0–0.1)
BASOPHILS NFR BLD AUTO: 1 % (ref 0–1)
BUN SERPL-MCNC: 18 MG/DL (ref 5–25)
CALCIUM SERPL-MCNC: 8.9 MG/DL (ref 8.3–10.1)
CHLORIDE SERPL-SCNC: 103 MMOL/L (ref 100–108)
CO2 SERPL-SCNC: 24 MMOL/L (ref 21–32)
CREAT SERPL-MCNC: 1.15 MG/DL (ref 0.6–1.3)
EOSINOPHIL # BLD AUTO: 0.16 THOUSAND/ΜL (ref 0–0.61)
EOSINOPHIL NFR BLD AUTO: 2 % (ref 0–6)
ERYTHROCYTE [DISTWIDTH] IN BLOOD BY AUTOMATED COUNT: 14.8 % (ref 11.6–15.1)
GFR SERPL CREATININE-BSD FRML MDRD: 44 ML/MIN/1.73SQ M
GLUCOSE SERPL-MCNC: 144 MG/DL (ref 65–140)
GLUCOSE SERPL-MCNC: 161 MG/DL (ref 65–140)
GLUCOSE SERPL-MCNC: 286 MG/DL (ref 65–140)
GRAM STN SPEC: ABNORMAL
HCT VFR BLD AUTO: 36.5 % (ref 34.8–46.1)
HGB BLD-MCNC: 11.8 G/DL (ref 11.5–15.4)
IMM GRANULOCYTES # BLD AUTO: 0.25 THOUSAND/UL (ref 0–0.2)
IMM GRANULOCYTES NFR BLD AUTO: 2 % (ref 0–2)
LYMPHOCYTES # BLD AUTO: 2.45 THOUSANDS/ΜL (ref 0.6–4.47)
LYMPHOCYTES NFR BLD AUTO: 24 % (ref 14–44)
MCH RBC QN AUTO: 30.6 PG (ref 26.8–34.3)
MCHC RBC AUTO-ENTMCNC: 32.3 G/DL (ref 31.4–37.4)
MCV RBC AUTO: 95 FL (ref 82–98)
MONOCYTES # BLD AUTO: 0.88 THOUSAND/ΜL (ref 0.17–1.22)
MONOCYTES NFR BLD AUTO: 9 % (ref 4–12)
NEUTROPHILS # BLD AUTO: 6.52 THOUSANDS/ΜL (ref 1.85–7.62)
NEUTS SEG NFR BLD AUTO: 62 % (ref 43–75)
NRBC BLD AUTO-RTO: 0 /100 WBCS
PLATELET # BLD AUTO: 332 THOUSANDS/UL (ref 149–390)
PMV BLD AUTO: 10 FL (ref 8.9–12.7)
POTASSIUM SERPL-SCNC: 4.3 MMOL/L (ref 3.5–5.3)
RBC # BLD AUTO: 3.85 MILLION/UL (ref 3.81–5.12)
SODIUM SERPL-SCNC: 135 MMOL/L (ref 136–145)
WBC # BLD AUTO: 10.38 THOUSAND/UL (ref 4.31–10.16)

## 2019-01-07 PROCEDURE — 99239 HOSP IP/OBS DSCHRG MGMT >30: CPT | Performed by: INTERNAL MEDICINE

## 2019-01-07 PROCEDURE — 99232 SBSQ HOSP IP/OBS MODERATE 35: CPT | Performed by: INTERNAL MEDICINE

## 2019-01-07 PROCEDURE — 85025 COMPLETE CBC W/AUTO DIFF WBC: CPT | Performed by: INTERNAL MEDICINE

## 2019-01-07 PROCEDURE — 80048 BASIC METABOLIC PNL TOTAL CA: CPT | Performed by: INTERNAL MEDICINE

## 2019-01-07 PROCEDURE — 82948 REAGENT STRIP/BLOOD GLUCOSE: CPT

## 2019-01-07 RX ORDER — OXYCODONE HYDROCHLORIDE 5 MG/1
2.5 TABLET ORAL EVERY 4 HOURS PRN
Qty: 20 TABLET | Refills: 0 | Status: SHIPPED | OUTPATIENT
Start: 2019-01-07 | End: 2019-01-17

## 2019-01-07 RX ORDER — SACCHAROMYCES BOULARDII 250 MG
250 CAPSULE ORAL 2 TIMES DAILY
Qty: 30 CAPSULE | Refills: 0 | Status: SHIPPED | OUTPATIENT
Start: 2019-01-07 | End: 2019-02-05 | Stop reason: SDUPTHER

## 2019-01-07 RX ORDER — PREGABALIN 100 MG/1
100 CAPSULE ORAL DAILY
Qty: 10 CAPSULE | Refills: 0 | Status: SHIPPED | OUTPATIENT
Start: 2019-01-07 | End: 2019-05-15 | Stop reason: SDUPTHER

## 2019-01-07 RX ORDER — ROPINIROLE 0.5 MG/1
0.5 TABLET, FILM COATED ORAL
Qty: 30 TABLET | Refills: 0 | Status: SHIPPED | OUTPATIENT
Start: 2019-01-07 | End: 2019-01-22 | Stop reason: ALTCHOICE

## 2019-01-07 RX ORDER — INSULIN ASPART 100 [IU]/ML
INJECTION, SOLUTION INTRAVENOUS; SUBCUTANEOUS
Qty: 25 PEN | Refills: 0 | Status: SHIPPED | OUTPATIENT
Start: 2019-01-07 | End: 2019-03-08 | Stop reason: SDUPTHER

## 2019-01-07 RX ADMIN — IPRATROPIUM BROMIDE 2 PUFF: 17 AEROSOL, METERED RESPIRATORY (INHALATION) at 12:36

## 2019-01-07 RX ADMIN — ASPIRIN 81 MG 81 MG: 81 TABLET ORAL at 09:34

## 2019-01-07 RX ADMIN — CEFAZOLIN SODIUM 2000 MG: 2 SOLUTION INTRAVENOUS at 09:41

## 2019-01-07 RX ADMIN — SENNOSIDES 8.6 MG: 8.6 TABLET, FILM COATED ORAL at 09:34

## 2019-01-07 RX ADMIN — IPRATROPIUM BROMIDE 2 PUFF: 17 AEROSOL, METERED RESPIRATORY (INHALATION) at 09:41

## 2019-01-07 RX ADMIN — LIDOCAINE 1 PATCH: 50 PATCH CUTANEOUS at 09:48

## 2019-01-07 RX ADMIN — TIMOLOL MALEATE 1 DROP: 5 SOLUTION/ DROPS OPHTHALMIC at 09:47

## 2019-01-07 RX ADMIN — PANTOPRAZOLE SODIUM 20 MG: 20 TABLET, DELAYED RELEASE ORAL at 05:48

## 2019-01-07 RX ADMIN — DOCUSATE SODIUM 100 MG: 100 CAPSULE, LIQUID FILLED ORAL at 09:33

## 2019-01-07 RX ADMIN — PREGABALIN 100 MG: 100 CAPSULE ORAL at 09:57

## 2019-01-07 RX ADMIN — OXYCODONE HYDROCHLORIDE 5 MG: 5 TABLET ORAL at 05:49

## 2019-01-07 RX ADMIN — INSULIN LISPRO 6 UNITS: 100 INJECTION, SOLUTION INTRAVENOUS; SUBCUTANEOUS at 12:35

## 2019-01-07 RX ADMIN — POLYETHYLENE GLYCOL 3350 17 G: 17 POWDER, FOR SOLUTION ORAL at 05:48

## 2019-01-07 RX ADMIN — DORZOLAMIDE HYDROCHLORIDE 1 DROP: 20 SOLUTION/ DROPS OPHTHALMIC at 09:33

## 2019-01-07 RX ADMIN — BRIMONIDINE TARTRATE 1 DROP: 1.5 SOLUTION OPHTHALMIC at 09:40

## 2019-01-07 RX ADMIN — INSULIN LISPRO 2 UNITS: 100 INJECTION, SOLUTION INTRAVENOUS; SUBCUTANEOUS at 09:46

## 2019-01-07 RX ADMIN — INSULIN GLARGINE 35 UNITS: 100 INJECTION, SOLUTION SUBCUTANEOUS at 09:57

## 2019-01-07 RX ADMIN — HEPARIN SODIUM 5000 UNITS: 5000 INJECTION INTRAVENOUS; SUBCUTANEOUS at 05:48

## 2019-01-07 RX ADMIN — HEPARIN SODIUM 5000 UNITS: 5000 INJECTION INTRAVENOUS; SUBCUTANEOUS at 13:00

## 2019-01-07 RX ADMIN — Medication 250 MG: at 09:34

## 2019-01-07 NOTE — PROGRESS NOTES
Progress Note - Infectious Disease   Дмитрий Ya 81 y o  female MRN: [id number]  Unit/Bed#: [facility] 604-01 Encounter: [id number]    Impression/Recommendations:  1  Left upper extremity cellulitis with subcutaneous abscess   Now status post I and D on    Abscess was superficial with no joint or bone involvement   Cellulitis had improved while patient was on oral Keflex suggesting that this may be secondary to MSSA   Patient currently on IV cefazolin  OR culture shows MSSA      -continue with IV cefazolin for now  -on discharge, we can transition to oral Keflex 500 mg q 12 hours to complete 7 day postoperative course through    -serial arm exams  -close surgery follow-up ongoing     2  Acute kidney injury   On CKD   Improved   Will dose adjust antibiotics based on current renal function       3  Sepsis, POA   Leukocytosis, tachycardia   Likely all secondary to #1   No fevers   Mild leukocytosis persistent, likely secondary to undrained abscess   Patient is hemodynamically stable, nontoxic   Admission blood cultures were negative      -antibiotic plan as above  -monitor temperatures and hemodynamics  -check CBC in a m      4  Diabetes mellitus   With hyperglycemia   Likely precipitating factor for development of severe arm infection   Blood sugar management per primary team      Antibiotics:  Antibiotic D9  Cefazolin  POD2    Discussed with Dr Katerine Vidal, and with patient and family at bedside  Subjective:  Patient feels well  Pain is controlled  Denies fevers, chills, or sweats  Denies nausea, vomiting, or diarrhea        Objective:  Vitals:  Temp:  [97 9 °F (36 6 °C)] 97 9 °F (36 6 °C)  HR:  [74] 74  Resp:  [18-20] 18  BP: (130-135)/(69-70) 135/69  SpO2:  [100 %] 100 %  Temp (24hrs), Av 9 °F (36 6 °C), Min:97 9 °F (36 6 °C), Max:97 9 °F (36 6 °C)  Current: Temperature: 97 9 °F (36 6 °C)    Physical Exam:   General:  No acute distress  Psychiatric:  Awake and alert  Pulmonary:  Normal respiratory excursion without accessory muscle use  Abdomen:  Soft, nontender  Extremities:  No edema  Left upper extremity dressing intact  Skin:  No rashes    Lab Results:  I have personally reviewed pertinent labs  Results from last 7 days  Lab Units 01/07/19  0634 01/06/19  0501 01/05/19  1055   POTASSIUM mmol/L 4 3 4 4 4 4   CHLORIDE mmol/L 103 102 96*   CO2 mmol/L 24 25 22   BUN mg/dL 18 21 19   CREATININE mg/dL 1 15 1 19 1 43*   EGFR ml/min/1 73sq m 44 43 34   CALCIUM mg/dL 8 9 8 5 8 9       Results from last 7 days  Lab Units 01/07/19  0634 01/06/19  0501 01/05/19  1749   WBC Thousand/uL 10 38* 11 88* 12 93*   HEMOGLOBIN g/dL 11 8 12 2 11 4*   PLATELETS Thousands/uL 332 333 308       Results from last 7 days  Lab Units 01/05/19  1141   GRAM STAIN RESULT  No Polys or Bacteria seen       Imaging Studies:   I have personally reviewed pertinent imaging study reports and images in PACS  EKG, Pathology, and Other Studies:   I have personally reviewed pertinent reports

## 2019-01-07 NOTE — UTILIZATION REVIEW
Continued Stay Review    Date:1/7/2019    Vital Signs: /69   Pulse 74   Temp 97 9 °F (36 6 °C)   Resp 18   Ht 5' 3" (1 6 m)   Wt 68 kg (149 lb 14 6 oz)   LMP  (LMP Unknown)   SpO2 100%   BMI 26 56 kg/m²     Medications:   Scheduled Meds:   Current Facility-Administered Medications:  aspirin 81 mg Oral Daily    atorvastatin 40 mg Oral Q24H    brimonidine 1 drop Both Eyes BID    cefazolin 2,000 mg Intravenous Q12H    docusate sodium 100 mg Oral BID    dorzolamide 1 drop Right Eye BID    heparin (porcine) 5,000 Units Subcutaneous Q8H [address]    hydrALAZINE 10 mg Intravenous Q6H PRN    insulin glargine 35 Units Subcutaneous QAM    insulin lispro 1-5 Units Subcutaneous HS    insulin lispro 14 Units Subcutaneous TID With Meals    insulin lispro 2-12 Units Subcutaneous TID AC    ipratropium 2 puff Inhalation 4x Daily    lidocaine 1 patch Topical Daily    naloxone 0 04 mg Intravenous Q1MIN PRN    ondansetron 4 mg Intravenous Q4H PRN    oxyCODONE 2 5 mg Oral Q4H PRN    oxyCODONE 5 mg Oral Q4H PRN    pantoprazole 20 mg Oral Early Morning    polyethylene glycol 17 g Oral BID AC    pregabalin 100 mg Oral Daily    rOPINIRole 0 5 mg Oral HS    saccharomyces boulardii 250 mg Oral BID    senna 1 tablet Oral Daily    timolol 1 drop Both Eyes BID          Abnormal Labs/Diagnostic Results:  Na 135 - Glu 144 - Wbc 10 38    Age/Sex: 81 y o  female     Assessment/Plan:  82 yo f with abscess cellulitis  of left arm s/p I & D on 1/5 - on IV ABX - ID following  On Cefazolin - follow up on OR cultures - DM  On SS coverage -  endocine adjusting her insulin dosing -  S/p fall- PT/OT follow-  ARF stage 3 -  Improved -  HTN on PO meds -monitoring  Discharge Plan:  TBD

## 2019-01-07 NOTE — PROGRESS NOTES
Reviewed blood sugars from past 24 hours  Would increase Humalog to 17 units with each meal   Continue current lantus and sliding scale regimen

## 2019-01-07 NOTE — RESTORATIVE TECHNICIAN NOTE
Restorative Specialist Mobility Note       Activity: Ambulate in mandujano, Ambulate in room, Bathroom privileges, Stand at bedside, Dangle (Educated/encouraged pt to ambulate with assistance 3-4 x's/day  Bed alarm on   Pt callbell, phone/tray within reach )     Assistive Device: Front wheel walker       Chilo CLAUDIO, Restorative Technician, [employer] [facility]

## 2019-01-08 NOTE — DISCHARGE SUMMARY
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 ([address] ) [H96 6576, H54 7]    HPI:  "Lily Amezquita is a 81 y o  female who presents with right leg pain  History difficult to obtain, patient speaks only Surinamese in currently she is in pain  History is obtained by grandson over the phone  Patient is history of diabetes mellitus, hypertension, COPD, glaucoma, diabetic retinopathy in came to the hospital today for evaluation of right hip pain after mechanical fall at home  In the ER she was found to have right hip pain with associated left upper extremity swelling, erythema, tenderness in keeping with cellulitis  As far as the family is aware patient had no trauma to the left upper extremity, no previous skin lesions  Unclear if fever or chills at home  No other history can be obtained even with cooperation of the family  " per Dr Peraza Maljamar note from admission  Procedures Performed: No orders of the defined types were placed in this encounter  Summary of Hospital Course: Initially from , she just traveled to [facility] to coming to [place], she fell in a store and caught herself with her left elbow on the floor, soon after she developed a bruise of the same arm all the way up to the wrist  That was followed by pain, redness and tenderness of the left arm  She has pain in left arm and hip, her daughter brought her to ED  She is diabetic and has HTN, COPD as past history  She was also in renal failure at the time of admission  She was given fluids during her CT scans with dye   Initial CT scan did not show any compartment syndrome, no abscess from 12/30  Orthopedics was following patient  She was placed on vancomycin and ceftriaxone initially, vancomycin d/c after r/o MRSA  She was placed on cefazolin and transitioned to keflex  The infection rapidly reduced the swelling and redness, however, her fingerstick glucose continues to be high  Endocrinology consulted, they were adjusting her long acting and short acting insulins daily due to infection  Her swelling has gone down significantly, however, it has became a collection near the Elbow, orthopedics was re-consulted  They did not think patient has any bursa or bone involvement, they wanted surgery to get involved  Also I have consulted ID as patient has abscess (collection of bacteria), she was taken to OR for I and D  She has improvement of white count, no fever, tachycadia, no complaints of pain  Wound is still open to air  Only upset she has to remain in the hospital  She was on IV cefazolin and switched to Keflex again for home  She will get visiting nurse service, instructions provided by surgery  She need dressing changes and bacitracin  She needs to follow up with ID and surgery as outpatient  She also needs primary doctor and endocrinology follow up for better glucose control  Discussed with daughter, I have PCA, RN speaking Georgian assisted as patient only speaks Georgian  Significant Findings, Care, Treatment and Services Provided: as above    Complications: none    Discharge Diagnosis: MSSA cellulitis    Resolved Problems  Date Reviewed: 1/6/2019          Resolved    Pain of right lower extremity 1/4/2019     Resolved by  Elliott Chanel MD    * (Principal)Sepsis [facility]) 1/4/2019     Resolved by  Elliott Chanel MD          Condition at Discharge: stable         Discharge instructions/Information to patient and family:   See after visit summary for information provided to patient and family        Provisions for Follow-Up Care:  See after visit summary for information related to follow-up care and any pertinent home health orders  PCP: Adrianna Escobar MD    Disposition: Home    Planned Readmission: No    Discharge Statement   I spent 35 minutes discharging the patient  This time was spent on the day of discharge  I had direct contact with the patient on the day of discharge  Additional documentation is required if more than 30 minutes were spent on discharge  Discharge Medications:  See after visit summary for reconciled discharge medications provided to patient and family

## 2019-01-14 ENCOUNTER — TELEPHONE (OUTPATIENT)
Dept: FAMILY MEDICINE CLINIC | Facility: CLINIC | Age: 84
End: 2019-01-14

## 2019-01-14 NOTE — TELEPHONE ENCOUNTER
Mana Lee from [facility] called stating that patient had an abscess and they want clarification on what medications is patient on and what antibiotic you will like to put her on  She will like a call back today at [phone]

## 2019-01-15 ENCOUNTER — IN HOME VISIT (OUTPATIENT)
Dept: FAMILY MEDICINE CLINIC | Facility: CLINIC | Age: 84
End: 2019-01-15
Payer: MEDICARE

## 2019-01-15 DIAGNOSIS — H54.7: ICD-10-CM

## 2019-01-15 DIAGNOSIS — G63 PERIPHERAL NEUROPATHY DUE TO METABOLIC DISORDER (HCC): ICD-10-CM

## 2019-01-15 DIAGNOSIS — E88.9 PERIPHERAL NEUROPATHY DUE TO METABOLIC DISORDER (HCC): ICD-10-CM

## 2019-01-15 DIAGNOSIS — L02.419 ARM ABSCESS: Primary | ICD-10-CM

## 2019-01-15 DIAGNOSIS — L02.414 ABSCESS OF ARM, LEFT: Primary | ICD-10-CM

## 2019-01-15 DIAGNOSIS — E08.3299: ICD-10-CM

## 2019-01-15 PROCEDURE — 99349 HOME/RES VST EST MOD MDM 40: CPT | Performed by: FAMILY MEDICINE

## 2019-01-15 RX ORDER — CEPHALEXIN 500 MG/1
500 CAPSULE ORAL EVERY 6 HOURS SCHEDULED
Qty: 40 CAPSULE | Refills: 0 | Status: SHIPPED | OUTPATIENT
Start: 2019-01-15 | End: 2019-01-16 | Stop reason: ALTCHOICE

## 2019-01-16 PROBLEM — E11.65 TYPE 2 DIABETES MELLITUS WITH HYPERGLYCEMIA, WITH LONG-TERM CURRENT USE OF INSULIN (HCC): Status: RESOLVED | Noted: 2018-12-30 | Resolved: 2019-01-16

## 2019-01-16 PROBLEM — Z79.4 TYPE 2 DIABETES MELLITUS WITH HYPERGLYCEMIA, WITH LONG-TERM CURRENT USE OF INSULIN (HCC): Status: RESOLVED | Noted: 2018-12-30 | Resolved: 2019-01-16

## 2019-01-16 NOTE — PROGRESS NOTES
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 ([facility] [address] )  She has red retinopathy and she will continue treatment with Ophtalmologist     No problem-specific Assessment & Plan notes found for this encounter  TCM Call (since 12/20/2018)     None      TCM Call (since 12/20/2018)     None             Diagnoses and all orders for this visit:    Abscess of arm, left    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 ([facility] )    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 ([facility] [address] )          Subjective:      Patient ID: Rick Ruvalcaba is a 81 y o  female  This is a home visit  Patient was seen home after being discharged from [facility] due to left forearm abscess  Patient denies pain in the arm  Is still having drainage the wound  She is not taking antibiotics cephalexin that was sent to her pharmacy  She currently is living with another daughter in a different place 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 [employer] , her daughter  of name Thuy Frias, is reporting improved control and dieting and blood sugar  Patient has for complaint with her treatment  She denies fever, chills, shortness of breath, chest pain           The following portions of the patient's history were reviewed and updated as appropriate: allergies, current medications, past family history, past medical history, past social history, past surgical history and problem list     Review of Systems Constitutional: Negative for fatigue, fever and unexpected weight change  HENT: Negative for sore throat and trouble swallowing  Eyes: Negative for photophobia  Respiratory: Negative for cough, chest tightness, shortness of breath and wheezing  Cardiovascular: Negative for chest pain, palpitations and leg swelling  Gastrointestinal: Negative for abdominal pain, blood in stool, nausea and vomiting  Genitourinary: Negative for hematuria  Skin: Positive for pallor and wound (Forearm wound as in HPI)  Neurological: Negative for dizziness, syncope, light-headedness and headaches  Hematological: Does not bruise/bleed easily  Objective:      LMP  (LMP Unknown)          Physical Exam   HENT:   Head: Normocephalic  Eyes: Pupils are equal, round, and reactive to light  EOM are normal    Neck: Neck supple  Cardiovascular: Normal rate and regular rhythm  Pulmonary/Chest: Effort normal    Abdominal: Soft  Musculoskeletal: Normal range of motion  Neurological: She is alert  Skin: Skin is warm and dry  Left forearm wound with drainage was inspected and looking clean, no redness, wound looks healing  Psychiatric: She has a normal mood and affect   Her behavior is normal

## 2019-01-20 VITALS
BODY MASS INDEX: 27.64 KG/M2 | HEIGHT: 63 IN | DIASTOLIC BLOOD PRESSURE: 80 MMHG | RESPIRATION RATE: 12 BRPM | WEIGHT: 156 LBS | OXYGEN SATURATION: 98 % | TEMPERATURE: 98 F | HEART RATE: 68 BPM | SYSTOLIC BLOOD PRESSURE: 135 MMHG

## 2019-01-21 ENCOUNTER — TELEPHONE (OUTPATIENT)
Dept: OBGYN CLINIC | Facility: CLINIC | Age: 84
End: 2019-01-21

## 2019-01-21 NOTE — TELEPHONE ENCOUNTER
Dr Edith Rai from [facility] called in reference to [facility]  Her wound is almost scabbed over and there is no drainage  She laid packing on top of site with a bandaid over top and wants to know if it is ok to check once a week until she sees you again  Best contact #[phone]

## 2019-01-22 ENCOUNTER — OFFICE VISIT (OUTPATIENT)
Dept: FAMILY MEDICINE CLINIC | Facility: CLINIC | Age: 84
End: 2019-01-22
Payer: MEDICARE

## 2019-01-22 VITALS
OXYGEN SATURATION: 98 % | RESPIRATION RATE: 16 BRPM | BODY MASS INDEX: 25.27 KG/M2 | DIASTOLIC BLOOD PRESSURE: 60 MMHG | SYSTOLIC BLOOD PRESSURE: 130 MMHG | TEMPERATURE: 98.2 F | HEIGHT: 64 IN | HEART RATE: 78 BPM | WEIGHT: 148 LBS

## 2019-01-22 DIAGNOSIS — Z79.4 TYPE 2 DIABETES MELLITUS WITH HYPERGLYCEMIA, WITH LONG-TERM CURRENT USE OF INSULIN (HCC): ICD-10-CM

## 2019-01-22 DIAGNOSIS — E11.65 TYPE 2 DIABETES MELLITUS WITH HYPERGLYCEMIA, WITH LONG-TERM CURRENT USE OF INSULIN (HCC): ICD-10-CM

## 2019-01-22 DIAGNOSIS — M54.41 CHRONIC BILATERAL LOW BACK PAIN WITH BILATERAL SCIATICA: Primary | ICD-10-CM

## 2019-01-22 DIAGNOSIS — M81.0 AGE-RELATED OSTEOPOROSIS WITHOUT CURRENT PATHOLOGICAL FRACTURE: ICD-10-CM

## 2019-01-22 DIAGNOSIS — H54.7: ICD-10-CM

## 2019-01-22 DIAGNOSIS — M54.42 CHRONIC BILATERAL LOW BACK PAIN WITH BILATERAL SCIATICA: Primary | ICD-10-CM

## 2019-01-22 DIAGNOSIS — G89.29 CHRONIC BILATERAL LOW BACK PAIN WITH BILATERAL SCIATICA: Primary | ICD-10-CM

## 2019-01-22 DIAGNOSIS — Z79.2 ANTIBIOTIC LONG-TERM USE: ICD-10-CM

## 2019-01-22 DIAGNOSIS — E08.3299: ICD-10-CM

## 2019-01-22 PROCEDURE — 99214 OFFICE O/P EST MOD 30 MIN: CPT | Performed by: FAMILY MEDICINE

## 2019-01-22 PROCEDURE — 20610 DRAIN/INJ JOINT/BURSA W/O US: CPT | Performed by: FAMILY MEDICINE

## 2019-01-22 RX ORDER — BACLOFEN 10 MG/1
10 TABLET ORAL 3 TIMES DAILY
Qty: 30 TABLET | Refills: 0 | Status: SHIPPED | OUTPATIENT
Start: 2019-01-22 | End: 2019-02-05 | Stop reason: SDUPTHER

## 2019-01-22 RX ORDER — SACCHAROMYCES BOULARDII 250 MG
250 CAPSULE ORAL 2 TIMES DAILY
Qty: 60 CAPSULE | Refills: 0 | Status: SHIPPED | OUTPATIENT
Start: 2019-01-22 | End: 2019-02-05 | Stop reason: ALTCHOICE

## 2019-01-22 RX ORDER — BRIMONIDINE TARTRATE 1.5 MG/ML
1 SOLUTION/ DROPS OPHTHALMIC 2 TIMES DAILY
Qty: 5 ML | Refills: 5 | Status: CANCELLED | OUTPATIENT
Start: 2019-01-22

## 2019-01-22 RX ORDER — DORZOLAMIDE HCL 20 MG/ML
1 SOLUTION/ DROPS OPHTHALMIC 3 TIMES DAILY
Qty: 5 ML | Refills: 5 | Status: SHIPPED | OUTPATIENT
Start: 2019-01-22 | End: 2019-02-05 | Stop reason: SDUPTHER

## 2019-01-22 RX ORDER — TIMOLOL MALEATE 5 MG/ML
1 SOLUTION/ DROPS OPHTHALMIC 2 TIMES DAILY
Qty: 30 ML | Refills: 0 | Status: CANCELLED | OUTPATIENT
Start: 2019-01-22

## 2019-01-22 RX ORDER — BRIMONIDINE TARTRATE/TIMOLOL 0.2%-0.5%
1 DROPS OPHTHALMIC (EYE) EVERY 12 HOURS SCHEDULED
Qty: 1 BOTTLE | Refills: 5 | Status: CANCELLED | OUTPATIENT
Start: 2019-01-22

## 2019-01-22 RX ORDER — ASPIRIN 81 MG/1
81 TABLET, CHEWABLE ORAL DAILY
Qty: 90 TABLET | Refills: 0 | Status: SHIPPED | OUTPATIENT
Start: 2019-01-22 | End: 2019-02-05 | Stop reason: ALTCHOICE

## 2019-01-22 RX ADMIN — TRIAMCINOLONE ACETONIDE 40 MG: 40 INJECTION, SUSPENSION INTRA-ARTICULAR; INTRAMUSCULAR at 11:58

## 2019-01-22 NOTE — PROGRESS NOTES
Assessment/Plan:  1  Chronic bilateral low back pain with bilateral sciatica  Injected SI joint and paramuscles/  - CBC and differential; Future  - baclofen 10 mg tablet; Take 1 tablet (10 mg total) by mouth 3 (three) times a day  Dispense: 30 tablet; Refill: 0    2  Type 2 diabetes mellitus with hyperglycemia, with long-term current use of insulin (HCC)    - Comprehensive metabolic panel; Future  - aspirin (ASPIRIN 81) 81 mg chewable tablet; Chew 1 tablet (81 mg total) daily Ninety day supplies please  Dispense: 90 tablet; Refill: 0    3  Antibiotic long-term use  P O  Ab no signs of infection  Recovered is complete  Stop antibiotic   - saccharomyces boulardii (FLORASTOR) 250 mg capsule; Take 1 capsule (250 mg total) by mouth 2 (two) times a day  Dispense: 60 capsule; Refill: 0    4  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 ([facility])    - dorzolamide (TRUSOPT) 2 % ophthalmic solution; Administer 1 drop to the right eye 3 (three) times a day  Dispense: 5 mL; Refill: 5    5  Age-related osteoporosis without current pathological fracture  I explained the patient and his son, Kell Montgomery about osteoporosis and that Lifestyle measures should be adopted universally to reduce bone loss in postmenopausal women  Lifestyle measures include adequate calcium and vitamin D, exercise as possible, avoid exposure to second hand smoking, counseling on fall prevention, and avoidance any amount of alcohol  In general, 1200 mg of elemental calcium daily, total diet plus supplement, and 800 international units of vitamin D daily are advised  - DXA bone density spine hip and pelvis; Future    No problem-specific Assessment & Plan notes found for this encounter         Diagnoses and all orders for this visit:    Chronic bilateral low back pain with bilateral sciatica    Type 2 diabetes mellitus with hyperglycemia, with long-term current use of insulin ([facility])    Antibiotic long-term use    Other orders  -     CBC and differential; Future  -     Comprehensive metabolic panel; Future  -     DXA bone density spine hip and pelvis; Future          Subjective:      Patient ID: Laurie Haines is a 81 y o  female  This is a chronic problem  The current episode started more than 1 year ago  The problem occurs intermittently  The problem has been gradually worsening since onset  The pain is present in the lumbar spine  The quality of the pain is described as aching  The pain radiates to the right foot  The pain is at a severity of 5/10  The pain is moderate  The pain is worse during the night  The symptoms are aggravated by lying down  Associated symptoms include numbness, paresthesias and tingling  Pertinent negatives include no abdominal pain, chest pain, dysuria, fever or headaches  He has tried NSAIDs for the symptoms  The treatment provided mild relief  She presents for her follow-up diabetic visit  She is here with her daughter Manuelito Melchor  she has type 2 diabetes mellitus  her disease course has been stable, but issues of compliance with frequent travel to [place] Patient denies blurred vision, no chest pain, no fatigue, she does have foot paresthesias, no foot ulcerations, no polydipsia, no polyphagia, no polyuria, visual changes due to retinopathy  , no weakness nor weight loss  Denies  hypoglycemia complications sympto  Symptoms are stable  Pertinent negatives for diabetic complications include no autonomic neuropathy  Risk factors for coronary artery disease include Diabetes and age  Current diabetic treatmentms  include no blackouts and no hospitalization includes diet and following medications: Sitagliptin Carlita Stalls), Insuline deglutec(Tresiba) and Insuline aspart (Novolog)            The following portions of the patient's history were reviewed and updated as appropriate: allergies, current medications, past family history, past medical history, past social history, past surgical history and problem list     Review of Systems   Constitutional: Positive for fatigue  Respiratory: Negative for shortness of breath  Cardiovascular: Negative for chest pain, palpitations and leg swelling  Gastrointestinal: Positive for constipation  Endocrine: Negative  Genitourinary: Negative  Musculoskeletal: Positive for arthralgias, back pain (right Si joint very tender ), gait problem, myalgias and neck stiffness  Skin: Negative  Neurological: Positive for dizziness, weakness and numbness  Hematological: Negative  Psychiatric/Behavioral: Positive for sleep disturbance  Negative for suicidal ideas  The patient is nervous/anxious  Objective:      /60 (BP Location: Left arm, Patient Position: Sitting, Cuff Size: Standard)   Pulse 78   Temp 98 2 °F (36 8 °C) (Oral)   Resp 16   Ht 5' 4" (1 626 m)   Wt 67 1 kg (148 lb)   LMP  (LMP Unknown)   SpO2 98%   Breastfeeding? No   BMI 25 40 kg/m²            Physical Exam   HENT:   Head: Normocephalic  Eyes: Pupils are equal, round, and reactive to light  EOM are normal    Neck: Neck supple  Cardiovascular: Normal rate and regular rhythm  Pulmonary/Chest: Effort normal    Abdominal: Soft  There is no tenderness  Musculoskeletal: Normal range of motion  She exhibits tenderness  Lumbar back: She exhibits tenderness (mostly over left, very decreased range of motion  ) and bony tenderness  Neurological: She is alert  Skin: Skin is warm and dry  HEALED ARM ABSCESS  Psychiatric: She has a normal mood and affect  Her behavior is normal      Large joint arthrocentesis  Date/Time: 1/22/2019 11:58 AM  Consent given by: patient  Supporting Documentation  Indications: pain   Procedure Details  Location: hip - Hip joint: SI joint, right    Needle size: 25 G  Ultrasound guidance: no  Approach: posterior  Medications administered: 40 mg triamcinolone acetonide 40 mg/mL    Patient tolerance: patient tolerated the procedure well with no immediate complications  Dressing:  Sterile dressing applied

## 2019-01-22 NOTE — PATIENT INSTRUCTIONS
Baclofen (Por la boca)   Trata los espasmos musculares  [place](s) :   Existen muchas otras marcas de farida medicamento  Farida medicamento no debe ser usado cuando:   Usted no debe usar farida medicamento si ha tenido ghazal reacción alérgica al baclofeno  Desiree Chess de usar farida medicamento:   Jv [place]  · [place] doctor le dirá cuanta medicina usar y que tan a menudo  Podría ser necesario el tener que cambiar la dosis varias veces para determinar la que funciona mejor para usted  No tome mas medicina de la que le indique [place] doctor, ni con mayor frecuencia  Si ghazal dosis es olvidada:   · Use la dosis olvidada lo más pronto posible  No use la dosis olvidada si es hora de [place] próxima dosis regular  · No use medicina adicional para compensar ghazal dosis Vincent  Forma de guardar y botar farida medicamento:   · Guarde el medicamento en un recipiente cerrado, a temperatura ambiente, alejado del calor, la humedad y la joe directa  · [address] [facility] de vencimiento haya expirado y las medicinas que ya no necesita siguiendo las instrucciones del farmacéutico, médico o paramédico   · Mantenga todos los medicamentos lejos del alcance de los niños y nunca comparta indu medicamentos con otras personas  Medicamentos y [employer] que debe evitar:   Consulte con [place] médico o farmacéutico antes de usar cualquier medicamento, incluyendo los que compra sin receta médica, las vitaminas y los productos herbales  · No olvide informarle al médico si también está usando medicamentos que le producen sueño  En estos se incluyen las píldoras para dormir, los medicamentos para los resfriados y las [place], los analgésicos narcóticos, y los sedantes    Precauciones elvis el uso de farida medicamento:   · No olvide informarle al médico si está embarazada o dando de lactar (amamantando), o si usted tiene enfermedad en los riñones, epilepsia, diabetes o si ha sufrido un derrame cerebral   · No suspenda el uso de farida medicamento repentinamente sin antes consultar con [place] médico  Es posible que necesite reducir gradualmente [place] dosis de medicamento antes de suspenderlo totalmente  · Farida medicamento podría causarle a usted mareos o somnolencia  Evite manejar automóvil, usar maquinaria o hacer alguna otra cosa que pueda ser peligrosa si usted no está alerta  Efectos secundarios que pueden presentarse elvis el uso de farida medicamento:   Consulte inmediatamente con el médico si nota cualquiera de estos efectos secundarios:  · Aumento en la cantidad de orina o la frecuencia con que orina  · Desvanecimientos o desmayos  · Debilidad muscular, dificultad para respirar, dificultad para scott  · Convulsiones  · Cansancio o debilidad inusuales  Consulte con el médico si nota los siguientes efectos secundarios menos graves:   · Confusión  · Dolor de richard  · Náuseas, estreñimiento  · Ronchas o picazón en la piel  · Hinchazón en indu tobillos  · Dificultad para dormir  · Debilidad  Consulte con el médico si nota otros efectos secundarios que deyanira son causados por farida medicamento  Llame a [place] médico para consultarle Kendell Agustin puede notificar indu efectos secundarios al FDA al [phone]  © 2017 [facility] Information is for End User's use only and may not be sold, redistributed or otherwise used for commercial purposes  Esta información es sólo para uso en educación  [place] intención no es darle un consejo médico sobre enfermedades o tratamientos  Colsulte con [place] Classie Oliver farmacéutico antes de seguir cualquier régimen médico para saber si es seguro y efectivo para usted

## 2019-01-24 RX ORDER — TRIAMCINOLONE ACETONIDE 40 MG/ML
40 INJECTION, SUSPENSION INTRA-ARTICULAR; INTRAMUSCULAR
Status: COMPLETED | OUTPATIENT
Start: 2019-01-22 | End: 2019-01-22

## 2019-02-01 ENCOUNTER — TELEPHONE (OUTPATIENT)
Dept: FAMILY MEDICINE CLINIC | Facility: CLINIC | Age: 84
End: 2019-02-01

## 2019-02-01 NOTE — TELEPHONE ENCOUNTER
Pt home health aid called in stating the patient vomited two days ago but was fine all day yesterday but does feel week today, states she doesn't seem dehydrated and doesn't have diarrhea or vomiting at the time  Made jhony office nurse aware she suggested because the patient was recently admitted she should visit the er incase they need to run certain test or put in an iv  The aide  states the family declined, I offered an apt for next week since we are only here half day today they declined again and disconnected the call

## 2019-02-02 ENCOUNTER — APPOINTMENT (EMERGENCY)
Dept: RADIOLOGY | Facility: HOSPITAL | Age: 84
End: 2019-02-02
Payer: MEDICARE

## 2019-02-02 ENCOUNTER — HOSPITAL ENCOUNTER (EMERGENCY)
Facility: HOSPITAL | Age: 84
Discharge: HOME | End: 2019-02-02
Attending: EMERGENCY MEDICINE
Payer: MEDICARE

## 2019-02-02 VITALS
HEART RATE: 70 BPM | RESPIRATION RATE: 14 BRPM | SYSTOLIC BLOOD PRESSURE: 139 MMHG | OXYGEN SATURATION: 98 % | DIASTOLIC BLOOD PRESSURE: 74 MMHG | WEIGHT: 146 LBS | BODY MASS INDEX: 25.06 KG/M2 | TEMPERATURE: 98.1 F

## 2019-02-02 DIAGNOSIS — M54.30 SCIATICA: Primary | ICD-10-CM

## 2019-02-02 LAB
ALBUMIN SERPL BCP-MCNC: 3.4 G/DL (ref 3.5–5)
ALP SERPL-CCNC: 117 U/L (ref 46–116)
ALT SERPL W P-5'-P-CCNC: 25 U/L (ref 12–78)
ANION GAP SERPL CALCULATED.3IONS-SCNC: 5 MMOL/L (ref 4–13)
AST SERPL W P-5'-P-CCNC: 14 U/L (ref 5–45)
BASOPHILS # BLD AUTO: 0.04 THOUSANDS/ΜL (ref 0–0.1)
BASOPHILS NFR BLD AUTO: 0 % (ref 0–1)
BILIRUB SERPL-MCNC: 0.86 MG/DL (ref 0.2–1)
BILIRUB UR QL STRIP: NEGATIVE
BUN SERPL-MCNC: 22 MG/DL (ref 5–25)
CALCIUM SERPL-MCNC: 9 MG/DL (ref 8.3–10.1)
CHLORIDE SERPL-SCNC: 104 MMOL/L (ref 100–108)
CLARITY UR: CLEAR
CO2 SERPL-SCNC: 26 MMOL/L (ref 21–32)
COLOR UR: YELLOW
COLOR, POC: NORMAL
CREAT SERPL-MCNC: 1.32 MG/DL (ref 0.6–1.3)
EOSINOPHIL # BLD AUTO: 0.29 THOUSAND/ΜL (ref 0–0.61)
EOSINOPHIL NFR BLD AUTO: 3 % (ref 0–6)
ERYTHROCYTE [DISTWIDTH] IN BLOOD BY AUTOMATED COUNT: 15.4 % (ref 11.6–15.1)
GFR SERPL CREATININE-BSD FRML MDRD: 38 ML/MIN/1.73SQ M
GLUCOSE SERPL-MCNC: 174 MG/DL (ref 65–140)
GLUCOSE UR STRIP-MCNC: NEGATIVE MG/DL
HCT VFR BLD AUTO: 42.1 % (ref 34.8–46.1)
HGB BLD-MCNC: 13.7 G/DL (ref 11.5–15.4)
HGB UR QL STRIP.AUTO: NEGATIVE
IMM GRANULOCYTES # BLD AUTO: 0.06 THOUSAND/UL (ref 0–0.2)
IMM GRANULOCYTES NFR BLD AUTO: 1 % (ref 0–2)
KETONES UR STRIP-MCNC: NEGATIVE MG/DL
LEUKOCYTE ESTERASE UR QL STRIP: NEGATIVE
LIPASE SERPL-CCNC: 126 U/L (ref 73–393)
LYMPHOCYTES # BLD AUTO: 2.19 THOUSANDS/ΜL (ref 0.6–4.47)
LYMPHOCYTES NFR BLD AUTO: 20 % (ref 14–44)
MCH RBC QN AUTO: 31.5 PG (ref 26.8–34.3)
MCHC RBC AUTO-ENTMCNC: 32.5 G/DL (ref 31.4–37.4)
MCV RBC AUTO: 97 FL (ref 82–98)
MONOCYTES # BLD AUTO: 0.58 THOUSAND/ΜL (ref 0.17–1.22)
MONOCYTES NFR BLD AUTO: 5 % (ref 4–12)
NEUTROPHILS # BLD AUTO: 7.73 THOUSANDS/ΜL (ref 1.85–7.62)
NEUTS SEG NFR BLD AUTO: 71 % (ref 43–75)
NITRITE UR QL STRIP: NEGATIVE
NRBC BLD AUTO-RTO: 0 /100 WBCS
PH UR STRIP.AUTO: 7 [PH] (ref 4.5–8)
PLATELET # BLD AUTO: 223 THOUSANDS/UL (ref 149–390)
PMV BLD AUTO: 11.1 FL (ref 8.9–12.7)
POTASSIUM SERPL-SCNC: 4.8 MMOL/L (ref 3.5–5.3)
PROT SERPL-MCNC: 6.8 G/DL (ref 6.4–8.2)
PROT UR STRIP-MCNC: NEGATIVE MG/DL
RBC # BLD AUTO: 4.35 MILLION/UL (ref 3.81–5.12)
SODIUM SERPL-SCNC: 135 MMOL/L (ref 136–145)
SP GR UR STRIP.AUTO: 1.02 (ref 1–1.03)
UROBILINOGEN UR QL STRIP.AUTO: 0.2 E.U./DL
WBC # BLD AUTO: 10.89 THOUSAND/UL (ref 4.31–10.16)

## 2019-02-02 PROCEDURE — 99284 EMERGENCY DEPT VISIT MOD MDM: CPT

## 2019-02-02 PROCEDURE — 85025 COMPLETE CBC W/AUTO DIFF WBC: CPT | Performed by: EMERGENCY MEDICINE

## 2019-02-02 PROCEDURE — 81003 URINALYSIS AUTO W/O SCOPE: CPT

## 2019-02-02 PROCEDURE — 74176 CT ABD & PELVIS W/O CONTRAST: CPT

## 2019-02-02 PROCEDURE — 80053 COMPREHEN METABOLIC PANEL: CPT | Performed by: EMERGENCY MEDICINE

## 2019-02-02 PROCEDURE — 83690 ASSAY OF LIPASE: CPT | Performed by: EMERGENCY MEDICINE

## 2019-02-02 PROCEDURE — 36415 COLL VENOUS BLD VENIPUNCTURE: CPT

## 2019-02-02 RX ORDER — DIAZEPAM 5 MG/1
5 TABLET ORAL ONCE
Status: COMPLETED | OUTPATIENT
Start: 2019-02-02 | End: 2019-02-02

## 2019-02-02 RX ORDER — DIAZEPAM 5 MG/1
5 TABLET ORAL 2 TIMES DAILY
Qty: 6 TABLET | Refills: 0 | Status: SHIPPED | OUTPATIENT
Start: 2019-02-02 | End: 2019-02-05 | Stop reason: ALTCHOICE

## 2019-02-02 RX ADMIN — DIAZEPAM 5 MG: 5 TABLET ORAL at 17:48

## 2019-02-02 NOTE — ED ATTENDING ATTESTATION
Angela Robison MD, saw and evaluated the patient  I have discussed the patient with the resident/non-physician practitioner and agree with the resident's/non-physician practitioner's findings, Plan of Care, and MDM as documented in the resident's/non-physician practitioner's note, except where noted  All available labs and Radiology studies were reviewed  At this point I agree with the current assessment done in the Emergency Department  I have conducted an independent evaluation of this patient a history and physical is as follows:      Critical Care Time  Procedures     80 yo female with back and buttock pain, vomiting few times, no diarrhea  Pt with known sciatica and pain radiating down both legs  Pt with no recent fall or injury, but admitted for cellulitis last month  No fever, no urinary complaints  No abodminal pain  pmh htn, hld, dm, copd  Vss, afebrile, lungs cta, rrr, abdomen soft nontender, low back tenderness  Labs, urine, pain meds, ct a/p  Likely sciatica

## 2019-02-03 NOTE — DISCHARGE INSTRUCTIONS
The scan of the abdomen back and pelvis was unremarkable  You do have kidney stones  This is unchanged from previous  Please follow-up with the primary care provider  If anything changes or worsens please come back  I am giving you a referral to [facility]   LO QUE NECESITA SABER:   La ciática es ghazal condición que causa dolor en el nervio ciático  El nervio ciático sale de la shae dorsal y corre por ambos lados de los glúteos  Luego baja por la parte posterior del muslo, la parte inferior de la pierna y el pie  El nervio ciático puede estar comprimido, [place], irritado o estirado  INSTRUCCIONES SOBRE EL ESTRELLA HOSPITALARIA:   Medicamentos:   · AINEs (analgésicos antiinflamatorios no esteroides):  Estos medicamentos disminuyen la inflamación y el dolor  Los AINEs se pueden obtener sin receta médica  Consulte con [place] médico cuál medicamento es el adecuado para usted  Pregunte cuánto rob y cuándo  Tómelos dank se le indique  Cuando no se kings de la [employer], los medicamentos antiinflamatorios no esteroides pueden causar sangrado estomacal o problemas renales  · Acetaminofeno:  Farida medicamento disminuye el dolor  El acetaminofeno puede obtener sin receta médica  Pregunte cuánto rob y cuándo  [facility]  El acetaminofén puede causar daño en el hígado cuando no se danae de forma correcta  · Relajantes musculares  ayudan a reducir dolor y espasmos musculares  · Western indu medicamentos dank se le haya indicado  Consulte con [place] médico si usted deyanira que [place] medicamento no le está ayudando o si presenta efectos secundarios  Infórmele si es alérgico a algún medicamento  Mantenga ghazal lista actualizada de los [place], las vitaminas y los productos herbales que danae  Incluya los siguientes datos de los medicamentos: cantidad, frecuencia y motivo de administración  Traiga con usted la lista o los envases de la píldoras a indu citas de seguimiento  Lleve la lista de los medicamentos con usted en reese de ghazal emergencia  Acuda a indu consultas de control con [place] médico según le indicaron  Anote indu preguntas para que se acuerde de hacerlas elvis indu visitas  El [facility] síntomas:   · Actividad:  Reduzca indu actividades  No levante objetos pesados ni tuerza la espalda elvis un mínimo de 6 semanas  Retome lentamente indu actividades acostumbradas  · Hielo:  El hielo ayuda a disminuir la inflamación y el dolor  El hielo también puede contribuir a evitar el daño de los tejidos  Use un paquete de hielo o ponga hielo molido dentro de [employer]  Cúbrala con ghazal toalla y colóquela en la pierna por 15 a 20 minutos cada hora o dank se le indique  · Calor:  El calor ayuda a disminuir el dolor y los espasmos musculares  Aplíquese calor en el área lesionada elvis 20 a 30 minutos cada 2 horas elvis la cantidad de [employer] indiquen  · Fisioterapia:  Es posible que deba atenderse con un fisioterapeuta para que le enseñe ejercicios para aumentar la fuerza y el movimiento y aliviar el dolor  Un terapeuta ocupacional le enseña habilidades para ayudarlo con indu actividades diarias  · Use los dispositivos de asistencia adam dank le indiquen:  Es posible que deba usar un soporte para la espalda, dank ghazal [place]  Puede que necesite muletas, un bastón o un andador para disminuir la presión en los músculos de la parte inferior de la espalda y las piernas  Pregunte a [place] médico por más [employer] dispositivos de asistencia y cómo se usan de forma correcta  Cuidados personales:   · Evite la presión en la espalda y las piernas:  No  levante objetos pesados, ni esté de pie o sentado por períodos prolongados de tiempo  · Levante los objetos de Bereince gordon: Mantenga la [place] [employer] y doble las rodillas para alzar un objeto  No doble ni tuerza la espalda cuando levante algo      · Mantenga un peso saludable:  Consulte con [place] médico cuánto debería pesar  Pida que le ayude a crear un plan para bajar de peso si usted tiene sobrepeso  · Ejercicio:  Pídale a gomez médico que le recomiende el programa de estiramiento, calentamiento y ejercicio más apropiado para usted  Pregúntele a gomez Pepper Chance vitaminas y minerales son adecuados para usted  · Le duele la parte inferior de la espalda por la noche o cuando descansa  · Le duele la parte inferior de la espalda y se le adormece la pierna por debajo de la rodilla  · Tiene debilidad en ghazal pierna solamente  · Usted tiene preguntas o inquietudes acerca de gomez condición o cuidado  Regrese a la jessy de emergencias si:   · Tiene dificultad para contener la orina o las evacuaciones intestinales  · Tiene debilidad en ambas piernas  · Pierde la sensación en la pily o los glúteos  © 2017 [address] Information is for End User's use only and may not be sold, redistributed or otherwise used for commercial purposes  All illustrations and images included in CareNotes® are the copyrighted property of A D A M , Inc  or Kal Strong  Esta información es sólo para uso en educación  [place]omez intención no es darle un consejo médico sobre enfermedades o tratamientos  Colsulte con gomez Huong Poncho farmacéutico antes de seguir cualquier régimen médico para saber si es seguro y efectivo para usted

## 2019-02-04 ENCOUNTER — TELEPHONE (OUTPATIENT)
Dept: PHYSICAL THERAPY | Facility: OTHER | Age: 84
End: 2019-02-04

## 2019-02-04 ENCOUNTER — OFFICE VISIT (OUTPATIENT)
Dept: SURGERY | Facility: CLINIC | Age: 84
End: 2019-02-04
Payer: MEDICARE

## 2019-02-04 VITALS
HEIGHT: 63 IN | SYSTOLIC BLOOD PRESSURE: 138 MMHG | BODY MASS INDEX: 25.91 KG/M2 | WEIGHT: 146.2 LBS | TEMPERATURE: 96.3 F | DIASTOLIC BLOOD PRESSURE: 72 MMHG

## 2019-02-04 DIAGNOSIS — L02.414 ABSCESS OF ARM, LEFT: Primary | ICD-10-CM

## 2019-02-04 PROCEDURE — 99212 OFFICE O/P EST SF 10 MIN: CPT | Performed by: STUDENT IN AN ORGANIZED HEALTH CARE EDUCATION/TRAINING PROGRAM

## 2019-02-04 NOTE — TELEPHONE ENCOUNTER
Using [employer]  ID [id number]    Spoke with patient to follow up on referral and provided her with overview of Comprehensive Sp[facility] Program  Patient is currently receiving home health services and does not qualify for outpatient physical therapy at this time  Patient states home health already sent a therapist to her house  Patient advised to continue with home PT  Referral to be closed at this time

## 2019-02-04 NOTE — PROGRESS NOTES
Office Visit - General Surgery  Bella Solorio MRN: [id number]  Encounter: [id number]    Assessment and Plan    Problem List Items Addressed This Visit        Other    Abscess of arm, left - Primary            Chief Complaint:  Bella Solorio is a 81 y o  female who presents for follow-up after incision and drainage of a left arm abscess    Subjective  Doing well  Incision has healed over  Finished antibiotics  Denies redness, fevers, chills or drainage       Past Medical History  Past Medical History:   Diagnosis Date    [facility]chronic obstructive pulmonary disease) ([facility] )     Diabetes mellitus ([facility] )     Hyperlipidemia     Hyperlipidemia     Hypertension     Kidney stones     Osteoporosis        Past Surgical History  Past Surgical History:   Procedure Laterality Date    APPENDECTOMY      HAND SURGERY Right     INCISION AND DRAINAGE OF WOUND Left 1/5/2019    Procedure: INCISION AND DRAINAGE (I&D) EXTREMITY;  Surgeon: Herminio Gonzalez MD;  [facility];  Service: General    LITHOTRIPSY      MASS EXCISION      [place] of back wall mass    TONSILLECTOMY      TONSILLECTOMY         Family History  Family History   Problem Relation Age of Onset    Diabetes Mother        Social History  Social History     Social History    Marital status: Single     Spouse name: N/A    Number of children: N/A    Years of education: N/A     Social History Main Topics    Smoking status: Former Smoker     Packs/day: 1 00     Years: 40 00     Types: Cigarettes     Quit date: 1/1/2017    Smokeless tobacco: Never Used      Comment: states she quit but family living with her states she smokes    Alcohol use Yes      Comment: OCCASIONAL    Drug use: No    Sexual activity: Yes     Partners: Male     Other Topics Concern    None     Social History Narrative    None        Medications  Current Outpatient Prescriptions on File Prior to Visit   Medication Sig Dispense Refill    aspirin (ASPIRIN 81) 81 mg chewable tablet Chew 1 tablet (81 mg total) daily Ninety day supplies please 90 tablet 0    atorvastatin (LIPITOR) 40 mg tablet Take 1 tablet (40 mg total) by mouth every 24 hours 90 tablet 3    baclofen 10 mg tablet Take 1 tablet (10 mg total) by mouth 3 (three) times a day 30 tablet 0    CLEVER CHOICE COMFORT EZ 33G X 4 MM MISC Ninety day supplies please 300 each 3    dorzolamide (TRUSOPT) 2 % ophthalmic solution Administer 1 drop to the right eye 3 (three) times a day 5 mL 5    EASY TOUCH LANCETS 32G MISC To use 4 times a day 300 each 3    glucose blood test strip To use 4 times a day  Ninety day supplies please 300 each 3    insulin degludec (TRESIBA FLEXTOUCH) 100 units/mL injection pen To use 35 U at bedtime  Ninety day supplies please 9 pen 0    ipratropium (ATROVENT HFA) 17 mcg/act inhaler Inhale 2 puffs 4 (four) times a day Ninety day supplies please 3 Inhaler 3    lisinopril (ZESTRIL) 10 mg tablet Take 1 tablet (10 mg total) by mouth daily 90 tablet 3    NOVOLOG FLEXPEN 100 units/mL injection pen 18 UNITS 3 TIMES A DAY WITH MEALS    Ninety day supplies please 25 pen 0    omeprazole (PriLOSEC) 20 mg delayed release capsule Take 1 capsule (20 mg total) by mouth daily Ninety day supplies please 90 capsule 3    saccharomyces boulardii (FLORASTOR) 250 mg capsule Take 1 capsule (250 mg total) by mouth 2 (two) times a day 30 capsule 0    saccharomyces boulardii (FLORASTOR) 250 mg capsule Take 1 capsule (250 mg total) by mouth 2 (two) times a day 60 capsule 0    sitaGLIPtin (JANUVIA) 50 mg tablet Take 1 tablet (50 mg total) by mouth daily 30 tablet 0    diazepam (VALIUM) 5 mg tablet Take 1 tablet (5 mg total) by mouth 2 (two) times a day for 3 days (Patient not taking: Reported on 2/4/2019 ) 6 tablet 0    pregabalin (LYRICA) 100 mg capsule Take 1 capsule (100 mg total) by mouth daily for 10 days 10 capsule 0     No current facility-administered medications on file prior to visit          Allergies  Allergies Allergen Reactions    Acetaminophen      Listen on patient's paperwork from [facility] and Staffing  Family unable to confirm      Morphine And Related Vomiting    Oxycodone GI Intolerance     Listed on patient's paperwork from [facility] and Staffing  Family unable to confirm      Tramadol Vomiting and Abdominal Pain     Other       Review of Systems   Constitutional: Negative  HENT: Negative  Eyes: Negative  Respiratory: Negative  Cardiovascular: Negative  Gastrointestinal: Negative  Endocrine: Negative  Genitourinary: Negative  Musculoskeletal: Negative  Skin: Negative  Allergic/Immunologic: Negative  Hematological: Negative  Psychiatric/Behavioral: Negative  Objective  Vitals:    02/04/19 1120   BP: 138/72   Temp: (!) 96 3 °F (35 7 °C)       Physical Exam   Constitutional: She is oriented to person, place, and time  She appears well-developed and well-nourished  HENT:   Head: Normocephalic and atraumatic  Eyes: Pupils are equal, round, and reactive to light  Neck: Normal range of motion  Cardiovascular: Normal rate  Pulmonary/Chest: Effort normal  No respiratory distress  Abdominal: Soft  She exhibits no distension  There is no tenderness  Musculoskeletal: She exhibits no tenderness  Left arm incision and drainage site c/d/i without erythema, well-healed   Neurological: She is alert and oriented to person, place, and time  Skin: Skin is warm and dry  Psychiatric: She has a normal mood and affect   Her behavior is normal

## 2019-02-04 NOTE — TELEPHONE ENCOUNTER
Using [employer]  ID: [id number]  Unknown woman answered and told RN to call number of daughter, and then hung up phone  RN and  to call number provided to attempt to reach patient

## 2019-02-05 ENCOUNTER — OFFICE VISIT (OUTPATIENT)
Dept: FAMILY MEDICINE CLINIC | Facility: CLINIC | Age: 84
End: 2019-02-05
Payer: MEDICARE

## 2019-02-05 VITALS
WEIGHT: 150 LBS | HEIGHT: 64 IN | TEMPERATURE: 98 F | BODY MASS INDEX: 25.61 KG/M2 | OXYGEN SATURATION: 98 % | SYSTOLIC BLOOD PRESSURE: 150 MMHG | DIASTOLIC BLOOD PRESSURE: 70 MMHG | HEART RATE: 80 BPM | RESPIRATION RATE: 16 BRPM

## 2019-02-05 DIAGNOSIS — K29.60 OTHER GASTRITIS WITHOUT HEMORRHAGE, UNSPECIFIED CHRONICITY: ICD-10-CM

## 2019-02-05 DIAGNOSIS — M54.42 CHRONIC BILATERAL LOW BACK PAIN WITH BILATERAL SCIATICA: Primary | ICD-10-CM

## 2019-02-05 DIAGNOSIS — R11.15 NON-INTRACTABLE CYCLICAL VOMITING WITH NAUSEA: ICD-10-CM

## 2019-02-05 DIAGNOSIS — E11.65 TYPE 2 DIABETES MELLITUS WITH HYPERGLYCEMIA, WITH LONG-TERM CURRENT USE OF INSULIN (HCC): ICD-10-CM

## 2019-02-05 DIAGNOSIS — G89.29 CHRONIC BILATERAL LOW BACK PAIN WITH BILATERAL SCIATICA: Primary | ICD-10-CM

## 2019-02-05 DIAGNOSIS — Z79.4 TYPE 2 DIABETES MELLITUS WITH HYPERGLYCEMIA, WITH LONG-TERM CURRENT USE OF INSULIN (HCC): ICD-10-CM

## 2019-02-05 DIAGNOSIS — H54.7: ICD-10-CM

## 2019-02-05 DIAGNOSIS — M54.41 CHRONIC BILATERAL LOW BACK PAIN WITH BILATERAL SCIATICA: Primary | ICD-10-CM

## 2019-02-05 DIAGNOSIS — E08.3299: ICD-10-CM

## 2019-02-05 PROCEDURE — 99214 OFFICE O/P EST MOD 30 MIN: CPT | Performed by: FAMILY MEDICINE

## 2019-02-05 RX ORDER — CELECOXIB 200 MG/1
200 CAPSULE ORAL 2 TIMES DAILY
Qty: 30 CAPSULE | Refills: 2 | Status: SHIPPED | OUTPATIENT
Start: 2019-02-05 | End: 2019-02-14 | Stop reason: SDUPTHER

## 2019-02-05 RX ORDER — LISINOPRIL 10 MG/1
10 TABLET ORAL DAILY
Qty: 90 TABLET | Refills: 3 | Status: SHIPPED | OUTPATIENT
Start: 2019-02-05 | End: 2019-06-11 | Stop reason: SDUPTHER

## 2019-02-05 RX ORDER — ONDANSETRON 4 MG/1
4 TABLET, FILM COATED ORAL EVERY 8 HOURS PRN
Qty: 20 TABLET | Refills: 0 | Status: SHIPPED | OUTPATIENT
Start: 2019-02-05 | End: 2019-02-07 | Stop reason: SDUPTHER

## 2019-02-05 RX ORDER — OMEPRAZOLE 20 MG/1
20 CAPSULE, DELAYED RELEASE ORAL 2 TIMES DAILY
Qty: 60 CAPSULE | Refills: 3 | Status: SHIPPED | OUTPATIENT
Start: 2019-02-05 | End: 2019-04-24 | Stop reason: SDUPTHER

## 2019-02-05 RX ORDER — DORZOLAMIDE HCL 20 MG/ML
1 SOLUTION/ DROPS OPHTHALMIC 3 TIMES DAILY
Qty: 5 ML | Refills: 5 | Status: SHIPPED | OUTPATIENT
Start: 2019-02-05 | End: 2019-06-11 | Stop reason: SDUPTHER

## 2019-02-05 RX ORDER — BACLOFEN 10 MG/1
10 TABLET ORAL 3 TIMES DAILY
Qty: 90 TABLET | Refills: 5 | Status: ON HOLD | OUTPATIENT
Start: 2019-02-05 | End: 2019-07-09 | Stop reason: SDUPTHER

## 2019-02-05 NOTE — PROGRESS NOTES
Assessment/Plan:  1  Chronic bilateral low back pain with bilateral sciatica  The choice of NSAID's in this patient depends of her current pain syndrome  I explained to patient that  response of NSAIDs varies between patient's and also is different in regarding to the area of the body affected in the progression of the damage  The drug interactions and comorbidities affecting by these medications were explained to patient also; the caution in toxicity in the GI tract was also discussed  Prevent the long-term use of these medications may be wise  The use ice and physical therapy is necessary in order to get the best results  NSAID's might elevate BP or block effect of certain antihypertensive agents  Use with caution  Always use ice and therapy to decrease or avoid the need for a Pharmacological agent  - celecoxib (CeleBREX) 200 mg capsule; Take 1 capsule (200 mg total) by mouth 2 (two) times a day  Dispense: 30 capsule; Refill: 2  - baclofen 10 mg tablet; Take 1 tablet (10 mg total) by mouth 3 (three) times a day  Dispense: 90 tablet; Refill: 5    2  Type 2 diabetes mellitus with hyperglycemia, with long-term current use of insulin ([facility])  Condition is stable with current treatment, to  continue the same    - lisinopril (ZESTRIL) 10 mg tablet; Take 1 tablet (10 mg total) by mouth daily  Dispense: 90 tablet; Refill: 3    3  Other gastritis without hemorrhage, unspecified chronicity  patient is suffering of gastritis symptoms, a PPI was recommended with the caution of side effects, a follow up will be needed in 3 weeks, if symptoms persist, a EGD is going to be offered  We discussed about diet and OTC medication as cause of current problem  - omeprazole (PriLOSEC) 20 mg delayed release capsule; Take 1 capsule (20 mg total) by mouth 2 (two) times a day Ninety day supplies please  Dispense: 60 capsule; Refill: 3        4  Non-intractable cyclical vomiting with nausea  Trial of zofran    - ondansetron (ZOFRAN) 4 mg tablet; Take 1 tablet (4 mg total) by mouth every 8 (eight) hours as needed for nausea or vomiting  Dispense: 20 tablet; Refill: 0        No problem-specific Assessment & Plan notes found for this encounter  Diagnoses and all orders for this visit:    Chronic bilateral low back pain with bilateral sciatica  -     celecoxib (CeleBREX) 200 mg capsule; Take 1 capsule (200 mg total) by mouth 2 (two) times a day  -     baclofen 10 mg tablet; Take 1 tablet (10 mg total) by mouth 3 (three) times a day    Type 2 diabetes mellitus with hyperglycemia, with long-term current use of insulin ([facility])  -     lisinopril (ZESTRIL) 10 mg tablet; Take 1 tablet (10 mg total) by mouth daily    Other gastritis without hemorrhage, unspecified chronicity  -     omeprazole (PriLOSEC) 20 mg delayed release capsule; Take 1 capsule (20 mg total) by mouth 2 (two) times a day Ninety day supplies please    Non-intractable cyclical vomiting with nausea  -     Discontinue: ondansetron (ZOFRAN) 4 mg tablet; Take 1 tablet (4 mg total) by mouth every 8 (eight) hours as needed for nausea or vomiting    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 ([facility])  -     dorzolamide (TRUSOPT) 2 % ophthalmic solution; Administer 1 drop to both eyes 3 (three) times a day          Subjective:      Patient ID: Carolyn Hardin is a 81 y o  female  Patient is complaining of back pain with radiation over the right leg, patient is following also with pain management for this condition  Patient also have physical therapy in the past   Patient is suffering of diabetes with fluctuating control  He also suffers hypertension  Patient suffers diabetes retinopathy        The following portions of the patient's history were reviewed and updated as appropriate: allergies, current medications, past family history, past medical history, past social history, past surgical history and problem list     Review of Systems Constitutional: Positive for activity change, appetite change and unexpected weight change  Negative for fatigue and fever  HENT: Negative for nosebleeds, sore throat, trouble swallowing and voice change  Eyes: Negative for photophobia  Respiratory: Negative for apnea, cough, chest tightness, shortness of breath and wheezing  Cardiovascular: Negative for chest pain, palpitations and leg swelling  Gastrointestinal: Positive for abdominal distention and abdominal pain  Negative for blood in stool, nausea and vomiting  Endocrine: Negative  Genitourinary: Negative for difficulty urinating, dyspareunia and hematuria  Musculoskeletal: Positive for back pain and gait problem  Skin: Negative for color change and pallor  Allergic/Immunologic: Negative for environmental allergies and food allergies  Neurological: Negative for dizziness, syncope, facial asymmetry, light-headedness and headaches  Hematological: Does not bruise/bleed easily  Objective:      /70 (BP Location: Left arm, Patient Position: Sitting, Cuff Size: Standard)   Pulse 80   Temp 98 °F (36 7 °C) (Oral)   Resp 16   Ht 5' 4" (1 626 m)   Wt 68 kg (150 lb)   LMP  (LMP Unknown)   SpO2 98%   Breastfeeding? No   BMI 25 75 kg/m²          Physical Exam   Constitutional: She is oriented to person, place, and time  She appears well-developed and well-nourished  She is cooperative  HENT:   Head: Normocephalic  Head is without contusion, without right periorbital erythema and without left periorbital erythema  Right Ear: Hearing, tympanic membrane, external ear and ear canal normal    Left Ear: Hearing, tympanic membrane, external ear and ear canal normal    Nose: Nose normal    Mouth/Throat: Uvula is midline, oropharynx is clear and moist and mucous membranes are normal    Eyes: Pupils are equal, round, and reactive to light  EOM are normal    Neck: Normal range of motion  Muscular tenderness present   No edema and no erythema present  Cardiovascular: Normal rate, regular rhythm, normal heart sounds and normal pulses  Pulmonary/Chest: Effort normal and breath sounds normal  She exhibits tenderness  She exhibits no deformity and no swelling  Abdominal: Soft  Normal appearance and bowel sounds are normal  She exhibits distension  She exhibits no abdominal bruit and no pulsatile midline mass  There is no hepatosplenomegaly  There is generalized tenderness  No hernia  Musculoskeletal: She exhibits tenderness  Neurological: She is alert and oriented to person, place, and time  She has normal strength and normal reflexes  No cranial nerve deficit or sensory deficit  Skin: Skin is warm, dry and intact  Psychiatric: She has a normal mood and affect   Her behavior is normal  Judgment and thought content normal  Cognition and memory are normal

## 2019-02-05 NOTE — PATIENT INSTRUCTIONS
Por favor sofia con detenimiento  Muy Importante!!!    · [place] peso  será revisado  Es posible que [employer] midan ol[place] altura para calcular [place] índice de masa corporal [facility])  El [facility] indica si tiene un peso saludable  · Se verificarán [place] presión arterial  y frecuencia cardíaca  También pueden revisar [place] temperatura  · Exámenes de [place] y [facility]  se podría realizar  Se podrían realizar exámenes de kris para revisar los niveles de [facility]  Los niveles anormales de colesterol aumentan el riesgo de enfermedad del corazón y accidente cerebrovascular  Puede que también necesite ghazal prueba de kris u orina para revisar si tiene diabetes si usted está en mayor riesgo  Las pruebas de orina pueden hacerse para buscar signos de ghazal infección o ghazal enfermedad renal      · Un examen físico  incluye la comprobación de indu latidos del corazón y los pulmones con un estetoscopio  [place] médico también podría revisarle la piel para buscar daños causados por el sol  · Pruebas de detección  podría recomendarse  Se realiza un examen de detección para detectar enfermedades que pueden no causar síntomas  Los exámenes de detección que necesite dependen de [place] edad, género, antecedentes familiares y hábitos de antonio  Por ejemplo, podrían recomendarle la exploración selectiva colorrectal si tiene 48 años o más  ¿Qué exámenes de detección necesito si soy ghazal susana? · El examen de Papanicolaou  se utiliza para detectar cáncer de amrik uterino  El examen del Papanicolaou por lo general se realiza entre 3 a 5 años dependiendo de olm[place] edad  Puede necesitarlo más a menudo si usted ha tenido [employer] de la prueba de Papanicolaou en el pasado  Pregunte a [place] médico con qué frecuencia debería realizarse un examen de Papanicolaou  · Ghazal mamografía  es ghazal radiografía de indu senos para detectar cáncer de mama  Los expertos recomiendan [address] cada 2 años empezando a los 48 años de [place]   Es probable que usted necesite ghazal mamografía a los 52 años o antes si tiene riesgo alto de cáncer de seno  Hable con [place] médico sobre cuándo debe empezar con indu mamografías y con cuánta frecuencia las necesita  ¿Qué vacunas podría necesitar? · Debe recibir Tacey Johan vacuna contra la influenza  todos los [place]  La vacuna contra la influenza protege de la gripe  Varios tipos de virus causan la influenza  Debido a que los virus [place] con el [place], es necesaria la producción de nuevas vacunas cada año  · Debe recibir Tacey Johan vacuna de refuerzo contra el tétanos-difteria (Td)  cada 10 años  Esta vacuna protege contra el tétanos y la difteria  El tétanos es ghazal infección severa que puede causar trismo y espasmos musculares dolorosos  La difteria es ghazal infección bacterial grave que produce ghazal cubierta gruesa en la parte de atrás de la boca y garganta  · Debe recibir la vacuna contra el virus del papiloma humano (VPH)  si usted es susana y Indio 19 y 32 años o es hombre y Indio 23 y 24 años y nunca la recibió  Esta vacuna protege contra la infección por VPH  El virus del papiloma humano o VPH es la infección más común que se transmite por contacto sexual  El virus del papiloma humano también podría provocar cáncer vaginal, del pene y del ano  · Debe recibir la vacuna antineumocócica  si tiene más de 72 años  La vacuna antineumocócica es ghazal inyección que se administra para protegerlo contra ghazal enfermedad neumocócica  La enfermedad neumocócica es ghazal infección causada por la bacteria neumocócica  La infección puede causar neumonía, meningitis o ghazal infección del oído  · Debe recibir la vacuna contra la culebrilla  si tiene [address] [place] [address],[address] [place] [address] o [place], incluso si choudhary tenido culebrilla antes  La vacuna contra la culebrilla (herpes zóster) es ghazal inyección usada para proteger contra el virus zóster que causa la varicela  [place] es el mismo virus que causa la varicela   La culebrilla es un sarpullido doloroso que se desarrolla en personas que tuvieron varicela o choudhary estado expuestas al virus  ¿Cómo puedo comer de manera saludable? [place] es un modelo para planear comidas sanas  Muestra los tipos y cantidades de alimentos que deberían ir en un plato  Diannia Nahomy y verduras representan alrededor de la mitad de [place] plato y los granos y proteínas representan la otra mitad  Se incluye ghazal porción de productos lácteos al lado del plato  La cantidad de calorías y [address] de las porciones que usted necesita dependen de [place] edad, [place], peso y altura  Los ejemplos de alimentos saludables son:  · Consuma ghazal variedad de verduras  dank las de color nita oscuro, naylor y [place]  Usted también puede incluir verduras enlatadas bajas en sodio (sal) y verduras congeladas sin mantequilla ni salsas [id number]  · Consuma ghazal variedad de fruta frescas , las frutas enlatadas en 100% de jugo , fruta [place] y nereyda secos  · Incluya granos enteros  Por lo menos la mitad de los granos que usted consume deben ser granos de dudley integral  Por ejemplo, panes de grano entero, pasta integral, arroz integral y cereales de grano entero dank la gautam  · Consuma ghazal variedad de alimentos con proteínas dank mariscos (pescado y crustáceos), Maudry Adjutant y carne de ave sin piel (pavo y ladi)  Ejemplos de brandee magras incluyen pierna, paleta o lomo de puerco y carlito, solomillo o, lomo de res y carne [place] de res extra [place]  Otros alimentos ricos en proteínas son los huevos y sustitutos de [place], frijoles, chícharos, productos de soya, nueces y semillas  · Elija productos lácteos bajos en grasa [employer] o del 1% o yogur, queso y requesón bajos en grasa  · Limite las grasas poco saludables,  dank la [place], la margarina dura y la [place]  ¿Qué cantidad de actividad física necesito? Realice ghazal actividad física por lo menos 30 minutos al día, la mayoría de los días de la [place]   Algunos de los ejercicios incluyen caminar, montar en bicicleta, bailar y nadar  Usted también puede realizar más actividad física usando las escaleras en vez de los ascensores o estacionarse más lejos cuando Paulene Linares a las tiendas  Incluya ejercicios para fortalecer los músculos 2 días a la semana  El ejercicio regular proporciona muchos beneficios para la jeffrey  Imelda Manges a controlar [place] peso y [employer] riesgo de diabetes tipo 2, presión arterial gloria, enfermedad del corazón y accidente cerebrovascular  El ejercicio [employer] puede ayudar a mejorar [place] estado de ánimo  Pregunte a [place] médico acerca del mejor plan de ejercicio para usted  ¿Cuáles son Radha Mylar generales de jeffrey y seguridad que vinay seguir? · No fume  La nicotina y otras sustancias químicas que contienen los cigarrillos y cigarros pueden dañar los pulmones  Pida información a [place] médico si usted actualmente fuma y necesita ayuda para dejar de fumar  Los cigarrillos electrónicos o tabaco sin humo todavía contienen nicotina  Consulte con [place] médico antes de [employer]  · Limite el consumo de alcohol  Un trago equivale a 12 onzas de cerveza, 5 onzas de vino o 1 onza y ½ de licor  · Baje de peso, si es necesario  El sobrepeso aumenta el riesgo de ciertas condiciones de [place]  Estos incluyen enfermedad del corazón, presión arterial gloria, diabetes tipo 2 y ciertos tipos de cáncer  · Proteja [place] piel  No tome el sol ni use maxim de bronceado  Use protector solar con un SPF 13 o mayor  Aplíquese el bloqueador por lo menos 15 minutos antes de que vaya a estar al [employer]  Vuelva a aplicarse la crema solar cada 2 horas  Use ropa protectora, sombrero y lentes para el sol cuando se encuentre afuera  · Conduzca con seguridad  Use siempre [place] cinturón de seguridad  Asegúrese que todos en el anaya usan el cinturón de seguridad  Un cinturón de seguridad puede salvar [place] antonio en reese de un accidente automovilístico  No use el celular cuando esté manejando   [place] puede hacer que se distraiga y causar un accidente  Es mejor que pare y se orille si necesita hacer ghazal Ollen Grippe un texto  · Practique el sexo seguro  Use condones de látex si es sexualmente [place] y tiene más de [place]  [place] médico puede recomendar exámenes de detección de infecciones de transmisión sexual (ITS)  · Use un jett, un chaleco salvavidas y unos implementos de protección  Siempre use un jett al [employer] en bicicleta o motocicleta, esquiar o jugar deportes que podrían causar ghazal lesión en la richard  Use implementos de protección cuando practique deportes  Use un chaleco salvavidas cuando esté en un bote o practicando actividades acuáticas

## 2019-02-06 ENCOUNTER — TELEPHONE (OUTPATIENT)
Dept: FAMILY MEDICINE CLINIC | Facility: CLINIC | Age: 84
End: 2019-02-06

## 2019-02-06 NOTE — TELEPHONE ENCOUNTER
Patient's home attendant called stating patient is confused with her medications  She stated she has a bottle of Ropinirole HCl and she needs to know if she needs to take it and also mentioned the oxycodone  Call her at home to verify information

## 2019-02-07 DIAGNOSIS — R11.15 NON-INTRACTABLE CYCLICAL VOMITING WITH NAUSEA: ICD-10-CM

## 2019-02-08 RX ORDER — ONDANSETRON 4 MG/1
4 TABLET, FILM COATED ORAL EVERY 8 HOURS PRN
Qty: 20 TABLET | Refills: 2 | Status: SHIPPED | OUTPATIENT
Start: 2019-02-08 | End: 2019-05-13 | Stop reason: ALTCHOICE

## 2019-02-09 NOTE — ED PROVIDER NOTES
History  Chief Complaint   Patient presents with    Buttocks Pain     patient c/o vomiting and diarrhea since tuesday, patient also c/o right sided leg pain  Denies fevers     HPI   76-year-old woman comes in for evaluation of buttocks pain, nausea vomiting and diarrhea  Patient has been seen for this in the past, last time she was here she came in for a fall with some back pain  She was admitted at that time for left arm cellulitis which improved  She has continued to pain  She was seen by her primary care provider stated that she had some sciatica  Patient also noticed some vomiting and diarrhea, but is here mostly for back and buttock pain  Patient otherwise denies fevers chills sweats, denies any bowel or bladder incontinence, denies any saddle anesthesia or weakness in her lower extremities  No history of cancer IVDU  Prior to Admission Medications   Prescriptions Last Dose Informant Patient Reported? Taking? CLEVER CHOICE COMFORT EZ 33G X 4 MM MISC   No No   Sig: Ninety day supplies please   EASY TOUCH LANCETS 32G MISC   No No   Sig: To use 4 times a day   NOVOLOG FLEXPEN 100 units/mL injection pen   No No   Si UNITS 3 TIMES A DAY WITH MEALS    Ninety day supplies please   atorvastatin (LIPITOR) 40 mg tablet   No No   Sig: Take 1 tablet (40 mg total) by mouth every 24 hours   glucose blood test strip   No No   Sig: To use 4 times a day  Ninety day supplies please   insulin degludec (TRESIBA FLEXTOUCH) 100 units/mL injection pen   No No   Sig: To use 35 U at bedtime      Ninety day supplies please   ipratropium (ATROVENT HFA) 17 mcg/act inhaler   No No   Sig: Inhale 2 puffs 4 (four) times a day Ninety day supplies please   pregabalin (LYRICA) 100 mg capsule   No No   Sig: Take 1 capsule (100 mg total) by mouth daily for 10 days   sitaGLIPtin (JANUVIA) 50 mg tablet   No No   Sig: Take 1 tablet (50 mg total) by mouth daily      Facility-Administered Medications: None       Past Medical History: Diagnosis Date    COPD (chronic obstructive pulmonary disease) ([facility] )     Diabetes mellitus ([facility] )     Hyperlipidemia     Hyperlipidemia     Hypertension     Kidney stones     Osteoporosis        Past Surgical History:   Procedure Laterality Date    APPENDECTOMY      HAND SURGERY Right     INCISION AND DRAINAGE OF WOUND Left 1/5/2019    Procedure: INCISION AND DRAINAGE (I&D) EXTREMITY;  Surgeon: Shannan Regan MD;  Location: [facility];  Service: General    LITHOTRIPSY      MASS EXCISION      [place] of back wall mass    TONSILLECTOMY      TONSILLECTOMY         Family History   Problem Relation Age of Onset    Diabetes Mother      I have reviewed and agree with the history as documented  Social History   Substance Use Topics    Smoking status: Former Smoker     Packs/day: 1 00     Years: 40 00     Types: Cigarettes     Quit date: 1/1/2017    Smokeless tobacco: Never Used      Comment: states she quit but family living with her states she smokes    Alcohol use Yes      Comment: OCCASIONAL        Review of Systems   Constitutional: Negative  Negative for chills and fever  HENT: Negative  Negative for congestion and sore throat  Eyes: Negative  Negative for discharge and redness  Respiratory: Negative  Negative for chest tightness and shortness of breath  Cardiovascular: Negative  Negative for chest pain and palpitations  Gastrointestinal: Positive for diarrhea, nausea and vomiting  Negative for abdominal pain  Endocrine: Negative  Negative for cold intolerance and polyphagia  Genitourinary: Negative  Negative for difficulty urinating and dysuria  Musculoskeletal: Positive for back pain  Negative for arthralgias  Skin: Negative  Negative for color change and wound  Allergic/Immunologic: Negative  Negative for environmental allergies  Neurological: Negative  Negative for dizziness, weakness and headaches  Hematological: Negative      Psychiatric/Behavioral: Negative  Negative for behavioral problems  The patient is not nervous/anxious  All other systems reviewed and are negative  Physical Exam  ED Triage Vitals   Temperature Pulse Respirations Blood Pressure SpO2   02/02/19 1551 02/02/19 1551 02/02/19 1551 02/02/19 1551 02/02/19 1551   98 1 °F (36 7 °C) 83 20 154/78 100 %      Temp Source Heart Rate Source Patient Position - Orthostatic VS BP Location FiO2 (%)   02/02/19 1551 02/02/19 1551 02/02/19 1734 02/02/19 1734 --   Oral Monitor Lying Right arm       Pain Score       02/02/19 1551       Worst Possible Pain           Orthostatic Vital Signs  Vitals:    02/02/19 1551 02/02/19 1734 02/02/19 1900   BP: 154/78 146/79 139/74   Pulse: 83 75 70   Patient Position - Orthostatic VS:  Lying Lying       Physical Exam   Constitutional: She is oriented to person, place, and time  She appears well-developed and well-nourished  No distress  HENT:   Head: Normocephalic and atraumatic  Right Ear: External ear normal    Left Ear: External ear normal    Mouth/Throat: Oropharynx is clear and moist    Eyes: Pupils are equal, round, and reactive to light  Conjunctivae and EOM are normal  Right eye exhibits no discharge  Left eye exhibits no discharge  No scleral icterus  Neck: Normal range of motion  Neck supple  No tracheal deviation present  No thyromegaly present  Cardiovascular: Normal rate, regular rhythm and intact distal pulses  Exam reveals no gallop and no friction rub  No murmur heard  Pulmonary/Chest: Effort normal and breath sounds normal  No stridor  No respiratory distress  She has no wheezes  She has no rales  Abdominal: Soft  Bowel sounds are normal  She exhibits no distension  There is no tenderness  There is no rebound and no guarding  Musculoskeletal: Normal range of motion  She exhibits tenderness  She exhibits no edema or deformity     Patient does have tenderness of her lower back, does have tenderness paraspinals and of the buttocks, no midline tenderness of the L-spine  Neurological: She is alert and oriented to person, place, and time  No cranial nerve deficit or sensory deficit  She exhibits normal muscle tone  Coordination normal    Skin: Skin is warm and dry  No rash noted  She is not diaphoretic  No erythema  Psychiatric: She has a normal mood and affect  Her behavior is normal  Thought content normal    Nursing note and vitals reviewed        ED Medications  Medications   diazepam (VALIUM) tablet 5 mg (5 mg Oral Given 2/2/19 1748)       Diagnostic Studies  Results Reviewed     Procedure Component Value Units Date/Time    POCT urinalysis dipstick [[id number]]  (Normal) Resulted:  02/02/19 1841    Lab Status:  Final result Specimen:  Urine Updated:  02/02/19 1841     Color, UA DIPPED    ED Urine Macroscopic [[id number]] Collected:  02/02/19 1841    Lab Status:  Final result Specimen:  Urine Updated:  02/02/19 1839     Color, UA Yellow     Clarity, UA Clear     pH, UA 7 0     Leukocytes, UA Negative     Nitrite, UA Negative     Protein, UA Negative mg/dl      Glucose, UA Negative mg/dl      Ketones, UA Negative mg/dl      Urobilinogen, UA 0 2 E U /dl      Bilirubin, UA Negative     Blood, UA Negative     Specific Gravity, UA 1 020    Narrative:       CLINITEK RESULT    Comprehensive metabolic panel [[id number]]  (Abnormal) Collected:  02/02/19 1605    Lab Status:  Final result Specimen:  Blood from Arm, Left Updated:  02/02/19 1632     Sodium 135 (L) mmol/L      Potassium 4 8 mmol/L      Chloride 104 mmol/L      CO2 26 mmol/L      ANION GAP 5 mmol/L      BUN 22 mg/dL      Creatinine 1 32 (H) mg/dL      Glucose 174 (H) mg/dL      Calcium 9 0 mg/dL      AST 14 U/L      ALT 25 U/L      Alkaline Phosphatase 117 (H) U/L      Total Protein 6 8 g/dL      Albumin 3 4 (L) g/dL      Total Bilirubin 0 86 mg/dL      eGFR 38 ml/min/1 73sq m     Narrative:         National Kidney Disease Education Program recommendations are as follows:  GFR calculation is accurate only with a steady state creatinine  Chronic Kidney disease less than 60 ml/min/1 73 sq  meters  Kidney failure less than 15 ml/min/1 73 sq  meters  Lipase [[id number]]  (Normal) Collected:  02/02/19 1605    Lab Status:  Final result Specimen:  Blood from Arm, Left Updated:  02/02/19 1632     Lipase 126 u/L     CBC and differential [[id number]]  (Abnormal) Collected:  02/02/19 1605    Lab Status:  Final result Specimen:  Blood from Arm, Left Updated:  02/02/19 1614     WBC 10 89 (H) Thousand/uL      RBC 4 35 Million/uL      Hemoglobin 13 7 g/dL      Hematocrit 42 1 %      MCV 97 fL      MCH 31 5 pg      MCHC 32 5 g/dL      RDW 15 4 (H) %      MPV 11 1 fL      Platelets 583 Thousands/uL      nRBC 0 /100 WBCs      Neutrophils Relative 71 %      Immat GRANS % 1 %      Lymphocytes Relative 20 %      Monocytes Relative 5 %      Eosinophils Relative 3 %      Basophils Relative 0 %      Neutrophils Absolute 7 73 (H) Thousands/µL      Immature Grans Absolute 0 06 Thousand/uL      Lymphocytes Absolute 2 19 Thousands/µL      Monocytes Absolute 0 58 Thousand/µL      Eosinophils Absolute 0 29 Thousand/µL      Basophils Absolute 0 04 Thousands/µL                  CT abdomen pelvis wo contrast   Final Result by Dariel Heimlich, MD (02/02 1831)      Bilateral renal and left ureteral stones, similar to prior  Workstation performed: [id number]               Procedures  Procedures      Phone Consults  ED Phone Contact    ED Course       Discussed results of workup with the patient  Should her pain is improved  Return precautions were discussed and patient was discharged  MDM  Number of Diagnoses or Management Options  Sciatica:   Diagnosis management comments:   Is 4year-old woman with history of back pain presents with back pain  Will get some labs, will get a urine  Will give pain control    Given her history will get a CT of pelvis to evaluate both for any intra-abdominal pathology causing her nausea vomiting diarrhea as well as a problem with her back and spine  Disposition  Final diagnoses:   Sciatica - Bilateral     Time reflects when diagnosis was documented in both MDM as applicable and the Disposition within this note     Time User Action Codes Description Comment    2/2/2019  7:05 PM Marino Horton Add [M54 30] Sciatica     2/2/2019  7:06 PM Marino Horton Modify [M54 30] Sciatica Bilateral      ED Disposition     ED Disposition Condition Date/Time Comment    Discharge  Sat Feb 2, 2019  7:11 PM Piedad Pagan discharge to home/self care      Condition at discharge: Stable        Follow-up Information     Follow up With Specialties Details Why [address] [facility] Emergency Department Emergency Medicine Go to As needed, If symptoms worsen [address] [place] [address], [address] [place], [place], [address] [facility] Schedule an appointment as soon as possible for a visit in 1 week To follow up being seen in the emergency department [address] [facility] [address]  [address] [facility]  [address]  [address] [place] [address]             Discharge Medication List as of 2/2/2019  7:11 PM      START taking these medications    Details   diazepam (VALIUM) 5 mg tablet Take 1 tablet (5 mg total) by mouth 2 (two) times a day for 3 days, Starting Sat 2/2/2019, Until Tue 2/5/2019, Print         CONTINUE these medications which have NOT CHANGED    Details   atorvastatin (LIPITOR) 40 mg tablet Take 1 tablet (40 mg total) by mouth every 24 hours, Starting Tue 11/13/2018, Normal      CLEVER CHOICE COMFORT EZ 33G X 4 MM MISC Ninety day supplies please, Normal      EASY TOUCH LANCETS 32G MISC To use 4 times a day, Normal      glucose blood test strip To use 4 times a day  Ninety day supplies please, Print      insulin degludec (TRESIBA FLEXTOUCH) 100 units/mL injection pen To use 35 U at bedtime  Ninety day supplies please, Print      ipratropium (ATROVENT HFA) 17 mcg/act inhaler Inhale 2 puffs 4 (four) times a day Ninety day supplies please, Starting Tue 11/13/2018, Normal      NOVOLOG FLEXPEN 100 units/mL injection pen 18 UNITS 3 TIMES A DAY WITH MEALS    Ninety day supplies please, Print      pregabalin (LYRICA) 100 mg capsule Take 1 capsule (100 mg total) by mouth daily for 10 days, Starting Mon 1/7/2019, Until Thu 1/17/2019, Print      sitaGLIPtin (JANUVIA) 50 mg tablet Take 1 tablet (50 mg total) by mouth daily, Starting Mon 1/7/2019, Print      aspirin (ASPIRIN 81) 81 mg chewable tablet Chew 1 tablet (81 mg total) daily Ninety day supplies please, Starting Tue 1/22/2019, Normal      baclofen 10 mg tablet Take 1 tablet (10 mg total) by mouth 3 (three) times a day, Starting Tue 1/22/2019, Normal      dorzolamide (TRUSOPT) 2 % ophthalmic solution Administer 1 drop to the right eye 3 (three) times a day, Starting Tue 1/22/2019, Normal      lisinopril (ZESTRIL) 10 mg tablet Take 1 tablet (10 mg total) by mouth daily, Starting Tue 11/13/2018, Normal      omeprazole (PriLOSEC) 20 mg delayed release capsule Take 1 capsule (20 mg total) by mouth daily Ninety day supplies please, Starting Tue 11/13/2018, Normal      !! saccharomyces boulardii (FLORASTOR) 250 mg capsule Take 1 capsule (250 mg total) by mouth 2 (two) times a day, Starting Mon 1/7/2019, Normal      !! saccharomyces boulardii (FLORASTOR) 250 mg capsule Take 1 capsule (250 mg total) by mouth 2 (two) times a day, Starting Tue 1/22/2019, Normal       !! - Potential duplicate medications found  Please discuss with provider  Outpatient Discharge Orders  Ambulatory Referral to Comprehensive Sp[facility] Program   Standing Status: Future  Standing Exp  Date: 08/02/19         ED Provider  Attending physically available and evaluated Radha Arredondoshaanrafael  LANDON managed the patient along with the ED Attending      Electronically Signed by         Debby Garcia Dudley Lee MD  02/09/19 [id number]

## 2019-02-14 DIAGNOSIS — M54.42 CHRONIC BILATERAL LOW BACK PAIN WITH BILATERAL SCIATICA: ICD-10-CM

## 2019-02-14 DIAGNOSIS — G89.29 CHRONIC BILATERAL LOW BACK PAIN WITH BILATERAL SCIATICA: ICD-10-CM

## 2019-02-14 DIAGNOSIS — M54.41 CHRONIC BILATERAL LOW BACK PAIN WITH BILATERAL SCIATICA: ICD-10-CM

## 2019-02-14 RX ORDER — CELECOXIB 200 MG/1
200 CAPSULE ORAL 2 TIMES DAILY
Qty: 30 CAPSULE | Refills: 2 | Status: SHIPPED | OUTPATIENT
Start: 2019-02-14 | End: 2019-04-24 | Stop reason: SDUPTHER

## 2019-03-08 ENCOUNTER — OFFICE VISIT (OUTPATIENT)
Dept: FAMILY MEDICINE CLINIC | Facility: CLINIC | Age: 84
End: 2019-03-08
Payer: MEDICARE

## 2019-03-08 VITALS
OXYGEN SATURATION: 97 % | DIASTOLIC BLOOD PRESSURE: 70 MMHG | HEIGHT: 64 IN | WEIGHT: 155 LBS | RESPIRATION RATE: 16 BRPM | BODY MASS INDEX: 26.46 KG/M2 | SYSTOLIC BLOOD PRESSURE: 150 MMHG | TEMPERATURE: 98 F | HEART RATE: 98 BPM

## 2019-03-08 DIAGNOSIS — E11.65 TYPE 2 DIABETES MELLITUS WITH HYPERGLYCEMIA, WITH LONG-TERM CURRENT USE OF INSULIN (HCC): ICD-10-CM

## 2019-03-08 DIAGNOSIS — Z00.01 ENCOUNTER FOR GENERAL ADULT MEDICAL EXAMINATION WITH ABNORMAL FINDINGS: ICD-10-CM

## 2019-03-08 DIAGNOSIS — M46.97 INFLAMMATORY SPONDYLOPATHY OF LUMBOSACRAL REGION (HCC): Primary | ICD-10-CM

## 2019-03-08 DIAGNOSIS — D72.828 OTHER ELEVATED WHITE BLOOD CELL (WBC) COUNT: ICD-10-CM

## 2019-03-08 DIAGNOSIS — I20.9 ANGINA PECTORIS (HCC): ICD-10-CM

## 2019-03-08 DIAGNOSIS — I50.32 CHRONIC DIASTOLIC HEART FAILURE (HCC): ICD-10-CM

## 2019-03-08 DIAGNOSIS — J44.9 COPD MIXED TYPE (HCC): ICD-10-CM

## 2019-03-08 DIAGNOSIS — Z79.4 TYPE 2 DIABETES MELLITUS WITH HYPERGLYCEMIA, WITH LONG-TERM CURRENT USE OF INSULIN (HCC): ICD-10-CM

## 2019-03-08 DIAGNOSIS — I73.9 PAD (PERIPHERAL ARTERY DISEASE) (HCC): ICD-10-CM

## 2019-03-08 PROCEDURE — 99214 OFFICE O/P EST MOD 30 MIN: CPT | Performed by: FAMILY MEDICINE

## 2019-03-08 PROCEDURE — 96372 THER/PROPH/DIAG INJ SC/IM: CPT | Performed by: FAMILY MEDICINE

## 2019-03-08 PROCEDURE — G0439 PPPS, SUBSEQ VISIT: HCPCS | Performed by: FAMILY MEDICINE

## 2019-03-08 RX ORDER — TRIAMCINOLONE ACETONIDE 40 MG/ML
60 INJECTION, SUSPENSION INTRA-ARTICULAR; INTRAMUSCULAR ONCE
Status: DISCONTINUED | OUTPATIENT
Start: 2019-03-08 | End: 2019-03-08

## 2019-03-08 RX ORDER — INSULIN ASPART 100 [IU]/ML
INJECTION, SOLUTION INTRAVENOUS; SUBCUTANEOUS
Qty: 25 PEN | Refills: 0
Start: 2019-03-08 | End: 2019-06-11 | Stop reason: SDUPTHER

## 2019-03-08 RX ORDER — KETOROLAC TROMETHAMINE 30 MG/ML
60 INJECTION, SOLUTION INTRAMUSCULAR; INTRAVENOUS ONCE
Status: COMPLETED | OUTPATIENT
Start: 2019-03-08 | End: 2019-03-08

## 2019-03-08 RX ADMIN — KETOROLAC TROMETHAMINE 60 MG: 30 INJECTION, SOLUTION INTRAMUSCULAR; INTRAVENOUS at 10:35

## 2019-03-08 NOTE — LETTER
March 8, 2019     Patient: Carlos Escalera   YOB: 1935   Date of Visit: 3/8/2019       To Whom it May Concern:    Carlos Escalera is under my professional care  She was seen in my office on 3/8/2019  Patient is having worsening neuropathy of right leg and needs home physical therapy       Consulting home physical therapy    Sincerely,          Matt West MD        CC: No Recipients

## 2019-03-08 NOTE — LETTER
March 8, 2019     [address] [place] [address]    Patient: Laurie Haines   YOB: 1935   Date of Visit: 3/8/2019       Rolling walker with seat      DX:  Gait dysfunction, diabetes neuropathy, sciatica        Pam Jiménez MD        CC: No Recipients

## 2019-03-08 NOTE — PROGRESS NOTES
Assessment and Plan:      Encounter for general adult medical examination with abnormal findings  During this visit we have a goal to personalize prevention  I discussed the patient new illness and  and decrease the risk of current problems  Health risk assessment was discussed with patient today and the ways to stay healthier  We reviewed also the current medications in his treatment regimen  Discussed about how the chronic conditions are impacting now and later  Recommended a healthy diet and exercising frequently will help to control better héctor's current chronic conditions  We also discussed about  Vaccinations, and the need to compliance with them  Patient declined at this time advanced directives  Patient will discuss with family regarding advanced directives  Problem List Items Addressed This Visit     None        Health Maintenance Due   Topic Date Due    Medicare Annual Wellness Visit (AWV)  1935    Diabetic Foot Exam  08/24/1946    DM Eye Exam  08/24/1946    BMI: Followup Plan  08/24/1954    HEPATITIS B VACCINES (1 of 3 - Risk 3-dose series) 08/24/1955    DTaP,Tdap,and Td Vaccines (1 - Tdap) 08/24/1957    Fall Risk  08/24/2001    Urinary Incontinence Screening  08/24/2001    Pneumococcal PPSV23/PCV13 65+ Years / High and Highest Risk (2 of 2 - PCV13) 09/15/2017         HPI:  Laurie Haines is a 81 y o  female here for her Subsequent Wellness Visit      Patient Active Problem List   Diagnosis    Acute metabolic encephalopathy    Hypertension    Shoulder joint pain    Gastritis without bleeding    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 (HCC)    Preop examination    Gait difficulty    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 (HCC)    Chronic bilateral low back pain with bilateral sciatica    Acute renal failure superimposed on stage 3 chronic kidney disease ([address] )    Left arm cellulitis    Accident due to mechanical fall without injury    Abscess of arm, left     Past Medical History:   Diagnosis Date    [facility] obstructive [facility]) [facility] [address] [facility]     Diabetes mellitus ([facility] [address] [facility]     Hyperlipidemia     Hyperlipidemia     Hypertension     Kidney stones     Osteoporosis      Past Surgical History:   Procedure Laterality Date    APPENDECTOMY      HAND SURGERY Right     INCISION AND DRAINAGE OF WOUND Left 2019    Procedure: INCISION AND DRAINAGE (I&D) EXTREMITY;  Surgeon: Clarita Vargas MD;  [facility];  Service: General    LITHOTRIPSY      MASS EXCISION      [place] of back wall mass    TONSILLECTOMY      TONSILLECTOMY       Family History   Problem Relation Age of Onset    Diabetes Mother      Social History     Tobacco Use   Smoking Status Former Smoker    Packs/day: 1 00    Years: 40 00    Pack years: 40 00    Types: Cigarettes    Last attempt to quit: 2017    Years since quittin 1   Smokeless Tobacco Never Used   Tobacco Comment    states she quit but family living with her states she smokes     Social History     Substance and Sexual Activity   Alcohol Use Not Currently    Comment: OCCASIONAL      Social History     Substance and Sexual Activity   Drug Use No       Current Outpatient Medications   Medication Sig Dispense Refill    denosumab (PROLIA) 60 mg/mL 1 mL      atorvastatin (LIPITOR) 40 mg tablet Take 1 tablet (40 mg total) by mouth every 24 hours 90 tablet 3    baclofen 10 mg tablet Take 1 tablet (10 mg total) by mouth 3 (three) times a day 90 tablet 5    celecoxib (CeleBREX) 200 mg capsule Take 1 capsule (200 mg total) by mouth 2 (two) times a day 30 capsule 2    CLEVER CHOICE COMFORT EZ 33G X 4 MM MISC Ninety day supplies please 300 each 3    dorzolamide (TRUSOPT) 2 % ophthalmic solution Administer 1 drop to both eyes 3 (three) times a day 5 mL 5    EASY TOUCH LANCETS 32G MISC To use 4 times a day 300 each 3    glucose blood test strip To use 4 times a day  Ninety day supplies please 300 each 3    insulin degludec (TRESIBA FLEXTOUCH) 100 units/mL injection pen To use 35 U at bedtime  Ninety day supplies please 9 pen 0    ipratropium (ATROVENT HFA) 17 mcg/act inhaler Inhale 2 puffs 4 (four) times a day Ninety day supplies please 3 Inhaler 3    lisinopril (ZESTRIL) 10 mg tablet Take 1 tablet (10 mg total) by mouth daily 90 tablet 3    NOVOLOG FLEXPEN 100 units/mL injection pen 18 UNITS 3 TIMES A DAY WITH MEALS    Ninety day supplies please 25 pen 0    omeprazole (PriLOSEC) 20 mg delayed release capsule Take 1 capsule (20 mg total) by mouth 2 (two) times a day Ninety day supplies please 60 capsule 3    ondansetron (ZOFRAN) 4 mg tablet Take 1 tablet (4 mg total) by mouth every 8 (eight) hours as needed for nausea or vomiting 20 tablet 2    pregabalin (LYRICA) 100 mg capsule Take 1 capsule (100 mg total) by mouth daily for 10 days 10 capsule 0    sitaGLIPtin (JANUVIA) 50 mg tablet Take 1 tablet (50 mg total) by mouth daily 30 tablet 0     No current facility-administered medications for this visit  Allergies   Allergen Reactions    Acetaminophen      Listen on patient's paperwork from [facility] and Staffing  Family unable to confirm      Morphine And Related Vomiting    Oxycodone GI Intolerance     Listed on patient's paperwork from [facility] and Staffing   Family unable to confirm      Tramadol Vomiting and Abdominal Pain     Other     Immunization History   Administered Date(s) Administered    INFLUENZA 01/22/2015, 11/04/2015, 11/04/2015, 09/15/2016, 09/15/2016, 11/01/2017    Influenza Split 11/21/2013    Influenza Split High Dose Preservative Free IM 11/01/2017    Influenza TIV (IM) 01/22/2015    Influenza, high dose seasonal 0 5 mL 10/02/2018    Pneumococcal Polysaccharide PPV23 09/03/2010, 09/15/2016       Patient Care Team:  Bruna Nick MD as PCP - General (Family Medicine)    Medicare Screening Tests and Risk Assessments:  Naida is here for her Subsequent Wellness visit  Health Risk Assessment:  Patient rates overall health as good  Patient feels that their physical health rating is Slightly worse  Eyesight was rated as Same  Hearing was rated as Same  Patient feels that their emotional and mental health rating is Slightly better  Pain experienced by patient in the last 7 days has been A lot  Patient's pain rating has been 10/10  Patient states that she has experienced no weight loss or gain in last 6 months  Emotional/Mental Health:  Patient has been feeling nervous/anxious  PHQ-9 Depression Screening:    Frequency of the following problems over the past two weeks:      1  Little interest or pleasure in doing things: 0 - not at all      2  Feeling down, depressed, or hopeless: 0 - not at all  PHQ-2 Score: 0          Broken Bones/Falls: Fall Risk Assessment:    In the past year, patient has experienced: History of falling in past year     Number of falls: greater than 3  Patient feels unsteady standing  Patient is not taking medication that can cause feelings of lightheadedness or tiredness  Patient often has no need to rush to the toilet  Chronic conditions that may contribute to falls diabetes  Bladder/Bowel:  Patient has not leaked urine accidently in the last six months  Patient reports no loss of bowel control  Immunizations:  Patient has had a flu vaccination within the last year  Patient has received a pneumonia shot  Patient has not received a shingles shot  Patient has not received tetanus/diphtheria shot  Home Safety:  Patient does not have trouble with stairs inside or outside of their home  Patient currently reports that there are no safety hazards present in home, working smoke alarms, working carbon monoxide detectors        Preventative Screenings:   Breast cancer screening performed, cholesterol screen completed, no glaucoma eye exam completed    Nutrition:  Current diet: Low Saturated Fat and No Added Salt with servings of the following:    Medications:  Patient is not currently taking any over-the-counter supplements  Patient is able to manage medications  Lifestyle Choices:  Patient reports no tobacco use  Patient has smoked or used tobacco in the past   Patient has stopped her tobacco use  Patient reports no alcohol use  Patient does not drive a vehicle  Patient wears seat belt  Activities of Daily Living:  Can get out of bed by his or her self, able to dress self, able to make own meals, unable to do own shopping, able to bathe self, unable to do laundry/housekeeping, unable to manage own money and other related tasks    Previous Hospitalizations:  Hospitalization or ED visit in past 12 months  Number of hospitalizations within the last year: 1-2        Advanced Directives:  Patient has not decided on power of   Patient has not completed advanced directive  Preventative Screening/Counseling:      Cardiovascular:      General: Screening Current      Counseling: Healthy Diet, Healthy Weight and Improve Cholesterol          Diabetes:      General: Screening Not Indicated      Counseling: Healthy Diet and Healthy Weight          Colorectal Cancer:      General: Screening Not Indicated          Breast Cancer:      General: Screening Current          Cervical Cancer:      General: Screening Not Indicated          Osteoporosis:      General: Screening Current      Counseling: Calcium and Vitamin D Intake          AAA:      General: Screening Not Indicated          Glaucoma:      General: Screening Current          HIV:      General: Screening Not Indicated          Hepatitis C:      General: Screening Not Indicated        Advanced Directives:   Patient has no living will for healthcare, does not have durable POA for healthcare, patient does not have an advanced directive  Information on ACP and/or AD not provided   5 wishes given  No end of life assessment reviewed with patient  Provider does not agree with end of life deisions       Immunizations:  Patient reviewed and up to date

## 2019-03-08 NOTE — LETTER
March 8, 2019     Patient: Carlos Flores   YOB: 1935   Date of Visit: 3/8/2019       To Whom it May Concern:    Carlos Flores is under my professional care  She was seen in my office on 3/8/2019  Patient is activity of daily living are decreased and needs more assistance  I am requesting total of 10 hours per day for her daily care and assistant with activities of basic living      If you have any question please do not hesitate to call the above numbers       Sincerely,          Abby Llanos MD        CC: No Recipients

## 2019-03-10 NOTE — PROGRESS NOTES
Assessment/Plan:  1  Type 2 diabetes mellitus with hyperglycemia, with long-term current use of insulin (HCC)  Worsening, Requesting home care to watch compliance and safety  - insulin degludec (TRESIBA FLEXTOUCH) 100 units/mL injection pen; To use 20 U at bedtime  Dispense: 9 pen; Refill: 0  - NOVOLOG FLEXPEN 100 units/mL injection pen; 12 UNITS 3 TIMES A DAY WITH MEALS    Ninety day supplies please  Dispense: 25 pen; Refill: 0    2  Inflammatory spondylopathy of lumbosacral region [facility])  The choice of NSAID's in this patient depends of her current pain syndrome  I explained to patient that  response of NSAIDs varies between patient's and also is different in regarding to the area of the body affected in the progression of the damage  The drug interactions and comorbidities affecting by these medications were explained to patient also; the caution in toxicity in the GI tract was also discussed  Prevent the long-term use of these medications may be wise  The use ice and physical therapy is necessary in order to get the best results  NSAID's might elevate BP or block effect of certain antihypertensive agents  Use with caution  Always use ice and therapy to decrease or avoid the need for a Pharmacological agent  - ketorolac (TORADOL) 60 mg/2 mL IM injection 60 mg    3  Chronic diastolic heart failure (HCC)  Condition is stable with current treatment, to  continue the same    4  COPD mixed type ([address] )  Condition is stable with current treatment, to  continue the same      5  PAD (peripheral artery disease) ([facility] [address] )  I explained to patient the goals of blood sugar levels during fasting  and after meals  Education given verbally and with written materials  Change of life style modification again stressed  The vascular complications secondary to diabetes poor control were explained with details    The renal function protection and the prevention of major vascular disease with the use of ACEI's and/or ARB  and the use of statins respectively and exercise/diet was also discussed  6  Angina pectoris ([address] )  Condition is stable with current treatment, to  continue the same      7  Other elevated white blood cell (WBC) count  Recheck labs       No problem-specific Assessment & Plan notes found for this encounter  Diagnoses and all orders for this visit:    Inflammatory spondylopathy of lumbosacral region [facility])  -     Discontinue: triamcinolone acetonide (KENALOG-40) 40 mg/mL injection 60 mg  -     ketorolac (TORADOL) 60 mg/2 mL IM injection 60 mg    Type 2 diabetes mellitus with hyperglycemia, with long-term current use of insulin (HCC)  -     insulin degludec (TRESIBA FLEXTOUCH) 100 units/mL injection pen; To use 20 U at bedtime   -     NOVOLOG FLEXPEN 100 units/mL injection pen; 12 UNITS 3 TIMES A DAY WITH MEALS    Ninety day supplies please    Chronic diastolic heart failure (HCC)    COPD mixed type (HCC)    PAD (peripheral artery disease) (HCC)    Angina pectoris (HCC)    Other elevated white blood cell (WBC) count    Encounter for general adult medical examination with abnormal findings    Other orders  -     Cancel: CBC and differential; Future  -     Cancel: Comprehensive metabolic panel; Future  -     denosumab (PROLIA) 60 mg/mL; 1 mL          Subjective:      Patient ID: Ta Hill is a 81 y o  female  HPI  Ta Hill 81 y o  complaining again of   Acute on chronic pain  It is located in back  Pain  started about 12 months  ago  The problem occurs   The right back pain has been gradually worsening since onset  The pain is present in the lower back area  The quality of the pain is described as aching  The pain radiates down her right buttocks  The pain is at a severity of 5/10  The pain is moderate  The pain is worse during the night  The symptoms are aggravated by lying down  Associated symptoms include numbness, paresthesias and tingling   Pertinent negatives include no abdominal pain, chest pain, dysuria, fever or headaches  she has  tried NSAIDs that did not improve the symptoms  The treatment provided mild relief  The following portions of the patient's history were reviewed and updated as appropriate: allergies, current medications, past family history, past medical history, past social history, past surgical history and problem list     Review of Systems   Constitutional: Positive for unexpected weight change  Negative for fatigue and fever  HENT: Negative for sore throat and trouble swallowing  Eyes: Negative for photophobia  Respiratory: Negative for cough, chest tightness, shortness of breath and wheezing  Cardiovascular: Negative for chest pain, palpitations and leg swelling  Gastrointestinal: Negative for abdominal pain, blood in stool, nausea and vomiting  Genitourinary: Negative for hematuria  Musculoskeletal: Positive for back pain and gait problem  Neurological: Negative for dizziness, syncope, light-headedness and headaches  Hematological: Does not bruise/bleed easily  Objective:      /70 (BP Location: Left arm, Patient Position: Sitting, Cuff Size: Standard)   Pulse 98   Temp 98 °F (36 7 °C) (Oral)   Resp 16   Ht 5' 4" (1 626 m)   Wt 70 3 kg (155 lb)   LMP  (LMP Unknown)   SpO2 97%   Breastfeeding? No   BMI 26 61 kg/m²          Physical Exam   HENT:   Head: Normocephalic  Eyes: Pupils are equal, round, and reactive to light  EOM are normal    Neck: Neck supple  Cardiovascular: Normal rate and regular rhythm  Pulmonary/Chest: Effort normal    Abdominal: Soft  There is tenderness  Musculoskeletal: She exhibits tenderness (severe tenderness over right SI Joint and lower  )  Neurological: She is alert  Skin: Skin is warm and dry  Psychiatric: She has a normal mood and affect   Her behavior is normal

## 2019-03-26 ENCOUNTER — HOSPITAL ENCOUNTER (OUTPATIENT)
Dept: RADIOLOGY | Facility: HOSPITAL | Age: 84
Discharge: HOME | End: 2019-03-26
Payer: MEDICARE

## 2019-03-26 ENCOUNTER — APPOINTMENT (OUTPATIENT)
Dept: LAB | Facility: HOSPITAL | Age: 84
End: 2019-03-26
Payer: MEDICARE

## 2019-03-26 ENCOUNTER — OFFICE VISIT (OUTPATIENT)
Dept: FAMILY MEDICINE CLINIC | Facility: CLINIC | Age: 84
End: 2019-03-26
Payer: MEDICARE

## 2019-03-26 VITALS
WEIGHT: 158 LBS | RESPIRATION RATE: 16 BRPM | DIASTOLIC BLOOD PRESSURE: 70 MMHG | HEIGHT: 64 IN | TEMPERATURE: 98.1 F | HEART RATE: 88 BPM | OXYGEN SATURATION: 96 % | BODY MASS INDEX: 26.98 KG/M2 | SYSTOLIC BLOOD PRESSURE: 150 MMHG

## 2019-03-26 DIAGNOSIS — E11.65 TYPE 2 DIABETES MELLITUS WITH HYPERGLYCEMIA, WITH LONG-TERM CURRENT USE OF INSULIN (HCC): ICD-10-CM

## 2019-03-26 DIAGNOSIS — M25.551 RIGHT HIP PAIN: ICD-10-CM

## 2019-03-26 DIAGNOSIS — M54.42 CHRONIC BILATERAL LOW BACK PAIN WITH BILATERAL SCIATICA: ICD-10-CM

## 2019-03-26 DIAGNOSIS — M54.41 CHRONIC BILATERAL LOW BACK PAIN WITH BILATERAL SCIATICA: ICD-10-CM

## 2019-03-26 DIAGNOSIS — M54.41 CHRONIC RIGHT-SIDED LOW BACK PAIN WITH RIGHT-SIDED SCIATICA: ICD-10-CM

## 2019-03-26 DIAGNOSIS — R26.9 GAIT DIFFICULTY: Primary | ICD-10-CM

## 2019-03-26 DIAGNOSIS — G89.29 CHRONIC BILATERAL LOW BACK PAIN WITH BILATERAL SCIATICA: ICD-10-CM

## 2019-03-26 DIAGNOSIS — I73.9 CLAUDICATION IN PERIPHERAL VASCULAR DISEASE (HCC): ICD-10-CM

## 2019-03-26 DIAGNOSIS — M54.41 CHRONIC RIGHT-SIDED LOW BACK PAIN WITH RIGHT-SIDED SCIATICA: Primary | ICD-10-CM

## 2019-03-26 DIAGNOSIS — G89.29 CHRONIC RIGHT-SIDED LOW BACK PAIN WITH RIGHT-SIDED SCIATICA: Primary | ICD-10-CM

## 2019-03-26 DIAGNOSIS — G89.29 CHRONIC RIGHT-SIDED LOW BACK PAIN WITH RIGHT-SIDED SCIATICA: ICD-10-CM

## 2019-03-26 DIAGNOSIS — Z79.4 TYPE 2 DIABETES MELLITUS WITH HYPERGLYCEMIA, WITH LONG-TERM CURRENT USE OF INSULIN (HCC): ICD-10-CM

## 2019-03-26 LAB
ALBUMIN SERPL BCP-MCNC: 4.1 G/DL (ref 3–5.2)
ALP SERPL-CCNC: 76 U/L (ref 43–122)
ALT SERPL W P-5'-P-CCNC: 19 U/L (ref 9–52)
ANION GAP SERPL CALCULATED.3IONS-SCNC: 8 MMOL/L (ref 5–14)
AST SERPL W P-5'-P-CCNC: 24 U/L (ref 14–36)
BASOPHILS # BLD AUTO: 0.1 THOUSANDS/ΜL (ref 0–0.1)
BASOPHILS NFR BLD AUTO: 1 % (ref 0–1)
BILIRUB SERPL-MCNC: 0.4 MG/DL
BUN SERPL-MCNC: 25 MG/DL (ref 5–25)
CALCIUM SERPL-MCNC: 9.8 MG/DL (ref 8.4–10.2)
CHLORIDE SERPL-SCNC: 103 MMOL/L (ref 97–108)
CO2 SERPL-SCNC: 28 MMOL/L (ref 22–30)
CREAT SERPL-MCNC: 1.37 MG/DL (ref 0.6–1.2)
EOSINOPHIL # BLD AUTO: 0.1 THOUSAND/ΜL (ref 0–0.4)
EOSINOPHIL NFR BLD AUTO: 2 % (ref 0–6)
ERYTHROCYTE [DISTWIDTH] IN BLOOD BY AUTOMATED COUNT: 14.6 %
GFR SERPL CREATININE-BSD FRML MDRD: 36 ML/MIN/1.73SQ M
GLUCOSE SERPL-MCNC: 196 MG/DL (ref 70–99)
HCT VFR BLD AUTO: 44.4 % (ref 36–46)
HGB BLD-MCNC: 14.2 G/DL (ref 12–16)
LYMPHOCYTES # BLD AUTO: 1.8 THOUSANDS/ΜL (ref 0.5–4)
LYMPHOCYTES NFR BLD AUTO: 22 % (ref 25–45)
MCH RBC QN AUTO: 30.7 PG (ref 26–34)
MCHC RBC AUTO-ENTMCNC: 31.9 G/DL (ref 31–36)
MCV RBC AUTO: 96 FL (ref 80–100)
MONOCYTES # BLD AUTO: 0.5 THOUSAND/ΜL (ref 0.2–0.9)
MONOCYTES NFR BLD AUTO: 5 % (ref 1–10)
NEUTROPHILS # BLD AUTO: 6 THOUSANDS/ΜL (ref 1.8–7.8)
NEUTS SEG NFR BLD AUTO: 71 % (ref 45–65)
PLATELET # BLD AUTO: 146 THOUSANDS/UL (ref 150–450)
PMV BLD AUTO: 10.8 FL (ref 8.9–12.7)
POTASSIUM SERPL-SCNC: 5.3 MMOL/L (ref 3.6–5)
PROT SERPL-MCNC: 6.8 G/DL (ref 5.9–8.4)
RBC # BLD AUTO: 4.61 MILLION/UL (ref 4–5.2)
SODIUM SERPL-SCNC: 139 MMOL/L (ref 137–147)
WBC # BLD AUTO: 8.4 THOUSAND/UL (ref 4.5–11)

## 2019-03-26 PROCEDURE — 73502 X-RAY EXAM HIP UNI 2-3 VIEWS: CPT

## 2019-03-26 PROCEDURE — 99214 OFFICE O/P EST MOD 30 MIN: CPT | Performed by: FAMILY MEDICINE

## 2019-03-26 PROCEDURE — 96372 THER/PROPH/DIAG INJ SC/IM: CPT | Performed by: FAMILY MEDICINE

## 2019-03-26 PROCEDURE — 85025 COMPLETE CBC W/AUTO DIFF WBC: CPT

## 2019-03-26 PROCEDURE — 80053 COMPREHEN METABOLIC PANEL: CPT

## 2019-03-26 PROCEDURE — 36415 COLL VENOUS BLD VENIPUNCTURE: CPT

## 2019-03-26 RX ORDER — KETOROLAC TROMETHAMINE 30 MG/ML
60 INJECTION, SOLUTION INTRAMUSCULAR; INTRAVENOUS ONCE
Status: COMPLETED | OUTPATIENT
Start: 2019-03-26 | End: 2019-03-26

## 2019-03-26 RX ORDER — HYDROCODONE BITARTRATE AND ACETAMINOPHEN 5; 325 MG/1; MG/1
1 TABLET ORAL 2 TIMES DAILY
Qty: 30 TABLET | Refills: 0 | Status: SHIPPED | OUTPATIENT
Start: 2019-03-26 | End: 2019-04-09 | Stop reason: SDUPTHER

## 2019-03-26 RX ORDER — WALKER
EACH MISCELLANEOUS DAILY
Qty: 1 EACH | Refills: 0 | Status: SHIPPED | OUTPATIENT
Start: 2019-03-26 | End: 2019-06-11 | Stop reason: ALTCHOICE

## 2019-03-26 RX ADMIN — KETOROLAC TROMETHAMINE 60 MG: 30 INJECTION, SOLUTION INTRAMUSCULAR; INTRAVENOUS at 11:17

## 2019-03-26 NOTE — LETTER
March 26, 2019     Liban Monroy  [address]    Patient: Buffy Hirsch   YOB: 1935   Date of Visit: 3/26/2019       To whom it may concern,        Home health care services needed for patient due to decrease in ability to due daily living activities  Recommending 16 hours of care              Sincerely,        Gar Isai        CC: No Recipients

## 2019-03-26 NOTE — PATIENT INSTRUCTIONS
Oxycodone/Acetaminophen (By mouth)   Acetaminophen (r-luga-x-MIN-oh-fen), Oxycodone Hydrochloride (zt-b-IMY-done isaías-droe-KLOR-kaela)  Treats moderate to moderately severe pain  This medicine is a narcotic pain reliever  Brand Name(s): Endocet, Percocet, Primlev, Xartemis XR   There may be other brand names for this medicine  When This Medicine Should Not Be Used: This medicine is not right for everyone  Do not use it if you had an allergic reaction to acetaminophen or oxycodone, or if you have serious breathing problems or paralytic ileus  How to Use This Medicine:   Capsule, Liquid, Tablet, Long Acting Tablet  · Your doctor will tell you how much medicine to use  Do not use more than directed  · An overdose can be dangerous  Follow directions carefully so you do not get too much medicine at one time  · Oral liquid: Measure the oral liquid medicine with a marked measuring spoon, oral syringe, or medicine cup  · Swallow the extended-release tablet whole  Do not crush, break, or chew it  Do not lick or wet the tablet before placing it in your mouth  Do not give this medicine through a feeding tube  · This medicine should come with a Medication Guide  Ask your pharmacist for a copy if you do not have one  · Missed dose: If you miss a dose of this medicine, skip the missed dose and go back to your regular dosing schedule  Do not double doses  · Store the medicine in a closed container at room temperature, away from heat, moisture, and direct light  Ask your pharmacist about the best way to dispose of medicine you do not use  Drugs and Foods to Avoid:   Ask your doctor or pharmacist before using any other medicine, including over-the-counter medicines, vitamins, and herbal products  · Do not use Xartemis XR if you are using or have used an MAO inhibitor in the past 14 days  · Some medicines can affect how this medicine works   Tell your doctor if you are using any of the following:   ¨ Carbamazepine, erythromycin, ketoconazole, lamotrigine, mirtazapine, naltrexone, phenytoin, propranolol, rifampin, ritonavir, tramadol, trazodone, or zidovudine  ¨ Birth control pills  ¨ Diuretic (water pill)  ¨ Medicine to treat depression  ¨ Phenothiazine medicine  ¨ Triptan medicine to treat migraine headaches  · Do not drink alcohol while you are using this medicine  Acetaminophen can damage your liver, and alcohol can increase this risk  Do not take acetaminophen without asking your doctor if you have 3 or more drinks of alcohol every day  · Tell your doctor if you use anything else that makes you sleepy  Some examples are allergy medicine, narcotic pain medicine, and alcohol  Tell your doctor if you are using buprenorphine, butorphanol, nalbuphine, pentazocine, a benzodiazepine, or a muscle relaxer  Warnings While Using This Medicine:   · Tell your doctor if you are pregnant or breastfeeding, or if you have kidney disease, liver disease, heart disease, low blood pressure, breathing problems or lung disease (such as asthma, COPD), thyroid problems, [place] disease, pancreas or gallbladder problems, prostate problems, trouble urinating, or a stomach problems, or a history of head injury or brain damage, seizures, or alcohol or drug abuse  Tell your doctor if you are allergic to codeine  · This medicine may cause the following problems:  ¨ High risk of overdose, which can lead to death  ¨ Respiratory depression (serious breathing problem that can be life-threatening)  ¨ Liver problems  ¨ Serious skin reactions  ¨ Serotonin syndrome (when used with certain medicines)  · This medicine may make you dizzy or drowsy  Do not drive or do anything that could be dangerous until you know how this medicine affects you  Sit or lie down if you feel dizzy  Stand up carefully  · This medicine contains acetaminophen   Read the labels of all other medicines you are using to see if they also contain acetaminophen, or ask your doctor or pharmacist  Mishel Gallardo not use more than 4 grams (4,000 milligrams) total of acetaminophen in one day  · This medicine can be habit-forming  Do not use more than your prescribed dose  Call your doctor if you think your medicine is not working  · Do not stop using this medicine suddenly  Your doctor will need to slowly decrease your dose before you stop it completely  · This medicine could cause infertility  Talk with your doctor before using this medicine if you plan to have children  · This medicine may cause constipation, especially with long-term use  Ask your doctor if you should use a laxative to prevent and treat constipation  · Keep all medicine out of the reach of children  Never share your medicine with anyone  Possible Side Effects While Using This Medicine:   Call your doctor right away if you notice any of these side effects:  · Allergic reaction: Itching or hives, swelling in your face or hands, swelling or tingling in your mouth or throat, chest tightness, trouble breathing  · Anxiety, restlessness, fast heartbeat, fever, muscle spasms, twitching, diarrhea, seeing or hearing things that are not there  · Blistering, peeling, red skin rash  · Blue lips, fingernails, or skin  · Dark urine or pale stools, loss of appetite, stomach pain, yellow skin or eyes  · Extreme weakness, shallow breathing, uneven heartbeat, seizures, sweating, or cold or clammy skin  · Severe confusion, lightheadedness, dizziness, or fainting  · Severe constipation, nausea, or vomiting  · Trouble breathing or slow breathing  If you notice these less serious side effects, talk with your doctor:   · Headache  · Mild constipation, nausea, or vomiting  · Mild sleepiness or drowsiness  If you notice other side effects that you think are caused by this medicine, tell your doctor  Call your doctor for medical advice about side effects   You may report side effects to FDA at 9-210-FDA-6888  © 2017 [address] Information is for End User's use only and may not be sold, redistributed or otherwise used for commercial purposes  The above information is an  only  It is not intended as medical advice for individual conditions or treatments  Talk to your doctor, nurse or pharmacist before following any medical regimen to see if it is safe and effective for you

## 2019-03-26 NOTE — PROGRESS NOTES
Assessment/Plan:  1  Chronic right-sided low back pain with right-sided sciatica  Starting hydrocodone for severe pain have an MRA of arteries and MRI of lumbar spine possible surgical candidate  She needs wheelchair to be pushed by  caregiver  - HYDROcodone-acetaminophen (NORCO) 5-325 mg per tablet; Take 1 tablet by mouth 2 (two) times a dayMax Daily Amount: 2 tablets  Dispense: 30 tablet; Refill: 0  - MRA runoff; Future  - MRI lumbar spine w wo contrast; Future  - Basic metabolic panel; Future    2  Right hip pain  Check x-ray of the hips  - XR hip/pelv 2-3 vws right if performed; Future    3  Claudication in peripheral vascular disease ([facility] [address] )  To rule out peripheral artery disease as a cause of the current pain  - MRA runoff; Future    No problem-specific Assessment & Plan notes found for this encounter  Diagnoses and all orders for this visit:    Chronic right-sided low back pain with right-sided sciatica  -     HYDROcodone-acetaminophen (NORCO) 5-325 mg per tablet; Take 1 tablet by mouth 2 (two) times a dayMax Daily Amount: 2 tablets  -     Cancel: MRI lumbar spine w wo contrast; Future  -     MRA runoff; Future  -     MRI lumbar spine w wo contrast; Future  -     Basic metabolic panel; Future    Right hip pain  -     XR hip/pelv 2-3 vws right if performed; Future    Claudication in peripheral vascular disease (HCC)  -     MRA runoff; Future          Subjective:      Patient ID: Rick Ruvalcaba is a 81 y o  female  Patient with severe pain the right side of her hips and lower back radiating down the leg  Patient is to follow pain management  Pain is severe  She has history of diabetes with vascular disease  Patient has difficulty walking        The following portions of the patient's history were reviewed and updated as appropriate: allergies, current medications, past family history, past medical history, past social history, past surgical history and problem list     Review of Systems Constitutional: Negative for diaphoresis, fatigue, fever and unexpected weight change  Respiratory: Negative for cough, chest tightness, shortness of breath and wheezing  Cardiovascular: Negative for chest pain, palpitations and leg swelling  Gastrointestinal: Positive for constipation  Negative for abdominal pain, blood in stool, nausea and vomiting  Endocrine: Negative  Genitourinary: Negative  Musculoskeletal: Positive for arthralgias, back pain, myalgias and neck stiffness  Skin: Negative  Neurological: Positive for weakness and numbness  Negative for dizziness, syncope, light-headedness and headaches  Hematological: Negative  Does not bruise/bleed easily  Psychiatric/Behavioral: Negative for behavioral problems, self-injury, sleep disturbance and suicidal ideas  The patient is not nervous/anxious  Objective:      /70 (BP Location: Left arm, Patient Position: Sitting, Cuff Size: Standard)   Pulse 88   Temp 98 1 °F (36 7 °C) (Oral)   Resp 16   Ht 5' 4" (1 626 m)   Wt 71 7 kg (158 lb)   LMP  (LMP Unknown)   SpO2 96%   Breastfeeding? No   BMI 27 12 kg/m²          Physical Exam   HENT:   Head: Normocephalic  Right Ear: External ear normal    Mouth/Throat: Oropharynx is clear and moist    Eyes: Pupils are equal, round, and reactive to light  EOM are normal  Right eye exhibits no discharge  Left eye exhibits no discharge  No scleral icterus  Neck: Normal range of motion  Neck supple  No tracheal deviation present  No thyromegaly present  Cardiovascular: Normal rate, regular rhythm, normal heart sounds and intact distal pulses  Exam reveals no friction rub  No murmur heard  Pulmonary/Chest: Effort normal  No stridor  No respiratory distress  She has no wheezes  She has no rales  She exhibits no tenderness  Abdominal: Soft  There is tenderness  Musculoskeletal: She exhibits tenderness (Severe on her right side of lumbar area and hip )     Can not walk needs a wheelchair  Lymphadenopathy:     She has no cervical adenopathy  Neurological: She is alert  Skin: Skin is warm and dry  No rash noted  No erythema  Psychiatric: She has a normal mood and affect  Her behavior is normal    Nursing note and vitals reviewed

## 2019-03-29 ENCOUNTER — HOSPITAL ENCOUNTER (OUTPATIENT)
Dept: MRI IMAGING | Facility: HOSPITAL | Age: 84
Discharge: HOME | End: 2019-03-29

## 2019-03-29 DIAGNOSIS — G89.29 CHRONIC BILATERAL LOW BACK PAIN WITH BILATERAL SCIATICA: Primary | ICD-10-CM

## 2019-03-29 DIAGNOSIS — M54.41 CHRONIC RIGHT-SIDED LOW BACK PAIN WITH RIGHT-SIDED SCIATICA: ICD-10-CM

## 2019-03-29 DIAGNOSIS — G89.29 CHRONIC RIGHT-SIDED LOW BACK PAIN WITH RIGHT-SIDED SCIATICA: ICD-10-CM

## 2019-03-29 DIAGNOSIS — G89.29 CHRONIC BILATERAL LOW BACK PAIN WITH BILATERAL SCIATICA: ICD-10-CM

## 2019-03-29 DIAGNOSIS — M54.42 CHRONIC BILATERAL LOW BACK PAIN WITH BILATERAL SCIATICA: ICD-10-CM

## 2019-03-29 DIAGNOSIS — M54.41 CHRONIC BILATERAL LOW BACK PAIN WITH BILATERAL SCIATICA: ICD-10-CM

## 2019-03-29 DIAGNOSIS — R63.4 WEIGHT LOSS: Primary | ICD-10-CM

## 2019-03-29 DIAGNOSIS — F17.200 SMOKER: ICD-10-CM

## 2019-03-29 DIAGNOSIS — M54.42 CHRONIC BILATERAL LOW BACK PAIN WITH BILATERAL SCIATICA: Primary | ICD-10-CM

## 2019-03-29 DIAGNOSIS — M54.41 CHRONIC BILATERAL LOW BACK PAIN WITH BILATERAL SCIATICA: Primary | ICD-10-CM

## 2019-04-01 ENCOUNTER — RX ONLY (RX ONLY)
Age: 84
End: 2019-04-01

## 2019-04-01 ENCOUNTER — DOCTOR'S OFFICE (OUTPATIENT)
Dept: URBAN - METROPOLITAN AREA CLINIC 136 | Facility: CLINIC | Age: 84
Setting detail: OPHTHALMOLOGY
End: 2019-04-01
Payer: COMMERCIAL

## 2019-04-01 DIAGNOSIS — Z96.1: ICD-10-CM

## 2019-04-01 DIAGNOSIS — H02.002: ICD-10-CM

## 2019-04-01 DIAGNOSIS — E11.9: ICD-10-CM

## 2019-04-01 DIAGNOSIS — H40.89: ICD-10-CM

## 2019-04-01 DIAGNOSIS — H35.373: ICD-10-CM

## 2019-04-01 DIAGNOSIS — H34.11: ICD-10-CM

## 2019-04-01 PROCEDURE — 92012 INTRM OPH EXAM EST PATIENT: CPT | Performed by: OPHTHALMOLOGY

## 2019-04-01 ASSESSMENT — CONFRONTATIONAL VISUAL FIELD TEST (CVF)
OS_FINDINGS: FULL
OD_FINDINGS: FULL

## 2019-04-01 ASSESSMENT — REFRACTION_CURRENTRX
OS_OVR_VA: 20/
OD_OVR_VA: 20/
OD_OVR_VA: 20/
OS_OVR_VA: 20/
OS_OVR_VA: 20/
OD_OVR_VA: 20/

## 2019-04-01 ASSESSMENT — REFRACTION_AUTOREFRACTION
OD_CYLINDER: -5.25
OS_SPHERE: +3.75
OD_SPHERE: +1.75
OS_AXIS: 093
OS_CYLINDER: -3.50
OD_AXIS: 77

## 2019-04-01 ASSESSMENT — KERATOMETRY
OD_K2POWER_DIOPTERS: 48.00
OD_AXISANGLE_DEGREES: 47.25
OS_AXISANGLE_DEGREES: 096
OS_K1POWER_DIOPTERS: 46.25
OD_K1POWER_DIOPTERS: 46.50
OS_K2POWER_DIOPTERS: 47.74

## 2019-04-01 ASSESSMENT — REFRACTION_MANIFEST
OS_VA1: 20/50
OS_VA3: 20/
OS_CYLINDER: -3.00
OS_SPHERE: +3.00
OS_VA3: 20/
OD_VA3: 20/
OD_VA3: 20/
OD_VA2: 20/
OD_SPHERE: +1.00
OU_VA: 20/
OS_AXIS: 090
OU_VA: 20/50
OD_VA1: 20/
OS_VA1: 20/
OS_VA2: 20/
OS_VA2: 20/
OD_VA1: 20/600
OD_VA2: 20/
OS_ADD: +3.00
OD_AXIS: 085
OD_CYLINDER: -2.50
OD_ADD: +3.00

## 2019-04-01 ASSESSMENT — SPHEQUIV_DERIVED
OS_SPHEQUIV: 2
OS_SPHEQUIV: 1.5
OD_SPHEQUIV: -0.25
OD_SPHEQUIV: -0.875

## 2019-04-01 ASSESSMENT — AXIALLENGTH_DERIVED
OS_AL: 21.69
OD_AL: 22.599
OD_AL: 22.3778
OS_AL: 21.86

## 2019-04-01 ASSESSMENT — LID POSITION - ENTROPION: OD_ENTROPION: RLL

## 2019-04-01 ASSESSMENT — VISUAL ACUITY
OD_BCVA: 20/125
OS_BCVA: 20/NLP

## 2019-04-09 ENCOUNTER — OFFICE VISIT (OUTPATIENT)
Dept: FAMILY MEDICINE CLINIC | Facility: CLINIC | Age: 84
End: 2019-04-09
Payer: MEDICARE

## 2019-04-09 VITALS
HEART RATE: 77 BPM | BODY MASS INDEX: 27.66 KG/M2 | WEIGHT: 162 LBS | SYSTOLIC BLOOD PRESSURE: 130 MMHG | RESPIRATION RATE: 16 BRPM | OXYGEN SATURATION: 94 % | HEIGHT: 64 IN | TEMPERATURE: 98 F | DIASTOLIC BLOOD PRESSURE: 70 MMHG

## 2019-04-09 DIAGNOSIS — G89.29 CHRONIC RIGHT-SIDED LOW BACK PAIN WITH RIGHT-SIDED SCIATICA: ICD-10-CM

## 2019-04-09 DIAGNOSIS — R60.0 LOCALIZED EDEMA: Primary | ICD-10-CM

## 2019-04-09 DIAGNOSIS — J44.9 COPD MIXED TYPE (HCC): ICD-10-CM

## 2019-04-09 DIAGNOSIS — M54.41 CHRONIC RIGHT-SIDED LOW BACK PAIN WITH RIGHT-SIDED SCIATICA: ICD-10-CM

## 2019-04-09 DIAGNOSIS — E11.65 TYPE 2 DIABETES MELLITUS WITH HYPERGLYCEMIA, WITH LONG-TERM CURRENT USE OF INSULIN (HCC): ICD-10-CM

## 2019-04-09 DIAGNOSIS — Z79.4 TYPE 2 DIABETES MELLITUS WITH HYPERGLYCEMIA, WITH LONG-TERM CURRENT USE OF INSULIN (HCC): ICD-10-CM

## 2019-04-09 PROCEDURE — 99214 OFFICE O/P EST MOD 30 MIN: CPT | Performed by: FAMILY MEDICINE

## 2019-04-09 RX ORDER — FUROSEMIDE 40 MG/1
40 TABLET ORAL 2 TIMES DAILY
Qty: 30 TABLET | Refills: 5 | Status: SHIPPED | OUTPATIENT
Start: 2019-04-09 | End: 2019-05-13 | Stop reason: SDUPTHER

## 2019-04-09 RX ORDER — HYDROCODONE BITARTRATE AND ACETAMINOPHEN 5; 325 MG/1; MG/1
1 TABLET ORAL 2 TIMES DAILY
Qty: 30 TABLET | Refills: 0 | Status: SHIPPED | OUTPATIENT
Start: 2019-04-09 | End: 2019-05-03 | Stop reason: SDUPTHER

## 2019-04-09 RX ORDER — LATANOPROST 50 UG/ML
SOLUTION/ DROPS OPHTHALMIC
Refills: 0 | COMMUNITY
Start: 2019-04-01 | End: 2019-06-11 | Stop reason: SDUPTHER

## 2019-04-16 ENCOUNTER — ANESTHESIA EVENT (OUTPATIENT)
Dept: MRI IMAGING | Facility: HOSPITAL | Age: 84
End: 2019-04-16

## 2019-04-17 ENCOUNTER — HOSPITAL ENCOUNTER (OUTPATIENT)
Dept: MRI IMAGING | Facility: HOSPITAL | Age: 84
Discharge: HOME | End: 2019-04-17
Payer: MEDICARE

## 2019-04-17 ENCOUNTER — ANESTHESIA (OUTPATIENT)
Dept: MRI IMAGING | Facility: HOSPITAL | Age: 84
End: 2019-04-17

## 2019-04-17 VITALS
SYSTOLIC BLOOD PRESSURE: 116 MMHG | RESPIRATION RATE: 16 BRPM | HEART RATE: 68 BPM | OXYGEN SATURATION: 97 % | TEMPERATURE: 97.2 F | DIASTOLIC BLOOD PRESSURE: 61 MMHG

## 2019-04-17 DIAGNOSIS — M54.42 CHRONIC BILATERAL LOW BACK PAIN WITH BILATERAL SCIATICA: ICD-10-CM

## 2019-04-17 DIAGNOSIS — G89.29 CHRONIC BILATERAL LOW BACK PAIN WITH BILATERAL SCIATICA: ICD-10-CM

## 2019-04-17 DIAGNOSIS — M54.41 CHRONIC BILATERAL LOW BACK PAIN WITH BILATERAL SCIATICA: ICD-10-CM

## 2019-04-17 PROCEDURE — 72148 MRI LUMBAR SPINE W/O DYE: CPT

## 2019-04-17 RX ORDER — MIDAZOLAM HYDROCHLORIDE 1 MG/ML
INJECTION INTRAMUSCULAR; INTRAVENOUS AS NEEDED
Status: DISCONTINUED | OUTPATIENT
Start: 2019-04-17 | End: 2019-04-17 | Stop reason: SURG

## 2019-04-17 RX ORDER — FENTANYL CITRATE 50 UG/ML
INJECTION, SOLUTION INTRAMUSCULAR; INTRAVENOUS AS NEEDED
Status: DISCONTINUED | OUTPATIENT
Start: 2019-04-17 | End: 2019-04-17 | Stop reason: SURG

## 2019-04-17 RX ORDER — SODIUM CHLORIDE 9 MG/ML
50 INJECTION, SOLUTION INTRAVENOUS CONTINUOUS
Status: DISCONTINUED | OUTPATIENT
Start: 2019-04-18 | End: 2019-04-21 | Stop reason: HOSPADM

## 2019-04-17 RX ADMIN — FENTANYL CITRATE 25 MCG: 50 INJECTION INTRAMUSCULAR; INTRAVENOUS at 08:55

## 2019-04-17 RX ADMIN — MIDAZOLAM HYDROCHLORIDE 0.5 MG: 1 INJECTION, SOLUTION INTRAMUSCULAR; INTRAVENOUS at 08:36

## 2019-04-17 RX ADMIN — FENTANYL CITRATE 50 MCG: 50 INJECTION INTRAMUSCULAR; INTRAVENOUS at 08:32

## 2019-04-17 RX ADMIN — MIDAZOLAM HYDROCHLORIDE 1 MG: 1 INJECTION, SOLUTION INTRAMUSCULAR; INTRAVENOUS at 08:32

## 2019-04-17 RX ADMIN — SODIUM CHLORIDE 50 ML/HR: 9 INJECTION, SOLUTION INTRAVENOUS at 06:53

## 2019-04-17 RX ADMIN — MIDAZOLAM HYDROCHLORIDE 0.5 MG: 1 INJECTION, SOLUTION INTRAMUSCULAR; INTRAVENOUS at 08:55

## 2019-04-17 RX ADMIN — FENTANYL CITRATE 25 MCG: 50 INJECTION INTRAMUSCULAR; INTRAVENOUS at 08:36

## 2019-04-18 ENCOUNTER — TRANSCRIBE ORDERS (OUTPATIENT)
Dept: ADMINISTRATIVE | Facility: HOSPITAL | Age: 84
End: 2019-04-18

## 2019-04-18 DIAGNOSIS — Z12.31 VISIT FOR SCREENING MAMMOGRAM: Primary | ICD-10-CM

## 2019-04-23 DIAGNOSIS — M54.41 CHRONIC BILATERAL LOW BACK PAIN WITH BILATERAL SCIATICA: Primary | ICD-10-CM

## 2019-04-23 DIAGNOSIS — M54.42 CHRONIC BILATERAL LOW BACK PAIN WITH BILATERAL SCIATICA: ICD-10-CM

## 2019-04-23 DIAGNOSIS — M54.41 CHRONIC BILATERAL LOW BACK PAIN WITH BILATERAL SCIATICA: ICD-10-CM

## 2019-04-23 DIAGNOSIS — K29.60 OTHER GASTRITIS WITHOUT HEMORRHAGE, UNSPECIFIED CHRONICITY: ICD-10-CM

## 2019-04-23 DIAGNOSIS — G89.29 CHRONIC BILATERAL LOW BACK PAIN WITH BILATERAL SCIATICA: ICD-10-CM

## 2019-04-23 DIAGNOSIS — M54.42 CHRONIC BILATERAL LOW BACK PAIN WITH BILATERAL SCIATICA: Primary | ICD-10-CM

## 2019-04-23 DIAGNOSIS — G89.29 CHRONIC BILATERAL LOW BACK PAIN WITH BILATERAL SCIATICA: Primary | ICD-10-CM

## 2019-04-23 RX ORDER — CELECOXIB 200 MG/1
200 CAPSULE ORAL 2 TIMES DAILY
Qty: 30 CAPSULE | Refills: 2 | Status: CANCELLED | OUTPATIENT
Start: 2019-04-23

## 2019-04-23 RX ORDER — OMEPRAZOLE 20 MG/1
20 CAPSULE, DELAYED RELEASE ORAL 2 TIMES DAILY
Qty: 60 CAPSULE | Refills: 3 | Status: CANCELLED | OUTPATIENT
Start: 2019-04-23

## 2019-04-24 ENCOUNTER — DOCTOR'S OFFICE (OUTPATIENT)
Dept: URBAN - METROPOLITAN AREA CLINIC 136 | Facility: CLINIC | Age: 84
Setting detail: OPHTHALMOLOGY
End: 2019-04-24
Payer: COMMERCIAL

## 2019-04-24 ENCOUNTER — RX ONLY (RX ONLY)
Age: 84
End: 2019-04-24

## 2019-04-24 DIAGNOSIS — K29.60 OTHER GASTRITIS WITHOUT HEMORRHAGE, UNSPECIFIED CHRONICITY: ICD-10-CM

## 2019-04-24 DIAGNOSIS — M54.41 CHRONIC BILATERAL LOW BACK PAIN WITH BILATERAL SCIATICA: ICD-10-CM

## 2019-04-24 DIAGNOSIS — G89.29 CHRONIC BILATERAL LOW BACK PAIN WITH BILATERAL SCIATICA: ICD-10-CM

## 2019-04-24 DIAGNOSIS — M54.42 CHRONIC BILATERAL LOW BACK PAIN WITH BILATERAL SCIATICA: ICD-10-CM

## 2019-04-24 DIAGNOSIS — H40.89: ICD-10-CM

## 2019-04-24 DIAGNOSIS — H02.002: ICD-10-CM

## 2019-04-24 PROCEDURE — 92012 INTRM OPH EXAM EST PATIENT: CPT | Performed by: OPHTHALMOLOGY

## 2019-04-24 ASSESSMENT — SPHEQUIV_DERIVED
OS_SPHEQUIV: 2
OS_SPHEQUIV: 1.5
OD_SPHEQUIV: -0.25
OD_SPHEQUIV: -0.875

## 2019-04-24 ASSESSMENT — REFRACTION_MANIFEST
OU_VA: 20/
OS_SPHERE: +3.00
OD_VA1: 20/600
OD_CYLINDER: -2.50
OD_VA2: 20/
OS_VA3: 20/
OS_VA3: 20/
OD_AXIS: 085
OS_VA1: 20/
OS_CYLINDER: -3.00
OS_AXIS: 090
OS_VA2: 20/
OS_VA2: 20/
OD_VA3: 20/
OD_SPHERE: +1.00
OD_ADD: +3.00
OU_VA: 20/50
OD_VA2: 20/
OD_VA1: 20/
OS_VA1: 20/50
OD_VA3: 20/
OS_ADD: +3.00

## 2019-04-24 ASSESSMENT — REFRACTION_CURRENTRX
OS_OVR_VA: 20/
OS_OVR_VA: 20/
OD_OVR_VA: 20/
OD_OVR_VA: 20/
OS_OVR_VA: 20/
OD_OVR_VA: 20/

## 2019-04-24 ASSESSMENT — REFRACTION_AUTOREFRACTION
OS_CYLINDER: -3.50
OD_SPHERE: +1.75
OS_SPHERE: +3.75
OD_CYLINDER: -5.25
OD_AXIS: 77
OS_AXIS: 093

## 2019-04-24 ASSESSMENT — VISUAL ACUITY
OS_BCVA: 20/NLP
OD_BCVA: 20/125

## 2019-04-24 ASSESSMENT — LID POSITION - ENTROPION: OD_ENTROPION: RLL

## 2019-04-26 RX ORDER — CELECOXIB 200 MG/1
200 CAPSULE ORAL 2 TIMES DAILY
Qty: 30 CAPSULE | Refills: 2 | Status: SHIPPED | OUTPATIENT
Start: 2019-04-26 | End: 2019-06-11 | Stop reason: ALTCHOICE

## 2019-04-26 RX ORDER — OMEPRAZOLE 20 MG/1
20 CAPSULE, DELAYED RELEASE ORAL DAILY
Qty: 30 CAPSULE | Refills: 3 | Status: SHIPPED | OUTPATIENT
Start: 2019-04-26 | End: 2019-06-11 | Stop reason: SDUPTHER

## 2019-04-29 ENCOUNTER — RX ONLY (RX ONLY)
Age: 84
End: 2019-04-29

## 2019-04-29 ENCOUNTER — DOCTOR'S OFFICE (OUTPATIENT)
Dept: URBAN - METROPOLITAN AREA CLINIC 136 | Facility: CLINIC | Age: 84
Setting detail: OPHTHALMOLOGY
End: 2019-04-29
Payer: COMMERCIAL

## 2019-04-29 DIAGNOSIS — H52.4: ICD-10-CM

## 2019-04-29 DIAGNOSIS — H52.223: ICD-10-CM

## 2019-04-29 DIAGNOSIS — H52.03: ICD-10-CM

## 2019-04-29 PROCEDURE — 92015 DETERMINE REFRACTIVE STATE: CPT | Performed by: OPTOMETRIST

## 2019-04-29 ASSESSMENT — REFRACTION_CURRENTRX
OS_OVR_VA: 20/
OD_OVR_VA: 20/
OS_OVR_VA: 20/
OS_OVR_VA: 20/
OD_OVR_VA: 20/
OD_OVR_VA: 20/

## 2019-04-29 ASSESSMENT — REFRACTION_MANIFEST
OS_ADD: +3.00
OS_AXIS: 090
OD_VA3: 20/
OS_VA1: 20/
OD_VA1: 20/NI
OS_VA3: 20/
OD_SPHERE: BALANCE
OS_CYLINDER: -3.00
OU_VA: 20/
OS_SPHERE: +3.50
OD_VA2: 20/
OU_VA: 20/
OS_VA2: 20/
OS_VA2: 20/
OS_VA3: 20/
OD_ADD: +3.00
OD_VA2: 20/
OD_VA1: 20/
OS_VA1: 20/50-1
OD_VA3: 20/

## 2019-04-29 ASSESSMENT — REFRACTION_AUTOREFRACTION
OS_CYLINDER: -2.75
OS_SPHERE: +4.00
OD_AXIS: 77
OD_CYLINDER: -5.25
OS_AXIS: 092
OD_SPHERE: +1.75

## 2019-04-29 ASSESSMENT — KERATOMETRY
OS_K2POWER_DIOPTERS: 47.74
OD_K1POWER_DIOPTERS: 46.50
OD_AXISANGLE_DEGREES: 47.25
OD_K2POWER_DIOPTERS: 48.00
OS_K1POWER_DIOPTERS: 46.25
OS_AXISANGLE_DEGREES: 096

## 2019-04-29 ASSESSMENT — AXIALLENGTH_DERIVED
OS_AL: 21.69
OS_AL: 21.49
OD_AL: 22.599

## 2019-04-29 ASSESSMENT — VISUAL ACUITY
OS_BCVA: 20/NLP
OD_BCVA: 20/125

## 2019-04-29 ASSESSMENT — SPHEQUIV_DERIVED
OS_SPHEQUIV: 2.625
OS_SPHEQUIV: 2
OD_SPHEQUIV: -0.875

## 2019-05-03 DIAGNOSIS — M54.41 CHRONIC RIGHT-SIDED LOW BACK PAIN WITH RIGHT-SIDED SCIATICA: ICD-10-CM

## 2019-05-03 DIAGNOSIS — G89.29 CHRONIC RIGHT-SIDED LOW BACK PAIN WITH RIGHT-SIDED SCIATICA: ICD-10-CM

## 2019-05-03 RX ORDER — HYDROCODONE BITARTRATE AND ACETAMINOPHEN 5; 325 MG/1; MG/1
1 TABLET ORAL 2 TIMES DAILY
Qty: 60 TABLET | Refills: 0 | Status: SHIPPED | OUTPATIENT
Start: 2019-05-03 | End: 2019-05-13 | Stop reason: SDUPTHER

## 2019-05-09 RX ORDER — PENTOXIFYLLINE 400 MG/1
TABLET, EXTENDED RELEASE ORAL
Refills: 3 | COMMUNITY
Start: 2019-04-26 | End: 2019-09-24 | Stop reason: ALTCHOICE

## 2019-05-13 ENCOUNTER — OFFICE VISIT (OUTPATIENT)
Dept: FAMILY MEDICINE CLINIC | Facility: CLINIC | Age: 84
End: 2019-05-13
Payer: MEDICARE

## 2019-05-13 DIAGNOSIS — M54.41 CHRONIC RIGHT-SIDED LOW BACK PAIN WITH RIGHT-SIDED SCIATICA: ICD-10-CM

## 2019-05-13 DIAGNOSIS — K59.03 DRUG-INDUCED CONSTIPATION: ICD-10-CM

## 2019-05-13 DIAGNOSIS — M54.42 CHRONIC BILATERAL LOW BACK PAIN WITH BILATERAL SCIATICA: Primary | ICD-10-CM

## 2019-05-13 DIAGNOSIS — Z79.4 TYPE 2 DIABETES MELLITUS WITH HYPERGLYCEMIA, WITH LONG-TERM CURRENT USE OF INSULIN (HCC): ICD-10-CM

## 2019-05-13 DIAGNOSIS — E11.65 TYPE 2 DIABETES MELLITUS WITH HYPERGLYCEMIA, WITH LONG-TERM CURRENT USE OF INSULIN (HCC): ICD-10-CM

## 2019-05-13 DIAGNOSIS — R60.0 LOCALIZED EDEMA: ICD-10-CM

## 2019-05-13 DIAGNOSIS — M54.41 CHRONIC BILATERAL LOW BACK PAIN WITH BILATERAL SCIATICA: Primary | ICD-10-CM

## 2019-05-13 DIAGNOSIS — G89.29 CHRONIC BILATERAL LOW BACK PAIN WITH BILATERAL SCIATICA: Primary | ICD-10-CM

## 2019-05-13 DIAGNOSIS — G89.29 CHRONIC RIGHT-SIDED LOW BACK PAIN WITH RIGHT-SIDED SCIATICA: ICD-10-CM

## 2019-05-13 PROCEDURE — 99214 OFFICE O/P EST MOD 30 MIN: CPT | Performed by: FAMILY MEDICINE

## 2019-05-13 RX ORDER — FUROSEMIDE 40 MG/1
40 TABLET ORAL DAILY
Qty: 30 TABLET | Refills: 5 | Status: SHIPPED | OUTPATIENT
Start: 2019-05-13 | End: 2019-05-21 | Stop reason: SDUPTHER

## 2019-05-13 RX ORDER — HYDROCODONE BITARTRATE AND ACETAMINOPHEN 5; 325 MG/1; MG/1
1 TABLET ORAL 2 TIMES DAILY
Qty: 60 TABLET | Refills: 0 | Status: SHIPPED | OUTPATIENT
Start: 2019-05-13 | End: 2019-06-11 | Stop reason: SDUPTHER

## 2019-05-14 VITALS
SYSTOLIC BLOOD PRESSURE: 140 MMHG | DIASTOLIC BLOOD PRESSURE: 70 MMHG | BODY MASS INDEX: 28.51 KG/M2 | TEMPERATURE: 98 F | OXYGEN SATURATION: 94 % | WEIGHT: 167 LBS | RESPIRATION RATE: 16 BRPM | HEIGHT: 64 IN | HEART RATE: 92 BPM

## 2019-05-14 PROBLEM — K59.03 DRUG-INDUCED CONSTIPATION: Status: ACTIVE | Noted: 2019-05-14

## 2019-05-14 PROBLEM — R60.0 LOCALIZED EDEMA: Status: ACTIVE | Noted: 2019-05-14

## 2019-05-15 DIAGNOSIS — E11.65 TYPE 2 DIABETES MELLITUS WITH HYPERGLYCEMIA, WITH LONG-TERM CURRENT USE OF INSULIN (HCC): ICD-10-CM

## 2019-05-15 DIAGNOSIS — Z79.4 TYPE 2 DIABETES MELLITUS WITH HYPERGLYCEMIA, WITH LONG-TERM CURRENT USE OF INSULIN (HCC): ICD-10-CM

## 2019-05-15 RX ORDER — PREGABALIN 100 MG/1
100 CAPSULE ORAL 2 TIMES DAILY
Qty: 60 CAPSULE | Refills: 5 | Status: SHIPPED | OUTPATIENT
Start: 2019-05-15 | End: 2019-05-15 | Stop reason: SDUPTHER

## 2019-05-15 RX ORDER — PREGABALIN 100 MG/1
100 CAPSULE ORAL 2 TIMES DAILY
Qty: 60 CAPSULE | Refills: 5 | Status: SHIPPED | OUTPATIENT
Start: 2019-05-15 | End: 2019-06-11 | Stop reason: SDUPTHER

## 2019-05-17 ENCOUNTER — HOSPITAL ENCOUNTER (OUTPATIENT)
Dept: MAMMOGRAPHY | Facility: CLINIC | Age: 84
Discharge: HOME | End: 2019-05-17
Payer: MEDICARE

## 2019-05-17 ENCOUNTER — HOSPITAL ENCOUNTER (OUTPATIENT)
Dept: BONE DENSITY | Facility: CLINIC | Age: 84
Discharge: HOME | End: 2019-05-17
Payer: MEDICARE

## 2019-05-17 VITALS — BODY MASS INDEX: 28.51 KG/M2 | WEIGHT: 167 LBS | HEIGHT: 64 IN

## 2019-05-17 DIAGNOSIS — M81.0 AGE-RELATED OSTEOPOROSIS WITHOUT CURRENT PATHOLOGICAL FRACTURE: ICD-10-CM

## 2019-05-17 DIAGNOSIS — Z12.31 VISIT FOR SCREENING MAMMOGRAM: ICD-10-CM

## 2019-05-17 PROCEDURE — 77080 DXA BONE DENSITY AXIAL: CPT

## 2019-05-17 PROCEDURE — 77067 SCR MAMMO BI INCL CAD: CPT

## 2019-05-21 DIAGNOSIS — R60.0 LOCALIZED EDEMA: ICD-10-CM

## 2019-05-24 RX ORDER — FUROSEMIDE 40 MG/1
40 TABLET ORAL DAILY
Qty: 60 TABLET | Refills: 5 | Status: SHIPPED | OUTPATIENT
Start: 2019-05-24 | End: 2019-06-11 | Stop reason: SDUPTHER

## 2019-05-29 ENCOUNTER — OFFICE VISIT (OUTPATIENT)
Dept: NEUROSURGERY | Facility: CLINIC | Age: 84
End: 2019-05-29
Payer: MEDICARE

## 2019-05-29 VITALS
RESPIRATION RATE: 16 BRPM | TEMPERATURE: 98 F | SYSTOLIC BLOOD PRESSURE: 128 MMHG | DIASTOLIC BLOOD PRESSURE: 64 MMHG | HEIGHT: 64 IN | BODY MASS INDEX: 28.51 KG/M2 | HEART RATE: 77 BPM | WEIGHT: 167 LBS

## 2019-05-29 DIAGNOSIS — M54.41 CHRONIC BILATERAL LOW BACK PAIN WITH BILATERAL SCIATICA: ICD-10-CM

## 2019-05-29 DIAGNOSIS — G89.29 CHRONIC BILATERAL LOW BACK PAIN WITH BILATERAL SCIATICA: ICD-10-CM

## 2019-05-29 DIAGNOSIS — M54.42 CHRONIC BILATERAL LOW BACK PAIN WITH BILATERAL SCIATICA: ICD-10-CM

## 2019-05-29 PROCEDURE — 99204 OFFICE O/P NEW MOD 45 MIN: CPT | Performed by: PHYSICIAN ASSISTANT

## 2019-06-04 ENCOUNTER — APPOINTMENT (OUTPATIENT)
Dept: LAB | Facility: HOSPITAL | Age: 84
End: 2019-06-04
Payer: MEDICARE

## 2019-06-04 DIAGNOSIS — E11.65 TYPE 2 DIABETES MELLITUS WITH HYPERGLYCEMIA, WITH LONG-TERM CURRENT USE OF INSULIN (HCC): ICD-10-CM

## 2019-06-04 DIAGNOSIS — Z79.4 TYPE 2 DIABETES MELLITUS WITH HYPERGLYCEMIA, WITH LONG-TERM CURRENT USE OF INSULIN (HCC): ICD-10-CM

## 2019-06-04 DIAGNOSIS — M81.0 AGE-RELATED OSTEOPOROSIS WITHOUT CURRENT PATHOLOGICAL FRACTURE: Primary | ICD-10-CM

## 2019-06-04 LAB
ALBUMIN SERPL BCP-MCNC: 4.2 G/DL (ref 3–5.2)
ALP SERPL-CCNC: 132 U/L (ref 43–122)
ALT SERPL W P-5'-P-CCNC: 24 U/L (ref 9–52)
ANION GAP SERPL CALCULATED.3IONS-SCNC: 8 MMOL/L (ref 5–14)
AST SERPL W P-5'-P-CCNC: 31 U/L (ref 14–36)
BILIRUB SERPL-MCNC: 0.4 MG/DL
BUN SERPL-MCNC: 28 MG/DL (ref 5–25)
CALCIUM SERPL-MCNC: 10 MG/DL (ref 8.4–10.2)
CHLORIDE SERPL-SCNC: 101 MMOL/L (ref 97–108)
CHOLEST SERPL-MCNC: 141 MG/DL
CO2 SERPL-SCNC: 32 MMOL/L (ref 22–30)
CREAT SERPL-MCNC: 1.95 MG/DL (ref 0.6–1.2)
EST. AVERAGE GLUCOSE BLD GHB EST-MCNC: 174 MG/DL
GFR SERPL CREATININE-BSD FRML MDRD: 23 ML/MIN/1.73SQ M
GLUCOSE P FAST SERPL-MCNC: 158 MG/DL (ref 70–99)
HBA1C MFR BLD: 7.7 % (ref 4.2–6.3)
HDLC SERPL-MCNC: 50 MG/DL (ref 40–59)
LDLC SERPL CALC-MCNC: 68 MG/DL
NONHDLC SERPL-MCNC: 91 MG/DL
POTASSIUM SERPL-SCNC: 6.6 MMOL/L (ref 3.6–5)
PROT SERPL-MCNC: 7 G/DL (ref 5.9–8.4)
SODIUM SERPL-SCNC: 141 MMOL/L (ref 137–147)
TRIGL SERPL-MCNC: 114 MG/DL

## 2019-06-04 PROCEDURE — 80053 COMPREHEN METABOLIC PANEL: CPT

## 2019-06-04 PROCEDURE — 36415 COLL VENOUS BLD VENIPUNCTURE: CPT

## 2019-06-04 PROCEDURE — 83036 HEMOGLOBIN GLYCOSYLATED A1C: CPT

## 2019-06-04 PROCEDURE — 80061 LIPID PANEL: CPT

## 2019-06-06 RX ORDER — BRIMONIDINE TARTRATE 2 MG/ML
SOLUTION/ DROPS OPHTHALMIC
Refills: 6 | COMMUNITY
Start: 2019-05-31 | End: 2020-01-03 | Stop reason: SDUPTHER

## 2019-06-11 ENCOUNTER — OFFICE VISIT (OUTPATIENT)
Dept: FAMILY MEDICINE CLINIC | Facility: CLINIC | Age: 84
End: 2019-06-11
Payer: MEDICARE

## 2019-06-11 ENCOUNTER — DOCUMENTATION (OUTPATIENT)
Dept: FAMILY MEDICINE CLINIC | Facility: CLINIC | Age: 84
End: 2019-06-11

## 2019-06-11 VITALS
DIASTOLIC BLOOD PRESSURE: 50 MMHG | HEIGHT: 64 IN | BODY MASS INDEX: 29.19 KG/M2 | TEMPERATURE: 98.2 F | HEART RATE: 82 BPM | SYSTOLIC BLOOD PRESSURE: 130 MMHG | OXYGEN SATURATION: 94 % | RESPIRATION RATE: 16 BRPM | WEIGHT: 171 LBS

## 2019-06-11 DIAGNOSIS — E11.65 TYPE 2 DIABETES MELLITUS WITH HYPERGLYCEMIA, WITH LONG-TERM CURRENT USE OF INSULIN (HCC): ICD-10-CM

## 2019-06-11 DIAGNOSIS — M54.41 CHRONIC RIGHT-SIDED LOW BACK PAIN WITH RIGHT-SIDED SCIATICA: ICD-10-CM

## 2019-06-11 DIAGNOSIS — G89.29 CHRONIC RIGHT-SIDED LOW BACK PAIN WITH RIGHT-SIDED SCIATICA: ICD-10-CM

## 2019-06-11 DIAGNOSIS — H42 GLAUCOMA OF BOTH EYES ASSOCIATED WITH UNDERLYING DISEASE: ICD-10-CM

## 2019-06-11 DIAGNOSIS — R60.0 LOCALIZED EDEMA: ICD-10-CM

## 2019-06-11 DIAGNOSIS — Z79.4 TYPE 2 DIABETES MELLITUS WITH HYPERGLYCEMIA, WITH LONG-TERM CURRENT USE OF INSULIN (HCC): ICD-10-CM

## 2019-06-11 DIAGNOSIS — E08.3299: ICD-10-CM

## 2019-06-11 DIAGNOSIS — H54.7: ICD-10-CM

## 2019-06-11 DIAGNOSIS — E87.5 HYPERKALEMIA: Primary | ICD-10-CM

## 2019-06-11 DIAGNOSIS — K29.60 OTHER GASTRITIS WITHOUT HEMORRHAGE, UNSPECIFIED CHRONICITY: ICD-10-CM

## 2019-06-11 DIAGNOSIS — K59.03 DRUG-INDUCED CONSTIPATION: ICD-10-CM

## 2019-06-11 DIAGNOSIS — M81.0 AGE-RELATED OSTEOPOROSIS WITHOUT CURRENT PATHOLOGICAL FRACTURE: Primary | ICD-10-CM

## 2019-06-11 DIAGNOSIS — J44.9 COPD MIXED TYPE (HCC): ICD-10-CM

## 2019-06-11 PROCEDURE — 96372 THER/PROPH/DIAG INJ SC/IM: CPT | Performed by: FAMILY MEDICINE

## 2019-06-11 PROCEDURE — 99214 OFFICE O/P EST MOD 30 MIN: CPT | Performed by: FAMILY MEDICINE

## 2019-06-11 RX ORDER — INSULIN ASPART 100 [IU]/ML
INJECTION, SOLUTION INTRAVENOUS; SUBCUTANEOUS
Qty: 25 PEN | Refills: 3 | Status: SHIPPED | OUTPATIENT
Start: 2019-06-11 | End: 2019-06-11 | Stop reason: SDUPTHER

## 2019-06-11 RX ORDER — LATANOPROST 50 UG/ML
1 SOLUTION/ DROPS OPHTHALMIC
Qty: 5 ML | Refills: 5 | Status: SHIPPED | OUTPATIENT
Start: 2019-06-11 | End: 2019-08-13 | Stop reason: SDUPTHER

## 2019-06-11 RX ORDER — FUROSEMIDE 40 MG/1
40 TABLET ORAL DAILY
Qty: 60 TABLET | Refills: 5 | Status: SHIPPED | OUTPATIENT
Start: 2019-06-11 | End: 2019-09-24 | Stop reason: ALTCHOICE

## 2019-06-11 RX ORDER — DORZOLAMIDE HCL 20 MG/ML
1 SOLUTION/ DROPS OPHTHALMIC 3 TIMES DAILY
Qty: 5 ML | Refills: 5 | Status: SHIPPED | OUTPATIENT
Start: 2019-06-11 | End: 2020-01-03 | Stop reason: SDUPTHER

## 2019-06-11 RX ORDER — HYDROCODONE BITARTRATE AND ACETAMINOPHEN 5; 325 MG/1; MG/1
1 TABLET ORAL 2 TIMES DAILY
Qty: 60 TABLET | Refills: 0 | Status: SHIPPED | OUTPATIENT
Start: 2019-06-11 | End: 2019-07-10 | Stop reason: HOSPADM

## 2019-06-11 RX ORDER — INSULIN ASPART 100 [IU]/ML
INJECTION, SOLUTION INTRAVENOUS; SUBCUTANEOUS
Qty: 25 PEN | Refills: 3 | Status: SHIPPED | OUTPATIENT
Start: 2019-06-11 | End: 2020-01-03 | Stop reason: SDUPTHER

## 2019-06-11 RX ORDER — LISINOPRIL 10 MG/1
10 TABLET ORAL DAILY
Qty: 90 TABLET | Refills: 3 | Status: SHIPPED | OUTPATIENT
Start: 2019-06-11 | End: 2019-07-10 | Stop reason: HOSPADM

## 2019-06-11 RX ORDER — PREGABALIN 100 MG/1
100 CAPSULE ORAL 2 TIMES DAILY
Qty: 60 CAPSULE | Refills: 5 | Status: SHIPPED | OUTPATIENT
Start: 2019-06-11 | End: 2019-11-13 | Stop reason: SDUPTHER

## 2019-06-11 RX ORDER — OMEPRAZOLE 20 MG/1
20 CAPSULE, DELAYED RELEASE ORAL DAILY
Qty: 30 CAPSULE | Refills: 3 | Status: SHIPPED | OUTPATIENT
Start: 2019-06-11 | End: 2019-09-24 | Stop reason: ALTCHOICE

## 2019-06-14 ENCOUNTER — APPOINTMENT (OUTPATIENT)
Dept: LAB | Facility: HOSPITAL | Age: 84
End: 2019-06-14
Payer: MEDICARE

## 2019-06-14 ENCOUNTER — TELEPHONE (OUTPATIENT)
Dept: FAMILY MEDICINE CLINIC | Facility: CLINIC | Age: 84
End: 2019-06-14

## 2019-06-14 DIAGNOSIS — Z79.4 TYPE 2 DIABETES MELLITUS WITH HYPERGLYCEMIA, WITH LONG-TERM CURRENT USE OF INSULIN (HCC): ICD-10-CM

## 2019-06-14 DIAGNOSIS — E11.65 TYPE 2 DIABETES MELLITUS WITH HYPERGLYCEMIA, WITH LONG-TERM CURRENT USE OF INSULIN (HCC): ICD-10-CM

## 2019-06-14 LAB
ALBUMIN SERPL BCP-MCNC: 4 G/DL (ref 3–5.2)
ALP SERPL-CCNC: 150 U/L (ref 43–122)
ALT SERPL W P-5'-P-CCNC: 26 U/L (ref 9–52)
ANION GAP SERPL CALCULATED.3IONS-SCNC: 10 MMOL/L (ref 5–14)
AST SERPL W P-5'-P-CCNC: 29 U/L (ref 14–36)
BILIRUB SERPL-MCNC: 0.5 MG/DL
BUN SERPL-MCNC: 22 MG/DL (ref 5–25)
CALCIUM SERPL-MCNC: 9.1 MG/DL (ref 8.4–10.2)
CHLORIDE SERPL-SCNC: 106 MMOL/L (ref 97–108)
CO2 SERPL-SCNC: 26 MMOL/L (ref 22–30)
CREAT SERPL-MCNC: 1.73 MG/DL (ref 0.6–1.2)
GFR SERPL CREATININE-BSD FRML MDRD: 27 ML/MIN/1.73SQ M
GLUCOSE P FAST SERPL-MCNC: 194 MG/DL (ref 70–99)
POTASSIUM SERPL-SCNC: 5.5 MMOL/L (ref 3.6–5)
PROT SERPL-MCNC: 6.8 G/DL (ref 5.9–8.4)
SODIUM SERPL-SCNC: 142 MMOL/L (ref 137–147)

## 2019-06-14 PROCEDURE — 80053 COMPREHEN METABOLIC PANEL: CPT

## 2019-06-14 PROCEDURE — 36415 COLL VENOUS BLD VENIPUNCTURE: CPT

## 2019-07-03 ENCOUNTER — OFFICE VISIT (OUTPATIENT)
Dept: FAMILY MEDICINE CLINIC | Facility: CLINIC | Age: 84
End: 2019-07-03
Payer: MEDICARE

## 2019-07-03 ENCOUNTER — HOSPITAL ENCOUNTER (OUTPATIENT)
Dept: RADIOLOGY | Facility: HOSPITAL | Age: 84
Discharge: HOME | End: 2019-07-03
Payer: MEDICARE

## 2019-07-03 VITALS
DIASTOLIC BLOOD PRESSURE: 80 MMHG | TEMPERATURE: 98 F | SYSTOLIC BLOOD PRESSURE: 130 MMHG | HEIGHT: 64 IN | BODY MASS INDEX: 28.68 KG/M2 | WEIGHT: 168 LBS | RESPIRATION RATE: 16 BRPM | OXYGEN SATURATION: 95 % | HEART RATE: 80 BPM

## 2019-07-03 DIAGNOSIS — Z79.4 TYPE 2 DIABETES MELLITUS WITH HYPERGLYCEMIA, WITH LONG-TERM CURRENT USE OF INSULIN (HCC): ICD-10-CM

## 2019-07-03 DIAGNOSIS — M54.41 ACUTE BILATERAL LOW BACK PAIN WITH BILATERAL SCIATICA: ICD-10-CM

## 2019-07-03 DIAGNOSIS — M54.42 ACUTE BILATERAL LOW BACK PAIN WITH BILATERAL SCIATICA: ICD-10-CM

## 2019-07-03 DIAGNOSIS — M54.42 ACUTE BILATERAL LOW BACK PAIN WITH BILATERAL SCIATICA: Primary | ICD-10-CM

## 2019-07-03 DIAGNOSIS — S32.050A COMPRESSION FRACTURE OF L5 LUMBAR VERTEBRA, CLOSED, INITIAL ENCOUNTER (HCC): ICD-10-CM

## 2019-07-03 DIAGNOSIS — E11.65 TYPE 2 DIABETES MELLITUS WITH HYPERGLYCEMIA, WITH LONG-TERM CURRENT USE OF INSULIN (HCC): ICD-10-CM

## 2019-07-03 DIAGNOSIS — M54.41 ACUTE BILATERAL LOW BACK PAIN WITH BILATERAL SCIATICA: Primary | ICD-10-CM

## 2019-07-03 PROCEDURE — 99214 OFFICE O/P EST MOD 30 MIN: CPT | Performed by: FAMILY MEDICINE

## 2019-07-03 PROCEDURE — 72110 X-RAY EXAM L-2 SPINE 4/>VWS: CPT

## 2019-07-03 RX ORDER — ACETAMINOPHEN AND CODEINE PHOSPHATE 300; 30 MG/1; MG/1
1 TABLET ORAL EVERY 4 HOURS PRN
Qty: 60 TABLET | Refills: 0 | Status: SHIPPED | OUTPATIENT
Start: 2019-07-03 | End: 2019-07-17 | Stop reason: ALTCHOICE

## 2019-07-03 RX ORDER — LINACLOTIDE 290 UG/1
CAPSULE, GELATIN COATED ORAL
Refills: 5 | COMMUNITY
Start: 2019-07-01 | End: 2019-11-12 | Stop reason: SDUPTHER

## 2019-07-03 RX ORDER — LISINOPRIL 5 MG/1
5 TABLET ORAL DAILY
Qty: 30 TABLET | Refills: 5 | Status: SHIPPED | OUTPATIENT
Start: 2019-07-03 | End: 2019-07-10 | Stop reason: HOSPADM

## 2019-07-03 RX ORDER — CELECOXIB 200 MG/1
CAPSULE ORAL
Refills: 2 | COMMUNITY
Start: 2019-06-14 | End: 2019-07-10 | Stop reason: HOSPADM

## 2019-07-03 RX ORDER — CALCITONIN SALMON 200 [IU]/.09ML
1 SPRAY, METERED NASAL DAILY
Qty: 1 ML | Refills: 3 | Status: SHIPPED | OUTPATIENT
Start: 2019-07-03 | End: 2019-11-25 | Stop reason: SDUPTHER

## 2019-07-03 NOTE — PROGRESS NOTES
Assessment/Plan:  1  Acute bilateral low back pain with bilateral sciatica  Change in the pattern of the pain, continue pain medications, check x-ray to rule out other disorders  - XR spine lumbar minimum 4 views non injury; Future    2  Compression fracture of L5 lumbar vertebra, closed, initial encounter [facility])  Reviewing the x-ray L5-is compressed, patient has history osteoporosis unsure this is the cause of her current pain in the lower spine  We treat as compression fracture to benefit her and reassess as needed  - calcitonin, salmon, (MIACALCIN) 200 units/act nasal spray; 1 spray into each nostril daily  Dispense: 1 mL; Refill: 3  - acetaminophen-codeine (TYLENOL #3) 300-30 mg per tablet; Take 1 tablet by mouth every 4 (four) hours as needed for moderate pain  Dispense: 60 tablet; Refill: 0    3  Type 2 diabetes mellitus with hyperglycemia, with long-term current use of insulin ([facility] )  She will continue same treatment as before  - lisinopril (ZESTRIL) 5 mg tablet; Take 1 tablet (5 mg total) by mouth daily  Dispense: 30 tablet; Refill: 5    No problem-specific Assessment & Plan notes found for this encounter  Diagnoses and all orders for this visit:    Acute bilateral low back pain with bilateral sciatica  -     XR spine lumbar minimum 4 views non injury; Future    Compression fracture of L5 lumbar vertebra, closed, initial encounter ([facility] )  -     calcitonin, salmon, (MIACALCIN) 200 units/act nasal spray; 1 spray into each nostril daily  -     acetaminophen-codeine (TYLENOL #3) 300-30 mg per tablet; Take 1 tablet by mouth every 4 (four) hours as needed for moderate pain    Type 2 diabetes mellitus with hyperglycemia, with long-term current use of insulin ([facility])  -     lisinopril (ZESTRIL) 5 mg tablet; Take 1 tablet (5 mg total) by mouth daily          Subjective:      Patient ID: Jameel Rojas is a 81 y o  female      Average blood pressure from home taken by home attendant Jennifer Luo is 872 over [de-identified]   Allegra Gaspar 81 y o  complaining of   Acute on chronic pain in back; started about 4 day(s) ago  The pain is  continuous and has been gradually worsening since onset  The quality of the pain is described as cramping, dull, heaviness and knife-like  The pain does radiate down his both legs, she has difficulty to walk due to pain    The pain is at a severity of 5/10  The symptoms are aggravated by movement  Associated symptoms include numbness, paresthesias and tingling  she has  tried muscle relaxant, Narcotics  The following portions of the patient's history were reviewed and updated as appropriate: allergies, current medications, past family history, past medical history, past social history, past surgical history and problem list     Review of Systems   Constitutional: Positive for fatigue  Respiratory: Negative for shortness of breath  Cardiovascular: Negative for chest pain, palpitations and leg swelling  Gastrointestinal: Positive for constipation  Endocrine: Negative  Genitourinary: Negative  Musculoskeletal: Positive for arthralgias, back pain, myalgias and neck stiffness  Skin: Negative  Neurological: Positive for dizziness, weakness and numbness  Hematological: Negative  Psychiatric/Behavioral: Positive for sleep disturbance  Negative for suicidal ideas  The patient is nervous/anxious  Objective:      /80 (BP Location: Left arm, Patient Position: Sitting, Cuff Size: Standard)   Pulse 80   Temp 98 °F (36 7 °C) (Oral)   Resp 16   Ht 5' 4" (1 626 m)   Wt 76 2 kg (168 lb)   LMP  (LMP Unknown)   SpO2 95%   Breastfeeding? No   BMI 28 84 kg/m²          Physical Exam   HENT:   Head: Normocephalic  Eyes: Pupils are equal, round, and reactive to light  EOM are normal    Neck: Neck supple  Cardiovascular: Normal rate and regular rhythm  Pulmonary/Chest: Effort normal    Abdominal: Soft  There is tenderness     Musculoskeletal: She exhibits tenderness  Lumbar back: She exhibits decreased range of motion, bony tenderness, deformity, pain and spasm  Neurological: She is alert  Skin: Skin is warm and dry  Psychiatric: She has a normal mood and affect  Her behavior is normal    Nursing note and vitals reviewed

## 2019-07-05 ENCOUNTER — APPOINTMENT (EMERGENCY)
Dept: RADIOLOGY | Facility: HOSPITAL | Age: 84
End: 2019-07-05
Payer: MEDICARE

## 2019-07-05 ENCOUNTER — HOSPITAL ENCOUNTER (EMERGENCY)
Facility: HOSPITAL | Age: 84
End: 2019-07-06
Attending: EMERGENCY MEDICINE
Payer: MEDICARE

## 2019-07-05 ENCOUNTER — APPOINTMENT (EMERGENCY)
Dept: CT IMAGING | Facility: HOSPITAL | Age: 84
End: 2019-07-05
Payer: MEDICARE

## 2019-07-05 DIAGNOSIS — A41.9 SEPSIS (HCC): ICD-10-CM

## 2019-07-05 DIAGNOSIS — R11.2 NAUSEA & VOMITING: Primary | ICD-10-CM

## 2019-07-05 DIAGNOSIS — N20.0 KIDNEY STONE: ICD-10-CM

## 2019-07-05 DIAGNOSIS — R77.8 ELEVATED TROPONIN: ICD-10-CM

## 2019-07-05 LAB
ALBUMIN SERPL BCP-MCNC: 4.1 G/DL (ref 3–5.2)
ALP SERPL-CCNC: 151 U/L (ref 43–122)
ALT SERPL W P-5'-P-CCNC: 21 U/L (ref 9–52)
ANION GAP SERPL CALCULATED.3IONS-SCNC: 13 MMOL/L (ref 5–14)
AST SERPL W P-5'-P-CCNC: 25 U/L (ref 14–36)
BACTERIA UR QL AUTO: ABNORMAL /HPF
BASOPHILS # BLD AUTO: 0.1 THOUSANDS/ΜL (ref 0–0.1)
BASOPHILS NFR BLD AUTO: 1 % (ref 0–1)
BILIRUB SERPL-MCNC: 0.6 MG/DL
BILIRUB UR QL STRIP: NEGATIVE
BUN SERPL-MCNC: 23 MG/DL (ref 5–25)
CALCIUM SERPL-MCNC: 9.6 MG/DL (ref 8.4–10.2)
CHLORIDE SERPL-SCNC: 97 MMOL/L (ref 97–108)
CLARITY UR: CLEAR
CO2 SERPL-SCNC: 30 MMOL/L (ref 22–30)
COLOR UR: ABNORMAL
CREAT SERPL-MCNC: 1.73 MG/DL (ref 0.6–1.2)
EOSINOPHIL # BLD AUTO: 0 THOUSAND/ΜL (ref 0–0.4)
EOSINOPHIL NFR BLD AUTO: 0 % (ref 0–6)
ERYTHROCYTE [DISTWIDTH] IN BLOOD BY AUTOMATED COUNT: 15.7 %
GFR SERPL CREATININE-BSD FRML MDRD: 27 ML/MIN/1.73SQ M
GLUCOSE SERPL-MCNC: 287 MG/DL (ref 65–140)
GLUCOSE SERPL-MCNC: 441 MG/DL (ref 70–99)
GLUCOSE UR STRIP-MCNC: ABNORMAL MG/DL
HCT VFR BLD AUTO: 38.6 % (ref 36–46)
HGB BLD-MCNC: 12.3 G/DL (ref 12–16)
HGB UR QL STRIP.AUTO: NEGATIVE
KETONES UR STRIP-MCNC: NEGATIVE MG/DL
LACTATE SERPL-SCNC: 1.1 MMOL/L (ref 0.7–2)
LACTATE SERPL-SCNC: 3.3 MMOL/L (ref 0.7–2)
LEUKOCYTE ESTERASE UR QL STRIP: NEGATIVE
LYMPHOCYTES # BLD AUTO: 1 THOUSANDS/ΜL (ref 0.5–4)
LYMPHOCYTES NFR BLD AUTO: 8 % (ref 25–45)
MCH RBC QN AUTO: 29.5 PG (ref 26–34)
MCHC RBC AUTO-ENTMCNC: 32 G/DL (ref 31–36)
MCV RBC AUTO: 92 FL (ref 80–100)
MONOCYTES # BLD AUTO: 1.1 THOUSAND/ΜL (ref 0.2–0.9)
MONOCYTES NFR BLD AUTO: 9 % (ref 1–10)
NEUTROPHILS # BLD AUTO: 9.5 THOUSANDS/ΜL (ref 1.8–7.8)
NEUTS SEG NFR BLD AUTO: 82 % (ref 45–65)
NITRITE UR QL STRIP: NEGATIVE
NON-SQ EPI CELLS URNS QL MICRO: ABNORMAL /HPF
NT-PROBNP SERPL-MCNC: 3370 PG/ML (ref 0–299)
PH UR STRIP.AUTO: 6 [PH]
PLATELET # BLD AUTO: 174 THOUSANDS/UL (ref 150–450)
PMV BLD AUTO: 11.1 FL (ref 8.9–12.7)
POTASSIUM SERPL-SCNC: 5 MMOL/L (ref 3.6–5)
PROT SERPL-MCNC: 6.9 G/DL (ref 5.9–8.4)
PROT UR STRIP-MCNC: ABNORMAL MG/DL
RBC # BLD AUTO: 4.19 MILLION/UL (ref 4–5.2)
RBC #/AREA URNS AUTO: ABNORMAL /HPF
SODIUM SERPL-SCNC: 140 MMOL/L (ref 137–147)
SP GR UR STRIP.AUTO: 1.01 (ref 1–1.04)
TROPONIN I SERPL-MCNC: 1.11 NG/ML (ref 0–0.03)
TROPONIN I SERPL-MCNC: 1.39 NG/ML (ref 0–0.03)
UROBILINOGEN UA: NEGATIVE MG/DL
WBC # BLD AUTO: 11.6 THOUSAND/UL (ref 4.5–11)
WBC #/AREA URNS AUTO: ABNORMAL /HPF

## 2019-07-05 PROCEDURE — 83880 ASSAY OF NATRIURETIC PEPTIDE: CPT | Performed by: EMERGENCY MEDICINE

## 2019-07-05 PROCEDURE — 83605 ASSAY OF LACTIC ACID: CPT | Performed by: EMERGENCY MEDICINE

## 2019-07-05 PROCEDURE — 36415 COLL VENOUS BLD VENIPUNCTURE: CPT | Performed by: EMERGENCY MEDICINE

## 2019-07-05 PROCEDURE — 93005 ELECTROCARDIOGRAM TRACING: CPT

## 2019-07-05 PROCEDURE — 82948 REAGENT STRIP/BLOOD GLUCOSE: CPT

## 2019-07-05 PROCEDURE — 80053 COMPREHEN METABOLIC PANEL: CPT | Performed by: EMERGENCY MEDICINE

## 2019-07-05 PROCEDURE — 96361 HYDRATE IV INFUSION ADD-ON: CPT

## 2019-07-05 PROCEDURE — 84484 ASSAY OF TROPONIN QUANT: CPT | Performed by: EMERGENCY MEDICINE

## 2019-07-05 PROCEDURE — 99285 EMERGENCY DEPT VISIT HI MDM: CPT | Performed by: EMERGENCY MEDICINE

## 2019-07-05 PROCEDURE — 81001 URINALYSIS AUTO W/SCOPE: CPT | Performed by: EMERGENCY MEDICINE

## 2019-07-05 PROCEDURE — 85025 COMPLETE CBC W/AUTO DIFF WBC: CPT | Performed by: EMERGENCY MEDICINE

## 2019-07-05 PROCEDURE — 99285 EMERGENCY DEPT VISIT HI MDM: CPT

## 2019-07-05 PROCEDURE — 74176 CT ABD & PELVIS W/O CONTRAST: CPT

## 2019-07-05 PROCEDURE — 96375 TX/PRO/DX INJ NEW DRUG ADDON: CPT

## 2019-07-05 PROCEDURE — 71045 X-RAY EXAM CHEST 1 VIEW: CPT

## 2019-07-05 RX ORDER — FENTANYL CITRATE 50 UG/ML
50 INJECTION, SOLUTION INTRAMUSCULAR; INTRAVENOUS ONCE
Status: COMPLETED | OUTPATIENT
Start: 2019-07-05 | End: 2019-07-05

## 2019-07-05 RX ADMIN — SODIUM CHLORIDE 500 ML: 9 INJECTION, SOLUTION INTRAVENOUS at 10:39

## 2019-07-05 RX ADMIN — FENTANYL CITRATE 50 MCG: 50 INJECTION INTRAMUSCULAR; INTRAVENOUS at 16:31

## 2019-07-05 NOTE — ED PROVIDER NOTES
History  Chief Complaint   Patient presents with    Vomiting     Nausea and vomiting reported since last night 19    Nausea     81-year-old female presents with complaint of nausea and vomiting  Symptoms began throughout the course of the day  She also complains of ongoing hip and lower back pain which is chronic and unchanged  There is no report of coughing, fever, chest pain  Patient is Azeri-speaking only and is an overall poor historian  Patient was noted initially on arrival in a be hypoxic in the upper 80s  She does not have a history of lung disease or of requiring oxygen chronically  Vomiting   Severity:  Moderate  Timing:  Intermittent  Quality:  Stomach contents  Progression:  Unchanged  Chronicity:  New  Recent urination:  Normal  Relieved by:  Nothing  Worsened by:  Nothing  Ineffective treatments:  None tried  Associated symptoms: no abdominal pain, no arthralgias, no chills, no cough, no diarrhea, no fever, no headaches, no myalgias, no sore throat and no URI        Prior to Admission Medications   Prescriptions Last Dose Informant Patient Reported? Taking?    HYDROcodone-acetaminophen (NORCO) 5-325 mg per tablet   No No   Sig: Take 1 tablet by mouth 2 (two) times a dayMax Daily Amount: 2 tablets   LINZESS 290 MCG CAPS   Yes No   Sig: TAKE ONE CAPSULE BY MOUTH DAILY ROSITA TOMASA CAPSULA POR VIA ORAL DIARIAMENTE   NOVOLOG FLEXPEN 100 units/mL injection pen   No No   Si UNITS 3 TIMES A DAY WITH MEALS [place] day supplies please   Patiromer Sorbitex Calcium 8 4 g PACK   No No   Sig: Take 1 each by mouth daily for 30 days   acetaminophen-codeine (TYLENOL #3) 300-30 mg per tablet   No No   Sig: Take 1 tablet by mouth every 4 (four) hours as needed for moderate pain   atorvastatin (LIPITOR) 40 mg tablet   No No   Sig: Take 1 tablet (40 mg total) by mouth every 24 hours   baclofen 10 mg tablet   No No   Sig: Take 1 tablet (10 mg total) by mouth 3 (three) times a day   brimonidine tartrate 0 2 % ophthalmic solution   Yes No   Sig: INSTILL 1 DROP INTO BOTH EYES 2 TIMES PER DAY   calcitonin, salmon, (MIACALCIN) 200 units/act nasal spray   No No   Si spray into each nostril daily   celecoxib (CeleBREX) 200 mg capsule   Yes No   Sig: TAKE ONE CAPSULE BY MOUTH TWICE DAILY ROSITA TOMASA CAPSULA POR VIA ORAL DOS VECES AL MAC   denosumab (PROLIA) 60 mg/mL   No No   Sig: Inject 1 mL (60 mg total) under the skin once for 1 dose   dorzolamide (TRUSOPT) 2 % ophthalmic solution   No No   Sig: Administer 1 drop to both eyes 3 (three) times a day   furosemide (LASIX) 40 mg tablet   No No   Sig: Take 1 tablet (40 mg total) by mouth daily   insulin degludec (TRESIBA FLEXTOUCH) 100 units/mL injection pen   No No   Sig: To use 25 U at bedtime     ipratropium (ATROVENT HFA) 17 mcg/act inhaler   No No   Sig: Inhale 2 puffs 4 (four) times a day  day supplies please   latanoprost (XALATAN) 0 005 % ophthalmic solution   No No   Sig: Administer 1 drop to both eyes daily at bedtime for 30 days   lisinopril (ZESTRIL) 10 mg tablet   No No   Sig: Take 1 tablet (10 mg total) by mouth daily   lisinopril (ZESTRIL) 5 mg tablet   No No   Sig: Take 1 tablet (5 mg total) by mouth daily   omeprazole (PriLOSEC) 20 mg delayed release capsule   No No   Sig: Take 1 capsule (20 mg total) by mouth daily  day supplies please   pentoxifylline (TRENtal) 400 mg ER tablet   Yes No   Sig: TAKE ONE TABLET BY MOUTH 3 TIMES A DAY WITH A MEAL ROSITA 1 TABLETA POR VIA ORAL JONAH VECES AL MAC WITH A MEAL   pregabalin (LYRICA) 100 mg capsule   No No   Sig: Take 1 capsule (100 mg total) by mouth 2 (two) times a day for 100 days   sitaGLIPtin (JANUVIA) 50 mg tablet   No No   Sig: Take 1 tablet (50 mg total) by mouth daily      Facility-Administered Medications: None       Past Medical History:   Diagnosis Date    COPD (chronic obstructive pulmonary disease) (HCC)     Diabetes mellitus (HCC)     Hyperlipidemia     Hyperlipidemia     Hypertension  Kidney stones     Osteoporosis        Past Surgical History:   Procedure Laterality Date    APPENDECTOMY      HAND SURGERY Right     HYSTERECTOMY      age 29    INCISION AND DRAINAGE OF WOUND Left 2019    Procedure: INCISION AND DRAINAGE (I&D) EXTREMITY;  Surgeon: Jose Enrique Mena MD;  [facility];  Service: General    LITHOTRIPSY      MASS EXCISION      remova of back wall mass    TONSILLECTOMY      TONSILLECTOMY         Family History   Problem Relation Age of Onset    Diabetes Mother     No Known Problems Father     Throat cancer Daughter     No Known Problems Maternal Grandmother     No Known Problems Maternal Grandfather     No Known Problems Paternal Grandmother     No Known Problems Paternal Grandfather      I have reviewed and agree with the history as documented  Social History     Tobacco Use    Smoking status: Former Smoker     Packs/day: 1 00     Years: 40 00     Pack years: 40 00     Types: Cigarettes     Last attempt to quit: 2017     Years since quittin 5    Smokeless tobacco: Never Used    Tobacco comment: states she quit but family living with her states she smokes   Substance Use Topics    Alcohol use: Not Currently     Comment: OCCASIONAL    Drug use: No        Review of Systems   Constitutional: Positive for fatigue  Negative for appetite change, chills and fever  HENT: Negative for postnasal drip, sinus pain, sore throat and trouble swallowing  Eyes: Negative for redness and itching  Respiratory: Negative for cough, chest tightness, shortness of breath and wheezing  Cardiovascular: Negative for chest pain and leg swelling  Gastrointestinal: Positive for vomiting  Negative for abdominal pain, constipation, diarrhea and nausea  Endocrine: Negative  Genitourinary: Negative for difficulty urinating and dysuria  Musculoskeletal: Positive for back pain  Negative for arthralgias and myalgias  Skin: Negative for rash  Allergic/Immunologic: Negative  Neurological: Negative for dizziness, numbness and headaches  Hematological: Negative  Psychiatric/Behavioral: Negative  Physical Exam  Physical Exam   Constitutional: She is oriented to person, place, and time  She appears well-developed and well-nourished  HENT:   Head: Normocephalic and atraumatic  Nose: Nose normal    Mouth/Throat: Oropharynx is clear and moist    Eyes: Pupils are equal, round, and reactive to light  Conjunctivae and EOM are normal    Neck: Normal range of motion  Neck supple  Cardiovascular: Normal rate, regular rhythm and normal heart sounds  Pulmonary/Chest: Effort normal and breath sounds normal  No respiratory distress  She has no wheezes  She exhibits no tenderness  Abdominal: Soft  Bowel sounds are normal  There is no tenderness  There is no guarding  Musculoskeletal: She exhibits no edema, tenderness or deformity  Neurological: She is alert and oriented to person, place, and time  Skin: Skin is warm and dry  Capillary refill takes less than 2 seconds  No rash noted  Psychiatric: She has a normal mood and affect  Her behavior is normal    Nursing note and vitals reviewed        Vital Signs  ED Triage Vitals [07/05/19 0955]   Temperature Pulse Respirations Blood Pressure SpO2   99 2 °F (37 3 °C) 92 18 163/82 (!) 89 %      Temp Source Heart Rate Source Patient Position - Orthostatic VS BP Location FiO2 (%)   Tympanic Monitor Lying Left arm --      Pain Score       7           Vitals:    07/05/19 1359 07/05/19 1401 07/05/19 1430 07/05/19 1530   BP: (!) 123/101 146/77 137/84 148/87   Pulse: 79  80 78   Patient Position - Orthostatic VS: Lying Lying Lying          Visual Acuity      ED Medications  Medications   iohexol (OMNIPAQUE) 240 MG/ML solution 50 mL (has no administration in time range)   sodium chloride 0 9 % bolus 500 mL (0 mL Intravenous Stopped 7/5/19 1118)   fentanyl citrate (PF) 100 MCG/2ML 50 mcg (50 mcg Intravenous Given 7/5/19 1631)       Diagnostic Studies  Results Reviewed     Procedure Component Value Units Date/Time    Troponin I [[id number]] Collected:  07/05/19 1635    Lab Status:   In process Specimen:  Blood from Arm, Right Updated:  07/05/19 1642    B-type natriuretic peptide [[id number]]  (Abnormal) Collected:  07/05/19 1029    Lab Status:  Final result Specimen:  Blood from Arm, Left Updated:  07/05/19 1430     NT-proBNP 3,370 pg/mL     Troponin I [[id number]]  (Abnormal) Collected:  07/05/19 1347    Lab Status:  Final result Specimen:  Blood from Arm, Right Updated:  07/05/19 1418     Troponin I 1 11 ng/mL     Urine Microscopic [[id number]]  (Abnormal) Collected:  07/05/19 1258    Lab Status:  Final result Specimen:  Urine, Indwelling Mejia Catheter Updated:  07/05/19 1323     RBC, UA None Seen /hpf      WBC, UA 0-1 /hpf      Epithelial Cells None Seen /hpf      Bacteria, UA None Seen /hpf     UA w Reflex to Microscopic [[id number]]  (Abnormal) Collected:  07/05/19 1258    Lab Status:  Final result Specimen:  Urine, Indwelling Mejia Catheter Updated:  07/05/19 1313     Color, UA Straw     Clarity, UA Clear     Specific Gravity, UA 1 010     pH, UA 6 0     Leukocytes, UA Negative     Nitrite, UA Negative     Protein, UA 15 (Trace) mg/dl      Glucose, UA >=1000 (1%) mg/dl      Ketones, UA Negative mg/dl      Bilirubin, UA Negative     Blood, UA Negative     UROBILINOGEN UA Negative mg/dL     Comprehensive metabolic panel [[id number]]  (Abnormal) Collected:  07/05/19 1029    Lab Status:  Final result Specimen:  Blood from Arm, Left Updated:  07/05/19 1103     Sodium 140 mmol/L      Potassium 5 0 mmol/L      Chloride 97 mmol/L      CO2 30 mmol/L      ANION GAP 13 mmol/L      BUN 23 mg/dL      Creatinine 1 73 mg/dL      Glucose 441 mg/dL      Calcium 9 6 mg/dL      AST 25 U/L      ALT 21 U/L      Alkaline Phosphatase 151 U/L      Total Protein 6 9 g/dL      Albumin 4 1 g/dL      Total Bilirubin 0 60 mg/dL eGFR 27 ml/min/1 73sq m     Narrative:       [place] guidelines for Chronic Kidney Disease (CKD):     Stage 1 with normal or high GFR (GFR > 90 mL/min/1 73 square meters)    Stage 2 Mild CKD (GFR = 60-89 mL/min/1 73 square meters)    Stage 3A Moderate CKD (GFR = 45-59 mL/min/1 73 square meters)    Stage 3B Moderate CKD (GFR = 30-44 mL/min/1 73 square meters)    Stage 4 Severe CKD (GFR = 15-29 mL/min/1 73 square meters)    Stage 5 End Stage CKD (GFR <15 mL/min/1 73 square meters)  Note: GFR calculation is accurate only with a steady state creatinine    Lactic acid, plasma [[id number]]  (Abnormal) Collected:  07/05/19 1029    Lab Status:  Final result Specimen:  Blood from Arm, Left Updated:  07/05/19 1101     LACTIC ACID 3 3 mmol/L     Narrative:       Result may be elevated if tourniquet was used during collection  CBC and differential [[id number]]  (Abnormal) Collected:  07/05/19 1029    Lab Status:  Final result Specimen:  Blood from Arm, Left Updated:  07/05/19 1053     WBC 11 60 Thousand/uL      RBC 4 19 Million/uL      Hemoglobin 12 3 g/dL      Hematocrit 38 6 %      MCV 92 fL      MCH 29 5 pg      MCHC 32 0 g/dL      RDW 15 7 %      MPV 11 1 fL      Platelets 832 Thousands/uL      Neutrophils Relative 82 %      Lymphocytes Relative 8 %      Monocytes Relative 9 %      Eosinophils Relative 0 %      Basophils Relative 1 %      Neutrophils Absolute 9 50 Thousands/µL      Lymphocytes Absolute 1 00 Thousands/µL      Monocytes Absolute 1 10 Thousand/µL      Eosinophils Absolute 0 00 Thousand/µL      Basophils Absolute 0 10 Thousands/µL                  XR chest 1 view   Final Result by Christopher Greenberg MD (07/05 1303)      No acute cardiopulmonary disease              Workstation performed: [id number]         CT abdomen pelvis wo contrast   Final Result by Patrick Childers MD (07/05 1213)      Bilateral focal areas of renal cortical scarring and dystrophic calcification appearing stable  Mild hydronephrosis bilaterally, left greater than right  On the right no discrete etiology for the increased dilatation  Nonobstructing stone within the right renal pelvis, increased in size but appearing nondependent as above  Left-sided hydronephrosis appears secondary to a distal left ureteral stone as described above  Bilateral nonobstructing nephrolithiasis  The study was marked in [facility] for immediate notification  Workstation performed: [id number]                    Procedures  ECG 12 Lead Documentation Only  Date/Time: 7/5/2019 10:19 AM  Performed by: Jacek Jimenez DO  Authorized by: Jacek Jimenez DO     ECG reviewed by me, the ED Provider: yes    Patient location:  ED  Rate:     ECG rate:  89    ECG rate assessment: normal    Rhythm:     Rhythm: sinus rhythm    Ectopy:     Ectopy: none    Conduction:     Conduction: abnormal      Abnormal conduction: complete RBBB    ST segments:     ST segments:  Non-specific  T waves:     T waves: non-specific      ECG 12 Lead Documentation Only  Date/Time: 7/5/2019 2:44 PM  Performed by: Jacek Jimenez DO  Authorized by: Jacek Jimenez DO     Rate:     ECG rate:  79  Rhythm:     Rhythm: sinus rhythm    Conduction:     Conduction: abnormal      Abnormal conduction: complete RBBB    ST segments:     ST segments:  Non-specific  T waves:     T waves: non-specific             ED Course  ED Course as of Jul 05 1712   Fri Jul 05, 2019   1428 Discussed case with Dr Theron Simpson who states that he believes the etiology is likely noncardiac  He is agreeable to keeping the patient at this facility and repeating/trending troponin levels  A repeat EKG is pending at this time  [address] Discussed case with Dr Christel Troy  Given the renal stone, patient will not be able to be admitted here as we do not currently have urology coverage                        Initial Sepsis Screening     Row Name 07/05/19 9426                Is the patient's history suggestive of a new or worsening infection? (!) Yes (Proceed)  -AK        Suspected source of infection  acute abdominal infection  -AK        Are two or more of the following signs & symptoms of infection both present and new to the patient? (!) Yes (Proceed)  -AK        Indicate SIRS criteria  Tachycardia > 90 bpm;Altered mental status ? borderline altered MS - does not answer questions consistently  -AK        If the answer is yes to both questions, suspicion of sepsis is present          If severe sepsis is present AND tissue hypoperfusion perists in the hour after fluid resuscitation or lactate > 4, the patient meets criteria for SEPTIC SHOCK          Are any of the following organ dysfunction criteria present within 6 hours of suspected infection and SIRS criteria that are NOT considered to be chronic conditions? No  -AK        Organ dysfunction  Lactate > 2 0 mmol/L  -AK        Date of presentation of severe sepsis  07/05/19  -AK        Time of presentation of severe sepsis  1510  -AK        Tissue hypoperfusion persists in the hour after crystalloid fluid administration, evidenced, by either:          Was hypotension present within one hour of the conclusion of crystalloid fluid administration?         Date of presentation of septic shock          Time of presentation of septic shock            User Key  (r) = Recorded By, (t) = Taken By, (c) = Cosigned By    [address] Name Provider Type    [address] [place] [address] Ne, DO Physician                  MDM  Number of Diagnoses or Management Options  Elevated troponin:   Kidney stone:   Nausea & vomiting:   Sepsis [facility]):   Diagnosis management comments: 52-year-old female presents with complaint of nausea and vomiting  She also is complaining of back and bilateral hip pain  She has a history of ongoing back and hip pain and her current complaint does not appear to be acutely different from that    On arrival the patient's oxygen saturation was noted to be in the upper 80s which is unusual for her  She denies any chest pain, difficulty breathing, cough, fever, or other acute infectious symptoms  Chest x-ray is unremarkable for any acute findings  CT shows a left ureteral stone with secondary hydronephrosis  Patient's lactic acid was noted to be elevated at 3 3  She was given a 500 cc bolus of normal saline  With her elevated BNP additional fluid boluses were not given despite her elevated lactic acid level  Further studies included a troponin and BNP which were both found to be elevated  I initially spoke with Cardiology who stated the patient could remain at [facility] for ongoing trending of the troponin further treatment  I spoke with the medical team in due to not having urology coverage, she recommends that we transfer to [facility] for ongoing treatment  There is no clear source of infection and there is concern an underlying non ST elevation MI causing the elevated lactic acid  A repeat EKG remained essentially unremarkable  Patient is to be transferred to [facility] for ongoing treatment and workup  I spoke with Dr Geoff Vargas requests given the possibility of sepsis that we give a dose of antibiotic now          Amount and/or Complexity of Data Reviewed  Clinical lab tests: ordered and reviewed  Tests in the radiology section of CPT®: ordered and reviewed  Tests in the medicine section of CPT®: reviewed and ordered  Discuss the patient with other providers: yes  Independent visualization of images, tracings, or specimens: yes        Disposition  Final diagnoses:   Nausea & vomiting   Elevated troponin   Kidney stone   Sepsis ([facility] [address] )     Time reflects when diagnosis was documented in both MDM as applicable and the Disposition within this note     Time User Action Codes Description Comment    7/5/2019  4:18 PM Hany Greenberg Add [R11 2] Nausea & vomiting     7/5/2019  4:18 PM Hany Greenberg Add [R74 8] Elevated troponin     7/5/2019  4:19 PM Delfin Dee Add [N20 0] Kidney stone     7/5/2019  4:21 PM Delfin Dee Add [A41 9] Sepsis [facility])       ED Disposition     ED Disposition Condition Date/Time Comment    Transfer to Another Facility-In Network  Fri Jul 5, 2019  4:18 PM Briana Bonilla should be transferred out to [facility]  Follow-up Information    None         Patient's Medications   Discharge Prescriptions    No medications on file     No discharge procedures on file      ED Provider  Electronically Signed by           Rock Dash DO  07/05/19 7302

## 2019-07-05 NOTE — ED NOTES
This RN placed patient on 2L of oxygen via nasal cannula for pulse ox of 88% on room air  Dr Devin Centeno made aware       Mick Taylor, RN  07/05/19 [address], RN  07/05/19 1005

## 2019-07-05 NOTE — SEPSIS NOTE
Sepsis Note   Catalino Tori 81 y o  female MRN: [id number]  Unit/Bed#: ED 08 Encounter: [id number]      qSOFA     Row Name 07/05/19 1530 07/05/19 1430 07/05/19 1401 07/05/19 1359 07/05/19 1245    Altered mental status GCS < 15              Respiratory Rate > / =22  0  0    0  0    Systolic BP < / =221  0  0  0  0  0    Q Sofa Score  0  0  0  0  0    Row Name 07/05/19 1115 07/05/19 1030 07/05/19 1000 07/05/19 0955       Altered mental status GCS < 15             Respiratory Rate > / =22  0  0  0  0     Systolic BP < / =438  1  0  0  0     Q Sofa Score  1  0  0  0         Initial Sepsis Screening     Row Name 07/05/19 1709                Is the patient's history suggestive of a new or worsening infection? (!) Yes (Proceed)  -AK        Suspected source of infection  acute abdominal infection  -AK        Are two or more of the following signs & symptoms of infection both present and new to the patient? (!) Yes (Proceed)  -AK        Indicate SIRS criteria  Tachycardia > 90 bpm;Altered mental status ? borderline altered MS - does not answer questions consistently  -AK        If the answer is yes to both questions, suspicion of sepsis is present          If severe sepsis is present AND tissue hypoperfusion perists in the hour after fluid resuscitation or lactate > 4, the patient meets criteria for SEPTIC SHOCK          Are any of the following organ dysfunction criteria present within 6 hours of suspected infection and SIRS criteria that are NOT considered to be chronic conditions? No  -AK        Organ dysfunction  Lactate > 2 0 mmol/L  -AK        Date of presentation of severe sepsis  07/05/19  -AK        Time of presentation of severe sepsis  1510  -AK        Tissue hypoperfusion persists in the hour after crystalloid fluid administration, evidenced, by either:          Was hypotension present within one hour of the conclusion of crystalloid fluid administration?           Date of presentation of septic shock          Time of presentation of septic shock            User Key  (r) = Recorded By, (t) = Taken By, (c) = Cosigned By    Initials Name Provider Type    [address] [facility] Ne, DO Physician

## 2019-07-05 NOTE — EMTALA/ACUTE CARE TRANSFER
[facility] EMERGENCY DEPARTMENT  [address] [facility] [address]  [phone]  Dept: [facility] TRANSFER CONSENT    NAME Joe Hinds                                         1935                              MRN [id number]    I have been informed of my rights regarding examination, treatment, and transfer   by Dr Jeremi Basilio DO    Benefits:      Risks:        Consent for Transfer:  I acknowledge that my medical condition has been evaluated and explained to me by the emergency department physician or other qualified medical person and/or my attending physician, who has recommended that I be transferred to the service of    at    The above potential benefits of such transfer, the potential risks associated with such transfer, and the probable risks of not being transferred have been explained to me, and I fully understand them  The doctor has explained that, in my case, the benefits of transfer outweigh the risks  I agree to be transferred  I authorize the performance of emergency medical procedures and treatments upon me in both transit and upon arrival at the receiving facility  Additionally, I authorize the release of any and all medical records to the receiving facility and request they be transported with me, if possible  I understand that the safest mode of transportation during a medical emergency is an ambulance and that the Hospital advocates the use of this mode of transport  Risks of traveling to the receiving facility by car, including absence of medical control, life sustaining equipment, such as oxygen, and medical personnel has been explained to me and I fully understand them  (ABEL CORRECT BOX BELOW)  [  ]  I consent to the stated transfer and to be transported by ambulance/helicopter  [  ]  I consent to the stated transfer, but refuse transportation by ambulance and accept full responsibility for my transportation by car    I understand the risks of non-ambulance transfers and I exonerate the Hospital and its staff from any deterioration in my condition that results from this refusal     X___________________________________________    DATE  19  TIME________  Signature of patient or legally responsible individual signing on patient behalf           RELATIONSHIP TO PATIENT_________________________          Provider Certification    NAME Kacey Vasquez                                         1935                              MRN [id number]    A medical screening exam was performed on the above named patient  Based on the examination:    Condition Necessitating Transfer The primary encounter diagnosis was Nausea & vomiting  Diagnoses of Elevated troponin, Kidney stone, and Sepsis ([facility] [address] ) were also pertinent to this visit  Patient Condition:      Reason for Transfer:      Transfer Requirements: Facility     · Space available and qualified personnel available for treatment as acknowledged by    · Agreed to accept transfer and to provide appropriate medical treatment as acknowledged by          · Appropriate medical records of the examination and treatment of the patient are provided at the time of transfer   [address] [facility], [address] _______  · Transfer will be performed by qualified personnel from    and appropriate transfer equipment as required, including the use of necessary and appropriate life support measures      Provider Certification: I have examined the patient and explained the following risks and benefits of being transferred/refusing transfer to the patient/family:         Based on these reasonable risks and benefits to the patient and/or the unborn child(azra), and based upon the information available at the time of the patients examination, I certify that the medical benefits reasonably to be expected from the provision of appropriate medical treatments at another medical facility outweigh the increasing risks, if any, to the individuals medical condition, and in the case of labor to the unborn child, from effecting the transfer      X____________________________________________ DATE 07/05/19        TIME_______      ORIGINAL - SEND TO MEDICAL RECORDS   COPY - SEND WITH PATIENT DURING TRANSFER

## 2019-07-06 ENCOUNTER — HOSPITAL ENCOUNTER (INPATIENT)
Facility: HOSPITAL | Age: 84
LOS: 4 days | Discharge: HOME HEALTH | DRG: 280 | End: 2019-07-10
Attending: HOSPITALIST | Admitting: INTERNAL MEDICINE
Payer: MEDICARE

## 2019-07-06 ENCOUNTER — APPOINTMENT (INPATIENT)
Dept: MRI IMAGING | Facility: HOSPITAL | Age: 84
DRG: 280 | End: 2019-07-06
Payer: MEDICARE

## 2019-07-06 VITALS
RESPIRATION RATE: 20 BRPM | TEMPERATURE: 97.1 F | HEART RATE: 81 BPM | DIASTOLIC BLOOD PRESSURE: 79 MMHG | WEIGHT: 169.31 LBS | SYSTOLIC BLOOD PRESSURE: 151 MMHG | OXYGEN SATURATION: 97 % | BODY MASS INDEX: 29.06 KG/M2

## 2019-07-06 DIAGNOSIS — N20.0 NEPHROLITHIASIS: Primary | ICD-10-CM

## 2019-07-06 DIAGNOSIS — G89.29 CHRONIC BILATERAL LOW BACK PAIN WITHOUT SCIATICA: ICD-10-CM

## 2019-07-06 DIAGNOSIS — I21.4 NSTEMI (NON-ST ELEVATED MYOCARDIAL INFARCTION) (HCC): ICD-10-CM

## 2019-07-06 DIAGNOSIS — M54.50 CHRONIC BILATERAL LOW BACK PAIN WITHOUT SCIATICA: ICD-10-CM

## 2019-07-06 DIAGNOSIS — N20.1 URETEROLITHIASIS: ICD-10-CM

## 2019-07-06 DIAGNOSIS — R77.8 ELEVATED TROPONIN: ICD-10-CM

## 2019-07-06 PROBLEM — N17.9 ACUTE KIDNEY INJURY SUPERIMPOSED ON CHRONIC KIDNEY DISEASE (HCC): Status: ACTIVE | Noted: 2019-07-06

## 2019-07-06 PROBLEM — N18.9 CHRONIC KIDNEY DISEASE: Status: ACTIVE | Noted: 2019-07-06

## 2019-07-06 LAB
ANION GAP SERPL CALCULATED.3IONS-SCNC: 9 MMOL/L (ref 4–13)
APTT PPP: 25 SECONDS (ref 23–34)
BASOPHILS # BLD AUTO: 0.04 THOUSANDS/ΜL (ref 0–0.1)
BASOPHILS NFR BLD AUTO: 0 % (ref 0–1)
BUN SERPL-MCNC: 20 MG/DL (ref 5–25)
CALCIUM SERPL-MCNC: 9.7 MG/DL (ref 8.3–10.1)
CHLORIDE SERPL-SCNC: 102 MMOL/L (ref 100–108)
CO2 SERPL-SCNC: 29 MMOL/L (ref 21–32)
CREAT SERPL-MCNC: 1.47 MG/DL (ref 0.6–1.3)
EOSINOPHIL # BLD AUTO: 0.11 THOUSAND/ΜL (ref 0–0.61)
EOSINOPHIL NFR BLD AUTO: 1 % (ref 0–6)
ERYTHROCYTE [DISTWIDTH] IN BLOOD BY AUTOMATED COUNT: 14.7 % (ref 11.6–15.1)
GFR SERPL CREATININE-BSD FRML MDRD: 33 ML/MIN/1.73SQ M
GLUCOSE SERPL-MCNC: 101 MG/DL (ref 65–140)
GLUCOSE SERPL-MCNC: 245 MG/DL (ref 65–140)
GLUCOSE SERPL-MCNC: 255 MG/DL (ref 65–140)
GLUCOSE SERPL-MCNC: 272 MG/DL (ref 65–140)
GLUCOSE SERPL-MCNC: 272 MG/DL (ref 65–140)
HCT VFR BLD AUTO: 38.2 % (ref 34.8–46.1)
HGB BLD-MCNC: 12 G/DL (ref 11.5–15.4)
IMM GRANULOCYTES # BLD AUTO: 0.03 THOUSAND/UL (ref 0–0.2)
IMM GRANULOCYTES NFR BLD AUTO: 0 % (ref 0–2)
INR PPP: 1.03 (ref 0.89–1.1)
LYMPHOCYTES # BLD AUTO: 1.8 THOUSANDS/ΜL (ref 0.6–4.47)
LYMPHOCYTES NFR BLD AUTO: 16 % (ref 14–44)
MCH RBC QN AUTO: 29.9 PG (ref 26.8–34.3)
MCHC RBC AUTO-ENTMCNC: 31.4 G/DL (ref 31.4–37.4)
MCV RBC AUTO: 95 FL (ref 82–98)
MONOCYTES # BLD AUTO: 0.89 THOUSAND/ΜL (ref 0.17–1.22)
MONOCYTES NFR BLD AUTO: 8 % (ref 4–12)
NEUTROPHILS # BLD AUTO: 8.12 THOUSANDS/ΜL (ref 1.85–7.62)
NEUTS SEG NFR BLD AUTO: 75 % (ref 43–75)
NRBC BLD AUTO-RTO: 0 /100 WBCS
PLATELET # BLD AUTO: 162 THOUSANDS/UL (ref 149–390)
PMV BLD AUTO: 12.9 FL (ref 8.9–12.7)
POTASSIUM SERPL-SCNC: 4.1 MMOL/L (ref 3.5–5.3)
PROTHROMBIN TIME: 10.9 SECONDS (ref 9.5–11.6)
RBC # BLD AUTO: 4.02 MILLION/UL (ref 3.81–5.12)
SODIUM SERPL-SCNC: 140 MMOL/L (ref 136–145)
TROPONIN I SERPL-MCNC: 1.21 NG/ML
TROPONIN I SERPL-MCNC: 1.29 NG/ML (ref 0–0.03)
TROPONIN I SERPL-MCNC: 1.43 NG/ML
TROPONIN I SERPL-MCNC: 1.62 NG/ML
WBC # BLD AUTO: 10.99 THOUSAND/UL (ref 4.31–10.16)

## 2019-07-06 PROCEDURE — 36415 COLL VENOUS BLD VENIPUNCTURE: CPT | Performed by: EMERGENCY MEDICINE

## 2019-07-06 PROCEDURE — 99222 1ST HOSP IP/OBS MODERATE 55: CPT | Performed by: UROLOGY

## 2019-07-06 PROCEDURE — 99223 1ST HOSP IP/OBS HIGH 75: CPT | Performed by: PHYSICIAN ASSISTANT

## 2019-07-06 PROCEDURE — 96365 THER/PROPH/DIAG IV INF INIT: CPT

## 2019-07-06 PROCEDURE — 80048 BASIC METABOLIC PNL TOTAL CA: CPT | Performed by: PHYSICIAN ASSISTANT

## 2019-07-06 PROCEDURE — 85730 THROMBOPLASTIN TIME PARTIAL: CPT | Performed by: EMERGENCY MEDICINE

## 2019-07-06 PROCEDURE — 99222 1ST HOSP IP/OBS MODERATE 55: CPT | Performed by: INTERNAL MEDICINE

## 2019-07-06 PROCEDURE — 96375 TX/PRO/DX INJ NEW DRUG ADDON: CPT

## 2019-07-06 PROCEDURE — 96366 THER/PROPH/DIAG IV INF ADDON: CPT

## 2019-07-06 PROCEDURE — 85610 PROTHROMBIN TIME: CPT | Performed by: EMERGENCY MEDICINE

## 2019-07-06 PROCEDURE — 72148 MRI LUMBAR SPINE W/O DYE: CPT

## 2019-07-06 PROCEDURE — 84484 ASSAY OF TROPONIN QUANT: CPT | Performed by: PHYSICIAN ASSISTANT

## 2019-07-06 PROCEDURE — 85025 COMPLETE CBC W/AUTO DIFF WBC: CPT | Performed by: PHYSICIAN ASSISTANT

## 2019-07-06 PROCEDURE — 82948 REAGENT STRIP/BLOOD GLUCOSE: CPT

## 2019-07-06 RX ORDER — SODIUM CHLORIDE 9 MG/ML
125 INJECTION, SOLUTION INTRAVENOUS CONTINUOUS
Status: CANCELLED | OUTPATIENT
Start: 2019-07-07

## 2019-07-06 RX ORDER — PREGABALIN 50 MG/1
100 CAPSULE ORAL DAILY
Status: DISCONTINUED | OUTPATIENT
Start: 2019-07-06 | End: 2019-07-10 | Stop reason: HOSPADM

## 2019-07-06 RX ORDER — BRIMONIDINE TARTRATE 0.15 %
1 DROPS OPHTHALMIC (EYE) 3 TIMES DAILY
Status: DISCONTINUED | OUTPATIENT
Start: 2019-07-06 | End: 2019-07-10 | Stop reason: HOSPADM

## 2019-07-06 RX ORDER — HYDROCODONE BITARTRATE AND ACETAMINOPHEN 5; 325 MG/1; MG/1
1 TABLET ORAL EVERY 6 HOURS PRN
Status: DISCONTINUED | OUTPATIENT
Start: 2019-07-06 | End: 2019-07-08

## 2019-07-06 RX ORDER — TAMSULOSIN HYDROCHLORIDE 0.4 MG/1
0.4 CAPSULE ORAL
Status: DISCONTINUED | OUTPATIENT
Start: 2019-07-06 | End: 2019-07-10 | Stop reason: HOSPADM

## 2019-07-06 RX ORDER — FUROSEMIDE 40 MG/1
40 TABLET ORAL DAILY
Status: DISCONTINUED | OUTPATIENT
Start: 2019-07-06 | End: 2019-07-06

## 2019-07-06 RX ORDER — LIDOCAINE 50 MG/G
1 PATCH TOPICAL DAILY
Status: DISCONTINUED | OUTPATIENT
Start: 2019-07-06 | End: 2019-07-10 | Stop reason: HOSPADM

## 2019-07-06 RX ORDER — ASPIRIN 325 MG
325 TABLET ORAL ONCE
Status: COMPLETED | OUTPATIENT
Start: 2019-07-06 | End: 2019-07-06

## 2019-07-06 RX ORDER — BACLOFEN 10 MG/1
10 TABLET ORAL 3 TIMES DAILY
Status: DISCONTINUED | OUTPATIENT
Start: 2019-07-06 | End: 2019-07-06

## 2019-07-06 RX ORDER — HEPARIN SODIUM 5000 [USP'U]/ML
5000 INJECTION, SOLUTION INTRAVENOUS; SUBCUTANEOUS EVERY 8 HOURS SCHEDULED
Status: DISCONTINUED | OUTPATIENT
Start: 2019-07-07 | End: 2019-07-10 | Stop reason: HOSPADM

## 2019-07-06 RX ORDER — PENTOXIFYLLINE 400 MG/1
400 TABLET, EXTENDED RELEASE ORAL
Status: DISCONTINUED | OUTPATIENT
Start: 2019-07-06 | End: 2019-07-10 | Stop reason: HOSPADM

## 2019-07-06 RX ORDER — LORAZEPAM 2 MG/ML
0.5 INJECTION INTRAMUSCULAR ONCE
Status: COMPLETED | OUTPATIENT
Start: 2019-07-06 | End: 2019-07-06

## 2019-07-06 RX ORDER — DORZOLAMIDE HCL 20 MG/ML
1 SOLUTION/ DROPS OPHTHALMIC 3 TIMES DAILY
Status: DISCONTINUED | OUTPATIENT
Start: 2019-07-06 | End: 2019-07-10 | Stop reason: HOSPADM

## 2019-07-06 RX ORDER — LATANOPROST 50 UG/ML
1 SOLUTION/ DROPS OPHTHALMIC
Status: DISCONTINUED | OUTPATIENT
Start: 2019-07-06 | End: 2019-07-06

## 2019-07-06 RX ORDER — HEPARIN SODIUM 1000 [USP'U]/ML
4000 INJECTION, SOLUTION INTRAVENOUS; SUBCUTANEOUS AS NEEDED
Status: DISCONTINUED | OUTPATIENT
Start: 2019-07-06 | End: 2019-07-06 | Stop reason: HOSPADM

## 2019-07-06 RX ORDER — HEPARIN SODIUM 10000 [USP'U]/100ML
3-20 INJECTION, SOLUTION INTRAVENOUS
Status: DISCONTINUED | OUTPATIENT
Start: 2019-07-06 | End: 2019-07-06 | Stop reason: HOSPADM

## 2019-07-06 RX ORDER — LISINOPRIL 10 MG/1
10 TABLET ORAL DAILY
Status: DISCONTINUED | OUTPATIENT
Start: 2019-07-06 | End: 2019-07-06

## 2019-07-06 RX ORDER — HYDROCODONE BITARTRATE AND ACETAMINOPHEN 5; 325 MG/1; MG/1
1 TABLET ORAL EVERY 6 HOURS PRN
Status: DISCONTINUED | OUTPATIENT
Start: 2019-07-06 | End: 2019-07-06

## 2019-07-06 RX ORDER — HEPARIN SODIUM 1000 [USP'U]/ML
2000 INJECTION, SOLUTION INTRAVENOUS; SUBCUTANEOUS AS NEEDED
Status: DISCONTINUED | OUTPATIENT
Start: 2019-07-06 | End: 2019-07-06 | Stop reason: HOSPADM

## 2019-07-06 RX ORDER — HEPARIN SODIUM 5000 [USP'U]/ML
INJECTION, SOLUTION INTRAVENOUS; SUBCUTANEOUS
Status: COMPLETED
Start: 2019-07-06 | End: 2019-07-06

## 2019-07-06 RX ORDER — HYDRALAZINE HYDROCHLORIDE 20 MG/ML
5 INJECTION INTRAMUSCULAR; INTRAVENOUS EVERY 6 HOURS PRN
Status: DISCONTINUED | OUTPATIENT
Start: 2019-07-06 | End: 2019-07-10 | Stop reason: HOSPADM

## 2019-07-06 RX ORDER — HEPARIN SODIUM 1000 [USP'U]/ML
4000 INJECTION, SOLUTION INTRAVENOUS; SUBCUTANEOUS ONCE
Status: COMPLETED | OUTPATIENT
Start: 2019-07-06 | End: 2019-07-06

## 2019-07-06 RX ORDER — ATORVASTATIN CALCIUM 40 MG/1
40 TABLET, FILM COATED ORAL
Status: DISCONTINUED | OUTPATIENT
Start: 2019-07-06 | End: 2019-07-10 | Stop reason: HOSPADM

## 2019-07-06 RX ORDER — ONDANSETRON 2 MG/ML
4 INJECTION INTRAMUSCULAR; INTRAVENOUS EVERY 6 HOURS PRN
Status: DISCONTINUED | OUTPATIENT
Start: 2019-07-06 | End: 2019-07-10 | Stop reason: HOSPADM

## 2019-07-06 RX ORDER — PANTOPRAZOLE SODIUM 40 MG/1
40 TABLET, DELAYED RELEASE ORAL
Status: DISCONTINUED | OUTPATIENT
Start: 2019-07-06 | End: 2019-07-10 | Stop reason: HOSPADM

## 2019-07-06 RX ADMIN — LORAZEPAM 0.5 MG: 2 INJECTION INTRAMUSCULAR; INTRAVENOUS at 03:51

## 2019-07-06 RX ADMIN — BRIMONIDINE TARTRATE 1 DROP: 1.5 SOLUTION OPHTHALMIC at 21:58

## 2019-07-06 RX ADMIN — TAMSULOSIN HYDROCHLORIDE 0.4 MG: 0.4 CAPSULE ORAL at 17:35

## 2019-07-06 RX ADMIN — LIDOCAINE 1 PATCH: 50 PATCH TOPICAL at 08:37

## 2019-07-06 RX ADMIN — HEPARIN SODIUM 4000 UNITS: 1000 INJECTION, SOLUTION INTRAVENOUS; SUBCUTANEOUS at 00:51

## 2019-07-06 RX ADMIN — LIDOCAINE 1 PATCH: 50 PATCH TOPICAL at 12:51

## 2019-07-06 RX ADMIN — DORZOLAMIDE HYDROCHLORIDE 1 DROP: 20 SOLUTION/ DROPS OPHTHALMIC at 21:53

## 2019-07-06 RX ADMIN — PANTOPRAZOLE SODIUM 40 MG: 40 TABLET, DELAYED RELEASE ORAL at 06:46

## 2019-07-06 RX ADMIN — BRIMONIDINE TARTRATE 1 DROP: 1.5 SOLUTION OPHTHALMIC at 17:44

## 2019-07-06 RX ADMIN — DORZOLAMIDE HYDROCHLORIDE 1 DROP: 20 SOLUTION/ DROPS OPHTHALMIC at 08:37

## 2019-07-06 RX ADMIN — HYDROCODONE BITARTRATE AND ACETAMINOPHEN 1 TABLET: 5; 325 TABLET ORAL at 21:53

## 2019-07-06 RX ADMIN — HYDROCODONE BITARTRATE AND ACETAMINOPHEN 1 TABLET: 5; 325 TABLET ORAL at 15:26

## 2019-07-06 RX ADMIN — ASPIRIN 325 MG ORAL TABLET 325 MG: 325 PILL ORAL at 00:55

## 2019-07-06 RX ADMIN — ATORVASTATIN CALCIUM 40 MG: 40 TABLET, FILM COATED ORAL at 17:34

## 2019-07-06 RX ADMIN — HEPARIN SODIUM AND DEXTROSE 12 UNITS/KG/HR: 10000; 5 INJECTION INTRAVENOUS at 00:54

## 2019-07-06 RX ADMIN — HEPARIN SODIUM 4000 UNITS: 5000 INJECTION INTRAVENOUS; SUBCUTANEOUS at 00:50

## 2019-07-06 RX ADMIN — DORZOLAMIDE HYDROCHLORIDE 1 DROP: 20 SOLUTION/ DROPS OPHTHALMIC at 17:35

## 2019-07-06 RX ADMIN — PENTOXIFYLLINE 400 MG: 400 TABLET, EXTENDED RELEASE ORAL at 08:37

## 2019-07-06 RX ADMIN — PREGABALIN 100 MG: 50 CAPSULE ORAL at 08:37

## 2019-07-06 RX ADMIN — PENTOXIFYLLINE 400 MG: 400 TABLET, EXTENDED RELEASE ORAL at 12:51

## 2019-07-06 RX ADMIN — BRIMONIDINE TARTRATE 1 DROP: 1.5 SOLUTION OPHTHALMIC at 08:44

## 2019-07-06 RX ADMIN — INSULIN LISPRO 12 UNITS: 100 INJECTION, SOLUTION INTRAVENOUS; SUBCUTANEOUS at 17:35

## 2019-07-06 RX ADMIN — PENTOXIFYLLINE 400 MG: 400 TABLET, EXTENDED RELEASE ORAL at 17:35

## 2019-07-06 RX ADMIN — INSULIN LISPRO 4 UNITS: 100 INJECTION, SOLUTION INTRAVENOUS; SUBCUTANEOUS at 17:36

## 2019-07-06 NOTE — PLAN OF CARE
Problem: Potential for Falls  Goal: Patient will remain free of falls  Description  INTERVENTIONS:  - Assess patient frequently for physical needs  -  Identify cognitive and physical deficits and behaviors that affect risk of falls    -  [place] fall precautions as indicated by assessment   - Educate patient/family on patient safety including physical limitations  - Instruct patient to call for assistance with activity based on assessment  - Modify environment to reduce risk of injury  - Consider OT/PT consult to assist with strengthening/mobility  Outcome: Progressing     Problem: Prexisting or High Potential for Compromised Skin Integrity  Goal: Skin integrity is maintained or improved  Description  INTERVENTIONS:  - Identify patients at risk for skin breakdown  - Assess and monitor skin integrity  - Assess and monitor nutrition and hydration status  - Monitor labs (i e  albumin)  - Assess for incontinence   - Turn and reposition patient  - Assist with mobility/ambulation  - Relieve pressure over bony prominences  - Avoid friction and shearing  - Provide appropriate hygiene as needed including keeping skin clean and dry  - Evaluate need for skin moisturizer/barrier cream  - Collaborate with interdisciplinary team (i e  Nutrition, Rehabilitation, etc )   - Patient/family teaching  Outcome: Progressing     Problem: PAIN - ADULT  Goal: Verbalizes/displays adequate comfort level or baseline comfort level  Description  Interventions:  - Encourage patient to monitor pain and request assistance  - Assess pain using appropriate pain scale  - Administer analgesics based on type and severity of pain and evaluate response  - Implement non-pharmacological measures as appropriate and evaluate response  - Consider cultural and social influences on pain and pain management  - Notify physician/advanced practitioner if interventions unsuccessful or patient reports new pain  Outcome: Progressing     Problem: INFECTION - ADULT  Goal: Absence or prevention of progression during hospitalization  Description  INTERVENTIONS:  - Assess and monitor for signs and symptoms of infection  - Monitor lab/diagnostic results  - Monitor all insertion sites, i e  indwelling lines, tubes, and drains  - Monitor endotracheal (as able) and nasal secretions for changes in amount and color  - [place] appropriate cooling/warming therapies per order  - Administer medications as ordered  - Instruct and encourage patient and family to use good hand hygiene technique  - Identify and instruct in appropriate isolation precautions for identified infection/condition  Outcome: Progressing     Problem: SAFETY ADULT  Goal: Maintain or return to baseline ADL function  Description  INTERVENTIONS:  -  Assess patient's ability to carry out ADLs; assess patient's baseline for ADL function and identify physical deficits which impact ability to perform ADLs (bathing, care of mouth/teeth, toileting, grooming, dressing, etc )  - Assess/evaluate cause of self-care deficits   - Assess range of motion  - Assess patient's mobility; develop plan if impaired  - Assess patient's need for assistive devices and provide as appropriate  - Encourage maximum independence but intervene and supervise when necessary  ¯ Involve family in performance of ADLs  ¯ Assess for home care needs following discharge   ¯ Request OT consult to assist with ADL evaluation and planning for discharge  ¯ Provide patient education as appropriate  Outcome: Progressing  Goal: Maintain or return mobility status to optimal level  Description  INTERVENTIONS:  - Assess patient's baseline mobility status (ambulation, transfers, stairs, etc )    - Identify cognitive and physical deficits and behaviors that affect mobility  - Identify mobility aids required to assist with transfers and/or ambulation (gait belt, sit-to-stand, lift, walker, cane, etc )  - [place] fall precautions as indicated by assessment  - Record patient progress and toleration of activity level on Mobility SBAR; progress patient to next Phase/Stage  - Instruct patient to call for assistance with activity based on assessment  - Request Rehabilitation consult to assist with strengthening/weightbearing, etc   Outcome: Progressing     Problem: DISCHARGE PLANNING  Goal: Discharge to home or other facility with appropriate resources  Description  INTERVENTIONS:  - Identify barriers to discharge w/patient and caregiver  - Arrange for needed discharge resources and transportation as appropriate  - Identify discharge learning needs (meds, wound care, etc )  - Arrange for interpretive services to assist at discharge as needed  - Refer to Case Management Department for coordinating discharge planning if the patient needs post-hospital services based on physician/advanced practitioner order or complex needs related to functional status, cognitive ability, or social support system  Outcome: Progressing     Problem: Knowledge Deficit  Goal: Patient/family/caregiver demonstrates understanding of disease process, treatment plan, medications, and discharge instructions  Description  Complete learning assessment and assess knowledge base  Interventions:  - Provide teaching at level of understanding  - Provide teaching via preferred learning methods  Outcome: Progressing     Problem: CARDIOVASCULAR - ADULT  Goal: Maintains optimal cardiac output and hemodynamic stability  Description  INTERVENTIONS:  - Monitor I/O, vital signs and rhythm  - Monitor for S/S and trends of decreased cardiac output i e  bleeding, hypotension  - Administer and titrate ordered vasoactive medications to optimize hemodynamic stability  - Assess quality of pulses, skin color and temperature  - Assess for signs of decreased coronary artery perfusion - ex   Angina  - Instruct patient to report change in severity of symptoms  Outcome: Progressing  Goal: Absence of cardiac dysrhythmias or at baseline rhythm  Description  INTERVENTIONS:  - Continuous cardiac monitoring, monitor vital signs, obtain 12 lead EKG if indicated  - Administer antiarrhythmic and heart rate control medications as ordered  - Monitor electrolytes and administer replacement therapy as ordered  Outcome: Progressing     Problem: GASTROINTESTINAL - ADULT  Goal: Minimal or absence of nausea and/or vomiting  Description  INTERVENTIONS:  - Maintain NPO status until nausea and vomiting are resolved  - Administer ordered antiemetic medications as needed  - Provide nonpharmacologic comfort measures as appropriate  - Advance diet as tolerated, if ordered  - Nutrition services referral to assist patient with adequate nutrition and appropriate food choices   Outcome: Progressing  Goal: Maintains or returns to baseline bowel function  Description  INTERVENTIONS:  - Encourage oral fluids to ensure adequate hydration  - Administer ordered medications as needed  - Encourage mobilization and activity     Outcome: Progressing  Goal: Maintains adequate nutritional intake  Description  INTERVENTIONS:  - Identify factors contributing to decreased intake, treat as appropriate  - Monitor I&O, WT and lab values  - Obtain nutrition services referral as needed   Outcome: Progressing     Problem: HEMATOLOGIC - ADULT  Goal: Maintains hematologic stability  Description  INTERVENTIONS  - Monitor labs  Monitor hemodynamics for signs of sepsis, administer bolus and reorder labs per protocol       Outcome: Progressing     Problem: MUSCULOSKELETAL - ADULT  Goal: Maintain or return mobility to safest level of function  Description  INTERVENTIONS:  - Assess patient's ability to carry out ADLs; assess patient's baseline for ADL function and identify physical deficits which impact ability to perform ADLs (bathing, care of mouth/teeth, toileting, grooming, dressing, etc )  - Assess/evaluate cause of self-care deficits   - Assess range of motion  - Assess patient's mobility; develop plan if impaired  - Assess patient's need for assistive devices and provide as appropriate  - Encourage maximum independence but intervene and supervise when necessary  - Involve family in performance of ADLs  - Assess for home care needs following discharge   - Request OT consult to assist with ADL evaluation and planning for discharge  - Provide patient education as appropriate  Outcome: Progressing  Goal: Maintain proper alignment of affected body part  Description  INTERVENTIONS:  - Support, maintain and protect limb and body alignment  - Provide pt/fam with appropriate education  Outcome: Progressing

## 2019-07-06 NOTE — ASSESSMENT & PLAN NOTE
· Mildly hypertensive at time of admission  · Hold lisinopril      Is still on patient's medication list but appears PCP had held this due to hyperkalemia/CKD  · Will add hydralazine should patient remain hypertensive

## 2019-07-06 NOTE — CONSULTS
UROLOGY CONSULTATION NOTE     Patient Identifiers: Shauna Sadler (MRN [id number])  Service Requesting Consultation: [facility]  Service Providing Consultation:  Urology, Freda Kent MD    Date of Service: 7/6/2019  Consults  Reason for Consultation: left ureteral calculus    History of Present Illness:     Shauna Sadler is a 81 y o  old with a history of essential hypertension, peripheral arterial disease, kidney stones, type 2 diabetes on insulin, chronic back and hip pain  Patient is a poor historian and the majority of information gathered from chart  She adamantly denies any chest pain/palpitations, shortness of breath  According to records she was having nausea vomiting for 1 day today prompting her evaluation at the ED  However she tells me she went to the ER because she was having left hip pain which is not new for her  She denies any falls or trauma  Denies any dysuria or hematuria, reports chronic issues with kidney problems"  Denies fever/chills  He is a poor historian and Italian-speaking only but indicates discomfort primarily over the right SI region this morning  CT scan was obtained demonstrating however a left 5 mm mid ureteral calculus with mild hydronephrosis      Past Medical, Past Surgical History:     Past Medical History:   Diagnosis Date    COPD [facility]chronic obstructive pulmonary disease) ([facility] [address] )     Diabetes mellitus ([facility] [address] )     Hyperlipidemia     Hyperlipidemia     Hypertension     Kidney stones     Osteoporosis    :    Past Surgical History:   Procedure Laterality Date    APPENDECTOMY      HAND SURGERY Right     HYSTERECTOMY      age 29   Annika Surya INCISION AND DRAINAGE OF WOUND Left 1/5/2019    Procedure: INCISION AND DRAINAGE (I&D) EXTREMITY;  Surgeon: Aleisha Ayala MD;  Location: [facility];  Service: General    LITHOTRIPSY      MASS EXCISION      [place] of back wall mass    TONSILLECTOMY      TONSILLECTOMY     :    Medications, Allergies:     Current Facility-Administered Medications   Medication Dose Route Frequency    atorvastatin (LIPITOR) tablet 40 mg  40 mg Oral Daily With Dinner    brimonidine (ALPHAGAN P) 0 15 % ophthalmic solution 1 drop  1 drop Both Eyes TID    dorzolamide (TRUSOPT) ophthalmic solution 1 drop  1 drop Both Eyes TID    [START ON 7/7/2019] heparin (porcine) subcutaneous injection 5,000 Units  5,000 Units Subcutaneous Q8H [address]    hydrALAZINE (APRESOLINE) injection 5 mg  5 mg Intravenous Q6H PRN    insulin lispro (HumaLOG) 100 units/mL subcutaneous injection 1-6 Units  1-6 Units Subcutaneous TID AC    insulin lispro (HumaLOG) 100 units/mL subcutaneous injection 1-6 Units  1-6 Units Subcutaneous HS    insulin lispro (HumaLOG) 100 units/mL subcutaneous injection 12 Units  12 Units Subcutaneous TID With Meals    ipratropium (ATROVENT HFA) inhaler 2 puff  2 puff Inhalation 4x Daily PRN    lidocaine (LIDODERM) 5 % patch 1 patch  1 patch Topical Daily    ondansetron (ZOFRAN) injection 4 mg  4 mg Intravenous Q6H PRN    pantoprazole (PROTONIX) EC tablet 40 mg  40 mg Oral Early Morning    pentoxifylline (TRENtal) ER tablet 400 mg  400 mg Oral TID With Meals    pregabalin (LYRICA) capsule 100 mg  100 mg Oral Daily       Allergies: Allergies   Allergen Reactions    Acetaminophen      Listen on patient's paperwork from [facility] and Staffing  Family unable to confirm      Morphine And Related Vomiting    Oxycodone GI Intolerance     Listed on patient's paperwork from [facility] and Staffing  Family unable to confirm      Tramadol Vomiting and Abdominal Pain     Other   :    Social and Family History:   Social History:   Social History     Tobacco Use    Smoking status: Never Smoker    Smokeless tobacco: Never Used    Tobacco comment: states she quit but family living with her states she smokes   Substance Use Topics    Alcohol use: Never     Frequency: Never     Comment: OCCASIONAL    Drug use: Never         Social History Tobacco Use   Smoking Status Never Smoker   Smokeless Tobacco Never Used   Tobacco Comment    states she quit but family living with her states she smokes       Family History:  Family History   Problem Relation Age of Onset    Diabetes Mother     No Known Problems Father     Throat cancer Daughter     No Known Problems Maternal Grandmother     No Known Problems Maternal Grandfather     No Known Problems Paternal Grandmother     No Known Problems Paternal Grandfather    :     Review of Systems:     General: Fever, chills, or night sweats: negative  Cardiac: Negative for chest pain  Pulmonary: Negative for shortness of breath  Gastrointestinal: Abdominal pain negative  Nausea, vomiting, or diarrhea positive,  Genitourinary: See HPI above  Patient does not have hematuria  All other systems queried were negative  Physical Exam:   I/O last 24 hours: In: 240 [P O :240]  Out: -   Temp (24hrs), Av °F (36 7 °C), Min:97 1 °F (36 2 °C), Max:98 7 °F (37 1 °C)  current: Temperature: 98 1 °F (36 7 °C)  General: Patient is pleasant and in NAD   Awake and alert  /87 (BP Location: Right arm)   Pulse 84   Temp 98 1 °F (36 7 °C) (Tympanic)   Resp 17   Ht 5' (1 524 m)   Wt 74 6 kg (164 lb 7 4 oz)   LMP  (LMP Unknown)   SpO2 94%   BMI 32 12 kg/m²   /87 (BP Location: Right arm)   Pulse 84   Temp 98 1 °F (36 7 °C) (Tympanic)   Resp 17   Ht 5' (1 524 m)   Wt 74 6 kg (164 lb 7 4 oz)   LMP  (LMP Unknown)   SpO2 94%   BMI 32 12 kg/m²   General appearance: alert and oriented, in no acute distress  Head: Normocephalic, without obvious abnormality, atraumatic  Lungs: clear to auscultation bilaterally  Heart: regular rate and rhythm, S1, S2 normal, no murmur, click, rub or gallop  Abdomen: soft, non-tender; bowel sounds normal; no masses,  no organomegaly  Extremities: extremities normal, warm and well-perfused; no cyanosis, clubbing, or edema  Skin: Skin color, texture, turgor normal  No rashes or lesions  Neurologic: Grossly normal  GUAN: none        Labs:     Lab Results   Component Value Date    HGB 12 0 07/06/2019    HCT 38 2 07/06/2019    WBC 10 99 (H) 07/06/2019     07/06/2019   ]    Lab Results   Component Value Date     06/19/2018    K 4 1 07/06/2019     07/06/2019    CO2 29 07/06/2019    BUN 20 07/06/2019    CREATININE 1 47 (H) 07/06/2019    CALCIUM 9 7 07/06/2019    GLUCOSE 136 (H) 06/19/2018   ]    Imaging:   I personally reviewed the images and report of the following studies, and reviewed them with the patient:  No orders to display       ASSESSMENT:     81 y o  old female with  left ureteral calculus and mild hydronephrosis  PLAN:     -History somewhat limited however patient does not appear to be experiencing significant renal colic in association with this stone at present  Absent at any signs of infection, recommend initial conservative management  She will be started on Flomax 0 4 mg daily  Follow up in outpatient setting in 1-2 weeks with repeat KUB  Ureteroscopy will be considered should symptoms progress  Thank you for allowing me to participate in this patients care  Please do not hesitate to call with any additional questions    Gil Gaona MD

## 2019-07-06 NOTE — H&P
H&P- Taisha Aquino 1935, 81 y o  female MRN: [id number]    Unit/Bed#: E4 -01 Encounter: [id number]    Primary Care Provider: Cuhla Ruiz MD   Date and time admitted to hospital: 7/6/2019  2:40 AM    * Ureterolithiasis  Assessment & Plan  · Patient presented to the emergency department at [facility] secondary to reported nausea/vomiting  On arrival after transfer, patient only complaint is chronic left hip pain  · Vital signs stable at time of admission  Mildly hypertensive  · CT abdomen/pelvis: "Bilateral focal areas of renal cortical scarring and dystrophic calcification appearing stable  Mild hydronephrosis bilaterally, left greater than right  On the right no discrete etiology for the increased dilatation  Nonobstructing stone within the right renal pelvis  Left-sided hydronephrosis appears secondary to a distal left ureteral stone  Bilateral nonobstructing nephrolithiasis " - patient reports multiple procedures for kidney stones previously  · Received IVF in the ED  Given history congestive heart failure, will hold off on further fluids  · UA negative for UTI  Was given dose of cefepime for possible pyelonephritis in ED due to initial elevated lactic (now normalized), leukocytosis  · Will hold off on further abx for now  · Will keep NPO pending urology evaluation    Acute kidney injury superimposed on chronic kidney disease ([facility] )  Assessment & Plan  · Baseline creatinine appears to be 1 1-1 4  · Creatinine at time of admission 1 73  Likely due to post renal obstruction due to ureterolithiasis   · Hold lisinopril, Lasix - patient appears euvolemic  · Recheck BMP  · Avoid nephrotoxic agents    Elevated troponin  Assessment & Plan  · Found to have elevated troponin at 1 11 --> 1 39 --> 1 29  · Patient adamantly denies any chest pain  · EKG with nonspecific changes    NSR  · ED attending discussed with Cardiology, did not feel cardiac intervention required at this time/deferred heparin drip for now    PAD (peripheral artery disease) ([facility] )  Assessment & Plan  · Denies lower extremity pain at this time associated with claudication  · Currently on Trental    COPD mixed type ([facility] )  Assessment & Plan  · Reported hypoxia in the ED which now appears to have resolved, patient on RA  · Does not appear to be in acute exacerbation  · Continue Atrovent    Chronic diastolic heart failure ([facility] )  Assessment & Plan  · Currently appears euvolemic despite an elevated BNP of 3 K  · Hold Lasix given EULOGIO  Monitor fluid status  · Daily weights, I/O  · Low sodium diet once able to take PO        Type 2 diabetes mellitus with hyperglycemia, with long-term current use of insulin [facility])  Assessment & Plan  Lab Results   Component Value Date    HGBA1C 7 7 (H) 06/04/2019       Recent Labs     07/05/19  1826   POCGLU 287*       Blood Sugar Average: Last 72 hrs:  · SSI + accuchek  · Novolog 12 units TID with meals  · Diabetic diet once able to take PO    Hypertension  Assessment & Plan  · Mildly hypertensive at time of admission  · Hold lisinopril    Is still on patient's medication list but appears PCP had held this due to hyperkalemia/CKD  · Will add hydralazine should patient remain hypertensive    VTE Prophylaxis: Heparin  / sequential compression device   Code Status:  Level 1 - full code  POLST: There is no POLST form on file for this patient (pre-hospital)  Discussion with family:  Discussed with patient directly at bedside    Anticipated Length of Stay:  Patient will be admitted on an Inpatient basis with an anticipated length of stay of  greater than 2 midnights  Justification for Hospital Stay:  Elevated troponin, ureterolithiasis    Total Time for Visit, including Counseling / Coordination of Care: 45 minutes  Greater than 50% of this total time spent on direct patient counseling and coordination of care      Chief Complaint:   Nausea and vomiting x1 day    History of Present Illness:    Carson Belcher is a 81 y o  female who presents with nausea vomiting  Past medical history significant for essential hypertension, peripheral arterial disease, kidney stones, type 2 diabetes on insulin, chronic back and hip pain  Patient is a poor historian and the majority of information gathered from chart  She adamantly denies any chest pain/palpitations, shortness of breath  According to records she was having nausea vomiting for 1 day today prompting her evaluation at the ED  However she tells me she went to the ER because she was having left hip pain which is not new for her  She denies any falls or trauma  Denies any dysuria or hematuria, reports chronic issues with kidney problems"  Denies fever/chills  Review of Systems:    Review of Systems   Constitutional: Negative for chills, fatigue, fever and unexpected weight change  HENT: Negative for congestion, sore throat and trouble swallowing  Eyes: Negative for photophobia, pain and visual disturbance  Respiratory: Negative for cough, shortness of breath and wheezing  Cardiovascular: Negative for chest pain, palpitations and leg swelling  Gastrointestinal: Positive for nausea and vomiting  Negative for abdominal pain, constipation and diarrhea  Endocrine: Negative for polyuria  Genitourinary: Negative for difficulty urinating, dysuria, flank pain, hematuria and urgency  Musculoskeletal: Positive for arthralgias  Negative for back pain, myalgias, neck pain and neck stiffness  Skin: Negative for pallor and rash  Neurological: Negative for dizziness, tremors, syncope, weakness, light-headedness, numbness and headaches  Hematological: Does not bruise/bleed easily  Psychiatric/Behavioral: Negative for agitation and confusion         Past Medical and Surgical History:     Past Medical History:   Diagnosis Date    COPD (chronic obstructive pulmonary disease) ([facility] )     Diabetes mellitus ([facility] )     Hyperlipidemia     Hyperlipidemia     Hypertension  Kidney stones     Osteoporosis        Past Surgical History:   Procedure Laterality Date    APPENDECTOMY      HAND SURGERY Right     HYSTERECTOMY      age 29    INCISION AND DRAINAGE OF WOUND Left 1/5/2019    Procedure: INCISION AND DRAINAGE (I&D) EXTREMITY;  Surgeon: Alisa Walters MD;  [facility];  Service: General    LITHOTRIPSY      MASS EXCISION      remova of back wall mass    TONSILLECTOMY      TONSILLECTOMY         Meds/Allergies:    Prior to Admission medications    Medication Sig Start Date End Date Taking?  Authorizing Provider   acetaminophen-codeine (TYLENOL #3) 300-30 mg per tablet Take 1 tablet by mouth every 4 (four) hours as needed for moderate pain 7/3/19   Domenico Haines MD   atorvastatin (LIPITOR) 40 mg tablet Take 1 tablet (40 mg total) by mouth every 24 hours 11/13/18   Domenico Haines MD   baclofen 10 mg tablet Take 1 tablet (10 mg total) by mouth 3 (three) times a day 2/5/19   Domenico Haines MD   brimonidine tartrate 0 2 % ophthalmic solution INSTILL 1 DROP INTO BOTH EYES 2 TIMES PER DAY 5/31/19   Historical Provider, MD   calcitonin, salmon, (MIACALCIN) 200 units/act nasal spray 1 spray into each nostril daily 7/3/19   Domenico Haines MD   celecoxib (CeleBREX) 200 mg capsule TAKE ONE CAPSULE BY MOUTH TWICE DAILY ROSITA TOMASA CAPSULA POR VIA ORAL DOS VECES AL MAC 6/14/19   Historical Provider, MD   denosumab (PROLIA) 60 mg/mL Inject 1 mL (60 mg total) under the skin once for 1 dose 6/4/19 6/4/19  Domenico Haines MD   dorzolamide (TRUSOPT) 2 % ophthalmic solution Administer 1 drop to both eyes 3 (three) times a day 6/11/19   Domenico Haines MD   furosemide (LASIX) 40 mg tablet Take 1 tablet (40 mg total) by mouth daily 6/11/19   Domenico Haines MD   HYDROcodone-acetaminophen [facility]) 5-325 mg per tablet Take 1 tablet by mouth 2 (two) times a dayMax Daily Amount: 2 tablets 6/11/19   Domenico Haines MD   insulin degludec (TRESIBA FLEXTOUCH) 100 units/mL injection pen To use 25 U at bedtime  6/11/19   Stanley Win MD   ipratropium (ATROVENT HFA) 17 mcg/act inhaler Inhale 2 puffs 4 (four) times a day Ninety day supplies please 6/11/19   Stanley Win MD   latanoprost (XALATAN) 0 005 % ophthalmic solution Administer 1 drop to both eyes daily at bedtime for 30 days 6/11/19 7/11/19  Stanley Win MD   LINZESS 290 MCG CAPS TAKE ONE CAPSULE BY MOUTH DAILY ROSITA TOMASA CAPSULA POR VIA ORAL DIARIAMENTE 7/1/19   Historical Provider, MD   lisinopril (ZESTRIL) 10 mg tablet Take 1 tablet (10 mg total) by mouth daily 6/11/19   Stanley Win MD   lisinopril (ZESTRIL) 5 mg tablet Take 1 tablet (5 mg total) by mouth daily 7/3/19   Stanley Win MD   NOVOLOG FLEXPEN 100 units/mL injection pen 12 UNITS 3 TIMES A DAY WITH MEALS Ninety day supplies please 6/11/19   Stanley Win MD   omeprazole (PriLOSEC) 20 mg delayed release capsule Take 1 capsule (20 mg total) by mouth daily Ninety day supplies please 6/11/19   Stanley Win MD   Patiromer Sorbitex Calcium 8 4 g PACK Take 1 each by mouth daily for 30 days 6/11/19 7/11/19  Stanley Win MD   pentoxifylline (TRENtal) 400 mg ER tablet TAKE ONE TABLET BY MOUTH 3 TIMES A DAY WITH A MEAL ROSITA 1 TABLETA POR VIA ORAL JONAH VECES AL MAC WITH A MEAL 4/26/19   Historical Provider, MD   pregabalin (LYRICA) 100 mg capsule Take 1 capsule (100 mg total) by mouth 2 (two) times a day for 100 days 6/11/19 9/19/19  Stanley Win MD   sitaGLIPtin (JANUVIA) 50 mg tablet Take 1 tablet (50 mg total) by mouth daily 6/11/19   Stanley Win MD     I have reveiwed home medications using records provided by [facility]  Allergies: Allergies   Allergen Reactions    Acetaminophen      Listen on patient's paperwork from [facility] and Staffing  Family unable to confirm      Morphine And Related Vomiting    Oxycodone GI Intolerance     Listed on patient's paperwork from [facility] and Staffing   Family unable to confirm      Tramadol Vomiting and Abdominal Pain     Other       Social History:     Marital Status: Single   Occupation:  Retired  Patient Pre-hospital Living Situation:  Lives alone  Patient Pre-hospital Level of Mobility:  Ambulatory  Patient Pre-hospital Diet Restrictions:  Diabetic  Substance Use History:   Social History     Substance and Sexual Activity   Alcohol Use Never    Frequency: Never    Comment: OCCASIONAL     Social History     Tobacco Use   Smoking Status Never Smoker   Smokeless Tobacco Never Used   Tobacco Comment    states she quit but family living with her states she smokes     Social History     Substance and Sexual Activity   Drug Use Never       Family History:    Family History   Problem Relation Age of Onset    Diabetes Mother     No Known Problems Father     Throat cancer Daughter     No Known Problems Maternal Grandmother     No Known Problems Maternal Grandfather     No Known Problems Paternal Grandmother     No Known Problems Paternal Grandfather        Physical Exam:     Vitals:   Blood Pressure: (!) 184/83 (07/06/19 0255)  Pulse: 84 (07/06/19 0255)  Temperature: 98 7 °F (37 1 °C) (07/06/19 0255)  Temp Source: Tympanic (07/06/19 0255)  Respirations: 17 (07/06/19 0255)  Height: 5' (152 4 cm) (07/06/19 0255)  Weight - Scale: 74 6 kg (164 lb 7 4 oz) (07/06/19 0255)  SpO2: 93 % (07/06/19 0255)    Physical Exam   Constitutional: She is oriented to person, place, and time  Vital signs are normal  She appears well-developed  Appears in no acute distress  Mildly agitated with questioning and exam   HENT:   Head: Normocephalic  Eyes: Pupils are equal, round, and reactive to light  Conjunctivae and EOM are normal  No scleral icterus  Neck: Normal range of motion  Cardiovascular: Normal rate, regular rhythm and normal heart sounds  No murmur heard  Pulmonary/Chest: Effort normal and breath sounds normal  No respiratory distress  Abdominal: Soft  Bowel sounds are normal  There is no tenderness  There is no rigidity, no rebound, no guarding and no CVA tenderness  Obese abdomen  No tenderness to palpation  Soft   Musculoskeletal: She exhibits tenderness  She exhibits no edema or deformity  No edema  Mild tenderness to palpation of the left buttock, however no ecchymosis or erythema  ROM otherwise WNL  No trauma   Neurological: She is alert and oriented to person, place, and time  Skin: Skin is warm and dry  Psychiatric: Her affect is blunt  Her speech is rapid and/or pressured  She is agitated  Cognition and memory are impaired  Nursing note and vitals reviewed  Additional Data:     Lab Results: I have personally reviewed pertinent reports  Results from last 7 days   Lab Units 07/05/19  1029   WBC Thousand/uL 11 60*   HEMOGLOBIN g/dL 12 3   HEMATOCRIT % 38 6   PLATELETS Thousands/uL 174   NEUTROS PCT % 82*   LYMPHS PCT % 8*   MONOS PCT % 9   EOS PCT % 0     Results from last 7 days   Lab Units 07/05/19  1029   SODIUM mmol/L 140   POTASSIUM mmol/L 5 0   CHLORIDE mmol/L 97   CO2 mmol/L 30   BUN mg/dL 23   CREATININE mg/dL 1 73*   ANION GAP mmol/L 13   CALCIUM mg/dL 9 6   ALBUMIN g/dL 4 1   TOTAL BILIRUBIN mg/dL 0 60   ALK PHOS U/L 151*   ALT U/L 21   AST U/L 25   GLUCOSE RANDOM mg/dL 441*     Results from last 7 days   Lab Units 07/06/19  0102   INR  1 03     Results from last 7 days   Lab Units 07/05/19  1826   POC GLUCOSE mg/dl 287*         Results from last 7 days   Lab Units 07/05/19 2014 07/05/19  1029   LACTIC ACID mmol/L 1 1 3 3*       Imaging: I have personally reviewed pertinent reports  No orders to display       EKG, Pathology, and Other Studies Reviewed on Admission:   · EKG: NSR RBBB    Allscripts / Epic Records Reviewed: Yes     ** Please Note: This note has been constructed using a voice recognition system   **

## 2019-07-06 NOTE — PHYSICIAN ADVISOR
Current patient class: Inpatient  The patient is currently on Hospital Day: 1 at [facility]       The patient is  documented to require at least a 2nd midnight in the hospital  As such the patient is  expected to satisfy the 2 midnight benchmark and is therefore eligible for inpatient admission  After review of the relevant documentation, labs, vital signs and test results, the patient is appropriate for INPATIENT ADMISSION  Admission to the hospital as an inpatient is a complex decision making process which requires the practitioner to consider the patients presenting complaint, history and physical examination and all relevant testing  With this in mind, in this case, the patient was deemed appropriate for INPATIENT ADMISSION  After review of the documentation and testing available at the time of the admission I concur with this clinical determination of medical necessity  Rationale is as follows: The patient is a 81 yrs Female who presented to the ED at 7/6/2019  2:40 AM with a chief complaint of nausea and vomiting   Patient admitted for ureterolithiasis -CT abdomen/pelvis showed mild hydronephrosis L>R   Non obstructing b/l nephrolithiasis - labs showed elevated lactic acid and leukocytosis, elevated troponin  and EULOGIO   Received IVF  And IV ABX in ED  Seen in consultation by urology service and recommended conservative management   Seen in consultation by cardiology service for NSTEMI type 2 -given that she also had dynamic EKG changes - plan is for cardiac catheterization on Monday - giving time for renal function to improve         The patients vitals on arrival were ED Triage Vitals   Temperature Pulse Respirations Blood Pressure SpO2   07/06/19 0255 07/06/19 0255 07/06/19 0255 07/06/19 0255 07/06/19 0255   98 7 °F (37 1 °C) 84 17 (!) 184/83 93 %      Temp Source Heart Rate Source Patient Position - Orthostatic VS BP Location FiO2 (%)   07/06/19 0255 -- 07/06/19 0255 07/06/19 0255 --   Tympanic  Lying Right arm       Pain Score       07/06/19 0300       No Pain           Past Medical History:   Diagnosis Date    COPD [facility]chronic obstructive pulmonary disease) [facility] )     Diabetes mellitus ([facility] )     Hyperlipidemia     Hyperlipidemia     Hypertension     Kidney stones     Osteoporosis      Past Surgical History:   Procedure Laterality Date    APPENDECTOMY      HAND SURGERY Right     HYSTERECTOMY      age 29   Iban Chambers INCISION AND DRAINAGE OF WOUND Left 1/5/2019    Procedure: INCISION AND DRAINAGE (I&D) EXTREMITY;  Surgeon: Alexandro Isaac MD;  Location: [facility];  Service: General    LITHOTRIPSY      MASS EXCISION      remova of back wall mass    TONSILLECTOMY      TONSILLECTOMY         The patient was admitted to the hospital at  2:40 AM on 7/6/19 for the following diagnosis:  Sepsis ([facility] ) [A41 9]    Consults have been placed to:   IP CONSULT TO UROLOGY  IP CONSULT TO CARDIOLOGY    Vitals:    07/06/19 0355 07/06/19 0600 07/06/19 0801 07/06/19 1150   BP:   161/87 169/79   BP Location:   Right arm Right arm   Pulse:   84 78   Resp:    18   Temp:   98 1 °F (36 7 °C)    TempSrc:   Tympanic    SpO2:   94% 97%   Weight: 74 6 kg (164 lb 7 4 oz) 74 6 kg (164 lb 7 4 oz)     Height:           Most recent labs:    Recent Labs     07/05/19  1029  07/06/19  0102 07/06/19  0351  07/06/19  0945   WBC 11 60*  --   --  10 99*  --   --    HGB 12 3  --   --  12 0  --   --    HCT 38 6  --   --  38 2  --   --      --   --  162  --   --    K 5 0  --   --  4 1  --   --    CALCIUM 9 6  --   --  9 7  --   --    BUN 23  --   --  20  --   --    CREATININE 1 73*  --   --  1 47*  --   --    INR  --   --  1 03  --   --   --    TROPONINI  --    < >  --  1 62*   < > 1 21*   AST 25  --   --   --   --   --    ALT 21  --   --   --   --   --    ALKPHOS 151*  --   --   --   --   --     < > = values in this interval not displayed         Scheduled Meds:  Current Facility-Administered Medications:  atorvastatin 40 mg Oral Daily With Jerrod Diana PA-C   brimonidine 1 drop Both Eyes TID Angela Osorio PA-C   dorzolamide 1 drop Both Eyes TID Héctor Eldridge   [START ON 7/7/2019] heparin (porcine) 5,000 Units Subcutaneous [facility] Sagar Ayoub PA-C   hydrALAZINE 5 mg Intravenous Q6H PRN Angela Osorio PA-C   HYDROcodone-acetaminophen 1 tablet Oral Q6H PRN Jalen Scott MD   insulin lispro 1-6 Units Subcutaneous TID AC Sagar Rider PA-C   insulin lispro 1-6 Units Subcutaneous HS Sagar Rider PA-C   insulin lispro 12 Units Subcutaneous TID With Meals Angela Osorio PA-C   ipratropium 2 puff Inhalation 4x Daily PRN Angela Osorio PA-C   lidocaine 1 patch Topical Daily Sagar Ayoub PA-C   lidocaine 1 patch Topical Daily Jalen Scott MD   ondansetron 4 mg Intravenous Q6H PRN Sagar Rider PA-C   pantoprazole 40 mg Oral Early Morning Sagar Ayoub PA-C   pentoxifylline 400 mg Oral TID With Meals Angela Osorio PA-C   pregabalin 100 mg Oral Daily Héctor Suazo   tamsulosin 0 4 mg Oral Daily With Mee Guillory MD     Continuous Infusions:   PRN Meds: hydrALAZINE    HYDROcodone-acetaminophen    ipratropium    ondansetron    Surgical procedures (if appropriate):

## 2019-07-06 NOTE — ASSESSMENT & PLAN NOTE
· Baseline creatinine appears to be 1 1-1 4  · Creatinine at time of admission 1 73    Likely due to post renal obstruction due to ureterolithiasis   · Hold lisinopril, Lasix - patient appears euvolemic  · Recheck BMP  · Avoid nephrotoxic agents

## 2019-07-06 NOTE — ASSESSMENT & PLAN NOTE
· Currently appears euvolemic despite an elevated BNP of 3 K  · Hold Lasix given EULOGIO    Monitor fluid status  · Daily weights, I/O  · Low sodium diet once able to take PO

## 2019-07-06 NOTE — LETTER
July 15, 2019     Dear Jermaine Ruiz is a copy of a BPCI (Bundled Payment for Care Improvement) letter which outlines a hospital program that you have been placed into, based off of your current hospital diagnosis  The program is a way for Medicare to work hand in hand with [facility], to provide you with the highest and best quality of care possible    [facility] follows your care for up to ninety days with the ultimate goal of keeping you healthy and out of the hospital  You may have someone from [facility] contact you after you are discharged, to see how you are doing and to see if you have any needs that need to be met at that time  If you have any questions please contact me directly  Thank you and have a great day  Sincerely,        [facility]

## 2019-07-06 NOTE — ASSESSMENT & PLAN NOTE
· Found to have elevated troponin at 1 11 --> 1 39 --> 1 29  · Patient adamantly denies any chest pain  · EKG with nonspecific changes    NSR  · ED attending discussed with Cardiology, did not feel cardiac intervention required at this time/deferred heparin drip for now

## 2019-07-06 NOTE — UTILIZATION REVIEW
Initial Clinical Review    Admission: Date/Time/Statement: 7/6/19 @ 0303   Inpatient   DIRECT ADMISSION, TRANSFERRED FROM [facility] ED TO [facility] MED SURG UNIT    Arrived to [facility] 7/6/19 @0240    Orders Placed This Encounter   Procedures    Inpatient Admission     Standing Status:   Standing     Number of Occurrences:   1     Order Specific Question:   Admitting Physician     Answer:   Alycia Arambula [[id number]]     Order Specific Question:   Level of Care     Answer:   Med Surg [[id number]]     Order Specific Question:   Estimated length of stay     Answer:   More than 2 Midnights     Order Specific Question:   Certification     Answer:   I certify that inpatient services are medically necessary for this patient for a duration of greater than two midnights  See H&P and MD Progress Notes for additional information about the patient's course of treatment  Assessment/Plan:  81 y o  female who presents with nausea vomiting  admitted as inpatient  Direct admit, transferred from [facility] ED to [facility] med surg     Patient is a poor historian and the majority of information gathered from chart  She adamantly denies any chest pain/palpitations, shortness of breath  According to records she was having nausea vomiting for 1 day today prompting her evaluation at the ED  However she tells me she went to the ER because she was having left hip pain which is not new for her  She denies any falls or trauma  Denies any dysuria or hematuria, reports chronic issues with kidney problems"  Denies fever/chills      Ureterolithiasis  Assessment & Plan  · Patient presented to the emergency department at [facility] secondary to reported nausea/vomiting  On arrival after transfer, patient only complaint is chronic left hip pain  · Vital signs stable at time of admission    Mildly hypertensive  · CT abdomen/pelvis: "Bilateral focal areas of renal cortical scarring and dystrophic calcification appearing stable  Mild hydronephrosis bilaterally, left greater than right   On the right no discrete etiology for the increased dilatation   Nonobstructing stone within the right renal pelvis  Left-sided hydronephrosis appears secondary to a distal left ureteral stone  Bilateral nonobstructing nephrolithiasis " - patient reports multiple procedures for kidney stones previously  · Received IVF in the ED  Given history congestive heart failure, will hold off on further fluids  · UA negative for UTI  Was given dose of cefepime for possible pyelonephritis in ED due to initial elevated lactic (now normalized), leukocytosis  · Will hold off on further abx for now  · Will keep NPO pending urology evaluation     Acute kidney injury superimposed on chronic kidney disease ([facility] [address] )  Assessment & Plan  · Baseline creatinine appears to be 1 1-1 4  · Creatinine at time of admission 1 73  Likely due to post renal obstruction due to ureterolithiasis   · Hold lisinopril, Lasix - patient appears euvolemic  · Recheck BMP  · Avoid nephrotoxic agents     Elevated troponin  Assessment & Plan  · Found to have elevated troponin at 1 11 --> 1 39 --> 1 29  · Patient adamantly denies any chest pain  · EKG with nonspecific changes  NSR  · ED attending discussed with Cardiology, did not feel cardiac intervention required at this time/deferred heparin drip for now  Patient will be admitted on an Inpatient basis with an anticipated length of stay of  greater than 2 midnights  Justification for Hospital Stay:  Elevated troponin, ureterolithiasis    7/6 per urology cons: History somewhat limited however patient does not appear to be experiencing significant renal colic in association with this stone at present  Absent at any signs of infection, recommend initial conservative management  She will be started on Flomax 0 4 mg daily  Follow up in outpatient setting in 1-2 weeks with repeat KUB    Ureteroscopy will be considered should symptoms progress    7/6 per cardio cons: 1  Troponin elevation: Most likely NSTEMI Type 2- however patient is poor historian and main complain was nausea and vomiting and feeling unwell- Troponin rise to 1 8 and now back to 1 2 and she did have dynamic EKG changes w ST depression/TWI lateral leads, however she has this pattern in past, it does change from EKG to EKG    2 EULOGIO Cr 1 7 on admission improved to baseline 1 4- because of this best to wait on cath until Monday    Recommend Cardiac catherization Monday - presentation atypical for acute MI but she had elevated troponin and dynamic EKG changes did present w nausea/vomiting- given mixed picture and know DMT2 and PAD there is high probability we will find signficant CAD and worth definitive cath rather than stress test, WIll delay this to Monday since now asymptomatic and prefer her renal function to improve  ED Triage Vitals   Temperature Pulse Respirations Blood Pressure SpO2   07/06/19 0255 07/06/19 0255 07/06/19 0255 07/06/19 0255 07/06/19 0255   98 7 °F (37 1 °C) 84 17 (!) 184/83 93 %      Temp Source Heart Rate Source Patient Position - Orthostatic VS BP Location FiO2 (%)   07/06/19 0255 -- 07/06/19 0255 07/06/19 0255 --   Tympanic  Lying Right arm       Pain Score       07/06/19 0300       No Pain        Wt Readings from Last 1 Encounters:   07/06/19 74 6 kg (164 lb 7 4 oz)     Additional Vital Signs: room air  Date/Time  Temp  Pulse  Resp  BP  SpO2   07/06/19 1150    78  18  169/79  97 %   07/06/19 0801  98 1 °F (36 7 °C)  84    161/87  94 %       Pertinent Labs/Diagnostic Test Results:   7/5 EKG: nsr, complete RBBB (done at [facility])  7/5 CXR: nothing acute  7/5 CT a&p: Bilateral focal areas of renal cortical scarring and dystrophic calcification appearing stable    Mild hydronephrosis bilaterally, left greater than right   On the right no discrete etiology for the increased dilatation   Nonobstructing stone within the right renal pelvis, increased in size but appearing nondependent   Left-sided hydronephrosis appears secondary to a distal left ureteral stone   Bilateral nonobstructing nephrolithiasis    7/5 L spine: nothing acute, degen change    Results from last 7 days   Lab Units 07/06/19  0351 07/05/19  1029   WBC Thousand/uL 10 99* 11 60*   HEMOGLOBIN g/dL 12 0 12 3   HEMATOCRIT % 38 2 38 6   PLATELETS Thousands/uL 162 174   NEUTROS ABS Thousands/µL 8 12* 9 50*     Results from last 7 days   Lab Units 07/06/19  0351 07/05/19  1029   SODIUM mmol/L 140 140   POTASSIUM mmol/L 4 1 5 0   CHLORIDE mmol/L 102 97   CO2 mmol/L 29 30   ANION GAP mmol/L 9 13   BUN mg/dL 20 23   CREATININE mg/dL 1 47* 1 73*   EGFR ml/min/1 73sq m 33 27*   CALCIUM mg/dL 9 7 9 6     Results from last 7 days   Lab Units 07/05/19  1029   AST U/L 25   ALT U/L 21   ALK PHOS U/L 151*   TOTAL PROTEIN g/dL 6 9   ALBUMIN g/dL 4 1   TOTAL BILIRUBIN mg/dL 0 60     Results from last 7 days   Lab Units 07/06/19  1156 07/06/19  0839 07/05/19  1826   POC GLUCOSE mg/dl 255* 245* 287*     Results from last 7 days   Lab Units 07/06/19  0351 07/05/19  1029   GLUCOSE RANDOM mg/dL 272* 441*     Results from last 7 days   Lab Units 07/06/19  0945 07/06/19  0650 07/06/19  0351 07/05/19  2356 07/05/19  1635   TROPONIN I ng/mL 1 21* 1 43* 1 62* 1 29* 1 39*     Results from last 7 days   Lab Units 07/06/19  0102   PROTIME seconds 10 9   INR  1 03   PTT seconds 25         Results from last 7 days   Lab Units 07/05/19  2014 07/05/19  1029   LACTIC ACID mmol/L 1 1 3 3*     Results from last 7 days   Lab Units 07/05/19  1029   NT-PRO BNP pg/mL 3,370*     Results from last 7 days   Lab Units 07/05/19  1258   CLARITY UA  Clear   COLOR UA  Straw   SPEC GRAV UA  1 010   PH UA  6 0   GLUCOSE UA mg/dl >=1000 (1%)*   KETONES UA mg/dl Negative   BLOOD UA  Negative   PROTEIN UA mg/dl 15 (Trace)*   NITRITE UA  Negative   BILIRUBIN UA  Negative   UROBILINOGEN UA mg/dL Negative   LEUKOCYTES UA  Negative   WBC UA /hpf 0-1*   RBC UA /hpf None Seen   BACTERIA UA /hpf None Seen   EPITHELIAL CELLS WET PREP /hpf None Seen     ED Treatment:   Medication Administration - No Administrations Displayed (No Start Event Found)     None        Past Medical History:   Diagnosis Date    COPD (chronic obstructive pulmonary disease) ([facility] [address] )     Diabetes mellitus ([facility] )     Hyperlipidemia     Hyperlipidemia     Hypertension     Kidney stones     Osteoporosis      Present on Admission:   Ureterolithiasis   Chronic diastolic heart failure (HCC)   COPD mixed type (HCC)   Hypertension   [facility]peripheral artery disease) ([facility])   Elevated troponin   Acute kidney injury superimposed on chronic kidney disease ([facility])      Admitting Diagnosis: ureterolithiasis, EULOGIO, NSTEMI type 2 vs  Type 1    Age/Sex: 81 y o  female  Admission Orders:    Current Facility-Administered Medications:  atorvastatin 40 mg Oral Daily With Dinner   brimonidine 1 drop Both Eyes TID   dorzolamide 1 drop Both Eyes TID   [START ON 7/7/2019] heparin (porcine) 5,000 Units Subcutaneous Q8H [facility]  (heparin gtt was running x 2 hrs 10 minutes, then dc)   hydrALAZINE 5 mg Intravenous Q6H PRN   HYDROcodone-acetaminophen 1 tablet Oral Q6H PRN x 1 on 7/6   insulin lispro 1-6 Units Subcutaneous TID AC   insulin lispro 1-6 Units Subcutaneous HS   insulin lispro 12 Units Subcutaneous TID With Meals   ipratropium 2 puff Inhalation 4x Daily PRN   lidocaine 1 patch Topical Daily   ondansetron 4 mg Intravenous Q6H PRN   pantoprazole 40 mg Oral Early Morning   pentoxifylline 400 mg Oral TID With Meals   pregabalin 100 mg Oral Daily   tamsulosin 0 4 mg Oral Daily With Dinner     Tele  SCD's  MRI L spine  Cardiac cath on 7/8    IP CONSULT TO UROLOGY  IP CONSULT TO CARDIOLOGY    Network Utilization Review Department  Phone: [phone]; Fax [phone]  [email or web address]  ATTENTION: Please call with any questions or concerns to [phone]  and carefully listen to the prompts so that you are directed to the right person  Send all requests for admission clinical reviews, approved or denied determinations and any other requests to fax [phone]   All voicemails are confidential

## 2019-07-06 NOTE — ASSESSMENT & PLAN NOTE
· Patient presented to the emergency department at [address] [place] [address] secondary to reported nausea/vomiting  On arrival after transfer, patient only complaint is chronic left hip pain  · Vital signs stable at time of admission  Mildly hypertensive  · CT abdomen/pelvis: "Bilateral focal areas of renal cortical scarring and dystrophic calcification appearing stable  Mild hydronephrosis bilaterally, left greater than right  On the right no discrete etiology for the increased dilatation  Nonobstructing stone within the right renal pelvis  Left-sided hydronephrosis appears secondary to a distal left ureteral stone  Bilateral nonobstructing nephrolithiasis " - patient reports multiple procedures for kidney stones previously  · Received IVF in the ED  Given history congestive heart failure, will hold off on further fluids  · UA negative for UTI    Was given dose of cefepime for possible pyelonephritis in ED due to initial elevated lactic (now normalized), leukocytosis  · Will hold off on further abx for now  · Will keep NPO pending urology evaluation

## 2019-07-06 NOTE — ED NOTES
Attempted to reposition bed in room to decrease the light she is not liking       Tom Ugarte RN  07/06/19 5387

## 2019-07-06 NOTE — ED NOTES
Patient is presently resting more quietly - noted to be playing with IV tubing - same wrapped       Roberto Whaley RN  07/06/19 6073

## 2019-07-06 NOTE — PLAN OF CARE
Problem: Potential for Falls  Goal: Patient will remain free of falls  Description  INTERVENTIONS:  - Assess patient frequently for physical needs  -  Identify cognitive and physical deficits and behaviors that affect risk of falls    -  [place] fall precautions as indicated by assessment   - Educate patient/family on patient safety including physical limitations  - Instruct patient to call for assistance with activity based on assessment  - Modify environment to reduce risk of injury  - Consider OT/PT consult to assist with strengthening/mobility  Outcome: Progressing     Problem: Prexisting or High Potential for Compromised Skin Integrity  Goal: Skin integrity is maintained or improved  Description  INTERVENTIONS:  - Identify patients at risk for skin breakdown  - Assess and monitor skin integrity  - Assess and monitor nutrition and hydration status  - Monitor labs (i e  albumin)  - Assess for incontinence   - Turn and reposition patient  - Assist with mobility/ambulation  - Relieve pressure over bony prominences  - Avoid friction and shearing  - Provide appropriate hygiene as needed including keeping skin clean and dry  - Evaluate need for skin moisturizer/barrier cream  - Collaborate with interdisciplinary team (i e  Nutrition, Rehabilitation, etc )   - Patient/family teaching  Outcome: Progressing     Problem: PAIN - ADULT  Goal: Verbalizes/displays adequate comfort level or baseline comfort level  Description  Interventions:  - Encourage patient to monitor pain and request assistance  - Assess pain using appropriate pain scale  - Administer analgesics based on type and severity of pain and evaluate response  - Implement non-pharmacological measures as appropriate and evaluate response  - Consider cultural and social influences on pain and pain management  - Notify physician/advanced practitioner if interventions unsuccessful or patient reports new pain  Outcome: Progressing     Problem: INFECTION - ADULT  Goal: Absence or prevention of progression during hospitalization  Description  INTERVENTIONS:  - Assess and monitor for signs and symptoms of infection  - Monitor lab/diagnostic results  - Monitor all insertion sites, i e  indwelling lines, tubes, and drains  - Monitor endotracheal (as able) and nasal secretions for changes in amount and color  - [place] appropriate cooling/warming therapies per order  - Administer medications as ordered  - Instruct and encourage patient and family to use good hand hygiene technique  - Identify and instruct in appropriate isolation precautions for identified infection/condition  Outcome: Progressing     Problem: SAFETY ADULT  Goal: Maintain or return to baseline ADL function  Description  INTERVENTIONS:  -  Assess patient's ability to carry out ADLs; assess patient's baseline for ADL function and identify physical deficits which impact ability to perform ADLs (bathing, care of mouth/teeth, toileting, grooming, dressing, etc )  - Assess/evaluate cause of self-care deficits   - Assess range of motion  - Assess patient's mobility; develop plan if impaired  - Assess patient's need for assistive devices and provide as appropriate  - Encourage maximum independence but intervene and supervise when necessary  ¯ Involve family in performance of ADLs  ¯ Assess for home care needs following discharge   ¯ Request OT consult to assist with ADL evaluation and planning for discharge  ¯ Provide patient education as appropriate  Outcome: Progressing  Goal: Maintain or return mobility status to optimal level  Description  INTERVENTIONS:  - Assess patient's baseline mobility status (ambulation, transfers, stairs, etc )    - Identify cognitive and physical deficits and behaviors that affect mobility  - Identify mobility aids required to assist with transfers and/or ambulation (gait belt, sit-to-stand, lift, walker, cane, etc )  - [place] fall precautions as indicated by assessment  - Record patient progress and toleration of activity level on Mobility SBAR; progress patient to next Phase/Stage  - Instruct patient to call for assistance with activity based on assessment  - Request Rehabilitation consult to assist with strengthening/weightbearing, etc   Outcome: Progressing     Problem: DISCHARGE PLANNING  Goal: Discharge to home or other facility with appropriate resources  Description  INTERVENTIONS:  - Identify barriers to discharge w/patient and caregiver  - Arrange for needed discharge resources and transportation as appropriate  - Identify discharge learning needs (meds, wound care, etc )  - Arrange for interpretive services to assist at discharge as needed  - Refer to Case Management Department for coordinating discharge planning if the patient needs post-hospital services based on physician/advanced practitioner order or complex needs related to functional status, cognitive ability, or social support system  Outcome: Progressing     Problem: Knowledge Deficit  Goal: Patient/family/caregiver demonstrates understanding of disease process, treatment plan, medications, and discharge instructions  Description  Complete learning assessment and assess knowledge base  Interventions:  - Provide teaching at level of understanding  - Provide teaching via preferred learning methods  Outcome: Progressing     Problem: CARDIOVASCULAR - ADULT  Goal: Maintains optimal cardiac output and hemodynamic stability  Description  INTERVENTIONS:  - Monitor I/O, vital signs and rhythm  - Monitor for S/S and trends of decreased cardiac output i e  bleeding, hypotension  - Administer and titrate ordered vasoactive medications to optimize hemodynamic stability  - Assess quality of pulses, skin color and temperature  - Assess for signs of decreased coronary artery perfusion - ex   Angina  - Instruct patient to report change in severity of symptoms  Outcome: Progressing  Goal: Absence of cardiac dysrhythmias or at baseline rhythm  Description  INTERVENTIONS:  - Continuous cardiac monitoring, monitor vital signs, obtain 12 lead EKG if indicated  - Administer antiarrhythmic and heart rate control medications as ordered  - Monitor electrolytes and administer replacement therapy as ordered  Outcome: Progressing     Problem: GASTROINTESTINAL - ADULT  Goal: Minimal or absence of nausea and/or vomiting  Description  INTERVENTIONS:  - Maintain NPO status until nausea and vomiting are resolved  - Administer ordered antiemetic medications as needed  - Provide nonpharmacologic comfort measures as appropriate  - Advance diet as tolerated, if ordered  - Nutrition services referral to assist patient with adequate nutrition and appropriate food choices   Outcome: Progressing  Goal: Maintains or returns to baseline bowel function  Description  INTERVENTIONS:  - Encourage oral fluids to ensure adequate hydration  - Administer ordered medications as needed  - Encourage mobilization and activity     Outcome: Progressing  Goal: Maintains adequate nutritional intake  Description  INTERVENTIONS:  - Identify factors contributing to decreased intake, treat as appropriate  - Monitor I&O, WT and lab values  - Obtain nutrition services referral as needed   Outcome: Progressing     Problem: HEMATOLOGIC - ADULT  Goal: Maintains hematologic stability  Description  INTERVENTIONS  - Monitor labs  Monitor hemodynamics for signs of sepsis, administer bolus and reorder labs per protocol       Outcome: Progressing     Problem: MUSCULOSKELETAL - ADULT  Goal: Maintain or return mobility to safest level of function  Description  INTERVENTIONS:  - Assess patient's ability to carry out ADLs; assess patient's baseline for ADL function and identify physical deficits which impact ability to perform ADLs (bathing, care of mouth/teeth, toileting, grooming, dressing, etc )  - Assess/evaluate cause of self-care deficits   - Assess range of motion  - Assess patient's mobility; develop plan if impaired  - Assess patient's need for assistive devices and provide as appropriate  - Encourage maximum independence but intervene and supervise when necessary  - Involve family in performance of ADLs  - Assess for home care needs following discharge   - Request OT consult to assist with ADL evaluation and planning for discharge  - Provide patient education as appropriate  Outcome: Progressing  Goal: Maintain proper alignment of affected body part  Description  INTERVENTIONS:  - Support, maintain and protect limb and body alignment  - Provide pt/fam with appropriate education  Outcome: Progressing

## 2019-07-06 NOTE — ED NOTES
Please note that the blue top tube was drawn at time of troponin - 2356 7/5       Lilly Pak RN  07/06/19 7502

## 2019-07-06 NOTE — ED NOTES
Heparin bolus and drip started as ordered - over ride of heparin bolus needed to be done - [place] pharmacist , Yves Amado , was called to verify this  Blue top tube was drawn at time of 3rd troponin and sent   Patient presently is non stop yelling in Estonian because via interpretor the hallway light is bothering her - asked to cover her eyes with blanket or towel and patient threw it off - explained that the light can not be turned off - all of the lights in her room are turned off       Hemal Villalta RN  07/06/19 8146

## 2019-07-06 NOTE — ASSESSMENT & PLAN NOTE
Lab Results   Component Value Date    HGBA1C 7 7 (H) 06/04/2019       Recent Labs     07/05/19  1826   POCGLU 287*       Blood Sugar Average: Last 72 hrs:  · SSI + accuchek  · Novolog 12 units TID with meals  · Diabetic diet once able to take PO

## 2019-07-06 NOTE — ED NOTES
Patient resting quietly - is awake and alert - presently denying chest pain or shortness of breath  Family present in room is cooperative with this staff but is upset that the patient has not been moved  Explained to family, the EMS situatiion and transfer is out of our hands but we would do all that is possible to make her comfortable  Dr Jamel Runner was questioned concerning any further orders for this patient  Blood work and EKG was ordered and same was done  Patient was turned side to side with linen change and repositioned in bed and made comfortable  Family happy with same  Monitor - sinus rhythm  Breath sounds noted to be relatively clear  Denies shortness of breath  No peripheral edema noted  Obese, soft abdomen with bowel sounds  Mejia patent for adequate amounts of pale yellow urine  Lights were dimmed, call bell within reach and side rails up with warm blanket given       Lilly Pak RN  07/06/19 0729

## 2019-07-06 NOTE — NURSING NOTE
Used  phone to speak with Daughter Louisa Sierra ( name: Michelle Samson #[id number])  Given update  Also completed MRI checklist  No questions or concerns  Call bell and personal items within close reach  Will continue to monitor patient

## 2019-07-06 NOTE — CONSULTS
Electrophysiology-Cardiology (EP)   Jb Dowd 81 y o  female MRN: [id number]  Unit/Bed#: E4 -01 Encounter: [id number]        IMPRESSION:  1  Troponin elevation: Most likely NSTEMI Type 2- however patient is poor historian and main complain was nausea and vomiting and feeling unwell- Troponin rise to 1 8 and now back to 1 2 and she did have dynamic EKG changes w ST depression/TWI lateral leads, however she has this pattern in past, it does change from EKG to EKG  2 EULOGIO Cr 1 7 on admission improved to baseline 1 4- because of this best to wait on cath until Monday    3  PAD    4  DMT2    5  H/o Diastolic chf EF normal 8642    6  Nephrolithiasis -urology consulted may have had pain related to renal colic  Again difficult history  7  HTN    PLAN:  1  Recommend Cardiac catherization Monday - presentation atypical for acute MI but she had elevated troponin and dynamic EKG changes did present w nausea/vomiting- given mixed picture and know DMT2 and PAD there is high probability we will find signficant CAD and worth definitive cath rather than stress test, WIll delay this to Monday since now asymptomatic and prefer her renal function to improve  2  Contineu asa, statin    3  Add metorpolol 25mg bid     No need for heparin gtt at this point  Referring Physian: David Montiel MD    Chief Complain/Reason for Referal: Elevated troponin  Galilea Saul is a 81 y o     Patient Active Problem List    Diagnosis Date Noted    Ureterolithiasis 07/06/2019     Priority: Low    Elevated troponin 07/06/2019     Priority: Low    Acute kidney injury superimposed on chronic kidney disease ([facility] ) 07/06/2019     Priority: Low    Localized edema 05/14/2019     Priority: Low    Drug-induced constipation 05/14/2019     Priority: Low    Inflammatory spondylopathy of lumbosacral region ([facility] ) 03/08/2019     Priority: Low    Chronic diastolic heart failure ([facility] ) 03/08/2019     Priority: Low    COPD mixed type ([facility] ) 03/08/2019     Priority: Low    PAD (peripheral artery disease) ([facility] ) 03/08/2019     Priority: Low    Angina pectoris ([facility] ) 03/08/2019     Priority: Low    Acute renal failure superimposed on stage 3 chronic kidney disease ([facility] ) 12/30/2018     Priority: Low    Left arm cellulitis 12/30/2018     Priority: Low    Accident due to mechanical fall without injury 12/30/2018     Priority: Low    Type 2 diabetes mellitus with hyperglycemia, with long-term current use of insulin ([facility] ) 12/30/2018     Priority: Low    Abscess of arm, left 12/30/2018     Priority: Low    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 ([facility] ) 08/14/2018     Priority: Low    Chronic right-sided low back pain with right-sided sciatica 08/14/2018     Priority: Low    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 ([facility] ) 05/91/8154     Priority: Low    Preop examination 08/13/2018     Priority: Low    Gait difficulty 08/13/2018     Priority: Low    Gastritis without bleeding 08/07/2018     Priority: Low    Acute metabolic encephalopathy 77/69/4731     Priority: Low    Hypertension 12/20/2017     Priority: Low    Shoulder joint pain 08/19/2014     Priority: Low     82 yo female w h/o  HTN, diastolic chf, HTN, COPD, CKI presented w nausea vomiting and "hip pain" thought to be renal colic  She had elevated troponin peak to 1 8 but does deny CP/SOB  She lives at home w her child and uses a walker  She was transferred from [address] given the troponin elevation  Currently she feels better and denies CP/SOB/Nausea         Past Medical History:   Diagnosis Date    COPD (chronic obstructive pulmonary disease) ([facility] )     Diabetes mellitus ([place] [address] )     Hyperlipidemia     Hyperlipidemia     Hypertension     Kidney stones     Osteoporosis        Medications Prior to Admission   Medication    acetaminophen-codeine (TYLENOL #3) 300-30 mg per tablet    atorvastatin (LIPITOR) 40 mg tablet  baclofen 10 mg tablet    brimonidine tartrate 0 2 % ophthalmic solution    calcitonin, salmon, (MIACALCIN) 200 units/act nasal spray    celecoxib (CeleBREX) 200 mg capsule    denosumab (PROLIA) 60 mg/mL    dorzolamide (TRUSOPT) 2 % ophthalmic solution    furosemide (LASIX) 40 mg tablet    HYDROcodone-acetaminophen (NORCO) 5-325 mg per tablet    insulin degludec (TRESIBA FLEXTOUCH) 100 units/mL injection pen    ipratropium (ATROVENT HFA) 17 mcg/act inhaler    latanoprost (XALATAN) 0 005 % ophthalmic solution    LINZESS 290 MCG CAPS    lisinopril (ZESTRIL) 10 mg tablet    lisinopril (ZESTRIL) 5 mg tablet    NOVOLOG FLEXPEN 100 units/mL injection pen    omeprazole (PriLOSEC) 20 mg delayed release capsule    Patiromer Sorbitex Calcium 8 4 g PACK    pentoxifylline (TRENtal) 400 mg ER tablet    pregabalin (LYRICA) 100 mg capsule    sitaGLIPtin (JANUVIA) 50 mg tablet       Scheduled Meds:  Current Facility-Administered Medications:  atorvastatin 40 mg Oral Daily With Health: Elt TRINA Rider PA-C   brimonidine 1 drop Both Eyes TID Levell Bumps CARL Rider   dorzolamide 1 drop Both Eyes TID Nithin Kelly PA-C   [START ON 7/7/2019] heparin (porcine) 5,000 Units Subcutaneous [facility] Levell Bumps CARL Rider   hydrALAZINE 5 mg Intravenous Q6H PRN Levell Bumps CARL Rider   insulin lispro 1-6 Units Subcutaneous TID AC Greta Rider PA-C   insulin lispro 1-6 Units Subcutaneous HS Levell Bumps CARL Rider   insulin lispro 12 Units Subcutaneous TID With Meals Nithin Kelly PA-C   ipratropium 2 puff Inhalation 4x Daily PRN Nithin Kelly PA-C   lidocaine 1 patch Topical Daily Levell Bumps GyCARL melara   lidocaine 1 patch Topical Daily Mirtha Donahue MD   ondansetron 4 mg Intravenous Q6H PRN Levell Bumps CARL Rider   pantoprazole 40 mg Oral Early Morning Levell Bumps Gyarmaty, PA-C   pentoxifylline 400 mg Oral TID With Meals Nithin Kelly PA-C   pregabalin 100 mg Oral Daily Nithin Kelly CARL   tamsulosin 0 4 mg Oral Daily With Maryam Mixon MD     Continuous Infusions:   PRN Meds: hydrALAZINE    ipratropium    ondansetron  Allergies   Allergen Reactions    Acetaminophen      Listen on patient's paperwork from [facility] and Staffing  Family unable to confirm      Morphine And Related Vomiting    Oxycodone GI Intolerance     Listed on patient's paperwork from [facility] and Staffing  Family unable to confirm      Tramadol Vomiting and Abdominal Pain     Other     I reviewed the Home Medication list in the chart       Family History   Problem Relation Age of Onset    Diabetes Mother     No Known Problems Father     Throat cancer Daughter     No Known Problems Maternal Grandmother     No Known Problems Maternal Grandfather     No Known Problems Paternal Grandmother     No Known Problems Paternal Grandfather        Social History     Socioeconomic History    Marital status: Single     Spouse name: Not on file    Number of children: Not on file    Years of education: Not on file    Highest education level: Not on file   Occupational History    Not on file   Social Needs    Financial resource strain: Not on file    Food insecurity:     Worry: Not on file     Inability: Not on file    Transportation needs:     Medical: Not on file     Non-medical: Not on file   Tobacco Use    Smoking status: Never Smoker    Smokeless tobacco: Never Used    Tobacco comment: states she quit but family living with her states she smokes   Substance and Sexual Activity    Alcohol use: Never     Frequency: Never     Comment: OCCASIONAL    Drug use: Never    Sexual activity: Yes     Partners: Male   Lifestyle    Physical activity:     Days per week: Not on file     Minutes per session: Not on file    Stress: Not on file   Relationships    Social connections:     Talks on phone: Not on file     Gets together: Not on file     Attends Mosque service: Not on file Active member of club or organization: Not on file     Attends meetings of clubs or organizations: Not on file     Relationship status: Not on file    Intimate partner violence:     Fear of current or ex partner: Not on file     Emotionally abused: Not on file     Physically abused: Not on file     Forced sexual activity: Not on file   Other Topics Concern    Not on file   Social History Narrative    Not on file       Review of Systems -12 Point ROS reviewed and are negative or noted in chart except for Pertinent Positives Pertaining to Cardiovascular and Respiratory in HPI above  Vitals:    07/06/19 1150   BP: 169/79   Pulse: 78   Resp: 18   Temp:    SpO2: 97%     Vitals:    07/06/19 0355 07/06/19 0600   Weight: 74 6 kg (164 lb 7 4 oz) 74 6 kg (164 lb 7 4 oz)       Intake/Output Summary (Last 24 hours) at 7/6/2019 1222  Last data filed at 7/6/2019 0901  Gross per 24 hour   Intake 240 ml   Output    Net 240 ml         GEN: Now acute distress, Alert and oriented, well appearing  HEENT:Head, neck, ears, oral pharynx: Mucus membranes moist, oral pharynx clear, nares clear  External ears normal  EYES: Pupils equal, sclera anicteric, midline, normal conjuctiva  NECK: No JVD, supple, no obvious masses or thryomegaly or goiter  CARDIOVASCULAR: RRR, No murmur, rub, gallops S1,S2  LUNGS: Clear To auscultation bilaterally, normal effort, no rales, rhonchi, crackles  ABDOMEN: Soft, nondistended, nontender, without obvious organomegaly or ascites  EXTREMITIES/VASCULAR: No edema  Radial pulses intact, pedal pulses difficult to palpate, warm an well perfused  PSYCH: Normal Affect, no overt suicidal ideation, linear speech pattern without evidence of psychosis  NEURO: Grossly intact, moving all extremiteis equal, face symmetric, alert and responsive, no obvious focal defecits  HEME: No bleeding, bruising, petechia, purpura  SKIN: No significant rashes, warm, no diaphoresis or pallor       Lab Results:     CBC with diff: Results from last 7 days   Lab Units 19  0351 19  1029   WBC Thousand/uL 10 99* 11 60*   HEMOGLOBIN g/dL 12 0 12 3   HEMATOCRIT % 38 2 38 6   MCV fL 95 92   PLATELETS Thousands/uL 162 174   MCH pg 29 9 29 5   MCHC g/dL 31 4 32 0   RDW % 14 7 15 7*   MPV fL 12 9* 11 1   NRBC AUTO /100 WBCs 0  --          CMP:  Results from last 7 days   Lab Units 19  0351 19  1029   POTASSIUM mmol/L 4 1 5 0   CHLORIDE mmol/L 102 97   CO2 mmol/L 29 30   BUN mg/dL 20 23   CREATININE mg/dL 1 47* 1 73*   CALCIUM mg/dL 9 7 9 6   AST U/L  --  25   ALT U/L  --  21   ALK PHOS U/L  --  151*   EGFR ml/min/1 73sq m 33 27*         BMP:  Results from last 7 days   Lab Units 19  0351 19  1029   POTASSIUM mmol/L 4 1 5 0   CHLORIDE mmol/L 102 97   CO2 mmol/L 29 30   BUN mg/dL 20 23   CREATININE mg/dL 1 47* 1 73*   CALCIUM mg/dL 9 7 9 6       BNP:   Results Reviewed     None        No results for input(s): BNP in the last 72 hours       Results from last 7 days   Lab Units 19  0945 19  0650 19  0351   TROPONIN I ng/mL 1 21* 1 43* 1 62*         Magnesium:       Coags:   Results from last 7 days   Lab Units 19  0102   PTT seconds 25   INR  1 03       TSH:       Lipid Profile:         Cardiac testing:   Results for orders placed in visit on 18   Echo complete with contrast if indicated    Narrative [id number] [place], [address] [phone]    Cardiovascular Report  _________________________________________________________________         Transthoracic Echocardiogram Report  Christa Oliveira    MRN:                 [id number]  Account :           [de-identified]  Accession :         [id number]  Exam Date:           2018 11:17  [facility]  Ordering Phys:       Roseanne Gee  Attending Phys:      Roseanne Gee  Technologist:        Navi Dhaliwal RCS,CCT    Age:  81             :   1935           Gender:   F  Wt: 146 lb         Ht:    64 in  Indication:  HEART FAILURE  BP:         /       HR:    Rhythm:              Sinus  Technical Quality:   Technically poor study      MEASUREMENTS  2D ECHO  Body Surface Area                 1 7 m²  LV Diastolic Diameter PLAX        3 9 cm  LV Systolic Diameter PLAX         2 4 cm  IVS Diastolic Thickness           1 5 cm  LVPW Diastolic Thickness          1 4 cm  LV Relative Wall Thickness        0 7  Aortic Root Diameter              2 8 cm  LA Systolic Diameter LX           3 9 cm  LV Ejection Fraction MOD 4C       70 6 %  LA Area 4C View                   20 0 cm²  RA Area 4C View                   11 2 cm²    DOPPLER  MV Area PHT                       2 6 cm²  MR Peak Velocity                  427 6 cm/s  MR Peak Gradient                  73 1 mmHg  Mitral E Point Velocity           78 3 cm/s  Mitral A Point Velocity           112 7 cm/s  Mitral E to A Ratio               0 7  MV E' Velocity                    3 3 cm/s  Mitral E to MV E' Ratio           24 0  TAPSE                             1 7 cm  Right Atrial Pressure             5 0 mmHg  PV Peak Velocity                  51 6 cm/s  PV Peak Gradient                  1 1 mmHg  Pulmonary Artery Diastolic Press  12 2 mmHg      FINDINGS  Left Ventricle  Hyperdynamic left ventricular systolic function, EF estimated at  75%  Normal left ventricular chamber size  Moderate concentric  left ventricular hypertrophy  Hyperdynamic left ventricular wall  motion without regional wall motion abnormalities  Indeterminate  left ventricular diastolic function  Mild to moderately elevated  left ventricular filling pressures  Right Ventricle  Normal right ventricular systolic function and size  Pulmonary  pressure could not be calculated due to incomplete tricuspid  regurgitation velocity profile  Right Atrium  Normal right atrial size  Left Atrium  Mild-to-moderate left atrial enlargement  Mitral Valve  Trace mitral regurgitation  Mitral valve annulus calcification  Aortic Valve  Aortic sclerosis  Tricuspid Valve  No tricuspid regurgitation  Pulmonic Valve  Trace pulmonic regurgitation  Pericardium  No pericardial effusion  Aorta  Normal aortic root size  CONCLUSIONS  Technically poor study  Mild-to-moderate left atrial enlargement  Aortic sclerosis  Hyperdynamic left ventricular systolic function, EF estimated at  75%  Indeterminate left ventricular diastolic function  Mild to moderately elevated left ventricular filling pressures  Abel Padron MD  (Electronically Signed)  Final Date:      22 June 2018 11:50  CV Report                    Mika Kamara       [id number]  Final                                                     Page 2     No results found for this or any previous visit  No results found for this or any previous visit  No results found for this or any previous visit  CXR:  no acute cp disease    EKG/TELE: NSR, TWI w HERBER depression V4-6 I, AVL  Some dynamic changes present  Reviewing old EKG's she has had this pattern before Dec 2017  I have personally reviewed the EKG, Echocardiogram, CXR and Telemetry images directly and the above is my interpretation

## 2019-07-07 ENCOUNTER — APPOINTMENT (INPATIENT)
Dept: RADIOLOGY | Facility: HOSPITAL | Age: 84
DRG: 280 | End: 2019-07-07
Payer: MEDICARE

## 2019-07-07 LAB
ANION GAP SERPL CALCULATED.3IONS-SCNC: 11 MMOL/L (ref 4–13)
BASOPHILS # BLD AUTO: 0.05 THOUSANDS/ΜL (ref 0–0.1)
BASOPHILS NFR BLD AUTO: 1 % (ref 0–1)
BUN SERPL-MCNC: 18 MG/DL (ref 5–25)
CALCIUM SERPL-MCNC: 9.5 MG/DL (ref 8.3–10.1)
CHLORIDE SERPL-SCNC: 98 MMOL/L (ref 100–108)
CO2 SERPL-SCNC: 25 MMOL/L (ref 21–32)
CREAT SERPL-MCNC: 1.36 MG/DL (ref 0.6–1.3)
EOSINOPHIL # BLD AUTO: 0.25 THOUSAND/ΜL (ref 0–0.61)
EOSINOPHIL NFR BLD AUTO: 3 % (ref 0–6)
ERYTHROCYTE [DISTWIDTH] IN BLOOD BY AUTOMATED COUNT: 14.4 % (ref 11.6–15.1)
GFR SERPL CREATININE-BSD FRML MDRD: 36 ML/MIN/1.73SQ M
GLUCOSE SERPL-MCNC: 117 MG/DL (ref 65–140)
GLUCOSE SERPL-MCNC: 229 MG/DL (ref 65–140)
GLUCOSE SERPL-MCNC: 279 MG/DL (ref 65–140)
GLUCOSE SERPL-MCNC: 308 MG/DL (ref 65–140)
GLUCOSE SERPL-MCNC: 71 MG/DL (ref 65–140)
GLUCOSE SERPL-MCNC: >500 MG/DL (ref 65–140)
HCT VFR BLD AUTO: 45.1 % (ref 34.8–46.1)
HGB BLD-MCNC: 13.8 G/DL (ref 11.5–15.4)
IMM GRANULOCYTES # BLD AUTO: 0.05 THOUSAND/UL (ref 0–0.2)
IMM GRANULOCYTES NFR BLD AUTO: 1 % (ref 0–2)
LYMPHOCYTES # BLD AUTO: 2.16 THOUSANDS/ΜL (ref 0.6–4.47)
LYMPHOCYTES NFR BLD AUTO: 22 % (ref 14–44)
MCH RBC QN AUTO: 30.1 PG (ref 26.8–34.3)
MCHC RBC AUTO-ENTMCNC: 30.6 G/DL (ref 31.4–37.4)
MCV RBC AUTO: 98 FL (ref 82–98)
MONOCYTES # BLD AUTO: 0.79 THOUSAND/ΜL (ref 0.17–1.22)
MONOCYTES NFR BLD AUTO: 8 % (ref 4–12)
NEUTROPHILS # BLD AUTO: 6.59 THOUSANDS/ΜL (ref 1.85–7.62)
NEUTS SEG NFR BLD AUTO: 65 % (ref 43–75)
NRBC BLD AUTO-RTO: 0 /100 WBCS
PLATELET # BLD AUTO: 141 THOUSANDS/UL (ref 149–390)
PMV BLD AUTO: 12.6 FL (ref 8.9–12.7)
POTASSIUM SERPL-SCNC: 4 MMOL/L (ref 3.5–5.3)
RBC # BLD AUTO: 4.59 MILLION/UL (ref 3.81–5.12)
SODIUM SERPL-SCNC: 134 MMOL/L (ref 136–145)
WBC # BLD AUTO: 9.89 THOUSAND/UL (ref 4.31–10.16)

## 2019-07-07 PROCEDURE — 97530 THERAPEUTIC ACTIVITIES: CPT

## 2019-07-07 PROCEDURE — G8978 MOBILITY CURRENT STATUS: HCPCS

## 2019-07-07 PROCEDURE — 99232 SBSQ HOSP IP/OBS MODERATE 35: CPT | Performed by: INTERNAL MEDICINE

## 2019-07-07 PROCEDURE — 80048 BASIC METABOLIC PNL TOTAL CA: CPT | Performed by: INTERNAL MEDICINE

## 2019-07-07 PROCEDURE — 85025 COMPLETE CBC W/AUTO DIFF WBC: CPT | Performed by: INTERNAL MEDICINE

## 2019-07-07 PROCEDURE — 97163 PT EVAL HIGH COMPLEX 45 MIN: CPT

## 2019-07-07 PROCEDURE — G8979 MOBILITY GOAL STATUS: HCPCS

## 2019-07-07 PROCEDURE — 82948 REAGENT STRIP/BLOOD GLUCOSE: CPT

## 2019-07-07 PROCEDURE — 74018 RADEX ABDOMEN 1 VIEW: CPT

## 2019-07-07 PROCEDURE — 99232 SBSQ HOSP IP/OBS MODERATE 35: CPT | Performed by: UROLOGY

## 2019-07-07 RX ORDER — INSULIN GLARGINE 100 [IU]/ML
25 INJECTION, SOLUTION SUBCUTANEOUS
Status: DISCONTINUED | OUTPATIENT
Start: 2019-07-07 | End: 2019-07-09

## 2019-07-07 RX ORDER — AMLODIPINE BESYLATE 5 MG/1
5 TABLET ORAL DAILY
Status: DISCONTINUED | OUTPATIENT
Start: 2019-07-07 | End: 2019-07-10 | Stop reason: HOSPADM

## 2019-07-07 RX ORDER — ASPIRIN 325 MG
325 TABLET ORAL DAILY
Status: DISCONTINUED | OUTPATIENT
Start: 2019-07-07 | End: 2019-07-10 | Stop reason: HOSPADM

## 2019-07-07 RX ADMIN — ATORVASTATIN CALCIUM 40 MG: 40 TABLET, FILM COATED ORAL at 17:51

## 2019-07-07 RX ADMIN — TAMSULOSIN HYDROCHLORIDE 0.4 MG: 0.4 CAPSULE ORAL at 17:51

## 2019-07-07 RX ADMIN — ASPIRIN 325 MG ORAL TABLET 325 MG: 325 PILL ORAL at 10:08

## 2019-07-07 RX ADMIN — DORZOLAMIDE HYDROCHLORIDE 1 DROP: 20 SOLUTION/ DROPS OPHTHALMIC at 17:51

## 2019-07-07 RX ADMIN — HYDROCODONE BITARTRATE AND ACETAMINOPHEN 1 TABLET: 5; 325 TABLET ORAL at 22:17

## 2019-07-07 RX ADMIN — PENTOXIFYLLINE 400 MG: 400 TABLET, EXTENDED RELEASE ORAL at 12:06

## 2019-07-07 RX ADMIN — INSULIN LISPRO 4 UNITS: 100 INJECTION, SOLUTION INTRAVENOUS; SUBCUTANEOUS at 12:07

## 2019-07-07 RX ADMIN — HYDROCODONE BITARTRATE AND ACETAMINOPHEN 1 TABLET: 5; 325 TABLET ORAL at 08:48

## 2019-07-07 RX ADMIN — INSULIN LISPRO 12 UNITS: 100 INJECTION, SOLUTION INTRAVENOUS; SUBCUTANEOUS at 17:52

## 2019-07-07 RX ADMIN — INSULIN LISPRO 12 UNITS: 100 INJECTION, SOLUTION INTRAVENOUS; SUBCUTANEOUS at 12:06

## 2019-07-07 RX ADMIN — BRIMONIDINE TARTRATE 1 DROP: 1.5 SOLUTION OPHTHALMIC at 17:56

## 2019-07-07 RX ADMIN — PENTOXIFYLLINE 400 MG: 400 TABLET, EXTENDED RELEASE ORAL at 08:47

## 2019-07-07 RX ADMIN — LIDOCAINE 1 PATCH: 50 PATCH TOPICAL at 08:43

## 2019-07-07 RX ADMIN — PENTOXIFYLLINE 400 MG: 400 TABLET, EXTENDED RELEASE ORAL at 17:52

## 2019-07-07 RX ADMIN — DORZOLAMIDE HYDROCHLORIDE 1 DROP: 20 SOLUTION/ DROPS OPHTHALMIC at 21:49

## 2019-07-07 RX ADMIN — BRIMONIDINE TARTRATE 1 DROP: 1.5 SOLUTION OPHTHALMIC at 21:49

## 2019-07-07 RX ADMIN — METOPROLOL TARTRATE 25 MG: 25 TABLET, FILM COATED ORAL at 10:08

## 2019-07-07 RX ADMIN — PREGABALIN 100 MG: 50 CAPSULE ORAL at 08:47

## 2019-07-07 RX ADMIN — AMLODIPINE BESYLATE 5 MG: 5 TABLET ORAL at 10:08

## 2019-07-07 RX ADMIN — PANTOPRAZOLE SODIUM 40 MG: 40 TABLET, DELAYED RELEASE ORAL at 05:48

## 2019-07-07 RX ADMIN — HEPARIN SODIUM 5000 UNITS: 5000 INJECTION INTRAVENOUS; SUBCUTANEOUS at 05:48

## 2019-07-07 RX ADMIN — METOPROLOL TARTRATE 25 MG: 25 TABLET, FILM COATED ORAL at 21:48

## 2019-07-07 RX ADMIN — LIDOCAINE 1 PATCH: 50 PATCH TOPICAL at 08:42

## 2019-07-07 RX ADMIN — HEPARIN SODIUM 5000 UNITS: 5000 INJECTION INTRAVENOUS; SUBCUTANEOUS at 15:00

## 2019-07-07 RX ADMIN — BRIMONIDINE TARTRATE 1 DROP: 1.5 SOLUTION OPHTHALMIC at 08:50

## 2019-07-07 RX ADMIN — DORZOLAMIDE HYDROCHLORIDE 1 DROP: 20 SOLUTION/ DROPS OPHTHALMIC at 08:43

## 2019-07-07 NOTE — CONSULTS
Electrophysiology-Cardiology (EP)   Danielle Borjas 81 y o  female MRN: [id number]  Unit/Bed#: E4 -01 Encounter: [id number]    Follow up note    IMPRESSION:  1  Troponin elevation: Most likely NSTEMI Type 2- however patient is poor historian and main complain was nausea and vomiting and feeling unwell- Troponin rise to 1 6 and now back to 1 2 and she did have dynamic EKG changes w ST depression/TWI lateral leads, however she has this pattern in past, it does change from EKG to EKG  Remains CP free  Tele does not show any ST segement changes    2  EULOGIO Cr 1 7 on admission improved to baseline 1 3- because of this best to wait on cath until Monday    3  PAD-workup pending, symptoms of claudication    4  DMT2 insulin dependent    5  H/o Diastolic chf EF normal 6021    6  Nephrolithiasis -urology consulted may have had pain related to renal colic  Again difficult history  7  HTN-labile, not well controlled    PLAN:  1  Recommend Cardiac catherization Monday - presentation atypical for acute MI but she had elevated troponin and dynamic EKG changes did present w nausea/vomiting- given mixed picture and know DMT2 and PAD there is high probability we will find signficant CAD and worth definitive cath rather than stress test, WIll delay this to Monday since now asymptomatic and prefer her renal function to improve  2  Contineu asa, statin    3  Add metorpolol 25mg bid   4  Add amlodipine 5mg for HTN  Holding lisinopril due to EULOGIO    No need for heparin gtt at this point  Referring Physian: Lori Benavides MD    Chief Complain/Reason for Referal: Elevated troponin  Jay Siegel is a 81 y o     Patient Active Problem List    Diagnosis Date Noted    Ureterolithiasis 07/06/2019     Priority: Low    Elevated troponin 07/06/2019     Priority: Low    Acute kidney injury superimposed on chronic kidney disease ([facility] [address] ) 07/06/2019     Priority: Low    Localized edema 05/14/2019     Priority: Low    Drug-induced constipation 05/14/2019     Priority: Low    Inflammatory spondylopathy of lumbosacral region ([facility] ) 03/08/2019     Priority: Low    Chronic diastolic heart failure ([facility] ) 03/08/2019     Priority: Low    COPD mixed type ([facility] ) 03/08/2019     Priority: Low    PAD (peripheral artery disease) ([facility] ) 03/08/2019     Priority: Low    Angina pectoris ([facility] ) 03/08/2019     Priority: Low    Acute renal failure superimposed on stage 3 chronic kidney disease ([facility] ) 12/30/2018     Priority: Low    Left arm cellulitis 12/30/2018     Priority: Low    Accident due to mechanical fall without injury 12/30/2018     Priority: Low    Type 2 diabetes mellitus with hyperglycemia, with long-term current use of insulin ([facility] ) 12/30/2018     Priority: Low    Abscess of arm, left 12/30/2018     Priority: Low    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 ([facility] ) 08/14/2018     Priority: Low    Chronic right-sided low back pain with right-sided sciatica 08/14/2018     Priority: Low    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 ([place] [address] ) 11/31/4408     Priority: Low    Preop examination 08/13/2018     Priority: Low    Gait difficulty 08/13/2018     Priority: Low    Gastritis without bleeding 08/07/2018     Priority: Low    Acute metabolic encephalopathy 28/15/6599     Priority: Low    Hypertension 12/20/2017     Priority: Low    Shoulder joint pain 08/19/2014     Priority: Low     S: Today just feels unwell  No CP/SOB  C/o leg discomfort         Past Medical History:   Diagnosis Date    COPD (chronic obstructive pulmonary disease) ([place] [address] )     Diabetes mellitus ([place] [address] )     Hyperlipidemia     Hyperlipidemia     Hypertension     Kidney stones     Osteoporosis        Medications Prior to Admission   Medication    acetaminophen-codeine (TYLENOL #3) 300-30 mg per tablet    atorvastatin (LIPITOR) 40 mg tablet    baclofen 10 mg tablet    brimonidine tartrate 0 2 % ophthalmic solution    calcitonin, salmon, (MIACALCIN) 200 units/act nasal spray    celecoxib (CeleBREX) 200 mg capsule    denosumab (PROLIA) 60 mg/mL    dorzolamide (TRUSOPT) 2 % ophthalmic solution    furosemide (LASIX) 40 mg tablet    HYDROcodone-acetaminophen (NORCO) 5-325 mg per tablet    insulin degludec (TRESIBA FLEXTOUCH) 100 units/mL injection pen    ipratropium (ATROVENT HFA) 17 mcg/act inhaler    latanoprost (XALATAN) 0 005 % ophthalmic solution    LINZESS 290 MCG CAPS    lisinopril (ZESTRIL) 10 mg tablet    lisinopril (ZESTRIL) 5 mg tablet    NOVOLOG FLEXPEN 100 units/mL injection pen    omeprazole (PriLOSEC) 20 mg delayed release capsule    Patiromer Sorbitex Calcium 8 4 g PACK    pentoxifylline (TRENtal) 400 mg ER tablet    pregabalin (LYRICA) 100 mg capsule    sitaGLIPtin (JANUVIA) 50 mg tablet       Scheduled Meds:    Current Facility-Administered Medications:  amLODIPine 5 mg Oral Daily Jarrett Chapman MD   aspirin 325 mg Oral Daily Jarrett Chapman MD   atorvastatin 40 mg Oral Daily With Havnegade Lebron, PA-C   brimonidine 1 drop Both Eyes TID Luis Alfredo Rider PA-C   dorzolamide 1 drop Both Eyes TID Luis Alfredo Rider PA-C   heparin (porcine) 5,000 Units Subcutaneous Q8H [address] [facility], CARL   hydrALAZINE 5 mg Intravenous Q6H PRN Felipe Delatorre PA-C   HYDROcodone-acetaminophen 1 tablet Oral Q6H PRN Jocelyn Trujillo MD   insulin glargine 25 Units Subcutaneous HS Jocelyn Trujillo MD   insulin lispro 1-6 Units Subcutaneous TID AC Greta Rider PA-C   insulin lispro 1-6 Units Subcutaneous HS Luis Alfredo Rider PA-C   insulin lispro 12 Units Subcutaneous TID With Meals Felipe Delatorre PA-C   ipratropium 2 puff Inhalation 4x Daily PRN Felipe Delatorre PA-C   lidocaine 1 patch Topical Daily Luis Alfredo Rider PA-C   lidocaine 1 patch Topical Daily Jocelyn Trujillo MD   metoprolol tartrate 25 mg Oral Q12H [facility] Jarrett Chapman MD   ondansetron 4 mg Intravenous Q6H PRN Luis Alfredo Mariscal CARL Rider   pantoprazole 40 mg Oral Early Morning Eulalia Ayoub PA-C   pentoxifylline 400 mg Oral TID With Meals Eulalia Ayoub PA-C   pregabalin 100 mg Oral Daily Eulalia Ayoub, [place]   tamsulosin 0 4 mg Oral Daily With Shahbaz Mariscal MD     Continuous Infusions:   PRN Meds: hydrALAZINE    HYDROcodone-acetaminophen    ipratropium    ondansetron  Allergies   Allergen Reactions    Acetaminophen      Listen on patient's paperwork from [facility] and Staffing  Family unable to confirm      Morphine And Related Vomiting    Oxycodone GI Intolerance     Listed on patient's paperwork from [facility] and Staffing  Family unable to confirm      Tramadol Vomiting and Abdominal Pain     Other     I reviewed the Home Medication list in the chart       Family History   Problem Relation Age of Onset    Diabetes Mother     No Known Problems Father     Throat cancer Daughter     No Known Problems Maternal Grandmother     No Known Problems Maternal Grandfather     No Known Problems Paternal Grandmother     No Known Problems Paternal Grandfather        Social History     Socioeconomic History    Marital status: Single     Spouse name: Not on file    Number of children: Not on file    Years of education: Not on file    Highest education level: Not on file   Occupational History    Not on file   Social Needs    Financial resource strain: Not on file    Food insecurity:     Worry: Not on file     Inability: Not on file    Transportation needs:     Medical: Not on file     Non-medical: Not on file   Tobacco Use    Smoking status: Never Smoker    Smokeless tobacco: Never Used    Tobacco comment: states she quit but family living with her states she smokes   Substance and Sexual Activity    Alcohol use: Never     Frequency: Never     Comment: OCCASIONAL    Drug use: Never    Sexual activity: Yes     Partners: Male   Lifestyle    Physical activity:     Days per week: Not on file     Minutes per session: Not on file    Stress: Not on file   Relationships    Social connections:     Talks on phone: Not on file     Gets together: Not on file     Attends Judaism service: Not on file     Active member of club or organization: Not on file     Attends meetings of clubs or organizations: Not on file     Relationship status: Not on file    Intimate partner violence:     Fear of current or ex partner: Not on file     Emotionally abused: Not on file     Physically abused: Not on file     Forced sexual activity: Not on file   Other Topics Concern    Not on file   Social History Narrative    Not on file         Vitals:    07/07/19 0729   BP: (!) 176/91   Pulse: 86   Resp: 22   Temp: 97 6 °F (36 4 °C)   SpO2: 98%     Vitals:    07/06/19 0600 07/07/19 0600   Weight: 74 6 kg (164 lb 7 4 oz) 73 7 kg (162 lb 7 7 oz)       Intake/Output Summary (Last 24 hours) at 7/7/2019 0958  Last data filed at 7/7/2019 0729  Gross per 24 hour   Intake 1180 ml   Output 1550 ml   Net -370 ml         GEN: Now acute distress, Alert and oriented, well appearing  HEENT:Head, neck, ears, oral pharynx: Mucus membranes moist, oral pharynx clear, nares clear  External ears normal  EYES: Pupils equal, sclera anicteric, midline, normal conjuctiva  NECK: No JVD, supple, no obvious masses or thryomegaly or goiter  CARDIOVASCULAR: RRR, No murmur, rub, gallops S1,S2  LUNGS: Clear To auscultation bilaterally, normal effort, no rales, rhonchi, crackles  ABDOMEN: Soft, nondistended, nontender, without obvious organomegaly or ascites  EXTREMITIES/VASCULAR: No edema  Radial pulses intact, pedal pulses difficult to palpate, warm an well perfused  PSYCH: Normal Affect, no overt suicidal ideation, linear speech pattern without evidence of psychosis     NEURO: Grossly intact, moving all extremiteis equal, face symmetric, alert and responsive, no obvious focal defecits  HEME: No bleeding, bruising, petechia, purpura  SKIN: No significant rashes, warm, no diaphoresis or pallor  Lab Results:     CBC with diff:   Results from last 7 days   Lab Units 07/07/19  0553 07/06/19  0351 07/05/19  1029   WBC Thousand/uL 9 89 10 99* 11 60*   HEMOGLOBIN g/dL 13 8 12 0 12 3   HEMATOCRIT % 45 1 38 2 38 6   MCV fL 98 95 92   PLATELETS Thousands/uL 141* 162 174   MCH pg 30 1 29 9 29 5   MCHC g/dL 30 6* 31 4 32 0   RDW % 14 4 14 7 15 7*   MPV fL 12 6 12 9* 11 1   NRBC AUTO /100 WBCs 0 0  --          CMP:  Results from last 7 days   Lab Units 07/07/19  0553 07/06/19  0351 07/05/19  1029   POTASSIUM mmol/L 4 0 4 1 5 0   CHLORIDE mmol/L 98* 102 97   CO2 mmol/L 25 29 30   BUN mg/dL 18 20 23   CREATININE mg/dL 1 36* 1 47* 1 73*   CALCIUM mg/dL 9 5 9 7 9 6   AST U/L  --   --  25   ALT U/L  --   --  21   ALK PHOS U/L  --   --  151*   EGFR ml/min/1 73sq m 36 33 27*         BMP:  Results from last 7 days   Lab Units 07/07/19  0553 07/06/19  0351 07/05/19  1029   POTASSIUM mmol/L 4 0 4 1 5 0   CHLORIDE mmol/L 98* 102 97   CO2 mmol/L 25 29 30   BUN mg/dL 18 20 23   CREATININE mg/dL 1 36* 1 47* 1 73*   CALCIUM mg/dL 9 5 9 7 9 6       BNP:   Results Reviewed     None        No results for input(s): BNP in the last 72 hours       Results from last 7 days   Lab Units 07/06/19  0945 07/06/19  0650 07/06/19  0351   TROPONIN I ng/mL 1 21* 1 43* 1 62*         Magnesium:       Coags:   Results from last 7 days   Lab Units 07/06/19  0102   PTT seconds 25   INR  1 03       TSH:       Lipid Profile:         Cardiac testing:   Results for orders placed in visit on 06/21/18   Echo complete with contrast if indicated    Narrative [id number] [place], [address] [phone]    Cardiovascular Report  _________________________________________________________________         Transthoracic Echocardiogram Report  Craig Sinclair    MRN:                 [id number]  Account :           [de-identified]  Accession :         [id number]  Exam Date:           2018 11:17  Patient Location:    O  Ordering Phys:       Karolina Eubanks  Attending Phys:      Karolina Eubanks  Technologist:        Ilene Dean,CCT    Age:  81             :   1935           Gender:   F  Wt:   146 lb         Ht:    64 in  Indication:  HEART FAILURE  BP:         /       HR:    Rhythm:              Sinus  Technical Quality:   Technically poor study      MEASUREMENTS  2D ECHO  Body Surface Area                 1 7 m²  LV Diastolic Diameter PLAX        3 9 cm  LV Systolic Diameter PLAX         2 4 cm  IVS Diastolic Thickness           1 5 cm  LVPW Diastolic Thickness          1 4 cm  LV Relative Wall Thickness        0 7  Aortic Root Diameter              2 8 cm  LA Systolic Diameter LX           3 9 cm  LV Ejection Fraction MOD 4C       70 6 %  LA Area 4C View                   20 0 cm²  RA Area 4C View                   11 2 cm²    DOPPLER  MV Area PHT                       2 6 cm²  MR Peak Velocity                  427 6 cm/s  MR Peak Gradient                  73 1 mmHg  Mitral E Point Velocity           78 3 cm/s  Mitral A Point Velocity           112 7 cm/s  Mitral E to A Ratio               0 7  MV E' Velocity                    3 3 cm/s  Mitral E to MV E' Ratio           24 0  TAPSE                             1 7 cm  Right Atrial Pressure             5 0 mmHg  PV Peak Velocity                  51 6 cm/s  PV Peak Gradient                  1 1 mmHg  Pulmonary Artery Diastolic Press  92 2 mmHg      FINDINGS  Left Ventricle  Hyperdynamic left ventricular systolic function, EF estimated at  75%  Normal left ventricular chamber size  Moderate concentric  left ventricular hypertrophy  Hyperdynamic left ventricular wall  motion without regional wall motion abnormalities  Indeterminate  left ventricular diastolic function  Mild to moderately elevated  left ventricular filling pressures  Right Ventricle  Normal right ventricular systolic function and size  Pulmonary  pressure could not be calculated due to incomplete tricuspid  regurgitation velocity profile  Right Atrium  Normal right atrial size  Left Atrium  Mild-to-moderate left atrial enlargement  Mitral Valve  Trace mitral regurgitation  Mitral valve annulus calcification  Aortic Valve  Aortic sclerosis  Tricuspid Valve  No tricuspid regurgitation  Pulmonic Valve  Trace pulmonic regurgitation  Pericardium  No pericardial effusion  Aorta  Normal aortic root size  CONCLUSIONS  Technically poor study  Mild-to-moderate left atrial enlargement  Aortic sclerosis  Hyperdynamic left ventricular systolic function, EF estimated at  75%  Indeterminate left ventricular diastolic function  Mild to moderately elevated left ventricular filling pressures  Amauri Saucedo MD  (Electronically Signed)  Final Date:      22 June 2018 11:50  CV Report                    Hola Oconnor       [id number]  Final                                                     Page 2     No results found for this or any previous visit  No results found for this or any previous visit  No results found for this or any previous visit  CXR:  no acute cp disease    EKG/TELE: NSR, TWI w HERBER depression V4-6 I, AVL  Some dynamic changes present  Reviewing old EKG's she has had this pattern before Dec 2017  Artifact on tele looking like flutter/nsvt- but is NSR throughout

## 2019-07-07 NOTE — PLAN OF CARE
Problem: GENITOURINARY - ADULT  Goal: Maintains or returns to baseline urinary function  Description  INTERVENTIONS:  - Assess urinary function  - Encourage oral fluids to ensure adequate hydration  - Administer IV fluids as ordered to ensure adequate hydration  - Administer ordered medications as needed  - Offer frequent toileting  - Follow urinary retention protocol if ordered  Outcome: Progressing

## 2019-07-07 NOTE — PHYSICAL THERAPY NOTE
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 ([facility])    Chronic right-sided low back pain with right-sided sciatica    Acute renal failure superimposed on stage 3 chronic kidney disease ([facility])    Left arm cellulitis    Accident due to mechanical fall without injury    Type 2 diabetes mellitus with hyperglycemia, with long-term current use of insulin ([facility])    Abscess of arm, left    Inflammatory spondylopathy of lumbosacral region ([facility]    Chronic diastolic heart failure (HCC)    COPD mixed type (HCC)    PAD (peripheral artery disease) ([facility])    Angina pectoris ([facility])    Localized edema    Drug-induced constipation    Ureterolithiasis    Elevated troponin    Acute kidney injury superimposed on chronic kidney disease ([facility] [address] )       Admitting Diagnoses:   Sepsis ([address] ) [A41 9]    Past Medical History:   Diagnosis Date    COPD (chronic obstructive pulmonary disease) ([address] )     Diabetes mellitus ([address] )     Hyperlipidemia     Hyperlipidemia     Hypertension     Kidney stones     Osteoporosis        Past Surgical History:   Procedure Laterality Date    APPENDECTOMY      HAND SURGERY Right     HYSTERECTOMY      age 29   Jjrafael Pearce INCISION AND DRAINAGE OF WOUND Left 1/5/2019    Procedure: INCISION AND DRAINAGE (I&D) EXTREMITY;  Surgeon: Karmen Pearson MD;  [facility];  Service: General    LITHOTRIPSY      MASS EXCISION      [place] of back wall mass    TONSILLECTOMY      TONSILLECTOMY         Imaging Studies:  MRI lumbar spine wo contrast   Final Result by Manfred Arita MD (07/07 8657)      Progressive L4-5 extrusion, asymmetric to the left, resulting in new redundancy of the cauda equina  Severe canal and bilateral lateral recess stenosis  Correlate for signs and symptoms of spinal stenosis and bilateral L5 radiculitis  Workstation performed: [id number]         XR abdomen 1 view kub    (Results Pending)        07/07/19 1004   Note Type   Note type Eval/Treat   Pain Assessment   Pain Score No Pain   Home Living   Type of [address] [facility] Two level  (pt only reports 10 steps to her 2nd level bedroom)   [address] [facility]  (RW)   Additional Comments pt poor historian, unable to provide accurate info & reluctant to answer questions half of the time   Prior Function   Level of [place] Other (Comment)  (unknown, pt unable to provide accurate info)   Lives With Daughter   Receives Help From Family   ADL Assistance   (unknown, pt unable to provide accurate info)   Falls in the last 6 months   (unknown, pt refused to answer)   Comments pt poor historian, unable to provide accurate info & reluctant to answer questions half of the time   Restrictions/Precautions   Weight Bearing Precautions Per Order No   Other Precautions Cognitive; Bed Alarm;Multiple lines; Fall Risk;Agitated   General   Additional Pertinent History Pt Albanian speaking only & refused to talk w/ [employer]rac[employer]   Pt prefers in-person       Family/Caregiver Present No   Cognition   Overall Cognitive Status Impaired   Arousal/Participation Uncooperative   Orientation Level Oriented to person;Oriented to place   Following Commands Follows one step commands with increased time or repetition   RUE Assessment   RUE Assessment WFL   RUE Strength   RUE Overall Strength Unable to assess  (pt refused testing)   LUE Assessment   LUE Assessment WFL   LUE Strength   LUE Overall Strength Unable to assess  (pt refused testing)   RLE Assessment RLE Assessment WFL   Strength RLE   RLE Overall Strength   (unable to assess, pt refused testing)   LLE Assessment   LLE Assessment WFL   Strength LLE   LLE Overall Strength   (unable to assess, pt refused testing)   Bed Mobility    Supine to Sit 4  Minimal assistance   Additional items Assist x 1;HOB elevated; Bedrails; Increased time required;Verbal cues;LE management   Sit to Supine 4  Minimal assistance   Additional items Assist x 1;HOB elevated; Bedrails; Increased time required;Verbal cues;LE management   Additional Comments Of note, mobility assessment completed on the 2nd attempt to complete PT eval as pt initially refusing to get OOB  Pt then able to comply once in-person  (Yesika Escort) was present  Transfers   Sit to Stand 4  Minimal assistance   Additional items Assist x 1; Increased time required;Verbal cues   Stand to Sit 4  Minimal assistance   Additional items Assist x 1; Increased time required;Verbal cues   Additional Comments Of note, mobility assessment completed on the 2nd attempt to complete PT eval as pt initially refusing to get OOB  Pt then able to comply once in-person  (Yesika Escort) was present  Ambulation/Elevation   Gait pattern Forward Flexion; Wide AUDREY; Decreased foot clearance; Short stride; Excessively slow  (unsteady)   Gait Assistance 4  Minimal assist   Additional items Assist x 2;Verbal cues; Tactile cues   Assistive Device Rolling walker   Distance 40'x1   Balance   Static Sitting Fair +   Static Standing Fair -  (w/ RW)   Ambulatory Poor +  (w/ RW)   Activity Tolerance   Activity Tolerance Treatment limited secondary to agitation   Nurse Made Aware ROYER Walters   Assessment   Prognosis Guarded   Problem List Decreased strength;Decreased endurance; Impaired balance;Decreased mobility; Decreased cognition; Impaired judgement;Decreased safety awareness   Assessment Pt  83 y  o female presented w/ nausea & vomiting   Pt admitted for Ureterolithiasis w/ mild hydroneprhosis + NSTEMI type 2 & EULOGIO  Cardiac cath pending  Pt referred to PT for mobility assessment & D/C planning w/ orders of up w/ assistance  Pt Italian speaking only + poor historian  During initial PT eval attempt, pt refused to talk to Adea  & talking over the [place]  & strongly refused to participate w/ the mobility assessment part of PT eval  Pt requested that I talk to her daughter over the phone but upon speaking w/ her daughter, the daughter also doesn't speak [place]  RN notified & reported that pt compliant w/ in-person  in room  Hence, requested SAILAJA Riggs's assistance for translation  On the 2nd attempt of PT eval, I was able to gather some info re: home set-up & PLOF  Please see above for details  On eval, pt demonstrate dec mobility, balance, endurance & amb  Pt require minAx1 for bed mobility & transfers however require minAx2 for amb w/ RW + cues for techniques  Gait deviations as above, slow w/ dec foot clearance & strides but no gross LOB noted  (+) forward flexion as pt pushes RW too far forward  Attempted to correct gait deviations & safety deficits but to no avail as pt does not listen  No dizziness reported t/o session  Nsg staff most recent vital signs as follows: BP (!) 172/84 (BP Location: Right arm)   Pulse 78   Temp 97 6 °F (36 4 °C) (Tympanic)   Resp 22   Ht 5' (1 524 m)   Wt 73 7 kg (162 lb 7 7 oz)   LMP  (LMP Unknown)   SpO2 98%   BMI 31 73 kg/m²   At end of session, pt back in bed w/o issues, call bell & phone in reach, bed alarm activated  Fall precautions reinforced  Unsure if above mentioned deficits are premorbid as pt poor historian but will continue skilled PT to improve function & safety  At this time, D/C rec will depend on progress (STR or HHPT) & pt's daughter ability to care for pt at her current level of function  CM to follow   Nsg staff to continue to mobilized pt (OOB in chair for all meals & ambulate in room/unit) as tolerated to prevent further decline in function  Nsg notified  Barriers to Discharge Inaccessible home environment   Goals   Patient Goals none stated 2* to impaired cognition   STG Expiration Date 07/21/19   Short Term Goal #1 Goals to be met in 14 days; pt will be able to: 1) inc strength & balance by 1/2 grade to improve overall functional mobility & dec fall risk; 2) inc bed mobility to S for pt to be able to get in/OOB safely w/ proper techniques 100% of the time, to dec caregiver assistance & safely function at home; 3) inc transfers to S for pt to transition safely from one surface to another w/o % of the time, to dec caregiver assistance & safely function at home; 4) inc amb w/ RW approx  >80' w/ S for pt to ambulate household distances w/o any % of the time, to dec caregiver assistance & safely function at home; 5) inc barthel score to 60 to decrease overall risk for falls; 6) negotiate stairs w/ S for inc safety during stair mgt inside/outside of home & dec caregiver assistance; 7) pt/caregiver ed   Treatment Day 1   Plan   Treatment/Interventions Functional transfer training;LE strengthening/ROM; Elevations; Therapeutic exercise; Endurance training;Patient/family training;Bed mobility;Gait training;Spoke to nursing   PT Frequency Other (Comment)  (3-5x/wk)   Recommendation   Recommendation Short-term skilled PT; Home PT;24 hour supervision/assist;Home with family support  (pending progress)   Equipment Recommended Walker  (RW)   Barthel Index   Feeding 10   Bathing 0   Grooming Score 0   Dressing Score 5   Bladder Score 0   Bowels Score 10   Toilet Use Score 5   Transfers (Bed/Chair) Score 10   Mobility (Level Surface) Score 0   Stairs Score 0   Barthel Index Score 40   Hx/personal factors: co-morbidities, inaccessible home, advanced age, mutliple lines, telemetry, use of AD, dec cognition, fall risk and assist w/ ADL's  Examination: dec mobility, dec balance, dec endurance, dec amb, moderate fall risk, dec cognition  Clinical: unpredictable (ongoing medical status, abnormal lab values, moderate fall risk and imaging test/result pending)  Complexity: high     Kriss Doyle, PT

## 2019-07-07 NOTE — PLAN OF CARE
Problem: PHYSICAL THERAPY ADULT  Goal: Performs mobility at highest level of function for planned discharge setting  See evaluation for individualized goals  Description  Treatment/Interventions: Functional transfer training, LE strengthening/ROM, Elevations, Therapeutic exercise, Endurance training, Patient/family training, Bed mobility, Gait training, Spoke to nursing  Equipment Recommended: Walker(RW)       See flowsheet documentation for full assessment, interventions and recommendations  Outcome: Progressing  Note:   Prognosis: Guarded  Problem List: Decreased strength, Decreased endurance, Impaired balance, Decreased mobility, Decreased cognition, Impaired judgement, Decreased safety awareness  Assessment: Pt  83 y  o female presented w/ nausea & vomiting  Pt admitted for Ureterolithiasis w/ mild hydroneprhosis + NSTEMI type 2 & EULOGIO  Cardiac cath pending  Pt referred to PT for mobility assessment & D/C planning w/ orders of up w/ assistance  Pt [place] speaking only + poor historian  During initial PT eval attempt, pt refused to talk to [employer]  & talking over the [employer]  & strongly refused to participate w/ the mobility assessment part of PT eval  Pt requested that I talk to her daughter over the phone but upon speaking w/ her daughter, the daughter also doesn't speak [place]  RN notified & reported that pt compliant w/ in-person  in room  Hence, requested SAILAJA Rgigs's assistance for translation  On the 2nd attempt of PT eval, I was able to gather some info re: home set-up & PLOF  Please see above for details  On eval, pt demonstrate dec mobility, balance, endurance & amb  Pt require minAx1 for bed mobility & transfers however require minAx2 for amb w/ RW + cues for techniques  Gait deviations as above, slow w/ dec foot clearance & strides but no gross LOB noted  (+) forward flexion as pt pushes RW too far forward   Attempted to correct gait deviations & safety deficits but to no avail as pt does not listen  No dizziness reported t/o session  Nsg staff most recent vital signs as follows: BP (!) 172/84 (BP Location: Right arm)   Pulse 78   Temp 97 6 °F (36 4 °C) (Tympanic)   Resp 22   Ht 5' (1 524 m)   Wt 73 7 kg (162 lb 7 7 oz)   LMP  (LMP Unknown)   SpO2 98%   BMI 31 73 kg/m²   At end of session, pt back in bed w/o issues, call bell & phone in reach, bed alarm activated  Fall precautions reinforced  Unsure if above mentioned deficits are premorbid as pt poor historian but will continue skilled PT to improve function & safety  At this time, D/C rec will depend on progress (STR or HHPT) & pt's daughter ability to care for pt at her current level of function  CM to follow  Nsg staff to continue to mobilized pt (OOB in chair for all meals & ambulate in room/unit) as tolerated to prevent further decline in function  Nsg notified  Barriers to Discharge: Inaccessible home environment     Recommendation: Short-term skilled PT, Home PT, 24 hour supervision/assist, Home with family support(pending progress)          See flowsheet documentation for full assessment

## 2019-07-07 NOTE — PLAN OF CARE
Problem: Potential for Falls  Goal: Patient will remain free of falls  Description  INTERVENTIONS:  - Assess patient frequently for physical needs  -  Identify cognitive and physical deficits and behaviors that affect risk of falls    -  [place] fall precautions as indicated by assessment   - Educate patient/family on patient safety including physical limitations  - Instruct patient to call for assistance with activity based on assessment  - Modify environment to reduce risk of injury  - Consider OT/PT consult to assist with strengthening/mobility  Outcome: Progressing     Problem: Prexisting or High Potential for Compromised Skin Integrity  Goal: Skin integrity is maintained or improved  Description  INTERVENTIONS:  - Identify patients at risk for skin breakdown  - Assess and monitor skin integrity  - Assess and monitor nutrition and hydration status  - Monitor labs (i e  albumin)  - Assess for incontinence   - Turn and reposition patient  - Assist with mobility/ambulation  - Relieve pressure over bony prominences  - Avoid friction and shearing  - Provide appropriate hygiene as needed including keeping skin clean and dry  - Evaluate need for skin moisturizer/barrier cream  - Collaborate with interdisciplinary team (i e  Nutrition, Rehabilitation, etc )   - Patient/family teaching  Outcome: Progressing     Problem: PAIN - ADULT  Goal: Verbalizes/displays adequate comfort level or baseline comfort level  Description  Interventions:  - Encourage patient to monitor pain and request assistance  - Assess pain using appropriate pain scale  - Administer analgesics based on type and severity of pain and evaluate response  - Implement non-pharmacological measures as appropriate and evaluate response  - Consider cultural and social influences on pain and pain management  - Notify physician/advanced practitioner if interventions unsuccessful or patient reports new pain  Outcome: Progressing     Problem: INFECTION - ADULT  Goal: Absence or prevention of progression during hospitalization  Description  INTERVENTIONS:  - Assess and monitor for signs and symptoms of infection  - Monitor lab/diagnostic results  - Monitor all insertion sites, i e  indwelling lines, tubes, and drains  - Monitor endotracheal (as able) and nasal secretions for changes in amount and color  - [place] appropriate cooling/warming therapies per order  - Administer medications as ordered  - Instruct and encourage patient and family to use good hand hygiene technique  - Identify and instruct in appropriate isolation precautions for identified infection/condition  Outcome: Progressing     Problem: SAFETY ADULT  Goal: Maintain or return to baseline ADL function  Description  INTERVENTIONS:  -  Assess patient's ability to carry out ADLs; assess patient's baseline for ADL function and identify physical deficits which impact ability to perform ADLs (bathing, care of mouth/teeth, toileting, grooming, dressing, etc )  - Assess/evaluate cause of self-care deficits   - Assess range of motion  - Assess patient's mobility; develop plan if impaired  - Assess patient's need for assistive devices and provide as appropriate  - Encourage maximum independence but intervene and supervise when necessary  ¯ Involve family in performance of ADLs  ¯ Assess for home care needs following discharge   ¯ Request OT consult to assist with ADL evaluation and planning for discharge  ¯ Provide patient education as appropriate  Outcome: Progressing  Goal: Maintain or return mobility status to optimal level  Description  INTERVENTIONS:  - Assess patient's baseline mobility status (ambulation, transfers, stairs, etc )    - Identify cognitive and physical deficits and behaviors that affect mobility  - Identify mobility aids required to assist with transfers and/or ambulation (gait belt, sit-to-stand, lift, walker, cane, etc )  - [place] fall precautions as indicated by assessment  - Record patient progress and toleration of activity level on Mobility SBAR; progress patient to next Phase/Stage  - Instruct patient to call for assistance with activity based on assessment  - Request Rehabilitation consult to assist with strengthening/weightbearing, etc   Outcome: Progressing     Problem: DISCHARGE PLANNING  Goal: Discharge to home or other facility with appropriate resources  Description  INTERVENTIONS:  - Identify barriers to discharge w/patient and caregiver  - Arrange for needed discharge resources and transportation as appropriate  - Identify discharge learning needs (meds, wound care, etc )  - Arrange for interpretive services to assist at discharge as needed  - Refer to Case Management Department for coordinating discharge planning if the patient needs post-hospital services based on physician/advanced practitioner order or complex needs related to functional status, cognitive ability, or social support system  Outcome: Progressing     Problem: Knowledge Deficit  Goal: Patient/family/caregiver demonstrates understanding of disease process, treatment plan, medications, and discharge instructions  Description  Complete learning assessment and assess knowledge base  Interventions:  - Provide teaching at level of understanding  - Provide teaching via preferred learning methods  Outcome: Progressing     Problem: CARDIOVASCULAR - ADULT  Goal: Maintains optimal cardiac output and hemodynamic stability  Description  INTERVENTIONS:  - Monitor I/O, vital signs and rhythm  - Monitor for S/S and trends of decreased cardiac output i e  bleeding, hypotension  - Administer and titrate ordered vasoactive medications to optimize hemodynamic stability  - Assess quality of pulses, skin color and temperature  - Assess for signs of decreased coronary artery perfusion - ex   Angina  - Instruct patient to report change in severity of symptoms  Outcome: Progressing  Goal: Absence of cardiac dysrhythmias or at baseline rhythm  Description  INTERVENTIONS:  - Continuous cardiac monitoring, monitor vital signs, obtain 12 lead EKG if indicated  - Administer antiarrhythmic and heart rate control medications as ordered  - Monitor electrolytes and administer replacement therapy as ordered  Outcome: Progressing     Problem: GASTROINTESTINAL - ADULT  Goal: Minimal or absence of nausea and/or vomiting  Description  INTERVENTIONS:  - Maintain NPO status until nausea and vomiting are resolved  - Administer ordered antiemetic medications as needed  - Provide nonpharmacologic comfort measures as appropriate  - Advance diet as tolerated, if ordered  - Nutrition services referral to assist patient with adequate nutrition and appropriate food choices   Outcome: Progressing  Goal: Maintains or returns to baseline bowel function  Description  INTERVENTIONS:  - Encourage oral fluids to ensure adequate hydration  - Administer ordered medications as needed  - Encourage mobilization and activity     Outcome: Progressing  Goal: Maintains adequate nutritional intake  Description  INTERVENTIONS:  - Identify factors contributing to decreased intake, treat as appropriate  - Monitor I&O, WT and lab values  - Obtain nutrition services referral as needed   Outcome: Progressing     Problem: HEMATOLOGIC - ADULT  Goal: Maintains hematologic stability  Description  INTERVENTIONS  - Monitor labs  Monitor hemodynamics for signs of sepsis, administer bolus and reorder labs per protocol       Outcome: Progressing     Problem: MUSCULOSKELETAL - ADULT  Goal: Maintain or return mobility to safest level of function  Description  INTERVENTIONS:  - Assess patient's ability to carry out ADLs; assess patient's baseline for ADL function and identify physical deficits which impact ability to perform ADLs (bathing, care of mouth/teeth, toileting, grooming, dressing, etc )  - Assess/evaluate cause of self-care deficits   - Assess range of motion  - Assess patient's mobility; develop plan if impaired  - Assess patient's need for assistive devices and provide as appropriate  - Encourage maximum independence but intervene and supervise when necessary  - Involve family in performance of ADLs  - Assess for home care needs following discharge   - Request OT consult to assist with ADL evaluation and planning for discharge  - Provide patient education as appropriate  Outcome: Progressing  Goal: Maintain proper alignment of affected body part  Description  INTERVENTIONS:  - Support, maintain and protect limb and body alignment  - Provide pt/fam with appropriate education  Outcome: Progressing     Problem: METABOLIC, FLUID AND ELECTROLYTES - ADULT  Goal: Glucose maintained within target range  Description  INTERVENTIONS:  - Monitor Blood Glucose as ordered  - Assess for signs and symptoms of hyperglycemia and hypoglycemia  - Administer ordered medications to maintain glucose within target range  - Assess nutritional intake and initiate nutrition service referral as needed  Outcome: Progressing     Problem: GENITOURINARY - ADULT  Goal: Maintains or returns to baseline urinary function  Description  INTERVENTIONS:  - Assess urinary function  - Encourage oral fluids to ensure adequate hydration  - Administer IV fluids as ordered to ensure adequate hydration  - Administer ordered medications as needed  - Offer frequent toileting  - Follow urinary retention protocol if ordered  Outcome: Progressing  Goal: Absence of urinary retention  Description  INTERVENTIONS:  - Assess patient's ability to void and empty bladder  - Monitor I/O  - Bladder scan as needed  - Discuss with physician/AP medications to alleviate retention as needed  - Discuss catheterization for long term situations as appropriate   Outcome: Progressing  Goal: Urinary catheter remains patent  Description  INTERVENTIONS:  - Assess patency of urinary catheter  - If patient has a chronic mejia, consider changing catheter if non-functioning  - Follow guidelines for intermittent irrigation of non-functioning urinary catheter  Outcome: Progressing

## 2019-07-07 NOTE — NURSING NOTE
Patient placed on call light and appeared very upset  Utilized blue translation phone  Patient reported to the  that she felt like she was "locked in this bed" and that she was "screaming all night and no one cared "  Patient was rubbing her belly  Denies pain  Denies nausea  Reports she is "feeling full and needs to stand "  When asked (via ) what kind of assistance she needed to stand, patient reportedly stated "I don't know if I can stand or not "  Patient then refused to continue to use the blue phone and would not hold the   She kept stating over and over (per ) "I don't know "  Patient able to stand at bedside with contact guard assistance and appeared more comfortable  She then sat in her bedside chair with call light in reach  Will continue to monitor

## 2019-07-07 NOTE — PLAN OF CARE
Problem: Potential for Falls  Goal: Patient will remain free of falls  Description  INTERVENTIONS:  - Assess patient frequently for physical needs  -  Identify cognitive and physical deficits and behaviors that affect risk of falls    -  [place] fall precautions as indicated by assessment   - Educate patient/family on patient safety including physical limitations  - Instruct patient to call for assistance with activity based on assessment  - Modify environment to reduce risk of injury  - Consider OT/PT consult to assist with strengthening/mobility  Outcome: Progressing     Problem: Prexisting or High Potential for Compromised Skin Integrity  Goal: Skin integrity is maintained or improved  Description  INTERVENTIONS:  - Identify patients at risk for skin breakdown  - Assess and monitor skin integrity  - Assess and monitor nutrition and hydration status  - Monitor labs (i e  albumin)  - Assess for incontinence   - Turn and reposition patient  - Assist with mobility/ambulation  - Relieve pressure over bony prominences  - Avoid friction and shearing  - Provide appropriate hygiene as needed including keeping skin clean and dry  - Evaluate need for skin moisturizer/barrier cream  - Collaborate with interdisciplinary team (i e  Nutrition, Rehabilitation, etc )   - Patient/family teaching  Outcome: Progressing     Problem: PAIN - ADULT  Goal: Verbalizes/displays adequate comfort level or baseline comfort level  Description  Interventions:  - Encourage patient to monitor pain and request assistance  - Assess pain using appropriate pain scale  - Administer analgesics based on type and severity of pain and evaluate response  - Implement non-pharmacological measures as appropriate and evaluate response  - Consider cultural and social influences on pain and pain management  - Notify physician/advanced practitioner if interventions unsuccessful or patient reports new pain  Outcome: Progressing     Problem: INFECTION - ADULT  Goal: Absence or prevention of progression during hospitalization  Description  INTERVENTIONS:  - Assess and monitor for signs and symptoms of infection  - Monitor lab/diagnostic results  - Monitor all insertion sites, i e  indwelling lines, tubes, and drains  - Monitor endotracheal (as able) and nasal secretions for changes in amount and color  - [place] appropriate cooling/warming therapies per order  - Administer medications as ordered  - Instruct and encourage patient and family to use good hand hygiene technique  - Identify and instruct in appropriate isolation precautions for identified infection/condition  Outcome: Progressing     Problem: SAFETY ADULT  Goal: Maintain or return to baseline ADL function  Description  INTERVENTIONS:  -  Assess patient's ability to carry out ADLs; assess patient's baseline for ADL function and identify physical deficits which impact ability to perform ADLs (bathing, care of mouth/teeth, toileting, grooming, dressing, etc )  - Assess/evaluate cause of self-care deficits   - Assess range of motion  - Assess patient's mobility; develop plan if impaired  - Assess patient's need for assistive devices and provide as appropriate  - Encourage maximum independence but intervene and supervise when necessary  ¯ Involve family in performance of ADLs  ¯ Assess for home care needs following discharge   ¯ Request OT consult to assist with ADL evaluation and planning for discharge  ¯ Provide patient education as appropriate  Outcome: Progressing  Goal: Maintain or return mobility status to optimal level  Description  INTERVENTIONS:  - Assess patient's baseline mobility status (ambulation, transfers, stairs, etc )    - Identify cognitive and physical deficits and behaviors that affect mobility  - Identify mobility aids required to assist with transfers and/or ambulation (gait belt, sit-to-stand, lift, walker, cane, etc )  - [place] fall precautions as indicated by assessment  - Record patient progress and toleration of activity level on Mobility SBAR; progress patient to next Phase/Stage  - Instruct patient to call for assistance with activity based on assessment  - Request Rehabilitation consult to assist with strengthening/weightbearing, etc   Outcome: Progressing     Problem: DISCHARGE PLANNING  Goal: Discharge to home or other facility with appropriate resources  Description  INTERVENTIONS:  - Identify barriers to discharge w/patient and caregiver  - Arrange for needed discharge resources and transportation as appropriate  - Identify discharge learning needs (meds, wound care, etc )  - Arrange for interpretive services to assist at discharge as needed  - Refer to Case Management Department for coordinating discharge planning if the patient needs post-hospital services based on physician/advanced practitioner order or complex needs related to functional status, cognitive ability, or social support system  Outcome: Progressing     Problem: Knowledge Deficit  Goal: Patient/family/caregiver demonstrates understanding of disease process, treatment plan, medications, and discharge instructions  Description  Complete learning assessment and assess knowledge base  Interventions:  - Provide teaching at level of understanding  - Provide teaching via preferred learning methods  Outcome: Progressing     Problem: CARDIOVASCULAR - ADULT  Goal: Maintains optimal cardiac output and hemodynamic stability  Description  INTERVENTIONS:  - Monitor I/O, vital signs and rhythm  - Monitor for S/S and trends of decreased cardiac output i e  bleeding, hypotension  - Administer and titrate ordered vasoactive medications to optimize hemodynamic stability  - Assess quality of pulses, skin color and temperature  - Assess for signs of decreased coronary artery perfusion - ex   Angina  - Instruct patient to report change in severity of symptoms  Outcome: Progressing  Goal: Absence of cardiac dysrhythmias or at baseline rhythm  Description  INTERVENTIONS:  - Continuous cardiac monitoring, monitor vital signs, obtain 12 lead EKG if indicated  - Administer antiarrhythmic and heart rate control medications as ordered  - Monitor electrolytes and administer replacement therapy as ordered  Outcome: Progressing     Problem: GASTROINTESTINAL - ADULT  Goal: Minimal or absence of nausea and/or vomiting  Description  INTERVENTIONS:  - Maintain NPO status until nausea and vomiting are resolved  - Administer ordered antiemetic medications as needed  - Provide nonpharmacologic comfort measures as appropriate  - Advance diet as tolerated, if ordered  - Nutrition services referral to assist patient with adequate nutrition and appropriate food choices   Outcome: Progressing  Goal: Maintains or returns to baseline bowel function  Description  INTERVENTIONS:  - Encourage oral fluids to ensure adequate hydration  - Administer ordered medications as needed  - Encourage mobilization and activity     Outcome: Progressing  Goal: Maintains adequate nutritional intake  Description  INTERVENTIONS:  - Identify factors contributing to decreased intake, treat as appropriate  - Monitor I&O, WT and lab values  - Obtain nutrition services referral as needed   Outcome: Progressing     Problem: HEMATOLOGIC - ADULT  Goal: Maintains hematologic stability  Description  INTERVENTIONS  - Monitor labs  Monitor hemodynamics for signs of sepsis, administer bolus and reorder labs per protocol       Outcome: Progressing     Problem: MUSCULOSKELETAL - ADULT  Goal: Maintain or return mobility to safest level of function  Description  INTERVENTIONS:  - Assess patient's ability to carry out ADLs; assess patient's baseline for ADL function and identify physical deficits which impact ability to perform ADLs (bathing, care of mouth/teeth, toileting, grooming, dressing, etc )  - Assess/evaluate cause of self-care deficits   - Assess range of motion  - Assess patient's mobility; develop plan if impaired  - Assess patient's need for assistive devices and provide as appropriate  - Encourage maximum independence but intervene and supervise when necessary  - Involve family in performance of ADLs  - Assess for home care needs following discharge   - Request OT consult to assist with ADL evaluation and planning for discharge  - Provide patient education as appropriate  Outcome: Progressing  Goal: Maintain proper alignment of affected body part  Description  INTERVENTIONS:  - Support, maintain and protect limb and body alignment  - Provide pt/fam with appropriate education  Outcome: Progressing     Problem: METABOLIC, FLUID AND ELECTROLYTES - ADULT  Goal: Glucose maintained within target range  Description  INTERVENTIONS:  - Monitor Blood Glucose as ordered  - Assess for signs and symptoms of hyperglycemia and hypoglycemia  - Administer ordered medications to maintain glucose within target range  - Assess nutritional intake and initiate nutrition service referral as needed  Outcome: Progressing

## 2019-07-07 NOTE — PROGRESS NOTES
UROLOGY PROGRESS NOTE   Patient Identifiers: Sanjeev Wolff (MRN [id number])  Date of Service: 7/7/2019    Subjective:     24 HR EVENTS:   no significant events  Patient has had sacroiliac and left flank pain yesterday which appears to be well managed currently  Preceding events noted  Patient had NSTEMI and is pending cardiac catheterization tomorrow  Objective:     VITALS:    Vitals:    07/07/19 1007   BP: (!) 172/84   Pulse: 78   Resp:    Temp:    SpO2:        INS & OUTS:  I/O last 24 hours:   In: 1300 [P O :1300]  Out: 1550 [Urine:1550]    LABS:  Lab Results   Component Value Date    HGB 13 8 07/07/2019    HCT 45 1 07/07/2019    WBC 9 89 07/07/2019     (L) 07/07/2019   ]    Lab Results   Component Value Date     06/19/2018    K 4 0 07/07/2019    CL 98 (L) 07/07/2019    CO2 25 07/07/2019    BUN 18 07/07/2019    CREATININE 1 36 (H) 07/07/2019    CALCIUM 9 5 07/07/2019    GLUCOSE 136 (H) 06/19/2018   ]    INPATIENT MEDS:    Current Facility-Administered Medications:     amLODIPine (NORVASC) tablet 5 mg, 5 mg, Oral, Daily, Keshav Bai MD, 5 mg at 07/07/19 1008    aspirin tablet 325 mg, 325 mg, Oral, Daily, Keshav Bai MD, 325 mg at 07/07/19 1008    atorvastatin (LIPITOR) tablet 40 mg, 40 mg, Oral, Daily With Dinner, ANDREW Bhagat-C, 40 mg at 07/06/19 1734    brimonidine (ALPHAGAN P) 0 15 % ophthalmic solution 1 drop, 1 drop, Both Eyes, TID, Denisse Garcia PA-C, 1 drop at 07/07/19 0850    dorzolamide (TRUSOPT) ophthalmic solution 1 drop, 1 drop, Both Eyes, TID, Denisse Garcia PA-C, 1 drop at 07/07/19 0843    heparin (porcine) subcutaneous injection 5,000 Units, 5,000 Units, Subcutaneous, Q8H [facility], 5,000 Units at 07/07/19 0548 **AND** [CANCELED] Platelet count, , , Once, Denisse Garcia PA-C    hydrALAZINE (APRESOLINE) injection 5 mg, 5 mg, Intravenous, Q6H PRN, Denisse Garcia PA-C    HYDROcodone-acetaminophen (NORCO) 5-325 mg per tablet 1 tablet, 1 tablet, Oral, Q6H PRN, Rebecca Tellez MD, 1 tablet at 07/07/19 0848    insulin glargine (LANTUS) subcutaneous injection 25 Units 0 25 mL, 25 Units, Subcutaneous, HS, Rebecca Tellez MD    insulin lispro (HumaLOG) 100 units/mL subcutaneous injection 1-6 Units, 1-6 Units, Subcutaneous, TID AC, 4 Units at 07/06/19 1736 **AND** Fingerstick Glucose (POCT), , , TID AC, Raul Banks PA-C    insulin lispro (HumaLOG) 100 units/mL subcutaneous injection 1-6 Units, 1-6 Units, Subcutaneous, HS, Greta Rider PA-C    insulin lispro (HumaLOG) 100 units/mL subcutaneous injection 12 Units, 12 Units, Subcutaneous, TID With Meals, Carter CARL Banks, 12 Units at 07/06/19 1735    ipratropium (ATROVENT HFA) inhaler 2 puff, 2 puff, Inhalation, 4x Daily PRN, [facility]    lidocaine (LIDODERM) 5 % patch 1 patch, 1 patch, Topical, Daily, [place] CARL Banks, 1 patch at 07/07/19 0843    lidocaine (LIDODERM) 5 % patch 1 patch, 1 patch, Topical, Daily, Rebecca Tellez MD, 1 patch at 07/07/19 0842    metoprolol tartrate (LOPRESSOR) tablet 25 mg, 25 mg, Oral, Q12H [address], Dimas Arteaga MD, 25 mg at 07/07/19 1008    ondansetron (ZOFRAN) injection 4 mg, 4 mg, Intravenous, Q6H PRN, Carter SickCARL    pantoprazole (PROTONIX) EC tablet 40 mg, 40 mg, Oral, Early Morning, mEanuel Rider PA-C, 40 mg at 07/07/19 3878    pentoxifylline (TRENtal) ER tablet 400 mg, 400 mg, Oral, TID With Meals, [facility], 400 mg at 07/07/19 0847    pregabalin (LYRICA) capsule 100 mg, 100 mg, Oral, Daily, Carter SickCARL, 100 mg at 07/07/19 0847    tamsulosin (FLOMAX) capsule 0 4 mg, 0 4 mg, Oral, Daily With Ankit Pizano MD, 0 4 mg at 07/06/19 1735      Physical Exam:   BP (!) 172/84 (BP Location: Right arm)   Pulse 78   Temp 97 6 °F (36 4 °C) (Tympanic)   Resp 22   Ht 5' (1 524 m)   Wt 73 7 kg (162 lb 7 7 oz)   LMP  (LMP Unknown)   SpO2 98%   BMI 31 73 kg/m²   GEN: alert and oriented x 3 RESP: breathing comfortably with no accessory muscle use    ABD: soft, appropriately tender to palpation, non-distended   EXT: no significant peripheral edema   DRAINS: none  GUAN: none            Assessment:   Principal Problem:    Ureterolithiasis  Active Problems:    Hypertension    Type 2 diabetes mellitus with hyperglycemia, with long-term current use of insulin ([facility])    Chronic diastolic heart failure ([facility])    COPD mixed type ([facility])    PAD (peripheral artery disease) ([facility])    Elevated troponin    Acute kidney injury superimposed on chronic kidney disease ([facility])       Plan:   -preceding events noted  Given cardiac findings and pending catheterization, would recommend continuing with conservative management of her left calculus for now  Ureteroscopy could potentially be considered later this week if cleared from a cardiac standpoint  In the interim, maintain hydration along with Flomax  Will continue to monitor

## 2019-07-07 NOTE — NURSING NOTE
Patient appears agitated and talking quickly in [place]  Utilized blue phone to find out that the patient reported that she was "hot" and that she didn't understand why her family would "let her stay in this place "  All attempts to ask questions and assess orientation status via blue phone were unsuccessful as the patient would not answer questions, but instead talked over what the  was trying to relate and kept stating that she didn't think it was right that her family would have left her here  Will continue to monitor patient  Call light in reach

## 2019-07-07 NOTE — PROGRESS NOTES
[address] Internal Medicine Progress Note  Patient: Nadene Ganser 81 y o  female   MRN: [id number]  PCP: Kathleen Higgins MD  Unit/Bed#: E4 -73 Encounter: [id number]  Date Of Visit: 07/07/19    Assessment:    Principal Problem:    Ureterolithiasis  Active Problems:    Hypertension    Type 2 diabetes mellitus with hyperglycemia, with long-term current use of insulin ([facility])    Chronic diastolic heart failure ([facility])    COPD mixed type ([facility])    PAD (peripheral artery disease) ([facility])    Elevated troponin    Acute kidney injury superimposed on chronic kidney disease ([facility] [address] )      Plan:    1 ) Elevated troponin  -most likely NSTEMI type 2 however patient is a poor historian, troponin peaked at 1 6 and trending down to 1 2  -patient at EKG changes with ST depressions and T-wave inversions in lateral lead, this is not new  -currently chest pain-free  -cardiology consultation appreciated, recommended cardiac catheterization on Monday  -continue with aspirin, statin, beta-blocker    2 ) Chronic back pain  -patient has chronic bilateral back pain and hip pain and sciatica follows with PCP and Neurosurgery outpatient  -MRI lumbar spine on 07/06/2019 reveals progressive L4-L5 extrusion, asymmetric to the left, 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    3 ) Acute kidney injury  -baseline creatinine close to 1 1-1 4  -creatinine trending down, 1 36 today  -continue to hold lisinopril, Lasix  -monitor renal function, avoid nephrotoxin    4 ) Ureterolithiasis  -CT item post feels bilateral focal areas of renal cortical scarring and dystrophic calcification appearing stable, mild hydronephrosis bilaterally, left greater than right, nonobstructing stone within right renal pelvis, left-sided hydronephrosis appears secondary to a distal left ureteral stone  -urology input appreciated, recommend continuing conservative management of left calculus for now, ureteroscopy could potentially be considered later this week if cleared from cardiac standpoint, continue with Flomax    5 ) Peripheral artery disease  -c/w trental    6 ) Chronic diastolic heart failure  -appears euvolemic, Lasix on hold due to acute kidney injury    7 ) Chronic obstructive pulmonary disease  -no acute exacerbation  -continue with Atrovent as needed    8 ) Diabetes mellitus  -HbA1c 7 7  -continue with latest 25 units at bedtime, NovoLog 12 units t i d  With meals  -monitor Accu-Cheks, sliding scale for coverage    9 ) Hypertension  -continue metoprolol 25 mg b i d , Norvasc    VTE Pharmacologic Prophylaxis:   Pharmacologic:  Heparin    Patient Centered Rounds: I have performed bedside rounds with nursing staff today  Time Spent for Care: 20 minutes  More than 50% of total time spent on counseling and coordination of care as described above  Current Length of Stay: 1 day(s)    Current Patient Status: Inpatient   Certification Statement: The patient will continue to require additional inpatient hospital stay due to Back pain, elevated troponin    Discharge Plan / Estimated Discharge Date:  2-3 days    Code Status: Level 1 - Full Code      Subjective:   Patient seen and examined at bedside, complaining of back pain, denies any chest pain, palpitations, dyspnea    Objective:     Vitals:   Temp (24hrs), Av 6 °F (36 4 °C), Min:97 3 °F (36 3 °C), Max:97 9 °F (36 6 °C)    Temp:  [97 3 °F (36 3 °C)-97 9 °F (36 6 °C)] 97 3 °F (36 3 °C)  HR:  [72-86] 72  Resp:  [17-22] 18  BP: (126-176)/(76-91) 159/85  SpO2:  [93 %-98 %] 98 %  Body mass index is 31 73 kg/m²  Input and Output Summary (last 24 hours):        Intake/Output Summary (Last 24 hours) at 2019 1356  Last data filed at 2019 1116  Gross per 24 hour   Intake 1180 ml   Output 1400 ml   Net -220 ml       Physical Exam:    Constitutional: Patient is in no acute distress  HEENT:  Normocephalic, atraumatic, EOMI, PERRLA, no scleral icterus, no pallor, moist oral mucosa  Neck:  Supple, no masses, no thyromegaly, no bruits Normal range of motion  Lymph nodes:  No lymphadenopathy  Cardiovascular: Normal S1S2, RRR, No murmurs/rubs/gallops appreciated  Pulmonary:  Bilateral air entry, No rhonchi/rales/wheezing appreciated  Abdominal: Soft, Bowel sounds present, Non-tender, Non-distended, No rebound/guarding, no hepatomegaly   Musculoskeletal: No tenderness/abnormality   Extremities:  No cyanosis, clubbing or edema  Peripheral pulses palpable and equal bilaterally  Neurological: Cranial nerves II-XII grossly intact, sensation intact, otherwise no focal neurological symptoms  Skin: Skin is warm and dry, no rashes  Additional Data:     Labs:    Results from last 7 days   Lab Units 07/07/19  0553   WBC Thousand/uL 9 89   HEMOGLOBIN g/dL 13 8   HEMATOCRIT % 45 1   PLATELETS Thousands/uL 141*   NEUTROS PCT % 65   LYMPHS PCT % 22   MONOS PCT % 8   EOS PCT % 3     Results from last 7 days   Lab Units 07/07/19  0553  07/05/19  1029   POTASSIUM mmol/L 4 0   < > 5 0   CHLORIDE mmol/L 98*   < > 97   CO2 mmol/L 25   < > 30   BUN mg/dL 18   < > 23   CREATININE mg/dL 1 36*   < > 1 73*   CALCIUM mg/dL 9 5   < > 9 6   ALK PHOS U/L  --   --  151*   ALT U/L  --   --  21   AST U/L  --   --  25    < > = values in this interval not displayed  Results from last 7 days   Lab Units 07/06/19  0102   INR  1 03        I Have Reviewed All Lab Data Listed Above          Recent Cultures (last 7 days):           Last 24 Hours Medication List:     Current Facility-Administered Medications:  amLODIPine 5 mg Oral Daily Alonso Goodpasture, MD   aspirin 325 mg Oral Daily Alonso Goodpasture, MD   atorvastatin 40 mg Oral Daily With [address], PAOSMAN   brimonidine 1 drop Both Eyes TID Cece Chino, CARL   dorzolamide 1 drop Both Eyes TID Robby Oreilly PA-C   heparin (porcine) 5,000 Units Subcutaneous [facility] Mariel Ayoub, CARL   hydrALAZINE 5 mg Intravenous Q6H PRN Robby Oreilly PA-C   HYDROcodone-acetaminophen 1 tablet Oral Q6H PRN Brent Schaffer MD   insulin glargine 25 Units Subcutaneous HS Brent Schaffer MD   insulin lispro 1-6 Units Subcutaneous TID AC Mariel Genaoa Gyarmaty, CARL   insulin lispro 1-6 Units Subcutaneous HS Mariel Arriola Gyarmaty, CARL   insulin lispro 12 Units Subcutaneous TID With Meals Robby Oreilly PA-C   ipratropium 2 puff Inhalation 4x Daily PRN Robby Oreilly PA-C   lidocaine 1 patch Topical Daily Mariel Deity, CARL   lidocaine 1 patch Topical Daily Brent Schaffer MD   metoprolol tartrate 25 mg Oral Q12H [facility] Marquita Miranda MD   ondansetron 4 mg Intravenous Q6H PRN Mariel TorresCARL phan   pantoprazole 40 mg Oral Early Morning Mariel Arriola CARL Ayoub   pentoxifylline 400 mg Oral TID With Meals Robby Oreilly PA-C   pregabalin 100 mg Oral Daily Robby Oreilly PA-C   tamsulosin 0 4 mg Oral Daily With Danielle Kat MD        Today, Patient Was Seen By: Brent Schaffer MD

## 2019-07-08 ENCOUNTER — APPOINTMENT (INPATIENT)
Dept: NON INVASIVE DIAGNOSTICS | Facility: HOSPITAL | Age: 84
DRG: 280 | End: 2019-07-08
Attending: INTERNAL MEDICINE
Payer: MEDICARE

## 2019-07-08 PROBLEM — I21.4 NON-ST ELEVATION MI (NSTEMI) (HCC): Status: ACTIVE | Noted: 2019-07-08

## 2019-07-08 PROBLEM — G89.29 CHRONIC BILATERAL LOW BACK PAIN WITHOUT SCIATICA: Status: ACTIVE | Noted: 2019-07-08

## 2019-07-08 PROBLEM — M54.50 CHRONIC BILATERAL LOW BACK PAIN WITHOUT SCIATICA: Status: ACTIVE | Noted: 2019-07-08

## 2019-07-08 PROBLEM — I21.4 NSTEMI (NON-ST ELEVATED MYOCARDIAL INFARCTION) (HCC): Status: ACTIVE | Noted: 2019-07-06

## 2019-07-08 LAB
ANION GAP SERPL CALCULATED.3IONS-SCNC: 12 MMOL/L (ref 4–13)
ATRIAL RATE: 77 BPM
ATRIAL RATE: 79 BPM
ATRIAL RATE: 89 BPM
BASOPHILS # BLD AUTO: 0.05 THOUSANDS/ΜL (ref 0–0.1)
BASOPHILS NFR BLD AUTO: 1 % (ref 0–1)
BUN SERPL-MCNC: 18 MG/DL (ref 5–25)
CALCIUM SERPL-MCNC: 9.6 MG/DL (ref 8.3–10.1)
CHLORIDE SERPL-SCNC: 98 MMOL/L (ref 100–108)
CO2 SERPL-SCNC: 23 MMOL/L (ref 21–32)
CREAT SERPL-MCNC: 1.48 MG/DL (ref 0.6–1.3)
EOSINOPHIL # BLD AUTO: 0.32 THOUSAND/ΜL (ref 0–0.61)
EOSINOPHIL NFR BLD AUTO: 4 % (ref 0–6)
ERYTHROCYTE [DISTWIDTH] IN BLOOD BY AUTOMATED COUNT: 14.4 % (ref 11.6–15.1)
GFR SERPL CREATININE-BSD FRML MDRD: 33 ML/MIN/1.73SQ M
GLUCOSE SERPL-MCNC: 190 MG/DL (ref 65–140)
GLUCOSE SERPL-MCNC: 242 MG/DL (ref 65–140)
GLUCOSE SERPL-MCNC: 281 MG/DL (ref 65–140)
GLUCOSE SERPL-MCNC: 300 MG/DL (ref 65–140)
GLUCOSE SERPL-MCNC: 333 MG/DL (ref 65–140)
HCT VFR BLD AUTO: 40.8 % (ref 34.8–46.1)
HGB BLD-MCNC: 13.3 G/DL (ref 11.5–15.4)
IMM GRANULOCYTES # BLD AUTO: 0.07 THOUSAND/UL (ref 0–0.2)
IMM GRANULOCYTES NFR BLD AUTO: 1 % (ref 0–2)
LYMPHOCYTES # BLD AUTO: 1.98 THOUSANDS/ΜL (ref 0.6–4.47)
LYMPHOCYTES NFR BLD AUTO: 24 % (ref 14–44)
MCH RBC QN AUTO: 30.2 PG (ref 26.8–34.3)
MCHC RBC AUTO-ENTMCNC: 32.6 G/DL (ref 31.4–37.4)
MCV RBC AUTO: 93 FL (ref 82–98)
MONOCYTES # BLD AUTO: 0.68 THOUSAND/ΜL (ref 0.17–1.22)
MONOCYTES NFR BLD AUTO: 8 % (ref 4–12)
NEUTROPHILS # BLD AUTO: 5.34 THOUSANDS/ΜL (ref 1.85–7.62)
NEUTS SEG NFR BLD AUTO: 62 % (ref 43–75)
NRBC BLD AUTO-RTO: 0 /100 WBCS
P AXIS: 12 DEGREES
P AXIS: 55 DEGREES
P AXIS: 60 DEGREES
PLATELET # BLD AUTO: 178 THOUSANDS/UL (ref 149–390)
PMV BLD AUTO: 12.1 FL (ref 8.9–12.7)
POTASSIUM SERPL-SCNC: 4.3 MMOL/L (ref 3.5–5.3)
PR INTERVAL: 140 MS
PR INTERVAL: 144 MS
PR INTERVAL: 144 MS
QRS AXIS: -2 DEGREES
QRS AXIS: 7 DEGREES
QRS AXIS: 8 DEGREES
QRSD INTERVAL: 116 MS
QRSD INTERVAL: 116 MS
QRSD INTERVAL: 118 MS
QT INTERVAL: 378 MS
QT INTERVAL: 412 MS
QT INTERVAL: 444 MS
QTC INTERVAL: 459 MS
QTC INTERVAL: 472 MS
QTC INTERVAL: 502 MS
RBC # BLD AUTO: 4.41 MILLION/UL (ref 3.81–5.12)
SODIUM SERPL-SCNC: 133 MMOL/L (ref 136–145)
T WAVE AXIS: 78 DEGREES
T WAVE AXIS: 90 DEGREES
T WAVE AXIS: 94 DEGREES
VENTRICULAR RATE: 77 BPM
VENTRICULAR RATE: 79 BPM
VENTRICULAR RATE: 89 BPM
WBC # BLD AUTO: 8.44 THOUSAND/UL (ref 4.31–10.16)

## 2019-07-08 PROCEDURE — 93454 CORONARY ARTERY ANGIO S&I: CPT | Performed by: INTERNAL MEDICINE

## 2019-07-08 PROCEDURE — C1769 GUIDE WIRE: HCPCS | Performed by: INTERNAL MEDICINE

## 2019-07-08 PROCEDURE — ND001 PR NO DOCUMENTATION: Performed by: NEUROLOGICAL SURGERY

## 2019-07-08 PROCEDURE — 99152 MOD SED SAME PHYS/QHP 5/>YRS: CPT | Performed by: INTERNAL MEDICINE

## 2019-07-08 PROCEDURE — 99153 MOD SED SAME PHYS/QHP EA: CPT | Performed by: INTERNAL MEDICINE

## 2019-07-08 PROCEDURE — B2111ZZ FLUOROSCOPY OF MULTIPLE CORONARY ARTERIES USING LOW OSMOLAR CONTRAST: ICD-10-PCS | Performed by: INTERNAL MEDICINE

## 2019-07-08 PROCEDURE — 93010 ELECTROCARDIOGRAM REPORT: CPT | Performed by: INTERNAL MEDICINE

## 2019-07-08 PROCEDURE — 82948 REAGENT STRIP/BLOOD GLUCOSE: CPT

## 2019-07-08 PROCEDURE — C1894 INTRO/SHEATH, NON-LASER: HCPCS | Performed by: INTERNAL MEDICINE

## 2019-07-08 PROCEDURE — 80048 BASIC METABOLIC PNL TOTAL CA: CPT | Performed by: INTERNAL MEDICINE

## 2019-07-08 PROCEDURE — 99232 SBSQ HOSP IP/OBS MODERATE 35: CPT | Performed by: NURSE PRACTITIONER

## 2019-07-08 PROCEDURE — 99232 SBSQ HOSP IP/OBS MODERATE 35: CPT | Performed by: PHYSICIAN ASSISTANT

## 2019-07-08 PROCEDURE — 99231 SBSQ HOSP IP/OBS SF/LOW 25: CPT | Performed by: INTERNAL MEDICINE

## 2019-07-08 PROCEDURE — 85025 COMPLETE CBC W/AUTO DIFF WBC: CPT | Performed by: INTERNAL MEDICINE

## 2019-07-08 RX ORDER — VERAPAMIL HYDROCHLORIDE 2.5 MG/ML
INJECTION, SOLUTION INTRAVENOUS CODE/TRAUMA/SEDATION MEDICATION
Status: COMPLETED | OUTPATIENT
Start: 2019-07-08 | End: 2019-07-08

## 2019-07-08 RX ORDER — MIDAZOLAM HYDROCHLORIDE 1 MG/ML
INJECTION INTRAMUSCULAR; INTRAVENOUS CODE/TRAUMA/SEDATION MEDICATION
Status: COMPLETED | OUTPATIENT
Start: 2019-07-08 | End: 2019-07-08

## 2019-07-08 RX ORDER — HYDROCODONE BITARTRATE AND ACETAMINOPHEN 5; 325 MG/1; MG/1
1 TABLET ORAL EVERY 6 HOURS PRN
Status: DISCONTINUED | OUTPATIENT
Start: 2019-07-08 | End: 2019-07-09

## 2019-07-08 RX ORDER — NITROGLYCERIN 20 MG/100ML
INJECTION INTRAVENOUS CODE/TRAUMA/SEDATION MEDICATION
Status: COMPLETED | OUTPATIENT
Start: 2019-07-08 | End: 2019-07-08

## 2019-07-08 RX ORDER — SODIUM CHLORIDE 9 MG/ML
INJECTION, SOLUTION INTRAVENOUS
Status: COMPLETED | OUTPATIENT
Start: 2019-07-08 | End: 2019-07-08

## 2019-07-08 RX ORDER — HEPARIN SODIUM 1000 [USP'U]/ML
INJECTION, SOLUTION INTRAVENOUS; SUBCUTANEOUS CODE/TRAUMA/SEDATION MEDICATION
Status: COMPLETED | OUTPATIENT
Start: 2019-07-08 | End: 2019-07-08

## 2019-07-08 RX ORDER — HYDROCODONE BITARTRATE AND ACETAMINOPHEN 5; 325 MG/1; MG/1
2 TABLET ORAL EVERY 6 HOURS PRN
Status: DISCONTINUED | OUTPATIENT
Start: 2019-07-08 | End: 2019-07-09

## 2019-07-08 RX ORDER — FENTANYL CITRATE 50 UG/ML
INJECTION, SOLUTION INTRAMUSCULAR; INTRAVENOUS CODE/TRAUMA/SEDATION MEDICATION
Status: COMPLETED | OUTPATIENT
Start: 2019-07-08 | End: 2019-07-08

## 2019-07-08 RX ORDER — SODIUM CHLORIDE 9 MG/ML
100 INJECTION, SOLUTION INTRAVENOUS CONTINUOUS
Status: DISPENSED | OUTPATIENT
Start: 2019-07-08 | End: 2019-07-08

## 2019-07-08 RX ORDER — LIDOCAINE HYDROCHLORIDE 10 MG/ML
INJECTION, SOLUTION INFILTRATION; PERINEURAL CODE/TRAUMA/SEDATION MEDICATION
Status: COMPLETED | OUTPATIENT
Start: 2019-07-08 | End: 2019-07-08

## 2019-07-08 RX ADMIN — INSULIN LISPRO 12 UNITS: 100 INJECTION, SOLUTION INTRAVENOUS; SUBCUTANEOUS at 13:11

## 2019-07-08 RX ADMIN — SODIUM CHLORIDE 100 ML/HR: 0.9 INJECTION, SOLUTION INTRAVENOUS at 09:26

## 2019-07-08 RX ADMIN — PENTOXIFYLLINE 400 MG: 400 TABLET, EXTENDED RELEASE ORAL at 13:14

## 2019-07-08 RX ADMIN — ATORVASTATIN CALCIUM 40 MG: 40 TABLET, FILM COATED ORAL at 16:10

## 2019-07-08 RX ADMIN — MIDAZOLAM 2 MG: 1 INJECTION INTRAMUSCULAR; INTRAVENOUS at 09:19

## 2019-07-08 RX ADMIN — BRIMONIDINE TARTRATE 1 DROP: 1.5 SOLUTION OPHTHALMIC at 21:45

## 2019-07-08 RX ADMIN — DORZOLAMIDE HYDROCHLORIDE 1 DROP: 20 SOLUTION/ DROPS OPHTHALMIC at 16:11

## 2019-07-08 RX ADMIN — SODIUM CHLORIDE 100 ML/HR: 0.9 INJECTION, SOLUTION INTRAVENOUS at 10:08

## 2019-07-08 RX ADMIN — DORZOLAMIDE HYDROCHLORIDE 1 DROP: 20 SOLUTION/ DROPS OPHTHALMIC at 08:27

## 2019-07-08 RX ADMIN — INSULIN LISPRO 12 UNITS: 100 INJECTION, SOLUTION INTRAVENOUS; SUBCUTANEOUS at 17:11

## 2019-07-08 RX ADMIN — INSULIN LISPRO 2 UNITS: 100 INJECTION, SOLUTION INTRAVENOUS; SUBCUTANEOUS at 17:11

## 2019-07-08 RX ADMIN — METOPROLOL TARTRATE 25 MG: 25 TABLET, FILM COATED ORAL at 21:45

## 2019-07-08 RX ADMIN — INSULIN LISPRO 5 UNITS: 100 INJECTION, SOLUTION INTRAVENOUS; SUBCUTANEOUS at 13:12

## 2019-07-08 RX ADMIN — AMLODIPINE BESYLATE 5 MG: 5 TABLET ORAL at 08:23

## 2019-07-08 RX ADMIN — PREGABALIN 100 MG: 50 CAPSULE ORAL at 08:23

## 2019-07-08 RX ADMIN — HYDROCODONE BITARTRATE AND ACETAMINOPHEN 1 TABLET: 5; 325 TABLET ORAL at 05:41

## 2019-07-08 RX ADMIN — PENTOXIFYLLINE 400 MG: 400 TABLET, EXTENDED RELEASE ORAL at 08:29

## 2019-07-08 RX ADMIN — IOHEXOL 65 ML: 350 INJECTION, SOLUTION INTRAVENOUS at 09:37

## 2019-07-08 RX ADMIN — BRIMONIDINE TARTRATE 1 DROP: 1.5 SOLUTION OPHTHALMIC at 16:10

## 2019-07-08 RX ADMIN — PANTOPRAZOLE SODIUM 40 MG: 40 TABLET, DELAYED RELEASE ORAL at 05:36

## 2019-07-08 RX ADMIN — PENTOXIFYLLINE 400 MG: 400 TABLET, EXTENDED RELEASE ORAL at 16:21

## 2019-07-08 RX ADMIN — INSULIN LISPRO 3 UNITS: 100 INJECTION, SOLUTION INTRAVENOUS; SUBCUTANEOUS at 22:15

## 2019-07-08 RX ADMIN — NITROGLYCERIN 200 MCG: 20 INJECTION INTRAVENOUS at 09:26

## 2019-07-08 RX ADMIN — HYDROCODONE BITARTRATE AND ACETAMINOPHEN 1 TABLET: 5; 325 TABLET ORAL at 19:53

## 2019-07-08 RX ADMIN — LIDOCAINE HYDROCHLORIDE 1 ML: 10 INJECTION, SOLUTION INFILTRATION; PERINEURAL at 09:24

## 2019-07-08 RX ADMIN — BRIMONIDINE TARTRATE 1 DROP: 1.5 SOLUTION OPHTHALMIC at 08:26

## 2019-07-08 RX ADMIN — FENTANYL CITRATE 25 MCG: 50 INJECTION, SOLUTION INTRAMUSCULAR; INTRAVENOUS at 09:19

## 2019-07-08 RX ADMIN — METOPROLOL TARTRATE 25 MG: 25 TABLET, FILM COATED ORAL at 08:23

## 2019-07-08 RX ADMIN — HEPARIN SODIUM 4000 UNITS: 1000 INJECTION INTRAVENOUS; SUBCUTANEOUS at 09:27

## 2019-07-08 RX ADMIN — DORZOLAMIDE HYDROCHLORIDE 1 DROP: 20 SOLUTION/ DROPS OPHTHALMIC at 21:45

## 2019-07-08 RX ADMIN — INSULIN GLARGINE 25 UNITS: 100 INJECTION, SOLUTION SUBCUTANEOUS at 22:14

## 2019-07-08 RX ADMIN — TAMSULOSIN HYDROCHLORIDE 0.4 MG: 0.4 CAPSULE ORAL at 16:10

## 2019-07-08 RX ADMIN — VERAPAMIL HYDROCHLORIDE 2.5 MG: 2.5 INJECTION INTRAVENOUS at 09:27

## 2019-07-08 RX ADMIN — ASPIRIN 325 MG ORAL TABLET 325 MG: 325 PILL ORAL at 08:23

## 2019-07-08 RX ADMIN — HYDROCODONE BITARTRATE AND ACETAMINOPHEN 1 TABLET: 5; 325 TABLET ORAL at 22:44

## 2019-07-08 NOTE — ASSESSMENT & PLAN NOTE
· Mildly hypertensive at time of admission  · Hold lisinopril    still on patient's medication list but appears PCP had held this due to hyperkalemia/CKD  · Amlodipine added, improved BP control  · Continue to monitor VS

## 2019-07-08 NOTE — ASSESSMENT & PLAN NOTE
Lab Results   Component Value Date    HGBA1C 7 7 (H) 06/04/2019       Recent Labs     07/08/19  1616 07/08/19  2213 07/09/19  0819 07/09/19  1117   POCGLU 190* 242* 241* 250*       Blood Sugar Average: Last 72 hrs:  · (P) 213 6   · Novolog 12 units TID with meals and Tresiba 25 units at bedtime  · Persistent hyperglycemia  Increased basal to 30 units    · Continue sliding scale  · FSBS ac/ hs

## 2019-07-08 NOTE — NURSING NOTE
Patient refusing to use  phone multiple times  Does not like to use  [place] speaking staff member spoke with her and instructed that this is the only way of communication if other staff members do not speak [place]  Patient denies any complaints or concerns at this time  She is hoping her daughter will come visit this afternoon  Will monitor

## 2019-07-08 NOTE — PROGRESS NOTES
Progress Note - Urology  Marybeth Mo 1935, 81 y o  female MRN: [id number]    Unit/Bed#: E4 -01 Encounter: [id number]    * Ureterolithiasis  Assessment & Plan  Distal left ureteral stone  Continue directed supportive care- analgesia, antiemetics, IVF, tamsulosin  Continue trial of passage in light of comorbid conditions  S/p cardiac catheterization today (no lesions requiring urgent PCI today)  S/p MRI lumbar spine with identification of focal disease in distribution of patient's pain symptoms  Consider ureteroscopy/lithotripsy during hospitalization if condition worsens- need cardiac clearance for procedure    Bedside rounds performed with Bruno Alexander RN  Discussed with Dr Josiephine Kocher  Urology will continue to follow  Subjective:   HPI: continues to c/o bilateral low back and lower abdominal pain  Urinating OK  No hematuria  No fevers  She underwent cardiac catheterization this AM and just got back to her room about an hour ago, RN reports some residual confusion s/t anesthesia  Review of Systems   Constitutional: Negative for activity change, appetite change, chills, fever and unexpected weight change  HENT: Negative  Respiratory: Negative  Negative for shortness of breath  Cardiovascular: Negative  Negative for chest pain  Gastrointestinal: Positive for abdominal pain  Negative for diarrhea, nausea and vomiting  Endocrine: Negative  Genitourinary: Positive for flank pain  Negative for decreased urine volume, difficulty urinating, dysuria, frequency, hematuria and urgency  Musculoskeletal: Positive for back pain  Negative for gait problem  Skin: Negative  Allergic/Immunologic: Negative  Neurological: Negative  Hematological: Negative for adenopathy  Does not bruise/bleed easily  Objective:  Nursing Rounds: s/p cardiac catheterization this AM  Still residual confusion s/t anesthesia at present   Albanian speaking RN present at bedside to translate for my interview of patient today  Vitals: Blood pressure 145/73, pulse 89, temperature 97 9 °F (36 6 °C), temperature source Tympanic, resp  rate 18, height 5' (1 524 m), weight 73 5 kg (162 lb 0 6 oz), SpO2 96 %, not currently breastfeeding  ,Body mass index is 31 65 kg/m²  Intake/Output Summary (Last 24 hours) at 7/8/2019 1601  Last data filed at 7/7/2019 1800  Gross per 24 hour   Intake 720 ml   Output    Net 720 ml     Invasive Devices     Peripheral Intravenous Line            Peripheral IV 07/05/19 Left Antecubital 3 days                Physical Exam   Constitutional: She is oriented to person, place, and time  She appears well-developed and well-nourished  No distress  HENT:   Head: Normocephalic and atraumatic  Cardiovascular: Normal rate, regular rhythm and intact distal pulses  Pulmonary/Chest: Effort normal and breath sounds normal    Abdominal: Soft  Bowel sounds are normal  She exhibits no distension  There is tenderness (mild llq and rlq tenderness)  There is no rebound and no guarding  Musculoskeletal: She exhibits no edema  Neurological: She is alert and oriented to person, place, and time  Gait normal    Skin: Skin is warm  Capillary refill takes less than 2 seconds  She is not diaphoretic  Psychiatric: She has a normal mood and affect  Her speech is normal and behavior is normal    Nursing note and vitals reviewed        History:    Past Medical History:   Diagnosis Date    [facility]ch[facility] obstructive [facility] disease) [facility] [address] [facility]     Diabetes mellitus ([facility] [address] [facility]     Hyperlipidemia     Hyperlipidemia     Hypertension     Kidney stones     Osteoporosis      Past Surgical History:   Procedure Laterality Date    APPENDECTOMY      HAND SURGERY Right     HYSTERECTOMY      age 29   [facility] INCISION AND DRAINAGE OF WOUND Left 1/5/2019    Procedure: INCISION AND DRAINAGE (I&D) EXTREMITY;  Surgeon: Filipe Irvin MD;  Location: [facility];  Service: General    LITHOTRIPSY      MASS EXCISION      [place] of back wall mass    TONSILLECTOMY      TONSILLECTOMY       Family History   Problem Relation Age of Onset    Diabetes Mother     No Known Problems Father     Throat cancer Daughter     No Known Problems Maternal Grandmother     No Known Problems Maternal Grandfather     No Known Problems Paternal Grandmother     No Known Problems Paternal Grandfather      Social History     Socioeconomic History    Marital status: Single     Spouse name: None    Number of children: None    Years of education: None    Highest education level: None   Occupational History    None   Social Needs    Financial resource strain: None    Food insecurity:     Worry: None     Inability: None    Transportation needs:     Medical: None     Non-medical: None   Tobacco Use    Smoking status: Never Smoker    Smokeless tobacco: Never Used    Tobacco comment: states she quit but family living with her states she smokes   Substance and Sexual Activity    Alcohol use: Never     Frequency: Never     Comment: OCCASIONAL    Drug use: Never    Sexual activity: Yes     Partners: Male   Lifestyle    Physical activity:     Days per week: None     Minutes per session: None    Stress: None   Relationships    Social connections:     Talks on phone: None     Gets together: None     Attends [employer] service: None     Active member of club or organization: None     Attends meetings of clubs or organizations: None     Relationship status: None    Intimate partner violence:     Fear of current or ex partner: None     Emotionally abused: None     Physically abused: None     Forced sexual activity: None   Other Topics Concern    None   Social History Narrative    None       Imaging:    CT abdomen pelvis wo contrast [[id number]] Collected: 07/05/19 1200   Order Status: Completed Updated: 07/05/19 1214   Narrative:     CT ABDOMEN AND PELVIS WITHOUT IV CONTRAST    INDICATION:   Nausea and vomiting   Nonspecific abdominal pain      COMPARISON:  February 2, 2019    TECHNIQUE:  CT examination of the abdomen and pelvis was performed without intravenous contrast   Axial, sagittal, and coronal 2D reformatted images were created from the source data and submitted for interpretation  Radiation dose length product (DLP) for this visit: 04 87 61 06 32 mGy-cm    This examination, like all CT scans performed in the [facility], was performed utilizing techniques to minimize radiation dose exposure, including the use of iterative   reconstruction and automated exposure control  Enteric contrast was administered  FINDINGS:    ABDOMEN    LOWER CHEST:  Mild reticular opacities in both lung bases, mostly in a dependent distribution   Mild cardiomegaly   Mitral annular calcification  LIVER/BILIARY TREE:  Unremarkable  GALLBLADDER:  No calcified gallstones  No pericholecystic inflammatory change  SPLEEN:  Unremarkable  PANCREAS:  Unremarkable  ADRENAL GLANDS:  Unremarkable  KIDNEYS/URETERS:  Left-sided hydronephrosis appearing secondary to ureteral calculus, 5 x 3 mm, seen as it traverses the iliac vessels  Moderate bilateral focal cortical scarring overall stable compared to prior   Multiple areas of cortical calcification as well as nephrolithiasis   Interval enlargement of stone seen within the dilated right renal pelvis which appears nondependent,   possibly adherent to the anterior urothelium, stone measuring 9 x 7 mm   Relative increased dilatation of the right renal calyces and renal pelvis without obstructing etiology evident  STOMACH AND BOWEL:  Unremarkable  APPENDIX:  No findings to suggest appendicitis  ABDOMINOPELVIC CAVITY:  No ascites or free intraperitoneal air  No lymphadenopathy  VESSELS:  Unremarkable for patient's age  PELVIS    REPRODUCTIVE ORGANS:  Unremarkable for patient's age  URINARY BLADDER:  Unremarkable  ABDOMINAL WALL/INGUINAL REGIONS:  Unremarkable      OSSEOUS STRUCTURES:  No acute fracture or destructive osseous lesion  Impression:       Bilateral focal areas of renal cortical scarring and dystrophic calcification appearing stable  Mild hydronephrosis bilaterally, left greater than right   On the right no discrete etiology for the increased dilatation   Nonobstructing stone within the right renal pelvis, increased in size but appearing nondependent as above  Left-sided hydronephrosis appears secondary to a distal left ureteral stone as described above  Bilateral nonobstructing nephrolithiasis  XR abdomen 1 view kub [[id number]] Collected: 07/07/19 1201   Order Status: Completed Updated: 07/07/19 1208   Narrative:     ABDOMEN    INDICATION:   nephrolithiasis   Low back pain    COMPARISON:  CT July 5, 2019    VIEWS:  AP supine      FINDINGS:    Within the region of the right renal pelvis there remains a large calcification measuring 1 2 x 0 9 cm   Within the renal parenchyma laterally interpolar region is a 6 x 5 mm calcification   Additional scattered smaller calcifications are seen within the   right kidney  Curvilinear calcifications as well as several small punctate calcifications are seen within the left lower pole   The calculus noted within the distal ureter on the left in the mid pelvis is not visualized   It may be obscured by the left sacrum  [place]   is suggestion of a rounded calcification overlying the left sacrum however it is not well seen  Nonobstructive bowel gas pattern  No acute osseous abnormality is seen  Impression:       The distal left ureteric calculus is not well-visualized and may reside in a similar position overlying the left sacrum  Bilateral nephrolithiasis as described stable           Labs:  Recent Labs     07/06/19  0351 07/07/19  0553 07/08/19  0625   WBC 10 99* 9 89 8 44       Recent Labs     07/06/19  0351 07/07/19  0553 07/08/19  0625   HGB 12 0 13 8 13 3     Recent Labs     07/06/19  0351 07/07/19  0553 07/08/19  0625   HCT 38 2 45 1 40 8     Recent Labs     07/06/19  0351 07/07/19  0553 07/08/19  0625   CREATININE 1 47* 1 36* 1 48*     Urinalysis: 0-1 rbc, otherwise negative      Kurt Rivas PA-C  Date: 7/8/2019 Time: 4:01 PM

## 2019-07-08 NOTE — PLAN OF CARE
Problem: Potential for Falls  Goal: Patient will remain free of falls  Description  INTERVENTIONS:  - Assess patient frequently for physical needs  -  Identify cognitive and physical deficits and behaviors that affect risk of falls    -  [place] fall precautions as indicated by assessment   - Educate patient/family on patient safety including physical limitations  - Instruct patient to call for assistance with activity based on assessment  - Modify environment to reduce risk of injury  - Consider OT/PT consult to assist with strengthening/mobility  Outcome: Progressing     Problem: Prexisting or High Potential for Compromised Skin Integrity  Goal: Skin integrity is maintained or improved  Description  INTERVENTIONS:  - Identify patients at risk for skin breakdown  - Assess and monitor skin integrity  - Assess and monitor nutrition and hydration status  - Monitor labs (i e  albumin)  - Assess for incontinence   - Turn and reposition patient  - Assist with mobility/ambulation  - Relieve pressure over bony prominences  - Avoid friction and shearing  - Provide appropriate hygiene as needed including keeping skin clean and dry  - Evaluate need for skin moisturizer/barrier cream  - Collaborate with interdisciplinary team (i e  Nutrition, Rehabilitation, etc )   - Patient/family teaching  Outcome: Progressing     Problem: PAIN - ADULT  Goal: Verbalizes/displays adequate comfort level or baseline comfort level  Description  Interventions:  - Encourage patient to monitor pain and request assistance  - Assess pain using appropriate pain scale  - Administer analgesics based on type and severity of pain and evaluate response  - Implement non-pharmacological measures as appropriate and evaluate response  - Consider cultural and social influences on pain and pain management  - Notify physician/advanced practitioner if interventions unsuccessful or patient reports new pain  Outcome: Progressing     Problem: INFECTION - ADULT  Goal: Absence or prevention of progression during hospitalization  Description  INTERVENTIONS:  - Assess and monitor for signs and symptoms of infection  - Monitor lab/diagnostic results  - Monitor all insertion sites, i e  indwelling lines, tubes, and drains  - Monitor endotracheal (as able) and nasal secretions for changes in amount and color  - [place] appropriate cooling/warming therapies per order  - Administer medications as ordered  - Instruct and encourage patient and family to use good hand hygiene technique  - Identify and instruct in appropriate isolation precautions for identified infection/condition  Outcome: Progressing     Problem: SAFETY ADULT  Goal: Maintain or return to baseline ADL function  Description  INTERVENTIONS:  -  Assess patient's ability to carry out ADLs; assess patient's baseline for ADL function and identify physical deficits which impact ability to perform ADLs (bathing, care of mouth/teeth, toileting, grooming, dressing, etc )  - Assess/evaluate cause of self-care deficits   - Assess range of motion  - Assess patient's mobility; develop plan if impaired  - Assess patient's need for assistive devices and provide as appropriate  - Encourage maximum independence but intervene and supervise when necessary  ¯ Involve family in performance of ADLs  ¯ Assess for home care needs following discharge   ¯ Request OT consult to assist with ADL evaluation and planning for discharge  ¯ Provide patient education as appropriate  Outcome: Progressing  Goal: Maintain or return mobility status to optimal level  Description  INTERVENTIONS:  - Assess patient's baseline mobility status (ambulation, transfers, stairs, etc )    - Identify cognitive and physical deficits and behaviors that affect mobility  - Identify mobility aids required to assist with transfers and/or ambulation (gait belt, sit-to-stand, lift, walker, cane, etc )  - [place] fall precautions as indicated by assessment  - Record patient progress and toleration of activity level on Mobility SBAR; progress patient to next Phase/Stage  - Instruct patient to call for assistance with activity based on assessment  - Request Rehabilitation consult to assist with strengthening/weightbearing, etc   Outcome: Progressing     Problem: DISCHARGE PLANNING  Goal: Discharge to home or other facility with appropriate resources  Description  INTERVENTIONS:  - Identify barriers to discharge w/patient and caregiver  - Arrange for needed discharge resources and transportation as appropriate  - Identify discharge learning needs (meds, wound care, etc )  - Arrange for interpretive services to assist at discharge as needed  - Refer to Case Management Department for coordinating discharge planning if the patient needs post-hospital services based on physician/advanced practitioner order or complex needs related to functional status, cognitive ability, or social support system  Outcome: Progressing     Problem: Knowledge Deficit  Goal: Patient/family/caregiver demonstrates understanding of disease process, treatment plan, medications, and discharge instructions  Description  Complete learning assessment and assess knowledge base  Interventions:  - Provide teaching at level of understanding  - Provide teaching via preferred learning methods  Outcome: Progressing     Problem: CARDIOVASCULAR - ADULT  Goal: Maintains optimal cardiac output and hemodynamic stability  Description  INTERVENTIONS:  - Monitor I/O, vital signs and rhythm  - Monitor for S/S and trends of decreased cardiac output i e  bleeding, hypotension  - Administer and titrate ordered vasoactive medications to optimize hemodynamic stability  - Assess quality of pulses, skin color and temperature  - Assess for signs of decreased coronary artery perfusion - ex   Angina  - Instruct patient to report change in severity of symptoms  Outcome: Progressing  Goal: Absence of cardiac dysrhythmias or at baseline rhythm  Description  INTERVENTIONS:  - Continuous cardiac monitoring, monitor vital signs, obtain 12 lead EKG if indicated  - Administer antiarrhythmic and heart rate control medications as ordered  - Monitor electrolytes and administer replacement therapy as ordered  Outcome: Progressing     Problem: GASTROINTESTINAL - ADULT  Goal: Minimal or absence of nausea and/or vomiting  Description  INTERVENTIONS:  - Maintain NPO status until nausea and vomiting are resolved  - Administer ordered antiemetic medications as needed  - Provide nonpharmacologic comfort measures as appropriate  - Advance diet as tolerated, if ordered  - Nutrition services referral to assist patient with adequate nutrition and appropriate food choices   Outcome: Progressing  Goal: Maintains or returns to baseline bowel function  Description  INTERVENTIONS:  - Encourage oral fluids to ensure adequate hydration  - Administer ordered medications as needed  - Encourage mobilization and activity     Outcome: Progressing  Goal: Maintains adequate nutritional intake  Description  INTERVENTIONS:  - Identify factors contributing to decreased intake, treat as appropriate  - Monitor I&O, WT and lab values  - Obtain nutrition services referral as needed   Outcome: Progressing     Problem: HEMATOLOGIC - ADULT  Goal: Maintains hematologic stability  Description  INTERVENTIONS  - Monitor labs  Monitor hemodynamics for signs of sepsis, administer bolus and reorder labs per protocol       Outcome: Progressing     Problem: MUSCULOSKELETAL - ADULT  Goal: Maintain or return mobility to safest level of function  Description  INTERVENTIONS:  - Assess patient's ability to carry out ADLs; assess patient's baseline for ADL function and identify physical deficits which impact ability to perform ADLs (bathing, care of mouth/teeth, toileting, grooming, dressing, etc )  - Assess/evaluate cause of self-care deficits   - Assess range of motion  - Assess patient's mobility; develop plan if impaired  - Assess patient's need for assistive devices and provide as appropriate  - Encourage maximum independence but intervene and supervise when necessary  - Involve family in performance of ADLs  - Assess for home care needs following discharge   - Request OT consult to assist with ADL evaluation and planning for discharge  - Provide patient education as appropriate  Outcome: Progressing  Goal: Maintain proper alignment of affected body part  Description  INTERVENTIONS:  - Support, maintain and protect limb and body alignment  - Provide pt/fam with appropriate education  Outcome: Progressing     Problem: METABOLIC, FLUID AND ELECTROLYTES - ADULT  Goal: Glucose maintained within target range  Description  INTERVENTIONS:  - Monitor Blood Glucose as ordered  - Assess for signs and symptoms of hyperglycemia and hypoglycemia  - Administer ordered medications to maintain glucose within target range  - Assess nutritional intake and initiate nutrition service referral as needed  Outcome: Progressing     Problem: GENITOURINARY - ADULT  Goal: Maintains or returns to baseline urinary function  Description  INTERVENTIONS:  - Assess urinary function  - Encourage oral fluids to ensure adequate hydration  - Administer IV fluids as ordered to ensure adequate hydration  - Administer ordered medications as needed  - Offer frequent toileting  - Follow urinary retention protocol if ordered  Outcome: Progressing  Goal: Absence of urinary retention  Description  INTERVENTIONS:  - Assess patient's ability to void and empty bladder  - Monitor I/O  - Bladder scan as needed  - Discuss with physician/AP medications to alleviate retention as needed  - Discuss catheterization for long term situations as appropriate   Outcome: Progressing  Goal: Urinary catheter remains patent  Description  INTERVENTIONS:  - Assess patency of urinary catheter  - If patient has a chronic mejia, consider changing catheter if non-functioning  - Follow guidelines for intermittent irrigation of non-functioning urinary catheter  Outcome: Progressing

## 2019-07-08 NOTE — ASSESSMENT & PLAN NOTE
Lab Results   Component Value Date    HGBA1C 7 7 (H) 06/04/2019       Recent Labs     07/07/19  1602 07/07/19  2050 07/08/19  0821 07/08/19  1133   POCGLU 117 71 300* 333*       Blood Sugar Average: Last 72 hrs:  · (P) 560 [id number] + accuchek  · Novolog 12 units TID with meals and Tresiba 25 units at bedtime  · Hyperglycemic today, changed to ADA diet  · Continue sliding scale  · FSBS ac/ hs

## 2019-07-08 NOTE — ASSESSMENT & PLAN NOTE
Distal left ureteral stone seen on initial CT 5 days ago was having back and groin pain, no longer having this pain  Continue directed supportive care- analgesia, antiemetics, IVF, tamsulosin  Continue trial of passage in light of comorbid conditions- patient may have passed stone as now asymptomatic  S/p cardiac catheterization (no lesions requiring urgent PCI, medical management recommended)  S/p MRI lumbar spine with identification of focal disease in distribution of patient's pain symptoms left hip and posterior thigh  Will check US k/b to evaluate hydronephrosis and ureteral jets however as now asymptomatic will cancel ureteroscopy/lithotripsy and establish outpatient followup

## 2019-07-08 NOTE — PROGRESS NOTES
[facility]  Progress Note - Binh Galvez 1935, 81 y o  female MRN: [id number]    Unit/Bed#: E4 -01 Encounter: [id number]    Primary Care Provider: Rom Luna MD   Date and time admitted to hospital: 7/6/2019  2:40 AM        * Ureterolithiasis  Assessment & Plan  · Patient presented to the emergency department at [facility] secondary to reported nausea/vomiting  On arrival after transfer, patient only complaint is chronic left hip pain  · Vital signs stable at time of admission  Mildly hypertensive  · CT abdomen/pelvis: "Bilateral focal areas of renal cortical scarring and dystrophic calcification appearing stable  Mild hydronephrosis bilaterally, left greater than right  On the right no discrete etiology for the increased dilatation  Nonobstructing stone within the right renal pelvis  Left-sided hydronephrosis appears secondary to a distal left ureteral stone  Bilateral nonobstructing nephrolithiasis " - patient reports multiple procedures for kidney stones previously  · Received IVF in the ED  Given history congestive heart failure, will hold off on further fluids  · UA negative for UTI  Was given dose of cefepime for possible pyelonephritis in ED due to initial elevated lactic (now normalized), leukocytosis  · Will hold off on further abx for now  · Appreciate Urology consultation  Recommend Flomax, KUB in 1-2 weeks, follow up with Urology  Would consider ureteroscopy if her pain worsens  Chronic bilateral low back pain without sciatica  Assessment & Plan  · Chronic bilateral back pain and hip pain and sciatica follows with PCP and Neurosurgery outpatient  · MRI lumbar spine on 07/06/2019 reveals progressive L4-L5 extrusion, asymmetric to the left, resulting in new redundancy of cauda equina    Severe canal and bilateral lateral recess stenosis  · Patient able to ambulate, sensation intact, no urinary or bowel incontinence  · Continues with pain  · Seen by PT / OT   · Consult to Neurosurgery    Acute kidney injury superimposed on chronic kidney disease ([facility] )  Assessment & Plan  · Baseline creatinine appears to be 1 1-1 4  · Creatinine at time of admission 1 73  Likely due to post renal obstruction due to ureterolithiasis   · Hold lisinopril, lasix - patient appears euvolemic  · Avoid nephrotoxic agents  · Improved  Lab Results   Component Value Date    CREATININE 1 48 (H) 07/08/2019    CREATININE 1 36 (H) 07/07/2019    CREATININE 1 47 (H) 07/06/2019    CREATININE 1 73 (H) 07/05/2019         NSTEMI (non-ST elevated myocardial infarction) [facility])  Assessment & Plan  · Found to have elevated troponin at 1 11 --> 1 39 --> 1 29  · Likely Type 2 related to ureterolithiasis  · Patient adamantly denies any chest pain  · EKG with nonspecific changes  NSR  · ED attending discussed with Cardiology, did not feel cardiac intervention required at this time/deferred heparin drip for now  · Consultation to Cardiology and because patient is poor historian, recommended further evaluation with cath  This was performed today which revealed proximal and mid RCA with 70% stenosis, all other disease is less than this  No intervention performed and recommend medical management first     PAD (peripheral artery disease) ([place] [address] )  Assessment & Plan  · Denies lower extremity pain at this time associated with claudication  · Currently on Trental    COPD mixed type ([facility] )  Assessment & Plan  · Reported hypoxia in the ED which now appears to have resolved, patient on RA  · Does not appear to be in acute exacerbation  · Continue Atrovent    Chronic diastolic heart failure ([facility] )  Assessment & Plan  · Currently appears euvolemic despite an elevated BNP of 3 K  · Hold Lasix given EULOGIO    Monitor fluid status  · Daily weights, I/O  · Low sodium diet         Type 2 diabetes mellitus with hyperglycemia, with long-term current use of insulin [facility])  Assessment & Plan  Lab Results   Component Value Date    HGBA1C 7 7 (H) 2019       Recent Labs     19  1602 19  2050 19  0821 19  1133   POCGLU 117 71 300* 333*       Blood Sugar Average: Last 72 hrs:  · (P) 833 [id number] + accuchek  · Novolog 12 units TID with meals and Tresiba 25 units at bedtime  · Hyperglycemic today, changed to ADA diet  · Continue sliding scale  · FSBS ac/ hs    Hypertension  Assessment & Plan  · Mildly hypertensive at time of admission  · Hold lisinopril    still on patient's medication list but appears PCP had held this due to hyperkalemia/CKD  · Amlodipine added, improved BP control  · Continue to monitor VS      VTE Pharmacologic Prophylaxis:   Pharmacologic: Heparin  Mechanical VTE Prophylaxis in Place: Yes    Patient Centered Rounds: I have performed bedside rounds with nursing staff today  Discussions with Specialists or Other Care Team Provider: Provider notes reviewed    Education and Discussions with Family / Patient: Plan of care with patient using     Time Spent for Care: 30 minutes  More than 50% of total time spent on counseling and coordination of care as described above  Current Length of Stay: 2 day(s)    Current Patient Status: Inpatient   Certification Statement: The patient will continue to require additional inpatient hospital stay due to s/p cardiac cath / back pain    Discharge Plan: Pending clinical improvement    Code Status: Level 1 - Full Code      Subjective:   Patient has bilateral lower back pain  Denies radiation down legs  Objective:     Vitals:   Temp (24hrs), Av 6 °F (36 4 °C), Min:97 4 °F (36 3 °C), Max:97 9 °F (36 6 °C)    Temp:  [97 4 °F (36 3 °C)-97 9 °F (36 6 °C)] 97 9 °F (36 6 °C)  HR:  [63-89] 68  Resp:  [18-20] 18  BP: (130-156)/(62-80) 149/67  SpO2:  [96 %-98 %] 96 %  Body mass index is 31 65 kg/m²  Input and Output Summary (last 24 hours):        Intake/Output Summary (Last 24 hours) at 2019 1446  Last data filed at 2019 1800  Gross per 24 hour   Intake 720 ml   Output    Net 720 ml       Physical Exam:     Physical Exam   Constitutional: She is oriented to person, place, and time  She appears well-developed and well-nourished  She appears distressed (Appears uncomfortable)  HENT:   Head: Normocephalic and atraumatic  Eyes: Conjunctivae are normal  No scleral icterus  Cardiovascular: Normal rate, regular rhythm and normal heart sounds  No murmur heard  Pulmonary/Chest: Effort normal and breath sounds normal  No respiratory distress  She has no wheezes  She has no rales  Abdominal: Soft  Bowel sounds are normal  She exhibits no distension  There is no tenderness  Musculoskeletal: Normal range of motion  She exhibits no edema or tenderness  Neurological: She is alert and oriented to person, place, and time  Skin: Skin is warm and dry  She is not diaphoretic  Psychiatric: She has a normal mood and affect  Her behavior is normal    Nursing note and vitals reviewed  Additional Data:     Labs:    Results from last 7 days   Lab Units 07/08/19  0625   WBC Thousand/uL 8 44   HEMOGLOBIN g/dL 13 3   HEMATOCRIT % 40 8   PLATELETS Thousands/uL 178   NEUTROS PCT % 62   LYMPHS PCT % 24   MONOS PCT % 8   EOS PCT % 4     Results from last 7 days   Lab Units 07/08/19  0625  07/05/19  1029   SODIUM mmol/L 133*   < > 140   POTASSIUM mmol/L 4 3   < > 5 0   CHLORIDE mmol/L 98*   < > 97   CO2 mmol/L 23   < > 30   BUN mg/dL 18   < > 23   CREATININE mg/dL 1 48*   < > 1 73*   ANION GAP mmol/L 12   < > 13   CALCIUM mg/dL 9 6   < > 9 6   ALBUMIN g/dL  --   --  4 1   TOTAL BILIRUBIN mg/dL  --   --  0 60   ALK PHOS U/L  --   --  151*   ALT U/L  --   --  21   AST U/L  --   --  25   GLUCOSE RANDOM mg/dL 281*   < > 441*    < > = values in this interval not displayed       Results from last 7 days   Lab Units 07/06/19  0102   INR  1 03     Results from last 7 days   Lab Units 07/08/19  1133 07/08/19  0813 07/07/19  2050 07/07/19  1602 07/07/19  1115 07/07/19  0742 07/07/19  0733 07/06/19  2104 07/06/19  1707 07/06/19  1156 07/06/19  0839 07/05/19  1826   POC GLUCOSE mg/dl 333* 300* 71 117 308* 279* >500* 101 272* 255* 245* 287*         Results from last 7 days   Lab Units 07/05/19 2014 07/05/19  1029   LACTIC ACID mmol/L 1 1 3 3*           * I Have Reviewed All Lab Data Listed Above  * Additional Pertinent Lab Tests Reviewed:  [facility] Admission Reviewed    Imaging:    Imaging Reports Reviewed Today Include: CT A/P & CXR on 7/5; MRI lumbar spine 7/6; KUB 7/7  Imaging Personally Reviewed by Myself Includes:  None    Recent Cultures (last 7 days):           Last 24 Hours Medication List:     Current Facility-Administered Medications:  amLODIPine 5 mg Oral Daily Keshav Bai MD    aspirin 325 mg Oral Daily Keshav Bai MD    atorvastatin 40 mg Oral Daily With [place] [address], PA-CHERRIE    brimonidine 1 drop Both Eyes TID Denisse [place], PA-CHERRIE    dorzolamide 1 drop Both Eyes TID Denisse RadhaANDREW sosa-CHERRIE    heparin (porcine) 5,000 Units Subcutaneous Q8H [address], CARL    hydrALAZINE 5 mg Intravenous Q6H PRN Denisse ANDREW Garcia-C    HYDROcodone-acetaminophen 1 tablet Oral Q6H PRN Abebe Samuel MD    insulin glargine 25 Units Subcutaneous HS Abebe Samuel MD    insulin lispro 1-6 Units Subcutaneous TID AC Greta Rider PA-C    insulin lispro 1-6 Units Subcutaneous HS Valerie Rider PA-C    insulin lispro 12 Units Subcutaneous TID With Meals Denisse Garcia PA-C    ipratropium 2 puff Inhalation 4x Daily PRN Denisse Garcia PA-C    lidocaine 1 patch Topical Daily Valerie Rider PA-C    lidocaine 1 patch Topical Daily Abebe Samuel MD    metoprolol tartrate 25 mg Oral Q12H [facility] Keshav Bai MD    ondansetron 4 mg Intravenous Q6H PRN Valerie Rider PA-C    pantoprazole 40 mg Oral Early Morning [facility] Kaur Rider PA-C    pentoxifylline 400 mg Oral TID With Meals Denisse Garcia PA-C pregabalin 100 mg Oral Daily Abdullahi Ayoub PA-C    sodium chloride 100 mL/hr Intravenous Continuous Abiodun Canseco MD Last Rate: 100 mL/hr (07/08/19 1008)   tamsulosin 0 4 mg Oral Daily With Jeremiah Kang MD         Today, Patient Was Seen By: VELASQUEZ Luz    ** Please Note: Dictation voice to text software may have been used in the creation of this document   **

## 2019-07-08 NOTE — ASSESSMENT & PLAN NOTE
· Reported hypoxia in the ED which now appears to have resolved, patient on RA  · Does not appear to be in acute exacerbation  · Continue Atrovent

## 2019-07-08 NOTE — ASSESSMENT & PLAN NOTE
· Baseline creatinine appears to be 1 1-1 4  · Creatinine at time of admission 1 73  Likely due to post renal obstruction due to ureterolithiasis   · Hold lisinopril, lasix - patient appears euvolemic  · Avoid nephrotoxic agents  · Improved    Lab Results   Component Value Date    CREATININE 1 48 (H) 07/08/2019    CREATININE 1 36 (H) 07/07/2019    CREATININE 1 47 (H) 07/06/2019    CREATININE 1 73 (H) 07/05/2019

## 2019-07-08 NOTE — ASSESSMENT & PLAN NOTE
· Currently appears euvolemic despite an elevated BNP of 3 K  · Hold Lasix given EULOGIO    Monitor fluid status  · Daily weights, I/O  · Low sodium diet

## 2019-07-08 NOTE — ASSESSMENT & PLAN NOTE
· Patient presented to the emergency department at [facility] secondary to reported nausea/vomiting  On arrival after transfer, patient only complaint is chronic left hip pain  · Vital signs stable at time of admission  Mildly hypertensive  · CT abdomen/pelvis: "Bilateral focal areas of renal cortical scarring and dystrophic calcification appearing stable  Mild hydronephrosis bilaterally, left greater than right  On the right no discrete etiology for the increased dilatation  Nonobstructing stone within the right renal pelvis  Left-sided hydronephrosis appears secondary to a distal left ureteral stone  Bilateral nonobstructing nephrolithiasis " - patient reports multiple procedures for kidney stones previously  · Received IVF in the ED  Given history congestive heart failure, will hold off on further fluids  · UA negative for UTI  Was given dose of cefepime for possible pyelonephritis in ED due to initial elevated lactic (now normalized), leukocytosis  · Will hold off on further abx for now  · Appreciate Urology consultation  Recommend Flomax, KUB in 1-2 weeks, follow up with Urology  Would consider ureteroscopy if her pain worsens

## 2019-07-08 NOTE — ASSESSMENT & PLAN NOTE
· Chronic bilateral back pain and hip pain and sciatica follows with PCP and Neurosurgery outpatient  · MRI lumbar spine on 07/06/2019 reveals progressive L4-L5 extrusion, asymmetric to the left, resulting in new redundancy of cauda equina    Severe canal and bilateral lateral recess stenosis  · Patient able to ambulate, sensation intact, no urinary or bowel incontinence  · Continues with pain  · Seen by PT / OT   · Consult to Neurosurgery

## 2019-07-08 NOTE — PROGRESS NOTES
Cardiac cath performed via the R radial artery  Calcified coronary arteries - Large LAD and L Cx system - no focal critical stenosis  RCA - calcified  Proximal 60 %, mid focal lesion 60 -70 %  Does not appear to be flow limiting  Her elevated troponin is likely to type 2 MI from her ureteral stone / hydronephrosis  Her presenting symptoms are also not c/w ACS  Recommend     ASA 81 mg daily  Plavix 75 mg daily  Continue other BP medications / statin  Follow up with [facility] as outpatient  PCI of the RCA is an option ( higher risk ) but I would recommend medical management as initial therapy

## 2019-07-08 NOTE — ASSESSMENT & PLAN NOTE
· Found to have elevated troponin at 1 11 --> 1 39 --> 1 29  · Likely Type 2 related to ureterolithiasis  · Patient adamantly denies any chest pain  · EKG with nonspecific changes  NSR  · ED attending discussed with Cardiology, did not feel cardiac intervention required at this time/deferred heparin drip for now  · Consultation to Cardiology and because patient is poor historian, recommended further evaluation with cath  This was performed today which revealed proximal and mid RCA with 70% stenosis, all other disease is less than this    No intervention performed and recommend medical management first

## 2019-07-08 NOTE — TELEMEDICINE
TeleConsult    2019    Adrianna Solano    Age 81     1935    Location [facility] -[facility]    PMH:  Hypertension ID DM COPD     EULOGIO with creatinine 1 7 admission now down to 1 3  Presented with nausea and vomiting and feeling unwell  In view of age and sex and prior history cardiac etiology being worked up    Asked to comment on lumbar spine MRI carried out 2019 during inter current admission for nausea vomiting abdominal pain, sacroiliac region back pain and left flank pain and history of kidney stones  Found to have central L4-5 disc protrusion with extruded L4-5 disc behind posterior the body of L4 vertebra and multilevel facet joint arthropathy    She was noted to have this before  See recent MRI from 2019 and prior outside MRI from 10/31/2017 Central disc protrusion is a little bit more prominent  Extends up USP between the body of L4    There is some elevation of the L4 nerve roots without compression    The L5 nerve roots are not easy to see because of recess stenosis and some bilateral neural foraminal narrowing secondary to facet joint hypertrophy    L5 nerve root be impinged on or displaced  However likely  central disc protrusion appears old going back to 2017  It is a little bit more prominent    Noted have troponin elevation up to 1 2  [place] to have NSTEMI initial EKG changes showing depression in the lateral leads but these ST changes chronic    Denied chest pain    Poor historian  Went for cardiac catheterization today  No critical focal stenosis in LAD and left circumflex system    RCA was calcified with proximal 60% and mid focal lesion 60 to 70%  Not felt to be flow-limiting so no PCI    Recommended ASA 81 and Plavix 75 mg today      Current current med chart shows  mg    Being followed for passage of left renal stone now down near entrance of left ureter into bladder    Mild hydronephrosis bilaterally left greater than right    Prior multiple procedures for kidney stones    Has large right renal pelvis calcification 1 2 by not been 9 cm  Within right renal parenchyma laterally into polar region 6 x 5 mm calcification    Small areas punctate calcifications within the left kidney lower pole    Difficult to see known descending calculus within distal left -thought to to be down near entrance of ureter into the on the left    PMH:  Reported long history of low back pain    Poor historian  Reporting diffuse lower back pain  Has been up out of bed today to BR by report  Not reporting pain going below the knees in either    Has good antigravity power lower extremity by report -  at least 4 to 4+/r    No obvious sensory abnormality or JPS abnormality by report    Imaging reviewed by me  MRI lumbar spine from 7/6/2019 showed  Multilevel degenerative spondylosis  Mild to moderate L2-3 and L3-4  L4-5 there is moderate degenerative disc disease with marked facet joint arthrosis  There is grade 1 anterolisthesis  There is moderate to large central disc protrusion with extension superiorly  This results in moderate to severe central canal stenosis and there is recess stenosis and bilateral foraminal stenosis  Changes are more definite and have progressed since MRI comparison 4/17/2019  Stable grade 1 retrolisthesis moderate bilateral neural foraminal stenosis with small central disc protrusion at L5-S1 level    Impression:   Not an anesthesia or surgical candidate at this time    With extensive comorbidities not a good candidate for GA anesthesia any time    There would have to be overwhelming definite radicular signs  Recommend outpatient follow up  Ongoing back pain is likely related to facet joint hypertrophy and degeneration  Although she does have combination of factors causing narrowing L4-5 and stenosis including facet joint hypertrophy ligamentum hypertrophy and central disc protrusion  No reported definite L5 abnormal radicular signs      Cardiac catheterization today  See Above    Being managed for left ureteral stone  If no intra current treatment for UTI, recommend PVR studies x 3 different shifts    Recommend ongoing management for her other identified problems  Recommend outpatient NS  follow-up in 3 to 4 weeks time if  still having unremitting concerns about her back pain and walking   Any potential elective surgical intervention would mean taking off ASA Minimum seven days and off aspirin and Plavix or equivalent for 14 days    Call office [phone] for appointment    7/8/2019 6:04 PM    Clark Lee MD, Attending Neurological Surgeon

## 2019-07-09 DIAGNOSIS — M54.41 CHRONIC BILATERAL LOW BACK PAIN WITH BILATERAL SCIATICA: ICD-10-CM

## 2019-07-09 DIAGNOSIS — M54.42 CHRONIC BILATERAL LOW BACK PAIN WITH BILATERAL SCIATICA: ICD-10-CM

## 2019-07-09 DIAGNOSIS — G89.29 CHRONIC BILATERAL LOW BACK PAIN WITH BILATERAL SCIATICA: ICD-10-CM

## 2019-07-09 PROBLEM — G92.9 TOXIC ENCEPHALOPATHY: Status: ACTIVE | Noted: 2019-07-09

## 2019-07-09 LAB
GLUCOSE SERPL-MCNC: 211 MG/DL (ref 65–140)
GLUCOSE SERPL-MCNC: 241 MG/DL (ref 65–140)
GLUCOSE SERPL-MCNC: 250 MG/DL (ref 65–140)
GLUCOSE SERPL-MCNC: 375 MG/DL (ref 65–140)
GLUCOSE SERPL-MCNC: 50 MG/DL (ref 65–140)

## 2019-07-09 PROCEDURE — 82948 REAGENT STRIP/BLOOD GLUCOSE: CPT

## 2019-07-09 PROCEDURE — 99232 SBSQ HOSP IP/OBS MODERATE 35: CPT | Performed by: INTERNAL MEDICINE

## 2019-07-09 PROCEDURE — 99232 SBSQ HOSP IP/OBS MODERATE 35: CPT | Performed by: PHYSICIAN ASSISTANT

## 2019-07-09 PROCEDURE — 99232 SBSQ HOSP IP/OBS MODERATE 35: CPT | Performed by: NURSE PRACTITIONER

## 2019-07-09 RX ORDER — OLANZAPINE 10 MG/1
2.5 INJECTION, POWDER, LYOPHILIZED, FOR SOLUTION INTRAMUSCULAR EVERY 4 HOURS PRN
Status: DISCONTINUED | OUTPATIENT
Start: 2019-07-09 | End: 2019-07-10 | Stop reason: HOSPADM

## 2019-07-09 RX ORDER — OLANZAPINE 10 MG/1
5 INJECTION, POWDER, LYOPHILIZED, FOR SOLUTION INTRAMUSCULAR ONCE
Status: COMPLETED | OUTPATIENT
Start: 2019-07-09 | End: 2019-07-09

## 2019-07-09 RX ORDER — DEXTROSE MONOHYDRATE 25 G/50ML
INJECTION, SOLUTION INTRAVENOUS
Status: COMPLETED
Start: 2019-07-09 | End: 2019-07-09

## 2019-07-09 RX ORDER — LORAZEPAM 2 MG/ML
0.5 INJECTION INTRAMUSCULAR ONCE
Status: COMPLETED | OUTPATIENT
Start: 2019-07-09 | End: 2019-07-09

## 2019-07-09 RX ORDER — INSULIN GLARGINE 100 [IU]/ML
30 INJECTION, SOLUTION SUBCUTANEOUS
Status: DISCONTINUED | OUTPATIENT
Start: 2019-07-09 | End: 2019-07-10 | Stop reason: HOSPADM

## 2019-07-09 RX ORDER — BACLOFEN 10 MG/1
10 TABLET ORAL 3 TIMES DAILY
Qty: 90 TABLET | Refills: 5 | OUTPATIENT
Start: 2019-07-09 | End: 2019-07-10 | Stop reason: HOSPADM

## 2019-07-09 RX ORDER — HYDROCODONE BITARTRATE AND ACETAMINOPHEN 5; 325 MG/1; MG/1
1 TABLET ORAL EVERY 6 HOURS PRN
Status: DISCONTINUED | OUTPATIENT
Start: 2019-07-09 | End: 2019-07-10 | Stop reason: HOSPADM

## 2019-07-09 RX ADMIN — METOPROLOL TARTRATE 25 MG: 25 TABLET, FILM COATED ORAL at 09:59

## 2019-07-09 RX ADMIN — INSULIN GLARGINE 30 UNITS: 100 INJECTION, SOLUTION SUBCUTANEOUS at 22:09

## 2019-07-09 RX ADMIN — INSULIN LISPRO 3 UNITS: 100 INJECTION, SOLUTION INTRAVENOUS; SUBCUTANEOUS at 10:02

## 2019-07-09 RX ADMIN — LORAZEPAM 0.5 MG: 2 INJECTION INTRAMUSCULAR; INTRAVENOUS at 00:58

## 2019-07-09 RX ADMIN — INSULIN LISPRO 6 UNITS: 100 INJECTION, SOLUTION INTRAVENOUS; SUBCUTANEOUS at 22:09

## 2019-07-09 RX ADMIN — DORZOLAMIDE HYDROCHLORIDE 1 DROP: 20 SOLUTION/ DROPS OPHTHALMIC at 18:02

## 2019-07-09 RX ADMIN — DEXTROSE 50 % IN WATER (D50W) INTRAVENOUS SYRINGE 50 ML: at 18:35

## 2019-07-09 RX ADMIN — BRIMONIDINE TARTRATE 1 DROP: 1.5 SOLUTION OPHTHALMIC at 10:00

## 2019-07-09 RX ADMIN — DORZOLAMIDE HYDROCHLORIDE 1 DROP: 20 SOLUTION/ DROPS OPHTHALMIC at 20:15

## 2019-07-09 RX ADMIN — DORZOLAMIDE HYDROCHLORIDE 1 DROP: 20 SOLUTION/ DROPS OPHTHALMIC at 10:00

## 2019-07-09 RX ADMIN — INSULIN LISPRO 3 UNITS: 100 INJECTION, SOLUTION INTRAVENOUS; SUBCUTANEOUS at 12:34

## 2019-07-09 RX ADMIN — INSULIN LISPRO 12 UNITS: 100 INJECTION, SOLUTION INTRAVENOUS; SUBCUTANEOUS at 10:01

## 2019-07-09 RX ADMIN — HYDROCODONE BITARTRATE AND ACETAMINOPHEN 1 TABLET: 5; 325 TABLET ORAL at 20:20

## 2019-07-09 RX ADMIN — HEPARIN SODIUM 5000 UNITS: 5000 INJECTION INTRAVENOUS; SUBCUTANEOUS at 22:09

## 2019-07-09 RX ADMIN — AMLODIPINE BESYLATE 5 MG: 5 TABLET ORAL at 09:59

## 2019-07-09 RX ADMIN — BRIMONIDINE TARTRATE 1 DROP: 1.5 SOLUTION OPHTHALMIC at 20:24

## 2019-07-09 RX ADMIN — PREGABALIN 100 MG: 50 CAPSULE ORAL at 09:59

## 2019-07-09 RX ADMIN — PENTOXIFYLLINE 400 MG: 400 TABLET, EXTENDED RELEASE ORAL at 12:33

## 2019-07-09 RX ADMIN — BRIMONIDINE TARTRATE 1 DROP: 1.5 SOLUTION OPHTHALMIC at 18:02

## 2019-07-09 RX ADMIN — ASPIRIN 325 MG ORAL TABLET 325 MG: 325 PILL ORAL at 09:59

## 2019-07-09 RX ADMIN — OLANZAPINE 5 MG: 10 INJECTION, POWDER, FOR SOLUTION INTRAMUSCULAR at 01:25

## 2019-07-09 RX ADMIN — INSULIN LISPRO 12 UNITS: 100 INJECTION, SOLUTION INTRAVENOUS; SUBCUTANEOUS at 12:34

## 2019-07-09 RX ADMIN — METOPROLOL TARTRATE 25 MG: 25 TABLET, FILM COATED ORAL at 20:20

## 2019-07-09 NOTE — PHYSICAL THERAPY NOTE
Physical Therapy Cancellation Note          Spoke to pt 's RN, Patsy Killian who reports pt is sleeping now and would not like pt not to be disturbed as pt had anesthesia last night , did not sleep was up all night, hallucinating, on restraints  Pt 's RN, Patsy Killian wishes for pt to get some sleep and not be disturbed  Will attempt at a later time when pt medically appropriate, awake and alert to attempt mobility training and PT intervention    Poli Floyd, PTA

## 2019-07-09 NOTE — ASSESSMENT & PLAN NOTE
· Chronic bilateral back pain and hip pain and sciatica follows with PCP and Neurosurgery outpatient  Maintained on Baclofen, hydrocodone and Lyrica  · MRI lumbar spine on 07/06/2019 reveals progressive L4-L5 extrusion, asymmetric to the left, resulting in new redundancy of cauda equina  Severe canal and bilateral lateral recess stenosis  · Patient able to ambulate, sensation intact, no urinary or bowel incontinence  Uses a walker at home  · Continues with pain  Would resume her normal scheduled hydrocodone and Baclofen once her encephalopathy clears  · Seen by PT / OT   · Consult to Neurosurgery and discussed on phone who recommends outpatient follow up in 3-4 weeks if still having pain

## 2019-07-09 NOTE — ASSESSMENT & PLAN NOTE
· Patient presented to the emergency department at [facility] secondary to reported nausea/vomiting  On arrival after transfer, patient only complaint is chronic left hip pain  · Vital signs stable at time of admission  Mildly hypertensive  · CT abdomen/pelvis: "Bilateral focal areas of renal cortical scarring and dystrophic calcification appearing stable  Mild hydronephrosis bilaterally, left greater than right  On the right no discrete etiology for the increased dilatation  Nonobstructing stone within the right renal pelvis  Left-sided hydronephrosis appears secondary to a distal left ureteral stone  Bilateral nonobstructing nephrolithiasis " - patient reports multiple procedures for kidney stones previously  · Received IVF in the ED  Given history congestive heart failure, will hold off on further fluids  · UA negative for UTI  Was given dose of cefepime for possible pyelonephritis in ED due to initial elevated lactic (now normalized), leukocytosis  · Will hold off on further abx for now  · Appreciate Urology consultation  Recommend Flomax, KUB in 1-2 weeks, follow up with Urology  Would consider ureteroscopy if her pain worsens

## 2019-07-09 NOTE — PROGRESS NOTES
Patient found by nursing in hallway confused and yelling  Patient is Vietnamese speaking only and translation was provided by CNA on the floor  Patient was speaking to her "dead neighbor"  Also noted to be speaking non-sense  Very confused and combative  Appeared to be hallucinating  Reporting she will hit anyone who comes near her  Refusing to get back in bed  Security and hospital supervisor were called  Patient received 0 5 mg IV ativan with no response  Given 5 mg IM Zyprexa with some positive response  Placed into bed with help from security, placed in 2 point soft restraints for violent behaviors  Will continue to monitor and continue restraints at this time  Will reevaluate need for restraints once patient back to baseline mental status

## 2019-07-09 NOTE — NURSING NOTE
Patient is of violent soft limb restraints, writer is able to communicate in [address] with patient  Ms Espitia is at nurses station on chair alarm cooperating with medication administration, remains non violent

## 2019-07-09 NOTE — PLAN OF CARE
Problem: SAFETY, RESTRAINT - VIOLENT/SELF-DESTRUCTIVE  Goal: Remains free of harm/injury from restraints (Restraint for Violent/Self-Destructive Behavior)  Description  INTERVENTIONS:  - Instruct patient/family regarding restraint use   - Assess and monitor physiologic and psychological status   - Provide interventions and comfort measures to meet assessed patient needs   - Ensure continuous in person monitoring is provided   - Identify and implement measures to help patient regain control  - Assess readiness for release of restraint  Outcome: Not Progressing  Goal: Returns to optimal restraint-free functioning  Description  INTERVENTIONS:  - Assess the patient's behavior and symptoms that indicate continued need for restraint  - Identify and implement measures to help patient regain control  - Assess readiness for release of restraint   Outcome: Not Progressing

## 2019-07-09 NOTE — ASSESSMENT & PLAN NOTE
· Last evening, patient became confused, combative and was out walking in hallway  + visual hallucinations  · She required chemical and physical restraints  · Most likely 2/2 anesthesia from cardiac cath  · Daughter reports that her mother does have episodes like this at home but not to this degree  · Today, patient was sitting in chair in hallway with continued hallucinations  Now sleeping  · Continue to monitor mental status  Will expect her to return to baseline with more time  Would hold off on any sedating medicine and consider consult to Gerontology if she does not improve by tomorrow

## 2019-07-09 NOTE — NURSING NOTE
Mrs Pike remains confused sitting at nurses station on chair alarm  Patient is hallucinating talking to people who are not visible  Remains cooperative with medications

## 2019-07-09 NOTE — OCCUPATIONAL THERAPY NOTE
Occupational Therapy         Patient Name: Joe Hinds  Today's Date: 7/9/2019      Attempted to see pt for evaluation, but nsg advising to hold tx 2* limited sleep previous evening  Pt with hx agitation, hallucinations  Will continue when appropriate   Dakota Kauffman, OT

## 2019-07-09 NOTE — PROGRESS NOTES
Progress Note - Sanjeev Wolff 81 y o  female MRN: [id number]    Unit/Bed#: E4 -01 Encounter: [id number]      Assessment:  Sanjeev Wolff is an 56-year-old female admitted for non STEMI with incidental finding of distal left ureteral stone  Patient is status post cardiac catheterization and was encephalopathic after receiving anesthesia yesterday requiring restraints  Patient is afebrile and hemodynamically stable  Patient does not offer complaints of flank or abdominal pain  Plan:  Continue medical optimization  Will consider in patient stone management, if patient demonstrates severe renal colic, sepsis or progressive worsening renal function in the face of ureteral obstruction  Otherwise, our service will contact patient/next of kin to arrange for hospital follow-up once discharged  Will follow peripherally  Contact our service with any questions or new  concerns  Subjective:  Denies fever, chills, flank, abdominal, suprapubic or groin pain, dysuria or gross hematuria      Objective:     Vitals: Blood pressure 130/62, pulse 75, temperature (!) 97 4 °F (36 3 °C), temperature source Tympanic, resp  rate 18, height 5' (1 524 m), weight 73 6 kg (162 lb 4 1 oz), SpO2 96 %, not currently breastfeeding  ,Body mass index is 31 69 kg/m²        Intake/Output Summary (Last 24 hours) at 7/9/2019 1308  Last data filed at 7/8/2019 1950  Gross per 24 hour   Intake 970 ml   Output    Net 970 ml       Physical Exam: General appearance: alert, appears stated age, cooperative and distracted  Head: Normocephalic, without obvious abnormality, atraumatic  Neck: no adenopathy, no carotid bruit, no JVD, supple, symmetrical, trachea midline and thyroid not enlarged, symmetric, no tenderness/mass/nodules  Lungs: diminished breath sounds  Heart: regular rate and rhythm, S1, S2 normal, no murmur, click, rub or gallop  Abdomen: soft, non-tender; bowel sounds normal; no masses,  no organomegaly  Extremities: extremities normal, warm and well-perfused; no cyanosis, clubbing, or edema  Pulses: 2+ and symmetric  Neurologic: Mental status: alertness: alert  No urinary drains     Invasive Devices     Peripheral Intravenous Line            Peripheral IV 07/05/19 Left Antecubital 4 days              Lab Results   Component Value Date    WBC 8 44 07/08/2019    HGB 13 3 07/08/2019    HCT 40 8 07/08/2019    MCV 93 07/08/2019     07/08/2019     Lab Results   Component Value Date    SODIUM 133 (L) 07/08/2019    K 4 3 07/08/2019    CL 98 (L) 07/08/2019    CO2 23 07/08/2019    BUN 18 07/08/2019    CREATININE 1 48 (H) 07/08/2019    GLUC 281 (H) 07/08/2019    CALCIUM 9 6 07/08/2019       Lab, Imaging and other studies: I have personally reviewed pertinent reports

## 2019-07-09 NOTE — PLAN OF CARE
Problem: Potential for Falls  Goal: Patient will remain free of falls  Description  INTERVENTIONS:  - Assess patient frequently for physical needs  -  Identify cognitive and physical deficits and behaviors that affect risk of falls    -  [place] fall precautions as indicated by assessment   - Educate patient/family on patient safety including physical limitations  - Instruct patient to call for assistance with activity based on assessment  - Modify environment to reduce risk of injury  - Consider OT/PT consult to assist with strengthening/mobility  Outcome: Progressing     Problem: Prexisting or High Potential for Compromised Skin Integrity  Goal: Skin integrity is maintained or improved  Description  INTERVENTIONS:  - Identify patients at risk for skin breakdown  - Assess and monitor skin integrity  - Assess and monitor nutrition and hydration status  - Monitor labs (i e  albumin)  - Assess for incontinence   - Turn and reposition patient  - Assist with mobility/ambulation  - Relieve pressure over bony prominences  - Avoid friction and shearing  - Provide appropriate hygiene as needed including keeping skin clean and dry  - Evaluate need for skin moisturizer/barrier cream  - Collaborate with interdisciplinary team (i e  Nutrition, Rehabilitation, etc )   - Patient/family teaching  Outcome: Progressing     Problem: PAIN - ADULT  Goal: Verbalizes/displays adequate comfort level or baseline comfort level  Description  Interventions:  - Encourage patient to monitor pain and request assistance  - Assess pain using appropriate pain scale  - Administer analgesics based on type and severity of pain and evaluate response  - Implement non-pharmacological measures as appropriate and evaluate response  - Consider cultural and social influences on pain and pain management  - Notify physician/advanced practitioner if interventions unsuccessful or patient reports new pain  Outcome: Progressing     Problem: INFECTION - ADULT  Goal: Absence or prevention of progression during hospitalization  Description  INTERVENTIONS:  - Assess and monitor for signs and symptoms of infection  - Monitor lab/diagnostic results  - Monitor all insertion sites, i e  indwelling lines, tubes, and drains  - Monitor endotracheal (as able) and nasal secretions for changes in amount and color  - [place] appropriate cooling/warming therapies per order  - Administer medications as ordered  - Instruct and encourage patient and family to use good hand hygiene technique  - Identify and instruct in appropriate isolation precautions for identified infection/condition  Outcome: Progressing     Problem: SAFETY ADULT  Goal: Maintain or return to baseline ADL function  Description  INTERVENTIONS:  -  Assess patient's ability to carry out ADLs; assess patient's baseline for ADL function and identify physical deficits which impact ability to perform ADLs (bathing, care of mouth/teeth, toileting, grooming, dressing, etc )  - Assess/evaluate cause of self-care deficits   - Assess range of motion  - Assess patient's mobility; develop plan if impaired  - Assess patient's need for assistive devices and provide as appropriate  - Encourage maximum independence but intervene and supervise when necessary  ¯ Involve family in performance of ADLs  ¯ Assess for home care needs following discharge   ¯ Request OT consult to assist with ADL evaluation and planning for discharge  ¯ Provide patient education as appropriate  Outcome: Progressing  Goal: Maintain or return mobility status to optimal level  Description  INTERVENTIONS:  - Assess patient's baseline mobility status (ambulation, transfers, stairs, etc )    - Identify cognitive and physical deficits and behaviors that affect mobility  - Identify mobility aids required to assist with transfers and/or ambulation (gait belt, sit-to-stand, lift, walker, cane, etc )  - [place] fall precautions as indicated by assessment  - Record patient progress and toleration of activity level on Mobility SBAR; progress patient to next Phase/Stage  - Instruct patient to call for assistance with activity based on assessment  - Request Rehabilitation consult to assist with strengthening/weightbearing, etc   Outcome: Progressing     Problem: DISCHARGE PLANNING  Goal: Discharge to home or other facility with appropriate resources  Description  INTERVENTIONS:  - Identify barriers to discharge w/patient and caregiver  - Arrange for needed discharge resources and transportation as appropriate  - Identify discharge learning needs (meds, wound care, etc )  - Arrange for interpretive services to assist at discharge as needed  - Refer to Case Management Department for coordinating discharge planning if the patient needs post-hospital services based on physician/advanced practitioner order or complex needs related to functional status, cognitive ability, or social support system  Outcome: Progressing     Problem: Knowledge Deficit  Goal: Patient/family/caregiver demonstrates understanding of disease process, treatment plan, medications, and discharge instructions  Description  Complete learning assessment and assess knowledge base  Interventions:  - Provide teaching at level of understanding  - Provide teaching via preferred learning methods  Outcome: Progressing     Problem: CARDIOVASCULAR - ADULT  Goal: Maintains optimal cardiac output and hemodynamic stability  Description  INTERVENTIONS:  - Monitor I/O, vital signs and rhythm  - Monitor for S/S and trends of decreased cardiac output i e  bleeding, hypotension  - Administer and titrate ordered vasoactive medications to optimize hemodynamic stability  - Assess quality of pulses, skin color and temperature  - Assess for signs of decreased coronary artery perfusion - ex   Angina  - Instruct patient to report change in severity of symptoms  Outcome: Progressing  Goal: Absence of cardiac dysrhythmias or at baseline rhythm  Description  INTERVENTIONS:  - Continuous cardiac monitoring, monitor vital signs, obtain 12 lead EKG if indicated  - Administer antiarrhythmic and heart rate control medications as ordered  - Monitor electrolytes and administer replacement therapy as ordered  Outcome: Progressing     Problem: GASTROINTESTINAL - ADULT  Goal: Minimal or absence of nausea and/or vomiting  Description  INTERVENTIONS:  - Maintain NPO status until nausea and vomiting are resolved  - Administer ordered antiemetic medications as needed  - Provide nonpharmacologic comfort measures as appropriate  - Advance diet as tolerated, if ordered  - Nutrition services referral to assist patient with adequate nutrition and appropriate food choices   Outcome: Progressing  Goal: Maintains or returns to baseline bowel function  Description  INTERVENTIONS:  - Encourage oral fluids to ensure adequate hydration  - Administer ordered medications as needed  - Encourage mobilization and activity     Outcome: Progressing  Goal: Maintains adequate nutritional intake  Description  INTERVENTIONS:  - Identify factors contributing to decreased intake, treat as appropriate  - Monitor I&O, WT and lab values  - Obtain nutrition services referral as needed   Outcome: Progressing     Problem: HEMATOLOGIC - ADULT  Goal: Maintains hematologic stability  Description  INTERVENTIONS  - Monitor labs  Monitor hemodynamics for signs of sepsis, administer bolus and reorder labs per protocol       Outcome: Progressing     Problem: MUSCULOSKELETAL - ADULT  Goal: Maintain or return mobility to safest level of function  Description  INTERVENTIONS:  - Assess patient's ability to carry out ADLs; assess patient's baseline for ADL function and identify physical deficits which impact ability to perform ADLs (bathing, care of mouth/teeth, toileting, grooming, dressing, etc )  - Assess/evaluate cause of self-care deficits   - Assess range of motion  - Assess patient's mobility; develop plan if impaired  - Assess patient's need for assistive devices and provide as appropriate  - Encourage maximum independence but intervene and supervise when necessary  - Involve family in performance of ADLs  - Assess for home care needs following discharge   - Request OT consult to assist with ADL evaluation and planning for discharge  - Provide patient education as appropriate  Outcome: Progressing  Goal: Maintain proper alignment of affected body part  Description  INTERVENTIONS:  - Support, maintain and protect limb and body alignment  - Provide pt/fam with appropriate education  Outcome: Progressing     Problem: METABOLIC, FLUID AND ELECTROLYTES - ADULT  Goal: Glucose maintained within target range  Description  INTERVENTIONS:  - Monitor Blood Glucose as ordered  - Assess for signs and symptoms of hyperglycemia and hypoglycemia  - Administer ordered medications to maintain glucose within target range  - Assess nutritional intake and initiate nutrition service referral as needed  Outcome: Progressing     Problem: GENITOURINARY - ADULT  Goal: Maintains or returns to baseline urinary function  Description  INTERVENTIONS:  - Assess urinary function  - Encourage oral fluids to ensure adequate hydration  - Administer IV fluids as ordered to ensure adequate hydration  - Administer ordered medications as needed  - Offer frequent toileting  - Follow urinary retention protocol if ordered  Outcome: Progressing  Goal: Absence of urinary retention  Description  INTERVENTIONS:  - Assess patient's ability to void and empty bladder  - Monitor I/O  - Bladder scan as needed  - Discuss with physician/AP medications to alleviate retention as needed  - Discuss catheterization for long term situations as appropriate   Outcome: Progressing  Goal: Urinary catheter remains patent  Description  INTERVENTIONS:  - Assess patency of urinary catheter  - If patient has a chronic mejia, consider changing catheter if non-functioning  - Follow guidelines for intermittent irrigation of non-functioning urinary catheter  Outcome: Progressing     Problem: COPING  Goal: Pt/Family able to verbalize concerns and demonstrate effective coping strategies  Description  INTERVENTIONS:  - Assist patient/family to identify coping skills, available support systems and cultural and spiritual values  - Provide emotional support, including active listening and acknowledgement of concerns of patient and caregivers  - Reduce environmental stimuli, as able  - Provide patient education  - Assess for spiritual pain/suffering and initiate spiritual care, including notification of [facility] or [facility] based community as needed  - Assess effectiveness of coping strategies  Outcome: Progressing  Goal: Will report anxiety at manageable levels  Description  INTERVENTIONS:  - Administer medication as ordered  - Teach and encourage coping skills  - Provide emotional support  - Assess patient/family for anxiety and ability to cope  Outcome: Progressing     Problem: BEHAVIOR  Goal: Pt/Family maintain appropriate behavior and adhere to behavioral management agreement, if implemented  Description  INTERVENTIONS:  - Assess the family dynamic   - Encourage verbalization of thoughts and concerns in a socially appropriate manner  - Assess patient/family's coping skills and non-compliant behavior (including use of illegal substances)  - Utilize positive, consistent limit setting strategies supporting safety of patient, staff and others  - Initiate consult with Case Management, Spiritual Care or other ancillary services as appropriate  - If a patient's/visitor's behavior jeopardizes the safety of the patient, staff, or others, refer to organization procedure     - Notify Security of behavior or suspected illegal substances which indicate the need for search of the patient and/or belongings  - Encourage participation in the decision making process about a behavioral management agreement; implement if patient meets criteria  Outcome: Progressing

## 2019-07-09 NOTE — PROGRESS NOTES
[facility]  Progress Note - Meeta Horne 1935, 81 y o  female MRN: [id number]    Unit/Bed#: E4 -01 Encounter: [id number]    Primary Care Provider: Agnieszka Morales MD   Date and time admitted to hospital: 7/6/2019  2:40 AM        * Ureterolithiasis  Assessment & Plan  · Patient presented to the emergency department at [facility] secondary to reported nausea/vomiting  On arrival after transfer, patient only complaint is chronic left hip pain  · Vital signs stable at time of admission  Mildly hypertensive  · CT abdomen/pelvis: "Bilateral focal areas of renal cortical scarring and dystrophic calcification appearing stable  Mild hydronephrosis bilaterally, left greater than right  On the right no discrete etiology for the increased dilatation  Nonobstructing stone within the right renal pelvis  Left-sided hydronephrosis appears secondary to a distal left ureteral stone  Bilateral nonobstructing nephrolithiasis " - patient reports multiple procedures for kidney stones previously  · Received IVF in the ED  Given history congestive heart failure, will hold off on further fluids  · UA negative for UTI  Was given dose of cefepime for possible pyelonephritis in ED due to initial elevated lactic (now normalized), leukocytosis  · Will hold off on further abx for now  · Appreciate Urology consultation  Recommend Flomax, KUB in 1-2 weeks, follow up with Urology  Would consider ureteroscopy if her pain worsens  Toxic encephalopathy  Assessment & Plan  · Last evening, patient became confused, combative and was out walking in hallway  + visual hallucinations  · She required chemical and physical restraints  · Most likely 2/2 anesthesia from cardiac cath  · Daughter reports that her mother does have episodes like this at home but not to this degree  · Today, patient was sitting in chair in hallway with continued hallucinations  Now sleeping  · Continue to monitor mental status    Will expect her to return to baseline with more time  Would hold off on any sedating medicine and consider consult to Gerontology if she does not improve by tomorrow  Chronic bilateral low back pain without sciatica  Assessment & Plan  · Chronic bilateral back pain and hip pain and sciatica follows with PCP and Neurosurgery outpatient  Maintained on Baclofen, hydrocodone and Lyrica  · MRI lumbar spine on 07/06/2019 reveals progressive L4-L5 extrusion, asymmetric to the left, resulting in new redundancy of cauda equina  Severe canal and bilateral lateral recess stenosis  · Patient able to ambulate, sensation intact, no urinary or bowel incontinence  Uses a walker at home  · Continues with pain  Would resume her normal scheduled hydrocodone and Baclofen once her encephalopathy clears  · Seen by PT / OT   · Consult to Neurosurgery and discussed on phone who recommends outpatient follow up in 3-4 weeks if still having pain  Acute kidney injury superimposed on chronic kidney disease ([facility] )  Assessment & Plan  · Baseline creatinine appears to be 1 1-1 4  · Creatinine at time of admission 1 73  Likely due to post renal obstruction due to ureterolithiasis   · Hold lisinopril, lasix - patient appears euvolemic  · Avoid nephrotoxic agents  · Improved  Lab Results   Component Value Date    CREATININE 1 48 (H) 07/08/2019    CREATININE 1 36 (H) 07/07/2019    CREATININE 1 47 (H) 07/06/2019    CREATININE 1 73 (H) 07/05/2019         NSTEMI (non-ST elevated myocardial infarction) [facility])  Assessment & Plan  · Found to have elevated troponin at 1 11 --> 1 39 --> 1 29  · Likely Type 2 related to ureterolithiasis  · Patient adamantly denies any chest pain  · EKG with nonspecific changes  NSR  · ED attending discussed with Cardiology, did not feel cardiac intervention required at this time/deferred heparin drip for now  · Consultation to Cardiology and because patient is poor historian, recommended further evaluation with cath  This was performed today which revealed proximal and mid RCA with 70% stenosis, all other disease is less than this  No intervention performed and recommend medical management first     PAD (peripheral artery disease) ([facility] [address] )  Assessment & Plan  · Denies lower extremity pain at this time associated with claudication  · Currently on Trental    COPD mixed type ([facility] [address] )  Assessment & Plan  · Reported hypoxia in the ED which now appears to have resolved, patient on RA  · Does not appear to be in acute exacerbation  · Continue Atrovent    Chronic diastolic heart failure ([facility] )  Assessment & Plan  · Currently appears euvolemic despite an elevated BNP of 3 K  · Hold Lasix given EULOGIO  Monitor fluid status  · Daily weights, I/O  · Low sodium diet         Type 2 diabetes mellitus with hyperglycemia, with long-term current use of insulin [facility])  Assessment & Plan  Lab Results   Component Value Date    HGBA1C 7 7 (H) 06/04/2019       Recent Labs     07/08/19  1616 07/08/19  2213 07/09/19  0819 07/09/19  1117   POCGLU 190* 242* 241* 250*       Blood Sugar Average: Last 72 hrs:  · (P) 213 6   · Novolog 12 units TID with meals and Tresiba 25 units at bedtime  · Persistent hyperglycemia  Increased basal to 30 units  · Continue sliding scale  · FSBS ac/ hs    Hypertension  Assessment & Plan  · Mildly hypertensive at time of admission  · Hold lisinopril    still on patient's medication list but appears PCP had held this due to hyperkalemia/CKD  · Amlodipine added, improved BP control  · Continue to monitor VS        VTE Pharmacologic Prophylaxis:   Pharmacologic: Heparin  Mechanical VTE Prophylaxis in Place: Yes    Patient Centered Rounds: I have performed bedside rounds with nursing staff today  Discussions with Specialists or Other Care Team Provider: Provider notes reviewed  Education and Discussions with Family / Patient: Plan of care with patient's daughter  Time Spent for Care: 20 minutes    More than 50% of total time spent on counseling and coordination of care as described above  Current Length of Stay: 3 day(s)    Current Patient Status: Inpatient   Certification Statement: The patient will continue to require additional inpatient hospital stay due to further monitoring    Discharge Plan: Pending clinical improvement    Code Status: Level 1 - Full Code      Subjective:   Patient poor historian  Objective:     Vitals:   Temp (24hrs), Av 7 °F (36 5 °C), Min:97 4 °F (36 3 °C), Max:98 °F (36 7 °C)    Temp:  [97 4 °F (36 3 °C)-98 °F (36 7 °C)] 97 7 °F (36 5 °C)  HR:  [] 85  Resp:  [18] 18  BP: (130-172)/(62-88) 164/74  SpO2:  [95 %-96 %] 95 %  Body mass index is 31 69 kg/m²  Input and Output Summary (last 24 hours): Intake/Output Summary (Last 24 hours) at 2019 1551  Last data filed at 2019 1950  Gross per 24 hour   Intake 970 ml   Output    Net 970 ml       Physical Exam:     Physical Exam   Constitutional: She appears well-developed and well-nourished  No distress  HENT:   Head: Normocephalic and atraumatic  Eyes: Conjunctivae are normal  No scleral icterus  Cardiovascular: Normal rate, regular rhythm and normal heart sounds  No murmur heard  Pulmonary/Chest: Effort normal and breath sounds normal  No respiratory distress  She has no wheezes  She has no rales  Abdominal: Soft  Bowel sounds are normal  She exhibits no distension  There is no tenderness  Musculoskeletal: Normal range of motion  She exhibits no edema or tenderness  Neurological:   Somnolent at the time of this exam    Skin: Skin is warm and dry  She is not diaphoretic  Psychiatric: Her mood appears anxious  Her affect is inappropriate  She is agitated and actively hallucinating  Cognition and memory are impaired  She expresses impulsivity  Nursing note and vitals reviewed          Additional Data:     Labs:    Results from last 7 days   Lab Units 19  0625   WBC Thousand/uL 8 44   HEMOGLOBIN g/dL 13 3 HEMATOCRIT % 40 8   PLATELETS Thousands/uL 178   NEUTROS PCT % 62   LYMPHS PCT % 24   MONOS PCT % 8   EOS PCT % 4     Results from last 7 days   Lab Units 07/08/19  0625  07/05/19  1029   SODIUM mmol/L 133*   < > 140   POTASSIUM mmol/L 4 3   < > 5 0   CHLORIDE mmol/L 98*   < > 97   CO2 mmol/L 23   < > 30   BUN mg/dL 18   < > 23   CREATININE mg/dL 1 48*   < > 1 73*   ANION GAP mmol/L 12   < > 13   CALCIUM mg/dL 9 6   < > 9 6   ALBUMIN g/dL  --   --  4 1   TOTAL BILIRUBIN mg/dL  --   --  0 60   ALK PHOS U/L  --   --  151*   ALT U/L  --   --  21   AST U/L  --   --  25   GLUCOSE RANDOM mg/dL 281*   < > 441*    < > = values in this interval not displayed  Results from last 7 days   Lab Units 07/06/19  0102   INR  1 03     Results from last 7 days   Lab Units 07/09/19  1117 07/09/19  0819 07/08/19  2213 07/08/19  1616 07/08/19  1133 07/08/19  0821 07/07/19  2050 07/07/19  1602 07/07/19  1115 07/07/19  0742 07/07/19  0733 07/06/19  2104   POC GLUCOSE mg/dl 250* 241* 242* 190* 333* 300* 71 117 308* 279* >500* 101         Results from last 7 days   Lab Units 07/05/19  2014 07/05/19  1029   LACTIC ACID mmol/L 1 1 3 3*           * I Have Reviewed All Lab Data Listed Above  * Additional Pertinent Lab Tests Reviewed:  [facility] Admission Reviewed    Imaging:    Imaging Reports Reviewed Today Include: None  Imaging Personally Reviewed by Myself Includes:  None    Recent Cultures (last 7 days):           Last 24 Hours Medication List:     Current Facility-Administered Medications:  amLODIPine 5 mg Oral Daily Shay Hannon MD   aspirin 325 mg Oral Daily Shay Hannon MD   atorvastatin 40 mg Oral Daily With [address], PA-C   brimonidine 1 drop Both Eyes TID Lupe Russo PA-C   dorzolamide 1 drop Both Eyes TID ANDREW Ojeda-CHERRIE   heparin (porcine) 5,000 Units Subcutaneous [facility] Ward Rider PA-C   hydrALAZINE 5 mg Intravenous Q6H PRN Lupe Russo PA-C HYDROcodone-acetaminophen 1 tablet Oral Q6H PRN Dione Florez PA-C   insulin glargine 30 Units Subcutaneous HS VELASQUEZ Vasquez   insulin lispro 1-6 Units Subcutaneous TID AC Greta Rider PA-C   insulin lispro 1-6 Units Subcutaneous HS Kylah Rider, PA-C   insulin lispro 12 Units Subcutaneous TID With Meals Sarah Freshwater, PA-CHERRIE   ipratropium 2 puff Inhalation 4x Daily PRN Sarah Freshwater, PASaydaC   lidocaine 1 patch Topical Daily Kylah Candy Anitra, PA-C   lidocaine 1 patch Topical Daily Olinda Diaz MD   metoprolol tartrate 25 mg Oral Q12H [address] Niles Dewitt MD   ondansetron 4 mg Intravenous Q6H PRN Kylah Rider, CARL   pantoprazole 40 mg Oral Early Morning Kylah Dayanara Rider, PA-C   pentoxifylline 400 mg Oral TID With Meals Sarah Freshwater, PA-CHERRIE   pregabalin 100 mg Oral Daily Sarah Freshwater, PA-C   tamsulosin 0 4 mg Oral Daily With Arlene Epstein MD        Today, Patient Was Seen By: VELASQUEZ Vasquez    ** Please Note: Dictation voice to text software may have been used in the creation of this document   **

## 2019-07-09 NOTE — PROGRESS NOTES
Progress Note - Cardiology   Kari Gave 81 y o  female MRN: [id number]  Unit/Bed#: E4 -01 Encounter: [id number]        Problem List:  Principal Problem:    Ureterolithiasis  Active Problems:    Hypertension    Type 2 diabetes mellitus with hyperglycemia, with long-term current use of insulin ([facility])    Chronic diastolic heart failure (HCC)    COPD mixed type ([facility])    PAD (peripheral artery disease) ([facility])    NSTEMI (non-ST elevated myocardial infarction) ([facility] [address] )    Acute kidney injury superimposed on chronic kidney disease ([facility])    Chronic bilateral low back pain without sciatica      Asessment/Plan:  1  CAD, diffuse disease on catheterization 07/08/2019:   -30% mid LAD stenosis   -60% stenosis at ostium 1st diagonal, 40% middle third   -40% middle 3rd of 1st OM   -50% middle 3rd of 2nd OM   -50% proximal RCA, 70% mid RCA    2  Troponin elevation, peak 1 39  3  Ureterolithiasis  4  Chronic low back pain  5  Acute kidney injury with baseline creatinine 1 1-1 4  6  Peripheral artery disease  7  COPD  8  Hyperdynamic LV function, EF 75 percent   9  Essential hypertension  10  Type 2 diabetes mellitus     Plan:  1  Nonobstructive CAD:   -81 milligrams daily aspirin indefinitely   -25 milligrams metoprolol tartrate twice daily   -atorvastatin 40 milligrams at night   -plavix 75 mg daily for at least 6 months   -very high risk for PCI of RCA, will treat medically for now      2  Hyperdynamic LV function:   -continue to hold Lasix   -kidney injury likely secondary to obstruction from renal stone   -if able to pass stone and kidney function improves would resume home dose of Lasix      3  Essential hypertension:   -continue amlodipine at present dose   -can increase as outpatient if remains hypertensive    *please note, pt has underlying dementia  After sedation and her hospital stay this has been exacerbated and she is presently hallucinating and unable to give a history  She is Azerbaijani speaking only   I communicated the above plan with her daughter who is also Sudanese speaking  One of our RN's translated per preference of the patient  I will contact our cardiology office and arrange follow up  Patient reportedly has a home health aid who helps manage her medications*    Subjective:  Pt actively hallucinating, Sudanese speaking only  Hx discussed with daughter with assistance of   Vitals:  Vitals:    19 0600 19 0600   Weight: 73 5 kg (162 lb 0 6 oz) 73 6 kg (162 lb 4 1 oz)   ,  Vitals:    19 2145 19 0600 19 0804 19 1100   BP: 158/82  (!) 172/81 130/62   BP Location:   Right arm Left arm   Pulse: 100  82 75   Resp:   18 18   Temp:   (!) 97 4 °F (36 3 °C)    TempSrc:   Tympanic    SpO2:   96%    Weight:  73 6 kg (162 lb 4 1 oz)     Height:           Exam:  General: Disheveled appearing  Speaking to people not in the room   Currently 1:1 in hallway   Heart: Unable to perform  Respiratory effort: Unable to perform  Lungs: Unable to perform  Lower Limbs: Unable to perform            Telemetry:           Medications:    Current Facility-Administered Medications:     amLODIPine (NORVASC) tablet 5 mg, 5 mg, Oral, Daily, Safia Rivera MD, 5 mg at 19 5301    aspirin tablet 325 mg, 325 mg, Oral, Daily, Safia Rivera MD, 325 mg at 19 0959    atorvastatin (LIPITOR) tablet 40 mg, 40 mg, Oral, Daily With Dinner, Sita Dowling PA-C, 40 mg at 19 1610    brimonidine (ALPHAGAN P) 0 15 % ophthalmic solution 1 drop, 1 drop, Both Eyes, TID, Sita Dowling PA-C, 1 drop at 19 1000    dorzolamide (TRUSOPT) ophthalmic solution 1 drop, 1 drop, Both Eyes, TID, Sita Dowling PA-C, 1 drop at 19 1000    heparin (porcine) subcutaneous injection 5,000 Units, 5,000 Units, Subcutaneous, Q8H [address], 5,000 Units at 19 1500 **AND** [CANCELED] Platelet count, , , Once, Sita Dowling PA-C    hydrALAZINE (APRESOLINE) injection 5 mg, 5 mg, Intravenous, Q6H PRN, Klaus Workman PA-C    HYDROcodone-acetaminophen (NORCO) 5-325 mg per tablet 1 tablet, 1 tablet, Oral, Q6H PRN, Jennifer Florez PA-C    insulin glargine (LANTUS) subcutaneous injection 25 Units 0 25 mL, 25 Units, Subcutaneous, HS, Jose Pérez MD, 25 Units at 07/08/19 2214    insulin lispro (HumaLOG) 100 units/mL subcutaneous injection 1-6 Units, 1-6 Units, Subcutaneous, TID AC, 3 Units at 07/09/19 1234 **AND** Fingerstick Glucose (POCT), , , TID AC, Klaus Workman PA-C    insulin lispro (HumaLOG) 100 units/mL subcutaneous injection 1-6 Units, 1-6 Units, Subcutaneous, HS, Klaus Workman PA-C, 3 Units at 07/08/19 2215    insulin lispro (HumaLOG) 100 units/mL subcutaneous injection 12 Units, 12 Units, Subcutaneous, TID With Meals, Klaus Workman PA-C, 12 Units at 07/09/19 1234    ipratropium (ATROVENT HFA) inhaler 2 puff, 2 puff, Inhalation, 4x Daily PRN, Klaus Workman PA-C    lidocaine (LIDODERM) 5 % patch 1 patch, 1 patch, Topical, Daily, Klaus Workman PA-C, Stopped at 07/08/19 0829    lidocaine (LIDODERM) 5 % patch 1 patch, 1 patch, Topical, Daily, Jose Pérez MD, Stopped at 07/08/19 0825    metoprolol tartrate (LOPRESSOR) tablet 25 mg, 25 mg, Oral, Q12H [address], Rodrigo Wolfe MD, 25 mg at 07/09/19 0959    ondansetron (ZOFRAN) injection 4 mg, 4 mg, Intravenous, Q6H PRN, Klaus Workman PA-C    pantoprazole (PROTONIX) EC tablet 40 mg, 40 mg, Oral, Early Morning, Lian Rider PA-C, 40 mg at 07/08/19 8469    pentoxifylline (TRENtal) ER tablet 400 mg, 400 mg, Oral, TID With Meals, Klaus Workman PA-C, 400 mg at 07/09/19 1233    pregabalin (LYRICA) capsule 100 mg, 100 mg, Oral, Daily, Lian Rider PA-C, 100 mg at 07/09/19 0959    tamsulosin (FLOMAX) capsule 0 4 mg, 0 4 mg, Oral, Daily With Nolvia Leal MD, 0 4 mg at 07/08/19 1610      Labs/Data:        Results from last 7 days   Lab Units 07/08/19  0625 07/07/19  0553 07/06/19  0351   WBC Thousand/uL 8 44 9 89 10 99*   HEMOGLOBIN g/dL 13 3 13 8 12 0   HEMATOCRIT % 40 8 45 1 38 2   PLATELETS Thousands/uL 178 141* 162     Results from last 7 days   Lab Units 07/08/19  0625 07/07/19  0553 07/06/19  0945 07/06/19  0650 07/06/19  0351   POTASSIUM mmol/L 4 3 4 0  --   --  4 1   CHLORIDE mmol/L 98* 98*  --   --  102   CO2 mmol/L 23 25  --   --  29   BUN mg/dL 18 18  --   --  20   TROPONIN I ng/mL  --   --  1 21* 1 43* 1 62*

## 2019-07-10 ENCOUNTER — ANESTHESIA EVENT (OUTPATIENT)
Dept: PERIOP | Facility: HOSPITAL | Age: 84
End: 2019-07-10

## 2019-07-10 ENCOUNTER — ANESTHESIA (OUTPATIENT)
Dept: PERIOP | Facility: HOSPITAL | Age: 84
End: 2019-07-10

## 2019-07-10 ENCOUNTER — APPOINTMENT (INPATIENT)
Dept: ULTRASOUND IMAGING | Facility: HOSPITAL | Age: 84
DRG: 280 | End: 2019-07-10
Payer: MEDICARE

## 2019-07-10 VITALS
DIASTOLIC BLOOD PRESSURE: 78 MMHG | RESPIRATION RATE: 18 BRPM | HEART RATE: 88 BPM | SYSTOLIC BLOOD PRESSURE: 148 MMHG | WEIGHT: 159.83 LBS | TEMPERATURE: 98.2 F | HEIGHT: 60 IN | OXYGEN SATURATION: 96 % | BODY MASS INDEX: 31.38 KG/M2

## 2019-07-10 PROBLEM — N20.0 NEPHROLITHIASIS: Status: ACTIVE | Noted: 2019-07-05

## 2019-07-10 PROBLEM — I21.A1 TYPE 2 MYOCARDIAL INFARCTION (HCC): Status: ACTIVE | Noted: 2019-07-06

## 2019-07-10 LAB
BASOPHILS # BLD AUTO: 0.06 THOUSANDS/ΜL (ref 0–0.1)
BASOPHILS NFR BLD AUTO: 1 % (ref 0–1)
EOSINOPHIL # BLD AUTO: 0.15 THOUSAND/ΜL (ref 0–0.61)
EOSINOPHIL NFR BLD AUTO: 1 % (ref 0–6)
ERYTHROCYTE [DISTWIDTH] IN BLOOD BY AUTOMATED COUNT: 15.2 % (ref 11.6–15.1)
GLUCOSE SERPL-MCNC: 167 MG/DL (ref 65–140)
GLUCOSE SERPL-MCNC: 174 MG/DL (ref 65–140)
HCT VFR BLD AUTO: 32.4 % (ref 34.8–46.1)
HGB BLD-MCNC: 10.8 G/DL (ref 11.5–15.4)
IMM GRANULOCYTES # BLD AUTO: 0.09 THOUSAND/UL (ref 0–0.2)
IMM GRANULOCYTES NFR BLD AUTO: 1 % (ref 0–2)
LYMPHOCYTES # BLD AUTO: 1.55 THOUSANDS/ΜL (ref 0.6–4.47)
LYMPHOCYTES NFR BLD AUTO: 12 % (ref 14–44)
MCH RBC QN AUTO: 31.7 PG (ref 26.8–34.3)
MCHC RBC AUTO-ENTMCNC: 33.3 G/DL (ref 31.4–37.4)
MCV RBC AUTO: 95 FL (ref 82–98)
MONOCYTES # BLD AUTO: 1.02 THOUSAND/ΜL (ref 0.17–1.22)
MONOCYTES NFR BLD AUTO: 8 % (ref 4–12)
NEUTROPHILS # BLD AUTO: 9.84 THOUSANDS/ΜL (ref 1.85–7.62)
NEUTS SEG NFR BLD AUTO: 77 % (ref 43–75)
NRBC BLD AUTO-RTO: 0 /100 WBCS
PLATELET # BLD AUTO: 174 THOUSANDS/UL (ref 149–390)
PMV BLD AUTO: 12.1 FL (ref 8.9–12.7)
RBC # BLD AUTO: 3.41 MILLION/UL (ref 3.81–5.12)
WBC # BLD AUTO: 12.71 THOUSAND/UL (ref 4.31–10.16)

## 2019-07-10 PROCEDURE — 99239 HOSP IP/OBS DSCHRG MGMT >30: CPT | Performed by: INTERNAL MEDICINE

## 2019-07-10 PROCEDURE — G8987 SELF CARE CURRENT STATUS: HCPCS

## 2019-07-10 PROCEDURE — 76770 US EXAM ABDO BACK WALL COMP: CPT

## 2019-07-10 PROCEDURE — G8989 SELF CARE D/C STATUS: HCPCS

## 2019-07-10 PROCEDURE — 85025 COMPLETE CBC W/AUTO DIFF WBC: CPT | Performed by: NURSE PRACTITIONER

## 2019-07-10 PROCEDURE — 82948 REAGENT STRIP/BLOOD GLUCOSE: CPT

## 2019-07-10 PROCEDURE — 97167 OT EVAL HIGH COMPLEX 60 MIN: CPT

## 2019-07-10 PROCEDURE — G8988 SELF CARE GOAL STATUS: HCPCS

## 2019-07-10 PROCEDURE — 99232 SBSQ HOSP IP/OBS MODERATE 35: CPT | Performed by: PHYSICIAN ASSISTANT

## 2019-07-10 RX ORDER — CLOPIDOGREL BISULFATE 75 MG/1
75 TABLET ORAL DAILY
Qty: 30 TABLET | Refills: 0 | Status: SHIPPED | OUTPATIENT
Start: 2019-07-10 | End: 2019-07-10 | Stop reason: SDUPTHER

## 2019-07-10 RX ORDER — ASPIRIN 81 MG/1
81 TABLET, CHEWABLE ORAL DAILY
Qty: 30 TABLET | Refills: 0 | Status: SHIPPED | OUTPATIENT
Start: 2019-07-10 | End: 2019-07-10 | Stop reason: SDUPTHER

## 2019-07-10 RX ORDER — AMLODIPINE BESYLATE 5 MG/1
5 TABLET ORAL DAILY
Qty: 30 TABLET | Refills: 0 | Status: SHIPPED | OUTPATIENT
Start: 2019-07-11 | End: 2019-07-10

## 2019-07-10 RX ORDER — AMLODIPINE BESYLATE 5 MG/1
5 TABLET ORAL DAILY
Qty: 30 TABLET | Refills: 0 | Status: SHIPPED | OUTPATIENT
Start: 2019-07-11 | End: 2019-08-05 | Stop reason: SDUPTHER

## 2019-07-10 RX ORDER — ASPIRIN 81 MG/1
81 TABLET, CHEWABLE ORAL DAILY
Qty: 30 TABLET | Refills: 0 | Status: SHIPPED | OUTPATIENT
Start: 2019-07-10 | End: 2019-11-12 | Stop reason: ALTCHOICE

## 2019-07-10 RX ORDER — CLOPIDOGREL BISULFATE 75 MG/1
75 TABLET ORAL DAILY
Qty: 30 TABLET | Refills: 0 | Status: SHIPPED | OUTPATIENT
Start: 2019-07-10 | End: 2019-08-05 | Stop reason: SDUPTHER

## 2019-07-10 RX ADMIN — AMLODIPINE BESYLATE 5 MG: 5 TABLET ORAL at 09:34

## 2019-07-10 RX ADMIN — INSULIN LISPRO 1 UNITS: 100 INJECTION, SOLUTION INTRAVENOUS; SUBCUTANEOUS at 13:16

## 2019-07-10 RX ADMIN — PANTOPRAZOLE SODIUM 40 MG: 40 TABLET, DELAYED RELEASE ORAL at 06:21

## 2019-07-10 RX ADMIN — METOPROLOL TARTRATE 25 MG: 25 TABLET, FILM COATED ORAL at 09:34

## 2019-07-10 RX ADMIN — HEPARIN SODIUM 5000 UNITS: 5000 INJECTION INTRAVENOUS; SUBCUTANEOUS at 06:21

## 2019-07-10 RX ADMIN — BRIMONIDINE TARTRATE 1 DROP: 1.5 SOLUTION OPHTHALMIC at 09:33

## 2019-07-10 RX ADMIN — PENTOXIFYLLINE 400 MG: 400 TABLET, EXTENDED RELEASE ORAL at 09:33

## 2019-07-10 RX ADMIN — DORZOLAMIDE HYDROCHLORIDE 1 DROP: 20 SOLUTION/ DROPS OPHTHALMIC at 09:33

## 2019-07-10 RX ADMIN — PREGABALIN 100 MG: 50 CAPSULE ORAL at 09:34

## 2019-07-10 RX ADMIN — INSULIN LISPRO 12 UNITS: 100 INJECTION, SOLUTION INTRAVENOUS; SUBCUTANEOUS at 13:16

## 2019-07-10 RX ADMIN — PENTOXIFYLLINE 400 MG: 400 TABLET, EXTENDED RELEASE ORAL at 13:15

## 2019-07-10 RX ADMIN — HEPARIN SODIUM 5000 UNITS: 5000 INJECTION INTRAVENOUS; SUBCUTANEOUS at 13:19

## 2019-07-10 NOTE — ASSESSMENT & PLAN NOTE
· Chronic bilateral back pain and hip pain and sciatica follows with PCP and Neurosurgery outpatient  Maintained on Baclofen, hydrocodone and Lyrica  · MRI lumbar spine on 07/06/2019 reveals progressive L4-L5 extrusion, asymmetric to the left, resulting in new redundancy of cauda equina  Severe canal and bilateral lateral recess stenosis  · Patient able to ambulate, sensation intact, no urinary or bowel incontinence  Uses a walker at home  · Consult to Neurosurgery and discussed on phone who recommends outpatient follow up in 3-4 weeks if still having pain    · Will prescribe home PT and VNA

## 2019-07-10 NOTE — SOCIAL WORK
MD has discharge pt home with family today  MD has order VNA for RN, PT and OT  Met with dtr and she is agreeable to [facility] for RN, OT and PT  Referral made today

## 2019-07-10 NOTE — ASSESSMENT & PLAN NOTE
· Status post cardiac catheterization which showed multivessel disease  · Cardiology recommended medical management with aspirin 81 mg, Plavix 75 mg, Lipitor, metoprolol 25 mg b i d  Sherre Soulier · Will prescribe medications on discharge    · Outpatient follow-up with Cardiology in 2 weeks

## 2019-07-10 NOTE — ASSESSMENT & PLAN NOTE
· Admitted due to left hydronephrosis due to distal ureteral stone  · Urologic intervention was not done due to development of elevated troponin and MI   · Repeat imaging today showed improved hydronephrosis and no further obstruction    · Patient has no further symptoms  · Outpatient follow-up with Urology in 2 weeks

## 2019-07-10 NOTE — ASSESSMENT & PLAN NOTE
· Secondary to acute illness, medication, sedation specifically Versed and fentanyl    · Resolved  · Mental status at baseline  · Stable for discharge home with 24/7 support by family

## 2019-07-10 NOTE — OCCUPATIONAL THERAPY NOTE
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 ([facility])    Chronic right-sided low back pain with right-sided sciatica    Acute renal failure superimposed on stage 3 chronic kidney disease ([address] )    Left arm cellulitis    Accident due to mechanical fall without injury    Type 2 diabetes mellitus with hyperglycemia, with long-term current use of insulin ([facility])    Abscess of arm, left    Inflammatory spondylopathy of lumbosacral region ([facility] [address] [facility]    Chronic diastolic heart failure ([facility]    COPD mixed type ([address]    PAD (peripheral artery disease) ([facility])    Angina pectoris ([facility])    Localized edema    Drug-induced constipation    Ureterolithiasis    NSTEMI (non-ST elevated myocardial infarction) ([address]    Acute kidney injury superimposed on chronic kidney disease ([facility])    Chronic bilateral low back pain without sciatica    Toxic encephalopathy    Nephrolithiasis     Past Medical History  Past Medical History:   Diagnosis Date    COPD (chronic obstructive pulmonary disease) ([address] )     Diabetes mellitus ([address] )     Hyperlipidemia     Hyperlipidemia     Hypertension     Kidney stones     Osteoporosis      Past Surgical History  Past Surgical History:   Procedure Laterality Date    APPENDECTOMY      HAND SURGERY Right     HYSTERECTOMY      age 29   [de-identified] INCISION AND DRAINAGE OF WOUND Left 1/5/2019    Procedure: INCISION AND DRAINAGE (I&D) EXTREMITY;  Surgeon: Yarelis Chris MD;  [facility];  Service: General    LITHOTRIPSY      MASS EXCISION      rem[place] of back wall mass    TONSILLECTOMY      TONSILLECTOMY          07/10/19 6324   Note Type   Note type Eval only   Restrictions/Precautions   Weight Bearing Precautions Per Order No   Other Precautions Cognitive; Bed Alarm; Fall Risk   Pain Assessment   Pain Assessment FLACC   Pain Rating: FLACC (Rest) - Face 0   Pain Rating: FLACC (Rest) - Legs 0   Pain Rating: FLACC (Rest) - Activity 0   Pain Rating: FLACC (Rest) - Cry 0   Pain Rating: FLACC (Rest) - Consolability 0   Score: FLACC (Rest) 0   Home Living   Type of Home Apartment  (2nd flr-derrick)   [address]   Prior Function   Lives With Daughter   ADL Assistance Needs assistance   Falls in the last 6 months 0   Lifestyle   Autonomy PTA family(i e dtr-via [employer] phone,  #[phone])ABMWGO pt had assistance with her ADLs; states independence with transfers/ambulation--with RW; neg falls, neg home alone, HHA(9am-3pm/daily)   Reciprocal Relationships supportive dtrs   Service to Others homemaker   Intrinsic Gratification watching TV   Psychosocial   Psychosocial (WDL) X   Patient Behaviors/Mood Flat affect; Cooperative   Subjective   Subjective "I'm tired "   ADL   Where Assessed Edge of bed   Eating Assistance 6  Modified independent   Grooming Assistance 6  Modified Independent   UB Bathing Assistance 5  Supervision/Setup   LB Bathing Assistance 5  Supervision/Setup   UB Dressing Assistance 5  Supervision/Setup   LB Dressing Assistance 5  Supervision/Setup   Bed Mobility   Rolling R 4  Minimal assistance   Additional items Assist x 1; Increased time required;Verbal cues;LE management   Rolling L 4  Minimal assistance   Additional items Assist x 1; Increased time required;Verbal cues; Other   Supine to Sit 4  Minimal assistance   Additional items Assist x 1; Increased time required;Verbal cues;LE management   Sit to Supine 5  Supervision   Additional items Increased time required;Verbal cues;LE management   Transfers   Sit to Stand 4  Minimal assistance   Additional items Assist x 1;Increased time required;Verbal cues   Stand to Sit 4  Minimal assistance   Additional items Assist x 1; Increased time required;Verbal cues   Functional Mobility   Functional Mobility 4  Minimal assistance   Additional Comments x1   Additional items Rolling walker   Balance   Static Sitting Good   Dynamic Sitting Fair +   Static Standing Fair   Dynamic Standing Fair -   Activity Tolerance   Activity Tolerance Patient limited by fatigue   Medical Staff Made Aware nsg   RUE Assessment   RUE Assessment WFL   RUE Strength   RUE Overall Strength Within Functional Limits - able to perform ADL tasks with strength  (4+/5 throughout)   LUE Assessment   LUE Assessment WFL   LUE Strength   LUE Overall Strength Within Functional Limits - able to perform ADL tasks with strength  (4/5 throughout)   Hand Function   Gross Motor Coordination Functional   Fine Motor Coordination Functional   Sensation   Light Touch No apparent deficits   Proprioception   Proprioception No apparent deficits   Vision - Complex Assessment   Visual Fields   (able to scan visual fields)   Perception   Inattention/Neglect Appears intact   Cognition   Overall Cognitive Status Impaired   Arousal/Participation Alert   Attention Attends with cues to redirect   Orientation Level Oriented to person   Memory Decreased short term memory;Decreased recall of recent events;Decreased recall of precautions   Following Commands Follows one step commands without difficulty   Comments dtr states hx decreased cognition with medication changes   Assessment   Limitation Decreased ADL status; Decreased UE strength;Decreased Safe judgement during ADL;Decreased cognition;Decreased endurance;Decreased high-level ADLs   Prognosis Fair   Assessment Pt is a 82y/o female admitted to the hospital from [facility] 2* symptoms nausea, vomitting, L hip pain  Pt noted with L ureteral stone, EULOGIO, elevated tropoinis, and ureterolithiassis   PTA family(i e dtr-via [employer] phone,  #[phone])WRIYVV pt had assistance with her ADLs; states independence with transfers/ambulation--with RW; neg falls, neg home alone, HHA(9am-3pm/daily)  During initial eval, pt demonstrated slight deficits with her functional balance, functional mobility, ADL status, activity tolerance, transfer safety, and cognition(i e memory, orientation)  Dtr states pt being close to her functional baseline, w/o concerns about pt returning home  Pt would benefit from continued P T  to improve her overall endurance, balance, and mobility  Acute OT tx not indicated at this time 2* pt's limited deviation from her functional baseline      Goals   Patient Goals "to get some rest "   Plan   OT Frequency Eval only   Recommendation   OT Discharge Recommendation 24 hour supervision/assist   Barthel Index   Feeding 10   Bathing 0   Grooming Score 5   Dressing Score 5   Bladder Score 10   Bowels Score 10   Toilet Use Score 5   Transfers (Bed/Chair) Score 10   Mobility (Level Surface) Score 0   Stairs Score 0   Barthel Index Score 55   Nerissa Lowe, OT

## 2019-07-10 NOTE — PROGRESS NOTES
Progress Note - Urology  Halie Hernández 1935, 81 y o  female MRN: [id number]    Unit/Bed#: E4 -01 Encounter: [id number]    * Ureterolithiasis  Assessment & Plan  Distal left ureteral stone seen on initial CT 5 days ago was having back and groin pain, no longer having this pain  Continue directed supportive care- analgesia, antiemetics, IVF, tamsulosin  Continue trial of passage in light of comorbid conditions- patient may have passed stone as now asymptomatic  S/p cardiac catheterization (no lesions requiring urgent PCI, medical management recommended)  S/p MRI lumbar spine with identification of focal disease in distribution of patient's pain symptoms left hip and posterior thigh  Will check US k/b to evaluate hydronephrosis and ureteral jets however as now asymptomatic will cancel ureteroscopy/lithotripsy and establish outpatient followup    Bedside rounds performed with Alejandro Walsh RN  Discussed with Dr Dawna Wallace      Subjective:   HPI:  Patient seen and examined this morning  She reports no further back or abdominal pain  She does still complain of left hip and thigh pain she points to the back of her left thigh  She feels she is voiding okay  She has not had any dysuria hematuria  She has no flank pain or fever  Did not visualize any stone pass  Will check ultrasound to evaluate hydronephrosis and presence of ureteral jets however as she is now asymptomatic will continue supportive care and cancel ureteroscopy that was scheduled for later today  Outpatient follow-up is appropriate  Review of Systems   Constitutional: Negative for activity change, appetite change, chills, fever and unexpected weight change  HENT: Negative  Respiratory: Negative  Negative for shortness of breath  Cardiovascular: Negative  Negative for chest pain  Gastrointestinal: Negative for abdominal pain, constipation, diarrhea, nausea and vomiting  Endocrine: Negative      Genitourinary: Negative for decreased urine volume, difficulty urinating, dysuria, flank pain, frequency, hematuria and urgency  Musculoskeletal: Positive for arthralgias (left hip and posterior thigh with movement)  Negative for back pain and gait problem  Skin: Negative  Allergic/Immunologic: Negative  Neurological: Negative  Hematological: Negative for adenopathy  Does not bruise/bleed easily  Objective:  Nursing Rounds: no longer having back or abdominal pain  Voiding OK  Vitals: Blood pressure 148/78, pulse 88, temperature 98 2 °F (36 8 °C), temperature source Temporal, resp  rate 18, height 5' (1 524 m), weight 72 5 kg (159 lb 13 3 oz), SpO2 96 %, not currently breastfeeding  ,Body mass index is 31 22 kg/m²  Intake/Output Summary (Last 24 hours) at 7/10/2019 1135  Last data filed at 7/10/2019 0700  Gross per 24 hour   Intake 325 ml   Output 2025 ml   Net -1700 ml     Invasive Devices     Peripheral Intravenous Line            Peripheral IV 07/10/19 Left Wrist less than 1 day                Physical Exam   Constitutional: She is oriented to person, place, and time  She appears well-developed and well-nourished  No distress  HENT:   Head: Normocephalic and atraumatic  Cardiovascular: Normal rate, regular rhythm and intact distal pulses  Pulmonary/Chest: Effort normal and breath sounds normal    Abdominal: Soft  Bowel sounds are normal  She exhibits no distension  There is no tenderness  There is no rebound and no guarding  No suprapubic or cva tenderness   Musculoskeletal: She exhibits no edema or deformity  Patient points to posterior left thigh as site of pain, only with movement better laying down   Neurological: She is alert and oriented to person, place, and time  Gait normal    Skin: Skin is warm  Capillary refill takes less than 2 seconds  She is not diaphoretic  Psychiatric: She has a normal mood and affect   Her speech is normal and behavior is normal    Nursing note and vitals reviewed        History:    Past Medical History:   Diagnosis Date    COPD [facility]ch[facility] obstructive [facility] disease) [facility] [address] [facility]     [facility]     Hyperlipidemia     Hyperlipidemia     Hypertension     Kidney stones     Osteoporosis      Past Surgical History:   Procedure Laterality Date    APPENDECTOMY      HAND SURGERY Right     HYSTERECTOMY      age 29   Elsy Pickering INCISION AND DRAINAGE OF WOUND Left 1/5/2019    Procedure: INCISION AND DRAINAGE (I&D) EXTREMITY;  Surgeon: Silvano Rolle MD;  [facility];  Service: General    LITHOTRIPSY      MASS EXCISION      [place] of back wall mass    TONSILLECTOMY      TONSILLECTOMY       Family History   Problem Relation Age of Onset    Diabetes Mother     No Known Problems Father     Throat cancer Daughter     No Known Problems Maternal Grandmother     No Known Problems Maternal Grandfather     No Known Problems Paternal Grandmother     No Known Problems Paternal Grandfather      Social History     Socioeconomic History    Marital status: Single     Spouse name: None    Number of children: None    Years of education: None    Highest education level: None   Occupational History    None   Social Needs    Financial resource strain: None    Food insecurity:     Worry: None     Inability: None    Transportation needs:     Medical: None     Non-medical: None   Tobacco Use    Smoking status: Never Smoker    Smokeless tobacco: Never Used    Tobacco comment: states she quit but family living with her states she smokes   Substance and Sexual Activity    Alcohol use: Never     Frequency: Never     Comment: OCCASIONAL    Drug use: Never    Sexual activity: Yes     Partners: Male   Lifestyle    Physical activity:     Days per week: None     Minutes per session: None    Stress: None   Relationships    Social connections:     Talks on phone: None     Gets together: None     Attends Denominational service: None     Active member of club or organization: None     Attends meetings of clubs or organizations: None     Relationship status: None    Intimate partner violence:     Fear of current or ex partner: None     Emotionally abused: None     Physically abused: None     Forced sexual activity: None   Other Topics Concern    None   Social History Narrative    None       Imaging:  Reviewed reports of CT abdomen pelvis, KUB, MRI lumbar  New order for US k/b to be performed today    Labs:  Recent Labs     07/08/19  0625 07/10/19  0655   WBC 8 44 12 71*       Recent Labs     07/08/19  0625 07/10/19  0655   HGB 13 3 10 8*     Recent Labs     07/08/19  0625 07/10/19  0655   HCT 40 8 32 4*     Recent Labs     07/08/19  0625   CREATININE 1 48*     Urinalysis: trace protein, +glucose   Microscopic 0-1 wbc, otherwise no cells seen    Hema Price PA-C  Date: 7/10/2019 Time: 11:35 AM

## 2019-07-10 NOTE — ASSESSMENT & PLAN NOTE
· Currently appears euvolemic despite an elevated BNP of 3 K  · Creatinine improved  · Resume Lasix 40 mg daily on discharge

## 2019-07-10 NOTE — DISCHARGE SUMMARY
Discharge- Baby Malling 1935, 81 y o  female MRN: [id number]    Unit/Bed#: E4 -01 Encounter: [id number]    Primary Care Provider: Makayla Colon MD   Date and time admitted to hospital: 7/6/2019  2:40 AM        * Ureterolithiasis  Assessment & Plan  · Admitted due to left hydronephrosis due to distal ureteral stone  · Urologic intervention was not done due to development of elevated troponin and MI   · Repeat imaging today showed improved hydronephrosis and no further obstruction  · Patient has no further symptoms  · Outpatient follow-up with Urology in 2 weeks    Type 2 myocardial infarction [facility])  Assessment & Plan  · Status post cardiac catheterization which showed multivessel disease  · Cardiology recommended medical management with aspirin 81 mg, Plavix 75 mg, Lipitor, metoprolol 25 mg b i d  Evelyn Mort · Will prescribe medications on discharge  · Outpatient follow-up with Cardiology in 2 weeks    Chronic bilateral low back pain without sciatica  Assessment & Plan  · Chronic bilateral back pain and hip pain and sciatica follows with PCP and Neurosurgery outpatient  Maintained on Baclofen, hydrocodone and Lyrica  · MRI lumbar spine on 07/06/2019 reveals progressive L4-L5 extrusion, asymmetric to the left, resulting in new redundancy of cauda equina  Severe canal and bilateral lateral recess stenosis  · Patient able to ambulate, sensation intact, no urinary or bowel incontinence  Uses a walker at home  · Consult to Neurosurgery and discussed on phone who recommends outpatient follow up in 3-4 weeks if still having pain  · Will prescribe home PT and VNA    Acute kidney injury superimposed on chronic kidney disease ([facility] [address] )  Assessment & Plan  · Baseline creatinine appears to be 1 1-1 4  · Improved  · Will restart Lasix but continue to hold lisinopril due to the addition of amlodipine and metoprolol to her regimen  · Avoid hypotension    Lab Results   Component Value Date    CREATININE 1 48 (H) 07/08/2019    CREATININE 1 36 (H) 07/07/2019    CREATININE 1 47 (H) 07/06/2019    CREATININE 1 73 (H) 07/05/2019         Toxic encephalopathy  Assessment & Plan  · Secondary to acute illness, medication, sedation specifically Versed and fentanyl  · Resolved  · Mental status at baseline  · Stable for discharge home with 24/7 support by family    PAD (peripheral artery disease) ([address] )  Assessment & Plan  · Continue Trental t i d  Chronic diastolic heart failure (HCC)  Assessment & Plan  · Currently appears euvolemic despite an elevated BNP of 3 K  · Creatinine improved  · Resume Lasix 40 mg daily on discharge      Type 2 diabetes mellitus with hyperglycemia, with long-term current use of insulin [facility])  Assessment & Plan  Lab Results   Component Value Date    HGBA1C 7 7 (H) 06/04/2019       Recent Labs     07/09/19  1918 07/09/19  2109 07/10/19  0745 07/10/19  1227   POCGLU 211* 375* 174* 167*       Blood Sugar Average: Last 72 hrs:  · (P) 206 75   Continue home regimen: tresiba 25 units at bedtime and NovoLog 12 units t i d  With meals    Hypertension  Assessment & Plan  · Hold lisinopril  · Resume furosemide 40 mg daily  · Continue amlodipine and metoprolol on discharge      Discharging Physician / Practitioner: Nayeli Rice MD  PCP: Makayla Colon MD  Admission Date:   Admission Orders (From admission, onward)    Ordered        07/06/19 0303  Inpatient Admission  Once             Discharge Date: 07/10/19    Resolved Problems  Date Reviewed: 7/10/2019    None          Consultations During Hospital Stay:  · Cardiology  · Urology    Procedures Performed:   · Cardiac catheterization    Significant Findings / Test Results:   CORONARY CIRCULATION:  Left main: Normal   LAD: The vessel was large sized and mildly calcified  Angiography showed mild atherosclerosis  Mid LAD: There was a discrete 30 % stenosis  1st diagonal: The vessel was medium sized   There was a discrete 60 % stenosis at the ostium of the vessel segment  In a second lesion, there was a tubular 40 % stenosis in the middle third of the vessel segment  Circumflex: The vessel was large sized and mildly calcified  1st obtuse marginal: The vessel was medium sized  There was a tubular 40 % stenosis in the middle third of the vessel segment  2nd obtuse marginal: The vessel was large sized  There was a tubular 50 % stenosis in the middle third of the vessel segment  RCA: The vessel was medium sized and moderately calcified  Proximal RCA: There was a discrete 50 % stenosis  The lesion was moderately calcified  Mid RCA: There was a tubular 70 % stenosis  The lesion was moderately calcified  Incidental Findings:   · None     Test Results Pending at Discharge (will require follow up): · None     Outpatient Tests Requested:  · None    Complications:  None    Reason for Admission:  Nausea and vomiting    Hospital Course:     Binh Galvez is a 81 y o  female patient who originally presented to the hospital on 7/6/2019 due to nausea and vomiting for 1 day  She has multiple medical problems including diabetes, degenerative disc disease, chronic low back, hypertension, chronic kidney disease stage III with baseline creatinine 1 1-1 4  She was found to have a left hydronephrosis due to a distal left ureteral stone  In addition she was noted to have an elevated troponin  She was seen by both Urology and Cardiology  A cystoscopy was contemplated however due to her elevated troponin and myocardial infarction this was delayed  She underwent a cardiac catheterization which showed diffuse calcific disease  No stent/intervention was done due to high risk and medical management was recommended (double anti-platelet, beta-blocker, statin)  After her catheterization she developed delirium which was felt to be due to either Versed or fentanyl  She was treated supportively and ultimately her delirium resolved  Her nausea and vomiting improved    Follow-up ultrasound showed that she passed the stone and hydronephrosis resolved  She will follow up with Urology in the office in 2 weeks  The patient has great support from her family  She will be going home with 24/7 support from her family  We will provide visiting nurse and home PT OT  Please see above list of diagnoses and related plan for additional information  Condition at Discharge: good     Discharge Day Visit / Exam:     Subjective:  Wants to go home  Walked with her walker  No chest pain  No nausea or vomiting  Vitals: Blood Pressure: 148/78 (07/10/19 0700)  Pulse: 88 (07/10/19 0700)  Temperature: 98 2 °F (36 8 °C) (07/10/19 0700)  Temp Source: Temporal (07/10/19 0700)  Respirations: 18 (07/10/19 0700)  Height: 5' (152 4 cm) (07/06/19 0255)  Weight - Scale: 72 5 kg (159 lb 13 3 oz) (07/10/19 0600)  SpO2: 96 % (07/10/19 0700)  Exam:   Physical Exam   Constitutional: She appears well-developed  No distress  HENT:   Head: Normocephalic and atraumatic  Eyes: No scleral icterus  Neck: No JVD present  Cardiovascular: Normal rate  Exam reveals no gallop and no friction rub  No murmur heard  Pulmonary/Chest: Effort normal  No stridor  No respiratory distress  She has no wheezes  Skin: She is not diaphoretic  Discussion with Family: spoke with daughter via     Discharge instructions/Information to patient and family:   See after visit summary for information provided to patient and family  Provisions for Follow-Up Care:  See after visit summary for information related to follow-up care and any pertinent home health orders  Disposition:     Home with VNA Services (Reminder: Complete face to face encounter)      Planned Readmission: no     Discharge Statement:  I spent >30 minutes discharging the patient  This time was spent on the day of discharge  I had direct contact with the patient on the day of discharge   Greater than 50% of the total time was spent examining patient, answering all patient questions, arranging and discussing plan of care with patient as well as directly providing post-discharge instructions  Additional time then spent on discharge activities  Discharge Medications:  See after visit summary for reconciled discharge medications provided to patient and family        ** Please Note: This note has been constructed using a voice recognition system **

## 2019-07-10 NOTE — ASSESSMENT & PLAN NOTE
· Baseline creatinine appears to be 1 1-1 4  · Improved  · Will restart Lasix but continue to hold lisinopril due to the addition of amlodipine and metoprolol to her regimen  · Avoid hypotension    Lab Results   Component Value Date    CREATININE 1 48 (H) 07/08/2019    CREATININE 1 36 (H) 07/07/2019    CREATININE 1 47 (H) 07/06/2019    CREATININE 1 73 (H) 07/05/2019

## 2019-07-10 NOTE — TREATMENT PLAN
Ultrasound kidney bladder performed this morning is now resulted demonstrating interval improvement of left-sided hydronephrosis, bilateral intrarenal calculi only  Bladder is decompressed, ureters were not visualized  Again patient now asymptomatic, voiding well  No UTI  Renal function at baseline  Combination suggest possible interval passage of stone  Will follow up in the office in 2-3 weeks  Urology will sign off but remain available for any further inpatient needs  Please feel free to call with questions or concerns      Neelam Colin PA-C  07/10/19  12:41 PM

## 2019-07-10 NOTE — ASSESSMENT & PLAN NOTE
Lab Results   Component Value Date    HGBA1C 7 7 (H) 06/04/2019       Recent Labs     07/09/19  1918 07/09/19  2109 07/10/19  0745 07/10/19  1227   POCGLU 211* 375* 174* 167*       Blood Sugar Average: Last 72 hrs:  · (P) 206 75   Continue home regimen: tresiba 25 units at bedtime and NovoLog 12 units t i d   With meals

## 2019-07-10 NOTE — ASSESSMENT & PLAN NOTE
· Hold lisinopril  · Resume furosemide 40 mg daily  · Continue amlodipine and metoprolol on discharge

## 2019-07-10 NOTE — PLAN OF CARE
Problem: Potential for Falls  Goal: Patient will remain free of falls  Description  INTERVENTIONS:  - Assess patient frequently for physical needs  -  Identify cognitive and physical deficits and behaviors that affect risk of falls    -  [place] fall precautions as indicated by assessment   - Educate patient/family on patient safety including physical limitations  - Instruct patient to call for assistance with activity based on assessment  - Modify environment to reduce risk of injury  - Consider OT/PT consult to assist with strengthening/mobility  Outcome: Progressing     Problem: Prexisting or High Potential for Compromised Skin Integrity  Goal: Skin integrity is maintained or improved  Description  INTERVENTIONS:  - Identify patients at risk for skin breakdown  - Assess and monitor skin integrity  - Assess and monitor nutrition and hydration status  - Monitor labs (i e  albumin)  - Assess for incontinence   - Turn and reposition patient  - Assist with mobility/ambulation  - Relieve pressure over bony prominences  - Avoid friction and shearing  - Provide appropriate hygiene as needed including keeping skin clean and dry  - Evaluate need for skin moisturizer/barrier cream  - Collaborate with interdisciplinary team (i e  Nutrition, Rehabilitation, etc )   - Patient/family teaching  Outcome: Progressing     Problem: PAIN - ADULT  Goal: Verbalizes/displays adequate comfort level or baseline comfort level  Description  Interventions:  - Encourage patient to monitor pain and request assistance  - Assess pain using appropriate pain scale  - Administer analgesics based on type and severity of pain and evaluate response  - Implement non-pharmacological measures as appropriate and evaluate response  - Consider cultural and social influences on pain and pain management  - Notify physician/advanced practitioner if interventions unsuccessful or patient reports new pain  Outcome: Progressing     Problem: INFECTION - ADULT  Goal: Absence or prevention of progression during hospitalization  Description  INTERVENTIONS:  - Assess and monitor for signs and symptoms of infection  - Monitor lab/diagnostic results  - Monitor all insertion sites, i e  indwelling lines, tubes, and drains  - Monitor endotracheal (as able) and nasal secretions for changes in amount and color  - [place] appropriate cooling/warming therapies per order  - Administer medications as ordered  - Instruct and encourage patient and family to use good hand hygiene technique  - Identify and instruct in appropriate isolation precautions for identified infection/condition  Outcome: Progressing     Problem: SAFETY ADULT  Goal: Maintain or return to baseline ADL function  Description  INTERVENTIONS:  -  Assess patient's ability to carry out ADLs; assess patient's baseline for ADL function and identify physical deficits which impact ability to perform ADLs (bathing, care of mouth/teeth, toileting, grooming, dressing, etc )  - Assess/evaluate cause of self-care deficits   - Assess range of motion  - Assess patient's mobility; develop plan if impaired  - Assess patient's need for assistive devices and provide as appropriate  - Encourage maximum independence but intervene and supervise when necessary  ¯ Involve family in performance of ADLs  ¯ Assess for home care needs following discharge   ¯ Request OT consult to assist with ADL evaluation and planning for discharge  ¯ Provide patient education as appropriate  Outcome: Progressing  Goal: Maintain or return mobility status to optimal level  Description  INTERVENTIONS:  - Assess patient's baseline mobility status (ambulation, transfers, stairs, etc )    - Identify cognitive and physical deficits and behaviors that affect mobility  - Identify mobility aids required to assist with transfers and/or ambulation (gait belt, sit-to-stand, lift, walker, cane, etc )  - [place] fall precautions as indicated by assessment  - Record patient progress and toleration of activity level on Mobility SBAR; progress patient to next Phase/Stage  - Instruct patient to call for assistance with activity based on assessment  - Request Rehabilitation consult to assist with strengthening/weightbearing, etc   Outcome: Progressing     Problem: DISCHARGE PLANNING  Goal: Discharge to home or other facility with appropriate resources  Description  INTERVENTIONS:  - Identify barriers to discharge w/patient and caregiver  - Arrange for needed discharge resources and transportation as appropriate  - Identify discharge learning needs (meds, wound care, etc )  - Arrange for interpretive services to assist at discharge as needed  - Refer to Case Management Department for coordinating discharge planning if the patient needs post-hospital services based on physician/advanced practitioner order or complex needs related to functional status, cognitive ability, or social support system  Outcome: Progressing     Problem: Knowledge Deficit  Goal: Patient/family/caregiver demonstrates understanding of disease process, treatment plan, medications, and discharge instructions  Description  Complete learning assessment and assess knowledge base  Interventions:  - Provide teaching at level of understanding  - Provide teaching via preferred learning methods  Outcome: Progressing     Problem: CARDIOVASCULAR - ADULT  Goal: Maintains optimal cardiac output and hemodynamic stability  Description  INTERVENTIONS:  - Monitor I/O, vital signs and rhythm  - Monitor for S/S and trends of decreased cardiac output i e  bleeding, hypotension  - Administer and titrate ordered vasoactive medications to optimize hemodynamic stability  - Assess quality of pulses, skin color and temperature  - Assess for signs of decreased coronary artery perfusion - ex   Angina  - Instruct patient to report change in severity of symptoms  Outcome: Progressing  Goal: Absence of cardiac dysrhythmias or at baseline rhythm  Description  INTERVENTIONS:  - Continuous cardiac monitoring, monitor vital signs, obtain 12 lead EKG if indicated  - Administer antiarrhythmic and heart rate control medications as ordered  - Monitor electrolytes and administer replacement therapy as ordered  Outcome: Progressing     Problem: GASTROINTESTINAL - ADULT  Goal: Minimal or absence of nausea and/or vomiting  Description  INTERVENTIONS:  - Maintain NPO status until nausea and vomiting are resolved  - Administer ordered antiemetic medications as needed  - Provide nonpharmacologic comfort measures as appropriate  - Advance diet as tolerated, if ordered  - Nutrition services referral to assist patient with adequate nutrition and appropriate food choices   Outcome: Progressing  Goal: Maintains or returns to baseline bowel function  Description  INTERVENTIONS:  - Encourage oral fluids to ensure adequate hydration  - Administer ordered medications as needed  - Encourage mobilization and activity     Outcome: Progressing  Goal: Maintains adequate nutritional intake  Description  INTERVENTIONS:  - Identify factors contributing to decreased intake, treat as appropriate  - Monitor I&O, WT and lab values  - Obtain nutrition services referral as needed   Outcome: Progressing     Problem: HEMATOLOGIC - ADULT  Goal: Maintains hematologic stability  Description  INTERVENTIONS  - Monitor labs  Monitor hemodynamics for signs of sepsis, administer bolus and reorder labs per protocol       Outcome: Progressing     Problem: MUSCULOSKELETAL - ADULT  Goal: Maintain or return mobility to safest level of function  Description  INTERVENTIONS:  - Assess patient's ability to carry out ADLs; assess patient's baseline for ADL function and identify physical deficits which impact ability to perform ADLs (bathing, care of mouth/teeth, toileting, grooming, dressing, etc )  - Assess/evaluate cause of self-care deficits   - Assess range of motion  - Assess patient's mobility; develop plan if impaired  - Assess patient's need for assistive devices and provide as appropriate  - Encourage maximum independence but intervene and supervise when necessary  - Involve family in performance of ADLs  - Assess for home care needs following discharge   - Request OT consult to assist with ADL evaluation and planning for discharge  - Provide patient education as appropriate  Outcome: Progressing  Goal: Maintain proper alignment of affected body part  Description  INTERVENTIONS:  - Support, maintain and protect limb and body alignment  - Provide pt/fam with appropriate education  Outcome: Progressing     Problem: METABOLIC, FLUID AND ELECTROLYTES - ADULT  Goal: Glucose maintained within target range  Description  INTERVENTIONS:  - Monitor Blood Glucose as ordered  - Assess for signs and symptoms of hyperglycemia and hypoglycemia  - Administer ordered medications to maintain glucose within target range  - Assess nutritional intake and initiate nutrition service referral as needed  Outcome: Progressing     Problem: GENITOURINARY - ADULT  Goal: Maintains or returns to baseline urinary function  Description  INTERVENTIONS:  - Assess urinary function  - Encourage oral fluids to ensure adequate hydration  - Administer IV fluids as ordered to ensure adequate hydration  - Administer ordered medications as needed  - Offer frequent toileting  - Follow urinary retention protocol if ordered  Outcome: Progressing  Goal: Absence of urinary retention  Description  INTERVENTIONS:  - Assess patient's ability to void and empty bladder  - Monitor I/O  - Bladder scan as needed  - Discuss with physician/AP medications to alleviate retention as needed  - Discuss catheterization for long term situations as appropriate   Outcome: Progressing  Goal: Urinary catheter remains patent  Description  INTERVENTIONS:  - Assess patency of urinary catheter  - If patient has a chronic mejia, consider changing catheter if non-functioning  - Follow guidelines for intermittent irrigation of non-functioning urinary catheter  Outcome: Progressing     Problem: COPING  Goal: Pt/Family able to verbalize concerns and demonstrate effective coping strategies  Description  INTERVENTIONS:  - Assist patient/family to identify coping skills, available support systems and cultural and spiritual values  - Provide emotional support, including active listening and acknowledgement of concerns of patient and caregivers  - Reduce environmental stimuli, as able  - Provide patient education  - Assess for spiritual pain/suffering and initiate spiritual care, including notification of Pastoral Care or jessy based community as needed  - Assess effectiveness of coping strategies  Outcome: Progressing  Goal: Will report anxiety at manageable levels  Description  INTERVENTIONS:  - Administer medication as ordered  - Teach and encourage coping skills  - Provide emotional support  - Assess patient/family for anxiety and ability to cope  Outcome: Progressing     Problem: CONFUSION/THOUGHT DISTURBANCE  Goal: Thought disturbances (confusion, delirium, depression, dementia or psychosis) are managed to maintain or return to baseline mental status and functional level  Description  INTERVENTIONS:  - Assess for possible contributors to  thought disturbance, including but not limited to medications, infection, impaired vision or hearing, underlying metabolic abnormalities, dehydration, respiratory compromise,  psychiatric diagnoses and notify attending PHYSICAN/AP  - Monitor and intervene to maintain adequate nutrition, hydration, elimination, sleep and activity  - Decrease environmental stimuli, including noise as appropriate  - Provide frequent contacts to provide refocusing, direction and reassurance as needed  Approach patient calmly with eye contact and at their level    - [place] high risk fall precautions, aspiration precautions and other safety measures, as indicated  - If delirium suspected, notify physician/AP of change in condition and request immediate in-person evaluation  - Pursue consults as appropriate including Geriatric (campus dependent), OT for cognitive evaluation/activity planning, psychiatric, pastoral care, etc   Outcome: Progressing     Problem: BEHAVIOR  Goal: Pt/Family maintain appropriate behavior and adhere to behavioral management agreement, if implemented  Description  INTERVENTIONS:  - Assess the family dynamic   - Encourage verbalization of thoughts and concerns in a socially appropriate manner  - Assess patient/family's coping skills and non-compliant behavior (including use of illegal substances)  - Utilize positive, consistent limit setting strategies supporting safety of patient, staff and others  - Initiate consult with Case Management, Spiritual Care or other ancillary services as appropriate  - If a patient's/visitor's behavior jeopardizes the safety of the patient, staff, or others, refer to organization procedure     - Notify Security of behavior or suspected illegal substances which indicate the need for search of the patient and/or belongings  - Encourage participation in the decision making process about a behavioral management agreement; implement if patient meets criteria  Outcome: Progressing

## 2019-07-11 ENCOUNTER — EPISODE CHANGES (OUTPATIENT)
Dept: CASE MANAGEMENT | Facility: HOSPITAL | Age: 84
End: 2019-07-11

## 2019-07-11 NOTE — SOCIAL WORK
Rec a message that pt is open with [facility] for RN, PT and OT  Fax discharge instruction sheets to them today

## 2019-07-12 ENCOUNTER — EPISODE CHANGES (OUTPATIENT)
Dept: CASE MANAGEMENT | Facility: OTHER | Age: 84
End: 2019-07-12

## 2019-07-12 ENCOUNTER — TRANSITIONAL CARE MANAGEMENT (OUTPATIENT)
Dept: FAMILY MEDICINE CLINIC | Facility: CLINIC | Age: 84
End: 2019-07-12

## 2019-07-15 ENCOUNTER — TELEPHONE (OUTPATIENT)
Dept: CASE MANAGEMENT | Facility: HOSPITAL | Age: 84
End: 2019-07-15

## 2019-07-17 ENCOUNTER — TRANSCRIBE ORDERS (OUTPATIENT)
Dept: ADMINISTRATIVE | Facility: HOSPITAL | Age: 84
End: 2019-07-17

## 2019-07-17 ENCOUNTER — LAB (OUTPATIENT)
Dept: LAB | Facility: HOSPITAL | Age: 84
End: 2019-07-17
Payer: MEDICARE

## 2019-07-17 ENCOUNTER — OFFICE VISIT (OUTPATIENT)
Dept: FAMILY MEDICINE CLINIC | Facility: CLINIC | Age: 84
End: 2019-07-17
Payer: MEDICARE

## 2019-07-17 VITALS
WEIGHT: 165 LBS | DIASTOLIC BLOOD PRESSURE: 60 MMHG | SYSTOLIC BLOOD PRESSURE: 120 MMHG | BODY MASS INDEX: 28.17 KG/M2 | HEIGHT: 64 IN | TEMPERATURE: 98 F | HEART RATE: 77 BPM | OXYGEN SATURATION: 95 % | RESPIRATION RATE: 16 BRPM

## 2019-07-17 DIAGNOSIS — N17.9 ACUTE KIDNEY INJURY SUPERIMPOSED ON CHRONIC KIDNEY DISEASE (HCC): ICD-10-CM

## 2019-07-17 DIAGNOSIS — D64.9 ANEMIA, UNSPECIFIED TYPE: Primary | ICD-10-CM

## 2019-07-17 DIAGNOSIS — N18.30 STAGE 3 CHRONIC KIDNEY DISEASE (HCC): ICD-10-CM

## 2019-07-17 DIAGNOSIS — Z79.4 TYPE 2 DIABETES MELLITUS WITH HYPERGLYCEMIA, WITH LONG-TERM CURRENT USE OF INSULIN (HCC): Primary | ICD-10-CM

## 2019-07-17 DIAGNOSIS — E11.65 TYPE 2 DIABETES MELLITUS WITH HYPERGLYCEMIA, WITH LONG-TERM CURRENT USE OF INSULIN (HCC): Primary | ICD-10-CM

## 2019-07-17 DIAGNOSIS — G89.29 CHRONIC BILATERAL LOW BACK PAIN WITH BILATERAL SCIATICA: ICD-10-CM

## 2019-07-17 DIAGNOSIS — D64.9 ANEMIA, UNSPECIFIED TYPE: ICD-10-CM

## 2019-07-17 DIAGNOSIS — N18.9 ACUTE KIDNEY INJURY SUPERIMPOSED ON CHRONIC KIDNEY DISEASE (HCC): ICD-10-CM

## 2019-07-17 DIAGNOSIS — M54.41 CHRONIC BILATERAL LOW BACK PAIN WITH BILATERAL SCIATICA: ICD-10-CM

## 2019-07-17 DIAGNOSIS — N20.1 URETEROLITHIASIS: ICD-10-CM

## 2019-07-17 DIAGNOSIS — M54.42 CHRONIC BILATERAL LOW BACK PAIN WITH BILATERAL SCIATICA: ICD-10-CM

## 2019-07-17 PROBLEM — I20.9 ANGINA PECTORIS (HCC): Status: RESOLVED | Noted: 2019-03-08 | Resolved: 2019-07-17

## 2019-07-17 PROBLEM — G93.41 ACUTE METABOLIC ENCEPHALOPATHY: Status: RESOLVED | Noted: 2017-12-20 | Resolved: 2019-07-17

## 2019-07-17 PROBLEM — L02.414 ABSCESS OF ARM, LEFT: Status: RESOLVED | Noted: 2018-12-30 | Resolved: 2019-07-17

## 2019-07-17 LAB
ALBUMIN SERPL BCP-MCNC: 4 G/DL (ref 3–5.2)
ALP SERPL-CCNC: 100 U/L (ref 43–122)
ALT SERPL W P-5'-P-CCNC: 22 U/L (ref 9–52)
ANION GAP SERPL CALCULATED.3IONS-SCNC: 9 MMOL/L (ref 5–14)
AST SERPL W P-5'-P-CCNC: 22 U/L (ref 14–36)
BASOPHILS # BLD AUTO: 0.1 THOUSANDS/ΜL (ref 0–0.1)
BASOPHILS NFR BLD AUTO: 1 % (ref 0–1)
BILIRUB SERPL-MCNC: 0.4 MG/DL
BUN SERPL-MCNC: 19 MG/DL (ref 5–25)
CALCIUM SERPL-MCNC: 9.2 MG/DL (ref 8.4–10.2)
CHLORIDE SERPL-SCNC: 102 MMOL/L (ref 97–108)
CO2 SERPL-SCNC: 30 MMOL/L (ref 22–30)
CREAT SERPL-MCNC: 1.43 MG/DL (ref 0.6–1.2)
EOSINOPHIL # BLD AUTO: 0.3 THOUSAND/ΜL (ref 0–0.4)
EOSINOPHIL NFR BLD AUTO: 3 % (ref 0–6)
ERYTHROCYTE [DISTWIDTH] IN BLOOD BY AUTOMATED COUNT: 15.2 %
FERRITIN SERPL-MCNC: 26 NG/ML (ref 8–388)
GFR SERPL CREATININE-BSD FRML MDRD: 34 ML/MIN/1.73SQ M
GLUCOSE SERPL-MCNC: 247 MG/DL (ref 70–99)
HCT VFR BLD AUTO: 38.9 % (ref 36–46)
HGB BLD-MCNC: 13 G/DL (ref 12–16)
IRON SATN MFR SERPL: 20 %
IRON SERPL-MCNC: 65 UG/DL (ref 50–170)
LYMPHOCYTES # BLD AUTO: 1.7 THOUSANDS/ΜL (ref 0.5–4)
LYMPHOCYTES NFR BLD AUTO: 19 % (ref 25–45)
MCH RBC QN AUTO: 30 PG (ref 26–34)
MCHC RBC AUTO-ENTMCNC: 33.4 G/DL (ref 31–36)
MCV RBC AUTO: 90 FL (ref 80–100)
MONOCYTES # BLD AUTO: 0.5 THOUSAND/ΜL (ref 0.2–0.9)
MONOCYTES NFR BLD AUTO: 5 % (ref 1–10)
NEUTROPHILS # BLD AUTO: 6.6 THOUSANDS/ΜL (ref 1.8–7.8)
NEUTS SEG NFR BLD AUTO: 73 % (ref 45–65)
PLATELET # BLD AUTO: 291 THOUSANDS/UL (ref 150–450)
PMV BLD AUTO: 9.9 FL (ref 8.9–12.7)
POTASSIUM SERPL-SCNC: 4.7 MMOL/L (ref 3.6–5)
PROT SERPL-MCNC: 6.8 G/DL (ref 5.9–8.4)
RBC # BLD AUTO: 4.33 MILLION/UL (ref 4–5.2)
SODIUM SERPL-SCNC: 141 MMOL/L (ref 137–147)
TIBC SERPL-MCNC: 333 UG/DL (ref 250–450)
WBC # BLD AUTO: 9.2 THOUSAND/UL (ref 4.5–11)

## 2019-07-17 PROCEDURE — 85025 COMPLETE CBC W/AUTO DIFF WBC: CPT

## 2019-07-17 PROCEDURE — 80053 COMPREHEN METABOLIC PANEL: CPT

## 2019-07-17 PROCEDURE — 36415 COLL VENOUS BLD VENIPUNCTURE: CPT

## 2019-07-17 PROCEDURE — 99496 TRANSJ CARE MGMT HIGH F2F 7D: CPT | Performed by: FAMILY MEDICINE

## 2019-07-17 PROCEDURE — 83540 ASSAY OF IRON: CPT

## 2019-07-17 PROCEDURE — 82728 ASSAY OF FERRITIN: CPT

## 2019-07-17 PROCEDURE — 83550 IRON BINDING TEST: CPT

## 2019-07-17 RX ORDER — BACLOFEN 10 MG/1
TABLET ORAL
Refills: 5 | COMMUNITY
Start: 2019-07-10 | End: 2019-08-13 | Stop reason: ALTCHOICE

## 2019-07-17 RX ORDER — LANCETS 28 GAUGE
EACH MISCELLANEOUS
Qty: 100 EACH | Refills: 5 | Status: SHIPPED | OUTPATIENT
Start: 2019-07-17 | End: 2019-12-09 | Stop reason: SDUPTHER

## 2019-07-17 NOTE — PROGRESS NOTES
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 and recommendation was only aspirin Plavix, Lipitor, metoprolol  Patient will follow up with cardiologist as of patient in two weeks  She also also was seen for chronic low back pain with sciatica and was recommended to follow up with surgery worry saw patient the past and did not recommend any further treatments beside conservative management  The following portions of the patient's history were reviewed and updated as appropriate: allergies, current medications, past family history, past medical history, past social history, past surgical history and problem list     Review of Systems   Constitutional: Negative for diaphoresis, fatigue, fever and unexpected weight change  Respiratory: Negative for cough, chest tightness, shortness of breath and wheezing  Cardiovascular: Negative for chest pain, palpitations and leg swelling  Gastrointestinal: Negative for abdominal pain, blood in stool, nausea and vomiting  Neurological: Negative for dizziness, syncope, light-headedness and headaches  Hematological: Does not bruise/bleed easily  Psychiatric/Behavioral: Negative for behavioral problems, self-injury and sleep disturbance  The patient is not nervous/anxious  Objective:      /60 (BP Location: Left arm, Patient Position: Sitting, Cuff Size: Standard)   Pulse 77   Temp 98 °F (36 7 °C) (Oral)   Resp 16   Ht 5' 4" (1 626 m)   Wt 74 8 kg (165 lb)   LMP 01/01/1990 (Within Years)   SpO2 95%   Breastfeeding?  No   BMI 28 32 kg/m² Physical Exam   HENT:   Nose: Nose normal    Mouth/Throat: Oropharynx is clear and moist  No oropharyngeal exudate  Eyes: Pupils are equal, round, and reactive to light  EOM are normal  Right eye exhibits no discharge  Left eye exhibits no discharge  No scleral icterus  Cardiovascular: Normal rate, regular rhythm, normal heart sounds and intact distal pulses  Exam reveals no friction rub  No murmur heard  Pulmonary/Chest: Effort normal  No respiratory distress  She has no wheezes  She has no rales  She exhibits no tenderness  Musculoskeletal: Normal range of motion  Neurological: She is alert  Skin: Skin is warm and dry  No rash noted  No erythema  Psychiatric: She has a normal mood and affect  Her behavior is normal    Nursing note and vitals reviewed

## 2019-07-17 NOTE — PROGRESS NOTES
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, we are going to repeat labs in establish stability  Explained to patient about avoidance nephrotoxin and other measures to prevent kidney damage  Anemia  Likely related from lost due to gastritis  To check iron levels and decide treatment once a weget the anemia classified       Chronic bilateral low back pain with bilateral sciatica  This the main concern during this visit, patient control pain is no optimized here because her chronic condition prevent her to be on NSAIDs or because will believe the level of her pain that she is complaining  Having trying the past multiple options in the goal is to have the patient to have quality of life and sleep better  So Percocet is going to be given twice a day and be will be manage by her caregiver  Ureterolithiasis  Seems this is not the problem this time and the hospital diagnosis was partially resolved  Patient will follow up with urologist Dr Guadalupe Cabrales  TCM Call (since 6/16/2019)     Date and time call was made  7/12/2019 10:20 AM    Hospital care reviewed  Records reviewed    Patient was hospitialized at  [facility] - CLOSED    Date of Admission  07/06/19    Date of discharge  07/10/19    Diagnosis  Nephrolithiasis    Disposition  Home    Were the patients medications reviewed and updated  No    Current Symptoms  None      TCM Call (since 6/16/2019)     Post hospital issues  None    Should patient be enrolled in anticoag monitoring? No    Scheduled for follow up?   Yes    Did you obtain your prescribed medications  Yes    Do you need help managing your prescriptions or medications  No    Is transportation to your appointment needed  No    I have advised the patient to call PCP with any new or worsening symptoms  Laurie Tejeda Practice Administrator         Diagnoses and all orders for this visit:    Anemia, unspecified type  -     CBC and differential; Future  -     TIBC; Future  -     Iron; Future  -     Iron Saturation %; Future  -     Ferritin; Future    Chronic bilateral low back pain with bilateral sciatica    Stage 3 chronic kidney disease ([facility] [address] )  -     Comprehensive metabolic panel; Future    Acute kidney injury superimposed on chronic kidney disease ([facility] [address] )    Ureterolithiasis    Other orders  -     Cancel: CBC and differential; Future          Subjective:      Patient ID: Oswaldo Oliver is a 81 y o  female  Transitional Care Management  Patient was admitted on July 6, 2018 for ureterolithiasis  and left  hydronephrosis  She also was found having a myocardial infarction and had a 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 and recommendation was conservative management withaspirin Plavix, Lipitor and metoprolol  Patient will follow up with cardiologist as of patient in two weeks  She also also was seen for chronic low back pain with sciatica and was recommended to follow up with surgery worry saw patient the past and did not recommend any further treatments beside conservative management  Main complaint today is  lower back and extremities pain and weakness  During the night the pain is very severe that she needs to take an extra pill of to sleep  The following portions of the patient's history were reviewed and updated as appropriate: allergies, current medications, past family history, past medical history, past social history, past surgical history and problem list     Review of Systems   Constitutional: Positive for fatigue  Negative for diaphoresis, fever and unexpected weight change  Respiratory: Negative for cough, chest tightness, shortness of breath and wheezing  Cardiovascular: Negative for chest pain, palpitations and leg swelling  Cardiac catheterization reported multivessel disease  Recommendation from cardiologist was conservative management    She has a follow-up appointment with cardiologist on August 27th 2019   Gastrointestinal: Negative for abdominal pain, blood in stool, nausea and vomiting  Genitourinary:        He has sodium low of 133, and decrease creatinine to 1 43  Musculoskeletal: Positive for back pain (Patient is supposed to have an appointment with neuro surgery, she got call from their office and she is planning to call back to set up appointment ), gait problem, joint swelling and myalgias  Skin: Positive for pallor  Neurological: Positive for light-headedness  Negative for dizziness, syncope and headaches  Hematological: Does not bruise/bleed easily  Found anemic with hemoglobin of 10 in the emergency room   Psychiatric/Behavioral: Negative for behavioral problems, self-injury and sleep disturbance  The patient is not nervous/anxious  Objective:      /60 (BP Location: Left arm, Patient Position: Sitting, Cuff Size: Standard)   Pulse 77   Temp 98 °F (36 7 °C) (Oral)   Resp 16   Ht 5' 4" (1 626 m)   Wt 74 8 kg (165 lb)   LMP 01/01/1990 (Within Years)   SpO2 95%   Breastfeeding? No   BMI 28 32 kg/m²          Physical Exam   Constitutional: She is oriented to person, place, and time  HENT:   Nose: Nose normal    Mouth/Throat: Oropharynx is clear and moist  No oropharyngeal exudate  Eyes: Pupils are equal, round, and reactive to light  EOM are normal  Right eye exhibits no discharge  Left eye exhibits no discharge  No scleral icterus  Cardiovascular: Normal rate, regular rhythm, normal heart sounds and intact distal pulses  Exam reveals no friction rub  No murmur heard  Pulmonary/Chest: Effort normal  No respiratory distress  She has no wheezes  She has no rales  She exhibits no tenderness  Musculoskeletal: She exhibits tenderness ( weakness of both lower extremities)  She exhibits no edema or deformity  Neurological: She is oriented to person, place, and time  A sensory deficit (Both feet) is present  Skin: Skin is warm and dry  No rash noted  No erythema  Psychiatric: She has a normal mood and affect  Her behavior is normal    Nursing note and vitals reviewed

## 2019-07-17 NOTE — ASSESSMENT & PLAN NOTE
Likely related from lost due to gastritis  To check iron levels and decide treatment once a weget the anemia classified  Marlene Hannon

## 2019-07-17 NOTE — ASSESSMENT & PLAN NOTE
Seems this is not the problem this time and the hospital diagnosis was partially resolved  Patient will follow up with urologist Dr Alan Flores

## 2019-07-17 NOTE — ASSESSMENT & PLAN NOTE
This the main concern during this visit, patient control pain is no optimized here because her chronic condition prevent her to be on NSAIDs or because will believe the level of her pain that she is complaining  Having trying the past multiple options in the goal is to have the patient to have quality of life and sleep better  So Percocet is going to be given twice a day and be will be manage by her caregiver

## 2019-07-17 NOTE — ASSESSMENT & PLAN NOTE
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

## 2019-07-22 ENCOUNTER — TELEPHONE (OUTPATIENT)
Dept: FAMILY MEDICINE CLINIC | Facility: CLINIC | Age: 84
End: 2019-07-22

## 2019-07-22 DIAGNOSIS — M54.41 CHRONIC BILATERAL LOW BACK PAIN WITH BILATERAL SCIATICA: Primary | ICD-10-CM

## 2019-07-22 DIAGNOSIS — M54.42 CHRONIC BILATERAL LOW BACK PAIN WITH BILATERAL SCIATICA: Primary | ICD-10-CM

## 2019-07-22 DIAGNOSIS — G89.29 CHRONIC BILATERAL LOW BACK PAIN WITH BILATERAL SCIATICA: Primary | ICD-10-CM

## 2019-07-22 RX ORDER — OXYCODONE HCL 10 MG/1
10 TABLET, FILM COATED, EXTENDED RELEASE ORAL EVERY 12 HOURS SCHEDULED
Qty: 60 TABLET | Refills: 0 | Status: SHIPPED | OUTPATIENT
Start: 2019-07-22 | End: 2019-08-22 | Stop reason: SDUPTHER

## 2019-07-22 NOTE — PROGRESS NOTES
Patient was warned of the risks of taking narcotic medications, patient is aware of drowsiness, overdose, addiction and confusion  The patient was instructed not to drive or operate heavy machinery while taking the medication  The patient does not display any drug-seeking or addictive behavior today  The patient should continue to stretch and exercise to help alleviate any pain that does occur  The patient will follow up with pain management and with me for the primary care concern  The patient has tried and failed NSAIDs and physical therapy  The patient is taking a concurrent muscle relaxer and I discussed the risk of taking this medication again  A plan to decrease the dose and stop opioid and muscle relaxer was discussed with the patient  No medical appropriate AP any medication at this time  The patient has a condition that the center for Disease Control prevention guidelines considers high risk and the risk of opioid use were discussed with the patient  The patient was educated on using naloxone in the a m  [place] PDMP was queried for the patient  The Mental health condition, history of stents, and the level of risk of opioid abuse, overdose and other risk a dose of medications requested  We will continue current medication and dosing at this time  Discontinue or tapering dose is not medically indicated at this time as the expected benefits of both pain and function out weigh the risk to the patient  Patient provided sample today for drugs screening  Medication adjustment depend on results  Moira Mcdonough

## 2019-07-22 NOTE — TELEPHONE ENCOUNTER
Patient call stating that she was supposed to receive a medication for pain and it was never sent to the Pharmacy  Patient requesting pain medication

## 2019-07-31 ENCOUNTER — APPOINTMENT (EMERGENCY)
Dept: CT IMAGING | Facility: HOSPITAL | Age: 84
End: 2019-07-31
Payer: MEDICARE

## 2019-07-31 ENCOUNTER — HOSPITAL ENCOUNTER (EMERGENCY)
Facility: HOSPITAL | Age: 84
Discharge: HOME | End: 2019-07-31
Attending: EMERGENCY MEDICINE | Admitting: EMERGENCY MEDICINE
Payer: MEDICARE

## 2019-07-31 ENCOUNTER — APPOINTMENT (EMERGENCY)
Dept: RADIOLOGY | Facility: HOSPITAL | Age: 84
End: 2019-07-31
Payer: MEDICARE

## 2019-07-31 VITALS
OXYGEN SATURATION: 99 % | BODY MASS INDEX: 28.15 KG/M2 | DIASTOLIC BLOOD PRESSURE: 68 MMHG | HEART RATE: 57 BPM | TEMPERATURE: 98.5 F | RESPIRATION RATE: 20 BRPM | WEIGHT: 164.9 LBS | SYSTOLIC BLOOD PRESSURE: 117 MMHG | HEIGHT: 64 IN

## 2019-07-31 DIAGNOSIS — N20.0 NEPHROLITHIASIS: Primary | ICD-10-CM

## 2019-07-31 DIAGNOSIS — M54.30 SCIATICA: ICD-10-CM

## 2019-07-31 LAB
ALBUMIN SERPL BCP-MCNC: 3.8 G/DL (ref 3–5.2)
ALP SERPL-CCNC: 107 U/L (ref 43–122)
ALT SERPL W P-5'-P-CCNC: 14 U/L (ref 9–52)
ANION GAP SERPL CALCULATED.3IONS-SCNC: 8 MMOL/L (ref 5–14)
AST SERPL W P-5'-P-CCNC: 20 U/L (ref 14–36)
BACTERIA UR QL AUTO: ABNORMAL /HPF
BASOPHILS # BLD AUTO: 0.1 THOUSANDS/ΜL (ref 0–0.1)
BASOPHILS NFR BLD AUTO: 1 % (ref 0–1)
BILIRUB SERPL-MCNC: 0.5 MG/DL
BILIRUB UR QL STRIP: NEGATIVE
BUN SERPL-MCNC: 13 MG/DL (ref 5–25)
CALCIUM SERPL-MCNC: 9.4 MG/DL (ref 8.4–10.2)
CHLORIDE SERPL-SCNC: 103 MMOL/L (ref 97–108)
CLARITY UR: CLEAR
CO2 SERPL-SCNC: 28 MMOL/L (ref 22–30)
COLOR UR: ABNORMAL
CREAT SERPL-MCNC: 1.33 MG/DL (ref 0.6–1.2)
EOSINOPHIL # BLD AUTO: 0.2 THOUSAND/ΜL (ref 0–0.4)
EOSINOPHIL NFR BLD AUTO: 2 % (ref 0–6)
ERYTHROCYTE [DISTWIDTH] IN BLOOD BY AUTOMATED COUNT: 15 %
GFR SERPL CREATININE-BSD FRML MDRD: 37 ML/MIN/1.73SQ M
GLUCOSE SERPL-MCNC: 182 MG/DL (ref 70–99)
GLUCOSE UR STRIP-MCNC: NEGATIVE MG/DL
HCT VFR BLD AUTO: 37.4 % (ref 36–46)
HGB BLD-MCNC: 12.2 G/DL (ref 12–16)
HGB UR QL STRIP.AUTO: 50
KETONES UR STRIP-MCNC: NEGATIVE MG/DL
LEUKOCYTE ESTERASE UR QL STRIP: 25
LIPASE SERPL-CCNC: 37 U/L (ref 23–300)
LYMPHOCYTES # BLD AUTO: 1.5 THOUSANDS/ΜL (ref 0.5–4)
LYMPHOCYTES NFR BLD AUTO: 20 % (ref 25–45)
MCH RBC QN AUTO: 29.5 PG (ref 26–34)
MCHC RBC AUTO-ENTMCNC: 32.5 G/DL (ref 31–36)
MCV RBC AUTO: 91 FL (ref 80–100)
MONOCYTES # BLD AUTO: 0.8 THOUSAND/ΜL (ref 0.2–0.9)
MONOCYTES NFR BLD AUTO: 11 % (ref 1–10)
NEUTROPHILS # BLD AUTO: 4.9 THOUSANDS/ΜL (ref 1.8–7.8)
NEUTS SEG NFR BLD AUTO: 66 % (ref 45–65)
NITRITE UR QL STRIP: NEGATIVE
NON-SQ EPI CELLS URNS QL MICRO: ABNORMAL /HPF
PH UR STRIP.AUTO: 6.5 [PH]
PLATELET # BLD AUTO: 148 THOUSANDS/UL (ref 150–450)
PMV BLD AUTO: 10.7 FL (ref 8.9–12.7)
POTASSIUM SERPL-SCNC: 4.3 MMOL/L (ref 3.6–5)
PROT SERPL-MCNC: 6.6 G/DL (ref 5.9–8.4)
PROT UR STRIP-MCNC: NEGATIVE MG/DL
RBC # BLD AUTO: 4.12 MILLION/UL (ref 4–5.2)
RBC #/AREA URNS AUTO: ABNORMAL /HPF
SODIUM SERPL-SCNC: 139 MMOL/L (ref 137–147)
SP GR UR STRIP.AUTO: 1 (ref 1–1.04)
UROBILINOGEN UA: NEGATIVE MG/DL
WBC # BLD AUTO: 7.5 THOUSAND/UL (ref 4.5–11)
WBC #/AREA URNS AUTO: ABNORMAL /HPF

## 2019-07-31 PROCEDURE — 85025 COMPLETE CBC W/AUTO DIFF WBC: CPT | Performed by: EMERGENCY MEDICINE

## 2019-07-31 PROCEDURE — 71046 X-RAY EXAM CHEST 2 VIEWS: CPT

## 2019-07-31 PROCEDURE — 81001 URINALYSIS AUTO W/SCOPE: CPT | Performed by: EMERGENCY MEDICINE

## 2019-07-31 PROCEDURE — 83690 ASSAY OF LIPASE: CPT | Performed by: EMERGENCY MEDICINE

## 2019-07-31 PROCEDURE — 74176 CT ABD & PELVIS W/O CONTRAST: CPT

## 2019-07-31 PROCEDURE — 99284 EMERGENCY DEPT VISIT MOD MDM: CPT | Performed by: EMERGENCY MEDICINE

## 2019-07-31 PROCEDURE — 80053 COMPREHEN METABOLIC PANEL: CPT | Performed by: EMERGENCY MEDICINE

## 2019-07-31 PROCEDURE — 36415 COLL VENOUS BLD VENIPUNCTURE: CPT | Performed by: EMERGENCY MEDICINE

## 2019-07-31 PROCEDURE — 99284 EMERGENCY DEPT VISIT MOD MDM: CPT

## 2019-07-31 RX ORDER — DIAZEPAM 2 MG/1
2 TABLET ORAL ONCE
Status: COMPLETED | OUTPATIENT
Start: 2019-07-31 | End: 2019-07-31

## 2019-07-31 RX ORDER — LIDOCAINE 50 MG/G
1 PATCH TOPICAL ONCE
Status: DISCONTINUED | OUTPATIENT
Start: 2019-07-31 | End: 2019-07-31 | Stop reason: HOSPADM

## 2019-07-31 RX ADMIN — DIAZEPAM 2 MG: 2 TABLET ORAL at 11:52

## 2019-07-31 RX ADMIN — LIDOCAINE 1 PATCH: 50 PATCH TOPICAL at 11:53

## 2019-07-31 NOTE — ED PROVIDER NOTES
History  Chief Complaint   Patient presents with    Leg Pain     per EMS, patient having right sided sciatica pain for several weeks  Was able to ambulate down a flight of steps for EMS  Patient states pain in both legs and swelling  Patient is a 73-year-old female, Welsh-speaking only, presenting for low back pain which she says radiates down both her legs to her toes  States she has had similar incidents in the past   Past medical history includes hypertension, diabetes, high cholesterol, chronic pain for which he takes OxyContin and gabapentin and Lyrica  Patient denies any trauma, unexplained weight loss, numbness or tingling, bowel or bladder incontinence, weakness, fever, IVDA, steroid use or known history of cancer  History provided by:  Patient  Leg Pain   Injury: no    Associated symptoms: back pain    Associated symptoms: no fever and no neck pain        Prior to Admission Medications   Prescriptions Last Dose Informant Patient Reported? Taking?    LINZESS 290 MCG CAPS   Yes Yes   Sig: TAKE ONE CAPSULE BY MOUTH DAILY ROSITA TOMASA CAPSULA POR VIA ORAL DIARIAMENTE   Lancets (FREESTYLE) lancets   No Yes   Sig: Check blood sugar three times a day   NOVOLOG FLEXPEN 100 units/mL injection pen   No Yes   Si UNITS 3 TIMES A DAY WITH MEALS [employer] day supplies please   amLODIPine (NORVASC) 5 mg tablet   No Yes   Sig: Take 1 tablet (5 mg total) by mouth daily   aspirin 81 mg chewable tablet   No Yes   Sig: Chew 1 tablet (81 mg total) daily   atorvastatin (LIPITOR) 40 mg tablet   No Yes   Sig: Take 1 tablet (40 mg total) by mouth every 24 hours   baclofen 10 mg tablet Not Taking at Unknown time  Yes No   Sig: TAKE ONE TABLET BY MOUTH 3 TIMES A DAY ROSITA 1 TABLETA POR VIA ORAL JONAH VECES AL MAC   brimonidine tartrate 0 2 % ophthalmic solution   Yes Yes   Sig: INSTILL 1 DROP INTO BOTH EYES 2 TIMES PER DAY   calcitonin, salmon, (MIACALCIN) 200 units/act nasal spray   No Yes   Si spray into each nostril daily   clopidogrel (PLAVIX) 75 mg tablet   No Yes   Sig: Take 1 tablet (75 mg total) by mouth daily   dorzolamide (TRUSOPT) 2 % ophthalmic solution   No Yes   Sig: Administer 1 drop to both eyes 3 (three) times a day   furosemide (LASIX) 40 mg tablet   No Yes   Sig: Take 1 tablet (40 mg total) by mouth daily   insulin degludec (TRESIBA FLEXTOUCH) 100 units/mL injection pen   No Yes   Sig: To use 25 U at bedtime     ipratropium (ATROVENT HFA) 17 mcg/act inhaler   No Yes   Sig: Inhale 2 puffs 4 (four) times a day [employer] day supplies please   latanoprost (XALATAN) 0 005 % ophthalmic solution   No Yes   Sig: Administer 1 drop to both eyes daily at bedtime for 30 days   metoprolol tartrate (LOPRESSOR) 25 mg tablet   No Yes   Sig: Take 1 tablet (25 mg total) by mouth every 12 (twelve) hours   omeprazole (PriLOSEC) 20 mg delayed release capsule   No Yes   Sig: Take 1 capsule (20 mg total) by mouth daily [employer] day supplies please   oxyCODONE (OxyCONTIN) 10 mg 12 hr tablet Not Taking at Unknown time  No No   Sig: Take 1 tablet (10 mg total) by mouth every 12 (twelve) hours for 30 daysMax Daily Amount: 20 mg   Patient not taking: Reported on 7/31/2019   pentoxifylline (TRENtal) 400 mg ER tablet   Yes Yes   Sig: TAKE ONE TABLET BY MOUTH 3 TIMES A DAY WITH A MEAL ROSITA 1 TABLETA POR VIA ORAL JONAH VECES AL MAC WITH A MEAL   pregabalin (LYRICA) 100 mg capsule   No Yes   Sig: Take 1 capsule (100 mg total) by mouth 2 (two) times a day for 100 days   sitaGLIPtin (JANUVIA) 50 mg tablet   No Yes   Sig: Take 1 tablet (50 mg total) by mouth daily      Facility-Administered Medications: None       Past Medical History:   Diagnosis Date    COPD [facility]chronic obstructive pulmonary disease) ([facility] [address] )     Hypertension     Kidney stones     Osteoporosis        Past Surgical History:   Procedure Laterality Date    APPENDECTOMY      HAND SURGERY Right     HYSTERECTOMY      age 29   Charmaine Charles INCISION AND DRAINAGE OF WOUND Left 1/5/2019 Procedure: INCISION AND DRAINAGE (I&D) EXTREMITY;  Surgeon: Noreen Schilling MD;  Location: [facility];  Service: General    LITHOTRIPSY      MASS EXCISION      [place] of back wall mass    TONSILLECTOMY      TONSILLECTOMY         Family History   Problem Relation Age of Onset    Diabetes Mother     No Known Problems Father     Throat cancer Daughter     No Known Problems Maternal Grandmother     No Known Problems Maternal Grandfather     No Known Problems Paternal Grandmother     No Known Problems Paternal Grandfather      I have reviewed and agree with the history as documented  Social History     Tobacco Use    Smoking status: Never Smoker    Smokeless tobacco: Never Used    Tobacco comment: states she quit but family living with her states she smokes   Substance Use Topics    Alcohol use: Never     Frequency: Never     Comment: OCCASIONAL    Drug use: Never        Review of Systems   Constitutional: Negative  Negative for chills and fever  HENT: Negative  Negative for rhinorrhea, sore throat, trouble swallowing and voice change  Eyes: Negative  Negative for pain and visual disturbance  Respiratory: Negative  Negative for cough, shortness of breath and wheezing  Cardiovascular: Negative  Negative for chest pain and palpitations  Gastrointestinal: Negative for abdominal pain, diarrhea, nausea and vomiting  Genitourinary: Negative  Negative for dysuria, frequency, pelvic pain, vaginal bleeding and vaginal discharge  Musculoskeletal: Positive for back pain  Negative for neck pain and neck stiffness  Skin: Negative  Negative for rash  Neurological: Negative  Negative for dizziness, speech difficulty, weakness, light-headedness and numbness  Physical Exam  Physical Exam   Constitutional: She is oriented to person, place, and time  She appears well-developed and well-nourished  No distress  HENT:   Head: Normocephalic and atraumatic     Mouth/Throat: Oropharynx is clear and moist    Eyes: Pupils are equal, round, and reactive to light  Conjunctivae and EOM are normal    Neck: Normal range of motion  Neck supple  No tracheal deviation present  Cardiovascular: Normal rate, regular rhythm and intact distal pulses  Pulmonary/Chest: Effort normal and breath sounds normal  No respiratory distress  She has no wheezes  She has no rales  Abdominal: Soft  Bowel sounds are normal  She exhibits no distension  There is no tenderness  There is no rebound and no guarding  Musculoskeletal: Normal range of motion  She exhibits no tenderness or deformity  Back:    Neurological: She is alert and oriented to person, place, and time  Skin: Skin is warm and dry  Capillary refill takes less than 2 seconds  No rash noted  Psychiatric: She has a normal mood and affect  Her behavior is normal    Nursing note and vitals reviewed        Vital Signs  ED Triage Vitals [07/31/19 1047]   Temperature Pulse Respirations Blood Pressure SpO2   98 5 °F (36 9 °C) 70 18 129/72 98 %      Temp Source Heart Rate Source Patient Position - Orthostatic VS BP Location FiO2 (%)   Tympanic Monitor Lying Right arm --      Pain Score       Worst Possible Pain           Vitals:    07/31/19 1047 07/31/19 1455   BP: 129/72 117/68   Pulse: 70 57   Patient Position - Orthostatic VS: Lying Lying         Visual Acuity      ED Medications  Medications   lidocaine (LIDODERM) 5 % patch 1 patch (1 patch Topical Medication Applied 7/31/19 1153)   diazepam (VALIUM) tablet 2 mg (2 mg Oral Given 7/31/19 1152)       Diagnostic Studies  Results Reviewed     Procedure Component Value Units Date/Time    Urine Microscopic [[id number]]  (Abnormal) Collected:  07/31/19 1448    Lab Status:  Final result Specimen:  Urine, Clean Catch Updated:  07/31/19 1522     RBC, UA 1-2 /hpf      WBC, UA 1-2 /hpf      Epithelial Cells Moderate /hpf      Bacteria, UA Moderate /hpf     UA w Reflex to Microscopic [[id number]]  (Abnormal) Collected:  07/31/19 1448    Lab Status:  Final result Specimen:  Urine, Clean Catch Updated:  07/31/19 1513     Color, UA Straw     Clarity, UA Clear     Specific [place], UA 1 005     pH, UA 6 5     Leukocytes, UA 25 0     Nitrite, UA Negative     Protein, UA Negative mg/dl      Glucose, UA Negative mg/dl      Ketones, UA Negative mg/dl      Bilirubin, UA Negative     Blood, UA 50 0     UROBILINOGEN UA Negative mg/dL     CBC and differential [[id number]]  (Abnormal) Collected:  07/31/19 1211    Lab Status:  Final result Specimen:  Blood from Arm, Right Updated:  07/31/19 1239     WBC 7 50 Thousand/uL      RBC 4 12 Million/uL      Hemoglobin 12 2 g/dL      Hematocrit 37 4 %      MCV 91 fL      MCH 29 5 pg      MCHC 32 5 g/dL      RDW 15 0 %      MPV 10 7 fL      Platelets 589 Thousands/uL      Neutrophils Relative 66 %      Lymphocytes Relative 20 %      Monocytes Relative 11 %      Eosinophils Relative 2 %      Basophils Relative 1 %      Neutrophils Absolute 4 90 Thousands/µL      Lymphocytes Absolute 1 50 Thousands/µL      Monocytes Absolute 0 80 Thousand/µL      Eosinophils Absolute 0 20 Thousand/µL      Basophils Absolute 0 10 Thousands/µL     CMP [[id number]]  (Abnormal) Collected:  07/31/19 1211    Lab Status:  Final result Specimen:  Blood from Arm, Right Updated:  07/31/19 1235     Sodium 139 mmol/L      Potassium 4 3 mmol/L      Chloride 103 mmol/L      CO2 28 mmol/L      ANION GAP 8 mmol/L      BUN 13 mg/dL      Creatinine 1 33 mg/dL      Glucose 182 mg/dL      Calcium 9 4 mg/dL      AST 20 U/L      ALT 14 U/L      Alkaline Phosphatase 107 U/L      Total Protein 6 6 g/dL      Albumin 3 8 g/dL      Total Bilirubin 0 50 mg/dL      eGFR 37 ml/min/1 73sq m     Narrative:       Debbie guidelines for Chronic Kidney Disease (CKD):     Stage 1 with normal or high GFR (GFR > 90 mL/min/1 73 square meters)    Stage 2 Mild CKD (GFR = 60-89 mL/min/1 73 square meters)    Stage 3A Moderate CKD (GFR = 45-59 mL/min/1 73 square meters)    Stage 3B Moderate CKD (GFR = 30-44 mL/min/1 73 square meters)    Stage 4 Severe CKD (GFR = 15-29 mL/min/1 73 square meters)    Stage 5 End Stage CKD (GFR <15 mL/min/1 73 square meters)  Note: GFR calculation is accurate only with a steady state creatinine    Lipase [[id number]]  (Normal) Collected:  07/31/19 1211    Lab Status:  Final result Specimen:  Blood from Arm, Right Updated:  07/31/19 1235     Lipase 37 u/L                  CT abdomen pelvis wo contrast   Final Result by Imelda Christie MD (07/31 1503)      Mild left-sided hydroureteronephrosis, improved since prior examination  4 mm calculus within the distal left ureter is not significantly changed since prior exam       Dilated right renal pelvis is again noted, slightly more prominent  Stable 9 x 6 mm calculus is noted within this region  Bilateral nonobstructive nephrolithiasis is unchanged  Colonic diverticulosis without evidence for diverticulitis  Workstation performed: [id number]         X-ray chest 2 views   ED Interpretation by Maria Eugenia Penny DO (07/31 1219)   Mild pulmonary congestion, no pna, ptx or effusion appreciated  Final Result by Joshua Hess MD (07/31 1411)      No acute cardiopulmonary disease  Workstation performed: [id number]                    Procedures  Procedures       ED Course  ED Course as of Jul 31 1601 Wed Jul 31, 2019   1228 Patient is a 52-year-old female presenting for back pain which radiates down into her leg  Feels similar previous episodes but more intense  Her home medications are not controlling her pain  No associated nausea vomiting diaphoresis  No red flags for back pain  Will obtain blood work, and given patient's age obtain CT scan to rule out intra-abdominal abnormalities  Exam is consistent with sciatica  Will give nonnarcotic multi modal pain control  1515 CT reviewed    Chronic nephrolithiasis, no acute changes  Exam consistent with chronic back pain now with sciatica  Patient already has narcotic pain control medicine home, advised she will need to use the significant not covering her pain she will need to talk to the prescriber to get additional medications  MDM  Number of Diagnoses or Management Options  Nephrolithiasis:   Sciatica:   Diagnosis management comments: 62-year-old female presenting for low back pain with sciatica symptoms  History of nephrolithiasis  CT scan repeated today shows stable nonobstructive nephrolithiasis  UTI not present on urinalysis  Creatinine stable, no evidence of acute kidney injury  Patient's pain is improved here in the emergency room  She was discharged home into the care of her family  A spine referral had already been made prior to her visit today, I gave her the contact information so that she can repeat her outpatient visit with them to possibly start physical therapy in other non narcotic interventions for controlling her back pain  Disposition  Final diagnoses:   Nephrolithiasis   Sciatica     Time reflects when diagnosis was documented in both MDM as applicable and the Disposition within this note     Time User Action Codes Description Comment    7/31/2019  3:19 PM Sonia Eubanks Add [N20 0] Nephrolithiasis     7/31/2019  3:19 PM Sonia Kraftstone Add [M54 30] Sciatica       ED Disposition     ED Disposition Condition Date/Time Comment    Discharge Stable Wed Jul 31, 2019  3:19 PM Nadene Ganser discharge to home/self care  Follow-up Information     Follow up With Specialties Details Why Contact Info    Kathleen Higgins MD [facility]   [address]  [address]  [place] [address] Program Physical Therapy Call   [phone]          Patient's Medications   Discharge Prescriptions    No medications on file     No discharge procedures on file      ED Provider  Electronically Signed by           Pankaj Kent DO  07/31/19 8669

## 2019-07-31 NOTE — ED NOTES
I contacted daughter to let her know pt will be discharged  She said she would be here to pick her up       Manoj Betancourt  07/31/19 7325

## 2019-07-31 NOTE — ED NOTES
Pt is resting comfortably  Sleeping with visible chest rise        Donaldo Canales, ROYER  07/31/19 0528

## 2019-08-05 DIAGNOSIS — I21.4 NSTEMI (NON-ST ELEVATED MYOCARDIAL INFARCTION) (HCC): ICD-10-CM

## 2019-08-05 PROBLEM — I25.10 CORONARY ARTERY DISEASE INVOLVING NATIVE CORONARY ARTERY OF NATIVE HEART WITHOUT ANGINA PECTORIS: Status: ACTIVE | Noted: 2019-08-05

## 2019-08-05 RX ORDER — AMLODIPINE BESYLATE 5 MG/1
5 TABLET ORAL DAILY
Qty: 90 TABLET | Refills: 3 | Status: SHIPPED | OUTPATIENT
Start: 2019-08-05 | End: 2020-01-03 | Stop reason: SDUPTHER

## 2019-08-05 RX ORDER — CLOPIDOGREL BISULFATE 75 MG/1
75 TABLET ORAL DAILY
Qty: 90 TABLET | Refills: 3 | Status: SHIPPED | OUTPATIENT
Start: 2019-08-05 | End: 2019-09-24 | Stop reason: ALTCHOICE

## 2019-08-07 ENCOUNTER — APPOINTMENT (OUTPATIENT)
Dept: LAB | Facility: HOSPITAL | Age: 84
End: 2019-08-07
Payer: MEDICARE

## 2019-08-07 ENCOUNTER — DOCUMENTATION (OUTPATIENT)
Dept: CARDIOLOGY CLINIC | Facility: CLINIC | Age: 84
End: 2019-08-07

## 2019-08-07 ENCOUNTER — OFFICE VISIT (OUTPATIENT)
Dept: CARDIOLOGY CLINIC | Facility: CLINIC | Age: 84
End: 2019-08-07
Payer: MEDICARE

## 2019-08-07 ENCOUNTER — HOSPITAL ENCOUNTER (OUTPATIENT)
Dept: RADIOLOGY | Facility: HOSPITAL | Age: 84
Discharge: HOME | End: 2019-08-07
Payer: MEDICARE

## 2019-08-07 VITALS
HEART RATE: 65 BPM | SYSTOLIC BLOOD PRESSURE: 120 MMHG | BODY MASS INDEX: 28.34 KG/M2 | DIASTOLIC BLOOD PRESSURE: 60 MMHG | WEIGHT: 166 LBS | HEIGHT: 64 IN | OXYGEN SATURATION: 95 %

## 2019-08-07 DIAGNOSIS — R06.02 SHORTNESS OF BREATH: ICD-10-CM

## 2019-08-07 DIAGNOSIS — R31.9 URINARY TRACT INFECTION WITH HEMATURIA, SITE UNSPECIFIED: ICD-10-CM

## 2019-08-07 DIAGNOSIS — I50.32 CHRONIC DIASTOLIC HEART FAILURE (HCC): ICD-10-CM

## 2019-08-07 DIAGNOSIS — I25.10 CORONARY ARTERY DISEASE INVOLVING NATIVE CORONARY ARTERY OF NATIVE HEART WITHOUT ANGINA PECTORIS: Primary | ICD-10-CM

## 2019-08-07 DIAGNOSIS — I10 ESSENTIAL HYPERTENSION: ICD-10-CM

## 2019-08-07 DIAGNOSIS — N39.0 URINARY TRACT INFECTION WITH HEMATURIA, SITE UNSPECIFIED: ICD-10-CM

## 2019-08-07 DIAGNOSIS — R31.0 GROSS HEMATURIA: Primary | ICD-10-CM

## 2019-08-07 DIAGNOSIS — R31.9 HEMATURIA, UNSPECIFIED TYPE: ICD-10-CM

## 2019-08-07 LAB
BACTERIA UR QL AUTO: ABNORMAL /HPF
BILIRUB UR QL STRIP: NEGATIVE
CLARITY UR: ABNORMAL
COLOR UR: ABNORMAL
GLUCOSE UR STRIP-MCNC: ABNORMAL MG/DL
HGB UR QL STRIP.AUTO: ABNORMAL
KETONES UR STRIP-MCNC: ABNORMAL MG/DL
LEUKOCYTE ESTERASE UR QL STRIP: ABNORMAL
NITRITE UR QL STRIP: POSITIVE
NON-SQ EPI CELLS URNS QL MICRO: ABNORMAL /HPF
PH UR STRIP.AUTO: 7 [PH]
PROT UR STRIP-MCNC: >=300 MG/DL
RBC #/AREA URNS AUTO: ABNORMAL /HPF
SP GR UR STRIP.AUTO: 1.01 (ref 1–1.03)
UROBILINOGEN UR QL STRIP.AUTO: >=8 E.U./DL
WBC #/AREA URNS AUTO: ABNORMAL /HPF

## 2019-08-07 PROCEDURE — 99214 OFFICE O/P EST MOD 30 MIN: CPT | Performed by: PHYSICIAN ASSISTANT

## 2019-08-07 PROCEDURE — 81001 URINALYSIS AUTO W/SCOPE: CPT | Performed by: PHYSICIAN ASSISTANT

## 2019-08-07 PROCEDURE — 71046 X-RAY EXAM CHEST 2 VIEWS: CPT

## 2019-08-07 RX ORDER — LEVOFLOXACIN 750 MG/1
750 TABLET ORAL EVERY OTHER DAY
Qty: 5 TABLET | Refills: 0 | Status: SHIPPED | OUTPATIENT
Start: 2019-08-07 | End: 2019-08-12 | Stop reason: SDUPTHER

## 2019-08-07 NOTE — PROGRESS NOTES
Noted results of urinalysis  Large Leukocytes, + Nitrites, > 300 Protein, 40 Ketones, > 8 0 Urobilinogen, Large Blood  Given her complicated urologic history (recent admission for ureterolithiasis, hydronephrosis, b/l intrarenal calculi) I feel her antibiotic choice would be better managed by primary care or Urology  As such I have called the patient's family to advise them to call the PCP right away or go to an Urgent care as soon as possible  Her aspirin & plavix remain on hold due to hematuria  I spoke with a family member using translation services [phone]  They are in understanding and will try to contact the family doctor or head to an urgent care  Chest X-ray also resulted  To my eye it appears grossly normal, perhaps a small amount of vascular congestion  Official radiology read pending, will defer advancing diuretic regiment at this time especially in light of UTI  Messages also sent to PCP & Urologist both of which she has upcoming appointments

## 2019-08-07 NOTE — PATIENT INSTRUCTIONS
Please check a urine sample and chest X-ray  Both of these can be done at the hospital across the street  I will message your family doctor and follow up on there results    Home care:  1  Please weigh yourself daily basis first thing in the morning after using the bathroom, if you gain more than 2-3 lb in 1 day or 5 lb in 1 week please contact the office as this may be a sign of fluid building up  We can temporarily increased your diuretics  2  Please maintain a diet low in sodium as excess salt can lead to acute heart failure  Common foods high in sodium are: Canned soup, pizza, tomato sauce, sliced lunch meat (especially ham), processed foods (prepackaged frozen meals   lasagna, pizza, chicken fingers, etc ), julien, sausage, hot dogs, [place] food, pickles  3  Fresh fruits and vegetables, lean meat (chicken, fish), whole grains, and yogurt are healthy low-sodium options     4  Refrain from tobacco or excess alcohol use  5   Be mindful of how much water you are taking in vs how much you are urinating

## 2019-08-09 ENCOUNTER — HOSPITAL ENCOUNTER (OUTPATIENT)
Dept: ULTRASOUND IMAGING | Facility: HOSPITAL | Age: 84
Discharge: HOME | End: 2019-08-09
Payer: MEDICARE

## 2019-08-09 ENCOUNTER — CLINICAL SUPPORT (OUTPATIENT)
Dept: PAIN MEDICINE | Facility: CLINIC | Age: 84
End: 2019-08-09
Payer: MEDICARE

## 2019-08-09 VITALS — BODY MASS INDEX: 28 KG/M2 | HEIGHT: 64 IN | WEIGHT: 164 LBS

## 2019-08-09 DIAGNOSIS — M47.816 LUMBAR SPONDYLOSIS: ICD-10-CM

## 2019-08-09 DIAGNOSIS — M54.16 LUMBAR RADICULOPATHY: Primary | ICD-10-CM

## 2019-08-09 DIAGNOSIS — M51.26 LUMBAR DISC HERNIATION: ICD-10-CM

## 2019-08-09 DIAGNOSIS — M48.062 SPINAL STENOSIS OF LUMBAR REGION WITH NEUROGENIC CLAUDICATION: ICD-10-CM

## 2019-08-09 DIAGNOSIS — R31.0 GROSS HEMATURIA: ICD-10-CM

## 2019-08-09 PROCEDURE — 99214 OFFICE O/P EST MOD 30 MIN: CPT | Performed by: ANESTHESIOLOGY

## 2019-08-09 PROCEDURE — 76770 US EXAM ABDO BACK WALL COMP: CPT

## 2019-08-09 NOTE — PROGRESS NOTES
Assessment  1  Lumbar radiculopathy    2  Spinal stenosis of lumbar region with neurogenic claudication    3  Lumbar spondylosis    4  Lumbar disc herniation        Plan  71-year-old female presenting for initial consultation regarding lumbosacral back pain with radiculopathy in the S1 distribution of bilateral lower extremities secondary to large disc herniation at L4-5 causing severe central stenosis and redundancy of the cauda equina  Patient also has scattered degenerative disc disease and spondylosis  The patient has been evaluated by Neurosurgery who did not feel that surgical intervention was required at this time, however considering the size of the disc herniation I would like her to get a 2nd opinion before proceeding with interventional therapy in the form of epidural steroid injections  The patient is currently taking Lyrica 100 milligrams b i d  And OxyContin 10 milligrams b i d  Which is prescribed by her primary care physician  The patient does have a history of chronic kidney disease and we are unable to titrate Lyrica any higher considering her creatinine clearance  Patient has done physical therapy without relief  Patient is on dual anti-platelet therapy precluding NSAIDs  1  Will refer to Dr Osman Blandon regarding surgical consultation  2  If surgery is not indicated may consider RYAN to reduce the inflammatory component of her pain  3  Patient may continue with Lyrica and OxyContin as prescribed by PCP  4  I will follow up the patient in 2-3 months      My impressions and treatment recommendations were discussed in detail with the patient who verbalized understanding and had no further questions  Discharge instructions were provided  I personally saw and examined the patient and I agree with the above discussed plan of care  No orders of the defined types were placed in this encounter  No orders of the defined types were placed in this encounter        History of Present Arlyn Rojas is a 81 y o  female presenting for initial consultation regarding an 8 month history of lumbosacral back pain that radiates into the posterior aspects of her lower extremities with associated subjective weakness and paresthesias  She denies any bladder or bowel incontinence or saddle anesthesia  She denies any trauma or inciting event  MRI of the lumbar spine reveals degenerative disc disease and spondylosis with a large disc herniation at L4-5 causing severe central stenosis and redundancy of the cauda equina  The patient has been evaluated by Neurosurgery who did not feel that surgical intervention was required at this time  The patient is currently taking Lyrica 100 milligrams b i d  And OxyContin 10 milligrams b i d  Which is prescribed by her primary care physician  Patient is on dual anti-platelet therapy precluding NSAIDs  The patient rates her pain a 10/10 on the pain does not follow any particular pattern throughout the day  The pain is described as constant cramping  The pain is decreased with lying down, sitting, and relaxation  The pain is increased with standing, bending, walking, exercise, and bowel movements  I have personally reviewed and/or updated the patient's past medical history, past surgical history, family history, social history, current medications, allergies, and vital signs today  Other than as stated above, the patient denies any interval changes in medications, medical condition, mental condition, symptoms, or allergies since the last office visit  Review of Systems   Constitutional: Negative for fever and unexpected weight change  HENT: Negative for trouble swallowing  Eyes: Negative for visual disturbance  Respiratory: Positive for shortness of breath  Negative for wheezing  Cardiovascular: Positive for palpitations and leg swelling  Negative for chest pain  Gastrointestinal: Positive for abdominal pain   Negative for constipation, diarrhea, nausea and vomiting  Endocrine: Negative for cold intolerance, heat intolerance and polydipsia  Genitourinary: Negative for difficulty urinating and frequency  Musculoskeletal: Positive for joint swelling  Negative for arthralgias, gait problem and myalgias  Skin: Negative for rash  Neurological: Negative for dizziness, seizures, syncope, weakness and headaches  Hematological: Does not bruise/bleed easily  Psychiatric/Behavioral: Negative for dysphoric mood  All other systems reviewed and are negative        Patient Active Problem List   Diagnosis    Hypertension    Shoulder joint pain    Gastritis without bleeding    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 ([facility])    Preop examination    Gait difficulty    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 [facility]    Acute renal failure superimposed on stage 3 chronic kidney disease ([facility]    Left arm cellulitis    Accident due to mechanical fall without injury    Type 2 diabetes mellitus with hyperglycemia, with long-term current use of insulin ([facility])    Inflammatory spondylopathy of lumbosacral region [facility]    Chronic diastolic heart failure ([facility])    PAD (peripheral artery disease) ([facility])    Localized edema    Drug-induced constipation    Ureterolithiasis    Type 2 myocardial infarction ([facility])    Acute kidney injury superimposed on chronic kidney disease ([facility])    Chronic bilateral low back pain with bilateral sciatica    Toxic encephalopathy    Anemia    Stage 3 chronic kidney disease ([facility]    Coronary artery disease involving native coronary artery of native heart without angina pectoris    Shortness of breath    Hematuria       Past Medical History:   Diagnosis Date    COPD (chronic obstructive pulmonary disease) [facility]     Hypertension     Kidney stones     Osteoporosis        Past Surgical History:   Procedure Laterality Date    APPENDECTOMY  HAND SURGERY Right     HYSTERECTOMY      age 29    INCISION AND DRAINAGE OF WOUND Left 1/5/2019    Procedure: INCISION AND DRAINAGE (I&D) EXTREMITY;  Surgeon: Scarlet Schneider MD;  Location: [facility];  Service: General    LITHOTRIPSY      MASS EXCISION      [place] of back wall mass    TONSILLECTOMY      TONSILLECTOMY         Family History   Problem Relation Age of Onset    Diabetes Mother     No Known Problems Father     Throat cancer Daughter     No Known Problems Maternal Grandmother     No Known Problems Maternal Grandfather     No Known Problems Paternal Grandmother     No Known Problems Paternal Grandfather        Social History     Occupational History    Not on file   Tobacco Use    Smoking status: Never Smoker    Smokeless tobacco: Never Used    Tobacco comment: states she quit but family living with her states she smokes   Substance and Sexual Activity    Alcohol use: Never     Frequency: Never     Comment: OCCASIONAL    Drug use: Never    Sexual activity: Yes     Partners: Male       Current Outpatient Medications on File Prior to Visit   Medication Sig    amLODIPine (NORVASC) 5 mg tablet Take 1 tablet (5 mg total) by mouth daily    aspirin 81 mg chewable tablet Chew 1 tablet (81 mg total) daily (Patient not taking: Reported on 8/7/2019)    atorvastatin (LIPITOR) 40 mg tablet Take 1 tablet (40 mg total) by mouth every 24 hours    baclofen 10 mg tablet TAKE ONE TABLET BY MOUTH 3 TIMES A DAY ROSITA 1 TABLETA POR VIA ORAL JONAH VECES AL MAC    brimonidine tartrate 0 2 % ophthalmic solution INSTILL 1 DROP INTO BOTH EYES 2 TIMES PER DAY    calcitonin, salmon, (MIACALCIN) 200 units/act nasal spray 1 spray into each nostril daily    clopidogrel (PLAVIX) 75 mg tablet Take 1 tablet (75 mg total) by mouth daily (Patient not taking: Reported on 8/7/2019)    dorzolamide (TRUSOPT) 2 % ophthalmic solution Administer 1 drop to both eyes 3 (three) times a day    furosemide (LASIX) 40 mg tablet Take 1 tablet (40 mg total) by mouth daily    insulin degludec (TRESIBA FLEXTOUCH) 100 units/mL injection pen To use 25 U at bedtime   ipratropium (ATROVENT HFA) 17 mcg/act inhaler Inhale 2 puffs 4 (four) times a day Ninety day supplies please    Lancets (FREESTYLE) lancets Check blood sugar three times a day    latanoprost (XALATAN) 0 005 % ophthalmic solution Administer 1 drop to both eyes daily at bedtime for 30 days    levofloxacin (LEVAQUIN) 750 mg tablet Take 1 tablet (750 mg total) by mouth every other day for 10 days    LINZESS 290 MCG CAPS TAKE ONE CAPSULE BY MOUTH DAILY ROSITA TOMASA CAPSULA POR VIA ORAL DIARIAMENTE    metoprolol tartrate (LOPRESSOR) 25 mg tablet Take 1 tablet (25 mg total) by mouth every 12 (twelve) hours    NOVOLOG FLEXPEN 100 units/mL injection pen 12 UNITS 3 TIMES A DAY WITH MEALS Ninety day supplies please    omeprazole (PriLOSEC) 20 mg delayed release capsule Take 1 capsule (20 mg total) by mouth daily Ninety day supplies please    oxyCODONE (OxyCONTIN) 10 mg 12 hr tablet Take 1 tablet (10 mg total) by mouth every 12 (twelve) hours for 30 daysMax Daily Amount: 20 mg    pentoxifylline (TRENtal) 400 mg ER tablet TAKE ONE TABLET BY MOUTH 3 TIMES A DAY WITH A MEAL ROSITA 1 TABLETA POR VIA ORAL JONAH VECES AL MAC WITH A MEAL    pregabalin (LYRICA) 100 mg capsule Take 1 capsule (100 mg total) by mouth 2 (two) times a day for 100 days    sitaGLIPtin (JANUVIA) 50 mg tablet Take 1 tablet (50 mg total) by mouth daily     No current facility-administered medications on file prior to visit  Allergies   Allergen Reactions    Acetaminophen      Listen on patient's paperwork from [facility] and Staffing   Family unable to confirm      Morphine And Related Vomiting    Tramadol Vomiting and Abdominal Pain     Other       Physical Exam    LMP 01/01/1990 (Within Years)     Constitutional: normal, well developed, well nourished, alert, in no distress and non-toxic and no overt pain behavior  Eyes: anicteric  HEENT: grossly intact  Neck: supple, symmetric, trachea midline and no masses   Pulmonary:even and unlabored  Cardiovascular:No edema or pitting edema present  Skin:Normal without rashes or lesions and well hydrated  Psychiatric:Mood and affect appropriate  Neurologic:Cranial Nerves II-XII grossly intact  Musculoskeletal:antalgic gait and ambulates with a walker with stooped posture  Bilateral lumbar paraspinals tender to palpation from L3-L5  Bilateral SI joints nontender to palpation  Bilateral patellar and Achilles reflexes were 2/4 and symmetrical   No clonus was noted bilaterally  Bilateral lower extremity strength 5/5 in all muscle groups with the exception of bilateral dorsiflexion and EHL which was 3/5 on the left and 4/5 on the right     Sensation intact to light touch in L3 thru S1 dermatomes bilaterally  Patient unable to lie supine, therefore unable to perform supine straight leg raise or Julio Cesar's test   Seated straight leg raise equivocal bilaterally  Imaging    PACS Images      Show images for MRI lumbar spine wo contrast   Study Result     MRI LUMBAR SPINE WITHOUT CONTRAST     INDICATION: back pain  Low back pain radiating into both legs     COMPARISON:  MR 4/17/2019     TECHNIQUE:  Sagittal T1, sagittal T2, sagittal inversion recovery, axial T1 and axial T2, coronal T2     IMAGE QUALITY:  Diagnostic     FINDINGS:     VERTEBRAL BODIES:  Advanced bilateral facet arthrosis L4-L5 results in ex grade 1 degenerative anterolisthesis  This does appear to have progressed slightly since prior exam 3 months earlier  Normal marrow signal is identified within the visualized   bony structures  No discrete marrow lesion      SACRUM:  Normal signal within the sacrum   No evidence of insufficiency or stress fracture      DISTAL CORD AND CONUS:  Normal size and signal within the distal cord and conus        PARASPINAL SOFT TISSUES:  Paraspinal soft tissues are unremarkable      LOWER THORACIC DISC SPACES:  Slight loss of disc height and chronic endplate changes [id number]      LUMBAR DISC SPACES:     L1-L2:  Normal      L2-L3:  Minor bulge     L3-L4:  Mild noncompressive degenerative disease  Stable slight circumferential bulge, minor bilateral facet arthrosis      L4-L5:  Advanced bilateral facet arthrosis  Interval increase in volume of the extruded disc material which ascends from the disc space  AP dimension of the sac estimated at L3-4 mm  New Redundancy of the cauda equina above this stenosis confirming   progressive compression of the cauda equina  Disc does extend asymmetrically to the left  Correlate for signs and symptoms of spinal stenosis and bilateral L5 radiculitis      L5-S1:  Reduction disc height, circumferential bulge, marginal osteophytes, chronic endplate changes     Minor retrolisthesis of L5      IMPRESSION:     Progressive L4-5 extrusion, asymmetric to the left, resulting in new redundancy of the cauda equina  Severe canal and bilateral lateral recess stenosis    Correlate for signs and symptoms of spinal stenosis and bilateral L5 radiculitis         Workstation performed: [id number]      Imaging     MRI lumbar spine wo contrast (Order: [id number]) - 7/6/2019

## 2019-08-12 DIAGNOSIS — N39.0 URINARY TRACT INFECTION WITH HEMATURIA, SITE UNSPECIFIED: ICD-10-CM

## 2019-08-12 DIAGNOSIS — R31.9 URINARY TRACT INFECTION WITH HEMATURIA, SITE UNSPECIFIED: ICD-10-CM

## 2019-08-13 ENCOUNTER — OFFICE VISIT (OUTPATIENT)
Dept: FAMILY MEDICINE CLINIC | Facility: CLINIC | Age: 84
End: 2019-08-13
Payer: MEDICARE

## 2019-08-13 VITALS
BODY MASS INDEX: 27.66 KG/M2 | TEMPERATURE: 98.1 F | SYSTOLIC BLOOD PRESSURE: 130 MMHG | WEIGHT: 162 LBS | OXYGEN SATURATION: 96 % | DIASTOLIC BLOOD PRESSURE: 60 MMHG | HEIGHT: 64 IN | RESPIRATION RATE: 16 BRPM | HEART RATE: 88 BPM

## 2019-08-13 DIAGNOSIS — M54.16 LUMBAR RADICULOPATHY: ICD-10-CM

## 2019-08-13 DIAGNOSIS — K59.03 DRUG-INDUCED CONSTIPATION: Primary | ICD-10-CM

## 2019-08-13 PROCEDURE — 99214 OFFICE O/P EST MOD 30 MIN: CPT | Performed by: FAMILY MEDICINE

## 2019-08-13 RX ORDER — LATANOPROST 50 UG/ML
SOLUTION/ DROPS OPHTHALMIC
Refills: 5 | COMMUNITY
Start: 2019-08-11 | End: 2020-01-03 | Stop reason: SDUPTHER

## 2019-08-13 NOTE — PROGRESS NOTES
Assessment/Plan:    Drug-induced constipation   A high fiber diet has been found to increase bowel frequency and to be effective in treatment of constipation  Dietary fiber is a natural component of plant products, such as fruit, vegetables, and grains  It provides the bulk needed by the colon to eliminate body waste  As fiber passes through the colon, it acts as a sponge by absorbing water  Lumbar radiculopathy  Continue same treatment, awaiting for pain management and surgery to evaluate  I believe treatment is palliative  High risk and multiple comorbidity  Diagnoses and all orders for this visit:    Drug-induced constipation  -     bisacodyl (DULCOLAX) 5 mg EC tablet; Take 1 tablet (5 mg total) by mouth daily as needed for constipation    Lumbar radiculopathy          Subjective:      Patient ID: Arnaud Malin is a 81 y o  female  Arnaud Malin 81 y o  complaining of   Chronic pain in back; started about 1 year(s) ago  The pain is  continuous and has been gradually worsening since onset  The quality of the pain is described as sharp  The pain does radiate  The pain is at a severity of 10/10  The symptoms are aggravated by movement  Associated symptoms include numbness, paresthesias and tingling  she has  tried Oxycodone  Patient is scheduled to see Ortho in September we will try to see if they can see her sooner  Patient also has appt with Surgeon on 8/27  Patient also complains of constipation which is attributed with her taking pain medications and she will be prescribed Dulcolax to help her with her bowel movements  The following portions of the patient's history were reviewed and updated as appropriate: allergies, current medications, past family history, past medical history, past social history, past surgical history and problem list     Review of Systems   Constitutional: Negative for diaphoresis, fatigue, fever and unexpected weight change     Respiratory: Negative for cough, chest tightness, shortness of breath and wheezing  Cardiovascular: Negative for chest pain, palpitations and leg swelling  Gastrointestinal: Negative for abdominal pain, blood in stool, nausea and vomiting  Neurological: Negative for dizziness, syncope, light-headedness and headaches  Hematological: Does not bruise/bleed easily  Psychiatric/Behavioral: Negative for behavioral problems, self-injury and sleep disturbance  The patient is not nervous/anxious  Objective:      /60 (BP Location: Left arm, Patient Position: Sitting, Cuff Size: Standard)   Pulse 88   Temp 98 1 °F (36 7 °C) (Oral)   Resp 16   Ht 5' 4" (1 626 m)   Wt 73 5 kg (162 lb)   LMP 01/01/1990 (Within Years)   SpO2 96%   Breastfeeding? No   BMI 27 81 kg/m²          Physical Exam   HENT:   Nose: Nose normal    Mouth/Throat: Oropharynx is clear and moist  No oropharyngeal exudate  Eyes: Pupils are equal, round, and reactive to light  EOM are normal  Right eye exhibits no discharge  Left eye exhibits no discharge  No scleral icterus  Cardiovascular: Normal rate, regular rhythm, normal heart sounds and intact distal pulses  Exam reveals no friction rub  No murmur heard  Pulmonary/Chest: Effort normal  No respiratory distress  She has no wheezes  She has no rales  She exhibits no tenderness  Musculoskeletal: Normal range of motion  Neurological: She is alert  Skin: Skin is warm and dry  No rash noted  No erythema  Psychiatric: She has a normal mood and affect  Her behavior is normal    Nursing note and vitals reviewed

## 2019-08-14 RX ORDER — LEVOFLOXACIN 750 MG/1
750 TABLET ORAL EVERY OTHER DAY
Qty: 5 TABLET | Refills: 0 | Status: SHIPPED | OUTPATIENT
Start: 2019-08-14 | End: 2019-08-24

## 2019-08-19 ENCOUNTER — OFFICE VISIT (OUTPATIENT)
Dept: OBGYN CLINIC | Facility: HOSPITAL | Age: 84
End: 2019-08-19
Attending: ANESTHESIOLOGY
Payer: MEDICARE

## 2019-08-19 VITALS
WEIGHT: 162.04 LBS | DIASTOLIC BLOOD PRESSURE: 68 MMHG | BODY MASS INDEX: 27.66 KG/M2 | HEIGHT: 64 IN | SYSTOLIC BLOOD PRESSURE: 111 MMHG | HEART RATE: 67 BPM

## 2019-08-19 DIAGNOSIS — M54.41 CHRONIC BILATERAL LOW BACK PAIN WITH BILATERAL SCIATICA: ICD-10-CM

## 2019-08-19 DIAGNOSIS — M54.16 LUMBAR RADICULOPATHY: ICD-10-CM

## 2019-08-19 DIAGNOSIS — M51.26 LUMBAR DISC HERNIATION: ICD-10-CM

## 2019-08-19 DIAGNOSIS — G89.29 CHRONIC BILATERAL LOW BACK PAIN WITH BILATERAL SCIATICA: ICD-10-CM

## 2019-08-19 DIAGNOSIS — M48.062 SPINAL STENOSIS OF LUMBAR REGION WITH NEUROGENIC CLAUDICATION: Primary | ICD-10-CM

## 2019-08-19 DIAGNOSIS — M21.372 BILATERAL FOOT-DROP: ICD-10-CM

## 2019-08-19 DIAGNOSIS — R26.9 GAIT DIFFICULTY: ICD-10-CM

## 2019-08-19 DIAGNOSIS — M21.371 BILATERAL FOOT-DROP: ICD-10-CM

## 2019-08-19 DIAGNOSIS — M54.42 CHRONIC BILATERAL LOW BACK PAIN WITH BILATERAL SCIATICA: ICD-10-CM

## 2019-08-19 PROCEDURE — 99214 OFFICE O/P EST MOD 30 MIN: CPT | Performed by: ORTHOPAEDIC SURGERY

## 2019-08-19 NOTE — ASSESSMENT & PLAN NOTE
Patient presents for evaluation of bilateral leg pain from her hips down to her toes  She does report subjective weakness as well as numbness and tingling down into her toes  She reports that she has had significant ambulatory dysfunction for the last 1 year  She is present with her caregiver  She has several comorbidities including insulin-dependent diabetes, hypercholesterolemia amongst others  Currently denies incontinence of bowel or bladder    On physical exam  She is sitting comfortably in a wheelchair  She has positive straight leg raise bilaterally  She has some mild discoloration to bilateral lower ankles and feet  Foot dorsiflexion is 0/5 bilaterally  Great toe extension is 1/5 bilaterally  Plantar flexion is 4+ out of 5 bilaterally  MRI lumbar spine is reviewed and this demonstrates severe spinal stenosis secondary to extruded disc herniation at L4-5  Moderate to severe bilateral lateral recess and foraminal stenosis at L5-S1    Assessment and plan    Chronic ambulatory dysfunction most likely multifactorial but also related to severe spinal stenosis evident on MRI lumbar spine  Extensive discussion is had with the patient regarding conservative management versus surgical intervention  She understands the epidural steroid injections may help improve her radicular symptoms but she would need to be cognizant of her blood sugars while undergoing this therapy  She understands that surgical intervention may ultimately be warranted but that this does not guarantee complete resolution of symptoms given chronicity  I have encouraged that she discuss this closely with her family members prior to any commitment to surgical intervention  Epidural steroid injections will be arranged

## 2019-08-19 NOTE — PROGRESS NOTES
Assessment/Plan:    Lumbar stenosis with bilateral radiculopathy and bilateral foot drop  · Referral to interventional radiology for L4-5 epidural steroid injection  · Patient was encouraged to see a specialist for her kidneys and blood in urine  · Patient was encouraged to discuss her diabetes and bilateral leg redness with her PCP  · Surgical intervention was discussed but is not advised at this time due to her blood sugar levels  Permanent nerve damage was reviewed and patient was educated that this will not return following surgery  · Patient will take time to consider her options  · Follow up 2 weeks       Problem List Items Addressed This Visit        Nervous and Auditory    Chronic bilateral low back pain with bilateral sciatica    Lumbar radiculopathy    Relevant Orders    CT epidural steroid injection (RYAN lumbar)       Musculoskeletal and Integument    Lumbar disc herniation       Other    Gait difficulty     Patient presents for evaluation of bilateral leg pain from her hips down to her toes  She does report subjective weakness as well as numbness and tingling down into her toes  She reports that she has had significant ambulatory dysfunction for the last 1 year  She is present with her caregiver  She has several comorbidities including insulin-dependent diabetes, hypercholesterolemia amongst others  Currently denies incontinence of bowel or bladder    On physical exam  She is sitting comfortably in a wheelchair  She has positive straight leg raise bilaterally  She has some mild discoloration to bilateral lower ankles and feet  Foot dorsiflexion is 0/5 bilaterally  Great toe extension is 1/5 bilaterally  Plantar flexion is 4+ out of 5 bilaterally  MRI lumbar spine is reviewed and this demonstrates severe spinal stenosis secondary to extruded disc herniation at L4-5    Moderate to severe bilateral lateral recess and foraminal stenosis at L5-S1    Assessment and plan    Chronic ambulatory dysfunction most likely multifactorial but also related to severe spinal stenosis evident on MRI lumbar spine  Extensive discussion is had with the patient regarding conservative management versus surgical intervention  She understands the epidural steroid injections may help improve her radicular symptoms but she would need to be cognizant of her blood sugars while undergoing this therapy  She understands that surgical intervention may ultimately be warranted but that this does not guarantee complete resolution of symptoms given chronicity  I have encouraged that she discuss this closely with her family members prior to any commitment to surgical intervention  Epidural steroid injections will be arranged  Spinal stenosis of lumbar region with neurogenic claudication - Primary    Relevant Orders    CT epidural steroid injection (RYAN lumbar)      Other Visit Diagnoses     Bilateral foot-drop        Relevant Orders    CT epidural steroid injection (RYAN lumbar)            Subjective:      Patient ID: Jb Dowd is a 81 y o  female  HPI  Patient presents to the office for evaluation of low back and bilateral leg pain worsening for 1 year  She is experiencing bilateral leg pain that goes down to her feet  She does walk short distances with assistance  She has noticed bilateral foot weakness  She denies any incontinence of bowel or bladder  Patient resides with her daughter who was recently diagnosed with cancer  She is accompanied by her caretaker today in the office  Translation provided by employee  Patient has history of diabetes  She currently reports blood in her urine  The following portions of the patient's history were reviewed and updated as appropriate: current medications, past family history, past medical history, past social history, past surgical history and problem list     Review of Systems   Constitutional: Negative for fever and unexpected weight change     HENT: Negative for hearing loss, nosebleeds and sore throat  Eyes: Negative for pain, redness and visual disturbance  Respiratory: Negative for cough, shortness of breath and wheezing  Cardiovascular: Negative for chest pain, palpitations and leg swelling  Gastrointestinal: Negative for abdominal pain, nausea and vomiting  Endocrine: Negative for polydipsia and polyuria  Genitourinary: Negative for dysuria and hematuria  Musculoskeletal: Positive for back pain  Skin: Negative for rash and wound  Neurological: Negative for dizziness and headaches  Psychiatric/Behavioral: Negative for agitation and suicidal ideas  Objective:      /68   Pulse 67   Ht 5' 4 02" (1 626 m)   Wt 73 5 kg (162 lb 0 6 oz)   LMP 01/01/1990 (Within Years)   BMI 27 80 kg/m²          Physical Exam   Constitutional: She is oriented to person, place, and time  She appears well-developed and well-nourished  HENT:   Head: Normocephalic and atraumatic  Eyes: Pupils are equal, round, and reactive to light  Neck: Neck supple  Cardiovascular: Intact distal pulses  Pulmonary/Chest: Effort normal and breath sounds normal    Neurological: She is alert and oriented to person, place, and time  Skin: Skin is warm and dry  Psychiatric: She has a normal mood and affect   Her behavior is normal        Patient presents to the office on a wheelchair  Modified straight leg raise positive bilaterally  Strength 4/5 plantar flexion, 0/5 dorsiflexion and 1/5 EHL bilaterally   Sensation L2-S1 intact bilaterally    Imaging  MRI of lumbar spine 07/06/2019 was reviewed and shows multilevel DDD with stenosis most pronounced at L4-5    Scribe Attestation    I,:   Florina Higgins am acting as a scribe while in the presence of the attending physician :        I,:   Alannah Kessler MD personally performed the services described in this documentation    as scribed in my presence :

## 2019-08-20 ENCOUNTER — HOSPITAL ENCOUNTER (OUTPATIENT)
Dept: RADIOLOGY | Facility: HOSPITAL | Age: 84
Discharge: HOME | End: 2019-08-20
Attending: ORTHOPAEDIC SURGERY | Admitting: RADIOLOGY
Payer: MEDICARE

## 2019-08-20 ENCOUNTER — TRANSCRIBE ORDERS (OUTPATIENT)
Dept: RADIOLOGY | Facility: HOSPITAL | Age: 84
End: 2019-08-20

## 2019-08-20 DIAGNOSIS — M48.062 SPINAL STENOSIS OF LUMBAR REGION WITH NEUROGENIC CLAUDICATION: ICD-10-CM

## 2019-08-20 DIAGNOSIS — M21.371 BILATERAL FOOT-DROP: ICD-10-CM

## 2019-08-20 DIAGNOSIS — M21.372 BILATERAL FOOT-DROP: ICD-10-CM

## 2019-08-20 DIAGNOSIS — M54.16 LUMBAR RADICULOPATHY: ICD-10-CM

## 2019-08-20 PROCEDURE — 62323 NJX INTERLAMINAR LMBR/SAC: CPT

## 2019-08-20 RX ORDER — LIDOCAINE HYDROCHLORIDE 10 MG/ML
5 INJECTION, SOLUTION INFILTRATION; PERINEURAL
Status: DISCONTINUED | OUTPATIENT
Start: 2019-08-20 | End: 2019-08-21 | Stop reason: HOSPADM

## 2019-08-20 RX ORDER — METHYLPREDNISOLONE ACETATE 80 MG/ML
80 INJECTION, SUSPENSION INTRA-ARTICULAR; INTRALESIONAL; INTRAMUSCULAR; SOFT TISSUE
Status: DISCONTINUED | OUTPATIENT
Start: 2019-08-20 | End: 2019-08-21 | Stop reason: HOSPADM

## 2019-08-20 RX ORDER — BUPIVACAINE HYDROCHLORIDE 2.5 MG/ML
5 INJECTION, SOLUTION EPIDURAL; INFILTRATION; INTRACAUDAL
Status: DISCONTINUED | OUTPATIENT
Start: 2019-08-20 | End: 2019-08-21 | Stop reason: HOSPADM

## 2019-08-22 DIAGNOSIS — M54.41 CHRONIC BILATERAL LOW BACK PAIN WITH BILATERAL SCIATICA: ICD-10-CM

## 2019-08-22 DIAGNOSIS — M54.42 CHRONIC BILATERAL LOW BACK PAIN WITH BILATERAL SCIATICA: ICD-10-CM

## 2019-08-22 DIAGNOSIS — G89.29 CHRONIC BILATERAL LOW BACK PAIN WITH BILATERAL SCIATICA: ICD-10-CM

## 2019-08-22 RX ORDER — OXYCODONE HCL 10 MG/1
10 TABLET, FILM COATED, EXTENDED RELEASE ORAL EVERY 12 HOURS SCHEDULED
Qty: 60 TABLET | Refills: 0 | Status: ON HOLD | OUTPATIENT
Start: 2019-08-22 | End: 2019-09-21 | Stop reason: SDUPTHER

## 2019-08-25 NOTE — ASSESSMENT & PLAN NOTE
A high fiber diet has been found to increase bowel frequency and to be effective in treatment of constipation  Dietary fiber is a natural component of plant products, such as fruit, vegetables, and grains  It provides the bulk needed by the colon to eliminate body waste  As fiber passes through the colon, it acts as a sponge by absorbing water

## 2019-08-25 NOTE — ASSESSMENT & PLAN NOTE
Continue same treatment, awaiting for pain management and surgery to evaluate  I believe treatment is palliative  High risk and multiple comorbidity

## 2019-08-27 ENCOUNTER — OFFICE VISIT (OUTPATIENT)
Dept: UROLOGY | Age: 84
End: 2019-08-27
Payer: MEDICARE

## 2019-08-27 VITALS
HEIGHT: 64 IN | HEART RATE: 65 BPM | BODY MASS INDEX: 27.8 KG/M2 | SYSTOLIC BLOOD PRESSURE: 128 MMHG | DIASTOLIC BLOOD PRESSURE: 62 MMHG

## 2019-08-27 DIAGNOSIS — N20.1 LEFT URETERAL STONE: ICD-10-CM

## 2019-08-27 DIAGNOSIS — N20.0 KIDNEY STONE ON RIGHT SIDE: ICD-10-CM

## 2019-08-27 DIAGNOSIS — R31.29 OTHER MICROSCOPIC HEMATURIA: Primary | ICD-10-CM

## 2019-08-27 PROCEDURE — 99214 OFFICE O/P EST MOD 30 MIN: CPT | Performed by: UROLOGY

## 2019-08-27 NOTE — PROGRESS NOTES
Assessment/Plan:    Hematuria    The patient had noted gross hematuria and workup was undertaken  She was found to have bilateral stones  Her CT is reviewed  On the right side there is a large renal pelvic stone  On the left side there is a distal ureteral stone  At the present time she is afebrile and relatively asymptomatic  I recommended we proceed with cystoscopy, bilateral retrograde pyelography, left ureteroscopy with stone extraction / laser lithotripsy and possibly right ureteroscopy with laser lithotripsy if the  Procedure on the left side goes easily  The procedure was described in detail and risks discussed  Consent form was signed  We will obtain preoperative clearance and a urine culture prior to proceeding  She knows to call for fever or severe pain  Diagnoses and all orders for this visit:    Other microscopic hematuria    Left ureteral stone  -     Case request operating room: CYSTOSCOPY URETEROSCOPY WITH LITHOTRIPSY HOLMIUM LASER, RETROGRADE PYELOGRAM AND INSERTION STENT URETERAL; Standing  -     Case request operating room: CYSTOSCOPY URETEROSCOPY WITH LITHOTRIPSY HOLMIUM LASER, RETROGRADE PYELOGRAM AND INSERTION STENT URETERAL    Kidney stone on right side  -     Case request operating room: CYSTOSCOPY URETEROSCOPY WITH LITHOTRIPSY HOLMIUM LASER, RETROGRADE PYELOGRAM AND INSERTION STENT URETERAL; Standing  -     Case request operating room: CYSTOSCOPY URETEROSCOPY WITH LITHOTRIPSY HOLMIUM LASER, RETROGRADE PYELOGRAM AND INSERTION STENT URETERAL    Other orders  -     Diet NPO; Sips with meds; Standing  -     Place sequential compression device; Standing          Subjective:      Patient ID: Emerson Ramires is a 81 y o  female  Blood in Urine   This is a new problem  The current episode started 1 to 4 weeks ago  The problem has been resolved since onset  She describes the hematuria as gross hematuria  She reports no clotting in her urine stream  She is experiencing no pain   She describes her urine color as yellow  Irritative symptoms do not include frequency, nocturia or urgency  Associated symptoms include inability to urinate and nausea  Pertinent negatives include no abdominal pain, chills, dysuria, fever, flank pain or vomiting  Her sexual activity is non-contributory to the current illness  The following portions of the patient's history were reviewed and updated as appropriate: allergies, current medications, past family history, past medical history, past social history, past surgical history and problem list     Review of Systems   Constitutional: Negative for activity change, appetite change, chills, fatigue and fever  HENT: Negative  Eyes: Negative  Respiratory: Negative  Cardiovascular: Negative  Gastrointestinal: Positive for nausea  Negative for abdominal pain, blood in stool, constipation, diarrhea and vomiting  Endocrine: Negative  Genitourinary: Positive for hematuria  Negative for dysuria, flank pain, frequency, nocturia and urgency  See HPI   Musculoskeletal: Positive for back pain  Skin: Negative  Allergic/Immunologic: Negative  Neurological: Negative  Hematological: Negative  Psychiatric/Behavioral: Negative  Objective:      /62 (BP Location: Left arm, Patient Position: Sitting, Cuff Size: Adult)   Pulse 65   Ht 5' 4 02" (1 626 m)   LMP 01/01/1990 (Within Years)   BMI 27 80 kg/m²          Physical Exam   Constitutional: She is oriented to person, place, and time  She appears well-developed and well-nourished  No distress  In wheelchair   HENT:   Head: Normocephalic and atraumatic  Eyes: Conjunctivae are normal    Neck: Neck supple  Cardiovascular: Normal rate  Pulmonary/Chest: Effort normal    Abdominal: Soft  She exhibits no distension and no mass  There is no tenderness  There is no rebound, no guarding and no CVA tenderness  No hernia  Musculoskeletal: She exhibits no edema     No CVA tenderness   Neurological: She is alert and oriented to person, place, and time  Skin: Skin is warm and dry  She is not diaphoretic  Psychiatric: She has a normal mood and affect  Her behavior is normal  Judgment and thought content normal    Vitals reviewed  A  was employed for the entire office visit and for review of the surgical consent

## 2019-08-27 NOTE — LETTER
August 27, 2019     Griselda Oas, MD  [address] [place] [address]  [address] [place] [address],[address]  [address]    Patient: Jb Dowd   YOB: 1935   Date of Visit: 8/27/2019       Dear Dr Rahul Corrigan: Thank you for referring Jb Dowd to me for evaluation  Below are my notes for this consultation  If you have questions, please do not hesitate to call me  I look forward to following your patient along with you  Sincerely,        Marvis Bence, MD        CC: No Recipients  Marvis Bence, MD  8/27/2019  4:04 PM  Sign at close encounter  Assessment/Plan:    Hematuria    The patient had noted gross hematuria and workup was undertaken  She was found to have bilateral stones  Her CT is reviewed  On the right side there is a large renal pelvic stone  On the left side there is a distal ureteral stone  At the present time she is afebrile and relatively asymptomatic  I recommended we proceed with cystoscopy, bilateral retrograde pyelography, left ureteroscopy with stone extraction / laser lithotripsy and possibly right ureteroscopy with laser lithotripsy if the  Procedure on the left side goes easily  The procedure was described in detail and risks discussed  Consent form was signed  We will obtain preoperative clearance and a urine culture prior to proceeding  She knows to call for fever or severe pain         Diagnoses and all orders for this visit:    Other microscopic hematuria    Left ureteral stone  -     Case request operating room: CYSTOSCOPY URETEROSCOPY WITH LITHOTRIPSY HOLMIUM LASER, RETROGRADE PYELOGRAM AND INSERTION STENT URETERAL; Standing  -     Case request operating room: CYSTOSCOPY URETEROSCOPY WITH LITHOTRIPSY HOLMIUM LASER, RETROGRADE PYELOGRAM AND INSERTION STENT URETERAL    Kidney stone on right side  -     Case request operating room: CYSTOSCOPY URETEROSCOPY WITH LITHOTRIPSY HOLMIUM LASER, RETROGRADE PYELOGRAM AND INSERTION STENT URETERAL; Standing  -     Case request operating room: CYSTOSCOPY URETEROSCOPY WITH LITHOTRIPSY HOLMIUM LASER, RETROGRADE PYELOGRAM AND INSERTION STENT URETERAL    Other orders  -     Diet NPO; Sips with meds; Standing  -     Place sequential compression device; Standing          Subjective:      Patient ID: Danielle Borjas is a 81 y o  female  Blood in Urine   This is a new problem  The current episode started 1 to 4 weeks ago  The problem has been resolved since onset  She describes the hematuria as gross hematuria  She reports no clotting in her urine stream  She is experiencing no pain  She describes her urine color as yellow  Irritative symptoms do not include frequency, nocturia or urgency  Associated symptoms include inability to urinate and nausea  Pertinent negatives include no abdominal pain, chills, dysuria, fever, flank pain or vomiting  Her sexual activity is non-contributory to the current illness  The following portions of the patient's history were reviewed and updated as appropriate: allergies, current medications, past family history, past medical history, past social history, past surgical history and problem list     Review of Systems   Constitutional: Negative for activity change, appetite change, chills, fatigue and fever  HENT: Negative  Eyes: Negative  Respiratory: Negative  Cardiovascular: Negative  Gastrointestinal: Positive for nausea  Negative for abdominal pain, blood in stool, constipation, diarrhea and vomiting  Endocrine: Negative  Genitourinary: Positive for hematuria  Negative for dysuria, flank pain, frequency, nocturia and urgency  See HPI   Musculoskeletal: Positive for back pain  Skin: Negative  Allergic/Immunologic: Negative  Neurological: Negative  Hematological: Negative  Psychiatric/Behavioral: Negative            Objective:      /62 (BP Location: Left arm, Patient Position: Sitting, Cuff Size: Adult)   Pulse 65   Ht 5' 4 02" (1 626 m)   LMP 01/01/1990 (Within Years) BMI 27 80 kg/m²           Physical Exam   Constitutional: She is oriented to person, place, and time  She appears well-developed and well-nourished  No distress  In wheelchair   HENT:   Head: Normocephalic and atraumatic  Eyes: Conjunctivae are normal    Neck: Neck supple  Cardiovascular: Normal rate  Pulmonary/Chest: Effort normal    Abdominal: Soft  She exhibits no distension and no mass  There is no tenderness  There is no rebound, no guarding and no CVA tenderness  No hernia  Musculoskeletal: She exhibits no edema  No CVA tenderness   Neurological: She is alert and oriented to person, place, and time  Skin: Skin is warm and dry  She is not diaphoretic  Psychiatric: She has a normal mood and affect  Her behavior is normal  Judgment and thought content normal    Vitals reviewed

## 2019-08-27 NOTE — ASSESSMENT & PLAN NOTE
The patient had noted gross hematuria and workup was undertaken  She was found to have bilateral stones  Her CT is reviewed  On the right side there is a large renal pelvic stone  On the left side there is a distal ureteral stone  At the present time she is afebrile and relatively asymptomatic  I recommended we proceed with cystoscopy, bilateral retrograde pyelography, left ureteroscopy with stone extraction / laser lithotripsy and possibly right ureteroscopy with laser lithotripsy if the  Procedure on the left side goes easily  The procedure was described in detail and risks discussed  Consent form was signed  We will obtain preoperative clearance and a urine culture prior to proceeding  She knows to call for fever or severe pain

## 2019-08-27 NOTE — PATIENT INSTRUCTIONS
Cálculos renales   LO QUE NECESITA SABER:   ¿Qué son los cálculos renales? Los cálculos renales se lala en el sistema urinario cuando el agua y los residuos de la orina no están eliel balanceados  Cuando esto sucede, ciertos tipos de arlette de desecho se separan de la orina  Los arlette se acumulan y lala piedras en los riñones  Los cálculos renales pueden estar compuestos de ácido úrico, calcio, fosfato o arlette de oxalato  Es posible que usted tenga 1 o más cálculos en los riñones  ¿Qué aumenta mi riesgo de cálculos renales? · Usted no danae suficientes líquidos (especialmente agua) cada día  · Tener infecciones frecuentes en el tracto urinario  · Usted sigue un cierto tipo de dieta  Por ejemplo, las personas que consumen ghazal dieta geraldo en brandee o en sal podrían tener mayor riesgo de cálculos renales  Las personas que consumen alimentos altos en oxalato también podrían tener un mayor riesgo  Los alimentos altos en oxalato incluyen a las nueces, chocolate, café y vegetales de hojas verdes  · Evelyn ciertos medicamentos dank diuréticos, esteroides y antiácidos  · Algún familiar hudson sufrido de cálculos renales  · Nació con un trastorno renal o intestinal, o tiene otros problemas médicos, dank gota  ¿Cuáles son los signos y síntomas de los cálculos renales? · Dolor en la parte media de la espalda que se mueve a través de un costado o que podría propagarse a la pily    · [employer] y vómitos    · Necesidad de orinar con frecuencia, sensación de ardor al orinar u orina color eriberto o naylor    · Sensibilidad en la parte inferior de la espalda, en el costado o en el estómago  ¿Cómo se diagnostican los cálculos renales? Gomez médico le preguntará sobre gomez jeffrey, Loreatha Dessert y   [place]  El podría referirlo con un urólogo  Es posible que usted necesite exámenes para determinar el tipo de cálculos renales que tiene   Los exámenes pueden mostrar el tamaño de los cálculos y en dónde se encuentran en el sistema urinario  Usted podría tener sorin o más de los siguientes:  · Análisis de [place]  podrían mostrar si usted tiene kris en la orina  También podrían mostrar altas cantidades de la sustancia que forma los cálculos renales, dank el ácido úrico     · Los análisis de kris:  muestran lo eliel que funcionan indu riñones  También podrían utilizarse para revisar los niveles de calcio o de ácido úrico en la kris  · Ghazal tomografía computarizada helicoidal sin contraste  es un tipo de radiografía que [place] ghazal computadora para rob imágenes de los riñones  [employer] las imágenes para revisar si existen cálculos renales u otros problemas  · Se pueden hacer radiografías  de los riñones, uréteres y vejiga  Es posible que le administren un medio de contraste antes de rob las imágenes para que los médicos las puedan scott con mayor claridad  Usted podría necesitar que le realicen mas de ghazal radiografía  Dígale al médico si usted alguna vez ha tenido ghazal reacción alérgica al tinte de [place]  · Un ultrasonido abdominal  utiliza ondas sonoras para mostrar imágenes de [place] abdomen en un monitor  ¿Cómo se tratan los cálculos renales? · AINEs (Analgésicos antiinflamatorios no esteroides) dank el ibuprofeno, ayudan a disminuir la inflamación, el dolor y la [place]  Deepak medicamento esta disponible con o sin ghazal receta médica  Los AINEs pueden causar sangrado estomacal o problemas renales en ciertas personas  Si usted danae un medicamento anticoagulante, siempre pregúntele a [place] médico si los MINESH son seguros para usted  Siempre sofia la etiqueta de deepak medicamento y [place] instrucciones  · Podrían recetarle un medicamento  podrían ser Jessica Gracia  Pregunte cómo rob estos medicamentos de ghazal forma gordon  · Medicamentos,  para balancear el nivel de los electrolitos  · Un procedimiento o ghazal cirugía  para remover los cálculos renales si no se eliminan por sí solos   El tratamiento dependerá del tamaño y [place] de los cálculos renales  ¿Cómo puedo controlar los síntomas? · [place] suficiente líquidos  Es probable que zuniga médico le indique que tome al menos 8 a 12 vasos (de ocho onzas) de líquidos al día  [place] ayuda a [employer] cálculos renales cuando usted orina  El agua es el mejor líquido para rob  · Cuele la orina cada vez que use el baño  Orine por medio de un colador o un pedazo de bernice [place] para así recolectar los cálculos  Lleve los cálculos donde zuniga médico para que pueda enviarlos al laboratorio y realizarles exámenes  [place] ayudará a zuniga médico a planear el mejor tratamiento para usted  · Consuma alimentos saludables y variados  Los alimentos sanos incluyen frutas, vegetales, panes integrales, productos lácteos bajos en grasas, frijoles y pescado  Es probable que usted tenga que limitar la cantidad de sodio (sal) o proteínas que usted come  Pida más información sobre los mejores alimentos para usted  · Realice actividad física con regularidad  Indu cálculos pueden pasar con más facilidad si usted permanece activo  Pregunte sobre cuáles son las mejores actividades para usted  ¿Cuándo vinay buscar atención inmediata? · Usted tiene vómitos que no se alivian con medicación  ¿Cuándo vinay comunicarme con mi médico?   · Usted tiene fiebre  · Usted tiene dificultad para orinar  · Usted orina con kris  · Usted tiene dolor intenso  · Tiene alguna pregunta o inquietud acerca de zuniga condición o cuidado  ACUERDOS SOBRE ZUNIGA CUIDADO:   Usted tiene el derecho de ayudar a planear zuniga cuidado  Aprenda todo lo que pueda sobre zuniga condición y dank darle tratamiento  Discuta indu opciones de tratamiento con indu médicos para decidir el cuidado que usted desea recibir  Usted siempre tiene el derecho de rechazar el tratamiento  Esta información es sólo para uso en educación  Zuniga intención no es darle un consejo médico sobre enfermedades o tratamientos   Colsulte con zuniga Simi Sinclair farmacéutico antes de seguir cualquier régimen médico para saber si es seguro y efectivo para usted  © 2017 [employer] Information is for End User's use only and may not be sold, redistributed or otherwise used for commercial purposes  All illustrations and images included in CareNotes® are the copyrighted property of A D A M , Inc  or Kal Strong

## 2019-08-29 DIAGNOSIS — K59.03 DRUG-INDUCED CONSTIPATION: ICD-10-CM

## 2019-08-29 RX ORDER — BISACODYL 5 MG
TABLET, DELAYED RELEASE (ENTERIC COATED) ORAL
Qty: 30 TABLET | Refills: 0 | Status: SHIPPED | OUTPATIENT
Start: 2019-08-29 | End: 2019-09-03 | Stop reason: SDUPTHER

## 2019-09-03 ENCOUNTER — DOCTOR'S OFFICE (OUTPATIENT)
Dept: URBAN - METROPOLITAN AREA CLINIC 136 | Facility: CLINIC | Age: 84
Setting detail: OPHTHALMOLOGY
End: 2019-09-03
Payer: COMMERCIAL

## 2019-09-03 ENCOUNTER — RX ONLY (RX ONLY)
Age: 84
End: 2019-09-03

## 2019-09-03 ENCOUNTER — TELEPHONE (OUTPATIENT)
Dept: UROLOGY | Facility: MEDICAL CENTER | Age: 84
End: 2019-09-03

## 2019-09-03 DIAGNOSIS — Z96.1: ICD-10-CM

## 2019-09-03 DIAGNOSIS — K59.03 DRUG-INDUCED CONSTIPATION: ICD-10-CM

## 2019-09-03 DIAGNOSIS — H40.89: ICD-10-CM

## 2019-09-03 DIAGNOSIS — F03.91: ICD-10-CM

## 2019-09-03 DIAGNOSIS — H02.002: ICD-10-CM

## 2019-09-03 PROBLEM — H52.03 HYPEROPIA, ASTIGMATISM, PRESBYOPIA OU: Status: ACTIVE | Noted: 2019-04-29

## 2019-09-03 PROBLEM — H35.379: Status: ACTIVE | Noted: 2018-06-07

## 2019-09-03 PROBLEM — E11.9 DIABETES TYPE 2 NO RETINOPATHY: Status: ACTIVE | Noted: 2018-06-07

## 2019-09-03 PROBLEM — H34.11 CENTRAL RETINAL ARTERY OCCLUSION; RIGHT EYE: Status: ACTIVE | Noted: 2018-06-07

## 2019-09-03 PROBLEM — H52.223 HYPEROPIA, ASTIGMATISM, PRESBYOPIA OU: Status: ACTIVE | Noted: 2019-04-29

## 2019-09-03 PROBLEM — H52.4 HYPEROPIA, ASTIGMATISM, PRESBYOPIA OU: Status: ACTIVE | Noted: 2019-04-29

## 2019-09-03 PROCEDURE — 92012 INTRM OPH EXAM EST PATIENT: CPT | Performed by: OPHTHALMOLOGY

## 2019-09-03 ASSESSMENT — REFRACTION_MANIFEST
OD_ADD: +3.00
OD_VA3: 20/
OS_CYLINDER: -3.00
OS_VA2: 20/
OS_ADD: +3.00
OS_VA1: 20/
OD_SPHERE: BALANCE
OD_VA3: 20/
OS_VA2: 20/
OS_AXIS: 090
OD_VA2: 20/
OD_VA1: 20/
OS_VA3: 20/
OS_VA3: 20/
OS_VA1: 20/50-1
OD_VA2: 20/
OD_VA1: 20/NI
OS_SPHERE: +3.50
OU_VA: 20/
OU_VA: 20/

## 2019-09-03 ASSESSMENT — SPHEQUIV_DERIVED
OS_SPHEQUIV: 2.625
OS_SPHEQUIV: 2
OD_SPHEQUIV: -0.875

## 2019-09-03 ASSESSMENT — REFRACTION_AUTOREFRACTION
OS_SPHERE: +4.00
OD_CYLINDER: -5.25
OD_SPHERE: +1.75
OS_AXIS: 092
OD_AXIS: 77
OS_CYLINDER: -2.75

## 2019-09-03 ASSESSMENT — VISUAL ACUITY
OD_BCVA: 20/250
OS_BCVA: 20/NLP

## 2019-09-03 ASSESSMENT — REFRACTION_CURRENTRX
OD_OVR_VA: 20/
OS_OVR_VA: 20/
OD_OVR_VA: 20/
OD_OVR_VA: 20/
OS_OVR_VA: 20/
OS_OVR_VA: 20/

## 2019-09-03 ASSESSMENT — LID POSITION - ENTROPION: OD_ENTROPION: RLL

## 2019-09-03 NOTE — TELEPHONE ENCOUNTER
Received surgery paper work from 8/27/19 from clinical staff today  I left a voice mail message to schedule

## 2019-09-06 ENCOUNTER — OFFICE VISIT (OUTPATIENT)
Dept: CARDIOLOGY CLINIC | Facility: CLINIC | Age: 84
End: 2019-09-06
Payer: MEDICARE

## 2019-09-06 VITALS
BODY MASS INDEX: 27.35 KG/M2 | SYSTOLIC BLOOD PRESSURE: 118 MMHG | DIASTOLIC BLOOD PRESSURE: 54 MMHG | WEIGHT: 160.2 LBS | HEIGHT: 64 IN | HEART RATE: 61 BPM

## 2019-09-06 DIAGNOSIS — Z79.4 TYPE 2 DIABETES MELLITUS WITH HYPERGLYCEMIA, WITH LONG-TERM CURRENT USE OF INSULIN (HCC): Primary | ICD-10-CM

## 2019-09-06 DIAGNOSIS — I25.10 CORONARY ARTERY DISEASE INVOLVING NATIVE CORONARY ARTERY OF NATIVE HEART WITHOUT ANGINA PECTORIS: ICD-10-CM

## 2019-09-06 DIAGNOSIS — N20.1 LEFT URETERAL STONE: ICD-10-CM

## 2019-09-06 DIAGNOSIS — N17.9 ACUTE KIDNEY INJURY SUPERIMPOSED ON CHRONIC KIDNEY DISEASE (HCC): ICD-10-CM

## 2019-09-06 DIAGNOSIS — M54.41 CHRONIC BILATERAL LOW BACK PAIN WITH BILATERAL SCIATICA: ICD-10-CM

## 2019-09-06 DIAGNOSIS — R26.9 GAIT DIFFICULTY: ICD-10-CM

## 2019-09-06 DIAGNOSIS — N20.0 KIDNEY STONE ON RIGHT SIDE: ICD-10-CM

## 2019-09-06 DIAGNOSIS — N18.9 ACUTE KIDNEY INJURY SUPERIMPOSED ON CHRONIC KIDNEY DISEASE (HCC): ICD-10-CM

## 2019-09-06 DIAGNOSIS — G89.29 CHRONIC BILATERAL LOW BACK PAIN WITH BILATERAL SCIATICA: ICD-10-CM

## 2019-09-06 DIAGNOSIS — M54.42 CHRONIC BILATERAL LOW BACK PAIN WITH BILATERAL SCIATICA: ICD-10-CM

## 2019-09-06 DIAGNOSIS — N18.30 STAGE 3 CHRONIC KIDNEY DISEASE (HCC): ICD-10-CM

## 2019-09-06 DIAGNOSIS — E11.65 TYPE 2 DIABETES MELLITUS WITH HYPERGLYCEMIA, WITH LONG-TERM CURRENT USE OF INSULIN (HCC): Primary | ICD-10-CM

## 2019-09-06 DIAGNOSIS — I10 ESSENTIAL HYPERTENSION: ICD-10-CM

## 2019-09-06 DIAGNOSIS — I50.32 CHRONIC DIASTOLIC HEART FAILURE (HCC): ICD-10-CM

## 2019-09-06 DIAGNOSIS — R31.29 OTHER MICROSCOPIC HEMATURIA: ICD-10-CM

## 2019-09-06 PROCEDURE — 99214 OFFICE O/P EST MOD 30 MIN: CPT

## 2019-09-06 PROCEDURE — 1124F ACP DISCUSS-NO DSCNMKR DOCD: CPT

## 2019-09-06 NOTE — PROGRESS NOTES
Cardiology Office Note  Sandor Murray  [address] [place] [address], [address] Cardiology [email or web address]  [address] [place], [place] [address] [phone]  [phone]  09/06/19    Referring Physian: No referring provider defined for this encounter  Chief Complain/Reason for Referal:  Preoperative risk assessment    IMPRESSION:  1  Preoperative cardiac risk evaluation  2  Coronary artery disease   3  Hypertension  4  Hematuria  5  Ureteral stones/kidney stones  6  Lumbar radiculopathy    DISCUSSION/RECOMMENDATIONS:  This is a 52-year-old female who presents today for preoperative risk assessment prior to urologic surgery as well as possible hernia surgery in the future  She recently had a cardiac catheterization in July of 2019 which showed 30% mid LAD stenosis, 60% ostial 1st diagonal stenosis as well as a 40% tubular stenosis in the middle 3rd of the vessel, tubular 40% stenosis in the 1st obtuse marginal, tubular 50% stenosis in the 2nd obtuse marginal, 50% proximal RCA stenosis with moderate calcification as well as a 70% mid RCA tubular stenosis that was moderately calcified as well  Recommendation at that time was for medical management  She denies any chest pain, dyspnea at rest or with exertion, syncope presyncope paroxysmal nocturnal dyspnea dizziness lightheadedness or palpitations, or diaphoresis  She has known ureter and kidney stones and is planning to have urologic surgery for these  She is currently taking amlodipine 5 mg, Lipitor 40 mg, Lasix 40 mg, and metoprolol 25 mg  She stop taking her aspirin because bruising  I recommended that she begin taking 81 mg aspirin daily given her underlying coronary artery disease  She has a normal ejection fraction by echocardiogram last year      Overall, considering the above, and lack of angina, she appears to be on appropriate medical management for coronary artery disease, and would likely be a low cardiac risk for upcoming urologic or hernia surgery  The only addition to her medical therapy at this point today was for me to recommend that she resume taking her aspirin 81 mg  Her blood pressure is controlled today   ----------------------------------------------------------------------------------------------  PRIOR STRESS TEST: None    PRIOR CATH:    Cardiac catheterization July 2019  CORONARY CIRCULATION:  Left main: Normal   LAD: The vessel was large sized and mildly calcified  Angiography showed mild atherosclerosis  Mid LAD: There was a discrete 30 % stenosis  1st diagonal: The vessel was medium sized  There was a discrete 60 % stenosis at the ostium of the vessel segment  In a second lesion, there was a tubular 40 % stenosis in the middle third of the vessel segment  Circumflex: The vessel was large sized and mildly calcified  1st obtuse marginal: The vessel was medium sized  There was a tubular 40 % stenosis in the middle third of the vessel segment  2nd obtuse marginal: The vessel was large sized  There was a tubular 50 % stenosis in the middle third of the vessel segment  RCA: The vessel was medium sized and moderately calcified  Proximal RCA: There was a discrete 50 % stenosis  The lesion was moderately calcified  Mid RCA: There was a tubular 70 % stenosis  The lesion was moderately calcified  Recommended medical management    ECHO:  June 2018  CONCLUSIONS  Technically poor study  Mild-to-moderate left atrial enlargement  Aortic sclerosis  Hyperdynamic left ventricular systolic function, EF estimated at  75%  Indeterminate left ventricular diastolic function  Mild to moderately elevated left ventricular filling pressures      There are no diagnoses linked to this encounter    ======================================================    HPI:  I am seeing this patient in cardiology consultation for:  Preoperative risk assessment    Anabel Rvualcaba is a 81 y o  female with a past medical history of hypertension, coronary artery disease as dictated by cardiac catheterization above in July 2019, preserved ejection fraction by echocardiogram last year 2018 who presents today for cardiac risk assessment prior to urologic and hernia surgery  She denies any chest pain, chest pressure, dyspnea on exertion, paroxysmal nocturnal dyspnea, syncope, presyncope, diaphoresis, lightheadedness or dizziness  She is on a good medical regimen for coronary artery disease  Past Medical History:   Diagnosis Date    COPD [facility]chronic obstructive pulmonary disease) ([facility] [address] )     Hypertension     Kidney stones     Osteoporosis          Medications  Current Outpatient Medications   Medication Sig Dispense Refill    amLODIPine (NORVASC) 5 mg tablet Take 1 tablet (5 mg total) by mouth daily 90 tablet 3    atorvastatin (LIPITOR) 40 mg tablet Take 1 tablet (40 mg total) by mouth every 24 hours 90 tablet 3    bisacodyl (bisacodyl) 5 mg EC tablet Take 1 tablet (5 mg total) by mouth daily as needed for constipation for up to 30 days 30 tablet 0    brimonidine tartrate 0 2 % ophthalmic solution INSTILL 1 DROP INTO BOTH EYES 2 TIMES PER DAY  6    calcitonin, salmon, (MIACALCIN) 200 units/act nasal spray 1 spray into each nostril daily 1 mL 3    dorzolamide (TRUSOPT) 2 % ophthalmic solution Administer 1 drop to both eyes 3 (three) times a day 5 mL 5    furosemide (LASIX) 40 mg tablet Take 1 tablet (40 mg total) by mouth daily 60 tablet 5    insulin degludec (TRESIBA FLEXTOUCH) 100 units/mL injection pen To use 25 U at bedtime   9 pen 5    ipratropium (ATROVENT HFA) 17 mcg/act inhaler Inhale 2 puffs 4 (four) times a day Ninety day supplies please 3 Inhaler 3    Lancets (FREESTYLE) lancets Check blood sugar three times a day 100 each 5    latanoprost (XALATAN) 0 005 % ophthalmic solution ADMINISTER 1 DROP TO BOTH EYES DAILY AT BEDTIME  5    LINZESS 290 MCG CAPS TAKE ONE CAPSULE BY MOUTH DAILY ROSITA TOMASA CAPSULA POR VIA ORAL DIARIAMENTE  5    metoprolol tartrate (LOPRESSOR) 25 mg tablet Take 1 tablet (25 mg total) by mouth every 12 (twelve) hours 180 tablet 3    NOVOLOG FLEXPEN 100 units/mL injection pen 12 UNITS 3 TIMES A DAY WITH MEALS Ninety day supplies please 25 pen 3    omeprazole (PriLOSEC) 20 mg delayed release capsule Take 1 capsule (20 mg total) by mouth daily Ninety day supplies please 30 capsule 3    oxyCODONE (OxyCONTIN) 10 mg 12 hr tablet Take 1 tablet (10 mg total) by mouth every 12 (twelve) hours for 30 daysMax Daily Amount: 20 mg 60 tablet 0    pentoxifylline (TRENtal) 400 mg ER tablet TAKE ONE TABLET BY MOUTH 3 TIMES A DAY WITH A MEAL ROSITA 1 TABLETA POR VIA ORAL JONAH VECES AL MAC WITH A MEAL  3    pregabalin (LYRICA) 100 mg capsule Take 1 capsule (100 mg total) by mouth 2 (two) times a day for 100 days 60 capsule 5    sitaGLIPtin (JANUVIA) 50 mg tablet Take 1 tablet (50 mg total) by mouth daily 30 tablet 5    aspirin 81 mg chewable tablet Chew 1 tablet (81 mg total) daily (Patient not taking: Reported on 8/7/2019) 30 tablet 0    clopidogrel (PLAVIX) 75 mg tablet Take 1 tablet (75 mg total) by mouth daily (Patient not taking: Reported on 8/7/2019) 90 tablet 3     No current facility-administered medications for this visit  Allergies   Allergen Reactions    Acetaminophen      Listen on patient's paperwork from [facility] and Staffing   Family unable to confirm      Morphine And Related Vomiting    Tramadol Vomiting and Abdominal Pain     Other       Family History   Problem Relation Age of Onset    Diabetes Mother     No Known Problems Father     Throat cancer Daughter     No Known Problems Maternal Grandmother     No Known Problems Maternal Grandfather     No Known Problems Paternal Grandmother     No Known Problems Paternal Grandfather        Social History     Socioeconomic History    Marital status: Single     Spouse name: Not on file    Number of children: Not on file    Years of education: Not on file    Highest education level: Not on file   Occupational History    Not on file   Social Needs    Financial resource strain: Not on file    Food insecurity:     Worry: Not on file     Inability: Not on file    Transportation needs:     Medical: Not on file     Non-medical: Not on file   Tobacco Use    Smoking status: Never Smoker    Smokeless tobacco: Never Used    Tobacco comment: states she quit but family living with her states she smokes   Substance and Sexual Activity    Alcohol use: Never     Frequency: Never     Comment: OCCASIONAL    Drug use: Never    Sexual activity: Yes     Partners: Male   Lifestyle    Physical activity:     Days per week: Not on file     Minutes per session: Not on file    Stress: Not on file   Relationships    Social connections:     Talks on phone: Not on file     Gets together: Not on file     Attends Zoroastrian service: Not on file     Active member of club or organization: Not on file     Attends meetings of clubs or organizations: Not on file     Relationship status: Not on file    Intimate partner violence:     Fear of current or ex partner: Not on file     Emotionally abused: Not on file     Physically abused: Not on file     Forced sexual activity: Not on file   Other Topics Concern    Not on file   Social History Narrative    Not on file         Review of Systems   Constitutional: Negative for chills and fever  HENT: Negative for facial swelling and sore throat  Eyes: Negative for visual disturbance  Respiratory: Negative for cough, chest tightness, shortness of breath and wheezing  Cardiovascular: Negative for chest pain, palpitations and leg swelling  Gastrointestinal: Negative for abdominal pain, blood in stool, constipation, diarrhea, nausea and vomiting  Endocrine: Negative for cold intolerance and heat intolerance  Genitourinary: Positive for flank pain  Negative for decreased urine volume, difficulty urinating, dysuria and hematuria  Musculoskeletal: Negative for arthralgias, back pain and myalgias  Skin: Negative for rash  Neurological: Negative for dizziness, syncope, weakness and numbness  Psychiatric/Behavioral: Negative for agitation, behavioral problems and confusion  The patient is not nervous/anxious  Vitals:    09/06/19 1027   BP: 118/54   Pulse: 61       Physical Exam   General appearance: alert and oriented, in no acute distress elderly Korean-speaking female  Head: Normocephalic, without obvious abnormality, atraumatic  Eyes: conjunctivae/corneas clear  Anicteric  Neck: no adenopathy, no carotid bruit, no JVD, supple, symmetrical, trachea midline and thyroid not enlarged, no tenderness  Lungs: clear to auscultation bilaterally  Heart: regular rate and rhythm, S1, S2 normal, no murmur, click, rub or gallop  Abdomen: soft, non-tender; bowel sounds normal; no masses,  no organomegaly  Extremities: extremities normal, warm and well-perfused; no cyanosis, clubbing, or edema  Skin: Skin color, texture, turgor normal  No rashes or lesions        EKG:  Previous electrocardiogram shows sinus rhythm with right bundle-branch block and old inferior wall MI  No results found for this visit on 09/06/19       Lab Results   Component Value Date    WBC 7 50 07/31/2019    HGB 12 2 07/31/2019    HCT 37 4 07/31/2019    MCV 91 07/31/2019     (L) 07/31/2019      Lab Results   Component Value Date    SODIUM 139 07/31/2019    K 4 3 07/31/2019     07/31/2019    CO2 28 07/31/2019    BUN 13 07/31/2019    CREATININE 1 33 (H) 07/31/2019    GLUC 182 (H) 07/31/2019    CALCIUM 9 4 07/31/2019      Lab Results   Component Value Date    HGBA1C 7 7 (H) 06/04/2019      Lab Results   Component Value Date    CHOL 140 04/18/2018    CHOL 157 03/04/2014     Lab Results   Component Value Date    HDL 50 06/04/2019    HDL 48 12/20/2018    HDL 36 (L) 04/18/2018     Lab Results   Component Value Date    LDLCALC 68 06/04/2019    LDLCALC 89 12/20/2018 [address] 04/18/2018     Lab Results   Component Value Date    TRIG 114 06/04/2019    TRIG 165 (H) 12/20/2018    TRIG 160 (H) 04/18/2018     No results found for: CHOLHDL   Lab Results   Component Value Date    INR 1 03 07/06/2019    INR 1 06 12/30/2018    INR 1 05 07/08/2018    PROTIME 10 9 07/06/2019    PROTIME 13 9 12/30/2018    PROTIME 11 1 07/08/2018          Patient Active Problem List    Diagnosis Date Noted    Kidney stone on right side 08/27/2019    Spinal stenosis of lumbar region with neurogenic claudication 08/09/2019    Lumbar radiculopathy 08/09/2019    Lumbar spondylosis 08/09/2019    Lumbar disc herniation 08/09/2019    Shortness of breath 08/07/2019    Hematuria 08/07/2019    Coronary artery disease involving native coronary artery of native heart without angina pectoris 08/05/2019    Anemia 07/17/2019    Stage 3 chronic kidney disease ([address] ) 07/17/2019    Toxic encephalopathy 07/09/2019    Chronic bilateral low back pain with bilateral sciatica 07/08/2019    Left ureteral stone 07/06/2019    Type 2 myocardial infarction ([address] ) 07/06/2019    Acute kidney injury superimposed on chronic kidney disease ([address] ) 07/06/2019    Localized edema 05/14/2019    Drug-induced constipation 05/14/2019    Inflammatory spondylopathy of lumbosacral region ([address] ) 03/08/2019    Chronic diastolic heart failure ([address] ) 03/08/2019    PAD (peripheral artery disease) ([address] ) 03/08/2019    Acute renal failure superimposed on stage 3 chronic kidney disease ([address] ) 12/30/2018    Left arm cellulitis 12/30/2018    Accident due to mechanical fall without injury 12/30/2018    Type 2 diabetes mellitus with hyperglycemia, with long-term current use of insulin ([address] ) 12/30/2018    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 ([address] ) 08/14/2018    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 ([address] ) 21/86/2086    Preop examination 08/13/2018    Gait difficulty 08/13/2018    Gastritis without bleeding 08/07/2018    Hypertension 12/20/2017    Shoulder joint pain 08/19/2014       Portions of the record may have been created with voice recognition software  Occasional wrong word or "sound a like" substitutions may have occurred due to the inherent limitations of voice recognition software  Read the chart carefully and recognize, using context, where substitutions have occurred        Casie Parmar DO  9/6/2019 11:02 AM

## 2019-09-09 ENCOUNTER — OFFICE VISIT (OUTPATIENT)
Dept: OBGYN CLINIC | Facility: HOSPITAL | Age: 84
End: 2019-09-09
Payer: MEDICARE

## 2019-09-09 ENCOUNTER — TELEPHONE (OUTPATIENT)
Dept: OBGYN CLINIC | Facility: HOSPITAL | Age: 84
End: 2019-09-09

## 2019-09-09 VITALS
DIASTOLIC BLOOD PRESSURE: 69 MMHG | HEART RATE: 55 BPM | WEIGHT: 160 LBS | HEIGHT: 64 IN | SYSTOLIC BLOOD PRESSURE: 105 MMHG | BODY MASS INDEX: 27.31 KG/M2

## 2019-09-09 DIAGNOSIS — M21.371 BILATERAL FOOT-DROP: ICD-10-CM

## 2019-09-09 DIAGNOSIS — M21.372 BILATERAL FOOT-DROP: ICD-10-CM

## 2019-09-09 DIAGNOSIS — M54.16 LUMBAR RADICULOPATHY: ICD-10-CM

## 2019-09-09 DIAGNOSIS — M48.062 SPINAL STENOSIS OF LUMBAR REGION WITH NEUROGENIC CLAUDICATION: Primary | ICD-10-CM

## 2019-09-09 PROCEDURE — 99213 OFFICE O/P EST LOW 20 MIN: CPT | Performed by: ORTHOPAEDIC SURGERY

## 2019-09-09 NOTE — PROGRESS NOTES
Assessment/Plan:    Lumbar stenosis with bilateral radiculopathy and bilateral foot drop  · Treatment options ranging from conservative management to surgical intervention were discussed in detail  · Start physical therapy  · Continue injections with pain management   · Follow up PRN       Problem List Items Addressed This Visit        Nervous and Auditory    Lumbar radiculopathy     Patient presents for follow-up regarding severe spinal stenosis L4-5  She did receive the epidural steroid injection and notes some improvement in symptoms but still reports ongoing pain as well as ongoing weakness with foot dorsiflexion bilaterally  Physical exam is unchanged with bilateral foot drops  Otherwise neurologically stable    Assessment and plan    Thorough discussion is had with the patient and her family member present regarding treatment options which include repeat epidural steroid injections versus surgical intervention in the way of decompression with possible fusion  Given her comorbidities at this time patient and her family have elected to continue with conservative management and observation  Physical therapy will be added to her plan  Relevant Orders    Ambulatory Referral to  Place [facility]    CT epidural steroid injection (RYAN lumbar)       Other    Spinal stenosis of lumbar region with neurogenic claudication - Primary    Relevant Orders    Ambulatory Referral to [address] [place] [address] [facility]    CT epidural steroid injection (RYAN lumbar)      Other Visit Diagnoses     Bilateral foot-drop        Relevant Orders    Ambulatory Referral to [address] [place] [address] [facility]    CT epidural steroid injection (RYAN lumbar)            Subjective:      Patient ID: Ayla Butler is a 81 y o  female  HPI  Patient presents to the office for follow up of lumbar stenosis with bilateral foot drop   Since last visit she has received an epidural steroid injection targeted at L4-5 which was mildly beneficial  She continues to experience bilateral leg pain that goes down to her feet  She is accompanied by her care taker and her daughter today in the office  Translation provided by ARIEL Bull  Patient has history of diabetes  The following portions of the patient's history were reviewed and updated as appropriate: current medications, past family history, past medical history, past social history, past surgical history and problem list     Review of Systems   Constitutional: Negative for fever and unexpected weight change  HENT: Negative for hearing loss, nosebleeds and sore throat  Eyes: Negative for pain, redness and visual disturbance  Respiratory: Negative for cough, shortness of breath and wheezing  Cardiovascular: Negative for chest pain, palpitations and leg swelling  Gastrointestinal: Negative for abdominal pain, nausea and vomiting  Endocrine: Negative for polydipsia and polyuria  Genitourinary: Negative for dysuria and hematuria  Skin: Negative for rash and wound  Neurological: Negative for dizziness and headaches  Psychiatric/Behavioral: Negative for agitation and suicidal ideas  Objective:      /69   Pulse 55   Ht 5' 4" (1 626 m)   Wt 72 6 kg (160 lb)   LMP 01/01/1990 (Within Years)   BMI 27 46 kg/m²          Physical Exam   Constitutional: She is oriented to person, place, and time  She appears well-developed and well-nourished  HENT:   Head: Normocephalic and atraumatic  Eyes: Pupils are equal, round, and reactive to light  Neck: Neck supple  Cardiovascular: Intact distal pulses  Pulmonary/Chest: Effort normal and breath sounds normal    Neurological: She is alert and oriented to person, place, and time  Skin: Skin is warm and dry  Psychiatric: She has a normal mood and affect   Her behavior is normal        Patient presents to the office on a wheelchair  Modified straight leg raise positive bilaterally  Bilateral foot drop present  Strength 4/5 plantar flexion and 1/5 EHL bilaterally  Sensation L2-S1 intact bilaterally      Scribe Attestation    I,:   Mahnaz Colunga am acting as a scribe while in the presence of the attending physician :        I,:   Sheridan Skinner MD personally performed the services described in this documentation    as scribed in my presence :

## 2019-09-09 NOTE — ASSESSMENT & PLAN NOTE
Patient presents for follow-up regarding severe spinal stenosis L4-5  She did receive the epidural steroid injection and notes some improvement in symptoms but still reports ongoing pain as well as ongoing weakness with foot dorsiflexion bilaterally  Physical exam is unchanged with bilateral foot drops  Otherwise neurologically stable    Assessment and plan    Thorough discussion is had with the patient and her family member present regarding treatment options which include repeat epidural steroid injections versus surgical intervention in the way of decompression with possible fusion  Given her comorbidities at this time patient and her family have elected to continue with conservative management and observation  Physical therapy will be added to her plan

## 2019-09-09 NOTE — TELEPHONE ENCOUNTER
Call from Danuta from [facility]   Call back # [phone]  Dr Willian Carrasco       Patient is current with [facility]  Please send any orders to that facility  Phone #  [phone], Fax # [phone]    Please fax home health script to [phone]   Thank you

## 2019-09-11 ENCOUNTER — TELEPHONE (OUTPATIENT)
Dept: FAMILY MEDICINE CLINIC | Facility: CLINIC | Age: 84
End: 2019-09-11

## 2019-09-11 PROBLEM — N20.0 KIDNEY STONE: Status: ACTIVE | Noted: 2019-09-11

## 2019-09-11 PROBLEM — N20.1 CALCULUS OF URETER: Status: ACTIVE | Noted: 2019-09-11

## 2019-09-11 NOTE — TELEPHONE ENCOUNTER
I spoke to Naida's aide Thom Chen today and scheduled her Ureteroscopy, Laser, Stent with Dr Rayna De La Rosa at [facility] for 9/26/19    -instructions given verbally and mailed  -patient will have PAT testing done at [facility] Friday 9/13/19 as an walk-in  -No auth or clearances required    * Thom Chen can be reached at [phone]

## 2019-09-12 DIAGNOSIS — R11.0 NAUSEA: Primary | ICD-10-CM

## 2019-09-12 RX ORDER — ONDANSETRON 4 MG/1
4 TABLET, FILM COATED ORAL EVERY 8 HOURS PRN
Qty: 20 TABLET | Refills: 0 | Status: SHIPPED | OUTPATIENT
Start: 2019-09-12 | End: 2019-09-16 | Stop reason: SDUPTHER

## 2019-09-13 ENCOUNTER — APPOINTMENT (OUTPATIENT)
Dept: PREADMISSION TESTING | Facility: HOSPITAL | Age: 84
End: 2019-09-13
Payer: MEDICARE

## 2019-09-13 DIAGNOSIS — Z01.818 PREOP TESTING: Primary | ICD-10-CM

## 2019-09-13 LAB
ALBUMIN SERPL BCP-MCNC: 3.5 G/DL (ref 3–5.2)
ALP SERPL-CCNC: 142 U/L (ref 43–122)
ALT SERPL W P-5'-P-CCNC: 22 U/L (ref 9–52)
ANION GAP SERPL CALCULATED.3IONS-SCNC: 9 MMOL/L (ref 5–14)
AST SERPL W P-5'-P-CCNC: 21 U/L (ref 14–36)
BASOPHILS # BLD AUTO: 0.1 THOUSANDS/ΜL (ref 0–0.1)
BASOPHILS NFR BLD AUTO: 1 % (ref 0–1)
BILIRUB SERPL-MCNC: 0.5 MG/DL
BUN SERPL-MCNC: 19 MG/DL (ref 5–25)
CALCIUM SERPL-MCNC: 8.5 MG/DL (ref 8.4–10.2)
CHLORIDE SERPL-SCNC: 98 MMOL/L (ref 97–108)
CO2 SERPL-SCNC: 29 MMOL/L (ref 22–30)
CREAT SERPL-MCNC: 1.4 MG/DL (ref 0.6–1.2)
EOSINOPHIL # BLD AUTO: 0.1 THOUSAND/ΜL (ref 0–0.4)
EOSINOPHIL NFR BLD AUTO: 1 % (ref 0–6)
ERYTHROCYTE [DISTWIDTH] IN BLOOD BY AUTOMATED COUNT: 14.8 %
GFR SERPL CREATININE-BSD FRML MDRD: 35 ML/MIN/1.73SQ M
GLUCOSE P FAST SERPL-MCNC: 228 MG/DL (ref 70–99)
GLUCOSE SERPL-MCNC: 228 MG/DL (ref 70–99)
HCT VFR BLD AUTO: 31 % (ref 36–46)
HGB BLD-MCNC: 10.3 G/DL (ref 12–16)
LYMPHOCYTES # BLD AUTO: 1.4 THOUSANDS/ΜL (ref 0.5–4)
LYMPHOCYTES NFR BLD AUTO: 15 % (ref 25–45)
MCH RBC QN AUTO: 28.2 PG (ref 26–34)
MCHC RBC AUTO-ENTMCNC: 33.1 G/DL (ref 31–36)
MCV RBC AUTO: 85 FL (ref 80–100)
MONOCYTES # BLD AUTO: 0.7 THOUSAND/ΜL (ref 0.2–0.9)
MONOCYTES NFR BLD AUTO: 7 % (ref 1–10)
NEUTROPHILS # BLD AUTO: 7.3 THOUSANDS/ΜL (ref 1.8–7.8)
NEUTS SEG NFR BLD AUTO: 77 % (ref 45–65)
PLATELET # BLD AUTO: 194 THOUSANDS/UL (ref 150–450)
PMV BLD AUTO: 10.5 FL (ref 8.9–12.7)
POTASSIUM SERPL-SCNC: 4.4 MMOL/L (ref 3.6–5)
PROT SERPL-MCNC: 6.1 G/DL (ref 5.9–8.4)
RBC # BLD AUTO: 3.65 MILLION/UL (ref 4–5.2)
SODIUM SERPL-SCNC: 136 MMOL/L (ref 137–147)
WBC # BLD AUTO: 9.4 THOUSAND/UL (ref 4.5–11)

## 2019-09-13 PROCEDURE — 80053 COMPREHEN METABOLIC PANEL: CPT

## 2019-09-13 PROCEDURE — 85025 COMPLETE CBC W/AUTO DIFF WBC: CPT

## 2019-09-13 PROCEDURE — 87086 URINE CULTURE/COLONY COUNT: CPT

## 2019-09-13 NOTE — PRE-PROCEDURE INSTRUCTIONS
Pre-Surgery Instructions:   Medication Instructions    amLODIPine (NORVASC) 5 mg tablet Instructed patient per Anesthesia Guidelines   brimonidine tartrate 0 2 % ophthalmic solution Instructed patient per Anesthesia Guidelines   calcitonin, salmon, (MIACALCIN) 200 units/act nasal spray Instructed patient per Anesthesia Guidelines   dorzolamide (TRUSOPT) 2 % ophthalmic solution Instructed patient per Anesthesia Guidelines   ipratropium (ATROVENT HFA) 17 mcg/act inhaler Instructed patient per Anesthesia Guidelines   metoprolol tartrate (LOPRESSOR) 25 mg tablet Instructed patient per Anesthesia Guidelines   omeprazole (PriLOSEC) 20 mg delayed release capsule Instructed patient per Anesthesia Guidelines   oxyCODONE (OxyCONTIN) 10 mg 12 hr tablet Instructed patient per Anesthesia Guidelines

## 2019-09-14 LAB — BACTERIA UR CULT: NORMAL

## 2019-09-16 ENCOUNTER — TELEPHONE (OUTPATIENT)
Dept: UROLOGY | Facility: MEDICAL CENTER | Age: 84
End: 2019-09-16

## 2019-09-16 DIAGNOSIS — R11.0 NAUSEA: ICD-10-CM

## 2019-09-16 NOTE — TELEPHONE ENCOUNTER
Chanel from [address]  left a message about patients blood sugar level is 228 and she was fasting  Also wants to know if she should hold her plavix

## 2019-09-17 RX ORDER — ONDANSETRON 4 MG/1
4 TABLET, FILM COATED ORAL EVERY 8 HOURS PRN
Qty: 20 TABLET | Refills: 0 | Status: SHIPPED | OUTPATIENT
Start: 2019-09-17 | End: 2019-11-25 | Stop reason: SDUPTHER

## 2019-09-17 NOTE — TELEPHONE ENCOUNTER
Called Chanel - she is notified that the patient doesn't need to stop her Plavix per Dr Melanie Ford

## 2019-09-18 ENCOUNTER — APPOINTMENT (EMERGENCY)
Dept: ULTRASOUND IMAGING | Facility: HOSPITAL | Age: 84
End: 2019-09-18
Payer: MEDICARE

## 2019-09-18 ENCOUNTER — HOSPITAL ENCOUNTER (INPATIENT)
Facility: HOSPITAL | Age: 84
LOS: 3 days | Discharge: HOME HEALTH | DRG: 687 | End: 2019-09-21
Attending: INTERNAL MEDICINE | Admitting: INTERNAL MEDICINE
Payer: MEDICARE

## 2019-09-18 ENCOUNTER — APPOINTMENT (INPATIENT)
Dept: RADIOLOGY | Facility: HOSPITAL | Age: 84
DRG: 687 | End: 2019-09-18
Payer: MEDICARE

## 2019-09-18 ENCOUNTER — APPOINTMENT (EMERGENCY)
Dept: CT IMAGING | Facility: HOSPITAL | Age: 84
End: 2019-09-18
Payer: MEDICARE

## 2019-09-18 ENCOUNTER — HOSPITAL ENCOUNTER (EMERGENCY)
Facility: HOSPITAL | Age: 84
End: 2019-09-18
Attending: EMERGENCY MEDICINE | Admitting: EMERGENCY MEDICINE
Payer: MEDICARE

## 2019-09-18 VITALS
TEMPERATURE: 98.1 F | DIASTOLIC BLOOD PRESSURE: 82 MMHG | SYSTOLIC BLOOD PRESSURE: 148 MMHG | OXYGEN SATURATION: 95 % | RESPIRATION RATE: 16 BRPM | HEART RATE: 68 BPM

## 2019-09-18 DIAGNOSIS — N28.89 RENAL MASS: ICD-10-CM

## 2019-09-18 DIAGNOSIS — M54.42 CHRONIC BILATERAL LOW BACK PAIN WITH BILATERAL SCIATICA: ICD-10-CM

## 2019-09-18 DIAGNOSIS — R31.0 GROSS HEMATURIA: ICD-10-CM

## 2019-09-18 DIAGNOSIS — R58 RETROPERITONEAL BLEED: ICD-10-CM

## 2019-09-18 DIAGNOSIS — R41.81 AGE-RELATED COGNITIVE DECLINE: ICD-10-CM

## 2019-09-18 DIAGNOSIS — G89.29 CHRONIC BILATERAL LOW BACK PAIN WITH BILATERAL SCIATICA: ICD-10-CM

## 2019-09-18 DIAGNOSIS — N28.89 RENAL MASS: Primary | ICD-10-CM

## 2019-09-18 DIAGNOSIS — M54.41 CHRONIC BILATERAL LOW BACK PAIN WITH BILATERAL SCIATICA: ICD-10-CM

## 2019-09-18 DIAGNOSIS — R31.9 HEMATURIA: Primary | ICD-10-CM

## 2019-09-18 DIAGNOSIS — N23 RENAL COLIC: ICD-10-CM

## 2019-09-18 DIAGNOSIS — D62 ACUTE BLOOD LOSS ANEMIA: ICD-10-CM

## 2019-09-18 PROBLEM — R10.9 RIGHT FLANK PAIN: Status: ACTIVE | Noted: 2019-09-18

## 2019-09-18 PROBLEM — D72.829 LEUKOCYTOSIS: Status: ACTIVE | Noted: 2019-09-18

## 2019-09-18 PROBLEM — K76.89 LIVER NODULE: Status: ACTIVE | Noted: 2019-09-18

## 2019-09-18 LAB
ABO GROUP BLD: NORMAL
ALBUMIN SERPL BCP-MCNC: 3.6 G/DL (ref 3–5.2)
ALP SERPL-CCNC: 141 U/L (ref 43–122)
ALT SERPL W P-5'-P-CCNC: 17 U/L (ref 9–52)
ANION GAP SERPL CALCULATED.3IONS-SCNC: 10 MMOL/L (ref 5–14)
APTT PPP: 26 SECONDS (ref 23–37)
AST SERPL W P-5'-P-CCNC: 24 U/L (ref 14–36)
BACTERIA UR QL AUTO: ABNORMAL /HPF
BASOPHILS # BLD AUTO: 0.1 THOUSANDS/ΜL (ref 0–0.1)
BASOPHILS # BLD AUTO: 0.1 THOUSANDS/ΜL (ref 0–0.1)
BASOPHILS NFR BLD AUTO: 0 % (ref 0–1)
BASOPHILS NFR BLD AUTO: 1 % (ref 0–1)
BILIRUB SERPL-MCNC: 0.4 MG/DL
BILIRUB UR QL STRIP: NEGATIVE
BLD GP AB SCN SERPL QL: NEGATIVE
BUN SERPL-MCNC: 20 MG/DL (ref 5–25)
CALCIUM SERPL-MCNC: 8.5 MG/DL (ref 8.4–10.2)
CHLORIDE SERPL-SCNC: 101 MMOL/L (ref 97–108)
CLARITY UR: ABNORMAL
CO2 SERPL-SCNC: 29 MMOL/L (ref 22–30)
COLOR UR: ABNORMAL
CREAT SERPL-MCNC: 1.54 MG/DL (ref 0.6–1.2)
EOSINOPHIL # BLD AUTO: 0.1 THOUSAND/ΜL (ref 0–0.4)
EOSINOPHIL # BLD AUTO: 0.1 THOUSAND/ΜL (ref 0–0.4)
EOSINOPHIL NFR BLD AUTO: 1 % (ref 0–6)
EOSINOPHIL NFR BLD AUTO: 1 % (ref 0–6)
ERYTHROCYTE [DISTWIDTH] IN BLOOD BY AUTOMATED COUNT: 14.4 %
ERYTHROCYTE [DISTWIDTH] IN BLOOD BY AUTOMATED COUNT: 14.8 %
GFR SERPL CREATININE-BSD FRML MDRD: 31 ML/MIN/1.73SQ M
GLUCOSE SERPL-MCNC: 153 MG/DL (ref 65–140)
GLUCOSE SERPL-MCNC: 182 MG/DL (ref 70–99)
GLUCOSE SERPL-MCNC: 187 MG/DL (ref 65–140)
GLUCOSE SERPL-MCNC: 188 MG/DL (ref 65–140)
GLUCOSE UR STRIP-MCNC: NEGATIVE MG/DL
HCT VFR BLD AUTO: 28 % (ref 34.8–46.1)
HCT VFR BLD AUTO: 28.1 % (ref 36–46)
HCT VFR BLD AUTO: 29.9 % (ref 36–46)
HGB BLD-MCNC: 8.9 G/DL (ref 11.5–15.4)
HGB BLD-MCNC: 9.3 G/DL (ref 12–16)
HGB BLD-MCNC: 9.7 G/DL (ref 12–16)
HGB UR QL STRIP.AUTO: 150
INR PPP: 1.1 (ref 0.84–1.19)
KETONES UR STRIP-MCNC: ABNORMAL MG/DL
LEUKOCYTE ESTERASE UR QL STRIP: NEGATIVE
LIPASE SERPL-CCNC: 50 U/L (ref 23–300)
LYMPHOCYTES # BLD AUTO: 1.4 THOUSANDS/ΜL (ref 0.5–4)
LYMPHOCYTES # BLD AUTO: 2.2 THOUSANDS/ΜL (ref 0.5–4)
LYMPHOCYTES NFR BLD AUTO: 12 % (ref 25–45)
LYMPHOCYTES NFR BLD AUTO: 17 % (ref 25–45)
MCH RBC QN AUTO: 27.5 PG (ref 26–34)
MCH RBC QN AUTO: 27.6 PG (ref 26–34)
MCHC RBC AUTO-ENTMCNC: 32.5 G/DL (ref 31–36)
MCHC RBC AUTO-ENTMCNC: 32.9 G/DL (ref 31–36)
MCV RBC AUTO: 84 FL (ref 80–100)
MCV RBC AUTO: 85 FL (ref 80–100)
MONOCYTES # BLD AUTO: 0.6 THOUSAND/ΜL (ref 0.2–0.9)
MONOCYTES # BLD AUTO: 1 THOUSAND/ΜL (ref 0.2–0.9)
MONOCYTES NFR BLD AUTO: 6 % (ref 1–10)
MONOCYTES NFR BLD AUTO: 8 % (ref 1–10)
NEUTROPHILS # BLD AUTO: 9 THOUSANDS/ΜL (ref 1.8–7.8)
NEUTROPHILS # BLD AUTO: 9.3 THOUSANDS/ΜL (ref 1.8–7.8)
NEUTS SEG NFR BLD AUTO: 73 % (ref 45–65)
NEUTS SEG NFR BLD AUTO: 81 % (ref 45–65)
NITRITE UR QL STRIP: NEGATIVE
NON-SQ EPI CELLS URNS QL MICRO: ABNORMAL /HPF
NT-PROBNP SERPL-MCNC: 608 PG/ML (ref 0–299)
PH UR STRIP.AUTO: 7 [PH]
PLATELET # BLD AUTO: 213 THOUSANDS/UL (ref 150–450)
PLATELET # BLD AUTO: 227 THOUSANDS/UL (ref 150–450)
PMV BLD AUTO: 9.6 FL (ref 8.9–12.7)
PMV BLD AUTO: 9.7 FL (ref 8.9–12.7)
POTASSIUM SERPL-SCNC: 4.2 MMOL/L (ref 3.6–5)
PROT SERPL-MCNC: 6.5 G/DL (ref 5.9–8.4)
PROT UR STRIP-MCNC: >=500 MG/DL
PROTHROMBIN TIME: 14.3 SECONDS (ref 11.6–14.5)
RBC # BLD AUTO: 3.35 MILLION/UL (ref 4–5.2)
RBC # BLD AUTO: 3.52 MILLION/UL (ref 4–5.2)
RBC #/AREA URNS AUTO: ABNORMAL /HPF
RH BLD: POSITIVE
SODIUM SERPL-SCNC: 140 MMOL/L (ref 137–147)
SP GR UR STRIP.AUTO: 1.01 (ref 1–1.04)
SPECIMEN EXPIRATION DATE: NORMAL
TROPONIN I SERPL-MCNC: <0.01 NG/ML (ref 0–0.03)
UROBILINOGEN UA: NEGATIVE MG/DL
WBC # BLD AUTO: 11.2 THOUSAND/UL (ref 4.5–11)
WBC # BLD AUTO: 12.7 THOUSAND/UL (ref 4.5–11)
WBC #/AREA URNS AUTO: ABNORMAL /HPF

## 2019-09-18 PROCEDURE — 85025 COMPLETE CBC W/AUTO DIFF WBC: CPT | Performed by: PHYSICIAN ASSISTANT

## 2019-09-18 PROCEDURE — 85014 HEMATOCRIT: CPT | Performed by: PHYSICIAN ASSISTANT

## 2019-09-18 PROCEDURE — 82948 REAGENT STRIP/BLOOD GLUCOSE: CPT

## 2019-09-18 PROCEDURE — 80053 COMPREHEN METABOLIC PANEL: CPT | Performed by: PHYSICIAN ASSISTANT

## 2019-09-18 PROCEDURE — 76700 US EXAM ABDOM COMPLETE: CPT

## 2019-09-18 PROCEDURE — C9113 INJ PANTOPRAZOLE SODIUM, VIA: HCPCS | Performed by: PHYSICIAN ASSISTANT

## 2019-09-18 PROCEDURE — 93005 ELECTROCARDIOGRAM TRACING: CPT

## 2019-09-18 PROCEDURE — 36415 COLL VENOUS BLD VENIPUNCTURE: CPT | Performed by: PHYSICIAN ASSISTANT

## 2019-09-18 PROCEDURE — 85730 THROMBOPLASTIN TIME PARTIAL: CPT | Performed by: PHYSICIAN ASSISTANT

## 2019-09-18 PROCEDURE — 85610 PROTHROMBIN TIME: CPT | Performed by: PHYSICIAN ASSISTANT

## 2019-09-18 PROCEDURE — 74176 CT ABD & PELVIS W/O CONTRAST: CPT

## 2019-09-18 PROCEDURE — 99285 EMERGENCY DEPT VISIT HI MDM: CPT

## 2019-09-18 PROCEDURE — 71046 X-RAY EXAM CHEST 2 VIEWS: CPT

## 2019-09-18 PROCEDURE — 84484 ASSAY OF TROPONIN QUANT: CPT | Performed by: PHYSICIAN ASSISTANT

## 2019-09-18 PROCEDURE — 86901 BLOOD TYPING SEROLOGIC RH(D): CPT | Performed by: PHYSICIAN ASSISTANT

## 2019-09-18 PROCEDURE — 83690 ASSAY OF LIPASE: CPT | Performed by: PHYSICIAN ASSISTANT

## 2019-09-18 PROCEDURE — 99283 EMERGENCY DEPT VISIT LOW MDM: CPT | Performed by: PHYSICIAN ASSISTANT

## 2019-09-18 PROCEDURE — 96360 HYDRATION IV INFUSION INIT: CPT

## 2019-09-18 PROCEDURE — 85018 HEMOGLOBIN: CPT | Performed by: PHYSICIAN ASSISTANT

## 2019-09-18 PROCEDURE — 81001 URINALYSIS AUTO W/SCOPE: CPT | Performed by: PHYSICIAN ASSISTANT

## 2019-09-18 PROCEDURE — 83880 ASSAY OF NATRIURETIC PEPTIDE: CPT | Performed by: PHYSICIAN ASSISTANT

## 2019-09-18 PROCEDURE — 96374 THER/PROPH/DIAG INJ IV PUSH: CPT

## 2019-09-18 PROCEDURE — 86850 RBC ANTIBODY SCREEN: CPT | Performed by: PHYSICIAN ASSISTANT

## 2019-09-18 PROCEDURE — 86900 BLOOD TYPING SEROLOGIC ABO: CPT | Performed by: PHYSICIAN ASSISTANT

## 2019-09-18 PROCEDURE — 96361 HYDRATE IV INFUSION ADD-ON: CPT

## 2019-09-18 PROCEDURE — 99223 1ST HOSP IP/OBS HIGH 75: CPT | Performed by: PHYSICIAN ASSISTANT

## 2019-09-18 PROCEDURE — 96375 TX/PRO/DX INJ NEW DRUG ADDON: CPT

## 2019-09-18 PROCEDURE — 81003 URINALYSIS AUTO W/O SCOPE: CPT | Performed by: PHYSICIAN ASSISTANT

## 2019-09-18 RX ORDER — MORPHINE SULFATE 4 MG/ML
4 INJECTION, SOLUTION INTRAMUSCULAR; INTRAVENOUS ONCE
Status: COMPLETED | OUTPATIENT
Start: 2019-09-18 | End: 2019-09-18

## 2019-09-18 RX ORDER — SODIUM CHLORIDE 9 MG/ML
500 INJECTION, SOLUTION INTRAVENOUS ONCE
Status: COMPLETED | OUTPATIENT
Start: 2019-09-18 | End: 2019-09-18

## 2019-09-18 RX ORDER — OXYCODONE HYDROCHLORIDE 5 MG/1
5 TABLET ORAL ONCE
Status: COMPLETED | OUTPATIENT
Start: 2019-09-18 | End: 2019-09-18

## 2019-09-18 RX ORDER — AMLODIPINE BESYLATE 5 MG/1
5 TABLET ORAL DAILY
Status: DISCONTINUED | OUTPATIENT
Start: 2019-09-19 | End: 2019-09-21 | Stop reason: HOSPADM

## 2019-09-18 RX ORDER — PREGABALIN 50 MG/1
100 CAPSULE ORAL 2 TIMES DAILY
Status: DISCONTINUED | OUTPATIENT
Start: 2019-09-18 | End: 2019-09-21 | Stop reason: HOSPADM

## 2019-09-18 RX ORDER — DORZOLAMIDE HCL 20 MG/ML
1 SOLUTION/ DROPS OPHTHALMIC 3 TIMES DAILY
Status: DISCONTINUED | OUTPATIENT
Start: 2019-09-18 | End: 2019-09-21 | Stop reason: HOSPADM

## 2019-09-18 RX ORDER — DIPHENHYDRAMINE HYDROCHLORIDE 50 MG/ML
INJECTION INTRAMUSCULAR; INTRAVENOUS
Status: COMPLETED
Start: 2019-09-18 | End: 2019-09-18

## 2019-09-18 RX ORDER — HYDROMORPHONE HCL/PF 1 MG/ML
0.5 SYRINGE (ML) INJECTION ONCE
Status: COMPLETED | OUTPATIENT
Start: 2019-09-18 | End: 2019-09-18

## 2019-09-18 RX ORDER — PANTOPRAZOLE SODIUM 40 MG/1
40 INJECTION, POWDER, FOR SOLUTION INTRAVENOUS ONCE
Status: COMPLETED | OUTPATIENT
Start: 2019-09-18 | End: 2019-09-18

## 2019-09-18 RX ORDER — ONDANSETRON 2 MG/ML
4 INJECTION INTRAMUSCULAR; INTRAVENOUS EVERY 6 HOURS PRN
Status: DISCONTINUED | OUTPATIENT
Start: 2019-09-18 | End: 2019-09-21 | Stop reason: HOSPADM

## 2019-09-18 RX ORDER — INSULIN GLARGINE 100 [IU]/ML
12 INJECTION, SOLUTION SUBCUTANEOUS
Status: DISCONTINUED | OUTPATIENT
Start: 2019-09-18 | End: 2019-09-21 | Stop reason: HOSPADM

## 2019-09-18 RX ORDER — ALBUTEROL SULFATE 90 UG/1
2 AEROSOL, METERED RESPIRATORY (INHALATION) EVERY 4 HOURS PRN
Status: DISCONTINUED | OUTPATIENT
Start: 2019-09-18 | End: 2019-09-21 | Stop reason: HOSPADM

## 2019-09-18 RX ORDER — DIPHENHYDRAMINE HYDROCHLORIDE 50 MG/ML
50 INJECTION INTRAMUSCULAR; INTRAVENOUS ONCE
Status: COMPLETED | OUTPATIENT
Start: 2019-09-18 | End: 2019-09-18

## 2019-09-18 RX ORDER — LATANOPROST 50 UG/ML
1 SOLUTION/ DROPS OPHTHALMIC
Status: DISCONTINUED | OUTPATIENT
Start: 2019-09-18 | End: 2019-09-21 | Stop reason: HOSPADM

## 2019-09-18 RX ORDER — SODIUM CHLORIDE 9 MG/ML
75 INJECTION, SOLUTION INTRAVENOUS CONTINUOUS
Status: DISCONTINUED | OUTPATIENT
Start: 2019-09-18 | End: 2019-09-21 | Stop reason: HOSPADM

## 2019-09-18 RX ORDER — PANTOPRAZOLE SODIUM 20 MG/1
20 TABLET, DELAYED RELEASE ORAL
Status: DISCONTINUED | OUTPATIENT
Start: 2019-09-19 | End: 2019-09-21 | Stop reason: HOSPADM

## 2019-09-18 RX ORDER — BRIMONIDINE TARTRATE 0.15 %
1 DROPS OPHTHALMIC (EYE) 2 TIMES DAILY
Status: DISCONTINUED | OUTPATIENT
Start: 2019-09-18 | End: 2019-09-21 | Stop reason: HOSPADM

## 2019-09-18 RX ORDER — ATORVASTATIN CALCIUM 40 MG/1
40 TABLET, FILM COATED ORAL
Status: DISCONTINUED | OUTPATIENT
Start: 2019-09-18 | End: 2019-09-21 | Stop reason: HOSPADM

## 2019-09-18 RX ORDER — PENTOXIFYLLINE 400 MG/1
400 TABLET, EXTENDED RELEASE ORAL DAILY
Status: DISCONTINUED | OUTPATIENT
Start: 2019-09-19 | End: 2019-09-21 | Stop reason: HOSPADM

## 2019-09-18 RX ADMIN — HYDROMORPHONE HYDROCHLORIDE 0.5 MG: 1 INJECTION, SOLUTION INTRAMUSCULAR; INTRAVENOUS; SUBCUTANEOUS at 15:48

## 2019-09-18 RX ADMIN — SODIUM CHLORIDE 500 ML/HR: 0.9 INJECTION, SOLUTION INTRAVENOUS at 09:54

## 2019-09-18 RX ADMIN — PANTOPRAZOLE SODIUM 40 MG: 40 INJECTION, POWDER, FOR SOLUTION INTRAVENOUS at 12:09

## 2019-09-18 RX ADMIN — DORZOLAMIDE HYDROCHLORIDE 1 DROP: 20 SOLUTION/ DROPS OPHTHALMIC at 20:38

## 2019-09-18 RX ADMIN — MORPHINE SULFATE 4 MG: 4 INJECTION INTRAVENOUS at 18:44

## 2019-09-18 RX ADMIN — LATANOPROST 1 DROP: 50 SOLUTION OPHTHALMIC at 21:24

## 2019-09-18 RX ADMIN — METOPROLOL TARTRATE 25 MG: 25 TABLET, FILM COATED ORAL at 20:37

## 2019-09-18 RX ADMIN — ATORVASTATIN CALCIUM 40 MG: 40 TABLET, FILM COATED ORAL at 21:24

## 2019-09-18 RX ADMIN — PREGABALIN 100 MG: 50 CAPSULE ORAL at 20:38

## 2019-09-18 RX ADMIN — DIPHENHYDRAMINE HYDROCHLORIDE 50 MG: 50 INJECTION INTRAMUSCULAR; INTRAVENOUS at 12:25

## 2019-09-18 RX ADMIN — BRIMONIDINE TARTRATE 1 DROP: 1.5 SOLUTION OPHTHALMIC at 20:38

## 2019-09-18 RX ADMIN — INSULIN GLARGINE 12 UNITS: 100 INJECTION, SOLUTION SUBCUTANEOUS at 22:37

## 2019-09-18 RX ADMIN — SODIUM CHLORIDE 75 ML/HR: 0.9 INJECTION, SOLUTION INTRAVENOUS at 20:37

## 2019-09-18 RX ADMIN — MORPHINE SULFATE 2 MG: 2 INJECTION, SOLUTION INTRAMUSCULAR; INTRAVENOUS at 12:13

## 2019-09-18 RX ADMIN — OXYCODONE HYDROCHLORIDE 5 MG: 5 TABLET ORAL at 10:18

## 2019-09-18 NOTE — ASSESSMENT & PLAN NOTE
Mild stable at leukocytosis of 12 in setting of gross hematuria    No s/sx of acute infection  ua bland save gross hematuria  Monitor off abx

## 2019-09-18 NOTE — QUICK NOTE
Treatment Plan - Urology   Nadene Ganser 81 y o  female MRN: [id number]  Unit/Bed#: ED 15 Encounter: [id number]    Case reviewed with Kashif Coker at [facility]  Reviewed with Dr Freddy Mckoy - 81year old female presenting with RUQ abdominal and back pain, found to have gross hematuria and on CT, Right renal collecting system hyperdense material seen in a curvilinear pattern within the   posterior mid calyx and filling and distending the renal pelvis most consistent with appearance of blood products   Left kidney stable  Patient's pre-hospital Hgb 10 3 last week, Hgb 9 3 & 9 7 in ED today  EULOGIO with Cr 1 54  VSS with /89 last check  Afebrile with leukocytosis of 12 70  Plan for transfer to [facility] to Dr Addi López service  Will place IR consultation for evaluation for embolization tomorrow  Discussed with Dr Freddy Mckoy and Addi Hankins PA-C  Date: 9/18/2019 Time: 4:05 PM

## 2019-09-18 NOTE — EMTALA/ACUTE CARE TRANSFER
[facility] EMERGENCY DEPARTMENT  [address] [facility] [address]  [phone]  Dept: [address] TRANSFER CONSENT    NAME Halie Hernández                                         1935                              MRN [id number]    I have been informed of my rights regarding examination, treatment, and transfer   by Dr Yuridia Judge DO    Benefits:     Risks:        Consent for Transfer:  I acknowledge that my medical condition has been evaluated and explained to me by the emergency department physician or other qualified medical person and/or my attending physician, who has recommended that I be transferred to the service of    at    The above potential benefits of such transfer, the potential risks associated with such transfer, and the probable risks of not being transferred have been explained to me, and I fully understand them  The doctor has explained that, in my case, the benefits of transfer outweigh the risks  I agree to be transferred  I authorize the performance of emergency medical procedures and treatments upon me in both transit and upon arrival at the receiving facility  Additionally, I authorize the release of any and all medical records to the receiving facility and request they be transported with me, if possible  I understand that the safest mode of transportation during a medical emergency is an ambulance and that the Hospital advocates the use of this mode of transport  Risks of traveling to the receiving facility by car, including absence of medical control, life sustaining equipment, such as oxygen, and medical personnel has been explained to me and I fully understand them  (ABEL CORRECT BOX BELOW)  [  ]  I consent to the stated transfer and to be transported by ambulance/helicopter  [  ]  I consent to the stated transfer, but refuse transportation by ambulance and accept full responsibility for my transportation by car    I understand the risks of non-ambulance transfers and I exonerate the Hospital and its staff from any deterioration in my condition that results from this refusal     X___________________________________________    DATE  19  TIME________  Signature of patient or legally responsible individual signing on patient behalf           RELATIONSHIP TO PATIENT_________________________          Provider Certification    NAME iWllam Crocker                                         1935                              MRN [id number]    A medical screening exam was performed on the above named patient  Based on the examination:    Condition Necessitating Transfer The primary encounter diagnosis was Renal mass  Diagnoses of Retroperitoneal bleed and Renal colic were also pertinent to this visit  Patient Condition:      Reason for Transfer:      Transfer Requirements: Facility     · Space available and qualified personnel available for treatment as acknowledged by    · Agreed to accept transfer and to provide appropriate medical treatment as acknowledged by          · Appropriate medical records of the examination and treatment of the patient are provided at the time of transfer   [facility] [address] _______  · Transfer will be performed by qualified personnel from    and appropriate transfer equipment as required, including the use of necessary and appropriate life support measures      Provider Certification: I have examined the patient and explained the following risks and benefits of being transferred/refusing transfer to the patient/family:         Based on these reasonable risks and benefits to the patient and/or the unborn child(azra), and based upon the information available at the time of the patients examination, I certify that the medical benefits reasonably to be expected from the provision of appropriate medical treatments at another medical facility outweigh the increasing risks, if any, to the individuals medical condition, and in the case of labor to the unborn child, from effecting the transfer      X____________________________________________ DATE 09/18/19        TIME_______      ORIGINAL - SEND TO MEDICAL RECORDS   COPY - SEND WITH PATIENT DURING TRANSFER

## 2019-09-18 NOTE — ASSESSMENT & PLAN NOTE
Possibly related to gross hematuria as above and ct findings  ua bland from infectious stand point    Continue analgesics and continue to monitor

## 2019-09-18 NOTE — ASSESSMENT & PLAN NOTE
Gross large volume hematuria  Ct a/p no con demonstrates hyperdense material seen in a curvilinear pattern within the posterior mid calyx and filling and distending the renal pelvis most consistent with appearance of blood products w/nonobstructing renal calculi  There is no ureteral obstruction or over etiology for gross hematuria concerning for possible underlying renal mass of right kidney  D/w urology  Repeat h/h at [facility] prior to transfer stable in 9s from 10 (remotely 11-13 in 07/2019)  Will check repeat now  No clot formation  Likely can defer on mejai cath and cbi presently  Monitor h/h monitor I/os, consult urology    Pt likely for IR embolization on 9/19/19 as long as clinically stable  D/w pt's daughter at bedside

## 2019-09-18 NOTE — H&P
H&P- Halie Hernández 1935, 81 y o  female MRN: [id number]    Unit/Bed#: E5 -01 Encounter: [id number]    Primary Care Provider: Alexia Kingston MD   Date and time admitted to hospital: 9/18/2019  5:39 PM        * Hematuria  Assessment & Plan  Gross large volume hematuria  Ct a/p no con demonstrates hyperdense material seen in a curvilinear pattern within the posterior mid calyx and filling and distending the renal pelvis most consistent with appearance of blood products w/nonobstructing renal calculi  There is no ureteral obstruction or over etiology for gross hematuria concerning for possible underlying renal mass of right kidney  D/w urology  Repeat h/h at [facility] prior to transfer stable in 9s from 10 (remotely 11-13 in 07/2019)  Will check repeat now  No clot formation  Likely can defer on mejia cath and cbi presently  Monitor h/h monitor I/os, consult urology  Pt likely for IR embolization on 9/19/19 as long as clinically stable  D/w pt's daughter at bedside    Right flank pain  Assessment & Plan  Possibly related to gross hematuria as above and ct findings  ua bland from infectious stand point  Continue analgesics and continue to monitor    Liver nodule  Assessment & Plan  Noted on dry ct a/p  Needs op dedicated focus u/s    Leukocytosis  Assessment & Plan  Mild stable at leukocytosis of 12 in setting of gross hematuria  No s/sx of acute infection  ua bland save gross hematuria  Monitor off abx    Spinal stenosis of lumbar region with neurogenic claudication  Assessment & Plan  severe spinal stenosis L4-5  W/weakness with dorsiflexion b/l    Pt's family declined surgical intervention for conservative mgmt  Op epidural injections w/ortho  Op f/u with ortho    Coronary artery disease involving native coronary artery of native heart without angina pectoris  Assessment & Plan  Pt w/nonocclusive vessel disease on cardiac cath 07/2019 for med mgmt on asa/plavix/bb statin  Pt as of recent cardiology notes had stopped ap due to bruising  She has since been recommended to restart at least asa given cad  Hold asa for now given hematuria, continue bb statin    Stage 3 chronic kidney disease ([facility] [address] )  Assessment & Plan  Baseline creatinine is 1 3-1 5  Monitor w/abla for nalini    Chronic diastolic heart failure ([facility] [address] )  Assessment & Plan  Wt Readings from Last 3 Encounters:   09/18/19 74 8 kg (164 lb 14 5 oz)   09/09/19 72 6 kg (160 lb)   09/06/19 72 7 kg (160 lb 3 2 oz)     Pt appears stable wo s/sx of volume overload  Continue bb    Type 2 diabetes mellitus with hyperglycemia, with long-term current use of insulin [facility])  Assessment & Plan  Lab Results   Component Value Date    HGBA1C 7 7 (H) 06/04/2019       Recent Labs     09/18/19  0950 09/18/19  1755   POCGLU 188* 153*       Blood Sugar Average: Last 72 hrs:  (P) 153    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 (HCC)  Assessment & Plan  Lab Results   Component Value Date    HGBA1C 7 7 (H) 06/04/2019       Recent Labs     09/18/19  0950 09/18/19  1755   POCGLU 188* 153*       Blood Sugar Average: Last 72 hrs:  (P) 153   Hold scheduled humalog, start 1/2 tdd insulin glargine at 12 iu qhs  Start ssi/poc bs    Hypertension  Assessment & Plan  Continue bb/ccb  Hold lasix      VTE Prophylaxis: Pharmacologic VTE Prophylaxis contraindicated due to hematuria  / sequential compression device   Code Status:   POLST: There is no POLST form on file for this patient (pre-hospital)  Discussion with family: dispo w/daughter at bedside    Anticipated Length of Stay:  Patient will be admitted on an Inpatient basis with an anticipated length of stay of  Greater than 2 midnights  Justification for Hospital Stay: gross hematuria    Total Time for Visit, including Counseling / Coordination of Care: 45 minutes  Greater than 50% of this total time spent on direct patient counseling and coordination of care      Chief Complaint: Gross hematuria right flank pain since this am    History of Present Illness:    Jennifer Beck is a 81 y o  female who presents with pmh of anemia, hx of hydronephrosis, former smoker status, hx of chronic diastolic chf, cad, pad, spinal stenosis of L4-5 w/neurogenic claudication coming to hospital for right flank pain/hematuria  Pt is [place] speaking only  Conversation occurred between pt and myself in [address] as well as with pt's daughter in [address]  Pt was in normal state of health when early in AM of day of admission she started w/right sided sharp stabbing flank pain  The pt herself has difficulty further clarifying the nature of this pain but it does not radiate  She has no n/v/f/c/d  She has no cp/sob  She is alert and oriented to hospital setting but otherwise unable to answer orientation questions due to pain  In the ed at [facility] she was evaluated for possible right ureteral calculus  She was found to have bleed within right kidney and mid posterior lower pelvis but no obstruction notable on ct concerning for occult malignancy and was recommended transfer after d/w urology  D/w urology pa as pt had another 400cc gross hematuria w/o clots  They recommend IR consultation for possible embolization  Review of Systems:    Review of Systems   Unable to perform ROS: Acuity of condition   Genitourinary: Positive for flank pain and hematuria         Past Medical and Surgical History:     Past Medical History:   Diagnosis Date    Anemia     Arthritis     CH[facility]congestive [facility] failure) [facility]     Chronic pain disorder     low back    COPD (chronic obstructive pulmonary disease) ([facility]     Coronary artery disease     Glaucoma     Hematuria 2019    Hyperlipidemia     Hypertension     Kidney stones     Lumbar stenosis     [facility] infarction ([facility] [address] )     Osteoporosis     PAD (peripheral artery disease) ([facility])     Peripheral neuropathy     Shortness of breath        Past Surgical History:   Procedure Laterality Date    APPENDECTOMY      CARDIAC CATHETERIZATION  2019    CT EPIDURAL STEROID INJECTION (RYAN LUMBAR)  8/20/2019    CYSTOSCOPY      HAND SURGERY Right     HYSTERECTOMY      age 29    INCISION AND DRAINAGE OF WOUND Left 1/5/2019    Procedure: INCISION AND DRAINAGE (I&D) EXTREMITY;  Surgeon: Dora Salmon MD;  Location: [facility];  Service: General    LITHOTRIPSY      MASS EXCISION      [place] of back wall mass    TONSILLECTOMY      TONSILLECTOMY         Meds/Allergies:    Prior to Admission medications    Medication Sig Start Date End Date Taking? Authorizing Provider   amLODIPine (NORVASC) 5 mg tablet Take 1 tablet (5 mg total) by mouth daily 8/5/19   Mendel Forest, MD   aspirin 81 mg chewable tablet Chew 1 tablet (81 mg total) daily 7/10/19   Geovanna Augustin MD   atorvastatin (LIPITOR) 40 mg tablet Take 1 tablet (40 mg total) by mouth every 24 hours  Patient taking differently: Take 40 mg by mouth daily at bedtime  11/13/18   Mendel Forest, MD   bisacodyl (bisacodyl) 5 mg EC tablet Take 1 tablet (5 mg total) by mouth daily as needed for constipation for up to 30 days 9/3/19 10/3/19  Mendel Forest, MD   brimonidine tartrate 0 2 % ophthalmic solution INSTILL 1 DROP INTO BOTH EYES 2 TIMES PER DAY 5/31/19   Historical Provider, MD   calcitonin, salmon, (MIACALCIN) 200 units/act nasal spray 1 spray into each nostril daily 7/3/19   Mendel Forest, MD   clopidogrel (PLAVIX) 75 mg tablet Take 1 tablet (75 mg total) by mouth daily 8/5/19   Mendel Forest, MD   dorzolamide (TRUSOPT) 2 % ophthalmic solution Administer 1 drop to both eyes 3 (three) times a day 6/11/19   Mendel Forest, MD   furosemide (LASIX) 40 mg tablet Take 1 tablet (40 mg total) by mouth daily 6/11/19   Mendel Forest, MD   insulin degludec (TRESIBA FLEXTOUCH) 100 units/mL injection pen To use 25 U at bedtime   6/11/19   Mendel Forest, MD   ipratropium (ATROVENT HFA) 17 mcg/act inhaler Inhale 2 puffs 4 (four) times a day Ninety day supplies please 6/11/19   Lawrence Yun MD   Lancets (FREESTYLE) lancets Check blood sugar three times a day 7/17/19   Lawrence Yun MD   latanoprost (XALATAN) 0 005 % ophthalmic solution ADMINISTER 1 DROP TO BOTH EYES DAILY AT BEDTIME 8/11/19   Historical Provider, MD [address] BY MOUTH DAILY ROSITA TOMASA CAPSULA POR VIA ORAL [place] 7/1/19   Historical Provider, MD   metoprolol tartrate (LOPRESSOR) 25 mg tablet Take 1 tablet (25 mg total) by mouth every 12 (twelve) hours 8/5/19   Lawrence Yun MD   NOVOLOG FLEXPEN 100 units/mL injection pen 12 UNITS 3 TIMES A DAY WITH MEALS [employer] day supplies please 6/11/19   Lawrence Yun MD   omeprazole (PriLOSEC) 20 mg delayed release capsule Take 1 capsule (20 mg total) by mouth daily [employer] day supplies please 6/11/19   Lawrence Yun MD   ondansetron (ZOFRAN) 4 mg tablet TAKE 1 TABLET (4 MG TOTAL) BY MOUTH EVERY 8 (EIGHT) HOURS AS NEEDED FOR NAUSEA OR VOMITING 9/17/19   Lawrence Yun MD   oxyCODONE (OxyCONTIN) 10 mg 12 hr tablet Take 1 tablet (10 mg total) by mouth every 12 (twelve) hours for 30 daysMax Daily Amount: 20 mg 8/22/19 9/21/19  Lawrence Yun MD   pentoxifylline (TRENtal) 400 mg ER tablet TAKE ONE TABLET BY MOUTH 3 TIMES A DAY WITH A MEAL ROSITA 1 TABLETA POR VIA ORAL JONAH VECES AL MAC WITH A MEAL 4/26/19   Historical Provider, MD   pregabalin (LYRICA) 100 mg capsule Take 1 capsule (100 mg total) by mouth 2 (two) times a day for 100 days 6/11/19 9/19/19  Lawrence Yun MD   sitaGLIPtin (JANUVIA) 50 mg tablet Take 1 tablet (50 mg total) by mouth daily 6/11/19   Lawrence Yun MD     I have reviewed home medications with patient family member  Allergies:    Allergies   Allergen Reactions    Morphine And Related Vomiting    Tramadol Vomiting and Abdominal Pain     Other       Social History:     Marital Status: Single   Occupation:   Patient Pre-hospital Living Situation: Patient Pre-hospital Level of Mobility:   Patient Pre-hospital Diet Restrictions:   Substance Use History:   Social History     Substance and Sexual Activity   Alcohol Use Never    Frequency: Never    Comment: OCCASIONAL     Social History     Tobacco Use   Smoking Status Never Smoker   Smokeless Tobacco Never Used   Tobacco Comment    states she quit but family living with her states she smokes     Social History     Substance and Sexual Activity   Drug Use Never       Family History:    pt's daughter-esophageal cancer  no family hx of bladder or kidney cancer    Physical Exam:     Vitals:   Blood Pressure: 160/66 (09/18/19 1759)  Pulse: 75 (09/18/19 1757)  Temperature: 97 8 °F (36 6 °C) (09/18/19 1757)  Temp Source: Temporal (09/18/19 1757)  Respirations: 18 (09/18/19 1757)  Weight - Scale: 74 8 kg (164 lb 14 5 oz) (09/18/19 1757)  SpO2: 97 % (09/18/19 1757)    Physical Exam   Constitutional: She appears well-developed and well-nourished  She appears distressed  Appears stated age in pain and in distress   HENT:   Head: Normocephalic and atraumatic  Right Ear: External ear normal    Left Ear: External ear normal    Eyes: Conjunctivae are normal    Neck: Normal range of motion  Cardiovascular: Normal rate, regular rhythm and normal heart sounds  Exam reveals no gallop and no friction rub  No murmur heard  Pulmonary/Chest: Effort normal and breath sounds normal  No respiratory distress  She has no wheezes  She has no rales  Abdominal: Soft  She exhibits no distension and no mass  There is tenderness (mild ttp of right lateral upper abdomen/flank at level of anterior midaxillary line )  There is no guarding  No cva tenderness   Musculoskeletal: She exhibits no edema  Neurological: She is alert  Skin: Skin is warm and dry  She is not diaphoretic  Psychiatric:   In pain w/associated intermittent distress   Vitals reviewed        (  Be Sure to Include Physical Exam: Delete this entire line when you have entered your exam)    Additional Data:     Lab Results: I have personally reviewed pertinent reports  Results from last 7 days   Lab Units 09/18/19  1545   WBC Thousand/uL 12 70*   HEMOGLOBIN g/dL 9 7*   HEMATOCRIT % 29 9*   PLATELETS Thousands/uL 227   NEUTROS PCT % 73*   LYMPHS PCT % 17*   MONOS PCT % 8   EOS PCT % 1     Results from last 7 days   Lab Units 09/18/19  0942   SODIUM mmol/L 140   POTASSIUM mmol/L 4 2   CHLORIDE mmol/L 101   CO2 mmol/L 29   BUN mg/dL 20   CREATININE mg/dL 1 54*   ANION GAP mmol/L 10   CALCIUM mg/dL 8 5   ALBUMIN g/dL 3 6   TOTAL BILIRUBIN mg/dL 0 40   ALK PHOS U/L 141*   ALT U/L 17   AST U/L 24   GLUCOSE RANDOM mg/dL 182*         Results from last 7 days   Lab Units 09/18/19  1755 09/18/19  0950   POC GLUCOSE mg/dl 153* 188*               Imaging: I have personally reviewed pertinent reports  CT a/p findings reviewed     IR consult    (Results Pending)       EKG, Pathology, and Other Studies Reviewed on Admission:   · EKG:     Allscripts / Epic Records Reviewed: Yes     ** Please Note: This note has been constructed using a voice recognition system   **

## 2019-09-18 NOTE — ASSESSMENT & PLAN NOTE
Pt w/nonocclusive vessel disease on cardiac cath 07/2019 for med mgmt on asa/plavix/bb statin  Pt as of recent cardiology notes had stopped ap due to bruising    She has since been recommended to restart at least asa given cad  Hold asa for now given hematuria, continue bb statin

## 2019-09-18 NOTE — ED PROVIDER NOTES
History  Chief Complaint   Patient presents with    Abdominal Pain     Patient woke up with RUQ pain since this am  No meds taken  This is an 52-year-old female with past medical history of insulin-dependent diabetes mellitus type 2, dyslipidemia, chronic renal calculi, peripheral arterial disease, COPD, CAD, and hypertension who presents today for right upper quadrant pain associated with nausea that started this morning  The patient reports she did not take anything for pain this morning  She has multiple allergies to Tylenol, Toradol, and morphine listed in the chart  I spoke to her family who stated that the patient takes Tylenol almost every day and they are unaware of an allergy to morphine  The patient takes oxycodone without any difficulty  The patient reports significant nausea  No fevers  She reports significant hematuria  No diarrhea  No vomiting  Patient brought in via EMS  Patient has a scheduled cystoscopy with stent placement on 2019  Prior to Admission Medications   Prescriptions Last Dose Informant Patient Reported? Taking?    LINZESS 290 MCG CAPS  Family Member Yes No   Sig: TAKE ONE CAPSULE BY MOUTH DAILY ROSITA TOMASA CAPSULA POR VIA ORAL DIARIAMENTE   Lancets (FREESTYLE) lancets  Family Member No No   Sig: Check blood sugar three times a day   NOVOLOG FLEXPEN 100 units/mL injection pen  Family Member No No   Si UNITS 3 TIMES A DAY WITH MEALS  day supplies please   amLODIPine (NORVASC) 5 mg tablet  Family Member No No   Sig: Take 1 tablet (5 mg total) by mouth daily   aspirin 81 mg chewable tablet  Family Member No No   Sig: Chew 1 tablet (81 mg total) daily   atorvastatin (LIPITOR) 40 mg tablet  Family Member No No   Sig: Take 1 tablet (40 mg total) by mouth every 24 hours   Patient taking differently: Take 40 mg by mouth daily at bedtime    bisacodyl (bisacodyl) 5 mg EC tablet  Family Member No No   Sig: Take 1 tablet (5 mg total) by mouth daily as needed for constipation for up to 30 days   brimonidine tartrate 0 2 % ophthalmic solution  Family Member Yes No   Sig: INSTILL 1 DROP INTO BOTH EYES 2 TIMES PER DAY   calcitonin, salmon, (MIACALCIN) 200 units/act nasal spray  Family Member No No   Si spray into each nostril daily   clopidogrel (PLAVIX) 75 mg tablet  Family Member No No   Sig: Take 1 tablet (75 mg total) by mouth daily   dorzolamide (TRUSOPT) 2 % ophthalmic solution  Family Member No No   Sig: Administer 1 drop to both eyes 3 (three) times a day   furosemide (LASIX) 40 mg tablet  Family Member No No   Sig: Take 1 tablet (40 mg total) by mouth daily   insulin degludec (TRESIBA FLEXTOUCH) 100 units/mL injection pen  Family Member No No   Sig: To use 25 U at bedtime     ipratropium (ATROVENT HFA) 17 mcg/act inhaler  Family Member No No   Sig: Inhale 2 puffs 4 (four) times a day Ninety day supplies please   latanoprost (XALATAN) 0 005 % ophthalmic solution  Family Member Yes No   Sig: ADMINISTER 1 DROP TO BOTH EYES DAILY AT BEDTIME   metoprolol tartrate (LOPRESSOR) 25 mg tablet  Family Member No No   Sig: Take 1 tablet (25 mg total) by mouth every 12 (twelve) hours   omeprazole (PriLOSEC) 20 mg delayed release capsule  Family Member No No   Sig: Take 1 capsule (20 mg total) by mouth daily Ninety day supplies please   ondansetron (ZOFRAN) 4 mg tablet   No No   Sig: TAKE 1 TABLET (4 MG TOTAL) BY MOUTH EVERY 8 (EIGHT) HOURS AS NEEDED FOR NAUSEA OR VOMITING   oxyCODONE (OxyCONTIN) 10 mg 12 hr tablet  Family Member No No   Sig: Take 1 tablet (10 mg total) by mouth every 12 (twelve) hours for 30 daysMax Daily Amount: 20 mg   pentoxifylline (TRENtal) 400 mg ER tablet  Family Member Yes No   Sig: TAKE ONE TABLET BY MOUTH 3 TIMES A DAY WITH A MEAL ROSITA 1 TABLETA POR VIA ORAL JONAH VECES AL MAC WITH A MEAL   pregabalin (LYRICA) 100 mg capsule  Family Member No No   Sig: Take 1 capsule (100 mg total) by mouth 2 (two) times a day for 100 days   sitaGLIPtin (JANUVIA) 50 mg tablet  Family Member No No   Sig: Take 1 tablet (50 mg total) by mouth daily      Facility-Administered Medications: None       Past Medical History:   Diagnosis Date    Anemia     Arthritis     [facility]     Chronic pain disorder     low back    COPD [facility]chronic obstructive pulmonary disease) ([facility])     Coronary artery disease     Glaucoma     Hematuria 2019    Hyperlipidemia     Hypertension     Kidney stones     Lumbar stenosis     [facility] [address] [facility]     Osteoporosis     PAD (peripheral artery disease) ([facility])     Peripheral neuropathy     Shortness of breath        Past Surgical History:   Procedure Laterality Date    APPENDECTOMY      CARDIAC CATHETERIZATION  2019    CT EPIDURAL STEROID INJECTION (RYAN LUMBAR)  8/20/2019    CYSTOSCOPY      HAND SURGERY Right     HYSTERECTOMY      age 29    INCISION AND DRAINAGE OF WOUND Left 1/5/2019    Procedure: INCISION AND DRAINAGE (I&D) EXTREMITY;  Surgeon: Asuncion Powell MD;  Location: [facility];  Service: General    LITHOTRIPSY      MASS EXCISION      [place] of back wall mass    TONSILLECTOMY      TONSILLECTOMY         Family History   Problem Relation Age of Onset    Diabetes Mother     No Known Problems Father     Throat cancer Daughter     No Known Problems Maternal Grandmother     No Known Problems Maternal Grandfather     No Known Problems Paternal Grandmother     No Known Problems Paternal Grandfather      I have reviewed and agree with the history as documented  Social History     Tobacco Use    Smoking status: Never Smoker    Smokeless tobacco: Never Used    Tobacco comment: states she quit but family living with her states she smokes   Substance Use Topics    Alcohol use: Never     Frequency: Never     Comment: OCCASIONAL    Drug use: Never        Review of Systems   Constitutional: Negative  HENT: Negative  Eyes: Negative  Respiratory: Negative      Cardiovascular: Negative  Gastrointestinal: Positive for abdominal pain  Endocrine: Negative  Genitourinary: Positive for hematuria  Musculoskeletal: Negative  Allergic/Immunologic: Negative  Neurological: Negative  Hematological: Negative  Psychiatric/Behavioral: Negative  Physical Exam  Physical Exam   Constitutional: She is oriented to person, place, and time  She appears well-developed and well-nourished  Cardiovascular: Normal rate, regular rhythm and normal heart sounds  Pulmonary/Chest: Effort normal and breath sounds normal    Abdominal: Soft  Bowel sounds are normal  There is tenderness in the right upper quadrant and epigastric area  There is positive Ascencio's sign  There is no rigidity, no rebound, no guarding and no CVA tenderness  Neurological: She is alert and oriented to person, place, and time  Skin: Skin is warm  Capillary refill takes less than 2 seconds  Psychiatric: She has a normal mood and affect   Her behavior is normal        Vital Signs  ED Triage Vitals [09/18/19 0933]   Temperature Pulse Respirations Blood Pressure SpO2   98 1 °F (36 7 °C) 67 14 141/85 95 %      Temp Source Heart Rate Source Patient Position - Orthostatic VS BP Location FiO2 (%)   Tympanic Monitor Lying Left arm --      Pain Score       Worst Possible Pain           Vitals:    09/18/19 1301 09/18/19 1459 09/18/19 1623 09/18/19 1711   BP: 129/55 152/89 152/86 148/82   Pulse: 63 69 (!) 113 68   Patient Position - Orthostatic VS: Lying Lying Lying Lying         Visual Acuity      ED Medications  Medications   sodium chloride 0 9 % infusion (0 mL/hr Intravenous Stopped 9/18/19 1302)   oxyCODONE (ROXICODONE) IR tablet 5 mg (5 mg Oral Given 9/18/19 1018)   morphine injection 2 mg (2 mg Intravenous Given 9/18/19 1213)   pantoprazole (PROTONIX) injection 40 mg (40 mg Intravenous Given 9/18/19 1209)   diphenhydrAMINE (BENADRYL) injection 50 mg (50 mg Intravenous Given 9/18/19 1225)   HYDROmorphone (DILAUDID) injection 0 5 mg (0 5 mg Intravenous Given 9/18/19 1548)       Diagnostic Studies  Results Reviewed     Procedure Component Value Units Date/Time    CBC and differential [[id number]]  (Abnormal) Collected:  09/18/19 1545    Lab Status:  Final result Specimen:  Blood from Arm, Left Updated:  09/18/19 1552     WBC 12 70 Thousand/uL      RBC 3 52 Million/uL      Hemoglobin 9 7 g/dL      Hematocrit 29 9 %      MCV 85 fL      MCH 27 5 pg      MCHC 32 5 g/dL      RDW 14 8 %      MPV 9 6 fL      Platelets 272 Thousands/uL      Neutrophils Relative 73 %      Lymphocytes Relative 17 %      Monocytes Relative 8 %      Eosinophils Relative 1 %      Basophils Relative 0 %      Neutrophils Absolute 9 30 Thousands/µL      Lymphocytes Absolute 2 20 Thousands/µL      Monocytes Absolute 1 00 Thousand/µL      Eosinophils Absolute 0 10 Thousand/µL      Basophils Absolute 0 10 Thousands/µL     Urine Microscopic [[id number]]  (Abnormal) Collected:  09/18/19 1351    Lab Status:  Final result Specimen:  Urine, Clean Catch Updated:  09/18/19 1432     RBC, UA Innumerable /hpf      WBC, UA 2-4 /hpf      Epithelial Cells Occasional /hpf      Bacteria, UA Occasional /hpf     UA w Reflex to Microscopic w Reflex to Culture [[id number]]  (Abnormal) Collected:  09/18/19 1351    Lab Status:  Final result Specimen:  Urine, Clean Catch Updated:  09/18/19 1423     Color, UA Red     Clarity, UA Bloody     Specific Gravity, UA 1 010     pH, UA 7 0     Leukocytes, UA Negative     Nitrite, UA Negative     Protein, UA >=500 mg/dl      Glucose, UA Negative mg/dl      Ketones, UA 5 (Trace) mg/dl      Bilirubin, UA Negative     Blood,  0     UROBILINOGEN UA Negative mg/dL     Troponin I [[id number]]  (Normal) Collected:  09/18/19 1227    Lab Status:  Final result Specimen:  Blood from Arm, Left Updated:  09/18/19 1304     Troponin I <0 01 ng/mL     Fingerstick Glucose (POCT) [[id number]]  (Abnormal) Collected:  09/18/19 0950    Lab Status:  Final result Updated:  09/18/19 1006     POC Glucose 188 mg/dl     Comprehensive metabolic panel [[id number]]  (Abnormal) Collected:  09/18/19 0942    Lab Status:  Final result Specimen:  Blood from Arm, Right Updated:  09/18/19 0959     Sodium 140 mmol/L      Potassium 4 2 mmol/L      Chloride 101 mmol/L      CO2 29 mmol/L      ANION GAP 10 mmol/L      BUN 20 mg/dL      Creatinine 1 54 mg/dL      Glucose 182 mg/dL      Calcium 8 5 mg/dL      AST 24 U/L      ALT 17 U/L      Alkaline Phosphatase 141 U/L      Total Protein 6 5 g/dL      Albumin 3 6 g/dL      Total Bilirubin 0 40 mg/dL      eGFR 31 ml/min/1 73sq m     Narrative:       National Kidney Disease Foundation guidelines for Chronic Kidney Disease (CKD):     Stage 1 with normal or high GFR (GFR > 90 mL/min/1 73 square meters)    Stage 2 Mild CKD (GFR = 60-89 mL/min/1 73 square meters)    Stage 3A Moderate CKD (GFR = 45-59 mL/min/1 73 square meters)    Stage 3B Moderate CKD (GFR = 30-44 mL/min/1 73 square meters)    Stage 4 Severe CKD (GFR = 15-29 mL/min/1 73 square meters)    Stage 5 End Stage CKD (GFR <15 mL/min/1 73 square meters)  Note: GFR calculation is accurate only with a steady state creatinine    Lipase [[id number]]  (Normal) Collected:  09/18/19 0942    Lab Status:  Final result Specimen:  Blood from Arm, Right Updated:  09/18/19 0959     Lipase 50 u/L     CBC and differential [[id number]]  (Abnormal) Collected:  09/18/19 0942    Lab Status:  Final result Specimen:  Blood from Arm, Right Updated:  09/18/19 0953     WBC 11 20 Thousand/uL      RBC 3 35 Million/uL      Hemoglobin 9 3 g/dL      Hematocrit 28 1 %      MCV 84 fL      MCH 27 6 pg      MCHC 32 9 g/dL      RDW 14 4 %      MPV 9 7 fL      Platelets 807 Thousands/uL      Neutrophils Relative 81 %      Lymphocytes Relative 12 %      Monocytes Relative 6 %      Eosinophils Relative 1 %      Basophils Relative 1 %      Neutrophils Absolute 9 00 Thousands/µL      Lymphocytes Absolute 1 40 Thousands/µL Monocytes Absolute 0 60 Thousand/µL      Eosinophils Absolute 0 10 Thousand/µL      Basophils Absolute 0 10 Thousands/µL                  CT abdomen pelvis wo contrast   Final Result by Damon Hatch MD (09/18 8091)      Blood products filling and distending the right mid to lower pole calyx and renal pelvis raising suspicion for underlying occult mass  There is central low attenuation within this distended lower calyx but cannot differentiate from older clotted blood    products  Limited evaluation given lack of intravenous contrast on this and the prior study  Stable bilateral renal cortical scarring and nonobstructing calculi  New indeterminate hypoattenuating nodule within the liver as described above  Focused ultrasound exam remains unrevealing than further characterization with either contrast CT or MRI should be considered  The study was marked in [facility] for immediate notification  Workstation performed: [id number]         US abdomen complete   Final Result by Nixon Shelley MD (09/18 0393)      1  No cholelithiasis, acute cholecystitis or biliary ductal dilation  2   Mild left-sided pelvocaliectasis, similar to the prior CT  Nonobstructing left renal calculi seen on the prior CT are not visualized sonographically  3   Right renal parenchymal scarring with moderate pelvic dilatation and several nonobstructing renal calculi, similar to the prior CT              Workstation performed: [id number]                    Procedures  ECG 12 Lead Documentation Only  Date/Time: 9/18/2019 12:45 PM  Performed by: Latonia Awan PA-C  Authorized by: Latonia Awan PA-C     Indications / Diagnosis:  79  Patient location:  ED  Interpretation:     Interpretation: non-specific    Rate:     ECG rate:  NSR    ECG rate assessment: normal    T waves:     T waves: flattening      Flattening:  V4, V5, V6, aVR and aVL           ED Course  ED Course as of Sep 19 0754   Wed Sep 18, 2019 1005 CT scan discontinued as patient in EULOGIO  Will obtain ultrasound first to assess  Scarlett Oneill - unable to read report  1614 Attempted to call patient's daughter concerning results  Spoke to Urology - Kalpesh Schroeder [facility]  Patient will be transferred to [facility] [place] for urgent surgical intervention  Spoke to Dr Nneka Aldana who will be the accepting hospitalist            MDM  Number of Diagnoses or Management Options  Renal colic:   Renal mass:   Retroperitoneal bleed:   Diagnosis management comments: This is an 13-year-old female with past medical history of insulin-dependent diabetes mellitus type 2, renal calculi, dyslipidemia, CAD, peripheral vascular disease, and hypertension who presents today for significant epigastric/right upper quadrant pain  The patient had a noted creatinine of 1 5 with calculated GFR of 39  For this reason, CT with dye was avoided  Ultrasound performed did not reveal any acute cholecystitis or noted hydronephrosis  Patient does have an elevated leukocytosis of 11,000 with bandemia  Her hemoglobin was 10 3 last week  This week it is 9 3  She urinated for us in the emergency department and there is gross hematuria noted  EKG did not reveal any ST elevations or depressions  Troponin x1 is negative  CT of abdomen without contrast revealed questionable right-sided mass with noted bleeding  Patient had repeat CBC drawn at this time and hemoglobin was stable  Her vitals have remained stable  I paged Urology urgently and they recommended transfer to [facility]  Patient was transferred to [facility] to the hospitalist service        Disposition  Final diagnoses:   Renal mass   Retroperitoneal bleed   Renal colic     Time reflects when diagnosis was documented in both MDM as applicable and the Disposition within this note     Time User Action Codes Description Comment    9/18/2019  4:08 PM Penelope Falcon Add [N28 89] Renal mass     9/18/2019 4:08 PM Tyra Vasquez Add [R58] Retroperitoneal bleed     9/18/2019  4:08 PM Tyra Vasquez Add [K76] Renal colic       ED Disposition     ED Disposition Condition Date/Time Comment    Transfer to Another Facility-In Network  Wed Sep 18, 2019  4:07 PM Oswaldo Oliver should be [place] [facility] [place] [facility]          Follow-up Information    None         Discharge Medication List as of 9/18/2019  5:17 PM      CONTINUE these medications which have NOT CHANGED    Details   amLODIPine (NORVASC) 5 mg tablet Take 1 tablet (5 mg total) by mouth daily, Starting Mon 8/5/2019, Normal      aspirin 81 mg chewable tablet Chew 1 tablet (81 mg total) daily, Starting Wed 7/10/2019, Normal      atorvastatin (LIPITOR) 40 mg tablet Take 1 tablet (40 mg total) by mouth every 24 hours, Starting Tue 11/13/2018, Normal      bisacodyl (bisacodyl) 5 mg EC tablet Take 1 tablet (5 mg total) by mouth daily as needed for constipation for up to 30 days, Starting Tue 9/3/2019, Until Thu 10/3/2019, Normal      brimonidine tartrate 0 2 % ophthalmic solution INSTILL 1 DROP INTO BOTH EYES 2 TIMES PER DAY, Historical Med      calcitonin, salmon, (MIACALCIN) 200 units/act nasal spray 1 spray into each nostril daily, Starting Wed 7/3/2019, Normal      clopidogrel (PLAVIX) 75 mg tablet Take 1 tablet (75 mg total) by mouth daily, Starting Mon 8/5/2019, Normal      dorzolamide (TRUSOPT) 2 % ophthalmic solution Administer 1 drop to both eyes 3 (three) times a day, Starting Tue 6/11/2019, Normal      furosemide (LASIX) 40 mg tablet Take 1 tablet (40 mg total) by mouth daily, Starting Tue 6/11/2019, Normal      insulin degludec (TRESIBA FLEXTOUCH) 100 units/mL injection pen To use 25 U at bedtime , Normal      ipratropium (ATROVENT HFA) 17 mcg/act inhaler Inhale 2 puffs 4 (four) times a day Ninety day supplies please, Starting Tue 6/11/2019, Normal      Lancets (FREESTYLE) lancets Check blood sugar three times a day, Normal      latanoprost (XALATAN) 0 005 % ophthalmic solution ADMINISTER 1 DROP TO BOTH EYES DAILY AT BEDTIME, Historical Med      LINZESS 290 MCG CAPS TAKE ONE CAPSULE BY MOUTH DAILY ROSITA TOMASA CAPSULA POR VIA ORAL DIARIAMENTE, Historical Med      metoprolol tartrate (LOPRESSOR) 25 mg tablet Take 1 tablet (25 mg total) by mouth every 12 (twelve) hours, Starting Mon 8/5/2019, Normal      NOVOLOG FLEXPEN 100 units/mL injection pen 12 UNITS 3 TIMES A DAY WITH MEALS Ninety day supplies please, Normal      omeprazole (PriLOSEC) 20 mg delayed release capsule Take 1 capsule (20 mg total) by mouth daily Ninety day supplies please, Starting Tue 6/11/2019, Normal      ondansetron (ZOFRAN) 4 mg tablet TAKE 1 TABLET (4 MG TOTAL) BY MOUTH EVERY 8 (EIGHT) HOURS AS NEEDED FOR NAUSEA OR VOMITING, Starting Tue 9/17/2019, Normal      oxyCODONE (OxyCONTIN) 10 mg 12 hr tablet Take 1 tablet (10 mg total) by mouth every 12 (twelve) hours for 30 daysMax Daily Amount: 20 mg, Starting Thu 8/22/2019, Until Sat 9/21/2019, Normal      pentoxifylline (TRENtal) 400 mg ER tablet TAKE ONE TABLET BY MOUTH 3 TIMES A DAY WITH A MEAL ROSITA 1 TABLETA POR VIA ORAL JONAH VECES AL MAC WITH A MEAL, Historical Med      pregabalin (LYRICA) 100 mg capsule Take 1 capsule (100 mg total) by mouth 2 (two) times a day for 100 days, Starting Tue 6/11/2019, Until Thu 9/19/2019, Normal      sitaGLIPtin (JANUVIA) 50 mg tablet Take 1 tablet (50 mg total) by mouth daily, Starting Tue 6/11/2019, Normal           No discharge procedures on file      ED Provider  Electronically Signed by           Lexa Alexander PA-C  09/19/19 8518

## 2019-09-18 NOTE — ASSESSMENT & PLAN NOTE
Wt Readings from Last 3 Encounters:   09/18/19 74 8 kg (164 lb 14 5 oz)   09/09/19 72 6 kg (160 lb)   09/06/19 72 7 kg (160 lb 3 2 oz)     Pt appears stable wo s/sx of volume overload  Continue bb

## 2019-09-18 NOTE — ASSESSMENT & PLAN NOTE
Lab Results   Component Value Date    HGBA1C 7 7 (H) 06/04/2019       Recent Labs     09/18/19  0950 09/18/19  1755   POCGLU 188* 153*       Blood Sugar Average: Last 72 hrs:  (P) 153   Hold scheduled humalog, start 1/2 tdd insulin glargine at 12 iu qhs  Start ssi/poc bs

## 2019-09-18 NOTE — ASSESSMENT & PLAN NOTE
Lab Results   Component Value Date    HGBA1C 7 7 (H) 06/04/2019       Recent Labs     09/18/19  0950 09/18/19  1755   POCGLU 188* 153*       Blood Sugar Average: Last 72 hrs:  (P) 153

## 2019-09-18 NOTE — ASSESSMENT & PLAN NOTE
severe spinal stenosis L4-5  W/weakness with dorsiflexion b/l    Pt's family declined surgical intervention for conservative mgmt  Op epidural injections w/ortho  Op f/u with ortho

## 2019-09-19 ENCOUNTER — TELEPHONE (OUTPATIENT)
Dept: RADIOLOGY | Facility: HOSPITAL | Age: 84
End: 2019-09-19

## 2019-09-19 ENCOUNTER — APPOINTMENT (INPATIENT)
Dept: RADIOLOGY | Facility: HOSPITAL | Age: 84
DRG: 687 | End: 2019-09-19
Payer: MEDICARE

## 2019-09-19 PROBLEM — D62 ACUTE BLOOD LOSS ANEMIA: Status: ACTIVE | Noted: 2019-09-19

## 2019-09-19 LAB
ANION GAP SERPL CALCULATED.3IONS-SCNC: 9 MMOL/L (ref 4–13)
ATRIAL RATE: 67 BPM
BUN SERPL-MCNC: 13 MG/DL (ref 5–25)
CALCIUM SERPL-MCNC: 8.5 MG/DL (ref 8.3–10.1)
CHLORIDE SERPL-SCNC: 108 MMOL/L (ref 100–108)
CO2 SERPL-SCNC: 25 MMOL/L (ref 21–32)
CREAT SERPL-MCNC: 1.18 MG/DL (ref 0.6–1.3)
ERYTHROCYTE [DISTWIDTH] IN BLOOD BY AUTOMATED COUNT: 14.2 % (ref 11.6–15.1)
GFR SERPL CREATININE-BSD FRML MDRD: 43 ML/MIN/1.73SQ M
GLUCOSE SERPL-MCNC: 129 MG/DL (ref 65–140)
GLUCOSE SERPL-MCNC: 160 MG/DL (ref 65–140)
GLUCOSE SERPL-MCNC: 163 MG/DL (ref 65–140)
GLUCOSE SERPL-MCNC: 189 MG/DL (ref 65–140)
GLUCOSE SERPL-MCNC: 193 MG/DL (ref 65–140)
GLUCOSE SERPL-MCNC: 198 MG/DL (ref 65–140)
GLUCOSE SERPL-MCNC: 200 MG/DL (ref 65–140)
HCT VFR BLD AUTO: 26.1 % (ref 34.8–46.1)
HCT VFR BLD AUTO: 26.6 % (ref 34.8–46.1)
HCT VFR BLD AUTO: 27.4 % (ref 34.8–46.1)
HCT VFR BLD AUTO: 27.4 % (ref 34.8–46.1)
HGB BLD-MCNC: 8.1 G/DL (ref 11.5–15.4)
HGB BLD-MCNC: 8.3 G/DL (ref 11.5–15.4)
HGB BLD-MCNC: 8.5 G/DL (ref 11.5–15.4)
HGB BLD-MCNC: 8.5 G/DL (ref 11.5–15.4)
MCH RBC QN AUTO: 27.5 PG (ref 26.8–34.3)
MCHC RBC AUTO-ENTMCNC: 31 G/DL (ref 31.4–37.4)
MCV RBC AUTO: 89 FL (ref 82–98)
P AXIS: 62 DEGREES
PLATELET # BLD AUTO: 215 THOUSANDS/UL (ref 149–390)
PMV BLD AUTO: 11.7 FL (ref 8.9–12.7)
POTASSIUM SERPL-SCNC: 3.8 MMOL/L (ref 3.5–5.3)
PR INTERVAL: 148 MS
QRS AXIS: 28 DEGREES
QRSD INTERVAL: 80 MS
QT INTERVAL: 326 MS
QTC INTERVAL: 344 MS
RBC # BLD AUTO: 3.09 MILLION/UL (ref 3.81–5.12)
SODIUM SERPL-SCNC: 142 MMOL/L (ref 136–145)
T WAVE AXIS: 62 DEGREES
VENTRICULAR RATE: 67 BPM
WBC # BLD AUTO: 11.85 THOUSAND/UL (ref 4.31–10.16)

## 2019-09-19 PROCEDURE — 93010 ELECTROCARDIOGRAM REPORT: CPT | Performed by: INTERNAL MEDICINE

## 2019-09-19 PROCEDURE — C1760 CLOSURE DEV, VASC: HCPCS

## 2019-09-19 PROCEDURE — 36251 INS CATH REN ART 1ST UNILAT: CPT | Performed by: RADIOLOGY

## 2019-09-19 PROCEDURE — 85014 HEMATOCRIT: CPT | Performed by: FAMILY MEDICINE

## 2019-09-19 PROCEDURE — C1769 GUIDE WIRE: HCPCS

## 2019-09-19 PROCEDURE — 99152 MOD SED SAME PHYS/QHP 5/>YRS: CPT | Performed by: RADIOLOGY

## 2019-09-19 PROCEDURE — 99232 SBSQ HOSP IP/OBS MODERATE 35: CPT | Performed by: PHYSICIAN ASSISTANT

## 2019-09-19 PROCEDURE — 99152 MOD SED SAME PHYS/QHP 5/>YRS: CPT

## 2019-09-19 PROCEDURE — 36253 INS CATH REN ART 2ND+ UNILAT: CPT

## 2019-09-19 PROCEDURE — 85027 COMPLETE CBC AUTOMATED: CPT | Performed by: PHYSICIAN ASSISTANT

## 2019-09-19 PROCEDURE — 82948 REAGENT STRIP/BLOOD GLUCOSE: CPT

## 2019-09-19 PROCEDURE — NC001 PR NO CHARGE: Performed by: RADIOLOGY

## 2019-09-19 PROCEDURE — 85018 HEMOGLOBIN: CPT | Performed by: PHYSICIAN ASSISTANT

## 2019-09-19 PROCEDURE — B416YZZ FLUOROSCOPY OF RIGHT RENAL ARTERY USING OTHER CONTRAST: ICD-10-PCS | Performed by: RADIOLOGY

## 2019-09-19 PROCEDURE — C1894 INTRO/SHEATH, NON-LASER: HCPCS

## 2019-09-19 PROCEDURE — 80048 BASIC METABOLIC PNL TOTAL CA: CPT | Performed by: PHYSICIAN ASSISTANT

## 2019-09-19 PROCEDURE — 99153 MOD SED SAME PHYS/QHP EA: CPT

## 2019-09-19 PROCEDURE — C1887 CATHETER, GUIDING: HCPCS

## 2019-09-19 PROCEDURE — 85018 HEMOGLOBIN: CPT | Performed by: FAMILY MEDICINE

## 2019-09-19 PROCEDURE — 99223 1ST HOSP IP/OBS HIGH 75: CPT | Performed by: UROLOGY

## 2019-09-19 PROCEDURE — 85014 HEMATOCRIT: CPT | Performed by: PHYSICIAN ASSISTANT

## 2019-09-19 RX ORDER — MIDAZOLAM HYDROCHLORIDE 1 MG/ML
INJECTION INTRAMUSCULAR; INTRAVENOUS CODE/TRAUMA/SEDATION MEDICATION
Status: COMPLETED | OUTPATIENT
Start: 2019-09-19 | End: 2019-09-19

## 2019-09-19 RX ORDER — FENTANYL CITRATE 50 UG/ML
INJECTION, SOLUTION INTRAMUSCULAR; INTRAVENOUS CODE/TRAUMA/SEDATION MEDICATION
Status: COMPLETED | OUTPATIENT
Start: 2019-09-19 | End: 2019-09-19

## 2019-09-19 RX ORDER — LIDOCAINE WITH 8.4% SOD BICARB 0.9%(10ML)
SYRINGE (ML) INJECTION CODE/TRAUMA/SEDATION MEDICATION
Status: COMPLETED | OUTPATIENT
Start: 2019-09-19 | End: 2019-09-19

## 2019-09-19 RX ADMIN — AMLODIPINE BESYLATE 5 MG: 5 TABLET ORAL at 10:00

## 2019-09-19 RX ADMIN — PENTOXIFYLLINE 400 MG: 400 TABLET, EXTENDED RELEASE ORAL at 10:00

## 2019-09-19 RX ADMIN — MIDAZOLAM 0.5 MG: 1 INJECTION INTRAMUSCULAR; INTRAVENOUS at 09:51

## 2019-09-19 RX ADMIN — FENTANYL CITRATE 25 MCG: 50 INJECTION, SOLUTION INTRAMUSCULAR; INTRAVENOUS at 09:20

## 2019-09-19 RX ADMIN — METOPROLOL TARTRATE 25 MG: 25 TABLET, FILM COATED ORAL at 10:00

## 2019-09-19 RX ADMIN — IODIXANOL 21 ML: 320 INJECTION, SOLUTION INTRAVASCULAR at 15:44

## 2019-09-19 RX ADMIN — SODIUM CHLORIDE 75 ML/HR: 0.9 INJECTION, SOLUTION INTRAVENOUS at 10:48

## 2019-09-19 RX ADMIN — LATANOPROST 1 DROP: 50 SOLUTION OPHTHALMIC at 21:29

## 2019-09-19 RX ADMIN — DORZOLAMIDE HYDROCHLORIDE 1 DROP: 20 SOLUTION/ DROPS OPHTHALMIC at 10:00

## 2019-09-19 RX ADMIN — PREGABALIN 100 MG: 50 CAPSULE ORAL at 10:00

## 2019-09-19 RX ADMIN — INSULIN LISPRO 1 UNITS: 100 INJECTION, SOLUTION INTRAVENOUS; SUBCUTANEOUS at 17:09

## 2019-09-19 RX ADMIN — FENTANYL CITRATE 25 MCG: 50 INJECTION, SOLUTION INTRAMUSCULAR; INTRAVENOUS at 09:51

## 2019-09-19 RX ADMIN — INSULIN LISPRO 2 UNITS: 100 INJECTION, SOLUTION INTRAVENOUS; SUBCUTANEOUS at 12:19

## 2019-09-19 RX ADMIN — METOPROLOL TARTRATE 25 MG: 25 TABLET, FILM COATED ORAL at 21:28

## 2019-09-19 RX ADMIN — FENTANYL CITRATE 25 MCG: 50 INJECTION, SOLUTION INTRAMUSCULAR; INTRAVENOUS at 09:15

## 2019-09-19 RX ADMIN — MIDAZOLAM 0.5 MG: 1 INJECTION INTRAMUSCULAR; INTRAVENOUS at 09:20

## 2019-09-19 RX ADMIN — INSULIN LISPRO 1 UNITS: 100 INJECTION, SOLUTION INTRAVENOUS; SUBCUTANEOUS at 06:37

## 2019-09-19 RX ADMIN — INSULIN LISPRO 1 UNITS: 100 INJECTION, SOLUTION INTRAVENOUS; SUBCUTANEOUS at 00:45

## 2019-09-19 RX ADMIN — DORZOLAMIDE HYDROCHLORIDE 1 DROP: 20 SOLUTION/ DROPS OPHTHALMIC at 21:29

## 2019-09-19 RX ADMIN — BRIMONIDINE TARTRATE 1 DROP: 1.5 SOLUTION OPHTHALMIC at 10:00

## 2019-09-19 RX ADMIN — LIDOCAINE HYDROCHLORIDE 7 ML: 10 INJECTION, SOLUTION INFILTRATION; PERINEURAL at 09:24

## 2019-09-19 RX ADMIN — BRIMONIDINE TARTRATE 1 DROP: 1.5 SOLUTION OPHTHALMIC at 17:07

## 2019-09-19 RX ADMIN — INSULIN GLARGINE 12 UNITS: 100 INJECTION, SOLUTION SUBCUTANEOUS at 21:28

## 2019-09-19 RX ADMIN — PREGABALIN 100 MG: 50 CAPSULE ORAL at 17:06

## 2019-09-19 RX ADMIN — MIDAZOLAM 0.5 MG: 1 INJECTION INTRAMUSCULAR; INTRAVENOUS at 09:15

## 2019-09-19 RX ADMIN — DORZOLAMIDE HYDROCHLORIDE 1 DROP: 20 SOLUTION/ DROPS OPHTHALMIC at 16:04

## 2019-09-19 RX ADMIN — ATORVASTATIN CALCIUM 40 MG: 40 TABLET, FILM COATED ORAL at 21:28

## 2019-09-19 NOTE — ASSESSMENT & PLAN NOTE
Possibly related to gross hematuria as above and ct findings  This is improved  ua bland from infectious stand point    Continue analgesics and continue to monitor

## 2019-09-19 NOTE — DISCHARGE INSTRUCTIONS
ARTERIOGRAM    WHAT YOU SHOULD KNOW:   An angiogram is a procedure to look at arteries in your body  Arteries are the blood vessels that carry blood from your heart to your body  AFTER YOU LEAVE:     Self-care:   · Limit activity: Rest for the remainder of the day of your procedure  Have some one with you until the next morning  Keep your arm or leg straight as much as possible  Rest as much as possible, sitting lying or reclining  Walk only to go to the bathroom, to bed or to eat  If the angiogram catheter was put in your leg, use the stairs as little as possible  No driving  · Keep your wound clean and dry  You may shower 24 hours after your procedure  The bandage you have on should fall off in 2-3 days  If there is any drainage from the puncture site, you should put on a clean bandage  · Watch for bleeding and bruising: It is normal to have a bruise and soreness where the angiogram catheter went in  · Diet:   · You may resume your regular diet, Sips of flat soda will help with mild nausea  · Drink more liquids than usual for the next 24 hours      · IMMEDIATELY Contact Interventional Radiology at [phone] Katheryn PATIENTS: Contact Interventional Radiology at [phone]) Ana León PATIENTS: Contact Interventional Radiology at [phone]) if any of the following occur:  · If your bruise gets larger or if you notice any active bleeding  APPLY DIRECT PRESSURE TO THE BLEEDING SITE  · If you notice increased swelling or have increased pain at the puncture site   · If you have any numbness or pain in the extremity of the puncture site   · If that extremity seems cold or pale      · You have fever greater than 101  · Persistent nausea or vomitting    Follow up with your primary healthcare provider  as directed: Write down your questions so you remember to ask them during your visits          Procedural Sedation   WHAT YOU NEED TO KNOW:   Procedural sedation is medicine used during procedures to help you feel relaxed and calm  You will remember little to none of the procedure  After sedation you may feel tired, weak, or unsteady on your feet  You may also have trouble concentrating or short-term memory loss  These symptoms should go away in 24 hours or less  DISCHARGE INSTRUCTIONS:     Call 911 or have someone else call for any of the following:   · You have sudden trouble breathing      · You cannot be woken  Contact Interventional Radiology at [phone]   Katheryn PATIENTS: Contact Interventional Radiology at [phone]) Liliane Yan PATIENTS: Contact Interventional Radiology at [phone]) if any of the following occur:     · You have a severe headache or dizziness      · Your heart is beating faster than usual     · You have a fever or chills      · Your skin is itchy, swollen, or you have a rash      · You have nausea or are vomiting for more than 8 hours after the procedure       · You have questions or concerns about your condition or care  Self-care:   · Have someone stay with you for 24 hours  This person can drive you to errands and help you do things around the house  This person can also watch for problems       · Rest and do quiet activities for 24 hours  Do not exercise, ride a bike, or play sports  Stand up slowly to prevent dizziness and falls  Take short walks around the house with another person  Slowly return to your usual activities the next day       · Do not drive or use dangerous machines or tools for 24 hours  You may injure yourself or others  Examples include a lawnmower, saw, or drill  Do not return to work for 24 hours if you use dangerous machines or tools for work       · Do not make important decisions for 24 hours  For example, do not sign important papers or invest money       · Drink liquids as directed  Liquids help flush the sedation medicine out of your body   Ask how much liquid to drink each day and which liquids are best for you       · Eat small, frequent meals to prevent nausea and vomiting  Start with clear liquids such as juice or broth  If you do not vomit after clear liquids, you can eat your usual foods       · Do not drink alcohol or take medicines that make you drowsy  This includes medicines that help you sleep and anxiety medicines  Ask your healthcare provider if it is safe for you to take pain medicine  Follow up with your healthcare provider as directed: Write down your questions so you remember to ask them during your visits

## 2019-09-19 NOTE — ASSESSMENT & PLAN NOTE
Wt Readings from Last 3 Encounters:   09/18/19 74 8 kg (164 lb 14 5 oz)   09/09/19 72 6 kg (160 lb)   09/06/19 72 7 kg (160 lb 3 2 oz)     Pt appears stable wo s/sx of volume overload, cxr demonstrates subsegmental atelectasis vs scar and bnp 600   monitor closely w/gentle ivf  Continue bb

## 2019-09-19 NOTE — PHYSICIAN ADVISOR
Current patient class: Inpatient  The patient is currently on Hospital Day: 2 at [facility]      The patient is  documented to require at least a 2nd midnight in the hospital  As such the patient is  expected to satisfy the 2 midnight benchmark and is therefore eligible for inpatient admission  After review of the relevant documentation, labs, vital signs and test results, the patient is appropriate for INPATIENT ADMISSION  Admission to the hospital as an inpatient is a complex decision making process which requires the practitioner to consider the patients presenting complaint, history and physical examination and all relevant testing  With this in mind, in this case, the patient was deemed appropriate for INPATIENT ADMISSION  After review of the documentation and testing available at the time of the admission I concur with this clinical determination of medical necessity  Rationale is as follows: The patient is a 81 yrs Female who presented to the ED at 9/18/2019  5:39 PM with a chief complaint of   Hematuria and right flank pain-labs showed leukocytosis-she underwent CT abdomen and pelvis which was concerning for possible underlying renal mass of right kidney-she had serial monitoring of hemoglobin and hematocrit  Seen in consultation by Urology service and IR  consultation was requested for embolization  SHE UNDERWENT RIGHT RENAL ARTERIOGRAM on September 19-has had serial monitoring of hemoglobin and hematocrit-will be NPO after midnight for possible cystoscopy tomorrow    She is appropriate for inpatient admission for ongoing workup of hematuria  The patients vitals on arrival were ED Triage Vitals   Temperature Pulse Respirations Blood Pressure SpO2   09/18/19 1757 09/18/19 1757 09/18/19 1757 09/18/19 1757 09/18/19 1757   97 8 °F (36 6 °C) 75 18 (!) 182/98 97 %      Temp Source Heart Rate Source Patient Position - Orthostatic VS BP Location FiO2 (%)   09/18/19 1757 -- 09/18/19 1757 09/18/19 1757 --   Temporal  Lying Left arm       Pain Score       09/18/19 1844       Worst Possible Pain           Past Medical History:   Diagnosis Date    Anemia     Arthritis     CH[facility]congestive [facility] failure[facility]     Chronic pain disorder     low back    COPD (chronic obstructive pulmonary disease) [facility]     Coronary artery disease     Glaucoma     Hematuria 2019    Hyperlipidemia     Hypertension     Kidney stones     Lumbar stenosis     Myocardial infarction ([address] )     Osteoporosis     PAD (peripheral artery disease) ([facility])     Peripheral neuropathy     Shortness of breath      Past Surgical History:   Procedure Laterality Date    APPENDECTOMY      CARDIAC CATHETERIZATION  2019    CT EPIDURAL STEROID INJECTION (RYAN LUMBAR)  8/20/2019    CYSTOSCOPY      HAND SURGERY Right     HYSTERECTOMY      age 29    INCISION AND DRAINAGE OF WOUND Left 1/5/2019    Procedure: INCISION AND DRAINAGE (I&D) EXTREMITY;  Surgeon: Filipe Irvin MD;  Location: [facility];  Service: General    LITHOTRIPSY      MASS EXCISION      [place] of back wall mass    TONSILLECTOMY      TONSILLECTOMY         The patient was admitted to the hospital at 1739 on 9/18/19 for the following diagnosis:  Renal mass [N28 89]    Consults have been placed to:   IP CONSULT TO UROLOGY    Vitals:    09/19/19 0957 09/19/19 0959 09/19/19 1002 09/19/19 1021   BP: 145/66  145/67 139/71   BP Location:    Left arm   Pulse: 80  76 77   Resp:    16   Temp:    97 8 °F (36 6 °C)   TempSrc:    Temporal   SpO2: 96% 95% 94% 94%   Weight:           Most recent labs:    Recent Labs     09/18/19  0942 09/18/19  1227  09/18/19  1938  09/19/19  0550 09/19/19  1034 09/19/19  1431   WBC 11 20*  --    < >  --   --  11 85*  --   --    HGB 9 3*  --    < > 8 9*   < > 8 5*  --  8 5*   HCT 28 1*  --    < > 28 0*   < > 27 4*  --  27 4*     --    < >  --   --  215  --   --    K 4 2  --   --   --   --   --  3 8  --    CALCIUM 8 5  --   --   --   --   --  8 5  --    BUN 20  --   --   --   --   --  13  --    CREATININE 1 54*  --   --   --   --   --  1 18  --    LIPASE 50  --   --   --   --   --   --   --    INR  --   --   --  1 10  --   --   --   --    TROPONINI  --  <0 01  --   --   --   --   --   --    AST 24  --   --   --   --   --   --   --    ALT 17  --   --   --   --   --   --   --    ALKPHOS 141*  --   --   --   --   --   --   --     < > = values in this interval not displayed         Scheduled Meds:  Current Facility-Administered Medications:  albuterol 2 puff Inhalation Q4H PRN Michelle Bains PA-C    amLODIPine 5 mg Oral Daily Michelle Bains PA-C    atorvastatin 40 mg Oral HS Michelle Bains PA-C    brimonidine 1 drop Both Eyes BID Michelle Bains PA-C    dorzolamide 1 drop Both Eyes TID Michelle Bains PA-C    insulin glargine 12 Units Subcutaneous HS Michelle Bains PA-C    insulin lispro 1-5 Units Subcutaneous Q6H Michelle Bains PA-C    latanoprost 1 drop Both Eyes HS Michelle Bains PA-C    metoprolol tartrate 25 mg Oral Q12H [facility] Michelle Bains PA-C    morphine injection 2 mg Intravenous Q4H PRN Michelle Bains PA-C    ondansetron 4 mg Intravenous Q6H PRN Michelle Bains PA-C    pantoprazole 20 mg Oral Early Morning Michelle Bains PA-C    pentoxifylline 400 mg Oral Daily Michelle Bains PA-C    pneumococcal 13-valent conjugate vaccine 0 5 mL Intramuscular Prior to discharge Sujata Frazier MD    pregabalin 100 mg Oral BID Michelle Bains PA-C    sodium chloride 75 mL/hr Intravenous Continuous Michelle Bains PA-C Last Rate: 75 mL/hr (09/19/19 1048)     Continuous Infusions:  sodium chloride 75 mL/hr Last Rate: 75 mL/hr (09/19/19 1048)     PRN Meds:   albuterol    morphine injection    ondansetron    pneumococcal 13-valent conjugate vaccine    Surgical procedures (if appropriate):

## 2019-09-19 NOTE — PROGRESS NOTES
Progress Note - Jennifer Beck 1935, 81 y o  female MRN: [id number]    Unit/Bed#: E5 -01 Encounter: [id number]    Primary Care Provider: Katlin Knott MD   Date and time admitted to hospital: 9/18/2019  5:39 PM        * Hematuria  Assessment & Plan  Gross large volume hematuria  Transferred to [facility] for urologic evaluation  Ct a/p no con demonstrates hyperdense material seen in a curvilinear pattern within the posterior mid calyx and filling and distending the renal pelvis most consistent with appearance of blood products w/nonobstructing renal calculi  There is no ureteral obstruction or over etiology for gross hematuria concerning for possible underlying renal mass of right kidney  -urology recommended IR for renal artery angiogram and embolization however no vessels were actively extravasating  Pt still has large volume gross hematuria since admission, but flank pain has improved  hgb was  Recently 10but remotely 11-13 and has dropped to mid 9s and now mid 8s  No clot formation  Likely can defer on mejia cath and cbi presently  Continue to monitor I/os  Urology recommends continued surveillance, will allow to eat and place npo at midnight for possible evaluation w/cystoscopy    Right flank pain  Assessment & Plan  Possibly related to gross hematuria as above and ct findings  This is improved  ua bland from infectious stand point  Continue analgesics and continue to monitor    Acute blood loss anemia  Assessment & Plan  abla baseline hgb remotely was  11-13 and on recent op labs 10  Has been progressive to mid 9s and now 8 3-8 5 w/ongoing gross hematuria  coags normal, no thrombocytopenia, type and screen obtained  Will d/w attending for consent  Check h/h q 8h    Liver nodule  Assessment & Plan  Noted on dry ct a/p  Needs op dedicated focus u/s    Leukocytosis  Assessment & Plan  Mild stable at leukocytosis of 12 in setting of gross hematuria  No s/sx of acute infection   This is improving and pt remains afebrile  ua bland save gross hematuria  Monitor off abx    Spinal stenosis of lumbar region with neurogenic claudication  Assessment & Plan  severe spinal stenosis L4-5  W/weakness with dorsiflexion b/l  Pt's family declined surgical intervention for conservative mgmt  Op epidural injections w/ortho  Op f/u with ortho    Coronary artery disease involving native coronary artery of native heart without angina pectoris  Assessment & Plan  Pt w/nonocclusive vessel disease on cardiac cath 07/2019 for med mgmt on asa/plavix/bb statin  Pt as of recent cardiology notes had stopped ap due to bruising  She has since been recommended to restart at least asa given cad  Hold asa for now given hematuria, continue bb statin    Stage 3 chronic kidney disease ([facility] [address] )  Assessment & Plan  Baseline creatinine is 1 3-1 5  Monitor w/abla for nalini    Chronic diastolic heart failure (HCC)  Assessment & Plan  Wt Readings from Last 3 Encounters:   09/18/19 74 8 kg (164 lb 14 5 oz)   09/09/19 72 6 kg (160 lb)   09/06/19 72 7 kg (160 lb 3 2 oz)     Pt appears stable wo s/sx of volume overload, cxr demonstrates subsegmental atelectasis vs scar and bnp 600   monitor closely w/gentle ivf  Continue bb    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 [facility])  Assessment & Plan  Lab Results   Component Value Date    HGBA1C 7 7 (H) 06/04/2019       Recent Labs     09/18/19  2237 09/19/19  0039 09/19/19  0630 09/19/19  1208   POCGLU 187* 163* 160* 200*       Blood Sugar Average: Last 72 hrs:  (P) 172 6   Hold scheduled humalog, start 1/2 tdd insulin glargine at 12 iu qhs while appetite is poor  Pt tentatively npo again at midnight so will keep unadjusted    Start ssi/poc bs    Hypertension  Assessment & Plan  Continue bb/ccb  Hold lasix      VTE Pharmacologic Prophylaxis:   Pharmacologic: Pharmacologic VTE Prophylaxis contraindicated due to hematuria  Mechanical VTE Prophylaxis in Place: Yes    Patient Centered Rounds: I have performed bedside rounds with nursing staff today  Discussions with Specialists or Other Care Team Provider: tea    Education and Discussions with Family / Patient: dispo workup with pt  Called daughter Srinivasan Troncoso for update    Time Spent for Care: 20 minutes  More than 50% of total time spent on counseling and coordination of care as described above  Current Length of Stay: 1 day(s)    Current Patient Status: Inpatient   Certification Statement: The patient will continue to require additional inpatient hospital stay due to abla    Discharge Plan: pending improvement of abla likely d/c in 72h    Code Status: Level 1 - Full Code      Subjective:   Pt seen/examined  Still w/gross hematuria per nursing  She is Urdu speaking only conversation occurred between the pt/myself in [address]  She reports her right flank pain is better but she is still having large volume gross blood  No cp/sob/lightheadedness  No abd pain n/v/d  She is alert but oriented x3  She is thirsty and requesting oral intake persistently  Objective:     Vitals:   Temp (24hrs), Av 8 °F (36 6 °C), Min:97 5 °F (36 4 °C), Max:97 9 °F (36 6 °C)    Temp:  [97 5 °F (36 4 °C)-97 9 °F (36 6 °C)] 97 8 °F (36 6 °C)  HR:  [] 77  Resp:  [16-19] 16  BP: (127-182)/(62-98) 139/71  SpO2:  [92 %-100 %] 94 %  Body mass index is 28 31 kg/m²  Input and Output Summary (last 24 hours): Intake/Output Summary (Last 24 hours) at 2019 1430  Last data filed at 2019 0800  Gross per 24 hour   Intake    Output 1400 ml   Net -1400 ml       Physical Exam:     Physical Exam   Constitutional: She appears well-developed and well-nourished  No distress  HENT:   Head: Normocephalic and atraumatic  Right Ear: External ear normal    Left Ear: External ear normal    Eyes: Conjunctivae are normal    Neck: Normal range of motion     Cardiovascular: Normal rate, regular rhythm and normal heart sounds  Exam reveals no gallop and no friction rub  No murmur heard  Pulmonary/Chest: Effort normal  No respiratory distress  She has no wheezes  She has no rales  Diminished breath sounds basilar bilaterally   Abdominal: She exhibits no distension and no mass  There is no tenderness  There is no rebound and no guarding  Musculoskeletal: She exhibits no edema  Pain w/ hip flexion with sitting up in bed in bilateral thighs   Neurological: She is alert  Skin: Skin is warm  She is not diaphoretic  Psychiatric: She has a normal mood and affect  Vitals reviewed  (  Be Sure to Include Physical Exam: Delete this entire line when you have entered your exam)    Additional Data:     Labs:    Results from last 7 days   Lab Units 09/19/19  0550  09/18/19  1545   WBC Thousand/uL 11 85*  --  12 70*   HEMOGLOBIN g/dL 8 5*   < > 9 7*   HEMATOCRIT % 27 4*   < > 29 9*   PLATELETS Thousands/uL 215  --  227   NEUTROS PCT %  --   --  73*   LYMPHS PCT %  --   --  17*   MONOS PCT %  --   --  8   EOS PCT %  --   --  1    < > = values in this interval not displayed  Results from last 7 days   Lab Units 09/19/19  1034 09/18/19  0942   SODIUM mmol/L 142 140   POTASSIUM mmol/L 3 8 4 2   CHLORIDE mmol/L 108 101   CO2 mmol/L 25 29   BUN mg/dL 13 20   CREATININE mg/dL 1 18 1 54*   ANION GAP mmol/L 9 10   CALCIUM mg/dL 8 5 8 5   ALBUMIN g/dL  --  3 6   TOTAL BILIRUBIN mg/dL  --  0 40   ALK PHOS U/L  --  141*   ALT U/L  --  17   AST U/L  --  24   GLUCOSE RANDOM mg/dL 193* 182*     Results from last 7 days   Lab Units 09/18/19  1938   INR  1 10     Results from last 7 days   Lab Units 09/19/19  1208 09/19/19  0630 09/19/19  0039 09/18/19  2237 09/18/19  1755 09/18/19  0950   POC GLUCOSE mg/dl 200* 160* 163* 187* 153* 188*                   * I Have Reviewed All Lab Data Listed Above  * Additional Pertinent Lab Tests Reviewed:  All Labs Within Last 24 Hours Reviewed    Imaging:    Imaging Reports Reviewed Today Include:   Imaging Personally Reviewed by Myself Includes:      Recent Cultures (last 7 days):     Results from last 7 days   Lab Units 09/13/19  1034   URINE CULTURE  No Growth <1000 cfu/mL       Last 24 Hours Medication List:     Current Facility-Administered Medications:  albuterol 2 puff Inhalation Q4H PRN Michelle Bains PA-C    amLODIPine 5 mg Oral Daily Michelle Bains PA-C    atorvastatin 40 mg Oral HS Michelle Bains PA-C    brimonidine 1 drop Both Eyes BID Michelle Bains PA-C    dorzolamide 1 drop Both Eyes TID Michelle Bains PA-C    insulin glargine 12 Units Subcutaneous HS Michelle Bains PA-C    insulin lispro 1-5 Units Subcutaneous Q6H Michelle Bains PA-C    latanoprost 1 drop Both Eyes HS Michelle Bains PA-C    metoprolol tartrate 25 mg Oral Q12H [address] Michelle Bains PA-C    morphine injection 2 mg Intravenous Q4H PRN Michelle Bains PA-C    ondansetron 4 mg Intravenous Q6H PRN Michelle Bains PA-C    pantoprazole 20 mg Oral Early Morning Michelle Bains PA-C    pentoxifylline 400 mg Oral Daily Michelle Bains PA-C    pneumococcal 13-valent conjugate vaccine 0 5 mL Intramuscular Prior to discharge Ramon Almazan MD    pregabalin 100 mg Oral BID Michelle Bains PA-C    sodium chloride 75 mL/hr Intravenous Continuous Michelle Bains PA-C Last Rate: 75 mL/hr (09/19/19 1048)        Today, Patient Was Seen By: Minesh Crystal PA-C    ** Please Note: Dictation voice to text software may have been used in the creation of this document   **

## 2019-09-19 NOTE — ASSESSMENT & PLAN NOTE
abla baseline hgb remotely was  11-13 and on recent op labs 10  Has been progressive to mid 9s and now 8 3-8 5 w/ongoing gross hematuria  coags normal, no thrombocytopenia, type and screen obtained  Will d/w attending for consent    Check h/h q 8h

## 2019-09-19 NOTE — ASSESSMENT & PLAN NOTE
As part of presenting complaint, in the presence of right renal hemorrhage  Improved since admission  Continue to monitor for symptoms  P r n  Pain control

## 2019-09-19 NOTE — ASSESSMENT & PLAN NOTE
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 or metastatic RCC with invasion into the collecting system, metastasis noted in the liver and aortocaval lymph nodes consistent with regional metastasis  There is also an enhancing nodule in the upper pole the right kidney suspicious for synchronous neoplasm  Lengthy discussion had, with the assistance of a , with the patient and her daughter, present in the room  Her daughter does not think that her mom would elect surgery, and is unsure about chemotherapy  At this time, we will monitor overnight and recheck hemoglobin in the morning, if it drops, will plan for transfusion and subsequent discharge home  If hemoglobin stable, will be discharged by Internal Medicine  Outpatient follow-up with Urology, oncology to discuss options  Advance diet today

## 2019-09-19 NOTE — CONSULTS
Consult - Urology   Danielle Borjas 1935, 81 y o  female MRN: [id number]    Unit/Bed#: E5 -01 Encounter: [id number]    Acute blood loss anemia  Assessment & Plan  Secondary to right renal bleeding  Hemoglobin stable at 8 5  Continue to check H&H periodically  Transfuse for hemoglobin less than 7 per Internal Medicine  Right flank pain  Assessment & Plan  As part of presenting complaint, in the presence of right renal hemorrhage  Improved since admission  Continue to monitor for symptoms  P r n  Pain control  * Hematuria  Assessment & Plan  Persistent hematuria, with likely source right kidney  Continues to void on her own without significant complaints of dysuria  PVRs have been low without any passage of clots or concern for clot retention  At this time, hemoglobin stable at 8 5 throughout the day today, despite IV fluid resuscitation  IR evaluation for possible embolization without active bleeding, therefore embolization not performed  Continue holding aspirin  Now that renal function has improved, plan for CT renal protocol with IV contrast to further evaluate this right kidney for possible underlying mass tomorrow  Discussed with Dr Jacquie Herrera and CARL Craven with JOSÉ LUIS as well as Dr Ed Hawkins  Subjective/Objective     Subjective:   13-year-old female, who originally presented to the [facility] Emergency Department for vague right upper quadrant pain and fatigue and overall feeling unwell  She underwent ultrasound in a workup for possible liver gallbladder source, and eventually had a CT which showed hyperdense material consistent with hemorrhage in blood products in the right mid calyx, no ureteral obstruction, with the possibility of underlying renal mass of the right kidney existing  Contrast was not given given her acute kidney injury  She was transferred to the [facility], IR consultation was obtained    She had renal artery angiogram with attempted embolization, there were no actively extravasating vessels and embolization was not performed  Her pre-hospital hemoglobin was in the 11-13 range, down to the mid 9 that original presentation with hemoglobin of 8 3 at its lowest early this morning  Currently holding steady at a 0 5 throughout the day today  Her acute kidney injury has improved from 1 5 for admission to 1 18 this morning  She reports improvement of her right flank pain   is used for interview and exam   She reports currently she feels very hungry and is unconcerned about her overall medical status and wishes to eat  She continues to have harpreet hematuria, but has low PVR without clots or any clot retention  She describes some dysuria but seems relatively on bothered  She has no lower abdominal pain  Review of Systems   Constitutional: Negative for activity change and appetite change  HENT: Negative for congestion and ear pain  Eyes: Negative for pain  Respiratory: Negative for cough and shortness of breath  Cardiovascular: Negative for chest pain and palpitations  Gastrointestinal: Negative for abdominal distention, abdominal pain, blood in stool, constipation, diarrhea and nausea  Genitourinary: Positive for flank pain ( right flank pain and right upper abdominal discomfort, improved since admission) and hematuria (Significant high volume hematuria, but emptying her bladder)  Negative for difficulty urinating, dysuria, pelvic pain and urgency  Musculoskeletal: Negative for arthralgias and myalgias  Skin: Negative for rash  Allergic/Immunologic: Negative for immunocompromised state  Neurological: Negative for dizziness and headaches  Hematological: Negative for adenopathy  Does not bruise/bleed easily  Psychiatric/Behavioral: Negative for agitation  The patient is not nervous/anxious          Objective:  Vitals: Blood pressure 148/73, pulse 71, temperature 97 7 °F (36 5 °C), temperature source Temporal, resp  rate 18, weight 74 8 kg (164 lb 14 5 oz), last menstrual period 01/01/1990, SpO2 95 %, not currently breastfeeding  ,Body mass index is 28 31 kg/m²  Intake/Output Summary (Last 24 hours) at 9/19/2019 1537  Last data filed at 9/19/2019 0800  Gross per 24 hour   Intake    Output 1400 ml   Net -1400 ml       Invasive Devices     Peripheral Intravenous Line            Peripheral IV 09/18/19 Right Forearm 1 day                Physical Exam   Constitutional: She is oriented to person, place, and time  She appears well-developed and well-nourished  She is cooperative  She does not appear ill  No distress  52-year-old female, resting comfortably in bed, appears to be sleeping  Easily arousable  No acute distress  HENT:   Head: Normocephalic and atraumatic  Moist mucous membranes  Eyes: Conjunctivae and EOM are normal    Neck: Normal range of motion  Neck supple  No tracheal deviation present  Cardiovascular: Normal rate, regular rhythm and normal heart sounds  No murmur heard  Pulmonary/Chest: Effort normal and breath sounds normal  No respiratory distress  She has no wheezes  Good airflow bilaterally on deep inspiration  Abdominal: Soft  Bowel sounds are normal  She exhibits no distension and no mass  There is no tenderness  Abdomen soft and benign without significant tenderness rigidity rebound or guarding  No suprapubic fullness appreciated  Musculoskeletal: Normal range of motion  She exhibits no edema  Bilateral CVA without tenderness  Neurological: She is alert and oriented to person, place, and time  Skin: Skin is warm and dry  No rash noted  She is not diaphoretic  No erythema  No pallor  Psychiatric: She has a normal mood and affect  Her behavior is normal  Judgment and thought content normal    Nursing note and vitals reviewed        History:    Past Medical History:   Diagnosis Date    Anemia     Arthritis     [facility] [address] [facility]     Chronic pain disorder     low back    [facility]     Coronary artery disease     Glaucoma     Hematuria 2019    Hyperlipidemia     Hypertension     Kidney stones     Lumbar stenosis     [facility] infarction [facility] [address] [facility]     Osteoporosis     PAD [facility]peripheral artery disease) [facility]     Peripheral neuropathy     Shortness of breath      Past Surgical History:   Procedure Laterality Date    APPENDECTOMY      CARDIAC CATHETERIZATION  2019    CT EPIDURAL STEROID INJECTION (RYAN LUMBAR)  8/20/2019    CYSTOSCOPY      HAND SURGERY Right     HYSTERECTOMY      age 29    INCISION AND DRAINAGE OF WOUND Left 1/5/2019    Procedure: INCISION AND DRAINAGE (I&D) EXTREMITY;  Surgeon: Yarelis Chris MD;  Location: [facility];  Service: General    LITHOTRIPSY      MASS EXCISION      [place] of back wall mass    TONSILLECTOMY      TONSILLECTOMY       Family History   Problem Relation Age of Onset    Diabetes Mother     No Known Problems Father     Throat cancer Daughter     No Known Problems Maternal Grandmother     No Known Problems Maternal Grandfather     No Known Problems Paternal Grandmother     No Known Problems Paternal Grandfather      Social History     Socioeconomic History    Marital status: Single     Spouse name: Not on file    Number of children: Not on file    Years of education: Not on file    Highest education level: Not on file   Occupational History    Not on file   Social Needs    Financial resource strain: Not on file    Food insecurity:     Worry: Not on file     Inability: Not on file    Transportation needs:     Medical: Not on file     Non-medical: Not on file   Tobacco Use    Smoking status: Never Smoker    Smokeless tobacco: Never Used    Tobacco comment: states she quit but family living with her states she smokes   Substance and Sexual Activity    Alcohol use: Never     Frequency: Never     Comment: OCCASIONAL    Drug use: Never  Sexual activity: Yes     Partners: Male   Lifestyle    Physical activity:     Days per week: Not on file     Minutes per session: Not on file    Stress: Not on file   Relationships    Social connections:     Talks on phone: Not on file     Gets together: Not on file     Attends Yarsanism service: Not on file     Active member of club or organization: Not on file     Attends meetings of clubs or organizations: Not on file     Relationship status: Not on file    Intimate partner violence:     Fear of current or ex partner: Not on file     Emotionally abused: Not on file     Physically abused: Not on file     Forced sexual activity: Not on file   Other Topics Concern    Not on file   Social History Narrative    Not on file       CT 9/18/2019: KIDNEYS/URETERS:  Evaluation is limited given the absence of intravenous contrast   There is bilateral renal cortical scarring similar in appearance to the prior study  [facility], there is now hyperdense material seen in a curvilinear pattern within the   posterior mid calyx and filling and distending the renal pelvis most consistent with appearance of blood products nonobstructing calculi remain stable, one in the anterior mid cortex and the other appearing adherent along the anteromedial renal pelvis     There is no ureteral filling defect appreciated on this unenhanced exam   Left kidney appears essentially stable       Imaging reviewed - both report and images personally reviewed  Labs:  Recent Labs     09/18/19  0942 09/18/19  1545 09/19/19  0550   WBC 11 20* 12 70* 11 85*     Recent Labs     09/18/19  0942 09/18/19  1545 09/18/19  1938 09/19/19  0254 09/19/19  0550 09/19/19  1431   HGB 9 3* 9 7* 8 9* 8 3* 8 5* 8 5*       Recent Labs     09/18/19  0942 09/19/19  1034   CREATININE 1 54* 1 18       Microbiology:  UCx from 9/13/2019 no growth       Leidy Rand PA-C  Date: 9/19/2019 Time: 3:37 PM

## 2019-09-19 NOTE — PLAN OF CARE
Problem: Prexisting or High Potential for Compromised Skin Integrity  Goal: Skin integrity is maintained or improved  Description  INTERVENTIONS:  - Identify patients at risk for skin breakdown  - Assess and monitor skin integrity  - Assess and monitor nutrition and hydration status  - Monitor labs   - Assess for incontinence   - Turn and reposition patient  - Assist with mobility/ambulation  - Relieve pressure over bony prominences  - Avoid friction and shearing  - Provide appropriate hygiene as needed including keeping skin clean and dry  - Evaluate need for skin moisturizer/barrier cream  - Collaborate with interdisciplinary team   - Patient/family teaching  - Consider wound care consult   Outcome: Progressing     Problem: Potential for Falls  Goal: Patient will remain free of falls  Description  INTERVENTIONS:  - Assess patient frequently for physical needs  -  Identify cognitive and physical deficits and behaviors that affect risk of falls    -  [place] fall precautions as indicated by assessment   - Educate patient/family on patient safety including physical limitations  - Instruct patient to call for assistance with activity based on assessment  - Modify environment to reduce risk of injury  - Consider OT/PT consult to assist with strengthening/mobility  Outcome: Progressing     Problem: PAIN - ADULT  Goal: Verbalizes/displays adequate comfort level or baseline comfort level  Description  Interventions:  - Encourage patient to monitor pain and request assistance  - Assess pain using appropriate pain scale  - Administer analgesics based on type and severity of pain and evaluate response  - Implement non-pharmacological measures as appropriate and evaluate response  - Consider cultural and social influences on pain and pain management  - Notify physician/advanced practitioner if interventions unsuccessful or patient reports new pain  Outcome: Progressing     Problem: INFECTION - ADULT  Goal: Absence or prevention of progression during hospitalization  Description  INTERVENTIONS:  - Assess and monitor for signs and symptoms of infection  - Monitor lab/diagnostic results  - Monitor all insertion sites, i e  indwelling lines, tubes, and drains  - Monitor endotracheal if appropriate and nasal secretions for changes in amount and color  - [place] appropriate cooling/warming therapies per order  - Administer medications as ordered  - Instruct and encourage patient and family to use good hand hygiene technique  - Identify and instruct in appropriate isolation precautions for identified infection/condition  Outcome: Progressing  Goal: Absence of fever/infection during neutropenic period  Description  INTERVENTIONS:  - Monitor WBC    Outcome: Progressing     Problem: SAFETY ADULT  Goal: Maintain or return to baseline ADL function  Description  INTERVENTIONS:  -  Assess patient's ability to carry out ADLs; assess patient's baseline for ADL function and identify physical deficits which impact ability to perform ADLs (bathing, care of mouth/teeth, toileting, grooming, dressing, etc )  - Assess/evaluate cause of self-care deficits   - Assess range of motion  - Assess patient's mobility; develop plan if impaired  - Assess patient's need for assistive devices and provide as appropriate  - Encourage maximum independence but intervene and supervise when necessary  - Involve family in performance of ADLs  - Assess for home care needs following discharge   - Consider OT consult to assist with ADL evaluation and planning for discharge  - Provide patient education as appropriate  Outcome: Progressing  Goal: Maintain or return mobility status to optimal level  Description  INTERVENTIONS:  - Assess patient's baseline mobility status (ambulation, transfers, stairs, etc )    - Identify cognitive and physical deficits and behaviors that affect mobility  - Identify mobility aids required to assist with transfers and/or ambulation (gait belt, sit-to-stand, lift, walker, cane, etc )  - [place] fall precautions as indicated by assessment  - Record patient progress and toleration of activity level on Mobility SBAR; progress patient to next Phase/Stage  - Instruct patient to call for assistance with activity based on assessment  - Consider rehabilitation consult to assist with strengthening/weightbearing, etc   Outcome: Progressing     Problem: DISCHARGE PLANNING  Goal: Discharge to home or other facility with appropriate resources  Description  INTERVENTIONS:  - Identify barriers to discharge w/patient and caregiver  - Arrange for needed discharge resources and transportation as appropriate  - Identify discharge learning needs (meds, wound care, etc )  - Arrange for interpretive services to assist at discharge as needed  - Refer to Case Management Department for coordinating discharge planning if the patient needs post-hospital services based on physician/advanced practitioner order or complex needs related to functional status, cognitive ability, or social support system  Outcome: Progressing     Problem: Knowledge Deficit  Goal: Patient/family/caregiver demonstrates understanding of disease process, treatment plan, medications, and discharge instructions  Description  Complete learning assessment and assess knowledge base    Interventions:  - Provide teaching at level of understanding  - Provide teaching via preferred learning methods  Outcome: Progressing     Problem: RESPIRATORY - ADULT  Goal: Achieves optimal ventilation and oxygenation  Description  INTERVENTIONS:  - Assess for changes in respiratory status  - Assess for changes in mentation and behavior  - Position to facilitate oxygenation and minimize respiratory effort  - Oxygen administered by appropriate delivery if ordered  - Initiate smoking cessation education as indicated  - Encourage broncho-pulmonary hygiene including cough, deep breathe, Incentive Spirometry  - Assess the need for suctioning and aspirate as needed  - Assess and instruct to report SOB or any respiratory difficulty  - Respiratory Therapy support as indicated  Outcome: Progressing     Problem: GENITOURINARY - ADULT  Goal: Maintains or returns to baseline urinary function  Description  INTERVENTIONS:  - Assess urinary function  - Encourage oral fluids to ensure adequate hydration if ordered  - Administer IV fluids as ordered to ensure adequate hydration  - Administer ordered medications as needed  - Offer frequent toileting  - Follow urinary retention protocol if ordered  Outcome: Progressing     Problem: METABOLIC, FLUID AND ELECTROLYTES - ADULT  Goal: Electrolytes maintained within normal limits  Description  INTERVENTIONS:  - Monitor labs and assess patient for signs and symptoms of electrolyte imbalances  - Administer electrolyte replacement as ordered  - Monitor response to electrolyte replacements, including repeat lab results as appropriate  - Instruct patient on fluid and nutrition as appropriate  Outcome: Progressing  Goal: Fluid balance maintained  Description  INTERVENTIONS:  - Monitor labs   - Monitor I/O and WT  - Instruct patient on fluid and nutrition as appropriate  - Assess for signs & symptoms of volume excess or deficit  Outcome: Progressing  Goal: Glucose maintained within target range  Description  INTERVENTIONS:  - Monitor Blood Glucose as ordered  - Assess for signs and symptoms of hyperglycemia and hypoglycemia  - Administer ordered medications to maintain glucose within target range  - Assess nutritional intake and initiate nutrition service referral as needed  Outcome: Progressing     Problem: SKIN/TISSUE INTEGRITY - ADULT  Goal: Skin integrity remains intact  Description  INTERVENTIONS  - Identify patients at risk for skin breakdown  - Assess and monitor skin integrity  - Assess and monitor nutrition and hydration status  - Monitor labs (i e  albumin)  - Assess for incontinence   - Turn and reposition patient  - Assist with mobility/ambulation  - Relieve pressure over bony prominences  - Avoid friction and shearing  - Provide appropriate hygiene as needed including keeping skin clean and dry  - Evaluate need for skin moisturizer/barrier cream  - Collaborate with interdisciplinary team (i e  Nutrition, Rehabilitation, etc )   - Patient/family teaching  Outcome: Progressing  Goal: Incision(s), wounds(s) or drain site(s) healing without S/S of infection  Description  INTERVENTIONS  - Assess and document risk factors for skin impairment   - Assess and document dressing, incision, wound bed, drain sites and surrounding tissue  - Consider nutrition services referral as needed  - Oral mucous membranes remain intact  - Provide patient/ family education  Outcome: Progressing  Goal: Oral mucous membranes remain intact  Description  INTERVENTIONS  - Assess oral mucosa and hygiene practices  - Implement preventative oral hygiene regimen  - Implement oral medicated treatments as ordered  - Initiate Nutrition services referral as needed  Outcome: Progressing     Problem: HEMATOLOGIC - ADULT  Goal: Maintains hematologic stability  Description  INTERVENTIONS  - Assess for signs and symptoms of bleeding or hemorrhage  - Monitor labs  - Administer supportive blood products/factors as ordered and appropriate  Outcome: Progressing

## 2019-09-19 NOTE — PROGRESS NOTES
Patient arrived to IR for right renal arteriogram  Patient with gross hematuria and perinephric hematoma on noncontrast CT  Plan for renal arteriogram and embolization  If no site identified, would observe  Patient is at increased risk for renal failure given underlying renal insufficiency  This was explained to the patient who refused to use the "blue phone" for translation services and preferred her nurse  The procedure and risks were discussed with the patient  All questions were answered  Informed consent was obtained

## 2019-09-19 NOTE — ASSESSMENT & PLAN NOTE
Secondary to right renal bleeding  Hemoglobin stable at 8 5->8 1 ->8 4  Continue to check H&H periodically  Transfuse for hemoglobin less than 7 per Internal Medicine

## 2019-09-19 NOTE — SOCIAL WORK
GLENN received call from Rin from the [address] [facility],[address]: [phone]  She reports pt lives with family and receives 30 hours per week of care and has asked to increase the hours provided to 47 hours per week  She asked that GLENN notify her piror to d/c so that they can arrange services to resume

## 2019-09-19 NOTE — UTILIZATION REVIEW
Initial Clinical Review    TRANSFER FROM [place]  ED    Admission: Date/Time/Statement: Inpatient Admission Orders (From admission, onward)     Ordered        09/18/19 1907  Inpatient Admission  Once                   Orders Placed This Encounter   Procedures    Inpatient Admission     Standing Status:   Standing     Number of Occurrences:   1     Order Specific Question:   Admitting Physician     Answer:   Katerina Mccullough [[id number]]     Order Specific Question:   Level of Care     Answer:   Med Surg [[id number]]     Order Specific Question:   Estimated length of stay     Answer:   More than 2 Midnights     Order Specific Question:   Certification     Answer:   I certify that inpatient services are medically necessary for this patient for a duration of greater than two midnights  See H&P and MD Progress Notes for additional information about the patient's course of treatment  No chief complaint on file  Assessment/Plan: Hematuria  Assessment & Plan  Gross large volume hematuria  Ct a/p no con demonstrates hyperdense material seen in a curvilinear pattern within the posterior mid calyx and filling and distending the renal pelvis most consistent with appearance of blood products w/nonobstructing renal calculi  There is no ureteral obstruction or over etiology for gross hematuria concerning for possible underlying renal mass of right kidney  D/w urology  Repeat h/h at [facility] prior to transfer stable in 9s from 10 (remotely 11-13 in 07/2019)  Will check repeat now  No clot formation  Likely can defer on mejia cath and cbi presently  Monitor h/h monitor I/os, consult urology  Pt likely for IR embolization on 9/19/19 as long as clinically stable  D/w pt's daughter at bedside     Right flank pain  Assessment & Plan  Possibly related to gross hematuria as above and ct findings  ua bland from infectious stand point    Continue analgesics and continue to monitor     Liver nodule  Assessment & Plan  Noted on dry ct a/p   Needs op dedicated focus u/s     Leukocytosis  Assessment & Plan  Mild stable at leukocytosis of 12 in setting of gross hematuria  No s/sx of acute infection  ua bland save gross hematuria  Monitor off abx  Spinal stenosis of lumbar region with neurogenic claudication  Assessment & Plan  severe spinal stenosis L4-5  W/weakness with dorsiflexion b/l  Pt's family declined surgical intervention for conservative mgmt  Op epidural injections w/ortho  Op f/u with ortho     Coronary artery disease involving native coronary artery of native heart without angina pectoris  Assessment & Plan  Pt w/nonocclusive vessel disease on cardiac cath 07/2019 for med mgmt on asa/plavix/bb statin  Pt as of recent cardiology notes had stopped ap due to bruising    She has since been recommended to restart at least asa given cad  Hold asa for now given hematuria, continue bb statin     Stage 3 chronic kidney disease ([facility] )  Assessment & Plan  Baseline creatinine is 1 3-1 5  Monitor w/abla for nalini     Chronic diastolic heart failure (HCC)  Assessment & Plan      Wt Readings from Last 3 Encounters:   09/18/19 74 8 kg (164 lb 14 5 oz)   09/09/19 72 6 kg (160 lb)   09/06/19 72 7 kg (160 lb 3 2 oz)      Pt appears stable wo s/sx of volume overload  Continue bb  Type 2 diabetes mellitus with hyperglycemia, with long-term current use of insulin ([facility]   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 ([facility] )    Hypertension  Assessment & Plan  Continue bb/ccb  Hold lasix     IR  PROCEDURE  9/19  Renal arteriogram Procedure Note  Findings: Right renal arteriogram with selection of multiple subsegmental branches fails to show a vascular abnormality or active extravasation of contrast        VTE Prophylaxis: Pharmacologic VTE Prophylaxis contraindicated due to hematuria  / sequential compression device   Code Status: fc  POLST: There is no POLST form on file for this patient (pre-hospital)  Discussion with family: dispo w/daughter at bedside     Anticipated Length of Stay:  Patient will be admitted on an Inpatient basis with an anticipated length of stay of  Greater than 2 midnights  Justification for Hospital Stay: gross hematuria    Holley Wesley is a 81 y o  female who presents with pmh of anemia, hx of hydronephrosis, former smoker status, hx of chronic diastolic chf, cad, pad, spinal stenosis of L4-5 w/neurogenic claudication coming to hospital for right flank pain/hematuria  Pt is Amharic speaking only  Conversation occurred between pt and myself in [address] [facility] as well as with pt's daughter in [address] [facility]  Pt was in normal state of health when early in AM of day of admission she started w/right sided sharp stabbing flank pain  The pt herself has difficulty further clarifying the nature of this pain but it does not radiate  She has no n/v/f/c/d  She has no cp/sob        She is alert and oriented to hospital setting but otherwise unable to answer orientation questions due to pain  In the ed at [facility] she was evaluated for possible right ureteral calculus  She was found to have bleed within right kidney and mid posterior lower pelvis but no obstruction notable on ct concerning for occult malignancy and was recommended transfer after d/w urology  D/w urology pa as pt had another 400cc gross hematuria w/o clots  They recommend IR consultation for possible embolization      ED Triage Vitals   Temperature Pulse Respirations Blood Pressure SpO2   09/18/19 1757 09/18/19 1757 09/18/19 1757 09/18/19 1757 09/18/19 1757   97 8 °F (36 6 °C) 75 18 (!) 182/98 97 %      Temp Source Heart Rate Source Patient Position - Orthostatic VS BP Location FiO2 (%)   09/18/19 1757 -- 09/18/19 1757 09/18/19 1757 --   Temporal  Lying Left arm       Pain Score       09/18/19 1844       Worst Possible Pain        Wt Readings from Last 1 Encounters:   09/18/19 74 8 kg (164 lb 14 5 oz) Additional Vital Signs:   19/19 1040              None (Room air)     09/19/19 1021  97 8 °F (36 6 °C)  77  16  139/71    94 %  None (Room air)  Lying   09/19/19 1002    76    145/67  101  94 %       09/19/19 09:59:23            95 %  None (Room air)     09/19/19 0957    80    145/66  92  96 %       09/19/19 0952    77    147/67  90  99 %       09/19/19 0947    77    144/69  103  100 %       09/19/19 0942    80    156/71  106  99 %       09/19/19 0937    80    140/70  98  98 %       09/19/19 0932    78    144/72  96  98 %       09/19/19 0927    77    143/70  97  98 %       09/19/19 0923    79    132/65  89  97 %       09/19/19 09:19:34            98 %  Nasal cannula     09/19/19 0917    85    159/71  88  99 %             Pertinent Labs/Diagnostic Test Results:   Results from last 7 days   Lab Units 09/19/19  0550 09/19/19  0254 09/18/19  1938 09/18/19  1545 09/18/19  0942 09/13/19  1034   WBC Thousand/uL 11 85*  --   --  12 70* 11 20* 9 40   HEMOGLOBIN g/dL 8 5* 8 3* 8 9* 9 7* 9 3* 10 3*   HEMATOCRIT % 27 4* 26 6* 28 0* 29 9* 28 1* 31 0*   PLATELETS Thousands/uL 215  --   --  227 213 194   NEUTROS ABS Thousands/µL  --   --   --  9 30* 9 00* 7 30         Results from last 7 days   Lab Units 09/19/19  1034 09/18/19  0942 09/13/19  1034   SODIUM mmol/L 142 140 136*   POTASSIUM mmol/L 3 8 4 2 4 4   CHLORIDE mmol/L 108 101 98   CO2 mmol/L 25 29 29   ANION GAP mmol/L 9 10 9   BUN mg/dL 13 20 19   CREATININE mg/dL 1 18 1 54* 1 40*   EGFR ml/min/1 73sq m 43 31* 35*   CALCIUM mg/dL 8 5 8 5 8 5     Results from last 7 days   Lab Units 09/18/19  0942 09/13/19  1034   AST U/L 24 21   ALT U/L 17 22   ALK PHOS U/L 141* 142*   TOTAL PROTEIN g/dL 6 5 6 1   ALBUMIN g/dL 3 6 3 5   TOTAL BILIRUBIN mg/dL 0 40 0 50     Results from last 7 days   Lab Units 09/19/19  0630 09/19/19  0039 09/18/19  2237 09/18/19  1755 09/18/19  0950   POC GLUCOSE mg/dl 160* 163* 187* 153* 188* Results from last 7 days   Lab Units 09/19/19  1034 09/18/19  0942 09/13/19  1034   GLUCOSE RANDOM mg/dL 193* 182* 228*           Results from last 7 days   Lab Units 09/18/19  1227   TROPONIN I ng/mL <0 01         Results from last 7 days   Lab Units 09/18/19  1938   PROTIME seconds 14 3   INR  1 10   PTT seconds 26           Results from last 7 days   Lab Units 09/18/19  0942   NT-PRO BNP pg/mL 608*             Results from last 7 days   Lab Units 09/18/19  0942   LIPASE u/L 50             Results from last 7 days   Lab Units 09/18/19  1351   CLARITY UA  Bloody*   COLOR UA  Red*   SPEC GRAV UA  1 010   PH UA  7 0   GLUCOSE UA mg/dl Negative   KETONES UA mg/dl 5 (Trace)*   BLOOD UA  150 0*   PROTEIN UA mg/dl >=500*   NITRITE UA  Negative   BILIRUBIN UA  Negative   UROBILINOGEN UA mg/dL Negative   LEUKOCYTES UA  Negative   WBC UA /hpf 2-4*   RBC UA /hpf Innumerable*   BACTERIA UA /hpf Occasional   EPITHELIAL CELLS WET PREP /hpf Occasional           Results from last 7 days   Lab Units 09/13/19  1034   URINE CULTURE  No Growth <1000 cfu/mL       CXR   ( 9/19)    Linear changes noted at left lung base compatible with subsegmental atelectasis or scar   Otherwise clear lungs     Ct    Abd/pelvis  ( 9/18)     Blood products filling and distending the right mid to lower pole calyx and renal pelvis raising suspicion for underlying occult mass  Mattia Resides is central low attenuation within this distended lower calyx but cannot differentiate from older clotted blood   products   Limited evaluation given lack of intravenous contrast on this and the prior study   Stable bilateral renal cortical scarring and nonobstructing calculi  New indeterminate hypoattenuating nodule within the liver as described above   Focused ultrasound exam remains unrevealing than further characterization with either contrast CT or MRI should be considered      U/S  abd  ( 9/18)     No cholelithiasis, acute cholecystitis or biliary ductal dilation  2   Mild left-sided pelvocaliectasis, similar to the prior CT   Nonobstructing left renal calculi seen on the prior CT are not visualized sonographically  3   Right renal parenchymal scarring with moderate pelvic dilatation and several nonobstructing renal calculi, similar to the prior CT    Past Medical History:   Diagnosis Date    Anemia     Arthritis     CH[facility]estive [facility] failure) [facility]     Chronic pain disorder     low back    COPD (chronic obstructive pulmonary disease) [facility]     Coronary artery disease     Glaucoma     Hematuria 2019    Hyperlipidemia     Hypertension     Kidney stones     Lumbar stenosis     [facility]ocardial infarction ([facility] )     Osteoporosis     PAD (peripheral artery disease) ([facility])     Peripheral neuropathy     Shortness of breath      Present on Admission:   Chronic diastolic heart failure ([facility] )   Coronary artery disease involving native coronary artery of native heart without angina pectoris   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 ([facility] )   Hematuria   Hypertension   Stage 3 chronic kidney disease ([facility] )   Spinal stenosis of lumbar region with neurogenic claudication      Admitting Diagnosis: Renal mass [N28 89]  Age/Sex: 81 y o  female  Admission Orders:    Current Facility-Administered Medications:  albuterol 2 puff Inhalation Q4H PRN Michelle Bains PA-C    amLODIPine 5 mg Oral Daily Michelle Bains PA-C    atorvastatin 40 mg Oral HS Michelle Bains PA-C    brimonidine 1 drop Both Eyes BID Michelle Bains PA-C    dorzolamide 1 drop Both Eyes TID Michelle Bains PA-C    insulin glargine 12 Units Subcutaneous HS Michelle Bains PA-C    insulin lispro 1-5 Units Subcutaneous Q6H Michelle Bains PA-C    latanoprost 1 drop Both Eyes HS Michelle Bains PA-C    metoprolol tartrate 25 mg Oral Q12H [facility] Michelle Bains PA-C    morphine injection 2 mg Intravenous Q4H PRN Michelle TRINA Bains PA-C    ondansetron 4 mg Intravenous Q6H PRN Michelle Bains PA-C    pantoprazole 20 mg Oral Early Morning Michelle Bains PA-C    pentoxifylline 400 mg Oral Daily Michelle Bains PA-C    pneumococcal 13-valent conjugate vaccine 0 5 mL Intramuscular Prior to discharge Guillermo Hernandez MD    pregabalin 100 mg Oral BID Michelle Bains PA-C    sodium chloride 75 mL/hr Intravenous Continuous Michelle Bains PA-C Last Rate: 75 mL/hr (09/19/19 1048)       IP CONSULT TO [address] [place] [address] Review Department  Phone: [phone]; Fax [phone]  [email or web address]  ATTENTION: Please call with any questions or concerns to [phone]  and carefully listen to the prompts so that you are directed to the right person  Send all requests for admission clinical reviews, approved or denied determinations and any other requests to fax [phone]   All voicemails are confidential

## 2019-09-19 NOTE — BRIEF OP NOTE (RAD/CATH)
Renal arteriogram Procedure Note    PATIENT NAME: Joe Hinds  : 1935  MRN: [id number]     Pre-op Diagnosis:   1  Hematuria      Post-op Diagnosis:   1   Hematuria        Surgeon:   Felix Osman MD  Assistants:     No qualified resident was available, Resident is only observing    Estimated Blood Loss: minimal  Findings: Right renal arteriogram with selection of multiple subsegmental branches fails to show a vascular abnormality or active extravasation of contrast     Specimens: none    Complications:  none    Anesthesia: Conscious sedation and Local    Felix Osman MD     Date: 2019  Time: 9:59 AM

## 2019-09-19 NOTE — ASSESSMENT & PLAN NOTE
Lab Results   Component Value Date    HGBA1C 7 7 (H) 06/04/2019       Recent Labs     09/18/19  2237 09/19/19  0039 09/19/19  0630 09/19/19  1208   POCGLU 187* 163* 160* 200*       Blood Sugar Average: Last 72 hrs:  (P) 172 6   Hold scheduled humalog, start 1/2 tdd insulin glargine at 12 iu qhs while appetite is poor  Pt tentatively npo again at midnight so will keep unadjusted    Start ssi/poc bs

## 2019-09-19 NOTE — PLAN OF CARE
Problem: Prexisting or High Potential for Compromised Skin Integrity  Goal: Skin integrity is maintained or improved  Description  INTERVENTIONS:  - Identify patients at risk for skin breakdown  - Assess and monitor skin integrity  - Assess and monitor nutrition and hydration status  - Monitor labs   - Assess for incontinence   - Turn and reposition patient  - Assist with mobility/ambulation  - Relieve pressure over bony prominences  - Avoid friction and shearing  - Provide appropriate hygiene as needed including keeping skin clean and dry  - Evaluate need for skin moisturizer/barrier cream  - Collaborate with interdisciplinary team   - Patient/family teaching  - Consider wound care consult   Outcome: Progressing     Problem: Potential for Falls  Goal: Patient will remain free of falls  Description  INTERVENTIONS:  - Assess patient frequently for physical needs  -  Identify cognitive and physical deficits and behaviors that affect risk of falls    -  [place] fall precautions as indicated by assessment   - Educate patient/family on patient safety including physical limitations  - Instruct patient to call for assistance with activity based on assessment  - Modify environment to reduce risk of injury  - Consider OT/PT consult to assist with strengthening/mobility  Outcome: Progressing     Problem: PAIN - ADULT  Goal: Verbalizes/displays adequate comfort level or baseline comfort level  Description  Interventions:  - Encourage patient to monitor pain and request assistance  - Assess pain using appropriate pain scale  - Administer analgesics based on type and severity of pain and evaluate response  - Implement non-pharmacological measures as appropriate and evaluate response  - Consider cultural and social influences on pain and pain management  - Notify physician/advanced practitioner if interventions unsuccessful or patient reports new pain  Outcome: Progressing     Problem: INFECTION - ADULT  Goal: Absence or prevention of progression during hospitalization  Description  INTERVENTIONS:  - Assess and monitor for signs and symptoms of infection  - Monitor lab/diagnostic results  - Monitor all insertion sites, i e  indwelling lines, tubes, and drains  - Monitor endotracheal if appropriate and nasal secretions for changes in amount and color  - [place] appropriate cooling/warming therapies per order  - Administer medications as ordered  - Instruct and encourage patient and family to use good hand hygiene technique  - Identify and instruct in appropriate isolation precautions for identified infection/condition  Outcome: Progressing     Problem: SAFETY ADULT  Goal: Maintain or return to baseline ADL function  Description  INTERVENTIONS:  -  Assess patient's ability to carry out ADLs; assess patient's baseline for ADL function and identify physical deficits which impact ability to perform ADLs (bathing, care of mouth/teeth, toileting, grooming, dressing, etc )  - Assess/evaluate cause of self-care deficits   - Assess range of motion  - Assess patient's mobility; develop plan if impaired  - Assess patient's need for assistive devices and provide as appropriate  - Encourage maximum independence but intervene and supervise when necessary  - Involve family in performance of ADLs  - Assess for home care needs following discharge   - Consider OT consult to assist with ADL evaluation and planning for discharge  - Provide patient education as appropriate  Outcome: Progressing  Goal: Maintain or return mobility status to optimal level  Description  INTERVENTIONS:  - Assess patient's baseline mobility status (ambulation, transfers, stairs, etc )    - Identify cognitive and physical deficits and behaviors that affect mobility  - Identify mobility aids required to assist with transfers and/or ambulation (gait belt, sit-to-stand, lift, walker, cane, etc )  - [place] fall precautions as indicated by assessment  - Record patient progress and toleration of activity level on Mobility SBAR; progress patient to next Phase/Stage  - Instruct patient to call for assistance with activity based on assessment  - Consider rehabilitation consult to assist with strengthening/weightbearing, etc   Outcome: Progressing     Problem: DISCHARGE PLANNING  Goal: Discharge to home or other facility with appropriate resources  Description  INTERVENTIONS:  - Identify barriers to discharge w/patient and caregiver  - Arrange for needed discharge resources and transportation as appropriate  - Identify discharge learning needs (meds, wound care, etc )  - Arrange for interpretive services to assist at discharge as needed  - Refer to Case Management Department for coordinating discharge planning if the patient needs post-hospital services based on physician/advanced practitioner order or complex needs related to functional status, cognitive ability, or social support system  Outcome: Progressing     Problem: Knowledge Deficit  Goal: Patient/family/caregiver demonstrates understanding of disease process, treatment plan, medications, and discharge instructions  Description  Complete learning assessment and assess knowledge base    Interventions:  - Provide teaching at level of understanding  - Provide teaching via preferred learning methods  Outcome: Progressing     Problem: RESPIRATORY - ADULT  Goal: Achieves optimal ventilation and oxygenation  Description  INTERVENTIONS:  - Assess for changes in respiratory status  - Assess for changes in mentation and behavior  - Position to facilitate oxygenation and minimize respiratory effort  - Oxygen administered by appropriate delivery if ordered  - Initiate smoking cessation education as indicated  - Encourage broncho-pulmonary hygiene including cough, deep breathe, Incentive Spirometry  - Assess the need for suctioning and aspirate as needed  - Assess and instruct to report SOB or any respiratory difficulty  - Respiratory Therapy support as indicated  Outcome: Progressing     Problem: GENITOURINARY - ADULT  Goal: Maintains or returns to baseline urinary function  Description  INTERVENTIONS:  - Assess urinary function  - Encourage oral fluids to ensure adequate hydration if ordered  - Administer IV fluids as ordered to ensure adequate hydration  - Administer ordered medications as needed  - Offer frequent toileting  - Follow urinary retention protocol if ordered  Outcome: Progressing     Problem: METABOLIC, FLUID AND ELECTROLYTES - ADULT  Goal: Electrolytes maintained within normal limits  Description  INTERVENTIONS:  - Monitor labs and assess patient for signs and symptoms of electrolyte imbalances  - Administer electrolyte replacement as ordered  - Monitor response to electrolyte replacements, including repeat lab results as appropriate  - Instruct patient on fluid and nutrition as appropriate  Outcome: Progressing  Goal: Fluid balance maintained  Description  INTERVENTIONS:  - Monitor labs   - Monitor I/O and WT  - Instruct patient on fluid and nutrition as appropriate  - Assess for signs & symptoms of volume excess or deficit  Outcome: Progressing  Goal: Glucose maintained within target range  Description  INTERVENTIONS:  - Monitor Blood Glucose as ordered  - Assess for signs and symptoms of hyperglycemia and hypoglycemia  - Administer ordered medications to maintain glucose within target range  - Assess nutritional intake and initiate nutrition service referral as needed  Outcome: Progressing     Problem: SKIN/TISSUE INTEGRITY - ADULT  Goal: Skin integrity remains intact  Description  INTERVENTIONS  - Identify patients at risk for skin breakdown  - Assess and monitor skin integrity  - Assess and monitor nutrition and hydration status  - Monitor labs (i e  albumin)  - Assess for incontinence   - Turn and reposition patient  - Assist with mobility/ambulation  - Relieve pressure over bony prominences  - Avoid friction and shearing  - Provide appropriate hygiene as needed including keeping skin clean and dry  - Evaluate need for skin moisturizer/barrier cream  - Collaborate with interdisciplinary team (i e  Nutrition, Rehabilitation, etc )   - Patient/family teaching  Outcome: Progressing  Goal: Incision(s), wounds(s) or drain site(s) healing without S/S of infection  Description  INTERVENTIONS  - Assess and document risk factors for skin impairment   - Assess and document dressing, incision, wound bed, drain sites and surrounding tissue  - Consider nutrition services referral as needed  - Oral mucous membranes remain intact  - Provide patient/ family education  Outcome: Progressing  Goal: Oral mucous membranes remain intact  Description  INTERVENTIONS  - Assess oral mucosa and hygiene practices  - Implement preventative oral hygiene regimen  - Implement oral medicated treatments as ordered  - Initiate Nutrition services referral as needed  Outcome: Progressing     Problem: HEMATOLOGIC - ADULT  Goal: Maintains hematologic stability  Description  INTERVENTIONS  - Assess for signs and symptoms of bleeding or hemorrhage  - Monitor labs  - Administer supportive blood products/factors as ordered and appropriate  Outcome: Progressing normal...

## 2019-09-19 NOTE — ASSESSMENT & PLAN NOTE
Mild stable at leukocytosis of 12 in setting of gross hematuria  No s/sx of acute infection   This is improving and pt remains afebrile  ua bland save gross hematuria  Monitor off abx

## 2019-09-19 NOTE — ASSESSMENT & PLAN NOTE
Gross large volume hematuria  Transferred to [facility] for urologic evaluation  Ct a/p no con demonstrates hyperdense material seen in a curvilinear pattern within the posterior mid calyx and filling and distending the renal pelvis most consistent with appearance of blood products w/nonobstructing renal calculi  There is no ureteral obstruction or over etiology for gross hematuria concerning for possible underlying renal mass of right kidney  -urology recommended IR for renal artery angiogram and embolization however no vessels were actively extravasating  Pt still has large volume gross hematuria since admission, but flank pain has improved  hgb was  Recently 10but remotely 11-13 and has dropped to mid 9s and now mid 8s  No clot formation  Likely can defer on mejia cath and cbi presently  Continue to monitor I/os    Urology recommends continued surveillance, will allow to eat and place npo at midnight for possible evaluation w/cystoscopy

## 2019-09-20 ENCOUNTER — HOSPITAL ENCOUNTER (OUTPATIENT)
Dept: RADIOLOGY | Facility: HOSPITAL | Age: 84
Discharge: HOME | End: 2019-09-20
Attending: ORTHOPAEDIC SURGERY

## 2019-09-20 ENCOUNTER — APPOINTMENT (INPATIENT)
Dept: CT IMAGING | Facility: HOSPITAL | Age: 84
DRG: 687 | End: 2019-09-20
Payer: MEDICARE

## 2019-09-20 PROBLEM — N28.89 RENAL MASS: Status: ACTIVE | Noted: 2019-09-20

## 2019-09-20 PROBLEM — R31.0 GROSS HEMATURIA: Status: ACTIVE | Noted: 2019-08-07

## 2019-09-20 LAB
GLUCOSE SERPL-MCNC: 164 MG/DL (ref 65–140)
GLUCOSE SERPL-MCNC: 164 MG/DL (ref 65–140)
GLUCOSE SERPL-MCNC: 168 MG/DL (ref 65–140)
GLUCOSE SERPL-MCNC: 227 MG/DL (ref 65–140)
HCT VFR BLD AUTO: 27.3 % (ref 34.8–46.1)
HGB BLD-MCNC: 8.4 G/DL (ref 11.5–15.4)

## 2019-09-20 PROCEDURE — 74178 CT ABD&PLV WO CNTR FLWD CNTR: CPT

## 2019-09-20 PROCEDURE — 99233 SBSQ HOSP IP/OBS HIGH 50: CPT | Performed by: FAMILY MEDICINE

## 2019-09-20 PROCEDURE — 85018 HEMOGLOBIN: CPT | Performed by: FAMILY MEDICINE

## 2019-09-20 PROCEDURE — 85014 HEMATOCRIT: CPT | Performed by: FAMILY MEDICINE

## 2019-09-20 PROCEDURE — 82948 REAGENT STRIP/BLOOD GLUCOSE: CPT

## 2019-09-20 PROCEDURE — 99223 1ST HOSP IP/OBS HIGH 75: CPT | Performed by: PHYSICIAN ASSISTANT

## 2019-09-20 RX ADMIN — INSULIN GLARGINE 12 UNITS: 100 INJECTION, SOLUTION SUBCUTANEOUS at 21:06

## 2019-09-20 RX ADMIN — PREGABALIN 100 MG: 50 CAPSULE ORAL at 17:08

## 2019-09-20 RX ADMIN — METOPROLOL TARTRATE 25 MG: 25 TABLET, FILM COATED ORAL at 21:07

## 2019-09-20 RX ADMIN — BRIMONIDINE TARTRATE 1 DROP: 1.5 SOLUTION OPHTHALMIC at 09:28

## 2019-09-20 RX ADMIN — DORZOLAMIDE HYDROCHLORIDE 1 DROP: 20 SOLUTION/ DROPS OPHTHALMIC at 17:08

## 2019-09-20 RX ADMIN — BRIMONIDINE TARTRATE 1 DROP: 1.5 SOLUTION OPHTHALMIC at 17:08

## 2019-09-20 RX ADMIN — INSULIN LISPRO 1 UNITS: 100 INJECTION, SOLUTION INTRAVENOUS; SUBCUTANEOUS at 05:32

## 2019-09-20 RX ADMIN — LATANOPROST 1 DROP: 50 SOLUTION OPHTHALMIC at 21:08

## 2019-09-20 RX ADMIN — DORZOLAMIDE HYDROCHLORIDE 1 DROP: 20 SOLUTION/ DROPS OPHTHALMIC at 09:28

## 2019-09-20 RX ADMIN — MORPHINE SULFATE 2 MG: 2 INJECTION, SOLUTION INTRAMUSCULAR; INTRAVENOUS at 17:14

## 2019-09-20 RX ADMIN — SODIUM CHLORIDE 75 ML/HR: 0.9 INJECTION, SOLUTION INTRAVENOUS at 18:40

## 2019-09-20 RX ADMIN — ATORVASTATIN CALCIUM 40 MG: 40 TABLET, FILM COATED ORAL at 21:06

## 2019-09-20 RX ADMIN — INSULIN LISPRO 1 UNITS: 100 INJECTION, SOLUTION INTRAVENOUS; SUBCUTANEOUS at 21:08

## 2019-09-20 RX ADMIN — INSULIN LISPRO 2 UNITS: 100 INJECTION, SOLUTION INTRAVENOUS; SUBCUTANEOUS at 17:07

## 2019-09-20 RX ADMIN — SODIUM CHLORIDE 75 ML/HR: 0.9 INJECTION, SOLUTION INTRAVENOUS at 02:36

## 2019-09-20 RX ADMIN — DORZOLAMIDE HYDROCHLORIDE 1 DROP: 20 SOLUTION/ DROPS OPHTHALMIC at 21:08

## 2019-09-20 RX ADMIN — IODIXANOL 100 ML: 320 INJECTION, SOLUTION INTRAVASCULAR at 13:09

## 2019-09-20 NOTE — ASSESSMENT & PLAN NOTE
Secondary to gross hematuria  Hb initially dropped to mid 8's and has remained stable  CT abd/pelvis revealed renal mass, will likely have cystoscopy tomorrow  Monitor H&H

## 2019-09-20 NOTE — SOCIAL WORK
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 , second floor apartment with approximately 16 steps to access  Patient;s daughter was contact  Antony Murry who informs this worker patient has non medical home care services rendered by [phone], 6 hours 6 days a week  Patient needs assistance with all AD:'S, can feed self  Patient uses a [facility] and a rollator  Patient receives benefits from [facility] has Medicare and [place] Medicaid  Patient has support from family  Patient denies any psychiatric problems, patient's daughter stated was examined once by a psychiatrist but refused follow up stating "Patrice not crazy"  Patient's PCP is Dr Joselyn Hackett and uses [facility]  Patient reports no medical services at home and no admission to a rehab facility in the last 60 days  No other issues reported by patient  Family will assist with trans[port at discharge  SW to follow  CM reviewed d/c planning process including the following: identifying help at home, patient preference for d/c planning needs  [facility] Med[facility] to Bed program, availability of treatment team to discuss questions or concerns patient and/or family may have regarding understanding medications and recognizing signs and symptoms once discharged  CM also encouraged patient to follow up with all recommended appointments after discharge   Patient advised of importance for patient and family to participate in managing patients medical well being

## 2019-09-20 NOTE — ASSESSMENT & PLAN NOTE
severe spinal stenosis L4-5  W/weakness with dorsiflexion b/l    Pt's family declined surgical intervention for conservative mgmt  Op epidural injections w/ortho  Op f/u with ortho  PT/OT prior to discharge

## 2019-09-20 NOTE — PLAN OF CARE
Problem: Prexisting or High Potential for Compromised Skin Integrity  Goal: Skin integrity is maintained or improved  Description  INTERVENTIONS:  - Identify patients at risk for skin breakdown  - Assess and monitor skin integrity  - Assess and monitor nutrition and hydration status  - Monitor labs   - Assess for incontinence   - Turn and reposition patient  - Assist with mobility/ambulation  - Relieve pressure over bony prominences  - Avoid friction and shearing  - Provide appropriate hygiene as needed including keeping skin clean and dry  - Evaluate need for skin moisturizer/barrier cream  - Collaborate with interdisciplinary team   - Patient/family teaching  - Consider wound care consult   Outcome: Progressing     Problem: Potential for Falls  Goal: Patient will remain free of falls  Description  INTERVENTIONS:  - Assess patient frequently for physical needs  -  Identify cognitive and physical deficits and behaviors that affect risk of falls    -  [place] fall precautions as indicated by assessment   - Educate patient/family on patient safety including physical limitations  - Instruct patient to call for assistance with activity based on assessment  - Modify environment to reduce risk of injury  - Consider OT/PT consult to assist with strengthening/mobility  Outcome: Progressing     Problem: PAIN - ADULT  Goal: Verbalizes/displays adequate comfort level or baseline comfort level  Description  Interventions:  - Encourage patient to monitor pain and request assistance  - Assess pain using appropriate pain scale  - Administer analgesics based on type and severity of pain and evaluate response  - Implement non-pharmacological measures as appropriate and evaluate response  - Consider cultural and social influences on pain and pain management  - Notify physician/advanced practitioner if interventions unsuccessful or patient reports new pain  Outcome: Progressing     Problem: INFECTION - ADULT  Goal: Absence or prevention of progression during hospitalization  Description  INTERVENTIONS:  - Assess and monitor for signs and symptoms of infection  - Monitor lab/diagnostic results  - Monitor all insertion sites, i e  indwelling lines, tubes, and drains  - Monitor endotracheal if appropriate and nasal secretions for changes in amount and color  - [place] appropriate cooling/warming therapies per order  - Administer medications as ordered  - Instruct and encourage patient and family to use good hand hygiene technique  - Identify and instruct in appropriate isolation precautions for identified infection/condition  Outcome: Progressing     Problem: SAFETY ADULT  Goal: Maintain or return to baseline ADL function  Description  INTERVENTIONS:  -  Assess patient's ability to carry out ADLs; assess patient's baseline for ADL function and identify physical deficits which impact ability to perform ADLs (bathing, care of mouth/teeth, toileting, grooming, dressing, etc )  - Assess/evaluate cause of self-care deficits   - Assess range of motion  - Assess patient's mobility; develop plan if impaired  - Assess patient's need for assistive devices and provide as appropriate  - Encourage maximum independence but intervene and supervise when necessary  - Involve family in performance of ADLs  - Assess for home care needs following discharge   - Consider OT consult to assist with ADL evaluation and planning for discharge  - Provide patient education as appropriate  Outcome: Progressing  Goal: Maintain or return mobility status to optimal level  Description  INTERVENTIONS:  - Assess patient's baseline mobility status (ambulation, transfers, stairs, etc )    - Identify cognitive and physical deficits and behaviors that affect mobility  - Identify mobility aids required to assist with transfers and/or ambulation (gait belt, sit-to-stand, lift, walker, cane, etc )  - [place] fall precautions as indicated by assessment  - Record patient progress and toleration of activity level on Mobility SBAR; progress patient to next Phase/Stage  - Instruct patient to call for assistance with activity based on assessment  - Consider rehabilitation consult to assist with strengthening/weightbearing, etc   Outcome: Progressing     Problem: DISCHARGE PLANNING  Goal: Discharge to home or other facility with appropriate resources  Description  INTERVENTIONS:  - Identify barriers to discharge w/patient and caregiver  - Arrange for needed discharge resources and transportation as appropriate  - Identify discharge learning needs (meds, wound care, etc )  - Arrange for interpretive services to assist at discharge as needed  - Refer to Case Management Department for coordinating discharge planning if the patient needs post-hospital services based on physician/advanced practitioner order or complex needs related to functional status, cognitive ability, or social support system  Outcome: Progressing     Problem: Knowledge Deficit  Goal: Patient/family/caregiver demonstrates understanding of disease process, treatment plan, medications, and discharge instructions  Description  Complete learning assessment and assess knowledge base    Interventions:  - Provide teaching at level of understanding  - Provide teaching via preferred learning methods  Outcome: Progressing     Problem: RESPIRATORY - ADULT  Goal: Achieves optimal ventilation and oxygenation  Description  INTERVENTIONS:  - Assess for changes in respiratory status  - Assess for changes in mentation and behavior  - Position to facilitate oxygenation and minimize respiratory effort  - Oxygen administered by appropriate delivery if ordered  - Initiate smoking cessation education as indicated  - Encourage broncho-pulmonary hygiene including cough, deep breathe, Incentive Spirometry  - Assess the need for suctioning and aspirate as needed  - Assess and instruct to report SOB or any respiratory difficulty  - Respiratory Therapy support as indicated  Outcome: Progressing     Problem: GENITOURINARY - ADULT  Goal: Maintains or returns to baseline urinary function  Description  INTERVENTIONS:  - Assess urinary function  - Encourage oral fluids to ensure adequate hydration if ordered  - Administer IV fluids as ordered to ensure adequate hydration  - Administer ordered medications as needed  - Offer frequent toileting  - Follow urinary retention protocol if ordered  Outcome: Progressing     Problem: METABOLIC, FLUID AND ELECTROLYTES - ADULT  Goal: Electrolytes maintained within normal limits  Description  INTERVENTIONS:  - Monitor labs and assess patient for signs and symptoms of electrolyte imbalances  - Administer electrolyte replacement as ordered  - Monitor response to electrolyte replacements, including repeat lab results as appropriate  - Instruct patient on fluid and nutrition as appropriate  Outcome: Progressing  Goal: Fluid balance maintained  Description  INTERVENTIONS:  - Monitor labs   - Monitor I/O and WT  - Instruct patient on fluid and nutrition as appropriate  - Assess for signs & symptoms of volume excess or deficit  Outcome: Progressing  Goal: Glucose maintained within target range  Description  INTERVENTIONS:  - Monitor Blood Glucose as ordered  - Assess for signs and symptoms of hyperglycemia and hypoglycemia  - Administer ordered medications to maintain glucose within target range  - Assess nutritional intake and initiate nutrition service referral as needed  Outcome: Progressing     Problem: SKIN/TISSUE INTEGRITY - ADULT  Goal: Skin integrity remains intact  Description  INTERVENTIONS  - Identify patients at risk for skin breakdown  - Assess and monitor skin integrity  - Assess and monitor nutrition and hydration status  - Monitor labs (i e  albumin)  - Assess for incontinence   - Turn and reposition patient  - Assist with mobility/ambulation  - Relieve pressure over bony prominences  - Avoid friction and shearing  - Provide appropriate hygiene as needed including keeping skin clean and dry  - Evaluate need for skin moisturizer/barrier cream  - Collaborate with interdisciplinary team (i e  Nutrition, Rehabilitation, etc )   - Patient/family teaching  Outcome: Progressing  Goal: Incision(s), wounds(s) or drain site(s) healing without S/S of infection  Description  INTERVENTIONS  - Assess and document risk factors for skin impairment   - Assess and document dressing, incision, wound bed, drain sites and surrounding tissue  - Consider nutrition services referral as needed  - Oral mucous membranes remain intact  - Provide patient/ family education  Outcome: Progressing  Goal: Oral mucous membranes remain intact  Description  INTERVENTIONS  - Assess oral mucosa and hygiene practices  - Implement preventative oral hygiene regimen  - Implement oral medicated treatments as ordered  - Initiate Nutrition services referral as needed  Outcome: Progressing     Problem: HEMATOLOGIC - ADULT  Goal: Maintains hematologic stability  Description  INTERVENTIONS  - Assess for signs and symptoms of bleeding or hemorrhage  - Monitor labs  - Administer supportive blood products/factors as ordered and appropriate  Outcome: Progressing

## 2019-09-20 NOTE — ASSESSMENT & PLAN NOTE
Lab Results   Component Value Date    HGBA1C 7 7 (H) 06/04/2019       Recent Labs     09/19/19  2005 09/19/19  2325 09/20/19  0528 09/20/19  1147   POCGLU 198* 129 168* 164*       Blood Sugar Average: Last 72 hrs:  (P) 171 1   Hold scheduled humalog, continue 1/2 tdd insulin glargine at 12 iu qhs while appetite is poor      Continue ssi/poc bs

## 2019-09-20 NOTE — PROGRESS NOTES
Progress Note - Anabel Ruvalcaba 1935, 81 y o  female MRN: [id number]    Unit/Bed#: E5 -01 Encounter: [id number]    Primary Care Provider: Papi James MD   Date and time admitted to hospital: 9/18/2019  5:39 PM        * Gross hematuria  Assessment & Plan  Gross large volume hematuria  Transferred to [facility] for urologic evaluation  CT a/p without contrast initially demonstrated hyperdense material seen in a curvilinear pattern within the posterior mid calyx and filling and distending the renal pelvis most consistent with appearance of blood products w/nonobstructing renal calculi  There is no ureteral obstruction or over etiology for gross hematuria concerning for possible underlying renal mass of right kidney  -urology recommended IR for renal artery angiogram and embolization however no vessels were actively extravasating  Pt still has large volume gross hematuria since admission, but flank pain has improved  Hb was recently 10 but remotely 11-13 and has dropped to mid 9s and now mid 8s abd since has remained stable  Consent for blood transfusion obtained from family and will transfuse if Hb < 8 in setting of ongoing hematuria  No clot formation  Likely can defer on mejia cath   I was just notified by Radiology of urgent findings on renal CT abd/pelvis - there is evidence of renal mass with recommendations for a cystoscopy   I discussed this with Urology - potential cystoscopy tomorrow, will await recommendations       Renal mass  Assessment & Plan  Associated with gross hematuria  Discussed with Radiology, discussed with Urology - potential plan for cystocopy tomorrow, will await final recommendations    Acute blood loss anemia  Assessment & Plan  Secondary to gross hematuria  Hb initially dropped to mid 8's and has remained stable  CT abd/pelvis revealed renal mass, will likely have cystoscopy tomorrow  Monitor H&H    Right flank pain  Assessment & Plan  Possibly related to gross hematuria as above and ct findings  This is improved  UA bland from infectious stand point  Continue analgesics and continue to monitor    Spinal stenosis of lumbar region with neurogenic claudication  Assessment & Plan  severe spinal stenosis L4-5  W/weakness with dorsiflexion b/l  Pt's family declined surgical intervention for conservative mgmt  Op epidural injections w/ortho  Op f/u with ortho  PT/OT prior to discharge     Coronary artery disease involving native coronary artery of native heart without angina pectoris  Assessment & Plan  Pt w/nonocclusive vessel disease on cardiac cath 07/2019 for med mgmt on asa/plavix/bb statin  Pt as of recent cardiology notes had stopped ap due to bruising  She has since been recommended to restart at least asa given cad  Hold asa for now given hematuria, continue bb statin  Will d/w Urology when it's okay to resume ASA    Stage 3 chronic kidney disease ([facility] [address] )  Assessment & Plan  Baseline creatinine is 1 3-1 5  Monitor daily BMP, avoid nephrotoxins     Chronic diastolic heart failure (HCC)  Assessment & Plan  Wt Readings from Last 3 Encounters:   09/18/19 74 8 kg (164 lb 14 5 oz)   09/09/19 72 6 kg (160 lb)   09/06/19 72 7 kg (160 lb 3 2 oz)     Pt appears stable wo s/sx of volume overload, cxr demonstrates subsegmental atelectasis vs scar and bnp 600 monitor closely w/gentle ivf  Continue bb    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 [facility])  Assessment & Plan  Lab Results   Component Value Date    HGBA1C 7 7 (H) 06/04/2019       Recent Labs     09/19/19  2005 09/19/19  2325 09/20/19  0528 09/20/19  1147   POCGLU 198* 129 168* 164*       Blood Sugar Average: Last 72 hrs:  (P) 171 1   Hold scheduled humalog, continue 1/2 tdd insulin glargine at 12 iu qhs while appetite is poor      Continue ssi/poc bs      VTE Pharmacologic Prophylaxis:   Pharmacologic: None due to hematuria  Mechanical VTE Prophylaxis in Place: Yes    Patient Centered Rounds: I have performed bedside rounds with nursing staff today  Discussions with Specialists or Other Care Team Provider: Radiology, Urology    Education and Discussions with Family / Patient: Patient's daughter    Time Spent for Care: 45 minutes  More than 50% of total time spent on counseling and coordination of care as described above  Current Length of Stay: 2 day(s)    Current Patient Status: Inpatient   Certification Statement: The patient will continue to require additional inpatient hospital stay due to need for close monitoring and potential procedure     Discharge Plan: TBD    Code Status: Level 1 - Full Code      Subjective:   Patient seen and examined  She reports feeling improved overall  She denies any pain, continues to have gross hematuria  Reports improved appetite  Objective:     Vitals:   Temp (24hrs), Av 8 °F (36 6 °C), Min:97 5 °F (36 4 °C), Max:98 °F (36 7 °C)    Temp:  [97 5 °F (36 4 °C)-98 °F (36 7 °C)] 98 °F (36 7 °C)  HR:  [73-86] 86  Resp:  [18-20] 18  BP: (123-148)/(64-74) 142/74  SpO2:  [94 %-98 %] 98 %  Body mass index is 28 31 kg/m²  Input and Output Summary (last 24 hours): Intake/Output Summary (Last 24 hours) at 2019 1521  Last data filed at 2019 0236  Gross per 24 hour   Intake 2248 75 ml   Output 200 ml   Net 2048  75 ml       Physical Exam:     Physical Exam   Constitutional: No distress  HENT:   Head: Normocephalic and atraumatic  Eyes: Conjunctivae are normal    Neck: No JVD present  Cardiovascular: Normal rate and regular rhythm  No murmur heard  Pulmonary/Chest: Effort normal  No respiratory distress  She has no wheezes  She has no rales  Abdominal: Soft  She exhibits no distension  There is no guarding  Musculoskeletal: She exhibits no edema  Neurological: She is alert  Skin: Skin is warm and dry  Psychiatric: She has a normal mood and affect         Additional Data:     Labs:    Results from last 7 days   Lab Units 09/20/19  0802  09/19/19  0550  09/18/19  1545   WBC Thousand/uL  --   --  11 85*  --  12 70*   HEMOGLOBIN g/dL 8 4*   < > 8 5*   < > 9 7*   HEMATOCRIT % 27 3*   < > 27 4*   < > 29 9*   PLATELETS Thousands/uL  --   --  215  --  227   NEUTROS PCT %  --   --   --   --  73*   LYMPHS PCT %  --   --   --   --  17*   MONOS PCT %  --   --   --   --  8   EOS PCT %  --   --   --   --  1    < > = values in this interval not displayed  Results from last 7 days   Lab Units 09/19/19  1034 09/18/19  0942   SODIUM mmol/L 142 140   POTASSIUM mmol/L 3 8 4 2   CHLORIDE mmol/L 108 101   CO2 mmol/L 25 29   BUN mg/dL 13 20   CREATININE mg/dL 1 18 1 54*   ANION GAP mmol/L 9 10   CALCIUM mg/dL 8 5 8 5   ALBUMIN g/dL  --  3 6   TOTAL BILIRUBIN mg/dL  --  0 40   ALK PHOS U/L  --  141*   ALT U/L  --  17   AST U/L  --  24   GLUCOSE RANDOM mg/dL 193* 182*     Results from last 7 days   Lab Units 09/18/19  1938   INR  1 10     Results from last 7 days   Lab Units 09/20/19  1147 09/20/19  0528 09/19/19  2325 09/19/19 2005 09/19/19  1709 09/19/19  1208 09/19/19  0630 09/19/19  0039 09/18/19  2237 09/18/19  1755 09/18/19  0950   POC GLUCOSE mg/dl 164* 168* 129 198* 189* 200* 160* 163* 187* 153* 188*               * I Have Reviewed All Lab Data Listed Above  * Additional Pertinent Lab Tests Reviewed:  [address] Admission Reviewed    Imaging:    Imaging Reports Reviewed Today Include: CT abd/pelvis renal protocol      Recent Cultures (last 7 days):           Last 24 Hours Medication List:     Current Facility-Administered Medications:  albuterol 2 puff Inhalation Q4H PRN Michelle Bains PA-C    amLODIPine 5 mg Oral Daily Michelle Bains PA-C    atorvastatin 40 mg Oral HS Michelle M Delabre, PA-C    brimonidine 1 drop Both Eyes BID Michelle Bains PA-C    dorzolamide 1 drop Both Eyes TID Michelle Bains PA-C    insulin glargine 12 Units Subcutaneous HS Michelle Bains PA-C    insulin lispro 1-5 Units Subcutaneous Q6H Michelle Bains PA-C    latanoprost 1 drop Both Eyes HS Michelle Bains PA-C    metoprolol tartrate 25 mg Oral Q12H [address] Michelle Bains PA-C    morphine injection 2 mg Intravenous Q4H PRN Michelle Bains PA-C    ondansetron 4 mg Intravenous Q6H PRN Michelle Bains PA-C    pantoprazole 20 mg Oral Early Morning Michelle Bains PA-C    pentoxifylline 400 mg Oral Daily Michelle Bains PA-C    pneumococcal 13-valent conjugate vaccine 0 5 mL Intramuscular Prior to discharge Nithya Donahue MD    pregabalin 100 mg Oral BID Michelle Bains PA-C    sodium chloride 75 mL/hr Intravenous Continuous Michelle Bains PA-C Last Rate: 75 mL/hr (09/20/19 0236)        Today, Patient Was Seen By: Loreen Seip, MD    ** Please Note: Dictation voice to text software may have been used in the creation of this document   **

## 2019-09-20 NOTE — ASSESSMENT & PLAN NOTE
Associated with gross hematuria  Discussed with Radiology, discussed with Urology - potential plan for cystocopy tomorrow, will await final recommendations

## 2019-09-20 NOTE — ASSESSMENT & PLAN NOTE
Gross large volume hematuria  Transferred to [facility] for urologic evaluation  CT a/p without contrast initially demonstrated hyperdense material seen in a curvilinear pattern within the posterior mid calyx and filling and distending the renal pelvis most consistent with appearance of blood products w/nonobstructing renal calculi  There is no ureteral obstruction or over etiology for gross hematuria concerning for possible underlying renal mass of right kidney  -urology recommended IR for renal artery angiogram and embolization however no vessels were actively extravasating  Pt still has large volume gross hematuria since admission, but flank pain has improved  Hb was recently 10 but remotely 11-13 and has dropped to mid 9s and now mid 8s abd since has remained stable  Consent for blood transfusion obtained from family and will transfuse if Hb < 8 in setting of ongoing hematuria  No clot formation  Likely can defer on mejia cath   I was just notified by Radiology of urgent findings on renal CT abd/pelvis - there is evidence of renal mass with recommendations for a cystoscopy   I discussed this with Urology - potential cystoscopy tomorrow, will await recommendations

## 2019-09-20 NOTE — ASSESSMENT & PLAN NOTE
Pt w/nonocclusive vessel disease on cardiac cath 07/2019 for med mgmt on asa/plavix/bb statin  Pt as of recent cardiology notes had stopped ap due to bruising    She has since been recommended to restart at least asa given cad  Hold asa for now given hematuria, continue bb statin  Will d/w Urology when it's okay to resume ASA

## 2019-09-20 NOTE — PROGRESS NOTES
Progress Note - Urology  Kacey Vasquez 1935, 81 y o  female MRN: [id number]    Unit/Bed#: E5 -01 Encounter: [id number]    Acute blood loss anemia  Assessment & Plan  Secondary to right renal bleeding  Hemoglobin stable at 8 5->8 1 ->8 4  Continue to check H&H periodically  Transfuse for hemoglobin less than 7 per Internal Medicine  Right flank pain  Assessment & Plan  As part of presenting complaint, in the presence of right renal hemorrhage  Improved since admission  Continue to monitor for symptoms  P r n  Pain control  * Gross hematuria  Assessment & Plan  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 or metastatic RCC with invasion into the collecting system, metastasis noted in the liver and aortocaval lymph nodes consistent with regional metastasis  There is also an enhancing nodule in the upper pole the right kidney suspicious for synchronous neoplasm  Lengthy discussion had, with the assistance of a , with the patient and her daughter, present in the room  Her daughter does not think that her mom would elect surgery, and is unsure about chemotherapy  At this time, we will monitor overnight and recheck hemoglobin in the morning, if it drops, will plan for transfusion and subsequent discharge home  If hemoglobin stable, will be discharged by Internal Medicine  Outpatient follow-up with Urology, oncology to discuss options  Advance diet today       Bedside rounds performed with Madison Clarke RN  Discussed with Dr Makayla Card of [facility] and Dr Lidia Orozco  Discharge navigator updated with urology information  Subjective/Objective     Subjective:   Carolyn Segundo feels better today  She is hungry  She has minimal right flank pain  Review of Systems   Unable to perform ROS: Dementia       Objective:  Vitals: Blood pressure 142/74, pulse 86, temperature 98 °F (36 7 °C), temperature source Temporal, resp  rate 18, weight 74 8 kg (164 lb 14 5 oz), last menstrual period 01/01/1990, SpO2 98 %, not currently breastfeeding  ,Body mass index is 28 31 kg/m²  Intake/Output Summary (Last 24 hours) at 9/20/2019 1628  Last data filed at 9/20/2019 0236  Gross per 24 hour   Intake 2248 75 ml   Output 200 ml   Net 2048  75 ml     Invasive Devices     Peripheral Intravenous Line            Peripheral IV 09/19/19 Left;Proximal;Ventral (anterior) Antecubital 1 day                Physical Exam   Constitutional: She appears well-developed and well-nourished  She is cooperative  She does not appear ill  No distress  46-year-old female, slightly restless but resting comfortably in bed  HENT:   Head: Normocephalic and atraumatic  Moist mucous membranes  Eyes: Conjunctivae and EOM are normal    Neck: Normal range of motion  Neck supple  No tracheal deviation present  Cardiovascular: Normal rate, regular rhythm and normal heart sounds  No murmur heard  Pulmonary/Chest: Effort normal and breath sounds normal  No respiratory distress  She has no wheezes  Good airflow bilaterally on deep inspiration  Abdominal: Soft  Bowel sounds are normal  She exhibits no distension and no mass  There is no tenderness  Abdomen obese soft and benign   Genitourinary:   Genitourinary Comments: Bilateral CVA without tenderness   Musculoskeletal: Normal range of motion  She exhibits no edema  Skin: Skin is warm and dry  No rash noted  She is not diaphoretic  No erythema  No pallor     Nursing note and vitals reviewed        History:    Past Medical History:   Diagnosis Date    Anemia     Arthritis     [facility]estive [facility] failure[facility]     Chronic pain disorder     low back    COPD (chronic obstructive pulmonary disease) ([facility])     Coronary artery disease     Glaucoma     Hematuria 2019    Hyperlipidemia     Hypertension     Kidney stones     Lumbar stenosis     Myocardial infarction ([facility] [address] [facility]     Osteoporosis     PAD (peripheral artery disease) ([facility])     Peripheral neuropathy     Shortness of breath      Past Surgical History:   Procedure Laterality Date    APPENDECTOMY      CARDIAC CATHETERIZATION  2019    CT EPIDURAL STEROID INJECTION (RYAN LUMBAR)  8/20/2019    CYSTOSCOPY      HAND SURGERY Right     HYSTERECTOMY      age 29    INCISION AND DRAINAGE OF WOUND Left 1/5/2019    Procedure: INCISION AND DRAINAGE (I&D) EXTREMITY;  Surgeon: Bobby Ca MD;  [facility]: [facility];  Service: General    IR ABDOMINAL ANGIOGRAPHY / INTERVENTION  9/19/2019    LITHOTRIPSY      MASS EXCISION      [place] of back wall mass    TONSILLECTOMY      TONSILLECTOMY       Family History   Problem Relation Age of Onset    Diabetes Mother     No Known Problems Father     Throat cancer Daughter     No Known Problems Maternal Grandmother     No Known Problems Maternal Grandfather     No Known Problems Paternal Grandmother     No Known Problems Paternal Grandfather      Social History     Socioeconomic History    Marital status: Single     Spouse name: Not on file    Number of children: Not on file    Years of education: Not on file    Highest education level: Not on file   Occupational History    Not on file   Social Needs    Financial resource strain: Not on file    Food insecurity:     Worry: Not on file     Inability: Not on file    Transportation needs:     Medical: Not on file     Non-medical: Not on file   Tobacco Use    Smoking status: Never Smoker    Smokeless tobacco: Never Used   [address] [place] [address] Tobacco comment: states she quit but family living with her states she smokes   Substance and Sexual Activity    Alcohol use: Never     Frequency: Never     Comment: OCCASIONAL    Drug use: Never    Sexual activity: Yes     Partners: Male   Lifestyle    Physical activity:     Days per week: Not on file     Minutes per session: Not on file    Stress: Not on file   Relationships    Social connections:     Talks on phone: Not on file     Gets together: Not on file     Attends Voodoo service: Not on file     Active member of club or organization: Not on file     Attends meetings of clubs or organizations: Not on file     Relationship status: Not on file    Intimate partner violence:     Fear of current or ex partner: Not on file     Emotionally abused: Not on file     Physically abused: Not on file     Forced sexual activity: Not on file   Other Topics Concern    Not on file   Social History Narrative    Not on file       Labs:  Recent Labs     09/18/19  0942 09/18/19  1545 09/19/19  0550   WBC 11 20* 12 70* 11 85*     Recent Labs     09/18/19  0942 09/18/19  1545 09/18/19  1938 09/19/19  0254 09/19/19  0550 09/19/19  1431 09/19/19  2329 09/20/19  0802   HGB 9 3* 9 7* 8 9* 8 3* 8 5* 8 5* 8 1* 8 4*       Recent Labs     09/18/19  0942 09/19/19  1034   CREATININE 1 54* 1 18     CT Renal Protocol -   CT ABDOMEN AND PELVIS WITH AND WITHOUT IV CONTRAST    INDICATION:   Hematuria       COMPARISON:  Renal arteriogram 9/19/2019, CT abdomen pelvis 9/18/2019, CT abdomen pelvis 7/31/2019    TECHNIQUE: Initial CT of the abdomen and pelvis was performed without intravenous contrast   Subsequent dynamic CT evaluation of the abdomen and pelvis was performed after the administration of intravenous contrast in both nephrographic and delayed   phases after the administration of intravenous contrast   Axial, sagittal, and coronal 2D reformatted images were created from the source data and submitted for interpretation  Radiation dose length product (DLP) for this visit:  3884 mGy-cm    This examination, like all CT scans performed in the [facility], was performed utilizing techniques to minimize radiation dose exposure, including the use of iterative   reconstruction and automated exposure control  IV Contrast:  100 mL of iodixanol (VISIPAQUE)  Enteric Contrast:  Enteric contrast was not administered  FINDINGS:    ABDOMEN    RIGHT KIDNEY AND URETER:  Calculus is again seen within extrarenal pelvis as well as within the midpole  Extrarenal pelvis dilatation has decreased and has been resolution of blood products within the extrarenal pelvis  There is a heterogeneously enhancing bilobed lesion within   the mid to lower pole with areas of hyperattenuation on precontrast images, consistent with hemorrhage  There is a portion of the lesion that appears to be within the parenchyma of the lower pole (series 601 image 92)  The largest portion of the lesion   is centered within the hilum replacing the renal sinus fat and appears to communicate with the collecting system, best seen on series 604 image 87 and also evidenced by the fact that the extrarenal pelvis previously contained blood products  The   parenchymal and hilar components as a conglomerate measures approximately 5 9 cm craniocaudal by 6 0 cm transverse by 5 6 cm anterior posterior  The renal vein is patent  The ureter is not opacified on delayed images  There is a 7 mm nodule within the upper pole of the right kidney that appears to enhance (series 3 image 33)  LEFT KIDNEY AND URETER:  No solid renal mass   No detectable urothelial mass  Simple cyst is present within the upper pole  No hydronephrosis or hydroureter  No urinary tract calculi  No perinephric collection  URINARY BLADDER:  No bladder wall mass  No calculi        LOWER CHEST:  No clinically significant abnormality identified in the visualized lower chest     LIVER/BILIARY TREE: Edmund Eastman is a 1 3 x 1 1 cm ill-defined lesion within segment 8 of the liver corresponding to findings on recent CT  This lesion does appear to have measurable enhancement as well as washout on delayed images, suggestive of metastatic   lesion  GALLBLADDER:  No calcified gallstones  No pericholecystic inflammatory change  SPLEEN:  Unremarkable  PANCREAS:  Unremarkable  ADRENAL GLANDS:  1 5 x 1 1 cm right adrenal nodule is stable dating back to 8/25/2016, suggesting benign lesion  STOMACH AND BOWEL: Edmund Eastman is colonic diverticulosis without evidence of acute diverticulitis  ABDOMINOPELVIC CAVITY:  2 8 x 1 8 cm necrotic aortocaval lymph node has increased in size from the prior CT of 7/31/2019, previously measuring 1 7 x 0 9 cm  VESSELS:  Unremarkable for patient's age  PELVIS    REPRODUCTIVE ORGANS:  Unremarkable for patient's age  APPENDIX: No findings to suggest appendicitis  ABDOMINAL WALL/INGUINAL REGIONS:  Unremarkable  OSSEOUS STRUCTURES:  No acute fracture or destructive osseous lesion  Impression:         1  Heterogeneous lobulated lesion within the mid to lower pole the right kidney with the portion that appears parenchymal and dominant portion that is centered within the hilum replacing the renal sinus fat and with evidence of communication with the   collecting system  Blood products within dilated renal pelvis have resolved with decreased dilatation of extrarenal pelvis  Differential considerations include renal cell carcinoma with invasion into the collecting system versus transitional cell   carcinoma with invasion into the parenchyma  Further evaluation with cystoscopy for tissue sampling is recommended  2  Enhancing nodule within the upper pole of the right kidney, suspicious for synchronous neoplasm  3  Increase in size of aortocaval lymph node, consistent with regional metastasis      4  Ill-defined enhancing lesion within segment 8 of the liver, consistent with metastasis      5  Right adrenal nodule, stable dating back to 8/25/2016 suggesting benign              I personally discussed this study with Dr Nathaly Anand on 9/20/2019 at 3:11 PM          Patricia Wynn PA-C  Date: 9/20/2019 Time: 4:28 PM

## 2019-09-21 VITALS
TEMPERATURE: 98.6 F | WEIGHT: 164.9 LBS | OXYGEN SATURATION: 97 % | RESPIRATION RATE: 20 BRPM | DIASTOLIC BLOOD PRESSURE: 80 MMHG | BODY MASS INDEX: 28.31 KG/M2 | SYSTOLIC BLOOD PRESSURE: 131 MMHG | HEART RATE: 73 BPM

## 2019-09-21 PROBLEM — R41.81 AGE-RELATED COGNITIVE DECLINE: Status: ACTIVE | Noted: 2019-09-21

## 2019-09-21 LAB
ABO GROUP BLD: NORMAL
ANION GAP SERPL CALCULATED.3IONS-SCNC: 8 MMOL/L (ref 4–13)
BASOPHILS # BLD AUTO: 0.06 THOUSANDS/ΜL (ref 0–0.1)
BASOPHILS NFR BLD AUTO: 1 % (ref 0–1)
BLD GP AB SCN SERPL QL: NEGATIVE
BUN SERPL-MCNC: 7 MG/DL (ref 5–25)
CALCIUM SERPL-MCNC: 8.3 MG/DL (ref 8.3–10.1)
CHLORIDE SERPL-SCNC: 108 MMOL/L (ref 100–108)
CO2 SERPL-SCNC: 24 MMOL/L (ref 21–32)
CREAT SERPL-MCNC: 0.94 MG/DL (ref 0.6–1.3)
EOSINOPHIL # BLD AUTO: 0.31 THOUSAND/ΜL (ref 0–0.61)
EOSINOPHIL NFR BLD AUTO: 3 % (ref 0–6)
ERYTHROCYTE [DISTWIDTH] IN BLOOD BY AUTOMATED COUNT: 14 % (ref 11.6–15.1)
GFR SERPL CREATININE-BSD FRML MDRD: 56 ML/MIN/1.73SQ M
GLUCOSE SERPL-MCNC: 135 MG/DL (ref 65–140)
GLUCOSE SERPL-MCNC: 151 MG/DL (ref 65–140)
GLUCOSE SERPL-MCNC: 153 MG/DL (ref 65–140)
GLUCOSE SERPL-MCNC: 181 MG/DL (ref 65–140)
HCT VFR BLD AUTO: 25.4 % (ref 34.8–46.1)
HCT VFR BLD AUTO: 25.6 % (ref 34.8–46.1)
HCT VFR BLD AUTO: 33.8 % (ref 34.8–46.1)
HGB BLD-MCNC: 10.9 G/DL (ref 11.5–15.4)
HGB BLD-MCNC: 7.9 G/DL (ref 11.5–15.4)
HGB BLD-MCNC: 8 G/DL (ref 11.5–15.4)
IMM GRANULOCYTES # BLD AUTO: 0.1 THOUSAND/UL (ref 0–0.2)
IMM GRANULOCYTES NFR BLD AUTO: 1 % (ref 0–2)
LYMPHOCYTES # BLD AUTO: 1.27 THOUSANDS/ΜL (ref 0.6–4.47)
LYMPHOCYTES NFR BLD AUTO: 12 % (ref 14–44)
MCH RBC QN AUTO: 27.8 PG (ref 26.8–34.3)
MCHC RBC AUTO-ENTMCNC: 31.5 G/DL (ref 31.4–37.4)
MCV RBC AUTO: 88 FL (ref 82–98)
MONOCYTES # BLD AUTO: 0.88 THOUSAND/ΜL (ref 0.17–1.22)
MONOCYTES NFR BLD AUTO: 8 % (ref 4–12)
NEUTROPHILS # BLD AUTO: 7.87 THOUSANDS/ΜL (ref 1.85–7.62)
NEUTS SEG NFR BLD AUTO: 75 % (ref 43–75)
NRBC BLD AUTO-RTO: 0 /100 WBCS
PLATELET # BLD AUTO: 219 THOUSANDS/UL (ref 149–390)
PMV BLD AUTO: 11.2 FL (ref 8.9–12.7)
POTASSIUM SERPL-SCNC: 3.5 MMOL/L (ref 3.5–5.3)
RBC # BLD AUTO: 2.88 MILLION/UL (ref 3.81–5.12)
RH BLD: POSITIVE
SODIUM SERPL-SCNC: 140 MMOL/L (ref 136–145)
SPECIMEN EXPIRATION DATE: NORMAL
WBC # BLD AUTO: 10.49 THOUSAND/UL (ref 4.31–10.16)

## 2019-09-21 PROCEDURE — 86850 RBC ANTIBODY SCREEN: CPT | Performed by: FAMILY MEDICINE

## 2019-09-21 PROCEDURE — 86920 COMPATIBILITY TEST SPIN: CPT

## 2019-09-21 PROCEDURE — 85025 COMPLETE CBC W/AUTO DIFF WBC: CPT | Performed by: FAMILY MEDICINE

## 2019-09-21 PROCEDURE — 86900 BLOOD TYPING SEROLOGIC ABO: CPT | Performed by: FAMILY MEDICINE

## 2019-09-21 PROCEDURE — 85018 HEMOGLOBIN: CPT | Performed by: FAMILY MEDICINE

## 2019-09-21 PROCEDURE — 82948 REAGENT STRIP/BLOOD GLUCOSE: CPT

## 2019-09-21 PROCEDURE — 30233N1 TRANSFUSION OF NONAUTOLOGOUS RED BLOOD CELLS INTO PERIPHERAL VEIN, PERCUTANEOUS APPROACH: ICD-10-PCS | Performed by: FAMILY MEDICINE

## 2019-09-21 PROCEDURE — 80048 BASIC METABOLIC PNL TOTAL CA: CPT | Performed by: FAMILY MEDICINE

## 2019-09-21 PROCEDURE — P9016 RBC LEUKOCYTES REDUCED: HCPCS

## 2019-09-21 PROCEDURE — 85014 HEMATOCRIT: CPT | Performed by: FAMILY MEDICINE

## 2019-09-21 PROCEDURE — 86901 BLOOD TYPING SEROLOGIC RH(D): CPT | Performed by: FAMILY MEDICINE

## 2019-09-21 PROCEDURE — 99239 HOSP IP/OBS DSCHRG MGMT >30: CPT | Performed by: FAMILY MEDICINE

## 2019-09-21 RX ORDER — FUROSEMIDE 10 MG/ML
20 INJECTION INTRAMUSCULAR; INTRAVENOUS ONCE
Status: COMPLETED | OUTPATIENT
Start: 2019-09-21 | End: 2019-09-21

## 2019-09-21 RX ORDER — OXYCODONE HCL 10 MG/1
10 TABLET, FILM COATED, EXTENDED RELEASE ORAL EVERY 12 HOURS SCHEDULED
Qty: 20 TABLET | Refills: 0 | Status: SHIPPED | OUTPATIENT
Start: 2019-09-21 | End: 2019-10-01

## 2019-09-21 RX ADMIN — PENTOXIFYLLINE 400 MG: 400 TABLET, EXTENDED RELEASE ORAL at 09:40

## 2019-09-21 RX ADMIN — PREGABALIN 100 MG: 50 CAPSULE ORAL at 09:39

## 2019-09-21 RX ADMIN — PANTOPRAZOLE SODIUM 20 MG: 20 TABLET, DELAYED RELEASE ORAL at 05:00

## 2019-09-21 RX ADMIN — PREGABALIN 100 MG: 50 CAPSULE ORAL at 18:47

## 2019-09-21 RX ADMIN — SODIUM CHLORIDE 75 ML/HR: 0.9 INJECTION, SOLUTION INTRAVENOUS at 09:30

## 2019-09-21 RX ADMIN — DORZOLAMIDE HYDROCHLORIDE 1 DROP: 20 SOLUTION/ DROPS OPHTHALMIC at 09:40

## 2019-09-21 RX ADMIN — INSULIN LISPRO 1 UNITS: 100 INJECTION, SOLUTION INTRAVENOUS; SUBCUTANEOUS at 17:00

## 2019-09-21 RX ADMIN — METOPROLOL TARTRATE 25 MG: 25 TABLET, FILM COATED ORAL at 09:39

## 2019-09-21 RX ADMIN — DORZOLAMIDE HYDROCHLORIDE 1 DROP: 20 SOLUTION/ DROPS OPHTHALMIC at 16:00

## 2019-09-21 RX ADMIN — AMLODIPINE BESYLATE 5 MG: 5 TABLET ORAL at 09:39

## 2019-09-21 RX ADMIN — BRIMONIDINE TARTRATE 1 DROP: 1.5 SOLUTION OPHTHALMIC at 09:40

## 2019-09-21 RX ADMIN — FUROSEMIDE 20 MG: 10 INJECTION, SOLUTION INTRAMUSCULAR; INTRAVENOUS at 19:04

## 2019-09-21 RX ADMIN — BRIMONIDINE TARTRATE 1 DROP: 1.5 SOLUTION OPHTHALMIC at 18:47

## 2019-09-21 NOTE — DISCHARGE SUMMARY
Discharge- Letta Gilford 1935, 81 y o  female MRN: [id number]    Unit/Bed#: E5 -01 Encounter: [id number]    Primary Care Provider: Marianne Light MD   Date and time admitted to hospital: 9/18/2019  5:39 PM        * Gross hematuria  Assessment & Plan  Gross large volume hematuria  Transferred to [facility]rologic evaluation    CT a/p without contrast on admission demonstrated hyperdense material seen in a curvilinear pattern within the posterior mid calyx and filling and distending the renal pelvis most consistent with appearance of blood products w/nonobstructing renal calculi  There is no ureteral obstruction or over etiology for gross hematuria concerning for possible underlying renal mass of right kidney    -Initially Urology recommended IR for renal artery angiogram and embolization however no vessels were actively extravasating  Pt still has large volume gross hematuria since admission, but flank pain has improved    Reviewed renal CT abd/pelvis - per report,  "Heterogeneous lobulated lesion within the mid to lower pole the right kidney with the portion that appears parenchymal and dominant portion that is centered within the hilum replacing the renal sinus fat and with evidence of communication with the collecting system  Blood products within dilated renal pelvis have resolved with decreased dilatation of extrarenal pelvis   Differential considerations include renal cell carcinoma with invasion into the collecting system versus transitional cell carcinoma with invasion into the parenchyma "    Discussed at length with daughter and also  per family's request    Due to drop in Hb to 7 9 will transfuse 2 units PRBC prior to d/c  Patient would like be candidate for treatment and family generally would like conservative approach, family does not feel patient would tolerate chemo  Outpatient referral to Hem-Onc for evaluation  Patient has been followed by Urology during hospitalization, no inpatient interventions recommended, proceed with outpatient f/u with Urology, appreciate input  Patient should monitor CBC regularly and may require additional transfusions outpatient      Renal mass  Assessment & Plan  Associated with gross hematuria  Reviewed US abd/pelvis renal protocol  Per report:  "Heterogeneous lobulated lesion within the mid to lower pole the right kidney with the portion that appears parenchymal and dominant portion that is centered within the hilum replacing the renal sinus fat and with evidence of communication with the   collecting system  Blood products within dilated renal pelvis have resolved with decreased dilatation of extrarenal pelvis "  Evidence of metastatic disease with "enhancing nodule within the upper pole of the right kidney, suspicious for synchronous neoplasm  Increase in size of aortocaval lymph node, consistent with regional metastasis  Ill-defined enhancing lesion within segment 8 of the liver, consistent with metastasis  Outpatient f/u with Hem-Onc with most likely conservative approach per family's wishes  Please refer to plan for Hematuria     Age-related cognitive decline  Assessment & Plan  Patient at baseline mental status  Ongoing update/discussion with daughter     Acute blood loss anemia  Assessment & Plan  Secondary to gross hematuria  Hb initially dropped to 7 9 and has remained stable  CT abd/pelvis revealed renal mass, most likely malignant, will transfuse 2 u PRBC prior to discharge  Monitor CBC on outpatient basis    Right flank pain  Assessment & Plan  Possibly related to gross hematuria as above and CT findings  This is improved  UA bland from infectious stand point    Continue analgesics     Liver nodule  Assessment & Plan  Per CT renal protocol "Ill-defined enhancing lesion within segment 8 of the liver, consistent with metastasis "  Patient will f/u with Hem-Onc with most likely plan for supportive treatment alone per discussion with family    Spinal stenosis of lumbar region with neurogenic claudication  Assessment & Plan  severe spinal stenosis L4-5  W/weakness with dorsiflexion b/l  Pt's family declined surgical intervention for conservative mgmt  Op epidural injections w/ortho  Op f/u with ortho  Home PT    Coronary artery disease involving native coronary artery of native heart without angina pectoris  Assessment & Plan  Pt w/nonocclusive vessel disease on cardiac cath 07/2019 for med mgmt on asa/plavix/bb statin  Pt as of recent cardiology notes had stopped ap due to bruising    She has since been recommended to restart at least asa given cad  Resume home ASA, continue BB      Stage 3 chronic kidney disease ([facility] [address] )  Assessment & Plan  Baseline creatinine is 1 3-1 5, Cr below baseline  Monitor daily BMP, avoid nephrotoxins     Chronic diastolic heart failure (HCC)  Assessment & Plan  Wt Readings from Last 3 Encounters:   09/18/19 74 8 kg (164 lb 14 5 oz)   09/09/19 72 6 kg (160 lb)   09/06/19 72 7 kg (160 lb 3 2 oz)     Pt appears stable wo s/sx of volume overload, cxr demonstrates subsegmental atelectasis vs scar and bnp 600   Will give IV Lasix with blood transfusions  Resume home medication regimen     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 ([facility] [address] )  Assessment & Plan  Lab Results   Component Value Date    HGBA1C 7 7 (H) 06/04/2019       Recent Labs     09/20/19  1705 09/20/19  2043 09/21/19  0749 09/21/19  1110   POCGLU 227* 164* 151* 153*       Blood Sugar Average: Last 72 hrs:  (P) 171 [id number]   Resume home regimen upon discharge     Hypertension  Assessment & Plan  Resume home regimen       Discharging Physician / Practitioner: Yumiko Martinez MD  PCP: Marianne Light MD  Admission Date:   Admission Orders (From admission, onward)     Ordered        09/18/19 1907  Inpatient Admission  Once                   Discharge Date: 09/21/19    Resolved Problems  Date Reviewed: 9/21/2019    None          Consultations During Hospital Stay:  · Urology, case management    Procedures Performed:   CT renal protocol   Final Result by Sharyle Karma, MD (09/20 1512)         1  Heterogeneous lobulated lesion within the mid to lower pole the right kidney with the portion that appears parenchymal and dominant portion that is centered within the hilum replacing the renal sinus fat and with evidence of communication with the    collecting system  Blood products within dilated renal pelvis have resolved with decreased dilatation of extrarenal pelvis  Differential considerations include renal cell carcinoma with invasion into the collecting system versus transitional cell    carcinoma with invasion into the parenchyma  Further evaluation with cystoscopy for tissue sampling is recommended  2  Enhancing nodule within the upper pole of the right kidney, suspicious for synchronous neoplasm  3  Increase in size of aortocaval lymph node, consistent with regional metastasis  4  Ill-defined enhancing lesion within segment 8 of the liver, consistent with metastasis  5  Right adrenal nodule, stable dating back to 8/25/2016 suggesting benign  I personally discussed this study with Dr Adams Saul on 9/20/2019 at 3:11 PM                   Workstation performed: [id number]         IR abdominal angiography / intervention   Final Result by Nelson Lofton MD (09/19 9372)   Impression:       Right renal arteriogram demonstrating no source for hemorrhage            Workstation performed: [id number]         XR chest pa & lateral   Final Result by Nick Oviedo MD (09/19 0980)      Linear changes noted at left lung base compatible with subsegmental atelectasis or scar    Otherwise clear lungs            Workstation performed: [id number]             Significant Findings / Test Results:   Results from last 7 days   Lab Units 09/21/19  1118 09/21/19  0536   WBC Thousand/uL  --  10 49* HEMOGLOBIN g/dL 7 9* 8 0*   HEMATOCRIT % 25 6* 25 4*   PLATELETS Thousands/uL  --  219     Results from last 7 days   Lab Units 09/21/19  0536   SODIUM mmol/L 140   POTASSIUM mmol/L 3 5   CHLORIDE mmol/L 108   CO2 mmol/L 24   BUN mg/dL 7   CREATININE mg/dL 0 94   CALCIUM mg/dL 8 3       Incidental Findings:   · None     Test Results Pending at Discharge (will require follow up): · None     Outpatient Tests Requested:  · CBC    Complications:  None    Reason for Admission: gross hematuria     Hospital Course:     Jameel Rojas is a 81 y o  female patient with history of cognitive decline, diabetes and HTN who originally presented to the hospital on 9/18/2019 due to gross hematuria  With workup the patient was found to have a right renal mass with suspected malignancy and suspected metastatic disease to lymph nodes and possibly liver  The patient's Hb did drop to around 8 with last value at 7 9  Given ongoing hematuria it was indicated for the patient to receive blood transfusion with patient potentially becoming transfusion dependent in the future  All the findings and plan of care were discussed with patient's daughter Russell Basurto and family voiced that there were in favor of conservative approach with realization that patient would be unlikely to tolerate aggressive treatments due to her functional status, age and comorbidities  The patient received 2 units PRBC prior to discharge and was discharged in overall improved and stable condition  The patient was instructed to follow up with PCP, Urology and Hem-Onc  Family's  [place] was also present at bedside, all daughter's questions were answered  Please see above list of diagnoses and related plan for additional information  Condition at Discharge: good     Discharge Day Visit / Exam:     Subjective:  Patient seen and examined  She reports feeling well and states that she would like to go home  She denies any pain   She continues to have gross hematuria, denies dysuria  Denies chest pain, SOB or palpitations  No nausea, vomiting, diarrhea or constipation  Baseline mental status  Baseline appetite  No overnight events  Vitals: Blood Pressure: 122/70 (09/21/19 1430)  Pulse: 68 (09/21/19 1430)  Temperature: 98 6 °F (37 °C) (09/21/19 1430)  Temp Source: Temporal (09/21/19 1430)  Respirations: 18 (09/21/19 1430)  Weight - Scale: 74 8 kg (164 lb 14 5 oz) (09/18/19 1757)  SpO2: 97 % (09/21/19 0700)    Exam:     Physical Exam   Constitutional: No distress  HENT:   Head: Normocephalic and atraumatic  Eyes: Conjunctivae are normal    Neck: No JVD present  Cardiovascular: Normal rate and regular rhythm  No murmur heard  Pulmonary/Chest: Effort normal  No respiratory distress  She has no wheezes  She has no rales  Abdominal: She exhibits no distension  There is no tenderness  There is no guarding  Musculoskeletal: She exhibits no edema  Neurological: She is alert  Skin: Skin is warm and dry  Psychiatric: She has a normal mood and affect  Discussion with Family: Roderick Stewart    Discharge instructions/Information to patient and family:   See after visit summary for information provided to patient and family  Provisions for Follow-Up Care:  See after visit summary for information related to follow-up care and any pertinent home health orders  Disposition:     Home with [facility] Services (Reminder: Complete face to face encounter)    For Discharges to [facility] SN[facility]:   · Not Applicable to this Patient - Not Applicable to this Patient    Planned Readmission: No     Discharge Statement:  I spent 35 minutes discharging the patient  This time was spent on the day of discharge  I had direct contact with the patient on the day of discharge   Greater than 50% of the total time was spent examining patient, answering all patient questions, arranging and discussing plan of care with patient as well as directly providing post-discharge instructions  Additional time then spent on discharge activities  Discharge Medications:  See after visit summary for reconciled discharge medications provided to patient and family        ** Please Note: This note has been constructed using a voice recognition system **

## 2019-09-21 NOTE — ASSESSMENT & PLAN NOTE
Gross large volume hematuria  Transferred to [facility] for urologic evaluation    CT a/p without contrast on admission demonstrated hyperdense material seen in a curvilinear pattern within the posterior mid calyx and filling and distending the renal pelvis most consistent with appearance of blood products w/nonobstructing renal calculi  There is no ureteral obstruction or over etiology for gross hematuria concerning for possible underlying renal mass of right kidney    -Initially Urology recommended IR for renal artery angiogram and embolization however no vessels were actively extravasating  Pt still has large volume gross hematuria since admission, but flank pain has improved    Reviewed renal CT abd/pelvis - per report,  "Heterogeneous lobulated lesion within the mid to lower pole the right kidney with the portion that appears parenchymal and dominant portion that is centered within the hilum replacing the renal sinus fat and with evidence of communication with the collecting system  Blood products within dilated renal pelvis have resolved with decreased dilatation of extrarenal pelvis   Differential considerations include renal cell carcinoma with invasion into the collecting system versus transitional cell carcinoma with invasion into the parenchyma "    Discussed at length with daughter and also  per family's request    Due to drop in Hb to 7 9 will transfuse 2 units PRBC prior to d/c  Patient would like be candidate for treatment and family generally would like conservative approach, family does not feel patient would tolerate chemo  Outpatient referral to Hem-Onc for evaluation  Patient has been followed by Urology during hospitalization, no inpatient interventions recommended, proceed with outpatient f/u with Urology, appreciate input  Patient should monitor CBC regularly and may require additional transfusions outpatient

## 2019-09-21 NOTE — ASSESSMENT & PLAN NOTE
severe spinal stenosis L4-5  W/weakness with dorsiflexion b/l    Pt's family declined surgical intervention for conservative mgmt  Op epidural injections w/ortho  Op f/u with ortho  Home PT

## 2019-09-21 NOTE — PLAN OF CARE
Problem: Prexisting or High Potential for Compromised Skin Integrity  Goal: Skin integrity is maintained or improved  Description  INTERVENTIONS:  - Identify patients at risk for skin breakdown  - Assess and monitor skin integrity  - Assess and monitor nutrition and hydration status  - Monitor labs   - Assess for incontinence   - Turn and reposition patient  - Assist with mobility/ambulation  - Relieve pressure over bony prominences  - Avoid friction and shearing  - Provide appropriate hygiene as needed including keeping skin clean and dry  - Evaluate need for skin moisturizer/barrier cream  - Collaborate with interdisciplinary team   - Patient/family teaching  - Consider wound care consult   Outcome: Progressing     Problem: Potential for Falls  Goal: Patient will remain free of falls  Description  INTERVENTIONS:  - Assess patient frequently for physical needs  -  Identify cognitive and physical deficits and behaviors that affect risk of falls    -  [place] fall precautions as indicated by assessment   - Educate patient/family on patient safety including physical limitations  - Instruct patient to call for assistance with activity based on assessment  - Modify environment to reduce risk of injury  - Consider OT/PT consult to assist with strengthening/mobility  Outcome: Progressing     Problem: PAIN - ADULT  Goal: Verbalizes/displays adequate comfort level or baseline comfort level  Description  Interventions:  - Encourage patient to monitor pain and request assistance  - Assess pain using appropriate pain scale  - Administer analgesics based on type and severity of pain and evaluate response  - Implement non-pharmacological measures as appropriate and evaluate response  - Consider cultural and social influences on pain and pain management  - Notify physician/advanced practitioner if interventions unsuccessful or patient reports new pain  Outcome: Progressing     Problem: INFECTION - ADULT  Goal: Absence or prevention of progression during hospitalization  Description  INTERVENTIONS:  - Assess and monitor for signs and symptoms of infection  - Monitor lab/diagnostic results  - Monitor all insertion sites, i e  indwelling lines, tubes, and drains  - Monitor endotracheal if appropriate and nasal secretions for changes in amount and color  - [place] appropriate cooling/warming therapies per order  - Administer medications as ordered  - Instruct and encourage patient and family to use good hand hygiene technique  - Identify and instruct in appropriate isolation precautions for identified infection/condition  Outcome: Progressing     Problem: SAFETY ADULT  Goal: Maintain or return to baseline ADL function  Description  INTERVENTIONS:  -  Assess patient's ability to carry out ADLs; assess patient's baseline for ADL function and identify physical deficits which impact ability to perform ADLs (bathing, care of mouth/teeth, toileting, grooming, dressing, etc )  - Assess/evaluate cause of self-care deficits   - Assess range of motion  - Assess patient's mobility; develop plan if impaired  - Assess patient's need for assistive devices and provide as appropriate  - Encourage maximum independence but intervene and supervise when necessary  - Involve family in performance of ADLs  - Assess for home care needs following discharge   - Consider OT consult to assist with ADL evaluation and planning for discharge  - Provide patient education as appropriate  Outcome: Progressing  Goal: Maintain or return mobility status to optimal level  Description  INTERVENTIONS:  - Assess patient's baseline mobility status (ambulation, transfers, stairs, etc )    - Identify cognitive and physical deficits and behaviors that affect mobility  - Identify mobility aids required to assist with transfers and/or ambulation (gait belt, sit-to-stand, lift, walker, cane, etc )  - [place] fall precautions as indicated by assessment  - Record patient progress and toleration of activity level on Mobility SBAR; progress patient to next Phase/Stage  - Instruct patient to call for assistance with activity based on assessment  - Consider rehabilitation consult to assist with strengthening/weightbearing, etc   Outcome: Progressing     Problem: DISCHARGE PLANNING  Goal: Discharge to home or other facility with appropriate resources  Description  INTERVENTIONS:  - Identify barriers to discharge w/patient and caregiver  - Arrange for needed discharge resources and transportation as appropriate  - Identify discharge learning needs (meds, wound care, etc )  - Arrange for interpretive services to assist at discharge as needed  - Refer to Case Management Department for coordinating discharge planning if the patient needs post-hospital services based on physician/advanced practitioner order or complex needs related to functional status, cognitive ability, or social support system  Outcome: Progressing     Problem: Knowledge Deficit  Goal: Patient/family/caregiver demonstrates understanding of disease process, treatment plan, medications, and discharge instructions  Description  Complete learning assessment and assess knowledge base    Interventions:  - Provide teaching at level of understanding  - Provide teaching via preferred learning methods  Outcome: Progressing     Problem: RESPIRATORY - ADULT  Goal: Achieves optimal ventilation and oxygenation  Description  INTERVENTIONS:  - Assess for changes in respiratory status  - Assess for changes in mentation and behavior  - Position to facilitate oxygenation and minimize respiratory effort  - Oxygen administered by appropriate delivery if ordered  - Initiate smoking cessation education as indicated  - Encourage broncho-pulmonary hygiene including cough, deep breathe, Incentive Spirometry  - Assess the need for suctioning and aspirate as needed  - Assess and instruct to report SOB or any respiratory difficulty  - Respiratory Therapy support as indicated  Outcome: Progressing     Problem: GENITOURINARY - ADULT  Goal: Maintains or returns to baseline urinary function  Description  INTERVENTIONS:  - Assess urinary function  - Encourage oral fluids to ensure adequate hydration if ordered  - Administer IV fluids as ordered to ensure adequate hydration  - Administer ordered medications as needed  - Offer frequent toileting  - Follow urinary retention protocol if ordered  Outcome: Progressing     Problem: METABOLIC, FLUID AND ELECTROLYTES - ADULT  Goal: Electrolytes maintained within normal limits  Description  INTERVENTIONS:  - Monitor labs and assess patient for signs and symptoms of electrolyte imbalances  - Administer electrolyte replacement as ordered  - Monitor response to electrolyte replacements, including repeat lab results as appropriate  - Instruct patient on fluid and nutrition as appropriate  Outcome: Progressing  Goal: Fluid balance maintained  Description  INTERVENTIONS:  - Monitor labs   - Monitor I/O and WT  - Instruct patient on fluid and nutrition as appropriate  - Assess for signs & symptoms of volume excess or deficit  Outcome: Progressing  Goal: Glucose maintained within target range  Description  INTERVENTIONS:  - Monitor Blood Glucose as ordered  - Assess for signs and symptoms of hyperglycemia and hypoglycemia  - Administer ordered medications to maintain glucose within target range  - Assess nutritional intake and initiate nutrition service referral as needed  Outcome: Progressing     Problem: SKIN/TISSUE INTEGRITY - ADULT  Goal: Skin integrity remains intact  Description  INTERVENTIONS  - Identify patients at risk for skin breakdown  - Assess and monitor skin integrity  - Assess and monitor nutrition and hydration status  - Monitor labs (i e  albumin)  - Assess for incontinence   - Turn and reposition patient  - Assist with mobility/ambulation  - Relieve pressure over bony prominences  - Avoid friction and shearing  - Provide appropriate hygiene as needed including keeping skin clean and dry  - Evaluate need for skin moisturizer/barrier cream  - Collaborate with interdisciplinary team (i e  Nutrition, Rehabilitation, etc )   - Patient/family teaching  Outcome: Progressing  Goal: Incision(s), wounds(s) or drain site(s) healing without S/S of infection  Description  INTERVENTIONS  - Assess and document risk factors for skin impairment   - Assess and document dressing, incision, wound bed, drain sites and surrounding tissue  - Consider nutrition services referral as needed  - Oral mucous membranes remain intact  - Provide patient/ family education  Outcome: Progressing  Goal: Oral mucous membranes remain intact  Description  INTERVENTIONS  - Assess oral mucosa and hygiene practices  - Implement preventative oral hygiene regimen  - Implement oral medicated treatments as ordered  - Initiate Nutrition services referral as needed  Outcome: Progressing     Problem: HEMATOLOGIC - ADULT  Goal: Maintains hematologic stability  Description  INTERVENTIONS  - Assess for signs and symptoms of bleeding or hemorrhage  - Monitor labs  - Administer supportive blood products/factors as ordered and appropriate  Outcome: Progressing

## 2019-09-21 NOTE — ASSESSMENT & PLAN NOTE
Per CT renal protocol "Ill-defined enhancing lesion within segment 8 of the liver, consistent with metastasis "  Patient will f/u with Hem-Onc with most likely plan for supportive treatment alone per discussion with family

## 2019-09-21 NOTE — ASSESSMENT & PLAN NOTE
Lab Results   Component Value Date    HGBA1C 7 7 (H) 06/04/2019       Recent Labs     09/20/19  1705 09/20/19  2043 09/21/19  0749 09/21/19  1110   POCGLU 227* 164* 151* 153*       Blood Sugar Average: Last 72 hrs:  (P) 781 [id number]   Resume home regimen upon discharge

## 2019-09-21 NOTE — ASSESSMENT & PLAN NOTE
Associated with gross hematuria  Reviewed US abd/pelvis renal protocol  Per report:  "Heterogeneous lobulated lesion within the mid to lower pole the right kidney with the portion that appears parenchymal and dominant portion that is centered within the hilum replacing the renal sinus fat and with evidence of communication with the   collecting system  Blood products within dilated renal pelvis have resolved with decreased dilatation of extrarenal pelvis "  Evidence of metastatic disease with "enhancing nodule within the upper pole of the right kidney, suspicious for synchronous neoplasm  Increase in size of aortocaval lymph node, consistent with regional metastasis  Ill-defined enhancing lesion within segment 8 of the liver, consistent with metastasis    Outpatient f/u with Hem-Onc with most likely conservative approach per family's wishes  Please refer to plan for Hematuria

## 2019-09-21 NOTE — PLAN OF CARE
Problem: Prexisting or High Potential for Compromised Skin Integrity  Goal: Skin integrity is maintained or improved  Description  INTERVENTIONS:  - Identify patients at risk for skin breakdown  - Assess and monitor skin integrity  - Assess and monitor nutrition and hydration status  - Monitor labs   - Assess for incontinence   - Turn and reposition patient  - Assist with mobility/ambulation  - Relieve pressure over bony prominences  - Avoid friction and shearing  - Provide appropriate hygiene as needed including keeping skin clean and dry  - Evaluate need for skin moisturizer/barrier cream  - Collaborate with interdisciplinary team   - Patient/family teaching  - Consider wound care consult   Outcome: Progressing     Problem: Potential for Falls  Goal: Patient will remain free of falls  Description  INTERVENTIONS:  - Assess patient frequently for physical needs  -  Identify cognitive and physical deficits and behaviors that affect risk of falls    -  [place] fall precautions as indicated by assessment   - Educate patient/family on patient safety including physical limitations  - Instruct patient to call for assistance with activity based on assessment  - Modify environment to reduce risk of injury  - Consider OT/PT consult to assist with strengthening/mobility  Outcome: Progressing     Problem: PAIN - ADULT  Goal: Verbalizes/displays adequate comfort level or baseline comfort level  Description  Interventions:  - Encourage patient to monitor pain and request assistance  - Assess pain using appropriate pain scale  - Administer analgesics based on type and severity of pain and evaluate response  - Implement non-pharmacological measures as appropriate and evaluate response  - Consider cultural and social influences on pain and pain management  - Notify physician/advanced practitioner if interventions unsuccessful or patient reports new pain  Outcome: Progressing     Problem: INFECTION - ADULT  Goal: Absence or prevention of progression during hospitalization  Description  INTERVENTIONS:  - Assess and monitor for signs and symptoms of infection  - Monitor lab/diagnostic results  - Monitor all insertion sites, i e  indwelling lines, tubes, and drains  - Monitor endotracheal if appropriate and nasal secretions for changes in amount and color  - [place] appropriate cooling/warming therapies per order  - Administer medications as ordered  - Instruct and encourage patient and family to use good hand hygiene technique  - Identify and instruct in appropriate isolation precautions for identified infection/condition  Outcome: Progressing     Problem: SAFETY ADULT  Goal: Maintain or return to baseline ADL function  Description  INTERVENTIONS:  -  Assess patient's ability to carry out ADLs; assess patient's baseline for ADL function and identify physical deficits which impact ability to perform ADLs (bathing, care of mouth/teeth, toileting, grooming, dressing, etc )  - Assess/evaluate cause of self-care deficits   - Assess range of motion  - Assess patient's mobility; develop plan if impaired  - Assess patient's need for assistive devices and provide as appropriate  - Encourage maximum independence but intervene and supervise when necessary  - Involve family in performance of ADLs  - Assess for home care needs following discharge   - Consider OT consult to assist with ADL evaluation and planning for discharge  - Provide patient education as appropriate  Outcome: Progressing  Goal: Maintain or return mobility status to optimal level  Description  INTERVENTIONS:  - Assess patient's baseline mobility status (ambulation, transfers, stairs, etc )    - Identify cognitive and physical deficits and behaviors that affect mobility  - Identify mobility aids required to assist with transfers and/or ambulation (gait belt, sit-to-stand, lift, walker, cane, etc )  - [place] fall precautions as indicated by assessment  - Record patient progress and toleration of activity level on Mobility SBAR; progress patient to next Phase/Stage  - Instruct patient to call for assistance with activity based on assessment  - Consider rehabilitation consult to assist with strengthening/weightbearing, etc   Outcome: Progressing     Problem: DISCHARGE PLANNING  Goal: Discharge to home or other facility with appropriate resources  Description  INTERVENTIONS:  - Identify barriers to discharge w/patient and caregiver  - Arrange for needed discharge resources and transportation as appropriate  - Identify discharge learning needs (meds, wound care, etc )  - Arrange for interpretive services to assist at discharge as needed  - Refer to Case Management Department for coordinating discharge planning if the patient needs post-hospital services based on physician/advanced practitioner order or complex needs related to functional status, cognitive ability, or social support system  Outcome: Progressing     Problem: Knowledge Deficit  Goal: Patient/family/caregiver demonstrates understanding of disease process, treatment plan, medications, and discharge instructions  Description  Complete learning assessment and assess knowledge base    Interventions:  - Provide teaching at level of understanding  - Provide teaching via preferred learning methods  Outcome: Progressing     Problem: RESPIRATORY - ADULT  Goal: Achieves optimal ventilation and oxygenation  Description  INTERVENTIONS:  - Assess for changes in respiratory status  - Assess for changes in mentation and behavior  - Position to facilitate oxygenation and minimize respiratory effort  - Oxygen administered by appropriate delivery if ordered  - Initiate smoking cessation education as indicated  - Encourage broncho-pulmonary hygiene including cough, deep breathe, Incentive Spirometry  - Assess the need for suctioning and aspirate as needed  - Assess and instruct to report SOB or any respiratory difficulty  - Respiratory Therapy support as indicated  Outcome: Progressing     Problem: GENITOURINARY - ADULT  Goal: Maintains or returns to baseline urinary function  Description  INTERVENTIONS:  - Assess urinary function  - Encourage oral fluids to ensure adequate hydration if ordered  - Administer IV fluids as ordered to ensure adequate hydration  - Administer ordered medications as needed  - Offer frequent toileting  - Follow urinary retention protocol if ordered  Outcome: Progressing     Problem: METABOLIC, FLUID AND ELECTROLYTES - ADULT  Goal: Electrolytes maintained within normal limits  Description  INTERVENTIONS:  - Monitor labs and assess patient for signs and symptoms of electrolyte imbalances  - Administer electrolyte replacement as ordered  - Monitor response to electrolyte replacements, including repeat lab results as appropriate  - Instruct patient on fluid and nutrition as appropriate  Outcome: Progressing  Goal: Fluid balance maintained  Description  INTERVENTIONS:  - Monitor labs   - Monitor I/O and WT  - Instruct patient on fluid and nutrition as appropriate  - Assess for signs & symptoms of volume excess or deficit  Outcome: Progressing  Goal: Glucose maintained within target range  Description  INTERVENTIONS:  - Monitor Blood Glucose as ordered  - Assess for signs and symptoms of hyperglycemia and hypoglycemia  - Administer ordered medications to maintain glucose within target range  - Assess nutritional intake and initiate nutrition service referral as needed  Outcome: Progressing     Problem: SKIN/TISSUE INTEGRITY - ADULT  Goal: Skin integrity remains intact  Description  INTERVENTIONS  - Identify patients at risk for skin breakdown  - Assess and monitor skin integrity  - Assess and monitor nutrition and hydration status  - Monitor labs (i e  albumin)  - Assess for incontinence   - Turn and reposition patient  - Assist with mobility/ambulation  - Relieve pressure over bony prominences  - Avoid friction and shearing  - Provide appropriate hygiene as needed including keeping skin clean and dry  - Evaluate need for skin moisturizer/barrier cream  - Collaborate with interdisciplinary team (i e  Nutrition, Rehabilitation, etc )   - Patient/family teaching  Outcome: Progressing  Goal: Incision(s), wounds(s) or drain site(s) healing without S/S of infection  Description  INTERVENTIONS  - Assess and document risk factors for skin impairment   - Assess and document dressing, incision, wound bed, drain sites and surrounding tissue  - Consider nutrition services referral as needed  - Oral mucous membranes remain intact  - Provide patient/ family education  Outcome: Progressing  Goal: Oral mucous membranes remain intact  Description  INTERVENTIONS  - Assess oral mucosa and hygiene practices  - Implement preventative oral hygiene regimen  - Implement oral medicated treatments as ordered  - Initiate Nutrition services referral as needed  Outcome: Progressing     Problem: HEMATOLOGIC - ADULT  Goal: Maintains hematologic stability  Description  INTERVENTIONS  - Assess for signs and symptoms of bleeding or hemorrhage  - Monitor labs  - Administer supportive blood products/factors as ordered and appropriate  Outcome: Progressing

## 2019-09-21 NOTE — ASSESSMENT & PLAN NOTE
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

## 2019-09-21 NOTE — SOCIAL WORK
Saturday w/e CM was made aware that pt was being d/c today and would require VNA for home PT and SN for gross hematuria  Sent referral to -VNA, but was informed pt is open with another agency that is "[facility]," but not [facility] [place] [place]  Attempted to call family (pt has dementia and is [place] speaking only); out of all the provided numbers, no one picked up any of the numbers and only two numbers allowed CM to [facility]  Provided the w/e CM ext  and requested a call back with the name of the agency pt is currently open with  This CM sent referrals out to similar sounding VNA agencies in the hopes someone would let us know since CM was unable to speak with family  There are multiple agency with the [facility] name  Received a call back from Ainsley Mantilla at "[facility]," and pt is NOT open with them  Still waiting on two other agencies to respond  Will make CM covering Sunday aware that family may call with info, or to f/u in [facility] for a response       MIRANDA Gardner  9/21/2019  [id number]

## 2019-09-21 NOTE — ASSESSMENT & PLAN NOTE
Secondary to gross hematuria  Hb initially dropped to 7 9 and has remained stable  CT abd/pelvis revealed renal mass, most likely malignant, will transfuse 2 u PRBC prior to discharge  Monitor CBC on outpatient basis

## 2019-09-22 LAB
ABO GROUP BLD BPU: NORMAL
ABO GROUP BLD BPU: NORMAL
BPU ID: NORMAL
BPU ID: NORMAL
CROSSMATCH: NORMAL
CROSSMATCH: NORMAL
UNIT DISPENSE STATUS: NORMAL
UNIT DISPENSE STATUS: NORMAL
UNIT PRODUCT CODE: NORMAL
UNIT PRODUCT CODE: NORMAL
UNIT RH: NORMAL
UNIT RH: NORMAL

## 2019-09-22 NOTE — SOCIAL WORK
LATE ENTRY 9/22/19 0847:  Call from [address] @ [facility] confirming pt being open with their services  [facility]S faxed as requested

## 2019-09-23 ENCOUNTER — TELEPHONE (OUTPATIENT)
Dept: UROLOGY | Facility: AMBULATORY SURGERY CENTER | Age: 84
End: 2019-09-23

## 2019-09-23 ENCOUNTER — TELEPHONE (OUTPATIENT)
Dept: HEMATOLOGY ONCOLOGY | Facility: CLINIC | Age: 84
End: 2019-09-23

## 2019-09-23 ENCOUNTER — TELEPHONE (OUTPATIENT)
Dept: SURGICAL ONCOLOGY | Facility: CLINIC | Age: 84
End: 2019-09-23

## 2019-09-23 ENCOUNTER — TRANSITIONAL CARE MANAGEMENT (OUTPATIENT)
Dept: FAMILY MEDICINE CLINIC | Facility: CLINIC | Age: 84
End: 2019-09-23

## 2019-09-23 ENCOUNTER — TELEPHONE (OUTPATIENT)
Dept: UROLOGY | Facility: MEDICAL CENTER | Age: 84
End: 2019-09-23

## 2019-09-23 NOTE — TELEPHONE ENCOUNTER
New Patient Encounter    New Patient Intake Form   Patient Details:  Ayla Butler  1935  [id number]    Background Information:  [address] starts by opening a telephone encounter and gathering the following information   Who is calling to schedule? If not self, relationship to patient? granddaughter   Referring Provider SLIM   What is the diagnosis? Renal mass   When was the diagnosis? 9/2019   Is patient aware of diagnosis? Yes   Reason for visit? NP DX   Have you had any testing done? If so: when, where? Yes   Are records in [employer]? yes   Was the patient told to bring a disk? no   Scheduling Information:   Preferred [place]:  [facility] Specific Provider? Godwin Allison   Are there any dates/time the patient cannot be seen? Need before 2pm   Counseling Pre-Screen:  If the patient answers YES to any of the below questions, please route to the appropriate location specific counselor    Have you felt anxious or worried about cancer and the treatment you are receiving? Yes   Has your diagnosis caused physical, emotional, or financial hardship for you? No   Note: Do not ask the patient about transportation issues/needs  Please notate if the patient brings it up and the counselor will schedule accordingly  Miscellaneous:    After completing the above information, please route to Financial Counselor and the appropriate Nurse Navigator for review

## 2019-09-23 NOTE — TELEPHONE ENCOUNTER
Spoke to Dr Daljit De regarding [facility] Cantu's concern about stopping blood thinner Trental before surgery 09/26 which he sees no problem if she took it today  The surgery is still on for Thursday

## 2019-09-23 NOTE — TELEPHONE ENCOUNTER
Josie Wall, nurse from [facility] called requesting surgery paperwork/instructions to be faxed to them at  [phone]  When Christiano Dugan was in the hospital on 9/18, she stated the paperwork was not given back to them  Please fax asap for her to prepare for surgery  Thank you

## 2019-09-23 NOTE — TELEPHONE ENCOUNTER
Talita from the [facility] contacted me  Champ Langston was referred to Med Onc and does not have tissue pathology  She is scheduled for Cysto, URS and stone extraction with Dr Ronnie Ewing on 9/26/19  He is aware of recent CT results and may attempt biopsy with right URS  He will also get urine cytology  He recommended that Califonbrynn see Dr Cherise Hook as scheduled on 9/30/19

## 2019-09-23 NOTE — TELEPHONE ENCOUNTER
New Patient Encounter    New Patient Intake Form   Patient Details:  Emerson Ramires  1935  [id number]    Background Information:  [address] starts by opening a telephone encounter and gathering the following information   Who is calling to schedule? If not self, relationship to patient? Tran Christina child    Referring Provider ED   What is the diagnosis? 9/2019   When was the diagnosis? Anemia    Is patient aware of diagnosis? Yes   Reason for visit? NP DX   Have you had any testing done? If so: when, where? Yes   Are records in [employer]? yes   Was the patient told to bring a disk? no   Scheduling Information:   Preferred [place]:  Any     Requesting Specific Provider? no   Are there any dates/time the patient cannot be seen? no   Counseling Pre-Screen:  If the patient answers YES to any of the below questions, please route to the appropriate location specific counselor    Have you felt anxious or worried about cancer and the treatment you are receiving? Did Not Speak to Patient   Has your diagnosis caused physical, emotional, or financial hardship for you? Did Not Speak to Patient   Note: Do not ask the patient about transportation issues/needs  Please notate if the patient brings it up and the counselor will schedule accordingly  Miscellaneous: Nepali only    After completing the above information, please route to Financial Counselor and the appropriate Nurse Navigator for review

## 2019-09-23 NOTE — TELEPHONE ENCOUNTER
Pt managed by [address] [place] [address] A[facility]A calling with questions regarding recent hospital discharge and holding medications prior to surgery

## 2019-09-24 ENCOUNTER — OFFICE VISIT (OUTPATIENT)
Dept: FAMILY MEDICINE CLINIC | Facility: CLINIC | Age: 84
End: 2019-09-24
Payer: MEDICARE

## 2019-09-24 ENCOUNTER — APPOINTMENT (OUTPATIENT)
Dept: LAB | Facility: HOSPITAL | Age: 84
End: 2019-09-24
Attending: UROLOGY
Payer: MEDICARE

## 2019-09-24 VITALS
SYSTOLIC BLOOD PRESSURE: 120 MMHG | OXYGEN SATURATION: 95 % | TEMPERATURE: 97.9 F | HEART RATE: 60 BPM | BODY MASS INDEX: 26.8 KG/M2 | DIASTOLIC BLOOD PRESSURE: 70 MMHG | WEIGHT: 157 LBS | RESPIRATION RATE: 16 BRPM | HEIGHT: 64 IN

## 2019-09-24 DIAGNOSIS — N28.89 RENAL MASS: ICD-10-CM

## 2019-09-24 DIAGNOSIS — E11.65 TYPE 2 DIABETES MELLITUS WITH HYPERGLYCEMIA, WITH LONG-TERM CURRENT USE OF INSULIN (HCC): ICD-10-CM

## 2019-09-24 DIAGNOSIS — M54.41 CHRONIC BILATERAL LOW BACK PAIN WITH BILATERAL SCIATICA: ICD-10-CM

## 2019-09-24 DIAGNOSIS — D62 ACUTE BLOOD LOSS ANEMIA: ICD-10-CM

## 2019-09-24 DIAGNOSIS — R31.0 GROSS HEMATURIA: ICD-10-CM

## 2019-09-24 DIAGNOSIS — Z79.4 TYPE 2 DIABETES MELLITUS WITH HYPERGLYCEMIA, WITH LONG-TERM CURRENT USE OF INSULIN (HCC): ICD-10-CM

## 2019-09-24 DIAGNOSIS — Z23 NEEDS FLU SHOT: Primary | ICD-10-CM

## 2019-09-24 DIAGNOSIS — N20.0 KIDNEY STONE: ICD-10-CM

## 2019-09-24 DIAGNOSIS — N20.1 CALCULUS OF URETER: ICD-10-CM

## 2019-09-24 DIAGNOSIS — M54.42 CHRONIC BILATERAL LOW BACK PAIN WITH BILATERAL SCIATICA: ICD-10-CM

## 2019-09-24 DIAGNOSIS — K59.03 DRUG-INDUCED CONSTIPATION: ICD-10-CM

## 2019-09-24 DIAGNOSIS — G89.29 CHRONIC BILATERAL LOW BACK PAIN WITH BILATERAL SCIATICA: ICD-10-CM

## 2019-09-24 PROBLEM — N17.9 ACUTE RENAL FAILURE SUPERIMPOSED ON STAGE 3 CHRONIC KIDNEY DISEASE (HCC): Status: RESOLVED | Noted: 2018-12-30 | Resolved: 2019-09-24

## 2019-09-24 PROBLEM — N18.9 ACUTE KIDNEY INJURY SUPERIMPOSED ON CHRONIC KIDNEY DISEASE (HCC): Status: RESOLVED | Noted: 2019-07-06 | Resolved: 2019-09-24

## 2019-09-24 PROBLEM — N18.30 ACUTE RENAL FAILURE SUPERIMPOSED ON STAGE 3 CHRONIC KIDNEY DISEASE (HCC): Status: RESOLVED | Noted: 2018-12-30 | Resolved: 2019-09-24

## 2019-09-24 PROBLEM — N17.9 ACUTE KIDNEY INJURY SUPERIMPOSED ON CHRONIC KIDNEY DISEASE (HCC): Status: RESOLVED | Noted: 2019-07-06 | Resolved: 2019-09-24

## 2019-09-24 LAB
ALBUMIN SERPL BCP-MCNC: 3.5 G/DL (ref 3–5.2)
ALP SERPL-CCNC: 116 U/L (ref 43–122)
ALT SERPL W P-5'-P-CCNC: 20 U/L (ref 9–52)
ANION GAP SERPL CALCULATED.3IONS-SCNC: 8 MMOL/L (ref 5–14)
AST SERPL W P-5'-P-CCNC: 21 U/L (ref 14–36)
BACTERIA UR QL AUTO: ABNORMAL /HPF
BASOPHILS # BLD AUTO: 0.1 THOUSANDS/ΜL (ref 0–0.1)
BASOPHILS NFR BLD AUTO: 1 % (ref 0–1)
BILIRUB SERPL-MCNC: 0.4 MG/DL
BILIRUB UR QL STRIP: NEGATIVE
BUN SERPL-MCNC: 11 MG/DL (ref 5–25)
CALCIUM SERPL-MCNC: 8.5 MG/DL (ref 8.4–10.2)
CHLORIDE SERPL-SCNC: 103 MMOL/L (ref 97–108)
CLARITY UR: ABNORMAL
CO2 SERPL-SCNC: 28 MMOL/L (ref 22–30)
COLOR UR: ABNORMAL
CREAT SERPL-MCNC: 1.77 MG/DL (ref 0.6–1.2)
EOSINOPHIL # BLD AUTO: 0.3 THOUSAND/ΜL (ref 0–0.4)
EOSINOPHIL NFR BLD AUTO: 3 % (ref 0–6)
ERYTHROCYTE [DISTWIDTH] IN BLOOD BY AUTOMATED COUNT: 15.4 %
GFR SERPL CREATININE-BSD FRML MDRD: 26 ML/MIN/1.73SQ M
GLUCOSE SERPL-MCNC: 149 MG/DL (ref 70–99)
GLUCOSE UR STRIP-MCNC: NEGATIVE MG/DL
HCT VFR BLD AUTO: 34.9 % (ref 36–46)
HGB BLD-MCNC: 11.6 G/DL (ref 12–16)
HGB UR QL STRIP.AUTO: 250
KETONES UR STRIP-MCNC: ABNORMAL MG/DL
LEUKOCYTE ESTERASE UR QL STRIP: NEGATIVE
LYMPHOCYTES # BLD AUTO: 1.2 THOUSANDS/ΜL (ref 0.5–4)
LYMPHOCYTES NFR BLD AUTO: 11 % (ref 25–45)
MCH RBC QN AUTO: 27.9 PG (ref 26–34)
MCHC RBC AUTO-ENTMCNC: 33.3 G/DL (ref 31–36)
MCV RBC AUTO: 84 FL (ref 80–100)
MONOCYTES # BLD AUTO: 0.5 THOUSAND/ΜL (ref 0.2–0.9)
MONOCYTES NFR BLD AUTO: 5 % (ref 1–10)
NEUTROPHILS # BLD AUTO: 8.7 THOUSANDS/ΜL (ref 1.8–7.8)
NEUTS SEG NFR BLD AUTO: 81 % (ref 45–65)
NITRITE UR QL STRIP: NEGATIVE
NON-SQ EPI CELLS URNS QL MICRO: ABNORMAL /HPF
PH UR STRIP.AUTO: 7 [PH]
PLATELET # BLD AUTO: 265 THOUSANDS/UL (ref 150–450)
PMV BLD AUTO: 9.2 FL (ref 8.9–12.7)
POTASSIUM SERPL-SCNC: 3.6 MMOL/L (ref 3.6–5)
PROT SERPL-MCNC: 6.5 G/DL (ref 5.9–8.4)
PROT UR STRIP-MCNC: >=500 MG/DL
RBC # BLD AUTO: 4.16 MILLION/UL (ref 4–5.2)
RBC #/AREA URNS AUTO: ABNORMAL /HPF
SODIUM SERPL-SCNC: 139 MMOL/L (ref 137–147)
SP GR UR STRIP.AUTO: 1.01 (ref 1–1.04)
UROBILINOGEN UA: NEGATIVE MG/DL
WBC # BLD AUTO: 10.8 THOUSAND/UL (ref 4.5–11)
WBC #/AREA URNS AUTO: ABNORMAL /HPF

## 2019-09-24 PROCEDURE — 85025 COMPLETE CBC W/AUTO DIFF WBC: CPT

## 2019-09-24 PROCEDURE — 87086 URINE CULTURE/COLONY COUNT: CPT | Performed by: FAMILY MEDICINE

## 2019-09-24 PROCEDURE — 99496 TRANSJ CARE MGMT HIGH F2F 7D: CPT | Performed by: FAMILY MEDICINE

## 2019-09-24 PROCEDURE — 36415 COLL VENOUS BLD VENIPUNCTURE: CPT

## 2019-09-24 PROCEDURE — 81001 URINALYSIS AUTO W/SCOPE: CPT | Performed by: FAMILY MEDICINE

## 2019-09-24 PROCEDURE — 80053 COMPREHEN METABOLIC PANEL: CPT

## 2019-09-24 PROCEDURE — 81003 URINALYSIS AUTO W/O SCOPE: CPT | Performed by: FAMILY MEDICINE

## 2019-09-24 PROCEDURE — G0008 ADMIN INFLUENZA VIRUS VAC: HCPCS | Performed by: FAMILY MEDICINE

## 2019-09-24 PROCEDURE — 90662 IIV NO PRSV INCREASED AG IM: CPT | Performed by: FAMILY MEDICINE

## 2019-09-24 PROCEDURE — NC001 PR NO CHARGE: Performed by: UROLOGY

## 2019-09-24 RX ORDER — ATORVASTATIN CALCIUM 40 MG/1
40 TABLET, FILM COATED ORAL EVERY 24 HOURS
Qty: 90 TABLET | Refills: 3 | Status: SHIPPED | OUTPATIENT
Start: 2019-09-24 | End: 2020-01-03 | Stop reason: SDUPTHER

## 2019-09-24 NOTE — ASSESSMENT & PLAN NOTE
Since patient has up post renal failure due to mask the kidney and kidney stones in the left kidney  She is going through a procedure the hopefully will open the ureters and solve the hydronephrosis  Unfortunately these neoplasia will be difficult to treat due to her comorbidities  Dr Tabitha Queen will perform biopsy on September 26, 2019 to get the pathological diagnosis  I will recommend to patient and her family palliative care and possible sooner than later hospice care

## 2019-09-24 NOTE — ASSESSMENT & PLAN NOTE
Since the pain is better control I asked the patient not to continue on narcotics medication that can increase constipation, she will continue on Linaclotide (Linzess) and bisacodyl

## 2019-09-24 NOTE — PROGRESS NOTES
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 ([address] )  Lab Results   Component Value Date    HGBA1C 7 7 (H) 06/04/2019       Her blood sugar is a goal, for this patient the hemoglobin A1c should be below 8 5, we are not going to make any changes  Renal mass  Since patient has up post renal failure due to mask the kidney and kidney stones in the left kidney  She is going through a procedure the hopefully will open the ureters and solve the hydronephrosis  Unfortunately these neoplasia will be difficult to treat due to her comorbidities  Dr Maria T Mike will perform biopsy on September 26, 2019 to get the pathological diagnosis  I will recommend to patient and her family palliative care and possible sooner than later hospice care  Drug-induced constipation  Since the pain is better control I asked the patient not to continue on narcotics medication that can increase constipation, she will continue on Linaclotide (Linzess) and bisacodyl  I did reconciliation of medication in the present visit  Diagnoses and all orders for this visit:    Needs flu shot  -     influenza vaccine, [id number], high-dose, PF 0 5 mL (FLUZONE HIGH-DOSE)    Type 2 diabetes mellitus with hyperglycemia, with long-term current use of insulin (HCC)  -     atorvastatin (LIPITOR) 40 mg tablet; Take 1 tablet (40 mg total) by mouth every 24 hours    Drug-induced constipation  -     bisacodyl (bisacodyl) 5 mg EC tablet; Take 1 tablet (5 mg total) by mouth daily as needed for constipation    Chronic bilateral low back pain with bilateral sciatica    Renal mass          Subjective:      Patient ID: Jimmy Galloway is a 81 y o  female  Patient is here for transitional care  Patient was discharged from hospital on September 20, 2019 which she was admitted for increased amount of pain in the right side    This admission a CT a a renal protocol reported with heterogeneous lobulated lesion within the mid and lower pole of the right kidney differential considerations are carcinoma with invasion into the collecting system versus transitional cell carcinoma with invasion into the parenchyma he also has a lesion in the liver the may represent a metastasis  Patient had the anemia and was transfused in the hospital 2 units of red blood cell pack a day that she was discharged, related to anemia of blood loss through the kidneys since patient has hematuria for the past one month secondary to kidney stones and or Kidney malignancy, today she did repeat blood work that reported hemoglobin of 11 6 Patient has been referred to Oncology for evaluation, she will have a cystoscopy done by Dr José Howell on September 26, 2019 when he will try to get a sample of this tissue to have pathological diagnosis  Today she is complaining of less pain in the back, she has wheelchair-bound, she needs lot of help in and out of the house having home aide for 12 hours  She is a back control diabetic with complication both neurological and circulatory  Patient also suffers of chronic kidney disease  Her last GFR in the hospital was 57, dropped to 26 in today's labs  One other complaint the patient has constipation and has been taking Linaclotide (Linzess) and Dulcolax daily with very poor help  She states that has low urine output, but reviewing her renal profile I did not see any signs of acute or increase and renal failure  The following portions of the patient's history were reviewed and updated as appropriate: allergies, current medications, past family history, past medical history, past social history, past surgical history and problem list     Review of Systems   Constitutional: Negative for diaphoresis, fatigue, fever and unexpected weight change  Respiratory: Positive for shortness of breath  Negative for cough, chest tightness and wheezing      Cardiovascular: Negative for chest pain, palpitations and leg swelling  Gastrointestinal: Positive for constipation  Negative for abdominal pain, blood in stool, nausea and vomiting  Musculoskeletal: Positive for arthralgias and back pain  Neurological: Negative for dizziness, syncope, light-headedness and headaches  Hematological: Does not bruise/bleed easily  Psychiatric/Behavioral: Negative for behavioral problems, self-injury and sleep disturbance  The patient is not nervous/anxious  Objective:      /70 (BP Location: Left arm, Patient Position: Sitting, Cuff Size: Standard)   Pulse 60   Temp 97 9 °F (36 6 °C) (Oral)   Resp 16   Ht 5' 4" (1 626 m)   Wt 71 2 kg (157 lb)   LMP 01/01/1990 (Within Years)   SpO2 95%   Breastfeeding? No   BMI 26 95 kg/m²          Physical Exam   Constitutional: She is oriented to person, place, and time  No distress  HENT:   Nose: Nose normal    Mouth/Throat: Oropharynx is clear and moist  No oropharyngeal exudate  Eyes: Pupils are equal, round, and reactive to light  EOM are normal  Right eye exhibits no discharge  Left eye exhibits no discharge  No scleral icterus  Cardiovascular: Normal rate, regular rhythm, normal heart sounds and intact distal pulses  Pulmonary/Chest: Effort normal  No respiratory distress  She has no wheezes  She has no rales  She exhibits no tenderness  Abdominal: She exhibits distension  She exhibits no mass  There is no rebound and no guarding  Musculoskeletal:        Lumbar back: She exhibits decreased range of motion, tenderness and spasm  Neurological: She is alert and oriented to person, place, and time  Skin: Skin is warm and dry  No rash noted  No erythema  There is pallor  Psychiatric: She has a normal mood and affect  Her behavior is normal    Nursing note and vitals reviewed

## 2019-09-24 NOTE — ASSESSMENT & PLAN NOTE
Lab Results   Component Value Date    HGBA1C 7 7 (H) 06/04/2019       Her blood sugar is a goal, for this patient the hemoglobin A1c should be below 8 5, we are not going to make any changes

## 2019-09-24 NOTE — H&P
HISTORY AND PHYSICAL  ? ? Patient Name: Mallory Medina  Patient MRN: [id number]  Attending Provider: Milly Srivastava MD  Service: Urology  Chief Complaint    Gross hematuria, bilateral kidney stones, possible right renal pelvic mass    HPI   Mallory Medina is a 81 y o  female with gross hematuria, bilateral kidney stones and possible right renal pelvic mass  I plan cystoscopy, bilateral retrograde pyelography possible left ureteroscopy with laser lithotripsy and stone removal, right ureteroscopy with biopsy or laser lithotripsy  Potential risks and complications discussed, and informed consent was given by the patient  Medications  Meds/Allergies     No current facility-administered medications for this encounter  Cannot display prior to admission medications because the patient has not been admitted in this contact  No current facility-administered medications for this encounter       Current Outpatient Medications:     amLODIPine (NORVASC) 5 mg tablet, Take 1 tablet (5 mg total) by mouth daily, Disp: 90 tablet, Rfl: 3    brimonidine tartrate 0 2 % ophthalmic solution, INSTILL 1 DROP INTO BOTH EYES 2 TIMES PER DAY, Disp: , Rfl: 6    calcitonin, salmon, (MIACALCIN) 200 units/act nasal spray, 1 spray into each nostril daily, Disp: 1 mL, Rfl: 3    dorzolamide (TRUSOPT) 2 % ophthalmic solution, Administer 1 drop to both eyes 3 (three) times a day, Disp: 5 mL, Rfl: 5    ipratropium (ATROVENT HFA) 17 mcg/act inhaler, Inhale 2 puffs 4 (four) times a day Ninety day supplies please, Disp: 3 Inhaler, Rfl: 3    metoprolol tartrate (LOPRESSOR) 25 mg tablet, Take 1 tablet (25 mg total) by mouth every 12 (twelve) hours, Disp: 180 tablet, Rfl: 3    aspirin 81 mg chewable tablet, Chew 1 tablet (81 mg total) daily, Disp: 30 tablet, Rfl: 0    atorvastatin (LIPITOR) 40 mg tablet, Take 1 tablet (40 mg total) by mouth every 24 hours, Disp: 90 tablet, Rfl: 3    bisacodyl (bisacodyl) 5 mg EC tablet, Take 1 tablet (5 mg total) by mouth daily as needed for constipation, Disp: 30 tablet, Rfl: 0    insulin degludec (TRESIBA FLEXTOUCH) 100 units/mL injection pen, To use 25 U at bedtime  , Disp: 9 pen, Rfl: 5    Lancets (FREESTYLE) lancets, Check blood sugar three times a day, Disp: 100 each, Rfl: 5    latanoprost (XALATAN) 0 005 % ophthalmic solution, ADMINISTER 1 DROP TO BOTH EYES DAILY AT BEDTIME, Disp: , Rfl: 5    LINZESS 290 MCG CAPS, TAKE ONE CAPSULE BY MOUTH DAILY ROSITA TOMASA CAPSULA POR VIA ORAL DIARIAMENTE, Disp: , Rfl: 5    NOVOLOG FLEXPEN 100 units/mL injection pen, 12 UNITS 3 TIMES A DAY WITH MEALS Ninety day supplies please, Disp: 25 pen, Rfl: 3    ondansetron (ZOFRAN) 4 mg tablet, TAKE 1 TABLET (4 MG TOTAL) BY MOUTH EVERY 8 (EIGHT) HOURS AS NEEDED FOR NAUSEA OR VOMITING, Disp: 20 tablet, Rfl: 0    oxyCODONE (OxyCONTIN) 10 mg 12 hr tablet, Take 1 tablet (10 mg total) by mouth every 12 (twelve) hours for 10 daysMax Daily Amount: 20 mg, Disp: 20 tablet, Rfl: 0    pregabalin (LYRICA) 100 mg capsule, Take 1 capsule (100 mg total) by mouth 2 (two) times a day for 100 days, Disp: 60 capsule, Rfl: 5    sitaGLIPtin (JANUVIA) 50 mg tablet, Take 1 tablet (50 mg total) by mouth daily, Disp: 30 tablet, Rfl: 5  Review of Systems  10 point review of systems negative except as noted in HPI  Allergies  Allergies   Allergen Reactions    Morphine And Related Vomiting    Tramadol Vomiting and Abdominal Pain     Other     PMH  Past Medical History:   Diagnosis Date    Anemia     Arthritis     Cancer [facility] )     kidney, liver    CH[facility]congestive [facility] failure) [facility]     Chronic pain disorder     low back    COPD (chronic obstructive pulmonary disease) ([facility]     Coronary artery disease     Glaucoma     Hematuria 2019    Hyperlipidemia     Hypertension     Kidney stones     Lumbar stenosis     Myocardial infarction ([facility] )     Osteoporosis     PAD (peripheral artery disease) ([facility]     Peripheral neuropathy     Shortness of breath      Past surgical history  Past Surgical History:   Procedure Laterality Date    APPENDECTOMY      CARDIAC CATHETERIZATION  2019    CT EPIDURAL STEROID INJECTION (RYAN LUMBAR)  8/20/2019    CYSTOSCOPY      HAND SURGERY Right     HYSTERECTOMY      age 29    INCISION AND DRAINAGE OF WOUND Left 1/5/2019    Procedure: INCISION AND DRAINAGE (I&D) EXTREMITY;  Surgeon: Irby Fleischer, MD;  Location: [facility];  Service: General    IR ABDOMINAL ANGIOGRAPHY / INTERVENTION  9/19/2019    LITHOTRIPSY      MASS EXCISION      [place] of back wall mass    TONSILLECTOMY      TONSILLECTOMY       Social history  Social History     Tobacco Use    Smoking status: Never Smoker    Smokeless tobacco: Never Used    Tobacco comment: states she quit but family living with her states she smokes   Substance Use Topics    Alcohol use: Never     Frequency: Never     Comment: OCCASIONAL    Drug use: Never     ?   Physical Exam      General appearance: alert and oriented, in no acute distress  Head: Normocephalic, without obvious abnormality, atraumatic  Neck: supple, symmetrical, trachea midline  Lungs: Normal respiratory effort  Heart: regular rate and rhythm  Abdomen: soft, non-tender; bowel sounds normal; no masses,  no organomegaly  Extremities: extremities normal, warm and well-perfused; no cyanosis, clubbing, or edema  Neurologic: Grossly normal     Peter Hutchison MD

## 2019-09-25 ENCOUNTER — ANESTHESIA EVENT (OUTPATIENT)
Dept: PERIOP | Facility: HOSPITAL | Age: 84
End: 2019-09-25
Payer: MEDICARE

## 2019-09-25 LAB — BACTERIA UR CULT: NORMAL

## 2019-09-25 RX ORDER — SODIUM CHLORIDE, SODIUM LACTATE, POTASSIUM CHLORIDE, CALCIUM CHLORIDE 600; 310; 30; 20 MG/100ML; MG/100ML; MG/100ML; MG/100ML
75 INJECTION, SOLUTION INTRAVENOUS CONTINUOUS
Status: CANCELLED | OUTPATIENT
Start: 2019-09-25

## 2019-09-25 NOTE — ANESTHESIA PREPROCEDURE EVALUATION
Review of Systems/Medical History  Patient summary reviewed  Chart reviewed  No history of anesthetic complications     Cardiovascular  Exercise tolerance (METS): <4,  Hyperlipidemia, Hypertension controlled, Past MI > 6 months, CAD , CAD status: obstructive, CHF , NYHA Classification: III compensated CHF,    Pulmonary  Smoker ex-smoker  Cumulative Pack Years: 36, COPD mild- PRN medication , Shortness of breath,        GI/Hepatic       Kidney stones, Chronic kidney disease stage 2,        Endo/Other  Diabetes (accu ch 104 @ 1500) poorly controlled type 2 Oral agent,      GYN      Comment: postmenopausal     Hematology  Anemia anemia of chronic disease,     Musculoskeletal    Arthritis     Neurology  Negative neurology ROS      Psychology   Negative psychology ROS   Chronic pain,            Physical Exam    Airway    Mallampati score: II  TM Distance: >3 FB       Dental   upper dentures and lower dentures,     Cardiovascular  Cardiovascular exam normal    Pulmonary  Pulmonary exam normal     Other Findings        Anesthesia Plan  ASA Score- 3     Anesthesia Type- general with ASA Monitors  Additional Monitors:   Airway Plan:     Comment: Old charts reviewed  Malagasy only  Plan Factors-Patient not instructed to abstain from smoking on day of procedure  Patient did not smoke on day of surgery  Induction- intravenous  Postoperative Plan- Plan for postoperative opioid use  Informed Consent- Anesthetic plan and risks discussed with patient and sibling (grandson is an excellent )  I personally reviewed this patient with the CRNA  Discussed and agreed on the Anesthesia Plan with the CRNA  Eleni De Souza

## 2019-09-26 ENCOUNTER — ANESTHESIA (OUTPATIENT)
Dept: PERIOP | Facility: HOSPITAL | Age: 84
End: 2019-09-26
Payer: MEDICARE

## 2019-09-26 ENCOUNTER — APPOINTMENT (OUTPATIENT)
Dept: RADIOLOGY | Facility: HOSPITAL | Age: 84
End: 2019-09-26
Payer: MEDICARE

## 2019-09-26 ENCOUNTER — HOSPITAL ENCOUNTER (OUTPATIENT)
Facility: HOSPITAL | Age: 84
Setting detail: OUTPATIENT SURGERY
Discharge: HOME | End: 2019-09-27
Attending: UROLOGY | Admitting: UROLOGY
Payer: MEDICARE

## 2019-09-26 DIAGNOSIS — M54.41 CHRONIC BILATERAL LOW BACK PAIN WITH BILATERAL SCIATICA: ICD-10-CM

## 2019-09-26 DIAGNOSIS — N20.1 CALCULUS OF URETER: ICD-10-CM

## 2019-09-26 DIAGNOSIS — N28.89 RENAL MASS: ICD-10-CM

## 2019-09-26 DIAGNOSIS — Z01.818 PREOP TESTING: ICD-10-CM

## 2019-09-26 DIAGNOSIS — Z98.890 STATUS POST CYSTOSCOPY: Primary | ICD-10-CM

## 2019-09-26 DIAGNOSIS — G89.29 CHRONIC BILATERAL LOW BACK PAIN WITH BILATERAL SCIATICA: ICD-10-CM

## 2019-09-26 DIAGNOSIS — R41.81 AGE-RELATED COGNITIVE DECLINE: ICD-10-CM

## 2019-09-26 DIAGNOSIS — N20.0 KIDNEY STONE: ICD-10-CM

## 2019-09-26 DIAGNOSIS — M54.42 CHRONIC BILATERAL LOW BACK PAIN WITH BILATERAL SCIATICA: ICD-10-CM

## 2019-09-26 PROBLEM — N17.9 AKI (ACUTE KIDNEY INJURY) (HCC): Status: ACTIVE | Noted: 2019-09-26

## 2019-09-26 PROBLEM — F03.90 DEMENTIA (HCC): Status: ACTIVE | Noted: 2019-09-26

## 2019-09-26 LAB
GLUCOSE SERPL-MCNC: 104 MG/DL (ref 65–140)
GLUCOSE SERPL-MCNC: 107 MG/DL (ref 65–140)
GLUCOSE SERPL-MCNC: 116 MG/DL (ref 65–140)

## 2019-09-26 PROCEDURE — 82948 REAGENT STRIP/BLOOD GLUCOSE: CPT

## 2019-09-26 PROCEDURE — 74420 UROGRAPHY RTRGR +-KUB: CPT

## 2019-09-26 PROCEDURE — 88112 CYTOPATH CELL ENHANCE TECH: CPT | Performed by: PATHOLOGY

## 2019-09-26 PROCEDURE — 88342 IMHCHEM/IMCYTCHM 1ST ANTB: CPT | Performed by: PATHOLOGY

## 2019-09-26 PROCEDURE — NC001 PR NO CHARGE: Performed by: UROLOGY

## 2019-09-26 PROCEDURE — C1769 GUIDE WIRE: HCPCS | Performed by: UROLOGY

## 2019-09-26 PROCEDURE — 82360 CALCULUS ASSAY QUANT: CPT | Performed by: UROLOGY

## 2019-09-26 PROCEDURE — 88300 SURGICAL PATH GROSS: CPT | Performed by: PATHOLOGY

## 2019-09-26 PROCEDURE — 88341 IMHCHEM/IMCYTCHM EA ADD ANTB: CPT | Performed by: PATHOLOGY

## 2019-09-26 PROCEDURE — 88305 TISSUE EXAM BY PATHOLOGIST: CPT | Performed by: PATHOLOGY

## 2019-09-26 PROCEDURE — C1894 INTRO/SHEATH, NON-LASER: HCPCS | Performed by: UROLOGY

## 2019-09-26 PROCEDURE — 52356 CYSTO/URETERO W/LITHOTRIPSY: CPT | Performed by: UROLOGY

## 2019-09-26 PROCEDURE — C2617 STENT, NON-COR, TEM W/O DEL: HCPCS | Performed by: UROLOGY

## 2019-09-26 PROCEDURE — 52007 CYSTO URTRL CATHJ BRUSH BX: CPT | Performed by: UROLOGY

## 2019-09-26 PROCEDURE — 99220 PR INITIAL OBSERVATION CARE/DAY 70 MINUTES: CPT | Performed by: NURSE PRACTITIONER

## 2019-09-26 DEVICE — STENT CONTOUR INJECTION SOFT 6FR X 22CM: Type: IMPLANTABLE DEVICE | Site: URETER | Status: FUNCTIONAL

## 2019-09-26 RX ORDER — MAGNESIUM HYDROXIDE 1200 MG/15ML
LIQUID ORAL AS NEEDED
Status: DISCONTINUED | OUTPATIENT
Start: 2019-09-26 | End: 2019-09-26 | Stop reason: HOSPADM

## 2019-09-26 RX ORDER — SODIUM CHLORIDE 9 MG/ML
50 INJECTION, SOLUTION INTRAVENOUS CONTINUOUS
Status: DISCONTINUED | OUTPATIENT
Start: 2019-09-26 | End: 2019-09-26

## 2019-09-26 RX ORDER — TAMSULOSIN HYDROCHLORIDE 0.4 MG/1
0.4 CAPSULE ORAL
Status: DISCONTINUED | OUTPATIENT
Start: 2019-09-26 | End: 2019-09-27 | Stop reason: HOSPADM

## 2019-09-26 RX ORDER — ACETAMINOPHEN 325 MG/1
650 TABLET ORAL EVERY 6 HOURS PRN
Status: DISCONTINUED | OUTPATIENT
Start: 2019-09-26 | End: 2019-09-27 | Stop reason: HOSPADM

## 2019-09-26 RX ORDER — CEFAZOLIN SODIUM 2 G/50ML
SOLUTION INTRAVENOUS AS NEEDED
Status: DISCONTINUED | OUTPATIENT
Start: 2019-09-26 | End: 2019-09-26

## 2019-09-26 RX ORDER — ONDANSETRON 2 MG/ML
4 INJECTION INTRAMUSCULAR; INTRAVENOUS EVERY 6 HOURS PRN
Status: DISCONTINUED | OUTPATIENT
Start: 2019-09-26 | End: 2019-09-27 | Stop reason: HOSPADM

## 2019-09-26 RX ORDER — ASPIRIN 81 MG/1
81 TABLET, CHEWABLE ORAL DAILY
Status: DISCONTINUED | OUTPATIENT
Start: 2019-09-27 | End: 2019-09-27 | Stop reason: HOSPADM

## 2019-09-26 RX ORDER — CEFAZOLIN SODIUM 2 G/50ML
SOLUTION INTRAVENOUS AS NEEDED
Status: DISCONTINUED | OUTPATIENT
Start: 2019-09-26 | End: 2019-09-26 | Stop reason: SURG

## 2019-09-26 RX ORDER — CEFAZOLIN SODIUM 1 G/50ML
1000 SOLUTION INTRAVENOUS ONCE
Status: DISCONTINUED | OUTPATIENT
Start: 2019-09-26 | End: 2019-09-27 | Stop reason: HOSPADM

## 2019-09-26 RX ORDER — OXYBUTYNIN CHLORIDE 5 MG/1
5 TABLET ORAL 2 TIMES DAILY PRN
Status: DISCONTINUED | OUTPATIENT
Start: 2019-09-26 | End: 2019-09-27 | Stop reason: HOSPADM

## 2019-09-26 RX ORDER — PROPOFOL 10 MG/ML
INJECTION, EMULSION INTRAVENOUS AS NEEDED
Status: DISCONTINUED | OUTPATIENT
Start: 2019-09-26 | End: 2019-09-26 | Stop reason: SURG

## 2019-09-26 RX ORDER — SODIUM CHLORIDE 9 MG/ML
50 INJECTION, SOLUTION INTRAVENOUS CONTINUOUS
Status: DISCONTINUED | OUTPATIENT
Start: 2019-09-26 | End: 2019-09-27 | Stop reason: HOSPADM

## 2019-09-26 RX ORDER — FENTANYL CITRATE 50 UG/ML
INJECTION, SOLUTION INTRAMUSCULAR; INTRAVENOUS AS NEEDED
Status: DISCONTINUED | OUTPATIENT
Start: 2019-09-26 | End: 2019-09-26 | Stop reason: SURG

## 2019-09-26 RX ORDER — OXYCODONE HYDROCHLORIDE 5 MG/1
5 TABLET ORAL EVERY 4 HOURS PRN
Status: DISCONTINUED | OUTPATIENT
Start: 2019-09-26 | End: 2019-09-27 | Stop reason: HOSPADM

## 2019-09-26 RX ORDER — CEPHALEXIN 250 MG/1
250 CAPSULE ORAL EVERY 12 HOURS SCHEDULED
Qty: 6 CAPSULE | Refills: 0 | Status: SHIPPED | OUTPATIENT
Start: 2019-09-26 | End: 2019-09-29

## 2019-09-26 RX ORDER — MEPERIDINE HYDROCHLORIDE 25 MG/ML
25 INJECTION INTRAMUSCULAR; INTRAVENOUS; SUBCUTANEOUS AS NEEDED
Status: DISCONTINUED | OUTPATIENT
Start: 2019-09-26 | End: 2019-09-26 | Stop reason: HOSPADM

## 2019-09-26 RX ORDER — TAMSULOSIN HYDROCHLORIDE 0.4 MG/1
0.4 CAPSULE ORAL EVERY EVENING
Qty: 30 CAPSULE | Refills: 0 | Status: SHIPPED | OUTPATIENT
Start: 2019-09-26 | End: 2019-10-23 | Stop reason: SDUPTHER

## 2019-09-26 RX ORDER — ATORVASTATIN CALCIUM 40 MG/1
40 TABLET, FILM COATED ORAL
Status: DISCONTINUED | OUTPATIENT
Start: 2019-09-26 | End: 2019-09-27 | Stop reason: HOSPADM

## 2019-09-26 RX ORDER — ONDANSETRON 2 MG/ML
4 INJECTION INTRAMUSCULAR; INTRAVENOUS ONCE AS NEEDED
Status: DISCONTINUED | OUTPATIENT
Start: 2019-09-26 | End: 2019-09-26 | Stop reason: HOSPADM

## 2019-09-26 RX ORDER — AMLODIPINE BESYLATE 5 MG/1
5 TABLET ORAL DAILY
Status: DISCONTINUED | OUTPATIENT
Start: 2019-09-27 | End: 2019-09-27 | Stop reason: HOSPADM

## 2019-09-26 RX ORDER — FENTANYL CITRATE/PF 50 MCG/ML
25 SYRINGE (ML) INJECTION
Status: COMPLETED | OUTPATIENT
Start: 2019-09-26 | End: 2019-09-26

## 2019-09-26 RX ORDER — ONDANSETRON 2 MG/ML
INJECTION INTRAMUSCULAR; INTRAVENOUS AS NEEDED
Status: DISCONTINUED | OUTPATIENT
Start: 2019-09-26 | End: 2019-09-26 | Stop reason: SURG

## 2019-09-26 RX ORDER — CEPHALEXIN 250 MG/1
250 CAPSULE ORAL EVERY 12 HOURS SCHEDULED
Status: DISCONTINUED | OUTPATIENT
Start: 2019-09-26 | End: 2019-09-27 | Stop reason: HOSPADM

## 2019-09-26 RX ADMIN — FENTANYL CITRATE 20 MCG: 50 INJECTION INTRAMUSCULAR; INTRAVENOUS at 16:22

## 2019-09-26 RX ADMIN — PROPOFOL 50 MG: 10 INJECTION, EMULSION INTRAVENOUS at 18:34

## 2019-09-26 RX ADMIN — SODIUM CHLORIDE: 0.9 INJECTION, SOLUTION INTRAVENOUS at 15:48

## 2019-09-26 RX ADMIN — ONDANSETRON 4 MG: 2 INJECTION, SOLUTION INTRAMUSCULAR; INTRAVENOUS at 22:11

## 2019-09-26 RX ADMIN — OXYCODONE HYDROCHLORIDE 5 MG: 5 TABLET ORAL at 21:53

## 2019-09-26 RX ADMIN — FENTANYL CITRATE 25 MCG: 50 INJECTION INTRAMUSCULAR; INTRAVENOUS at 19:30

## 2019-09-26 RX ADMIN — ONDANSETRON HYDROCHLORIDE 4 MG: 2 INJECTION, SOLUTION INTRAMUSCULAR; INTRAVENOUS at 17:24

## 2019-09-26 RX ADMIN — FENTANYL CITRATE 20 MCG: 50 INJECTION INTRAMUSCULAR; INTRAVENOUS at 18:19

## 2019-09-26 RX ADMIN — FENTANYL CITRATE 20 MCG: 50 INJECTION INTRAMUSCULAR; INTRAVENOUS at 16:32

## 2019-09-26 RX ADMIN — ATORVASTATIN CALCIUM 40 MG: 40 TABLET, FILM COATED ORAL at 22:12

## 2019-09-26 RX ADMIN — FENTANYL CITRATE 20 MCG: 50 INJECTION INTRAMUSCULAR; INTRAVENOUS at 17:46

## 2019-09-26 RX ADMIN — FENTANYL CITRATE 25 MCG: 50 INJECTION INTRAMUSCULAR; INTRAVENOUS at 19:17

## 2019-09-26 RX ADMIN — FENTANYL CITRATE 20 MCG: 50 INJECTION INTRAMUSCULAR; INTRAVENOUS at 17:19

## 2019-09-26 RX ADMIN — FENTANYL CITRATE 20 MCG: 50 INJECTION INTRAMUSCULAR; INTRAVENOUS at 16:58

## 2019-09-26 RX ADMIN — PROPOFOL 100 MG: 10 INJECTION, EMULSION INTRAVENOUS at 16:25

## 2019-09-26 RX ADMIN — FENTANYL CITRATE 25 MCG: 50 INJECTION INTRAMUSCULAR; INTRAVENOUS at 19:10

## 2019-09-26 RX ADMIN — SODIUM CHLORIDE 50 ML/HR: 9 INJECTION, SOLUTION INTRAVENOUS at 22:11

## 2019-09-26 RX ADMIN — FENTANYL CITRATE 25 MCG: 50 INJECTION INTRAMUSCULAR; INTRAVENOUS at 19:43

## 2019-09-26 RX ADMIN — METOPROLOL TARTRATE 25 MG: 25 TABLET ORAL at 21:57

## 2019-09-26 RX ADMIN — FENTANYL CITRATE 20 MCG: 50 INJECTION INTRAMUSCULAR; INTRAVENOUS at 17:36

## 2019-09-26 RX ADMIN — CEFAZOLIN SODIUM 1000 MG: 2 SOLUTION INTRAVENOUS at 16:27

## 2019-09-26 RX ADMIN — FENTANYL CITRATE 20 MCG: 50 INJECTION INTRAMUSCULAR; INTRAVENOUS at 16:27

## 2019-09-26 RX ADMIN — FENTANYL CITRATE 20 MCG: 50 INJECTION INTRAMUSCULAR; INTRAVENOUS at 18:10

## 2019-09-26 RX ADMIN — TAMSULOSIN HYDROCHLORIDE 0.4 MG: 0.4 CAPSULE ORAL at 21:54

## 2019-09-26 RX ADMIN — FENTANYL CITRATE 20 MCG: 50 INJECTION INTRAMUSCULAR; INTRAVENOUS at 18:02

## 2019-09-26 NOTE — DISCHARGE SUMMARY
DISCHARGE SUMMARY     Patient Name: Shagufta De La Garza    Patient MRN: [id number]    Admitting Provider: Katherin Shone, MD    Discharging Provider: Katherin Shone, MD    Primary Care Physician at Discharge: Kang Shelley [phone]     Admission Date: 9/26/2019     Discharge Date: 9/27/2019    Admission Diagnosis   Kidney stone [N20 0]  Calculus of ureter [N20 1]    Discharge Diagnoses  Active Problems:    Kidney stone    Calculus of ureter      Medications  Current Discharge Medication List      START taking these medications    Details   cephalexin (KEFLEX) 250 mg capsule Take 1 capsule (250 mg total) by mouth every 12 (twelve) hours for 3 days  Qty: 6 capsule, Refills: 0    Associated Diagnoses: Kidney stone; Calculus of ureter      tamsulosin (FLOMAX) 0 4 mg Take 1 capsule (0 4 mg total) by mouth every evening  Qty: 30 capsule, Refills: 0    Associated Diagnoses: Kidney stone; Calculus of ureter            Current Discharge Medication List      CONTINUE these medications which have NOT CHANGED    Details   amLODIPine (NORVASC) 5 mg tablet Take 1 tablet (5 mg total) by mouth daily  Qty: 90 tablet, Refills: 3    Associated Diagnoses: NSTEMI (non-ST elevated myocardial infarction) ([facility])      aspirin 81 mg chewable tablet Chew 1 tablet (81 mg total) daily  Qty: 30 tablet, Refills: 0    Associated Diagnoses: NSTEMI (non-ST elevated myocardial infarction) ([facility])      atorvastatin (LIPITOR) 40 mg tablet Take 1 tablet (40 mg total) by mouth every 24 hours  Qty: 90 tablet, Refills: 3    Associated Diagnoses: Type 2 diabetes mellitus with hyperglycemia, with long-term current use of insulin ([facility])      bisacodyl (bisacodyl) 5 mg EC tablet Take 1 tablet (5 mg total) by mouth daily as needed for constipation  Qty: 30 tablet, Refills: 0    Associated Diagnoses: Drug-induced constipation      brimonidine tartrate 0 2 % ophthalmic solution INSTILL 1 DROP INTO BOTH EYES 2 TIMES PER DAY  Refills: 6      calcitonin, bethanie, (MIACALCIN) 200 units/act nasal spray 1 spray into each nostril daily  Qty: 1 mL, Refills: 3    Associated Diagnoses: Compression fracture of L5 lumbar vertebra, closed, initial encounter ([facility])      dorzolamide (TRUSOPT) 2 % ophthalmic solution Administer 1 drop to both eyes 3 (three) times a day  Qty: 5 mL, Refills: 5    Associated Diagnoses: 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 ([facility])      insulin degludec (TRESIBA FLEXTOUCH) 100 units/mL injection pen To use 25 U at bedtime    Qty: 9 pen, Refills: 5    Associated Diagnoses: Type 2 diabetes mellitus with hyperglycemia, with long-term current use of insulin ([facility])      ipratropium (ATROVENT HFA) 17 mcg/act inhaler Inhale 2 puffs 4 (four) times a day Ninety day supplies please  Qty: 3 Inhaler, Refills: 3    Associated Diagnoses: COPD mixed type ([facility])      Lancets (FREESTYLE) lancets Check blood sugar three times a day  Qty: 100 each, Refills: 5    Associated Diagnoses: Type 2 diabetes mellitus with hyperglycemia, with long-term current use of insulin ([facility])      latanoprost (XALATAN) 0 005 % ophthalmic solution ADMINISTER 1 DROP TO BOTH EYES DAILY AT BEDTIME  Refills: 5      LINZESS 290 MCG CAPS TAKE ONE CAPSULE BY MOUTH DAILY ROSITA TOMASA CAPSULA POR VIA ORAL DIARIAMENTE  Refills: 5      metoprolol tartrate (LOPRESSOR) 25 mg tablet Take 1 tablet (25 mg total) by mouth every 12 (twelve) hours  Qty: 180 tablet, Refills: 3    Associated Diagnoses: NSTEMI (non-ST elevated myocardial infarction) ([facility])      NOVOLOG FLEXPEN 100 units/mL injection pen 12 UNITS 3 TIMES A DAY WITH MEALS Ninety day supplies please  Qty: 25 pen, Refills: 3    Associated Diagnoses: Type 2 diabetes mellitus with hyperglycemia, with long-term current use of insulin ([facility])      ondansetron (ZOFRAN) 4 mg tablet TAKE 1 TABLET (4 MG TOTAL) BY MOUTH EVERY 8 (EIGHT) HOURS AS NEEDED FOR NAUSEA OR VOMITING  Qty: 20 tablet, Refills: 0 Associated Diagnoses: Nausea      oxyCODONE (OxyCONTIN) 10 mg 12 hr tablet Take 1 tablet (10 mg total) by mouth every 12 (twelve) hours for 10 daysMax Daily Amount: 20 mg  Qty: 20 tablet, Refills: 0    Associated Diagnoses: Chronic bilateral low back pain with bilateral sciatica      sitaGLIPtin (JANUVIA) 50 mg tablet Take 1 tablet (50 mg total) by mouth daily  Qty: 30 tablet, Refills: 5    Associated Diagnoses: Type 2 diabetes mellitus with hyperglycemia, with long-term current use of insulin ([facility])      pregabalin (LYRICA) 100 mg capsule Take 1 capsule (100 mg total) by mouth 2 (two) times a day for 100 days  Qty: 60 capsule, Refills: 5    Associated Diagnoses: Type 2 diabetes mellitus with hyperglycemia, with long-term current use of insulin ([facility])             Allergies  Allergies   Allergen Reactions    Morphine And Related Vomiting    Tramadol Vomiting and Abdominal Pain     Other       Outpatient Follow-Up  Delia Norwood MD  [address] [place] [address]  [place] [address]  [phone]    Follow up  Office will call to arrange follow-up appointments  Hospital course- She was observed overnight and remained stable  ? Discharge Disposition  Home    Operative Procedures Performed  Procedure(s):  cystoscopy, bilateral retrograde pyelography, bilateral ureteroscopy with stone extraction / laser lithotripsy  ? Physical Exam at Discharge  Condition of Patient on Discharge: stable  ?   Delia Norwood MD

## 2019-09-26 NOTE — INTERVAL H&P NOTE
H&P reviewed  After examining the patient I find no changes in the patients condition since the H&P had been written  Vitals:    09/26/19 1323   BP: 127/64   Pulse: 58   Resp: 16   Temp: (!) 97 3 °F (36 3 °C)   SpO2: 98%   She has begun to have Right flank pain again  Procedure reviewed with patient and family in ASU and again in holding unit

## 2019-09-26 NOTE — OP NOTE
OPERATIVE REPORT  PATIENT NAME: Omar Castle    :  1935  MRN: [id number]  Pt Location:  OR ROOM 12    SURGERY DATE: 2019    Surgeon(s) and Role:     * Sheree Decker MD - Primary    Preop Diagnosis:  Kidney stone [N20 0]  Calculus of ureter [N20 1]    Post-Op Diagnosis Codes:     * Kidney stone [N20 0]     * Left ureteral stone [N20 1]     * Right renal mass [N28 89]    Procedure(s) (LRB):  cystoscopy, bilateral retrograde pyelography, bilateral ureteroscopy with stone extraction / laser lithotripsy (Bilateral)    Specimen(s):  ID Type Source Tests Collected by Time Destination   1 : Bladder Stones Other Soft Tissue, Other TISSUE EXAM Sheree Decker MD 2019 1710    2 : Right Renal Pelvic Lesion Tissue Lesion TISSUE EXAM Sheree Decker MD 2019 1759    3 : Tumor Right Renal Pelvis Brushing Brushing, Renal Right NON-GYNECOLOGIC CYTOLOGY Sheree Decker MD 2019 1815    4 : Right Renal Pelvic Washings Washing Renal Washing, Right NON-GYNECOLOGIC CYTOLOGY Sheree Decker MD 2019 1822        Estimated Blood Loss:   Minimal    Drains:  * No LDAs found *    Anesthesia Type:   General    Operative Indications:  Kidney stone [N20 0]  Calculus of ureter [N20 1]  Tumor right renal pelvis    Operative Findings:  Normal bladder and orifices, lateralizing hematuria from the right collecting system  Left retrograde revealed a filling defect in the distal left ureter which on ureteroscopy was a ureteral stone  This was lasered and basket extracted  The right retrograde revealed extrinsic compression of the mid and upper pole calices  On ureteral pyeloscopy a lesion was noted in the upper mid pole calyx  This appears to be invading the collecting system rather then emanating from the collecting system  There was also a small upper pole stone which was not blocking and was left alone  Biopsies were taken of the collecting system mass as well as cytology obtained    Brushings of the area were also performed  Stents were placed bilaterally  Both  stents should remain in place 10 days to 2 weeks  The right stent has a Dangler  The left stent will need to be removed cystoscopically  Complications:   None    Procedure and Technique:  Patient was brought to the operating properly identified  General anesthesia was administered  She was placed in lithotomy position prepped and draped in usual sterile fashion  Compression boots were employed  Intravenous antibiotic was administered by Anesthesia  An appropriate time-out was performed  Cystourethroscopy was performed with 25 Monegasque cystoscope with findings as above  A tiger tail catheter and dilute Omnipaque were then utilized to perform bilateral ureteroscopy  A filling defect was noted in the left ureter  This was addressed 1st   A 0 035 guidewire was passed up the left collecting system  The short semi rigid ureteral scope was then employed and left ureteroscopy performed up to the level of the pelvic vessels where a stone was identified  The stone was large approximately 6 to 8 mm in size and was not passable were basketable  The holmium laser was then employed along with a 272 micron fiber to break the stone into multiple fragments which were then basket extracted with a Nitinol tipless basket  Once this was performed ureteroscopy was performed up to the level of the ureteropelvic junction without finding of any ureteral lesion or additional stone  A 6 Monegasque stent was then passed in usual fashion over the guidewire and into position in the kidney  Fluoroscopically was well positioned in the kidney and cystoscopically was seen to be well coiled in the bladder  The Dangler was cut short and the stent will need to be removed cystoscopically  Attention was then turned to the right side  The guidewire was then passed up the right collecting system and into the kidney    A 10-12 ureteral access sheath was then easily placed up into the upper ureter  Additional guidewire was placed as a safety wire and the ureteral access sheath then removed and replaced over the initial wire leaving the safety wire outside the sheath  Flexible ureteroscopy was then performed with findings as above  There was a small stone but this was peripheral and did not to appear to be causing any obstruction  An erosive infiltrative lesion was noted in the upper mid pole calyx  This was biopsied several times with ureteroscopic biopsy forceps  Cytology was obtained and the area then brushed  Hemostasis appeared adequate  The ureteral scope and ureteral access sheath were then removed and no lesion or trauma noted in the ureter  A 6 Vietnamese stent was then placed in standard fashion into this kidney  At the conclusion the procedure nice coil was seen in the renal pelvis fluoroscopically and in the bladder cystoscopically  The bladder was then drained and the cystoscope removed  The Dangler on the right stent was left long  The patient tolerated procedure well  She was awakened from anesthesia and taken the recovery room in satisfactory condition    I was present for the entire procedure and A qualified resident physician was not available    Patient Disposition:  PACU     SIGNATURE: Kedar Damico MD  DATE: September 26, 2019  TIME: 6:46 PM

## 2019-09-26 NOTE — PERIOPERATIVE NURSING NOTE
Returned from [employer] awake and c/o left flank pain  Area has some redness  ivs running  Eating and drinking

## 2019-09-26 NOTE — DISCHARGE INSTRUCTIONS
Ureteroscopy   WHAT YOU NEED TO KNOW:   A ureteroscopy is a procedure to examine in the inside of your urinary tract, which includes your urethra, bladder, ureters, and kidneys  A ureteroscope is a small, thin tube with a light and camera on the end  Ureteroscopy can help your healthcare provider diagnose and treat problems in your urinary tract, such as kidney stones  DISCHARGE INSTRUCTIONS:   Medicine:   · Antibiotics  may be given to treat or prevent an infection  · Take your medicine as directed  Contact your healthcare provider if you think your medicine is not helping or if you have side effects  Tell him or her if you are allergic to any medicine  Keep a list of the medicines, vitamins, and herbs you take  Include the amounts, and when and why you take them  Bring the list or the pill bottles to follow-up visits  Carry your medicine list with you in case of an emergency  Follow up with your healthcare provider as directed:  Write down your questions so you remember to ask them during your visits  Drink liquids as directed  Liquids can help prevent kidney stones and urinary tract infections  Drink water and limit the amount of caffeine you drink  Caffeine may be found in coffee, tea, soda, sports drinks, and foods  Ask your healthcare provider how much liquid to drink each day  Contact your healthcare provider if:   · You have a fever  · You cannot urinate  · You have blood in your urine  · You are vomiting  · You have pain in your abdomen or side  · You have questions or concerns about your condition or care  © 2017 [address] [employer] Information is for End User's use only and may not be sold, redistributed or otherwise used for commercial purposes  All illustrations and images included in CareNotes® are the copyrighted property of A D A M , Inc  or Kal Strong  The above information is an  only   It is not intended as medical advice for individual conditions or treatments  Talk to your doctor, nurse or pharmacist before following any medical regimen to see if it is safe and effective for you  Colocación de catéter ureteral   LO QUE NECESITA SABER:   La colocación del catéter ureteral es un procedimiento que se realiza para abrir un uréter angosto o bloqueado  El uréter es el tubo que transporta la orina desde el [place]rah[place] a la vejiga  El catéter es un tubo plástico [place] hueco que se [place] para mantener abierto el uréter y así permitir el flujo de [place]  El catéter podría permanecer instalado por varias semanas  INSTRUCCIONES SOBRE EL ESTRELLA HOSPITALARIA:   Medicamentos:   · Los analgésicos  podrían administrarse para quitar o disminuir el dolor  No espere a que el dolor sea muy intenso para rob el medicamento  · Antibióticos  ayudan a prevenir infecciones  [place] médico podría recetarle antibióticos mientras tenga colocado el catéter  · Ault indu medicamentos dank se le haya indicado  Consulte con [place] médico si usted deyanira que [place] medicamento no le está ayudando o si presenta efectos secundarios  Infórmele si es alérgico a cualquier medicamento  Mantenga ghazal lista actualizada de los [place], las vitaminas y los productos herbales que danae  Incluya los siguientes datos de los medicamentos: cantidad, frecuencia y motivo de administración  Traiga con usted la lista o los envases de la píldoras a indu citas de seguimiento  Lleve la lista de los medicamentos con usted en reese de ghazal emergencia  Programe ghazal zoraida con [place] urólogo dank se le indique:  Usted necesitará citas de seguimiento regulares con [place] urólogo elvis el tiempo que tenga puesto el catéter  El urólogo lo revisará y se asegurará de que el catéter esté funcionando correctamente  Es probable que le reinier algunas pruebas de [place] para scott si hay infección  Anote indu preguntas para que se acuerde de hacerlas elvis indu visitas    Cuidados personales:   · [address] indicaciones  Pregunte a gomez médico sobre la cantidad de líquido que necesita rob todos los días y cuáles le recomienda  Bebidas dank jugos de arándanos o [place] podrían ser de mucha utilidad en la prevención de infecciones urinarias  · Regrese a indu actividades regulares  el día después de que le coloquen el catéter o según las indicaciones de gomez médico      · Usted puede bañarse  el día después de colocado el catéter, si gomez médico lo autoriza a hacerlo  Comuníquese con gomez médico o urólogo si:   · Usted tiene fiebre o escalofríos  · Usted siente que necesita orinar con frecuencia  · Usted tiene dolor cuando orina o dolor alrededor de la vejiga o el [place]delrahman Jenny  · Usted nota kris en la orina o la [place] se ve turbia  · Usted tiene preguntas o inquietudes acerca de gomez condición o cuidado  Busque atención médica de inmediato o llame al 911 si:   · Usted orina poco o no orina nada  · Usted tiene dolor abdominal intenso  © 2017 [facility] Information is for End User's use only and may not be sold, redistributed or otherwise used for commercial purposes  All illustrations and images included in CareNotes® are the copyrighted property of A D A M , Inc  or Kal Strong  Esta información es sólo para uso en educación  Gomez intención no es darle un consejo médico sobre enfermedades o tratamientos  Colsulte con gomez Joe Slicker farmacéutico antes de seguir cualquier régimen médico para saber si es seguro y efectivo para usted

## 2019-09-27 ENCOUNTER — TELEPHONE (OUTPATIENT)
Dept: UROLOGY | Facility: AMBULATORY SURGERY CENTER | Age: 84
End: 2019-09-27

## 2019-09-27 VITALS
WEIGHT: 155.65 LBS | RESPIRATION RATE: 18 BRPM | SYSTOLIC BLOOD PRESSURE: 173 MMHG | OXYGEN SATURATION: 96 % | HEART RATE: 81 BPM | DIASTOLIC BLOOD PRESSURE: 85 MMHG | TEMPERATURE: 97.5 F | BODY MASS INDEX: 26.72 KG/M2

## 2019-09-27 LAB
ALBUMIN SERPL BCP-MCNC: 3.8 G/DL (ref 3–5.2)
ALP SERPL-CCNC: 155 U/L (ref 43–122)
ALT SERPL W P-5'-P-CCNC: 13 U/L (ref 9–52)
ANION GAP SERPL CALCULATED.3IONS-SCNC: 10 MMOL/L (ref 5–14)
AST SERPL W P-5'-P-CCNC: 27 U/L (ref 14–36)
BASOPHILS # BLD AUTO: 0.1 THOUSANDS/ΜL (ref 0–0.1)
BASOPHILS NFR BLD AUTO: 1 % (ref 0–1)
BILIRUB SERPL-MCNC: 0.8 MG/DL
BUN SERPL-MCNC: 12 MG/DL (ref 5–25)
CALCIUM SERPL-MCNC: 8.5 MG/DL (ref 8.4–10.2)
CHLORIDE SERPL-SCNC: 101 MMOL/L (ref 97–108)
CO2 SERPL-SCNC: 27 MMOL/L (ref 22–30)
CREAT SERPL-MCNC: 1.06 MG/DL (ref 0.6–1.2)
EOSINOPHIL # BLD AUTO: 0.1 THOUSAND/ΜL (ref 0–0.4)
EOSINOPHIL NFR BLD AUTO: 1 % (ref 0–6)
ERYTHROCYTE [DISTWIDTH] IN BLOOD BY AUTOMATED COUNT: 15.6 %
GFR SERPL CREATININE-BSD FRML MDRD: 49 ML/MIN/1.73SQ M
GLUCOSE SERPL-MCNC: 107 MG/DL (ref 65–140)
GLUCOSE SERPL-MCNC: 186 MG/DL (ref 70–99)
GLUCOSE SERPL-MCNC: 94 MG/DL (ref 65–140)
HCT VFR BLD AUTO: 36.1 % (ref 36–46)
HGB BLD-MCNC: 11.9 G/DL (ref 12–16)
LYMPHOCYTES # BLD AUTO: 0.9 THOUSANDS/ΜL (ref 0.5–4)
LYMPHOCYTES NFR BLD AUTO: 9 % (ref 25–45)
MAGNESIUM SERPL-MCNC: 1.6 MG/DL (ref 1.6–2.3)
MCH RBC QN AUTO: 27.6 PG (ref 26–34)
MCHC RBC AUTO-ENTMCNC: 32.9 G/DL (ref 31–36)
MCV RBC AUTO: 84 FL (ref 80–100)
MONOCYTES # BLD AUTO: 0.4 THOUSAND/ΜL (ref 0.2–0.9)
MONOCYTES NFR BLD AUTO: 4 % (ref 1–10)
NEUTROPHILS # BLD AUTO: 8.8 THOUSANDS/ΜL (ref 1.8–7.8)
NEUTS SEG NFR BLD AUTO: 85 % (ref 45–65)
PHOSPHATE SERPL-MCNC: 2.3 MG/DL (ref 2.5–4.8)
PLATELET # BLD AUTO: 287 THOUSANDS/UL (ref 150–450)
PMV BLD AUTO: 8.9 FL (ref 8.9–12.7)
POTASSIUM SERPL-SCNC: 4 MMOL/L (ref 3.6–5)
PROT SERPL-MCNC: 6.9 G/DL (ref 5.9–8.4)
RBC # BLD AUTO: 4.3 MILLION/UL (ref 4–5.2)
SODIUM SERPL-SCNC: 138 MMOL/L (ref 137–147)
WBC # BLD AUTO: 10.3 THOUSAND/UL (ref 4.5–11)

## 2019-09-27 PROCEDURE — 85025 COMPLETE CBC W/AUTO DIFF WBC: CPT | Performed by: NURSE PRACTITIONER

## 2019-09-27 PROCEDURE — 99224 PR SBSQ OBSERVATION CARE/DAY 15 MINUTES: CPT | Performed by: UROLOGY

## 2019-09-27 PROCEDURE — 80053 COMPREHEN METABOLIC PANEL: CPT | Performed by: NURSE PRACTITIONER

## 2019-09-27 PROCEDURE — 84100 ASSAY OF PHOSPHORUS: CPT | Performed by: NURSE PRACTITIONER

## 2019-09-27 PROCEDURE — 82948 REAGENT STRIP/BLOOD GLUCOSE: CPT

## 2019-09-27 PROCEDURE — 83735 ASSAY OF MAGNESIUM: CPT | Performed by: NURSE PRACTITIONER

## 2019-09-27 RX ADMIN — METOPROLOL TARTRATE 25 MG: 25 TABLET ORAL at 08:01

## 2019-09-27 RX ADMIN — AMLODIPINE BESYLATE 5 MG: 5 TABLET ORAL at 08:01

## 2019-09-27 RX ADMIN — ASPIRIN 81 MG 81 MG: 81 TABLET ORAL at 08:01

## 2019-09-27 RX ADMIN — CEPHALEXIN 250 MG: 250 CAPSULE ORAL at 08:01

## 2019-09-27 NOTE — ASSESSMENT & PLAN NOTE
· Patient presented electively today for procedure with Urology after being found to have hematuria and bilateral kidney stones  · Status post cystoscopy with bilateral retrograde polygraphy and bilateral ureterscopy with lithotripsy  · Patient has baseline dementia and is only oriented to self however in PACU she presented more confused after receiving anesthesia  · It was decided by Urology to leak patient overnight for monitoring with plan to discharge in the morning  · Continue urinary retention protocol  · Initiate Keflex and Flomax per Urology  · Maintenance IV fluids overnight  · Monitor neurologic status

## 2019-09-27 NOTE — ASSESSMENT & PLAN NOTE
· Patient has baseline dementia history and only oriented to self  · Admitted to Med surge floor overnight for observation since patient seemed to have worsening confusion postoperatively  · Continue to monitor

## 2019-09-27 NOTE — ASSESSMENT & PLAN NOTE
· Last creatinine 09/24/2019 resulted 1 77  · Baseline creatinine appears 1 4-1 5  · Tejinder I most likely secondary to urinary obstruction  · Continue IV fluids as ordered  · Repeat BMP in a m

## 2019-09-27 NOTE — ASSESSMENT & PLAN NOTE
· Patient status post lithotripsy earlier today  · Voiding on her own postoperatively, urine remains bloody  · Follow bladder scan protocol

## 2019-09-27 NOTE — TELEPHONE ENCOUNTER
John Snellen was originally scheduled with Dr Carmen Bell on 9/30/19  Dr Kindra Vigil did biopsy of the renal collecting system yesterday during her procedure  Called Heather in Pathology  She stated that results of path will not be resulted by 9/30/19  Spoke to Radha Jensen (RN) in Dr Brittany Tolentino office  Made her aware  She rescheduled OV with Dr Carmen Bell  Appointment is now 10/9/19  Called patient's cell number  Cristina García answered the phone and identified herself as Naida's caregiver  Made her aware of the date, time, and location of Naida's new appointment  Called Lila Rojo (grand daughter) made her aware of the change in appointment with Dr Carmen Bell

## 2019-09-27 NOTE — ASSESSMENT & PLAN NOTE
Lab Results   Component Value Date    HGBA1C 7 7 (H) 06/04/2019     · Sliding scale insulin ordered  · Blood sugars AC HS

## 2019-09-27 NOTE — NURSING NOTE
AOX1 self HR regular Lungs clear hypoactive Bowel sounds x4 + PP ABD soft  Denies any pain  Will continue to monitor call bell within reach

## 2019-09-27 NOTE — PLAN OF CARE
Problem: Prexisting or High Potential for Compromised Skin Integrity  Goal: Skin integrity is maintained or improved  Description  INTERVENTIONS:  - Identify patients at risk for skin breakdown  - Assess and monitor skin integrity  - Assess and monitor nutrition and hydration status  - Monitor labs   - Assess for incontinence   - Turn and reposition patient  - Assist with mobility/ambulation  - Relieve pressure over bony prominences  - Avoid friction and shearing  - Provide appropriate hygiene as needed including keeping skin clean and dry  - Evaluate need for skin moisturizer/barrier cream  - Collaborate with interdisciplinary team   - Patient/family teaching  - Consider wound care consult   Outcome: Progressing     Problem: Potential for Falls  Goal: Patient will remain free of falls  Description  INTERVENTIONS:  - Assess patient frequently for physical needs  -  Identify cognitive and physical deficits and behaviors that affect risk of falls    -  Allen fall precautions as indicated by assessment   - Educate patient/family on patient safety including physical limitations  - Instruct patient to call for assistance with activity based on assessment  - Modify environment to reduce risk of injury  - Consider OT/PT consult to assist with strengthening/mobility  Outcome: Progressing

## 2019-09-27 NOTE — NURSING NOTE
PT was D/C to home D/C instruction was given to PT and PT verbalized understanding of D ;C instruction  All personal belonging was given to PT  IV was D/C  Volunteer accompany Py to [facility]

## 2019-09-27 NOTE — ASSESSMENT & PLAN NOTE
· Patient was found have bilateral kidney stones and underwent bilateral ureteroscopy with lithotripsy earlier today  · Admitted to Med surge postoperatively for increased confusion and observation overnight  · Monitor I&O  · Follow urinary retention protocol

## 2019-09-27 NOTE — H&P
H&P- Begracie Sever 1935, 81 y o  female MRN: [id number]    Unit/Bed#: 7T U 703-02 Encounter: [id number]    Primary Care Provider: Myrtle Restrepo MD   Date and time admitted to hospital: 9/26/2019  1:07 PM        * Status post cystoscopy  Assessment & Plan  · Patient presented electively today for procedure with Urology after being found to have hematuria and bilateral kidney stones  · Status post cystoscopy with bilateral retrograde polygraphy and bilateral ureterscopy with lithotripsy  · Patient has baseline dementia and is only oriented to self however in PACU she presented more confused after receiving anesthesia  · It was decided by Urology to leak patient overnight for monitoring with plan to discharge in the morning  · Continue urinary retention protocol  · Initiate Keflex and Flomax per Urology  · Maintenance IV fluids overnight  · Monitor neurologic status    Kidney stone  Assessment & Plan  · Patient was found have bilateral kidney stones and underwent bilateral ureteroscopy with lithotripsy earlier today  · Admitted to Med surge postoperatively for increased confusion and observation overnight  · Monitor I&O  · Follow urinary retention protocol    Calculus of ureter  Assessment & Plan  · Patient status post lithotripsy earlier today  · Voiding on her own postoperatively, urine remains bloody  · Follow bladder scan protocol    TEJINDER (acute kidney injury) ([facility] [address] )  Assessment & Plan  · Last creatinine 09/24/2019 resulted 1 77  · Baseline creatinine appears 1 4-1 5  · Tejinder I most likely secondary to urinary obstruction  · Continue IV fluids as ordered  · Repeat BMP in a m      [facility])  Assessment & Plan  · Patient has baseline dementia history and only oriented to self  · Admitted to Med surge floor overnight for observation since patient seemed to have worsening confusion postoperatively  · Continue to monitor    Hypertension  Assessment & Plan  · Continue home Norvasc  · Continue home metoprolol    Type 2 diabetes mellitus with hyperglycemia, with long-term current use of insulin ([facility])  Assessment & Plan    Lab Results   Component Value Date    HGBA1C 7 7 (H) 06/04/2019     · Sliding scale insulin ordered  · Blood sugars AC HS      VTE Prophylaxis: Pharmacologic VTE Prophylaxis contraindicated due to Hematuria  / sequential compression device   Code Status:  Full  POLST: POLST form is not discussed and not completed at this time  Discussion with family:  Patient daughter        Total Time for Visit, including Counseling / Coordination of Care: 30 minutes  Greater than 50% of this total time spent on direct patient counseling and coordination of care  Chief Complaint:   Patient confused at baseline    History of Present Illness:    Oneda Home is a 81 y o  female who presents with past medical history of dementia, CKD, hypertension, CAD, and type 2 diabetes presents to the medical-surgical floor from PACU after being admitted today for an elective cystoscopy  In the operating room patient underwent a cystoscopy with bilateral retrograde pyelogram with a bilateral ureteroscopy with lithotripsy done by Urology  In PACU patient had worsening confusion and family did not want to take the patient home this evening  Patient was admitted for overnight observation the medical-surgical unit and will be re-evaluated tomorrow morning by Urology      Review of Systems:    Review of Systems   Unable to perform ROS: Dementia       Past Medical and Surgical History:     Past Medical History:   Diagnosis Date    Anemia     Arthritis     Cancer ([facility] [address] )     kidney, liver    CHF (congestive heart failure) ([facility])     Chronic pain disorder     low back    COPD (chronic obstructive pulmonary disease) ([facility] [address] )     Coronary artery disease     Glaucoma     Hematuria 2019    Hyperlipidemia     Hypertension     Kidney stones     Lumbar stenosis     Myocardial infarction ([facility] [address] )     Osteoporosis     PAD (peripheral artery disease) (HCC)     Peripheral neuropathy     Shortness of breath        Past Surgical History:   Procedure Laterality Date    APPENDECTOMY      CARDIAC CATHETERIZATION  2019    CT EPIDURAL STEROID INJECTION (RYAN LUMBAR)  8/20/2019    CYSTOSCOPY      HAND SURGERY Right     HYSTERECTOMY      age 29    INCISION AND DRAINAGE OF WOUND Left 1/5/2019    Procedure: INCISION AND DRAINAGE (I&D) EXTREMITY;  Surgeon: Rajni Lemos MD;  Location: [facility];  Service: General    IR ABDOMINAL ANGIOGRAPHY / INTERVENTION  9/19/2019    LITHOTRIPSY      MASS EXCISION      remova of back wall mass    TONSILLECTOMY      TONSILLECTOMY         Meds/Allergies:    Prior to Admission medications    Medication Sig Start Date End Date Taking? Authorizing Provider   amLODIPine (NORVASC) 5 mg tablet Take 1 tablet (5 mg total) by mouth daily 8/5/19  Yes Kang Shelley MD   aspirin 81 mg chewable tablet Chew 1 tablet (81 mg total) daily 7/10/19  Yes Ivet Cutler MD   atorvastatin (LIPITOR) 40 mg tablet Take 1 tablet (40 mg total) by mouth every 24 hours 9/24/19  Yes Kang Shelley MD   bisacodyl (bisacodyl) 5 mg EC tablet Take 1 tablet (5 mg total) by mouth daily as needed for constipation 9/24/19 10/24/19 Yes Tahir Myers MD   brimonidine tartrate 0 2 % ophthalmic solution INSTILL 1 DROP INTO BOTH EYES 2 TIMES PER DAY 5/31/19  Yes Historical Provider, MD   calcitonin, salmon, (MIACALCIN) 200 units/act nasal spray 1 spray into each nostril daily 7/3/19  Yes Kang Shelley MD   dorzolamide (TRUSOPT) 2 % ophthalmic solution Administer 1 drop to both eyes 3 (three) times a day 6/11/19  Yes Tahir Myers MD   insulin degludec (TRESIBA FLEXTOUCH) 100 units/mL injection pen To use 25 U at bedtime   6/11/19  Yes Tahir Myers MD   ipratropium (ATROVENT HFA) 17 mcg/act inhaler Inhale 2 puffs 4 (four) times a day Ninety day supplies please 6/11/19  Yes Kang Shelley MD   Lancets (FREESTYLE) lancets Check blood sugar three times a day 7/17/19  Yes Tahir Myers MD   latanoprost (XALATAN) 0 005 % ophthalmic solution ADMINISTER 1 DROP TO BOTH EYES DAILY AT BEDTIME 8/11/19  Yes Historical Provider, MD   LINZESS 290 MCG CAPS TAKE ONE CAPSULE BY MOUTH DAILY ROSITA TOMASA CAPSULA POR VIA ORAL Trent Parth 7/1/19  Yes Historical Provider, MD   metoprolol tartrate (LOPRESSOR) 25 mg tablet Take 1 tablet (25 mg total) by mouth every 12 (twelve) hours 8/5/19  Yes Tahir Myers MD   NOVOLOG FLEXPEN 100 units/mL injection pen 12 UNITS 3 TIMES A DAY WITH MEALS Ninety day supplies please 6/11/19  Yes Tahir Myers MD   ondansetron (ZOFRAN) 4 mg tablet TAKE 1 TABLET (4 MG TOTAL) BY MOUTH EVERY 8 (EIGHT) HOURS AS NEEDED FOR NAUSEA OR VOMITING 9/17/19  Yes James Simons MD   oxyCODONE (OxyCONTIN) 10 mg 12 hr tablet Take 1 tablet (10 mg total) by mouth every 12 (twelve) hours for 10 daysMax Daily Amount: 20 mg 9/21/19 10/1/19 Yes Rc Berg MD   sitaGLIPtin (JANUVIA) 50 mg tablet Take 1 tablet (50 mg total) by mouth daily 6/11/19  Yes James Simons MD   cephalexin (KEFLEX) 250 mg capsule Take 1 capsule (250 mg total) by mouth every 12 (twelve) hours for 3 days 9/26/19 9/29/19  Peter Hutchison MD   pregabalin (LYRICA) 100 mg capsule Take 1 capsule (100 mg total) by mouth 2 (two) times a day for 100 days 6/11/19 9/19/19  James Simons MD   tamsulosin [facility]) 0 4 mg Take 1 capsule (0 4 mg total) by mouth every evening 9/26/19   Peter Hutchison MD     I have reviewed home medications with patient personally  Allergies:    Allergies   Allergen Reactions    Morphine And Related Vomiting    Tramadol Vomiting and Abdominal Pain     Other       Social History:     Marital Status: Single     Substance Use History:   Social History     Substance and Sexual Activity   Alcohol Use Never    Frequency: Never    Comment: OCCASIONAL     Social History     Tobacco Use   Smoking Status Never Smoker   Smokeless Tobacco Never Used Tobacco Comment    states she quit but family living with her states she smokes     Social History     Substance and Sexual Activity   Drug Use Never       Family History:    non-contributory    Physical Exam:     Vitals:   Blood Pressure: 131/81 (09/26/19 2157)  Pulse: 81 (09/26/19 2157)  Temperature: 98 5 °F (36 9 °C) (09/26/19 2018)  Respirations: 20 (09/26/19 2018)  SpO2: 92 % (09/26/19 2018)    Physical Exam   Constitutional: She appears well-developed and well-nourished  HENT:   Head: Normocephalic and atraumatic  Right Ear: External ear normal    Left Ear: External ear normal    Nose: Nose normal    Mouth/Throat: Oropharynx is clear and moist    Eyes: Pupils are equal, round, and reactive to light  Conjunctivae and EOM are normal    Neck: Normal range of motion  Neck supple  Cardiovascular: Normal rate, regular rhythm, normal heart sounds and intact distal pulses  Pulmonary/Chest: Effort normal and breath sounds normal    Abdominal: Soft  Bowel sounds are normal    Genitourinary:   Genitourinary Comments: Bloody urine   Musculoskeletal: Normal range of motion  Neurological: She is alert  Does not follow commands  Impulsive  Unable to be reoriented  Withdraws to painful stimuli   Skin: Skin is warm and dry  Capillary refill takes less than 2 seconds  Additional Data:     Lab Results: I have personally reviewed pertinent reports        Results from last 7 days   Lab Units 09/24/19  1026   WBC Thousand/uL 10 80   HEMOGLOBIN g/dL 11 6*   HEMATOCRIT % 34 9*   PLATELETS Thousands/uL 265   NEUTROS PCT % 81*   LYMPHS PCT % 11*   MONOS PCT % 5   EOS PCT % 3     Results from last 7 days   Lab Units 09/24/19  1026   SODIUM mmol/L 139   POTASSIUM mmol/L 3 6   CHLORIDE mmol/L 103   CO2 mmol/L 28   BUN mg/dL 11   CREATININE mg/dL 1 77*   ANION GAP mmol/L 8   CALCIUM mg/dL 8 5   ALBUMIN g/dL 3 5   TOTAL BILIRUBIN mg/dL 0 40   ALK PHOS U/L 116   ALT U/L 20   AST U/L 21   GLUCOSE RANDOM mg/dL 149* Results from last 7 days   Lab Units 09/26/19 2054 09/26/19 1951 09/26/19  1346 09/21/19  1612 09/21/19  1110 09/21/19  0749 09/20/19  2043 09/20/19  1705 09/20/19  1147 09/20/19  0528 09/19/19  2325   POC GLUCOSE mg/dl 107 116 104 181* 153* 151* 164* 227* 164* 168* 129               Imaging: I have personally reviewed pertinent reports  XR retrograde pyelogram    (Results Pending)         Allscripts / Epic Records Reviewed: Yes     ** Please Note: This note has been constructed using a voice recognition system      Emmie Graves

## 2019-09-30 ENCOUNTER — TELEPHONE (OUTPATIENT)
Dept: UROLOGY | Facility: AMBULATORY SURGERY CENTER | Age: 84
End: 2019-09-30

## 2019-09-30 NOTE — TELEPHONE ENCOUNTER
Spoke with Elier Archer, pt is c/o burning with urination  I advised that is normal after the type of procedure the pt had  She should hydrate aggressively, take pain meds as ordered  Call immediately for intractable pain, pain in new places, f/c, n/v  [facility] will call pt to discuss follow up appt and will call us back to schedule

## 2019-09-30 NOTE — TELEPHONE ENCOUNTER
Second call In regard to pain with urination    Call from [facility] with [address] [facility] [place] can be [phone]  Please advise

## 2019-09-30 NOTE — TELEPHONE ENCOUNTER
Patient managed by Maggie Becerril is post op  Marcelina from [facility] care is calling to say pt is having pain when she urinates since her surgery on 9/26/19  No burning no fever  Please advise

## 2019-09-30 NOTE — TELEPHONE ENCOUNTER
Burning is to be expected, if other signs of urinary tract infection they should obtain urine testing

## 2019-10-04 ENCOUNTER — OFFICE VISIT (OUTPATIENT)
Dept: FAMILY MEDICINE CLINIC | Facility: CLINIC | Age: 84
End: 2019-10-04
Payer: MEDICARE

## 2019-10-04 VITALS
DIASTOLIC BLOOD PRESSURE: 70 MMHG | OXYGEN SATURATION: 98 % | HEIGHT: 64 IN | TEMPERATURE: 98 F | SYSTOLIC BLOOD PRESSURE: 106 MMHG | RESPIRATION RATE: 16 BRPM | HEART RATE: 60 BPM | WEIGHT: 154 LBS | BODY MASS INDEX: 26.29 KG/M2

## 2019-10-04 DIAGNOSIS — N20.0 KIDNEY STONE ON RIGHT SIDE: Primary | ICD-10-CM

## 2019-10-04 PROCEDURE — 99213 OFFICE O/P EST LOW 20 MIN: CPT | Performed by: FAMILY MEDICINE

## 2019-10-04 RX ORDER — PREGABALIN 100 MG/1
CAPSULE ORAL
Refills: 5 | COMMUNITY
Start: 2019-09-27 | End: 2019-11-12 | Stop reason: SDUPTHER

## 2019-10-04 NOTE — PROGRESS NOTES
Assessment/Plan:      Kidney stone on right side  Great improvement  Patient care giver given false information  I clarify to them: patient and patient's care giver that this visit is to assess pain  Pain is under control and tumor in kidney was not proved  I told care giver that she needs to take patient to appts as requested since she is given multiple versions  This situation was reported to patient's agency in charge of her home care  Diagnoses and all orders for this visit:    Kidney stone on right side          Subjective:      Patient ID: Brenton Cano is a 81 y o  female  HPI  Reports pain worsening, Missing appt with Urology, stent to be remove  The following portions of the patient's history were reviewed and updated as appropriate: allergies, current medications, past family history, past medical history, past social history, past surgical history and problem list     Review of Systems   Constitutional: Positive for fatigue  Respiratory: Negative for shortness of breath  Cardiovascular: Negative for chest pain, palpitations and leg swelling  Gastrointestinal: Positive for constipation  Endocrine: Negative  Genitourinary: Negative  Musculoskeletal: Positive for arthralgias, back pain, myalgias and neck stiffness  Skin: Negative  Neurological: Positive for dizziness, weakness and numbness  Hematological: Negative  Psychiatric/Behavioral: Positive for sleep disturbance  Negative for suicidal ideas  The patient is nervous/anxious  Objective:      /70 (BP Location: Left arm, Patient Position: Sitting, Cuff Size: Standard)   Pulse 60   Temp 98 °F (36 7 °C) (Oral)   Resp 16   Ht 5' 4" (1 626 m)   Wt 69 9 kg (154 lb)   LMP 01/01/1990 (Within Years)   SpO2 98%   Breastfeeding? No   BMI 26 43 kg/m²          Physical Exam   HENT:   Head: Normocephalic  Eyes: Pupils are equal, round, and reactive to light  EOM are normal    Neck: Neck supple  Cardiovascular: Normal rate and regular rhythm  Pulmonary/Chest: Effort normal    Abdominal: Soft  There is tenderness  Musculoskeletal: Normal range of motion  She exhibits tenderness  Neurological: She is alert  Skin: Skin is warm and dry  Psychiatric: She has a normal mood and affect  Her behavior is normal    Nursing note and vitals reviewed

## 2019-10-07 ENCOUNTER — TELEPHONE (OUTPATIENT)
Dept: HEMATOLOGY ONCOLOGY | Facility: CLINIC | Age: 84
End: 2019-10-07

## 2019-10-07 DIAGNOSIS — K59.03 DRUG-INDUCED CONSTIPATION: ICD-10-CM

## 2019-10-07 LAB
CA PHOS MFR STONE: 5 %
COLOR STONE: NORMAL
COM MFR STONE: 95 %
COMMENT-STONE3: NORMAL
COMPOSITION: NORMAL
LABORATORY COMMENT REPORT: NORMAL
NIDUS STONE QL: NORMAL
PHOTO: NORMAL
SIZE STONE: NORMAL MM
STONE ANALYSIS-IMP: NORMAL
STONE ANALYSIS-IMP: NORMAL
WT STONE: 50 MG

## 2019-10-07 NOTE — TELEPHONE ENCOUNTER
Liseth from [facility] called to cancel patient's 10/9 Consult  appt with Dr George Mccarthy  She stated that per her pcp she do not have Cancer  Any further question's she can be reached at [phone]

## 2019-10-08 ENCOUNTER — HOSPITAL ENCOUNTER (OUTPATIENT)
Dept: RADIOLOGY | Facility: HOSPITAL | Age: 84
Discharge: HOME | End: 2019-10-08
Attending: ORTHOPAEDIC SURGERY
Payer: MEDICARE

## 2019-10-08 ENCOUNTER — TRANSCRIBE ORDERS (OUTPATIENT)
Dept: RADIOLOGY | Facility: HOSPITAL | Age: 84
End: 2019-10-08

## 2019-10-08 DIAGNOSIS — M21.371 BILATERAL FOOT-DROP: ICD-10-CM

## 2019-10-08 DIAGNOSIS — M48.062 SPINAL STENOSIS OF LUMBAR REGION WITH NEUROGENIC CLAUDICATION: ICD-10-CM

## 2019-10-08 DIAGNOSIS — M21.372 BILATERAL FOOT-DROP: ICD-10-CM

## 2019-10-08 DIAGNOSIS — M54.16 LUMBAR RADICULOPATHY: ICD-10-CM

## 2019-10-08 PROCEDURE — 62323 NJX INTERLAMINAR LMBR/SAC: CPT

## 2019-10-08 RX ORDER — METHYLPREDNISOLONE ACETATE 80 MG/ML
80 INJECTION, SUSPENSION INTRA-ARTICULAR; INTRALESIONAL; INTRAMUSCULAR; SOFT TISSUE
Status: COMPLETED | OUTPATIENT
Start: 2019-10-08 | End: 2019-10-08

## 2019-10-08 RX ORDER — LINACLOTIDE 290 UG/1
CAPSULE, GELATIN COATED ORAL
Qty: 30 CAPSULE | Refills: 5 | Status: SHIPPED | OUTPATIENT
Start: 2019-10-08 | End: 2020-01-03 | Stop reason: SDUPTHER

## 2019-10-08 RX ORDER — BUPIVACAINE HYDROCHLORIDE 2.5 MG/ML
2.5 INJECTION, SOLUTION EPIDURAL; INFILTRATION; INTRACAUDAL
Status: COMPLETED | OUTPATIENT
Start: 2019-10-08 | End: 2019-10-08

## 2019-10-08 RX ADMIN — BUPIVACAINE HYDROCHLORIDE 2.5 ML: 2.5 INJECTION, SOLUTION EPIDURAL; INFILTRATION; INTRACAUDAL; PERINEURAL at 10:12

## 2019-10-08 RX ADMIN — METHYLPREDNISOLONE ACETATE 80 MG: 80 INJECTION, SUSPENSION INTRA-ARTICULAR; INTRALESIONAL; INTRAMUSCULAR; SOFT TISSUE at 10:12

## 2019-10-09 ENCOUNTER — OFFICE VISIT (OUTPATIENT)
Dept: UROLOGY | Facility: MEDICAL CENTER | Age: 84
End: 2019-10-09
Payer: MEDICARE

## 2019-10-09 DIAGNOSIS — N17.9 AKI (ACUTE KIDNEY INJURY) (HCC): ICD-10-CM

## 2019-10-09 DIAGNOSIS — N20.1 LEFT URETERAL STONE: Primary | ICD-10-CM

## 2019-10-09 DIAGNOSIS — N28.89 RENAL MASS: ICD-10-CM

## 2019-10-09 PROCEDURE — 99214 OFFICE O/P EST MOD 30 MIN: CPT | Performed by: UROLOGY

## 2019-10-09 NOTE — PROGRESS NOTES
HISTORY:    Follow-up for hospital care  Several issues:  1  Left ureteral stone, laser and basket, stent September 26   2  Renal mass, lower pole, infiltrative with suggestion of extension of mass from parenchyma into collecting system  See op report from September 26, and CT scans leading up to that  CT of July did not show this mass, but two CTs in late September, including renal particle, show this  At ureteroscopy, it was visible evidence of mass protruding into the collecting system  ASSESSMENT / PLAN:    1  Stents removed today  2  Unusual right renal mass which seems to be apparent only on the most recent CT scans  3  Mild renal insufficiency  4  Discussed with patient, and her caregiver through  telephone today  I am not sure how much she understands  5  Follow-up to discuss this renal mass, patient knows to bring her family with her  It may take further evaluation, possible CT-guided biopsy to determine if this is a malignant mass that needs nephrectomy  The following portions of the patient's history were reviewed and updated as appropriate: allergies, current medications, past family history, past medical history, past social history, past surgical history and problem list     Review of Systems   All other systems reviewed and are negative  Objective:     Physical Exam   Constitutional: She appears well-developed and well-nourished  Frail, possible mild dementia   Genitourinary:   Genitourinary Comments:  Both stents removed with strings         No results found for: PSA]  BUN   Date Value Ref Range Status   09/27/2019 12 5 - 25 mg/dL Final   06/19/2018 28 (H) 5 - 25 MG/DL Final     Creatinine   Date Value Ref Range Status   09/27/2019 1 06 0 60 - 1 20 mg/dL Final     Comment:     Standardized to IDMS reference method   03/04/2014 1 18 0 60 - 1 30 mg/dL Final     Comment:     Standardized to IDMS reference method     No components found for: CBC      Patient Active Problem List   Diagnosis    Hypertension    Shoulder joint pain    Gastritis without bleeding    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 ([facility])    Preop examination    Gait difficulty    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 ([facility] )    Left arm cellulitis    Accident due to mechanical fall without injury    Type 2 diabetes mellitus with hyperglycemia, with long-term current use of insulin ([facility])    Inflammatory spondylopathy of lumbosacral region ([facility])    Chronic diastolic heart failure ([facility])    PAD (peripheral artery disease) ([facility])    Localized edema    Drug-induced constipation    Left ureteral stone    Type 2 myocardial infarction ([facility])    Chronic bilateral low back pain with bilateral sciatica    Toxic encephalopathy    Anemia    Stage 3 [facility] ([facility]    Coronary artery disease involving native coronary artery of native heart without angina pectoris    Shortness of breath    Gross hematuria    Spinal stenosis of lumbar region with neurogenic claudication    Lumbar radiculopathy    Lumbar spondylosis    Lumbar disc herniation    Kidney stone on right side    Kidney stone    Calculus of ureter    Leukocytosis    Liver nodule    Right flank pain    Acute blood loss anemia    Renal mass    Age-related cognitive decline    Status post cystoscopy    Dementia ([facility]    EULOGIO (acute kidney injury) ([place] [address] )        Diagnoses and all orders for this visit:    Left ureteral stone    EULOGIO (acute kidney injury) ([facility] )    Renal mass           Patient ID: Army Fletcher is a 81 y o  female        Current Outpatient Medications:     amLODIPine (NORVASC) 5 mg tablet, Take 1 tablet (5 mg total) by mouth daily, Disp: 90 tablet, Rfl: 3    aspirin 81 mg chewable tablet, Chew 1 tablet (81 mg total) daily, Disp: 30 tablet, Rfl: 0    atorvastatin (LIPITOR) 40 mg tablet, Take 1 tablet (40 mg total) by mouth every 24 hours, Disp: 90 tablet, Rfl: 3    brimonidine tartrate 0 2 % ophthalmic solution, INSTILL 1 DROP INTO BOTH EYES 2 TIMES PER DAY, Disp: , Rfl: 6    calcitonin, salmon, (MIACALCIN) 200 units/act nasal spray, 1 spray into each nostril daily, Disp: 1 mL, Rfl: 3    dorzolamide (TRUSOPT) 2 % ophthalmic solution, Administer 1 drop to both eyes 3 (three) times a day, Disp: 5 mL, Rfl: 5    insulin degludec (TRESIBA FLEXTOUCH) 100 units/mL injection pen, To use 25 U at bedtime  , Disp: 9 pen, Rfl: 5    ipratropium (ATROVENT HFA) 17 mcg/act inhaler, Inhale 2 puffs 4 (four) times a day Ninety day supplies please, Disp: 3 Inhaler, Rfl: 3    Lancets (FREESTYLE) lancets, Check blood sugar three times a day, Disp: 100 each, Rfl: 5    latanoprost (XALATAN) 0 005 % ophthalmic solution, ADMINISTER 1 DROP TO BOTH EYES DAILY AT BEDTIME, Disp: , Rfl: 5    LINZESS 290 MCG CAPS, TAKE ONE CAPSULE BY MOUTH DAILY ROSITA TOMASA CAPSULA POR VIA ORAL DIARIAMENTE, Disp: , Rfl: 5    LINZESS 290 MCG CAPS, TAKE ONE CAPSULE BY MOUTH DAILYTOMAR TOMASA CAPSULA POR VIA ORAL DIARIAMENTE, Disp: 30 capsule, Rfl: 5    metoprolol tartrate (LOPRESSOR) 25 mg tablet, Take 1 tablet (25 mg total) by mouth every 12 (twelve) hours, Disp: 180 tablet, Rfl: 3    NOVOLOG FLEXPEN 100 units/mL injection pen, 12 UNITS 3 TIMES A DAY WITH MEALS Ninety day supplies please, Disp: 25 pen, Rfl: 3    pregabalin (LYRICA) 100 mg capsule, TAKE ONE CAPSULE BY MOUTH TWICE DAILY ROSITA TOMASA CAPSULA POR VIA ORAL DOS VECES AL MAC, Disp: , Rfl: 5    sitaGLIPtin (JANUVIA) 50 mg tablet, Take 1 tablet (50 mg total) by mouth daily, Disp: 30 tablet, Rfl: 5    pregabalin (LYRICA) 100 mg capsule, Take 1 capsule (100 mg total) by mouth 2 (two) times a day for 100 days, Disp: 60 capsule, Rfl: 5    tamsulosin (FLOMAX) 0 4 mg, Take 1 capsule (0 4 mg total) by mouth every evening (Patient not taking: Reported on 10/9/2019), Disp: 30 capsule, Rfl: 0    Past Medical History:   Diagnosis Date    Anemia     Arthritis     Cancer ([facility] [address] )     kidney, liver    [facility]) [facility])     Chronic pain disorder     low back    COPD (chronic obstructive pulmonary disease) ([facility] [address] )     Coronary artery disease     Glaucoma     Hematuria 2019    Hyperlipidemia     Hypertension     Kidney stones     Lumbar stenosis     Myocardial infarction ([facility] )     Osteoporosis     PAD (peripheral artery disease) ([facility])     Peripheral neuropathy     Shortness of breath        Past Surgical History:   Procedure Laterality Date    APPENDECTOMY      CARDIAC CATHETERIZATION  2019    CT EPIDURAL STEROID INJECTION (RYAN LUMBAR)  8/20/2019    CT EPIDURAL STEROID INJECTION (RYAN LUMBAR)  10/8/2019    CYSTOSCOPY      HAND SURGERY Right     HYSTERECTOMY      age 29    INCISION AND DRAINAGE OF WOUND Left 1/5/2019    Procedure: INCISION AND DRAINAGE (I&D) EXTREMITY;  Surgeon: Marzette Holter, MD;  Location:  MAIN OR;  Service: General    IR ABDOMINAL ANGIOGRAPHY / INTERVENTION  9/19/2019    LITHOTRIPSY      MASS EXCISION      remova of back wall mass    OH CYSTO/URETERO W/LITHOTRIPSY &INDWELL STENT INSRT Bilateral 9/26/2019    Procedure: cystoscopy, bilateral retrograde pyelography, bilateral ureteroscopy with stone extraction / [place] lithotripsy;  Surgeon: Zulay Walton MD;  Location: [address] [place] [address] MAIN OR;  Service: Urology   [place] [address]         Social History

## 2019-10-11 DIAGNOSIS — I73.9 PAD (PERIPHERAL ARTERY DISEASE) (HCC): ICD-10-CM

## 2019-10-14 RX ORDER — BLOOD SUGAR DIAGNOSTIC
STRIP MISCELLANEOUS
Qty: 100 EACH | OUTPATIENT
Start: 2019-10-14

## 2019-10-14 RX ORDER — PENTOXIFYLLINE 400 MG/1
TABLET, EXTENDED RELEASE ORAL
Qty: 270 TABLET | Refills: 3 | OUTPATIENT
Start: 2019-10-14

## 2019-10-14 NOTE — ASSESSMENT & PLAN NOTE
Great improvement  Patient care giver given false information  I clarify to them: patient and patient's care giver that this visit is to assess pain  Pain is under control and tumor in kidney was not proved  I told care giver that she needs to take patient to appts as requested since she is given multiple versions  This situation was reported to patient's agency in charge of her home care

## 2019-10-20 NOTE — DISCHARGE INSTRUCTIONS
Please see your medication list for new meds and medication changes  Take the medications as prescribed  Follow up with Urology in 2 weeks  Call neurosurgery , Dr Abhinav Yap office if you develop worsening hip pain and difficulty walking  Follow up with the heart doctors in 2 weeks  Their office will coordinate with you 
3

## 2019-10-21 NOTE — PROGRESS NOTES
[address] FOLLOW UP NOTE     CHIEF COMPLAINT   Brenton Cano is a 81 y o  female with a complaint of   Chief Complaint   Patient presents with    Renal Mass       History of Present Illness:     81 y o   female, who originally presented to the [facility] Emergency Department for vague right upper quadrant pain and fatigue and overall feeling unwell  She underwent ultrasound in a workup for possible liver gallbladder source, and eventually had a CT which showed hyperdense material consistent with hemorrhage in blood products in the right mid calyx, no ureteral obstruction, with the possibility of underlying renal mass of the right kidney existing  Contrast was not given given her acute kidney injury      She was transferred to the [facility], IR consultation was obtained  She had renal artery angiogram with attempted embolization, there were no actively extravasating vessels and embolization was not performed      Her pre-hospital hemoglobin was in the 11-13 range, down to the mid 9 that original presentation with hemoglobin of 8 3 at its lowest early this morning  Currently holding steady at a 0 5 throughout the day today  Her acute kidney injury has improved from 1 5 for admission to 1 18 this morning  Underwent cystoscopy with bilateral ureteroscopy on 9/26/19 with Dr Braxton Sotelo  RIGHT ureteroscopy demonstrated what appeared to be a renal parenchymal lesion invading into collecting system  LEFT sided ureteroscopy was for treatment of stone  At the request of Dr Leodan Will,  Patient is seeing me today to discuss further her CT findings and the pathology from her recent ureteroscopy  She presents with her granddaughter and her age  These family member speak [place]  At their report, they  Were aware that the patient underwent a biopsy but that the pathology was negative  The patient's primary care doctor has been directing her care up until this point    Of note she did have a medical oncology appointment in November but canceled this      Past Medical History:     Past Medical History:   Diagnosis Date    Anemia     Arthritis     Cancer ([facility] [address] )     kidney, liver    CHF (congestive heart failure) (HCC)     Chronic pain disorder     low back    COPD (chronic obstructive pulmonary disease) (HCC)     Coronary artery disease     Glaucoma     Hematuria 2019    Hyperlipidemia     Hypertension     Kidney stones     Lumbar stenosis     Myocardial infarction ([facility] [address] )     Osteoporosis     PAD (peripheral artery disease) (HCC)     Peripheral neuropathy     Shortness of breath        PAST SURGICAL HISTORY:     Past Surgical History:   Procedure Laterality Date    APPENDECTOMY      CARDIAC CATHETERIZATION  2019    CT EPIDURAL STEROID INJECTION (RYAN LUMBAR)  8/20/2019    CT EPIDURAL STEROID INJECTION (RYAN LUMBAR)  10/8/2019    CYSTOSCOPY      HAND SURGERY Right     HYSTERECTOMY      age 29    INCISION AND DRAINAGE OF WOUND Left 1/5/2019    Procedure: INCISION AND DRAINAGE (I&D) EXTREMITY;  Surgeon: Christi Dave MD;  Location:  MAIN OR;  Service: General    IR ABDOMINAL ANGIOGRAPHY / INTERVENTION  9/19/2019    LITHOTRIPSY      MASS EXCISION      remova of back wall mass    AZ CYSTO/URETERO W/LITHOTRIPSY &INDWELL STENT INSRT Bilateral 9/26/2019    Procedure: cystoscopy, bilateral retrograde pyelography, bilateral ureteroscopy with stone extraction / laser lithotripsy;  Surgeon: Soto Loja MD;  Location: [address] [place] [address] MAIN OR;  Service: Urology    TONSILLECTOMY      TONSILLECTOMY         CURRENT MEDICATIONS:     Current Outpatient Medications   Medication Sig Dispense Refill    amLODIPine (NORVASC) 5 mg tablet Take 1 tablet (5 mg total) by mouth daily 90 tablet 3    aspirin 81 mg chewable tablet Chew 1 tablet (81 mg total) daily 30 tablet 0    atorvastatin (LIPITOR) 40 mg tablet Take 1 tablet (40 mg total) by mouth every 24 hours 90 tablet 3    brimonidine tartrate 0 2 % ophthalmic solution INSTILL 1 DROP INTO BOTH EYES 2 TIMES PER DAY  6    calcitonin, salmon, (MIACALCIN) 200 units/act nasal spray 1 spray into each nostril daily 1 mL 3    dorzolamide (TRUSOPT) 2 % ophthalmic solution Administer 1 drop to both eyes 3 (three) times a day 5 mL 5    insulin degludec (TRESIBA FLEXTOUCH) 100 units/mL injection pen To use 25 U at bedtime  9 pen 5    ipratropium (ATROVENT HFA) 17 mcg/act inhaler Inhale 2 puffs 4 (four) times a day Ninety day supplies please 3 Inhaler 3    Lancets (FREESTYLE) lancets Check blood sugar three times a day 100 each 5    latanoprost (XALATAN) 0 005 % ophthalmic solution ADMINISTER 1 DROP TO BOTH EYES DAILY AT BEDTIME  5    LINZESS 290 MCG CAPS TAKE ONE CAPSULE BY MOUTH DAILY ROSITA TOMASA CAPSULA POR VIA ORAL DIARIAMENTE  5    LINZESS 290 MCG CAPS TAKE ONE CAPSULE BY MOUTH DAILYTOMAR TOMASA CAPSULA POR VIA ORAL DIARIAMENTE 30 capsule 5    metoprolol tartrate (LOPRESSOR) 25 mg tablet Take 1 tablet (25 mg total) by mouth every 12 (twelve) hours 180 tablet 3    NOVOLOG FLEXPEN 100 units/mL injection pen 12 UNITS 3 TIMES A DAY WITH MEALS Ninety day supplies please 25 pen 3    pregabalin (LYRICA) 100 mg capsule TAKE ONE CAPSULE BY MOUTH TWICE DAILY ROSITA TOMASA CAPSULA POR VIA ORAL DOS VECES AL MAC  5    sitaGLIPtin (JANUVIA) 50 mg tablet Take 1 tablet (50 mg total) by mouth daily 30 tablet 5    tamsulosin (FLOMAX) 0 4 mg Take 1 capsule (0 4 mg total) by mouth every evening 30 capsule 0    pregabalin (LYRICA) 100 mg capsule Take 1 capsule (100 mg total) by mouth 2 (two) times a day for 100 days 60 capsule 5     No current facility-administered medications for this visit          ALLERGIES:     Allergies   Allergen Reactions    Morphine And Related Vomiting    Tramadol Vomiting and Abdominal Pain     Other       SOCIAL HISTORY:     Social History     Socioeconomic History    Marital status: Single     Spouse name: None    Number of children: None    Years of education: None    Highest education level: None   Occupational History    None   Social Needs    Financial resource strain: None    Food insecurity:     Worry: None     Inability: None    Transportation needs:     Medical: None     Non-medical: None   Tobacco Use    Smoking status: Never Smoker    Smokeless tobacco: Never Used    Tobacco comment: states she quit but family living with her states she smokes   Substance and Sexual Activity    Alcohol use: Never     Frequency: Never     Comment: OCCASIONAL    Drug use: Never    Sexual activity: Yes     Partners: Male   Lifestyle    Physical activity:     Days per week: None     Minutes per session: None    Stress: None   Relationships    Social connections:     Talks on phone: None     Gets together: None     Attends [employer] service: None     Active member of club or organization: None     Attends meetings of clubs or organizations: None     Relationship status: None    Intimate partner violence:     Fear of current or ex partner: None     Emotionally abused: None     Physically abused: None     Forced sexual activity: None   Other Topics Concern    None   Social History Narrative    None       SOCIAL HISTORY:     Family History   Problem Relation Age of Onset    Diabetes Mother     No Known Problems Father     Throat cancer Daughter     No Known Problems Maternal Grandmother     No Known Problems Maternal Grandfather     No Known Problems Paternal Grandmother     No Known Problems Paternal Grandfather        REVIEW OF SYSTEMS:     Review of Systems   Constitutional: Negative  Respiratory: Negative  Cardiovascular: Negative  Gastrointestinal: Negative  Genitourinary: Positive for hematuria (  Resolved currently)  Musculoskeletal: Positive for gait problem  Skin: Negative  Psychiatric/Behavioral: Negative            PHYSICAL EXAM:     /62   Pulse 68   LMP 01/01/1990 (Within Years)     General:   Elderly female in no acute distress  They have a normal affect  There is not appear to be any gross neurologic defects or abnormalities  Hard of hearing  HEENT:  Normocephalic, atraumatic  Neck is supple without any palpable lymphadenopathy  Cardiovascular:  Patient has normal palpable distal radial pulses  There is no significant peripheral edema  No JVD is noted  Respiratory:  Patient has unlabored respirations  There is no audible wheeze or rhonchi  Abdomen:    Abdomen is soft and nontender  There is no tympany  Inguinal and umbilical hernia are not appreciated  Musculoskeletal:   Wheelchair-bound  Dermatologic:  Patient has no skin abnormalities or rashes  LABS:     CBC:   Lab Results   Component Value Date    WBC 10 30 2019    HGB 11 9 (L) 2019    HCT 36 1 2019    MCV 84 2019     2019       BMP:   Lab Results   Component Value Date    GLUCOSE 136 (H) 2018    CALCIUM 8 5 2019     2018    K 4 0 2019    CO2 27 2019     2019    BUN 12 2019    CREATININE 1 06 2019       IMAGIN/20/19  CT ABDOMEN AND PELVIS WITH AND WITHOUT IV CONTRAST     INDICATION:   Hematuria        COMPARISON:  Renal arteriogram 2019, CT abdomen pelvis 2019, CT abdomen pelvis 2019     TECHNIQUE: Initial CT of the abdomen and pelvis was performed without intravenous contrast   Subsequent dynamic CT evaluation of the abdomen and pelvis was performed after the administration of intravenous contrast in both nephrographic and delayed   phases after the administration of intravenous contrast   Axial, sagittal, and coronal 2D reformatted images were created from the source data and submitted for interpretation       Radiation dose length product (DLP) for this visit:  1740 mGy-cm     This examination, like all CT scans performed in the [facility], was performed utilizing techniques to minimize radiation dose exposure, including the use of iterative   reconstruction and automated exposure control      IV Contrast:  100 mL of iodixanol (VISIPAQUE)  Enteric Contrast:  Enteric contrast was not administered      FINDINGS:     ABDOMEN     RIGHT KIDNEY AND URETER:  Calculus is again seen within extrarenal pelvis as well as within the midpole  Extrarenal pelvis dilatation has decreased and has been resolution of blood products within the extrarenal pelvis  There is a heterogeneously enhancing bilobed lesion within   the mid to lower pole with areas of hyperattenuation on precontrast images, consistent with hemorrhage  There is a portion of the lesion that appears to be within the parenchyma of the lower pole (series 601 image 92)  The largest portion of the lesion   is centered within the hilum replacing the renal sinus fat and appears to communicate with the collecting system, best seen on series 604 image 87 and also evidenced by the fact that the extrarenal pelvis previously contained blood products  The   parenchymal and hilar components as a conglomerate measures approximately 5 9 cm craniocaudal by 6 0 cm transverse by 5 6 cm anterior posterior  The renal vein is patent      The ureter is not opacified on delayed images      There is a 7 mm nodule within the upper pole of the right kidney that appears to enhance (series 3 image 33)        LEFT KIDNEY AND URETER:  No solid renal mass  No detectable urothelial mass  Simple cyst is present within the upper pole  No hydronephrosis or hydroureter  No urinary tract calculi  No perinephric collection      URINARY BLADDER:  No bladder wall mass  No calculi         LOWER CHEST:  No clinically significant abnormality identified in the visualized lower chest      LIVER/BILIARY TREE:  There is a 1 3 x 1 1 cm ill-defined lesion within segment 8 of the liver corresponding to findings on recent CT   This lesion does appear to have measurable enhancement as well as washout on delayed images, suggestive of metastatic   lesion      GALLBLADDER:  No calcified gallstones  No pericholecystic inflammatory change      SPLEEN:  Unremarkable      PANCREAS:  Unremarkable      ADRENAL GLANDS:  1 5 x 1 1 cm right adrenal nodule is stable dating back to 8/25/2016, suggesting benign lesion      STOMACH AND BOWEL:  There is colonic diverticulosis without evidence of acute diverticulitis      ABDOMINOPELVIC CAVITY:  2 8 x 1 8 cm necrotic aortocaval lymph node has increased in size from the prior CT of 7/31/2019, previously measuring 1 7 x 0 9 cm      VESSELS:  Unremarkable for patient's age      PELVIS     REPRODUCTIVE ORGANS:  Unremarkable for patient's age      APPENDIX: No findings to suggest appendicitis      ABDOMINAL WALL/INGUINAL REGIONS:  Unremarkable      OSSEOUS STRUCTURES:  No acute fracture or destructive osseous lesion      IMPRESSION:        1  Heterogeneous lobulated lesion within the mid to lower pole the right kidney with the portion that appears parenchymal and dominant portion that is centered within the hilum replacing the renal sinus fat and with evidence of communication with the   collecting system  Blood products within dilated renal pelvis have resolved with decreased dilatation of extrarenal pelvis  Differential considerations include renal cell carcinoma with invasion into the collecting system versus transitional cell   carcinoma with invasion into the parenchyma  Further evaluation with cystoscopy for tissue sampling is recommended         2  Enhancing nodule within the upper pole of the right kidney, suspicious for synchronous neoplasm      3  Increase in size of aortocaval lymph node, consistent with regional metastasis      4  Ill-defined enhancing lesion within segment 8 of the liver, consistent with metastasis      5  Right adrenal nodule, stable dating back to 8/25/2016 suggesting benign  CYTOLOGY:     9/26/19  Final Diagnosis   A   Renal Washing, Right, :  Atypical urothelial cells (AUC) - see comment  Clusters of atypical urothelium present with associated background degenerative changes and occasional inflammatory cells      Satisfactory for evaluation          B  Brushing, Right Renal :  Negative for high grade urothelial carcinoma ([address])    - see comment  Clusters of benign urothelium present      Satisfactory for evaluation  PATHOLOGY:     9/26/19  Final Diagnosis   A  Urinary Bladder, Bladder Stones:  - Consistent with calculus, pending chemical analysis at [address] (gross only)       B  Kidney, Right, Right Renal Pelvic Lesion:  - One fragment of benign urothelial mucosa and two separate fragments of necrotic debris  See comment   - No evidence of malignancy  ASSESSMENT:     81 y o  female with metastatic renal lesion, likely RCC given negative urothelial brushing and biopsy    PLAN:      I discussed with the patient's granddaughter that she likely has metastatic renal cell carcinoma given the aortic caval lymph node and liver lesion  We do not have positive pathology  The urothelial biopsy was negative but there still remains an underlying issue  I recommended that the patient undergo in interventional radiology biopsy of the renal masses this is the easiest lesion to sample  This would allow us to determine whether the mass is clear cell renal cell carcinoma or some other underlying pathology  This will be necessary for discussion of treatment of her likely metastatic disease  I have recommended a staging CT of her chest   I would not recommend palliative nephrectomy at this point time is the patient appears resolved from her prior hematuria episode  Surgery would not be curative  The patient has been re-referred to the medical oncology team in our oncology nurse navigator has met the patient  I did discuss with the granddaughter the option of the palliative care referral at this stage but at this point time they have deferred    Patient's primary care doctor likes to be involved for discussion of all these issues and so I will reach out to him directly to discuss further        I would be happy to see the patient back after her renal mass biopsy and visit with the medical oncology team

## 2019-10-23 ENCOUNTER — DOCUMENTATION (OUTPATIENT)
Dept: UROLOGY | Facility: CLINIC | Age: 84
End: 2019-10-23

## 2019-10-23 ENCOUNTER — TELEPHONE (OUTPATIENT)
Dept: FAMILY MEDICINE CLINIC | Facility: CLINIC | Age: 84
End: 2019-10-23

## 2019-10-23 ENCOUNTER — CONSULT (OUTPATIENT)
Dept: UROLOGY | Facility: CLINIC | Age: 84
End: 2019-10-23
Payer: MEDICARE

## 2019-10-23 VITALS — DIASTOLIC BLOOD PRESSURE: 62 MMHG | HEART RATE: 68 BPM | SYSTOLIC BLOOD PRESSURE: 126 MMHG

## 2019-10-23 DIAGNOSIS — K76.9 LIVER LESION: ICD-10-CM

## 2019-10-23 DIAGNOSIS — N20.1 CALCULUS OF URETER: ICD-10-CM

## 2019-10-23 DIAGNOSIS — N28.89 RENAL MASS: Primary | ICD-10-CM

## 2019-10-23 DIAGNOSIS — C77.2 METASTASIS TO RETROPERITONEAL LYMPH NODE (HCC): ICD-10-CM

## 2019-10-23 DIAGNOSIS — N20.0 KIDNEY STONE: ICD-10-CM

## 2019-10-23 PROCEDURE — 99215 OFFICE O/P EST HI 40 MIN: CPT | Performed by: UROLOGY

## 2019-10-23 NOTE — TELEPHONE ENCOUNTER
I spoke with Dr Deborah Liang about coordination of  care  There is a high likelihood the patient is having renal carcinoma with metastasis  The goal at this time is to get kidney biopsy to make tissue diagnosis  through intervention Radiology and consult Oncology  Thank you Dr Karina Abad for your lengthily review  and  recommendations of Mrs Omkar kraus

## 2019-10-23 NOTE — PROGRESS NOTES
I met with Kendall Padilla (granddaughter) and her caregiver after their visit with Dr Levis Meigs today  Elina Sweet speaks [address]   Jannet Neftaly speaks English and requested to be Naida's primary contact  Dr Levis Meigs ordered CT and IR biopsy of the Kidney  They had previously scheduled appointments with Medical Oncology that they canceled per PCP request because biopsy of right renal pelvic lesion biopsy was negative  They understand that Elina Sweet does need to have consult visit with Medical Oncology  Explained to them that the IR biopsy and CT need to be completed prior to the visit with Medical Oncology  Deloris scheduled CT and IR renal biopsy  Jannet Higginbotham does not know if Elina Sweet will want to pursue treatment  She will talk to her mother and aunt about what was discussed with Dr Levis Meigs today  They were given information on Palliative care  They were encouraged to keep appointment with Medical Oncology  They were given my contact information and encouraged to call with questions  Elina Sweet does not have any transportation issues and requests all appointments in \[facility] [place] [facility]\""  Called the [phone] [facility] [address]  Spoke to [employer]  She will schedule Naida with Dr Adalgisa Zaragoza on 11/22/19 and call Jannet Higginbotham (granddaughter) with the appointment

## 2019-10-23 NOTE — TELEPHONE ENCOUNTER
The patient was last seen on 10/9/2019 by Dr Kana Gomes in the [facility] location; continuation of the medication was authorized at that time    Request for same, 90 day supply with 1 refills was queued and forwarded to the Advanced Practitioner covering the [facility] location for approval

## 2019-10-24 RX ORDER — TAMSULOSIN HYDROCHLORIDE 0.4 MG/1
0.4 CAPSULE ORAL EVERY EVENING
Qty: 90 CAPSULE | Refills: 1 | Status: SHIPPED | OUTPATIENT
Start: 2019-10-24 | End: 2019-12-11 | Stop reason: ALTCHOICE

## 2019-10-25 DIAGNOSIS — K59.03 DRUG-INDUCED CONSTIPATION: ICD-10-CM

## 2019-10-25 RX ORDER — BISACODYL 5 MG
TABLET, DELAYED RELEASE (ENTERIC COATED) ORAL
Qty: 30 TABLET | Refills: 0 | OUTPATIENT
Start: 2019-10-25

## 2019-10-28 ENCOUNTER — TELEPHONE (OUTPATIENT)
Dept: FAMILY MEDICINE CLINIC | Facility: CLINIC | Age: 84
End: 2019-10-28

## 2019-10-28 DIAGNOSIS — K59.03 DRUG-INDUCED CONSTIPATION: Primary | ICD-10-CM

## 2019-10-28 DIAGNOSIS — R10.11 RIGHT UPPER QUADRANT ABDOMINAL PAIN: ICD-10-CM

## 2019-10-28 RX ORDER — OXYCODONE HCL 10 MG/1
10 TABLET, FILM COATED, EXTENDED RELEASE ORAL
Qty: 14 TABLET | Refills: 0 | Status: SHIPPED | OUTPATIENT
Start: 2019-10-28 | End: 2019-12-04 | Stop reason: SDUPTHER

## 2019-10-28 RX ORDER — SODIUM PHOSPHATE, DIBASIC AND SODIUM PHOSPHATE, MONOBASIC, UNSPECIFIED FORM 7; 19 G/118ML; G/118ML
1 ENEMA RECTAL DAILY
Qty: 2 BOTTLE | Refills: 0 | Status: SHIPPED | OUTPATIENT
Start: 2019-10-28 | End: 2019-11-25 | Stop reason: SDUPTHER

## 2019-10-28 RX ORDER — POLYETHYLENE GLYCOL 3350 17 G/17G
17 POWDER, FOR SOLUTION ORAL DAILY
Qty: 30 EACH | Refills: 0 | Status: SHIPPED | OUTPATIENT
Start: 2019-10-28 | End: 2020-01-03 | Stop reason: ALTCHOICE

## 2019-10-28 NOTE — TELEPHONE ENCOUNTER
[facility] stated pt is having a lot of abdominal pain due to her constipation  Would like any recommendation or medication send to local pharmacy

## 2019-10-29 ENCOUNTER — HOSPITAL ENCOUNTER (OUTPATIENT)
Dept: CT IMAGING | Facility: HOSPITAL | Age: 84
Discharge: HOME | End: 2019-10-29
Payer: MEDICARE

## 2019-10-29 DIAGNOSIS — K76.9 LIVER LESION: ICD-10-CM

## 2019-10-29 DIAGNOSIS — N28.89 RENAL MASS: ICD-10-CM

## 2019-10-29 DIAGNOSIS — C77.2 METASTASIS TO RETROPERITONEAL LYMPH NODE (HCC): ICD-10-CM

## 2019-10-29 PROCEDURE — 71250 CT THORAX DX C-: CPT

## 2019-11-01 ENCOUNTER — APPOINTMENT (EMERGENCY)
Dept: RADIOLOGY | Facility: HOSPITAL | Age: 84
End: 2019-11-01
Payer: MEDICARE

## 2019-11-01 ENCOUNTER — HOSPITAL ENCOUNTER (EMERGENCY)
Facility: HOSPITAL | Age: 84
Discharge: LEFT BEFORE DISCHARGE | End: 2019-11-01
Attending: EMERGENCY MEDICINE
Payer: MEDICARE

## 2019-11-01 VITALS
SYSTOLIC BLOOD PRESSURE: 144 MMHG | DIASTOLIC BLOOD PRESSURE: 71 MMHG | WEIGHT: 150.79 LBS | HEART RATE: 95 BPM | OXYGEN SATURATION: 96 % | TEMPERATURE: 100.8 F | RESPIRATION RATE: 28 BRPM | BODY MASS INDEX: 25.88 KG/M2

## 2019-11-01 DIAGNOSIS — R10.9 RIGHT FLANK PAIN: Primary | ICD-10-CM

## 2019-11-01 DIAGNOSIS — R50.9 FEVER: ICD-10-CM

## 2019-11-01 LAB
ALBUMIN SERPL BCP-MCNC: 3.8 G/DL (ref 3–5.2)
ALP SERPL-CCNC: 149 U/L (ref 43–122)
ALT SERPL W P-5'-P-CCNC: 18 U/L (ref 9–52)
ANION GAP SERPL CALCULATED.3IONS-SCNC: 10 MMOL/L (ref 5–14)
APTT PPP: 27 SECONDS (ref 25–32)
AST SERPL W P-5'-P-CCNC: 24 U/L (ref 14–36)
BILIRUB SERPL-MCNC: 0.9 MG/DL
BUN SERPL-MCNC: 20 MG/DL (ref 5–25)
CALCIUM SERPL-MCNC: 9.2 MG/DL (ref 8.4–10.2)
CHLORIDE SERPL-SCNC: 92 MMOL/L (ref 97–108)
CO2 SERPL-SCNC: 32 MMOL/L (ref 22–30)
CREAT SERPL-MCNC: 1.49 MG/DL (ref 0.6–1.2)
ERYTHROCYTE [DISTWIDTH] IN BLOOD BY AUTOMATED COUNT: 17.3 %
GFR SERPL CREATININE-BSD FRML MDRD: 32 ML/MIN/1.73SQ M
GLUCOSE SERPL-MCNC: 270 MG/DL (ref 70–99)
HCT VFR BLD AUTO: 33.2 % (ref 36–46)
HGB BLD-MCNC: 10.8 G/DL (ref 12–16)
INR PPP: 1.06 (ref 0.91–1.09)
LACTATE SERPL-SCNC: 1.2 MMOL/L (ref 0.7–2)
LIPASE SERPL-CCNC: 49 U/L (ref 23–300)
LYMPHOCYTES # BLD AUTO: 1.58 THOUSAND/UL (ref 0.5–4)
LYMPHOCYTES # BLD AUTO: 9 % (ref 25–45)
MCH RBC QN AUTO: 26.4 PG (ref 26–34)
MCHC RBC AUTO-ENTMCNC: 32.6 G/DL (ref 31–36)
MCV RBC AUTO: 81 FL (ref 80–100)
MONOCYTES # BLD AUTO: 0.7 THOUSAND/UL (ref 0.2–0.9)
MONOCYTES NFR BLD AUTO: 4 % (ref 1–10)
NEUTS BAND NFR BLD MANUAL: 1 % (ref 0–8)
NEUTS SEG # BLD: 15.23 THOUSAND/UL (ref 1.8–7.8)
NEUTS SEG NFR BLD AUTO: 86 %
PLATELET # BLD AUTO: 263 THOUSANDS/UL (ref 150–450)
PLATELET BLD QL SMEAR: ADEQUATE
PMV BLD AUTO: 9.1 FL (ref 8.9–12.7)
POTASSIUM SERPL-SCNC: 4.2 MMOL/L (ref 3.6–5)
PROT SERPL-MCNC: 6.9 G/DL (ref 5.9–8.4)
PROTHROMBIN TIME: 11.6 SECONDS (ref 9.8–12)
RBC # BLD AUTO: 4.11 MILLION/UL (ref 4–5.2)
RBC MORPH BLD: NORMAL
SODIUM SERPL-SCNC: 134 MMOL/L (ref 137–147)
TOTAL CELLS COUNTED SPEC: 100
TOXIC GRANULES BLD QL SMEAR: PRESENT
WBC # BLD AUTO: 17.5 THOUSAND/UL (ref 4.5–11)

## 2019-11-01 PROCEDURE — 99284 EMERGENCY DEPT VISIT MOD MDM: CPT | Performed by: PHYSICIAN ASSISTANT

## 2019-11-01 PROCEDURE — 85730 THROMBOPLASTIN TIME PARTIAL: CPT | Performed by: PHYSICIAN ASSISTANT

## 2019-11-01 PROCEDURE — 93005 ELECTROCARDIOGRAM TRACING: CPT

## 2019-11-01 PROCEDURE — 99284 EMERGENCY DEPT VISIT MOD MDM: CPT

## 2019-11-01 PROCEDURE — 85007 BL SMEAR W/DIFF WBC COUNT: CPT | Performed by: PHYSICIAN ASSISTANT

## 2019-11-01 PROCEDURE — 87040 BLOOD CULTURE FOR BACTERIA: CPT | Performed by: PHYSICIAN ASSISTANT

## 2019-11-01 PROCEDURE — 71046 X-RAY EXAM CHEST 2 VIEWS: CPT

## 2019-11-01 PROCEDURE — 87077 CULTURE AEROBIC IDENTIFY: CPT | Performed by: PHYSICIAN ASSISTANT

## 2019-11-01 PROCEDURE — 83605 ASSAY OF LACTIC ACID: CPT | Performed by: PHYSICIAN ASSISTANT

## 2019-11-01 PROCEDURE — 85027 COMPLETE CBC AUTOMATED: CPT | Performed by: PHYSICIAN ASSISTANT

## 2019-11-01 PROCEDURE — 80053 COMPREHEN METABOLIC PANEL: CPT | Performed by: PHYSICIAN ASSISTANT

## 2019-11-01 PROCEDURE — 83690 ASSAY OF LIPASE: CPT | Performed by: PHYSICIAN ASSISTANT

## 2019-11-01 PROCEDURE — 85610 PROTHROMBIN TIME: CPT | Performed by: PHYSICIAN ASSISTANT

## 2019-11-01 PROCEDURE — 36415 COLL VENOUS BLD VENIPUNCTURE: CPT | Performed by: PHYSICIAN ASSISTANT

## 2019-11-01 PROCEDURE — 87186 SC STD MICRODIL/AGAR DIL: CPT | Performed by: PHYSICIAN ASSISTANT

## 2019-11-01 RX ORDER — ONDANSETRON 2 MG/ML
4 INJECTION INTRAMUSCULAR; INTRAVENOUS ONCE
Status: DISCONTINUED | OUTPATIENT
Start: 2019-11-01 | End: 2019-11-01 | Stop reason: HOSPADM

## 2019-11-01 RX ORDER — FENTANYL CITRATE 50 UG/ML
25 INJECTION, SOLUTION INTRAMUSCULAR; INTRAVENOUS ONCE
Status: DISCONTINUED | OUTPATIENT
Start: 2019-11-01 | End: 2019-11-01 | Stop reason: HOSPADM

## 2019-11-01 NOTE — ED NOTES
Patient sitting up in bed, speaking with home health aide, patient appears pain free at this time     Chayito Xiong RN  11/01/19 [id number]

## 2019-11-01 NOTE — ED PROVIDER NOTES
History  Chief Complaint   Patient presents with    Back Pain     rt sided lower back pain  per home attendant has a history of kidney problems  81-year-old female with past medical history anemia, CHF, COPD, diabetes, hyperlipidemia, hypertension, kidney stones, PAD presenting for evaluation of right-sided flank pain  Patient reports she has had right-sided flank pain worsening over the past week  Patient presents with home health aide who helps provide history  Patient aide reports that patient is concerned that this is another kidney stone  Upon record review patient is found to have a right-sided renal mass and is due for biopsy on   Patient states that the pain radiates around toward the abdomen  Denies any vomiting but reports nausea  No constipation or diarrhea  Denies any fevers chills at home  No dysuria hematuria chest pain shortness breath headache or dizziness  States that she has been taking oxycodone at home for her symptoms however ran out of her medication and PCP has not refilled  Prior to Admission Medications   Prescriptions Last Dose Informant Patient Reported? Taking?    LINZESS 290 MCG CAPS  Family Member Yes No   Sig: TAKE ONE CAPSULE BY MOUTH DAILY ROSITA TOMASA CAPSULA POR VIA ORAL DIARIAMENTE   LINZESS 290 MCG CAPS   No No   Sig: TAKE ONE CAPSULE BY MOUTH DAILYTOMAR TOMASA CAPSULA POR VIA ORAL DIARIAMENTE   Lancets (FREESTYLE) lancets  Family Member No No   Sig: Check blood sugar three times a day   NOVOLOG FLEXPEN 100 units/mL injection pen  Family Member No No   Si UNITS 3 TIMES A DAY WITH MEALS Ninety day supplies please   Sodium Phosphates (ENEMA READY-TO-USE) 7-19 GM/118ML ENEM   No No   Sig: Insert 1 Bottle into the rectum daily   amLODIPine (NORVASC) 5 mg tablet  Family Member No No   Sig: Take 1 tablet (5 mg total) by mouth daily   aspirin 81 mg chewable tablet  Family Member No No   Sig: Chew 1 tablet (81 mg total) daily   atorvastatin (LIPITOR) 40 mg tablet   No No   Sig: Take 1 tablet (40 mg total) by mouth every 24 hours   brimonidine tartrate 0 2 % ophthalmic solution  Family Member Yes No   Sig: INSTILL 1 DROP INTO BOTH EYES 2 TIMES PER DAY   calcitonin, salmon, (MIACALCIN) 200 units/act nasal spray  Family Member No No   Si spray into each nostril daily   dorzolamide (TRUSOPT) 2 % ophthalmic solution  Family Member No No   Sig: Administer 1 drop to both eyes 3 (three) times a day   insulin degludec (TRESIBA FLEXTOUCH) 100 units/mL injection pen  Family Member No No   Sig: To use 25 U at bedtime     ipratropium (ATROVENT HFA) 17 mcg/act inhaler  Family Member No No   Sig: Inhale 2 puffs 4 (four) times a day Ninety day supplies please   latanoprost (XALATAN) 0 005 % ophthalmic solution  Family Member Yes No   Sig: ADMINISTER 1 DROP TO BOTH EYES DAILY AT BEDTIME   metoprolol tartrate (LOPRESSOR) 25 mg tablet  Family Member No No   Sig: Take 1 tablet (25 mg total) by mouth every 12 (twelve) hours   oxyCODONE (OxyCONTIN) 10 mg 12 hr tablet   No No   Sig: Take 1 tablet (10 mg total) by mouth daily at bedtimeMax Daily Amount: 10 mg   polyethylene glycol (MIRALAX) 17 g packet   No No   Sig: Take 17 g by mouth daily   pregabalin (LYRICA) 100 mg capsule  Family Member No No   Sig: Take 1 capsule (100 mg total) by mouth 2 (two) times a day for 100 days   pregabalin (LYRICA) 100 mg capsule   Yes No   Sig: TAKE ONE CAPSULE BY MOUTH TWICE DAILY ROSITA TOMASA CAPSULA POR VIA ORAL DOS VECES AL MAC   sitaGLIPtin (JANUVIA) 50 mg tablet  Family Member No No   Sig: Take 1 tablet (50 mg total) by mouth daily   tamsulosin (FLOMAX) 0 4 mg   No No   Sig: Take 1 capsule (0 4 mg total) by mouth every evening      Facility-Administered Medications: None       Past Medical History:   Diagnosis Date    Anemia     Arthritis     Cancer (HCC)     kidney, liver    CHF (congestive heart failure) (HCC)     Chronic pain disorder     low back    [facility]     Coronary artery disease     Diabetes mellitus [facility]     Glaucoma     Hematuria     Hyperlipidemia     Hypertension     Kidney stones     Lumbar stenosis     Myocardial infarction [facility]     Osteoporosis     [facility]peripheral [facility]     Peripheral neuropathy     Shortness of breath        Past Surgical History:   Procedure Laterality Date    APPENDECTOMY      CARDIAC CATHETERIZATION      CT EPIDURAL STEROID INJECTION (RYAN LUMBAR)  2019    CT EPIDURAL STEROID INJECTION (RYAN LUMBAR)  10/8/2019    CYSTOSCOPY      HAND SURGERY Right     HYSTERECTOMY      age 29    INCISION AND DRAINAGE OF WOUND Left 2019    Procedure: INCISION AND DRAINAGE (I&D) EXTREMITY;  Surgeon: Vania Jolly MD;  Location:  MAIN OR;  Service: General    IR ABDOMINAL ANGIOGRAPHY / INTERVENTION  2019    LITHOTRIPSY      MASS EXCISION      [place] of back wall mass    OH CYSTO/URETERO W/LITHOTRIPSY &INDWELL STENT INSRT Bilateral 2019    Procedure: cystoscopy, bilateral retrograde pyelography, bilateral ureteroscopy with stone extraction / Maite Nickels lithotripsy;  Surgeon: Olegario Hamlin MD;  Location: [address] [place] [address] MAIN OR;  Service: Urology    TONSILLECTOMY      TONSILLECTOMY         Family History   Problem Relation Age of Onset    Diabetes Mother     No Known Problems Father     Throat cancer Daughter     No Known Problems Maternal Grandmother     No Known Problems Maternal Grandfather     No Known Problems Paternal Grandmother     No Known Problems Paternal Grandfather      I have reviewed and agree with the history as documented      Social History     Tobacco Use    Smoking status: Former Smoker     Packs/day: 1 00     Years: 40 00     Pack years: 40 00     Types: Cigarettes     Last attempt to quit: 2017     Years since quittin 8    Smokeless tobacco: Never Used    Tobacco comment: states she quit but family living with her states she smokes Substance Use Topics    Alcohol use: Never     Frequency: Never     Comment: OCCASIONAL    Drug use: Never        Review of Systems   All other systems reviewed and are negative  Physical Exam  Physical Exam   Constitutional: She is oriented to person, place, and time  She appears well-developed and well-nourished  No distress  Lying left lateral decubitus   HENT:   Head: Normocephalic and atraumatic  Right Ear: External ear normal    Left Ear: External ear normal    Eyes: Conjunctivae are normal    EOM grossly intact   Neck: Normal range of motion  Neck supple  No JVD present  Cardiovascular: Normal rate  Pulmonary/Chest: Effort normal  No stridor  No respiratory distress  Abdominal: Soft  Bowel sounds are normal  She exhibits no distension  There is tenderness (RUQ and RLQ)  There is no guarding  No cva tenderness   Musculoskeletal:   FROM, steady gait, cap refill brisk, strength and sensation grossly intact throughout   Lymphadenopathy:     She has no cervical adenopathy  Neurological: She is alert and oriented to person, place, and time  Skin: Skin is warm and dry  Capillary refill takes less than 2 seconds  She is not diaphoretic  Psychiatric: She has a normal mood and affect  Her behavior is normal    Nursing note and vitals reviewed  Vital Signs  ED Triage Vitals [11/01/19 0918]   Temperature Pulse Respirations Blood Pressure SpO2   99 1 °F (37 3 °C) 95 (!) 28 144/71 96 %      Temp Source Heart Rate Source Patient Position - Orthostatic VS BP Location FiO2 (%)   Tympanic Monitor Sitting Left arm --      Pain Score       Worst Possible Pain           Vitals:    11/01/19 0918   BP: 144/71   Pulse: 95   Patient Position - Orthostatic VS: Sitting         Visual Acuity      ED Medications  Medications - No data to display    Diagnostic Studies  Results Reviewed     Procedure Component Value Units Date/Time    Blood culture #1 [[id number]] Collected:  11/01/19 1043    Lab Status:   In process Specimen:  Blood from Line, Venous Updated:  11/01/19 1208    Blood culture #2 [[id number]] Collected:  11/01/19 1044    Lab Status: In process Specimen:  Blood from Hand, Right Updated:  11/01/19 1208    Lactic acid, plasma [[id number]]  (Normal) Collected:  11/01/19 1044    Lab Status:  Final result Specimen:  Blood from Hand, Right Updated:  11/01/19 1114     LACTIC ACID 1 2 mmol/L     Narrative:       Result may be elevated if tourniquet was used during collection      Lipase [[id number]]  (Normal) Collected:  11/01/19 1043    Lab Status:  Final result Specimen:  Blood from Line, Venous Updated:  11/01/19 1113     Lipase 49 u/L     Comprehensive metabolic panel [[id number]]  (Abnormal) Collected:  11/01/19 1043    Lab Status:  Final result Specimen:  Blood from Line, Venous Updated:  11/01/19 1113     Sodium 134 mmol/L      Potassium 4 2 mmol/L      Chloride 92 mmol/L      CO2 32 mmol/L      ANION GAP 10 mmol/L      BUN 20 mg/dL      Creatinine 1 49 mg/dL      Glucose 270 mg/dL      Calcium 9 2 mg/dL      AST 24 U/L      ALT 18 U/L      Alkaline Phosphatase 149 U/L      Total Protein 6 9 g/dL      Albumin 3 8 g/dL      Total Bilirubin 0 90 mg/dL      eGFR 32 ml/min/1 73sq m     Narrative:       [place] guidelines for Chronic Kidney Disease (CKD):     Stage 1 with normal or high GFR (GFR > 90 mL/min/1 73 square meters)    Stage 2 Mild CKD (GFR = 60-89 mL/min/1 73 square meters)    Stage 3A Moderate CKD (GFR = 45-59 mL/min/1 73 square meters)    Stage 3B Moderate CKD (GFR = 30-44 mL/min/1 73 square meters)    Stage 4 Severe CKD (GFR = 15-29 mL/min/1 73 square meters)    Stage 5 End Stage CKD (GFR <15 mL/min/1 73 square meters)  Note: GFR calculation is accurate only with a steady state creatinine    Protime-INR [[id number]]  (Normal) Collected:  11/01/19 1043    Lab Status:  Final result Specimen:  Blood from Arm, Right Updated:  11/01/19 1113     Protime 11 6 seconds      INR 1  06    APTT [[id number]]  (Normal) Collected:  11/01/19 1043    Lab Status:  Final result Specimen:  Blood from Arm, Right Updated:  11/01/19 1113     PTT 27 seconds     CBC and differential [[id number]]  (Abnormal) Collected:  11/01/19 1043    Lab Status:  Final result Specimen:  Blood from Line, Venous Updated:  11/01/19 1109     WBC 17 50 Thousand/uL      RBC 4 11 Million/uL      Hemoglobin 10 8 g/dL      Hematocrit 33 2 %      MCV 81 fL      MCH 26 4 pg      MCHC 32 6 g/dL      RDW 17 3 %      MPV 9 1 fL      Platelets 853 Thousands/uL                  XR chest 2 views   Final Result by Kayden Jackson MD (11/01 1126)      No acute cardiopulmonary disease              Workstation performed: [id number]                    Procedures  ECG 12 Lead Documentation Only  Date/Time: 11/1/2019 3:04 PM  Performed by: Laila Barksdale PA-C  Authorized by: Laila Barksdale PA-C     Indications / Diagnosis:  Flank pain  ECG reviewed by me, the ED Provider: yes    Patient location:  ED  Interpretation:     Interpretation: abnormal    Rate:     ECG rate:  79    ECG rate assessment: normal    Rhythm:     Rhythm: sinus rhythm    Ectopy:     Ectopy: none    QRS:     QRS axis:  Normal  Conduction:     Conduction: normal    ST segments:     ST segments:  Normal  T waves:     T waves: normal    Comments:      RBBB, qtc 470           ED Course  ED Course as of Nov 01 1754 Fri Nov 01, 2019   1109 Pt asking to leave, care taker states she wants pt to stay for evaluation       1134 Patient again asking if she was relief, patient was not given any pain medication, stating now she has no pain, caregiver at bedside stating that she does not want to left patient go home however patient can make her own medical decisions, she is awake alert and oriented, asking again to leave, will have patient sign out [facility]                      Initial Sepsis Screening     Row Name 11/01/19 0948                Is the patient's history suggestive of a new or worsening infection? (!) Yes (Proceed)  -BG        Suspected source of infection  urinary tract infection  -BG        Are two or more of the following signs & symptoms of infection both present and new to the patient?         Indicate SIRS criteria  Tachycardia > 90 bpm;Tachypnea > 20 resp per min  -BG        If the answer is yes to both questions, suspicion of sepsis is present          If severe sepsis is present AND tissue hypoperfusion perists in the hour after fluid resuscitation or lactate > 4, the patient meets criteria for SEPTIC SHOCK          Are any of the following organ dysfunction criteria present within 6 hours of suspected infection and SIRS criteria that are NOT considered to be chronic conditions?         Organ dysfunction          Date of presentation of severe sepsis          Time of presentation of severe sepsis          Tissue hypoperfusion persists in the hour after crystalloid fluid administration, evidenced, by either:          Was hypotension present within one hour of the conclusion of crystalloid fluid administration?           Date of presentation of septic shock          Time of presentation of septic shock            User Key  (r) = Recorded By, (t) = Taken By, (c) = Cosigned By    Initials Name Provider Type    BG Rosemary Amato PA-C Physician Assistant                  MDM  Number of Diagnoses or Management Options  Fever:   Right flank pain:   Diagnosis management comments: 27-year-old female presenting for evaluation of right flank pain, patient had a right renal mass concerning for renal cancer on previous CTs, she is due for biopsy to rule out cancer on the 16th of this month, IV was placed and labs were drawn however patient wanted to leave AMA because she wanted to go home to eat prior to any imaging being completed, she reports that her pain had resolved on its own before any medication administration, she states she is completely asymptomatic at the time of discharge  Patient does make her own medical decisions and her health aide is not p o  A so she signed AMA papers and was discharged    The patient has requested to leave the ED against medical advice  The patient reason(s) for leaving include, but are not limited to, the following: she wants to go home to eat  I believe this patient is of sound mind and competent to refuse medical care  The patient is responding and asking questions appropriately  The patient is oriented to person, place and time  The patient is not psychotic, delusional, suicidal, homicidal or hallucinating  The patient demonstrates a normal mental capacity to make decisions regarding their healthcare  The patient is clinically sober and does not appear to be under the influence of any illicit drugs at this time  The patient has been advised of the risks, in layman terms, of leaving AMA which include, but are not limited to death, coma, permanent disability, loss of current lifestyle, delay in diagnosis  Alternatives have been offered - the patient remains steadfast in their wish to leave  The patient has been advised that should they change their mind they are welcome to return to this hospital, or any other, at any time  The patient understands that in no way does an [place] discharge mean that I do not want them to have the best medical care available  To this end, I have provided appropriate prescriptions, referrals, and discharge instructions  The patient did sign AMA paperwork  The above discussion was witnessed by another member of staff  Portions of the record may have been created with voice recognition software  Occasional wrong word or "sound a like" substitutions may have occurred due to the inherent limitations of voice recognition software  Read the chart carefully and recognize, using context, where substitutions have occurred          Disposition  Final diagnoses:   Right flank pain   Fever     Time reflects when diagnosis was documented in both MDM as applicable and the Disposition within this note     Time User Action Codes Description Comment    11/1/2019 11:38 AM Clista Lemon C Add [R10 9] Right flank pain     11/1/2019 11:38 AM Clista Lemon C Add [R50 9] Fever       ED Disposition     ED Disposition Condition Date/Time Comment    [place]  Fri Nov 1, 2019 11:38 AM Date: 11/1/2019  Patient: Perico Nunez  Admitted: 11/1/2019  9:14 AM  Attending Provider: Rexie Olszewski, DO    Perico Nunez or her authorized caregiver has made the decision for the patient to leave the emergency department against the advice o f the emergency department staff  She or her authorized caregiver has been informed and understands the inherent risks, including death  She or her authorized caregiver has decided to accept the responsibility for this decision  Perico Nunez and julian hernandez necessary parties have been advised that she may return for further evaluation or treatment  Her condition at time of discharge was stable    Perico Nunez had current vital signs as follows:  /71 (BP Location: Left arm)   Pulse 95   Temp (! ) 100 8 °F (38 2 °C) (Oral)   Resp (!) 28   Wt 68 4 kg (150 lb 12 7 oz)   LMP 01/01/1990 (Within Years)         Follow-up Information     Follow up With Specialties Details Why Contact Info    Brandi Alcantara MD Family Medicine Call in 1 day  [address] [place] [phone]  [address]  [facility] [place] [address] [facility]      Your Urologist  Call in 1 day            Discharge Medication List as of 11/1/2019 11:38 AM      CONTINUE these medications which have NOT CHANGED    Details   amLODIPine (NORVASC) 5 mg tablet Take 1 tablet (5 mg total) by mouth daily, Starting Mon 8/5/2019, Normal      aspirin 81 mg chewable tablet Chew 1 tablet (81 mg total) daily, Starting Wed 7/10/2019, Normal      atorvastatin (LIPITOR) 40 mg tablet Take 1 tablet (40 mg total) by mouth every 24 hours, Starting Tue 9/24/2019, Normal      brimonidine tartrate 0 2 % ophthalmic solution INSTILL 1 DROP INTO BOTH EYES 2 TIMES PER DAY, Historical Med      calcitonin, salmon, (MIACALCIN) 200 units/act nasal spray 1 spray into each nostril daily, Starting Wed 7/3/2019, Normal      dorzolamide (TRUSOPT) 2 % ophthalmic solution Administer 1 drop to both eyes 3 (three) times a day, Starting Tue 6/11/2019, Normal      insulin degludec (TRESIBA FLEXTOUCH) 100 units/mL injection pen To use 25 U at bedtime , Normal      ipratropium (ATROVENT HFA) 17 mcg/act inhaler Inhale 2 puffs 4 (four) times a day Ninety day supplies please, Starting Tue 6/11/2019, Normal      Lancets (FREESTYLE) lancets Check blood sugar three times a day, Normal      latanoprost (XALATAN) 0 005 % ophthalmic solution ADMINISTER 1 DROP TO BOTH EYES DAILY AT BEDTIME, Historical Med      !! LINZESS 290 MCG CAPS TAKE ONE CAPSULE BY MOUTH DAILY ROSITA TOMASA CAPSULA POR VIA ORAL DIARIAMENTE, Historical Med      !!  LINZESS 290 MCG CAPS TAKE ONE CAPSULE BY MOUTH DAILYTOMAR TOMASA CAPSULA POR VIA ORAL DIARIAMENTE, Normal      metoprolol tartrate (LOPRESSOR) 25 mg tablet Take 1 tablet (25 mg total) by mouth every 12 (twelve) hours, Starting Mon 8/5/2019, Normal      NOVOLOG FLEXPEN 100 units/mL injection pen 12 UNITS 3 TIMES A DAY WITH MEALS Ninety day supplies please, Normal      oxyCODONE (OxyCONTIN) 10 mg 12 hr tablet Take 1 tablet (10 mg total) by mouth daily at bedtimeMax Daily Amount: 10 mg, Starting Mon 10/28/2019, Normal      polyethylene glycol (MIRALAX) 17 g packet Take 17 g by mouth daily, Starting Mon 10/28/2019, Normal      pregabalin (LYRICA) 100 mg capsule TAKE ONE CAPSULE BY MOUTH TWICE DAILY ROSITA TOMASA CAPSULA POR VIA ORAL DOS VECES AL MAC, Historical Med      sitaGLIPtin (JANUVIA) 50 mg tablet Take 1 tablet (50 mg total) by mouth daily, Starting Tue 6/11/2019, Normal      Sodium Phosphates (ENEMA READY-TO-USE) 7-19 GM/118ML ENEM Insert 1 Bottle into the rectum daily, Starting Mon 10/28/2019, Normal tamsulosin (FLOMAX) 0 4 mg Take 1 capsule (0 4 mg total) by mouth every evening, Starting Thu 10/24/2019, Normal       !! - Potential duplicate medications found  Please discuss with provider  No discharge procedures on file      ED Provider  Electronically Signed by           Mikey Handley PA-C  11/01/19 0826

## 2019-11-01 NOTE — ED NOTES
Patient asking to leave, via staff interpretation  patient states she can make all of her own medical decisions and wishes to leave  Patient's caregiver wanted patient to remain here, however patient states she wants to leave  Provider made aware of same       Valerie Schreiber RN  11/01/19 1607

## 2019-11-02 ENCOUNTER — APPOINTMENT (EMERGENCY)
Dept: CT IMAGING | Facility: HOSPITAL | Age: 84
End: 2019-11-02
Payer: MEDICARE

## 2019-11-02 ENCOUNTER — HOSPITAL ENCOUNTER (EMERGENCY)
Facility: HOSPITAL | Age: 84
End: 2019-11-02
Attending: EMERGENCY MEDICINE
Payer: MEDICARE

## 2019-11-02 ENCOUNTER — APPOINTMENT (EMERGENCY)
Dept: RADIOLOGY | Facility: HOSPITAL | Age: 84
End: 2019-11-02
Payer: MEDICARE

## 2019-11-02 ENCOUNTER — HOSPITAL ENCOUNTER (INPATIENT)
Facility: HOSPITAL | Age: 84
LOS: 3 days | Discharge: HOME HEALTH | DRG: 871 | End: 2019-11-05
Attending: INTERNAL MEDICINE | Admitting: INTERNAL MEDICINE
Payer: MEDICARE

## 2019-11-02 VITALS
WEIGHT: 156.1 LBS | HEART RATE: 64 BPM | DIASTOLIC BLOOD PRESSURE: 53 MMHG | OXYGEN SATURATION: 100 % | BODY MASS INDEX: 26.79 KG/M2 | SYSTOLIC BLOOD PRESSURE: 102 MMHG | RESPIRATION RATE: 16 BRPM | TEMPERATURE: 99.3 F

## 2019-11-02 DIAGNOSIS — E11.65 TYPE 2 DIABETES MELLITUS WITH HYPERGLYCEMIA, WITH LONG-TERM CURRENT USE OF INSULIN (HCC): ICD-10-CM

## 2019-11-02 DIAGNOSIS — Z79.4 TYPE 2 DIABETES MELLITUS WITH HYPERGLYCEMIA, WITH LONG-TERM CURRENT USE OF INSULIN (HCC): ICD-10-CM

## 2019-11-02 DIAGNOSIS — N20.0 BILATERAL KIDNEY STONES: Primary | ICD-10-CM

## 2019-11-02 DIAGNOSIS — R65.20 SEVERE SEPSIS WITH ACUTE ORGAN DYSFUNCTION (HCC): ICD-10-CM

## 2019-11-02 DIAGNOSIS — N20.0 RENAL CALCULUS, BILATERAL: ICD-10-CM

## 2019-11-02 DIAGNOSIS — R78.81 BACTEREMIA DUE TO GRAM-NEGATIVE BACTERIA: ICD-10-CM

## 2019-11-02 DIAGNOSIS — A41.9 SEPSIS (HCC): Primary | ICD-10-CM

## 2019-11-02 DIAGNOSIS — A41.9 SEVERE SEPSIS WITH ACUTE ORGAN DYSFUNCTION (HCC): ICD-10-CM

## 2019-11-02 DIAGNOSIS — N17.9 ACUTE KIDNEY INJURY (HCC): ICD-10-CM

## 2019-11-02 PROBLEM — G93.40 ACUTE ENCEPHALOPATHY: Status: ACTIVE | Noted: 2019-11-02

## 2019-11-02 LAB
ALBUMIN SERPL BCP-MCNC: 3.8 G/DL (ref 3–5.2)
ALP SERPL-CCNC: 128 U/L (ref 43–122)
ALT SERPL W P-5'-P-CCNC: 20 U/L (ref 9–52)
ANION GAP SERPL CALCULATED.3IONS-SCNC: 13 MMOL/L (ref 5–14)
ANISOCYTOSIS BLD QL SMEAR: PRESENT
APTT PPP: 30 SECONDS (ref 25–32)
AST SERPL W P-5'-P-CCNC: 23 U/L (ref 14–36)
BILIRUB SERPL-MCNC: 0.8 MG/DL
BUN SERPL-MCNC: 25 MG/DL (ref 5–25)
BURR CELLS BLD QL SMEAR: PRESENT
CALCIUM SERPL-MCNC: 9.1 MG/DL (ref 8.4–10.2)
CHLORIDE SERPL-SCNC: 93 MMOL/L (ref 97–108)
CO2 SERPL-SCNC: 29 MMOL/L (ref 22–30)
CREAT SERPL-MCNC: 1.98 MG/DL (ref 0.6–1.2)
EOSINOPHIL # BLD AUTO: 0.23 THOUSAND/UL (ref 0–0.4)
EOSINOPHIL NFR BLD MANUAL: 1 % (ref 0–6)
ERYTHROCYTE [DISTWIDTH] IN BLOOD BY AUTOMATED COUNT: 17.4 %
GFR SERPL CREATININE-BSD FRML MDRD: 23 ML/MIN/1.73SQ M
GLUCOSE SERPL-MCNC: 162 MG/DL (ref 70–99)
GLUCOSE SERPL-MCNC: 188 MG/DL (ref 65–140)
HCT VFR BLD AUTO: 32.5 % (ref 36–46)
HGB BLD-MCNC: 10.5 G/DL (ref 12–16)
INR PPP: 1.08 (ref 0.91–1.09)
LACTATE SERPL-SCNC: 1 MMOL/L (ref 0.7–2)
LACTATE SERPL-SCNC: 2.4 MMOL/L (ref 0.7–2)
LG PLATELETS BLD QL SMEAR: PRESENT
LYMPHOCYTES # BLD AUTO: 1.81 THOUSAND/UL (ref 0.5–4)
LYMPHOCYTES # BLD AUTO: 8 % (ref 25–45)
MCH RBC QN AUTO: 25.9 PG (ref 26–34)
MCHC RBC AUTO-ENTMCNC: 32.4 G/DL (ref 31–36)
MCV RBC AUTO: 80 FL (ref 80–100)
MONOCYTES # BLD AUTO: 2.03 THOUSAND/UL (ref 0.2–0.9)
MONOCYTES NFR BLD AUTO: 9 % (ref 1–10)
NEUTS BAND NFR BLD MANUAL: 1 % (ref 0–8)
NEUTS SEG # BLD: 18.53 THOUSAND/UL (ref 1.8–7.8)
NEUTS SEG NFR BLD AUTO: 81 %
PLATELET # BLD AUTO: 282 THOUSANDS/UL (ref 150–450)
PLATELET BLD QL SMEAR: ADEQUATE
PMV BLD AUTO: 8.9 FL (ref 8.9–12.7)
POIKILOCYTOSIS BLD QL SMEAR: PRESENT
POTASSIUM SERPL-SCNC: 3.9 MMOL/L (ref 3.6–5)
PROT SERPL-MCNC: 7 G/DL (ref 5.9–8.4)
PROTHROMBIN TIME: 12 SECONDS (ref 9.8–12)
RBC # BLD AUTO: 4.06 MILLION/UL (ref 4–5.2)
RBC MORPH BLD: PRESENT
SODIUM SERPL-SCNC: 135 MMOL/L (ref 137–147)
TOTAL CELLS COUNTED SPEC: 100
TOXIC GRANULES BLD QL SMEAR: PRESENT
WBC # BLD AUTO: 22.6 THOUSAND/UL (ref 4.5–11)

## 2019-11-02 PROCEDURE — 93005 ELECTROCARDIOGRAM TRACING: CPT

## 2019-11-02 PROCEDURE — 87040 BLOOD CULTURE FOR BACTERIA: CPT | Performed by: PHYSICIAN ASSISTANT

## 2019-11-02 PROCEDURE — 83605 ASSAY OF LACTIC ACID: CPT | Performed by: PHYSICIAN ASSISTANT

## 2019-11-02 PROCEDURE — 74176 CT ABD & PELVIS W/O CONTRAST: CPT

## 2019-11-02 PROCEDURE — 85610 PROTHROMBIN TIME: CPT | Performed by: EMERGENCY MEDICINE

## 2019-11-02 PROCEDURE — 99223 1ST HOSP IP/OBS HIGH 75: CPT | Performed by: NURSE PRACTITIONER

## 2019-11-02 PROCEDURE — 84145 PROCALCITONIN (PCT): CPT | Performed by: PHYSICIAN ASSISTANT

## 2019-11-02 PROCEDURE — 85027 COMPLETE CBC AUTOMATED: CPT | Performed by: EMERGENCY MEDICINE

## 2019-11-02 PROCEDURE — 99285 EMERGENCY DEPT VISIT HI MDM: CPT

## 2019-11-02 PROCEDURE — 85730 THROMBOPLASTIN TIME PARTIAL: CPT | Performed by: EMERGENCY MEDICINE

## 2019-11-02 PROCEDURE — 82948 REAGENT STRIP/BLOOD GLUCOSE: CPT

## 2019-11-02 PROCEDURE — 36415 COLL VENOUS BLD VENIPUNCTURE: CPT | Performed by: EMERGENCY MEDICINE

## 2019-11-02 PROCEDURE — 85007 BL SMEAR W/DIFF WBC COUNT: CPT | Performed by: EMERGENCY MEDICINE

## 2019-11-02 PROCEDURE — 96360 HYDRATION IV INFUSION INIT: CPT

## 2019-11-02 PROCEDURE — 80053 COMPREHEN METABOLIC PANEL: CPT | Performed by: EMERGENCY MEDICINE

## 2019-11-02 PROCEDURE — 96361 HYDRATE IV INFUSION ADD-ON: CPT

## 2019-11-02 PROCEDURE — 71045 X-RAY EXAM CHEST 1 VIEW: CPT

## 2019-11-02 PROCEDURE — 96367 TX/PROPH/DG ADDL SEQ IV INF: CPT

## 2019-11-02 PROCEDURE — 83605 ASSAY OF LACTIC ACID: CPT | Performed by: EMERGENCY MEDICINE

## 2019-11-02 PROCEDURE — 99285 EMERGENCY DEPT VISIT HI MDM: CPT | Performed by: EMERGENCY MEDICINE

## 2019-11-02 PROCEDURE — 96365 THER/PROPH/DIAG IV INF INIT: CPT

## 2019-11-02 RX ORDER — ONDANSETRON 2 MG/ML
4 INJECTION INTRAMUSCULAR; INTRAVENOUS EVERY 6 HOURS PRN
Status: DISCONTINUED | OUTPATIENT
Start: 2019-11-02 | End: 2019-11-05 | Stop reason: HOSPADM

## 2019-11-02 RX ORDER — ASPIRIN 81 MG/1
81 TABLET ORAL DAILY
Status: DISCONTINUED | OUTPATIENT
Start: 2019-11-03 | End: 2019-11-05 | Stop reason: HOSPADM

## 2019-11-02 RX ORDER — SODIUM CHLORIDE, SODIUM GLUCONATE, SODIUM ACETATE, POTASSIUM CHLORIDE, MAGNESIUM CHLORIDE, SODIUM PHOSPHATE, DIBASIC, AND POTASSIUM PHOSPHATE .53; .5; .37; .037; .03; .012; .00082 G/100ML; G/100ML; G/100ML; G/100ML; G/100ML; G/100ML; G/100ML
50 INJECTION, SOLUTION INTRAVENOUS CONTINUOUS
Status: DISCONTINUED | OUTPATIENT
Start: 2019-11-02 | End: 2019-11-04

## 2019-11-02 RX ORDER — INSULIN GLARGINE 100 [IU]/ML
15 INJECTION, SOLUTION SUBCUTANEOUS
Status: DISCONTINUED | OUTPATIENT
Start: 2019-11-03 | End: 2019-11-05 | Stop reason: HOSPADM

## 2019-11-02 RX ORDER — ATORVASTATIN CALCIUM 40 MG/1
40 TABLET, FILM COATED ORAL
Status: DISCONTINUED | OUTPATIENT
Start: 2019-11-03 | End: 2019-11-05 | Stop reason: HOSPADM

## 2019-11-02 RX ORDER — AMLODIPINE BESYLATE 5 MG/1
5 TABLET ORAL DAILY
Status: DISCONTINUED | OUTPATIENT
Start: 2019-11-03 | End: 2019-11-05 | Stop reason: HOSPADM

## 2019-11-02 RX ORDER — PREGABALIN 50 MG/1
100 CAPSULE ORAL DAILY
Status: DISCONTINUED | OUTPATIENT
Start: 2019-11-03 | End: 2019-11-05 | Stop reason: HOSPADM

## 2019-11-02 RX ORDER — TAMSULOSIN HYDROCHLORIDE 0.4 MG/1
0.4 CAPSULE ORAL EVERY EVENING
Status: DISCONTINUED | OUTPATIENT
Start: 2019-11-02 | End: 2019-11-05 | Stop reason: HOSPADM

## 2019-11-02 RX ORDER — ACETAMINOPHEN 325 MG/1
650 TABLET ORAL ONCE
Status: COMPLETED | OUTPATIENT
Start: 2019-11-02 | End: 2019-11-02

## 2019-11-02 RX ORDER — ACETAMINOPHEN 325 MG/1
650 TABLET ORAL EVERY 6 HOURS PRN
Status: DISCONTINUED | OUTPATIENT
Start: 2019-11-02 | End: 2019-11-05 | Stop reason: HOSPADM

## 2019-11-02 RX ORDER — VANCOMYCIN HYDROCHLORIDE 1 G/200ML
15 INJECTION, SOLUTION INTRAVENOUS ONCE
Status: COMPLETED | OUTPATIENT
Start: 2019-11-02 | End: 2019-11-02

## 2019-11-02 RX ORDER — BRIMONIDINE TARTRATE 0.15 %
1 DROPS OPHTHALMIC (EYE) 2 TIMES DAILY
Status: DISCONTINUED | OUTPATIENT
Start: 2019-11-03 | End: 2019-11-05 | Stop reason: HOSPADM

## 2019-11-02 RX ORDER — LATANOPROST 50 UG/ML
1 SOLUTION/ DROPS OPHTHALMIC
Status: DISCONTINUED | OUTPATIENT
Start: 2019-11-02 | End: 2019-11-05 | Stop reason: HOSPADM

## 2019-11-02 RX ORDER — POLYETHYLENE GLYCOL 3350 17 G/17G
17 POWDER, FOR SOLUTION ORAL DAILY
Status: DISCONTINUED | OUTPATIENT
Start: 2019-11-03 | End: 2019-11-05 | Stop reason: HOSPADM

## 2019-11-02 RX ORDER — OXYCODONE HCL 10 MG/1
10 TABLET, FILM COATED, EXTENDED RELEASE ORAL
Status: DISCONTINUED | OUTPATIENT
Start: 2019-11-02 | End: 2019-11-05 | Stop reason: HOSPADM

## 2019-11-02 RX ORDER — HEPARIN SODIUM 5000 [USP'U]/ML
5000 INJECTION, SOLUTION INTRAVENOUS; SUBCUTANEOUS EVERY 8 HOURS SCHEDULED
Status: DISCONTINUED | OUTPATIENT
Start: 2019-11-02 | End: 2019-11-05 | Stop reason: HOSPADM

## 2019-11-02 RX ORDER — DOCUSATE SODIUM 100 MG/1
100 CAPSULE, LIQUID FILLED ORAL 2 TIMES DAILY
Status: DISCONTINUED | OUTPATIENT
Start: 2019-11-03 | End: 2019-11-05 | Stop reason: HOSPADM

## 2019-11-02 RX ADMIN — SODIUM CHLORIDE 1000 ML: 0.9 INJECTION, SOLUTION INTRAVENOUS at 19:10

## 2019-11-02 RX ADMIN — PIPERACILLIN SODIUM,TAZOBACTAM SODIUM 4.5 G: 4; .5 INJECTION, POWDER, FOR SOLUTION INTRAVENOUS at 19:10

## 2019-11-02 RX ADMIN — ACETAMINOPHEN 650 MG: 325 TABLET ORAL at 17:45

## 2019-11-02 RX ADMIN — VANCOMYCIN HYDROCHLORIDE 1000 MG: 1 INJECTION, SOLUTION INTRAVENOUS at 17:45

## 2019-11-02 RX ADMIN — SODIUM CHLORIDE 1000 ML: 0.9 INJECTION, SOLUTION INTRAVENOUS at 17:33

## 2019-11-02 NOTE — ED PROVIDER NOTES
History  Chief Complaint   Patient presents with    Fever - 76 years or older     pt's daughter called 47 - pt received call from PA asking pt to return to ER after blood cultures came back + for bacterial growth     Patient is an 56-year-old female who was seen in this emergency department yesterday for flank pain and a fever who presents today via EMS for a fever today  Patient is noted to have signed out against medical advice yesterday and presents altered mental status today oriented only to her person  It is noted that the patient does have a past medical history significant for anemia, congestive heart failure, COPD, diabetes, hyperlipidemia, hypertension, kidney stones, P 80  It is noted that the patient does have a right-sided renal mass due for biopsy however is unable to describe pain anywhere  History provided by:  EMS personnel   used: Yes    Fever - 9 weeks to 74 years   Max temp prior to arrival:  102 taken by EMS  Temp source:  Oral  Severity:  Moderate  Onset quality:  Gradual  Timing:  Constant  Progression:  Worsening  Chronicity:  Recurrent  Relieved by:  None tried  Worsened by:  Nothing  Ineffective treatments:  None tried      Prior to Admission Medications   Prescriptions Last Dose Informant Patient Reported? Taking?    LINZESS 290 MCG CAPS  Family Member Yes No   Sig: TAKE ONE CAPSULE BY MOUTH DAILY ROSITA TOMASA CAPSULA POR VIA ORAL DIARIAMENTE   LINZESS 290 MCG CAPS   No No   Sig: TAKE ONE CAPSULE BY MOUTH DAILYTOMAR TOMASA CAPSULA POR VIA ORAL DIARIAMENTE   Lancets (FREESTYLE) lancets  Family Member No No   Sig: Check blood sugar three times a day   NOVOLOG FLEXPEN 100 units/mL injection pen  Family Member No No   Si UNITS 3 TIMES A DAY WITH MEALS  day supplies please   Sodium Phosphates (ENEMA READY-TO-USE) 7-19 GM/118ML ENEM   No No   Sig: Insert 1 Bottle into the rectum daily   amLODIPine (NORVASC) 5 mg tablet  Family Member No No   Sig: Take 1 tablet (5 mg total) by mouth daily   aspirin 81 mg chewable tablet  Family Member No No   Sig: Chew 1 tablet (81 mg total) daily   atorvastatin (LIPITOR) 40 mg tablet   No No   Sig: Take 1 tablet (40 mg total) by mouth every 24 hours   brimonidine tartrate 0 2 % ophthalmic solution  Family Member Yes No   Sig: INSTILL 1 DROP INTO BOTH EYES 2 TIMES PER DAY   calcitonin, salmon, (MIACALCIN) 200 units/act nasal spray  Family Member No No   Si spray into each nostril daily   dorzolamide (TRUSOPT) 2 % ophthalmic solution  Family Member No No   Sig: Administer 1 drop to both eyes 3 (three) times a day   insulin degludec (TRESIBA FLEXTOUCH) 100 units/mL injection pen  Family Member No No   Sig: To use 25 U at bedtime     ipratropium (ATROVENT HFA) 17 mcg/act inhaler  Family Member No No   Sig: Inhale 2 puffs 4 (four) times a day Ninety day supplies please   latanoprost (XALATAN) 0 005 % ophthalmic solution  Family Member Yes No   Sig: ADMINISTER 1 DROP TO BOTH EYES DAILY AT BEDTIME   metoprolol tartrate (LOPRESSOR) 25 mg tablet  Family Member No No   Sig: Take 1 tablet (25 mg total) by mouth every 12 (twelve) hours   oxyCODONE (OxyCONTIN) 10 mg 12 hr tablet   No No   Sig: Take 1 tablet (10 mg total) by mouth daily at bedtimeMax Daily Amount: 10 mg   polyethylene glycol (MIRALAX) 17 g packet   No No   Sig: Take 17 g by mouth daily   pregabalin (LYRICA) 100 mg capsule  Family Member No No   Sig: Take 1 capsule (100 mg total) by mouth 2 (two) times a day for 100 days   pregabalin (LYRICA) 100 mg capsule   Yes No   Sig: TAKE ONE CAPSULE BY MOUTH TWICE DAILY ROSITA TOMASA CAPSULA POR VIA ORAL DOS VECES AL MAC   sitaGLIPtin (JANUVIA) 50 mg tablet  Family Member No No   Sig: Take 1 tablet (50 mg total) by mouth daily   tamsulosin (FLOMAX) 0 4 mg   No No   Sig: Take 1 capsule (0 4 mg total) by mouth every evening      Facility-Administered Medications: None       Past Medical History:   Diagnosis Date    Anemia     Arthritis     Cancer ([facility] [address] )     kidney, liver    [facility]estive [facility] failure[facility]     Chronic pain disorder     low back    COPD (chronic obstructive pulmonary disease) ([facility]     Coronary artery disease     Diabetes mellitus ([facility] [address] )     Glaucoma     Hematuria 2019    Hyperlipidemia     Hypertension     Kidney stones     Lumbar stenosis     Myocardial infarction ([facility] [address] )     Osteoporosis     PAD (peripheral artery disease) ([facility]     Peripheral neuropathy     Shortness of breath        Past Surgical History:   Procedure Laterality Date    APPENDECTOMY      CARDIAC CATHETERIZATION  2019    CT EPIDURAL STEROID INJECTION (RYAN LUMBAR)  8/20/2019    CT EPIDURAL STEROID INJECTION (RYAN LUMBAR)  10/8/2019    CYSTOSCOPY      HAND SURGERY Right     HYSTERECTOMY      age 29    INCISION AND DRAINAGE OF WOUND Left 1/5/2019    Procedure: INCISION AND DRAINAGE (I&D) EXTREMITY;  Surgeon: Robert Schuler MD;  Location:  MAIN OR;  Service: General    IR ABDOMINAL ANGIOGRAPHY / INTERVENTION  9/19/2019    LITHOTRIPSY      MASS EXCISION      [place] of back wall mass    MI CYSTO/URETERO W/LITHOTRIPSY &INDWELL STENT INSRT Bilateral 9/26/2019    Procedure: cystoscopy, bilateral retrograde pyelography, bilateral ureteroscopy with stone extraction / [place] lithotripsy;  Surgeon: Sarah Partida MD;  Location: [facility] MAIN OR;  Service: Urology    TONSILLECTOMY      TONSILLECTOMY         Family History   Problem Relation Age of Onset    Diabetes Mother     No Known Problems Father     Throat cancer Daughter     No Known Problems Maternal Grandmother     No Known Problems Maternal Grandfather     No Known Problems Paternal Grandmother     No Known Problems Paternal Grandfather      I have reviewed and agree with the history as documented      Social History     Tobacco Use    Smoking status: Former Smoker     Packs/day: 1 00     Years: 40 00     Pack years: 40 00     Types: Cigarettes     Last attempt to quit: 1/1/2017     Years since quittin 8    Smokeless tobacco: Never Used    Tobacco comment: states she quit but family living with her states she smokes   Substance Use Topics    Alcohol use: Never     Frequency: Never     Comment: OCCASIONAL    Drug use: Never        Review of Systems   Unable to perform ROS: Mental status change   Constitutional: Positive for fever  Physical Exam  Physical Exam   Constitutional: She is oriented to person, place, and time  She appears well-developed and well-nourished  Elderly appearing female, moaning with each breath  Oriented only to person   HENT:   Head: Normocephalic  Eyes: No scleral icterus  Cardiovascular: Normal rate and regular rhythm  Pulmonary/Chest: Effort normal and breath sounds normal  No stridor  Abdominal: Soft  She exhibits no distension  There is no tenderness  Musculoskeletal: Normal range of motion  Neurological: She is alert and oriented to person, place, and time  Skin: Skin is warm and dry  Capillary refill takes less than 2 seconds  Psychiatric: She has a normal mood and affect  Nursing note and vitals reviewed        Vital Signs  ED Triage Vitals   Temperature Pulse Respirations Blood Pressure SpO2   19 1715 19 1715 19 17119 1715 19 1715   (!) 102 2 °F (39 °C) 89 (!) 26 106/59 95 %      Temp Source Heart Rate Source Patient Position - Orthostatic VS BP Location FiO2 (%)   19 1715 19 1816 19 17119 171 --   Tympanic Monitor Lying Right arm       Pain Score       19 2153       No Pain           Vitals:    19 1715 19 1816 19 1901 19 2153   BP: 106/59 105/61 108/61 102/53   Pulse: 89 78 75 64   Patient Position - Orthostatic VS: Lying Lying Lying Lying         Visual Acuity      ED Medications  Medications   piperacillin-tazobactam (ZOSYN) 4 5 g in sodium chloride 0 9 % 100 mL IVPB (0 g Intravenous Stopped 19)   vancomycin (VANCOCIN) IVPB (premix) 1,000 mg (0 mg/kg × 70 8 kg Intravenous Stopped 11/2/19 1907)   sodium chloride 0 9 % bolus 1,000 mL (0 mL Intravenous Stopped 11/2/19 1908)   acetaminophen (TYLENOL) tablet 650 mg (650 mg Oral Given 11/2/19 1745)   sodium chloride 0 9 % bolus 1,000 mL (0 mL Intravenous Stopped 11/2/19 2015)       Diagnostic Studies  Results Reviewed     Procedure Component Value Units Date/Time    Blood culture #1 [[id number]] Collected:  11/02/19 1734    Lab Status:  Preliminary result Specimen:  Blood from Arm, Right Updated:  11/03/19 2301     Blood Culture No Growth at 24 hrs  Blood culture #2 [[id number]] Collected:  11/02/19 1734    Lab Status:  Preliminary result Specimen:  Blood from Arm, Left Updated:  11/03/19 2301     Blood Culture No Growth at 24 hrs  Procalcitonin [[id number]]  (Abnormal) Collected:  11/02/19 1734    Lab Status:  Final result Specimen:  Blood from Arm, Right Updated:  11/03/19 0010     Procalcitonin 4 91 ng/ml     Lactic acid, plasma [[id number]]  (Normal) Collected:  11/02/19 1935    Lab Status:  Final result Specimen:  Blood from Hand, Right Updated:  11/02/19 2004     LACTIC ACID 1 0 mmol/L     Narrative:       Result may be elevated if tourniquet was used during collection      Protime-INR [[id number]]  (Normal) Collected:  11/02/19 1734    Lab Status:  Final result Specimen:  Blood from Arm, Right Updated:  11/02/19 1755     Protime 12 0 seconds      INR 1 08    APTT [[id number]]  (Normal) Collected:  11/02/19 1734    Lab Status:  Final result Specimen:  Blood from Arm, Right Updated:  11/02/19 1755     PTT 30 seconds     Comprehensive metabolic panel [[id number]]  (Abnormal) Collected:  11/02/19 1734    Lab Status:  Final result Specimen:  Blood from Arm, Right Updated:  11/02/19 1754     Sodium 135 mmol/L      Potassium 3 9 mmol/L      Chloride 93 mmol/L      CO2 29 mmol/L      ANION GAP 13 mmol/L      BUN 25 mg/dL      Creatinine 1 98 mg/dL      Glucose 162 mg/dL      Calcium 9 1 mg/dL      AST 23 U/L      ALT 20 U/L      Alkaline Phosphatase 128 U/L      Total Protein 7 0 g/dL      Albumin 3 8 g/dL      Total Bilirubin 0 80 mg/dL      eGFR 23 ml/min/1 73sq m     Narrative:       [place] guidelines for Chronic Kidney Disease (CKD):     Stage 1 with normal or high GFR (GFR > 90 mL/min/1 73 square meters)    Stage 2 Mild CKD (GFR = 60-89 mL/min/1 73 square meters)    Stage 3A Moderate CKD (GFR = 45-59 mL/min/1 73 square meters)    Stage 3B Moderate CKD (GFR = 30-44 mL/min/1 73 square meters)    Stage 4 Severe CKD (GFR = 15-29 mL/min/1 73 square meters)    Stage 5 End Stage CKD (GFR <15 mL/min/1 73 square meters)  Note: GFR calculation is accurate only with a steady state creatinine    Lactic acid, plasma [[id number]]  (Abnormal) Collected:  11/02/19 1734    Lab Status:  Final result Specimen:  Blood from Arm, Right Updated:  11/02/19 1754     LACTIC ACID 2 4 mmol/L     Narrative:       Result may be elevated if tourniquet was used during collection  CBC and differential [[id number]]  (Abnormal) Collected:  11/02/19 1734    Lab Status:  Final result Specimen:  Blood from Arm, Right Updated:  11/02/19 1750     WBC 22 60 Thousand/uL      RBC 4 06 Million/uL      Hemoglobin 10 5 g/dL      Hematocrit 32 5 %      MCV 80 fL      MCH 25 9 pg      MCHC 32 4 g/dL      RDW 17 4 %      MPV 8 9 fL      Platelets 305 Thousands/uL                  CT abdomen pelvis wo contrast   Final Result by Dashawn Meza MD (11/02 1922)         1  Stable nonobstructing bilateral renal calculi measuring up to 10 mm at the right ureteropelvic junction  Pyelonephritis not excluded  2   No evidence of bowel obstruction, colitis or diverticulitis  3   Stable L4-5 paracentral disc protrusion resulting in severe central canal stenosis and bilateral neural foraminal narrowing                 Workstation performed: [id number]         XR chest portable   Final Result by Andres Parsons MD (11/03 1045)      No acute cardiopulmonary disease  Stable exam      Workstation performed: [id number]                    Procedures  ECG 12 Lead Documentation Only  Date/Time: 11/2/2019 6:15 PM  Performed by: Luis Antonio Boyce PA-C  Authorized by: Luis Antonio Boyce PA-C     Indications / Diagnosis:  Sepsis alert  ECG reviewed by me, the ED Provider: yes    Patient location:  ED  Previous ECG:     Previous ECG:  Compared to current    Comparison ECG info:  11/01/2019    Similarity:  No change    Comparison to cardiac monitor: Yes    Interpretation:     Interpretation: abnormal    Rate:     ECG rate:  87    ECG rate assessment: normal    Rhythm:     Rhythm: sinus rhythm    Ectopy:     Ectopy: none    QRS:     QRS axis:  Left    QRS intervals: Wide  Conduction:     Conduction: abnormal      Abnormal conduction: incomplete RBBB    ST segments:     ST segments:  Abnormal  T waves:     T waves: inverted    Q waves:     Q waves:  III and aVF  Other findings:     Other findings: LAE    Comments:                 ED Course  ED Course as of Nov 04 0851   Sat Nov 02, 2019   1810 LACTIC ACID(!!): 2 4   1810 WBC(!): 22 60   1814 Creatinine(!): 1 98   1927  Stable nonobstructing 10 mm calculus at the ureteropelvic junction   Nonobstructing 3 to 6 mm calculi in right renal calyces   Punctate nonobstructing left renal calculi   Fullness of the right and left renal pelvis without evidence of   distal obstructive uropathy      1930  [facility] [place] recommending transfer for urology intervention if needed  Initial Sepsis Screening     Row Name 11/02/19 6860                Is the patient's history suggestive of a new or worsening infection? (!) Yes (Proceed)  -AS        Suspected source of infection  suspect infection, source unknown  -AS        Are two or more of the following signs & symptoms of infection both present and new to the patient?   (!) Yes (Proceed)  -AS        Indicate SIRS criteria  Hyperthemia > 38 3C (100 9F); Altered mental status; Tachypnea > 20 resp per min  -AS        If the answer is yes to both questions, suspicion of sepsis is present          If severe sepsis is present AND tissue hypoperfusion perists in the hour after fluid resuscitation or lactate > 4, the patient meets criteria for SEPTIC SHOCK          Are any of the following organ dysfunction criteria present within 6 hours of suspected infection and SIRS criteria that are NOT considered to be chronic conditions?         Organ dysfunction          Date of presentation of severe sepsis          Time of presentation of severe sepsis          Tissue hypoperfusion persists in the hour after crystalloid fluid administration, evidenced, by either:          Was hypotension present within one hour of the conclusion of crystalloid fluid administration?           Date of presentation of septic shock          Time of presentation of septic shock            User Key  (r) = Recorded By, (t) = Taken By, (c) = Cosigned By    Initials Name Provider Type    AS Hilda Akers PA-C Physician Assistant                  MDM    Disposition  Final diagnoses:   Sepsis [facility])   Acute kidney injury [facility])   Renal calculus, bilateral     Time reflects when diagnosis was documented in both MDM as applicable and the Disposition within this note     Time User Action Codes Description Comment    11/2/2019  8:02 PM Petey Negro Add [A41 9] Sepsis ([facility] [address] )     11/2/2019  8:02 PM Petey Negro Add [N17 9] Acute kidney injury ([facility] [address] )     11/2/2019  8:02 PM Petey Negro Add [N20 0] Renal calculus, bilateral       ED Disposition     ED Disposition Condition Date/Time Comment    Transfer to Another [facility] Nov 2, 2019  8:02 PM Oswaldo Medrano should be transferred out to [facility]        MD Documentation      Most Recent Value   Patient Condition  The patient has been stabilized such that within reasonable medical probability, no material deterioration of the patient condition or the condition of the unborn child(azra) is likely to result from the transfer, An emergency transfer is being made prior to stabilization due to the need for definitive care and the benefit of transfer outweighs the risk   Reason for Transfer  Level of Care needed not available at this facility   Benefits of Transfer  Specialized equipment and/or services available at the receiving facility (Include comment)________________________   Risks of Transfer  Potential for delay in receiving treatment   Accepting Physician  Dr Heidy Hong Name, Dioni Mendoza   Sending MD  [facility] PA-C   Provider Certification  General risk, such as traffic hazards, adverse weather conditions, rough terrain or turbulence, possible failure of equipment (including vehicle or aircraft), or consequences of actions of persons outside the control of the transport personnel      RN Documentation      [address] Name, Dioni Mendoza   Bed Assignment  559   Report Given to  Reyna Rodriguez RN      Follow-up Information    None         Discharge Medication List as of 11/2/2019 10:00 PM      CONTINUE these medications which have NOT CHANGED    Details   amLODIPine (NORVASC) 5 mg tablet Take 1 tablet (5 mg total) by mouth daily, Starting Mon 8/5/2019, Normal      aspirin 81 mg chewable tablet Chew 1 tablet (81 mg total) daily, Starting Wed 7/10/2019, Normal      atorvastatin (LIPITOR) 40 mg tablet Take 1 tablet (40 mg total) by mouth every 24 hours, Starting Tue 9/24/2019, Normal      brimonidine tartrate 0 2 % ophthalmic solution INSTILL 1 DROP INTO BOTH EYES 2 TIMES PER DAY, Historical Med      calcitonin, salmon, (MIACALCIN) 200 units/act nasal spray 1 spray into each nostril daily, Starting Wed 7/3/2019, Normal      dorzolamide (TRUSOPT) 2 % ophthalmic solution Administer 1 drop to both eyes 3 (three) times a day, Starting Tue 6/11/2019, Normal      insulin degludec (TRESIBA FLEXTOUCH) 100 units/mL injection pen To use 25 U at bedtime , Normal      ipratropium (ATROVENT HFA) 17 mcg/act inhaler Inhale 2 puffs 4 (four) times a day Ninety day supplies please, Starting Tue 6/11/2019, Normal      Lancets (FREESTYLE) lancets Check blood sugar three times a day, Normal      latanoprost (XALATAN) 0 005 % ophthalmic solution ADMINISTER 1 DROP TO BOTH EYES DAILY AT BEDTIME, Historical Med      !! LINZESS 290 MCG CAPS TAKE ONE CAPSULE BY MOUTH DAILY ROSITA TOMASA CAPSULA POR VIA ORAL DIARIAMENTE, Historical Med      !! LINZESS 290 MCG CAPS TAKE ONE CAPSULE BY MOUTH DAILYTOMAR TOMASA CAPSULA POR VIA ORAL DIARIAMENTE, Normal      metoprolol tartrate (LOPRESSOR) 25 mg tablet Take 1 tablet (25 mg total) by mouth every 12 (twelve) hours, Starting Mon 8/5/2019, Normal      NOVOLOG FLEXPEN 100 units/mL injection pen 12 UNITS 3 TIMES A DAY WITH MEALS Ninety day supplies please, Normal      oxyCODONE (OxyCONTIN) 10 mg 12 hr tablet Take 1 tablet (10 mg total) by mouth daily at bedtimeMax Daily Amount: 10 mg, Starting Mon 10/28/2019, Normal      polyethylene glycol (MIRALAX) 17 g packet Take 17 g by mouth daily, Starting Mon 10/28/2019, Normal      pregabalin (LYRICA) 100 mg capsule TAKE ONE CAPSULE BY MOUTH TWICE DAILY ROSITA TOMASA CAPSULA POR VIA ORAL DOS VECES AL MAC, Historical Med      sitaGLIPtin (JANUVIA) 50 mg tablet Take 1 tablet (50 mg total) by mouth daily, Starting Tue 6/11/2019, Normal      Sodium Phosphates (ENEMA READY-TO-USE) 7-19 GM/118ML ENEM Insert 1 Bottle into the rectum daily, Starting Mon 10/28/2019, Normal      tamsulosin (FLOMAX) 0 4 mg Take 1 capsule (0 4 mg total) by mouth every evening, Starting Thu 10/24/2019, Normal       !! - Potential duplicate medications found  Please discuss with provider  No discharge procedures on file      ED Provider  Electronically Signed by           Angie Gerard CARL  11/04/19 0483

## 2019-11-02 NOTE — SEPSIS NOTE
=  Sepsis Note   [facility] 81 y o  female MRN: [id number]  Unit/Bed#: BUTCH Encounter: [id number]      qSOFA     Row Name 11/02/19 2153 11/02/19 1901 11/02/19 1816 11/02/19 1753 11/02/19 1715    Altered mental status GCS < 15        1      Respiratory Rate > / =22  0  0  0    1    Systolic BP < / =766  0  0  0    0    Q Sofa Score  1  1  1  2  1        Initial Sepsis Screening     Row Name 11/02/19 1724                Is the patient's history suggestive of a new or worsening infection? (!) Yes (Proceed)  -AS        Suspected source of infection  suspect infection, source unknown  -AS        Are two or more of the following signs & symptoms of infection both present and new to the patient? (!) Yes (Proceed)  -AS        Indicate SIRS criteria  Hyperthemia > 38 3C (100 9F); Altered mental status; Tachypnea > 20 resp per min  -AS        If the answer is yes to both questions, suspicion of sepsis is present          If severe sepsis is present AND tissue hypoperfusion perists in the hour after fluid resuscitation or lactate > 4, the patient meets criteria for SEPTIC SHOCK          Are any of the following organ dysfunction criteria present within 6 hours of suspected infection and SIRS criteria that are NOT considered to be chronic conditions?         Organ dysfunction          Date of presentation of severe sepsis          Time of presentation of severe sepsis          Tissue hypoperfusion persists in the hour after crystalloid fluid administration, evidenced, by either:          Was hypotension present within one hour of the conclusion of crystalloid fluid administration?           Date of presentation of septic shock          Time of presentation of septic shock            User Key  (r) = Recorded By, (t) = Taken By, (c) = Cosigned By    Initials Name Provider Type    AS Laxmi Islas PA-C Physician Assistant

## 2019-11-03 PROBLEM — D64.9 ANEMIA: Status: RESOLVED | Noted: 2019-07-17 | Resolved: 2019-11-03

## 2019-11-03 LAB
AMORPH URATE CRY URNS QL MICRO: ABNORMAL /HPF
ANION GAP SERPL CALCULATED.3IONS-SCNC: 9 MMOL/L (ref 4–13)
ATRIAL RATE: 79 BPM
ATRIAL RATE: 87 BPM
BACTERIA UR QL AUTO: ABNORMAL /HPF
BASOPHILS # BLD AUTO: 0.05 THOUSANDS/ΜL (ref 0–0.1)
BASOPHILS NFR BLD AUTO: 0 % (ref 0–1)
BILIRUB UR QL STRIP: NEGATIVE
BUN SERPL-MCNC: 23 MG/DL (ref 5–25)
CALCIUM SERPL-MCNC: 8 MG/DL (ref 8.3–10.1)
CHLORIDE SERPL-SCNC: 99 MMOL/L (ref 100–108)
CLARITY UR: ABNORMAL
CO2 SERPL-SCNC: 25 MMOL/L (ref 21–32)
COLOR UR: YELLOW
CREAT SERPL-MCNC: 1.7 MG/DL (ref 0.6–1.3)
EOSINOPHIL # BLD AUTO: 0 THOUSAND/ΜL (ref 0–0.61)
EOSINOPHIL NFR BLD AUTO: 0 % (ref 0–6)
ERYTHROCYTE [DISTWIDTH] IN BLOOD BY AUTOMATED COUNT: 16.7 % (ref 11.6–15.1)
GFR SERPL CREATININE-BSD FRML MDRD: 27 ML/MIN/1.73SQ M
GLUCOSE SERPL-MCNC: 169 MG/DL (ref 65–140)
GLUCOSE SERPL-MCNC: 180 MG/DL (ref 65–140)
GLUCOSE SERPL-MCNC: 192 MG/DL (ref 65–140)
GLUCOSE SERPL-MCNC: 192 MG/DL (ref 65–140)
GLUCOSE UR STRIP-MCNC: NEGATIVE MG/DL
HCT VFR BLD AUTO: 29.7 % (ref 34.8–46.1)
HGB BLD-MCNC: 9.3 G/DL (ref 11.5–15.4)
HGB UR QL STRIP.AUTO: ABNORMAL
IMM GRANULOCYTES # BLD AUTO: 0.09 THOUSAND/UL (ref 0–0.2)
IMM GRANULOCYTES NFR BLD AUTO: 1 % (ref 0–2)
KETONES UR STRIP-MCNC: NEGATIVE MG/DL
LEUKOCYTE ESTERASE UR QL STRIP: ABNORMAL
LYMPHOCYTES # BLD AUTO: 0.96 THOUSANDS/ΜL (ref 0.6–4.47)
LYMPHOCYTES NFR BLD AUTO: 6 % (ref 14–44)
MCH RBC QN AUTO: 26.3 PG (ref 26.8–34.3)
MCHC RBC AUTO-ENTMCNC: 31.3 G/DL (ref 31.4–37.4)
MCV RBC AUTO: 84 FL (ref 82–98)
MONOCYTES # BLD AUTO: 1.31 THOUSAND/ΜL (ref 0.17–1.22)
MONOCYTES NFR BLD AUTO: 8 % (ref 4–12)
NEUTROPHILS # BLD AUTO: 14.57 THOUSANDS/ΜL (ref 1.85–7.62)
NEUTS SEG NFR BLD AUTO: 85 % (ref 43–75)
NITRITE UR QL STRIP: NEGATIVE
NON-SQ EPI CELLS URNS QL MICRO: ABNORMAL /HPF
NRBC BLD AUTO-RTO: 0 /100 WBCS
P AXIS: 6 DEGREES
P AXIS: 8 DEGREES
PH UR STRIP.AUTO: 6 [PH]
PLATELET # BLD AUTO: 210 THOUSANDS/UL (ref 149–390)
PMV BLD AUTO: 10.6 FL (ref 8.9–12.7)
POTASSIUM SERPL-SCNC: 3.5 MMOL/L (ref 3.5–5.3)
PR INTERVAL: 138 MS
PR INTERVAL: 138 MS
PROCALCITONIN SERPL-MCNC: 4.91 NG/ML
PROT UR STRIP-MCNC: ABNORMAL MG/DL
QRS AXIS: -2 DEGREES
QRS AXIS: 25 DEGREES
QRSD INTERVAL: 118 MS
QRSD INTERVAL: 120 MS
QT INTERVAL: 378 MS
QT INTERVAL: 410 MS
QTC INTERVAL: 454 MS
QTC INTERVAL: 470 MS
RBC # BLD AUTO: 3.53 MILLION/UL (ref 3.81–5.12)
RBC #/AREA URNS AUTO: ABNORMAL /HPF
SODIUM SERPL-SCNC: 133 MMOL/L (ref 136–145)
SP GR UR STRIP.AUTO: 1.01 (ref 1–1.03)
T WAVE AXIS: 38 DEGREES
T WAVE AXIS: 45 DEGREES
UROBILINOGEN UR QL STRIP.AUTO: 0.2 E.U./DL
VENTRICULAR RATE: 79 BPM
VENTRICULAR RATE: 87 BPM
WBC # BLD AUTO: 16.98 THOUSAND/UL (ref 4.31–10.16)
WBC #/AREA URNS AUTO: ABNORMAL /HPF

## 2019-11-03 PROCEDURE — 80048 BASIC METABOLIC PNL TOTAL CA: CPT | Performed by: NURSE PRACTITIONER

## 2019-11-03 PROCEDURE — 99232 SBSQ HOSP IP/OBS MODERATE 35: CPT | Performed by: INTERNAL MEDICINE

## 2019-11-03 PROCEDURE — 82948 REAGENT STRIP/BLOOD GLUCOSE: CPT

## 2019-11-03 PROCEDURE — 99223 1ST HOSP IP/OBS HIGH 75: CPT | Performed by: INTERNAL MEDICINE

## 2019-11-03 PROCEDURE — 81001 URINALYSIS AUTO W/SCOPE: CPT | Performed by: NURSE PRACTITIONER

## 2019-11-03 PROCEDURE — 93010 ELECTROCARDIOGRAM REPORT: CPT | Performed by: INTERNAL MEDICINE

## 2019-11-03 PROCEDURE — 99223 1ST HOSP IP/OBS HIGH 75: CPT | Performed by: UROLOGY

## 2019-11-03 PROCEDURE — 87086 URINE CULTURE/COLONY COUNT: CPT | Performed by: NURSE PRACTITIONER

## 2019-11-03 PROCEDURE — 85025 COMPLETE CBC W/AUTO DIFF WBC: CPT | Performed by: NURSE PRACTITIONER

## 2019-11-03 RX ORDER — SACCHAROMYCES BOULARDII 250 MG
250 CAPSULE ORAL 2 TIMES DAILY
Status: DISCONTINUED | OUTPATIENT
Start: 2019-11-03 | End: 2019-11-05 | Stop reason: HOSPADM

## 2019-11-03 RX ADMIN — LATANOPROST 1 DROP: 50 SOLUTION OPHTHALMIC at 23:02

## 2019-11-03 RX ADMIN — BRIMONIDINE TARTRATE 1 DROP: 1.5 SOLUTION OPHTHALMIC at 09:09

## 2019-11-03 RX ADMIN — HEPARIN SODIUM 5000 UNITS: 5000 INJECTION INTRAVENOUS; SUBCUTANEOUS at 13:43

## 2019-11-03 RX ADMIN — INSULIN LISPRO 1 UNITS: 100 INJECTION, SOLUTION INTRAVENOUS; SUBCUTANEOUS at 01:01

## 2019-11-03 RX ADMIN — DOCUSATE SODIUM 100 MG: 100 CAPSULE, LIQUID FILLED ORAL at 09:07

## 2019-11-03 RX ADMIN — BRIMONIDINE TARTRATE 1 DROP: 1.5 SOLUTION OPHTHALMIC at 18:10

## 2019-11-03 RX ADMIN — ACETAMINOPHEN 650 MG: 325 TABLET ORAL at 12:03

## 2019-11-03 RX ADMIN — Medication 250 MG: at 18:13

## 2019-11-03 RX ADMIN — BISACODYL 10 MG: 5 TABLET, COATED ORAL at 09:07

## 2019-11-03 RX ADMIN — ASPIRIN 81 MG: 81 TABLET, COATED ORAL at 09:07

## 2019-11-03 RX ADMIN — INSULIN GLARGINE 15 UNITS: 100 INJECTION, SOLUTION SUBCUTANEOUS at 23:01

## 2019-11-03 RX ADMIN — DOCUSATE SODIUM 100 MG: 100 CAPSULE, LIQUID FILLED ORAL at 18:10

## 2019-11-03 RX ADMIN — TAMSULOSIN HYDROCHLORIDE 0.4 MG: 0.4 CAPSULE ORAL at 18:10

## 2019-11-03 RX ADMIN — TAMSULOSIN HYDROCHLORIDE 0.4 MG: 0.4 CAPSULE ORAL at 01:03

## 2019-11-03 RX ADMIN — METOPROLOL TARTRATE 25 MG: 25 TABLET, FILM COATED ORAL at 09:08

## 2019-11-03 RX ADMIN — OXYCODONE HYDROCHLORIDE 10 MG: 10 TABLET, FILM COATED, EXTENDED RELEASE ORAL at 23:05

## 2019-11-03 RX ADMIN — AMLODIPINE BESYLATE 5 MG: 5 TABLET ORAL at 09:08

## 2019-11-03 RX ADMIN — SODIUM CHLORIDE, SODIUM GLUCONATE, SODIUM ACETATE, POTASSIUM CHLORIDE AND MAGNESIUM CHLORIDE 50 ML/HR: 526; 502; 368; 37; 30 INJECTION, SOLUTION INTRAVENOUS at 01:02

## 2019-11-03 RX ADMIN — ATORVASTATIN CALCIUM 40 MG: 40 TABLET, FILM COATED ORAL at 18:10

## 2019-11-03 RX ADMIN — HEPARIN SODIUM 5000 UNITS: 5000 INJECTION INTRAVENOUS; SUBCUTANEOUS at 00:46

## 2019-11-03 RX ADMIN — POLYETHYLENE GLYCOL 3350 17 G: 17 POWDER, FOR SOLUTION ORAL at 09:06

## 2019-11-03 RX ADMIN — INSULIN LISPRO 1 UNITS: 100 INJECTION, SOLUTION INTRAVENOUS; SUBCUTANEOUS at 12:03

## 2019-11-03 RX ADMIN — PREGABALIN 100 MG: 50 CAPSULE ORAL at 09:07

## 2019-11-03 RX ADMIN — CEFTRIAXONE 1000 MG: 1 INJECTION, POWDER, FOR SOLUTION INTRAMUSCULAR; INTRAVENOUS at 00:50

## 2019-11-03 RX ADMIN — SODIUM CHLORIDE, SODIUM GLUCONATE, SODIUM ACETATE, POTASSIUM CHLORIDE AND MAGNESIUM CHLORIDE 50 ML/HR: 526; 502; 368; 37; 30 INJECTION, SOLUTION INTRAVENOUS at 22:53

## 2019-11-03 NOTE — ASSESSMENT & PLAN NOTE
Lab Results   Component Value Date    HGBA1C 7 7 (H) 06/04/2019     · Home regimen Januvia, Tresiba and Novolog; hold [place] and [place] while inpatient  · Hold Novolog while NPO  · Continue long acting insulin at decreased dose  · SSI  · ADA diet     No results for input(s): POCGLU in the last 72 hours      Blood Sugar Average: Last 72 hrs:

## 2019-11-03 NOTE — CONSULTS
Consultation - Infectious Disease   Sushant Aguillon 81 y o  female MRN: [id number]  Unit/Bed#: E5 -01 Encounter: [id number]      IMPRESSION & RECOMMENDATIONS:   1  Severe sepsis-POA  Fever, leukocytosis, lactic acidosis  Appears to be secondary to gram-negative bacteremia from a UTI  No other clear sources appreciated  The patient remains hemodynamically stable and nontoxic despite her systemic illness  She seems to be tolerating the antibiotics without difficulty  Her temperatures come down, the white cell count has decreased, and the lactic acidosis has resolved  -continue ceftriaxone for now at current dose  -follow up sensitivities and adjust antibiotics as needed  -recheck procalcitonin levels  -recheck CBC with diff and BMP  -supportive care    2  Gram-negative bacteremia-most likely secondary to UTI in this patient with some notable symptoms referring to the urinary tract and the patient having a renal mass and stones, albeit without any obstruction  The urinalysis is consistent with UTI, however the urine cultures were not collected until well after the 1st dose of antibiotics  -antibiotics as above  -follow up sensitivities and adjust antibiotics as needed  -followup urine culture although this may remain negative as the patient received Zosyn many hours before the urine culture was collected  -no additional ID workup for now    3  Presumptive urinary tract infection-likely gram-negative based upon the blood culture results  This is all in the setting of a right renal mass and nonobstructing nephrolithiasis  She does have equivocal symptoms including some pelvic pain, some difficulty voiding, as well as a fever  -antibiotics as above  -monitor symptomatology  -follow up urine culture and blood cultures and adjust antibiotics as needed  -Urology follow-up    4  Right renal mass-had been scheduled for an outpatient IR biopsy    Apparent high likelihood of renal cell carcinoma based upon the radiographic findings  -eventual IR biopsy of the mass  -urology follow-up      5  Acute kidney injury-complicating chronic kidney disease  The baseline creatinine is 0 9-1 4 by report  Renal function has improved since yesterday  Suspect all pre renal issue  No obstruction seen on imaging  -recheck BMP  -volume management  -avoid nephrotoxins  -no need to dose adjust ceftriaxone    6  Diabetes mellitus-type 2 with hyperglycemia and with long-term insulin use    Reviewed previous medical records in epic including previous culture results, radiographs, and nodes, in order to help formulate the above HPI  HISTORY OF PRESENT ILLNESS:  Reason for Consult:   HPI: Marlen Ruano is a 81y o  year old female transferred from [address] in the setting of reported pelvic pain, fever, and difficulty voiding and found to have nephrolithiasis who I am asked to assist with antibiotic management for positive blood culture  Patient apparently has been worked up by Urology for a right renal mass and was pending IR guided biopsy in the near future for probable renal cell carcinoma  She has suffered from persistent pelvic pain and some difficulty urinating for several weeks  However she apparently developed a fever and due to the persistent symptomatology in the onset of fever [facility]  She underwent CT of the abdomen pelvis that revealed nonobstructing nephrolithiasis  She was found to be febrile and with a brisk leukocytosis, therefore she had blood cultures obtained was given a dose of vancomycin and Zosyn and transferred to [facility] for further evaluation  Her blood cultures came back positive for gram-negative rods  Her antibiotics were changed to ceftriaxone  The patient's temperature and white count have come down and she has remained hemodynamically stable    She currently denies any dysuria or hematuria, denies any current fever chills or sweats, denies any nausea vomiting or diarrhea, she has suffered from constipation  She denies any cough or shortness of breath, denies any sore throat or rhinorrhea or nasal congestion  Today the patient is feeling better  Of note the urinalysis and urine culture were not obtained until after the blood cultures became positive and the patient had already received a dose of vancomycin and Zosyn more than 6 hours earlier    In addition to getting history from the patient with my limited Estonian, the use of a , a detailed review of the medical record in epic was accomplished including review of the notes, radiographs, and laboratory tests    REVIEW OF SYSTEMS:  A complete 12 point system-based review of systems is negative other than that noted in the HPI      PAST MEDICAL HISTORY:  Past Medical History:   Diagnosis Date    Anemia     Arthritis     Cancer [facility] )     kidney, liver    [facility]estive [facility] failure) [facility]     Chronic pain disorder     low back    COPD (chronic obstructive pulmonary disease) [facility])     Coronary artery disease     Diabetes mellitus ([facility] )     Glaucoma     Hematuria 2019    Hyperlipidemia     Hypertension     Kidney stones     Lumbar stenosis     Myocardial infarction ([facility] )     Osteoporosis     PAD (peripheral artery disease) ([facility])     Peripheral neuropathy     Shortness of breath      Past Surgical History:   Procedure Laterality Date    APPENDECTOMY      CARDIAC CATHETERIZATION  2019    CT EPIDURAL STEROID INJECTION (RYAN LUMBAR)  8/20/2019    CT EPIDURAL STEROID INJECTION (RYAN LUMBAR)  10/8/2019    CYSTOSCOPY      HAND SURGERY Right     HYSTERECTOMY      age 29    INCISION AND DRAINAGE OF WOUND Left 1/5/2019    Procedure: INCISION AND DRAINAGE (I&D) EXTREMITY;  Surgeon: Bethany Ridley MD;  [facility];  Service: General    IR ABDOMINAL ANGIOGRAPHY / INTERVENTION  9/19/2019    LITHOTRIPSY      MASS EXCISION      remova of back wall mass    OR CYSTO/URETERO W/LITHOTRIPSY &INDWELL STENT INSRT Bilateral 2019    Procedure: cystoscopy, bilateral retrograde pyelography, bilateral ureteroscopy with stone extraction / Deneise Kruse lithotripsy;  Surgeon: Kelle Garcia MD;  Location: [address] [place] [address];  Service: Urology    TONSILLECTOMY      TONSILLECTOMY         FAMILY HISTORY:  Non-contributory    SOCIAL HISTORY:  Social History   Social History     Substance and Sexual Activity   Alcohol Use Never    Frequency: Never    Comment: OCCASIONAL     Social History     Substance and Sexual Activity   Drug Use Never     Social History     Tobacco Use   Smoking Status Former Smoker    Packs/day: 1 00    Years: 40 00    Pack years: 40 00    Types: Cigarettes    Last attempt to quit: 2017    Years since quittin 8   Smokeless Tobacco Never Used   Tobacco Comment    states she quit but family living with her states she smokes       ALLERGIES:  Allergies   Allergen Reactions    Morphine And Related Vomiting    Tramadol Vomiting and Abdominal Pain     Other       MEDICATIONS:  All current active medications have been reviewed    Antibiotics:  Ceftriaxone 2    PHYSICAL EXAM:  Temp:  [98 6 °F (37 °C)-102 2 °F (39 °C)] 99 1 °F (37 3 °C)  HR:  [64-91] 91  Resp:  [14-26] 22  BP: (102-133)/(52-61) 133/61  SpO2:  [93 %-100 %] 94 %  Temp (24hrs), Av 7 °F (37 6 °C), Min:98 6 °F (37 °C), Max:102 2 °F (39 °C)  Current: Temperature: 99 1 °F (37 3 °C)    Intake/Output Summary (Last 24 hours) at 11/3/2019 1247  Last data filed at 11/3/2019 0636  Gross per 24 hour   Intake    Output 800 ml   Net -800 ml       General Appearance:  Elderly but appearing well, nontoxic, and in no distress   Head:  Normocephalic, without obvious abnormality, atraumatic   Eyes:  Conjunctiva pale and sclera anicteric, both eyes   Nose: Nares normal, mucosa normal, no drainage   Throat: Oropharynx moist without lesions   Neck: Supple, symmetrical, no adenopathy, no tenderness/mass/nodules   Back:   Symmetric, no curvature, ROM normal, no CVA tenderness   Lungs:   Clear to auscultation bilaterally, respirations unlabored   Chest Wall:  No tenderness or deformity   Heart:  RRR; no murmur, rub or gallop   Abdomen:   Soft, non-tender, non-distended, positive bowel sounds    Extremities: No cyanosis, clubbing or edema   Skin: No rashes or lesions  No draining wounds noted  Lymph nodes: Cervical, supraclavicular nodes normal   Neurologic: Alert and oriented times 3, extremity strength 5/5 and symmetric       LABS, IMAGING, & OTHER STUDIES:  Lab Results:  I have personally reviewed pertinent labs  Results from last 7 days   Lab Units 11/03/19  0557 11/02/19  1734 11/01/19  1043   WBC Thousand/uL 16 98* 22 60* 17 50*   HEMOGLOBIN g/dL 9 3* 10 5* 10 8*   PLATELETS Thousands/uL 210 282 263     Results from last 7 days   Lab Units 11/03/19  0557 11/02/19  1734 11/01/19  1043   SODIUM mmol/L 133* 135* 134*   POTASSIUM mmol/L 3 5 3 9 4 2   CHLORIDE mmol/L 99* 93* 92*   CO2 mmol/L 25 29 32*   BUN mg/dL 23 25 20   CREATININE mg/dL 1 70* 1 98* 1 49*   EGFR ml/min/1 73sq m 27 23* 32*   CALCIUM mg/dL 8 0* 9 1 9 2   AST U/L  --  23 24   ALT U/L  --  20 18   ALK PHOS U/L  --  128* 149*     Results from last 7 days   Lab Units 11/01/19  1044 11/01/19  1043   BLOOD CULTURE  No Growth at 24 hrs  Gram Negative Semaj*   GRAM STAIN RESULT   --  Gram negative rods*     Results from last 7 days   Lab Units 11/02/19  1734   PROCALCITONIN ng/ml 4 91*     Urinalysis-pyuria and bacteriuria    Urine cultures pending    Imaging Studies:     CT abdomen pelvis-stable nonobstructing bilateral renal calculi  No abscess or hydronephrosis    No other acute abnormality intra-abdominally

## 2019-11-03 NOTE — ASSESSMENT & PLAN NOTE
Wt Readings from Last 3 Encounters:   11/02/19 70 8 kg (156 lb 1 6 oz)   11/02/19 70 8 kg (156 lb 1 4 oz)   11/01/19 68 4 kg (150 lb 12 7 oz)     Euvolemic  ECHO: LVEF 75%, hyperdynamic left ventricular systolic function  Daily wt, I&O  Low Na diet

## 2019-11-03 NOTE — CONSULTS
UROLOGY CONSULTATION NOTE     Patient Identifiers: Linda Hinojosa (MRN [id number])  Service Requesting Consultation:  [facility] Internal Medicine  Service Providing Consultation:  Urology, Sunny Kumar MD    Date of Service: 11/3/2019  Consults    Reason for Consultation:  Nephrolithiasis, pyelonephritis, history of right renal mass    History of Present Illness:     Linda Hinojosa is a 81 y o  old with a history of right renal mass, with concern for metastatic renal cell carcinoma, also with history of bilateral nephrolithiasis, status post bilateral ureteroscopy with laser lithotripsy and subsequent stent removal, she has no obstructing stones on her most recent CT scan    This has been present for weeks and the patient has noted feeling fed up with being here in the hospital as associated symptoms  nothing and nothing are identified as aggravating and alleviating factors, respectively  Previous therapies include ureteroscopic stone intervention and stenting and discussion of percutaneous biopsy of real mass and previous work-up includes imaging and laboratory work-up  The patient has previously seen a urologist for this problem  The patient otherwise endorses a history of urologic malignancy or malady of stones and kidney cancer, concern for metastatic renal cell carcinoma  The patient denies a family history of urologic malignancy or malady      Past Medical, Past Surgical History:     Past Medical History:   Diagnosis Date    Anemia     Arthritis     Cancer ([address] )     kidney, liver    CHF (congestive heart failure) (HCC)     Chronic pain disorder     low back    COPD (chronic obstructive pulmonary disease) ([facility]CC)     Coronary artery disease     Diabetes mellitus ([address] )     Glaucoma     Hematuria 2019    Hyperlipidemia     Hypertension     Kidney stones     Lumbar stenosis     Myocardial infarction ([address] )     Osteoporosis     PAD (peripheral artery disease) (HCC)     Peripheral neuropathy     Shortness of breath    :    Past Surgical History:   Procedure Laterality Date    APPENDECTOMY      CARDIAC CATHETERIZATION  2019    CT EPIDURAL STEROID INJECTION (RYAN LUMBAR)  8/20/2019    CT EPIDURAL STEROID INJECTION (RYAN LUMBAR)  10/8/2019    CYSTOSCOPY      HAND SURGERY Right     HYSTERECTOMY      age 29    INCISION AND DRAINAGE OF WOUND Left 1/5/2019    Procedure: INCISION AND DRAINAGE (I&D) EXTREMITY;  Surgeon: Yulissa Murillo MD;  Location:  MAIN OR;  Service: General    IR ABDOMINAL ANGIOGRAPHY / INTERVENTION  9/19/2019    LITHOTRIPSY      MASS EXCISION      remova of back wall mass    IL CYSTO/URETERO W/LITHOTRIPSY &INDWELL STENT INSRT Bilateral 9/26/2019    Procedure: cystoscopy, bilateral retrograde pyelography, bilateral ureteroscopy with stone extraction / laser lithotripsy;  Surgeon: Bonnie Dave MD;  Location: [address] [place] [address] MAIN OR;  Service: Urology    TONSILLECTOMY      TONSILLECTOMY     :    Medications, Allergies:     Current Facility-Administered Medications   Medication Dose Route Frequency    acetaminophen (TYLENOL) tablet 650 mg  650 mg Oral Q6H PRN    amLODIPine (NORVASC) tablet 5 mg  5 mg Oral Daily    aspirin (ECOTRIN LOW STRENGTH) EC tablet 81 mg  81 mg Oral Daily    atorvastatin (LIPITOR) tablet 40 mg  40 mg Oral Daily With Dinner    bisacodyl (DULCOLAX) EC tablet 10 mg  10 mg Oral Daily    brimonidine (ALPHAGAN P) 0 15 % ophthalmic solution 1 drop  1 drop Both Eyes BID    [START ON 11/4/2019] cefTRIAXone (ROCEPHIN) 2,000 mg in dextrose 5 % 50 mL IVPB  2,000 mg Intravenous Q24H    docusate sodium (COLACE) capsule 100 mg  100 mg Oral BID    heparin (porcine) subcutaneous injection 5,000 Units  5,000 Units Subcutaneous Q8H [address]    insulin glargine (LANTUS) subcutaneous injection 15 Units 0 15 mL  15 Units Subcutaneous HS    insulin lispro (HumaLOG) 100 units/mL subcutaneous injection 1-5 Units  1-5 Units Subcutaneous Q6H [address]    ipratropium (ATROVENT HFA) inhaler 2 puff  2 puff Inhalation 4x Daily    latanoprost (XALATAN) 0 005 % ophthalmic solution 1 drop  1 drop Both Eyes HS    metoprolol tartrate (LOPRESSOR) tablet 25 mg  25 mg Oral Q12H [address]    multi-electrolyte (PLASMALYTE-A/ISOLYTE-S PH 7 4) IV solution  50 mL/hr Intravenous Continuous    ondansetron (ZOFRAN) injection 4 mg  4 mg Intravenous Q6H PRN    oxyCODONE (OxyCONTIN) 12 hr tablet 10 mg  10 mg Oral HS PRN    polyethylene glycol (MIRALAX) packet 17 g  17 g Oral Daily    pregabalin (LYRICA) capsule 100 mg  100 mg Oral Daily    tamsulosin (FLOMAX) capsule 0 4 mg  0 4 mg Oral QPM       Allergies: Allergies   Allergen Reactions    Morphine And Related Vomiting    Tramadol Vomiting and Abdominal Pain     Other   :    Social and Family History:   Social History:   Social History     Tobacco Use    Smoking status: Former Smoker     Packs/day: 1 00     Years: 40 00     Pack years: 40 00     Types: Cigarettes     Last attempt to quit: 2017     Years since quittin 8    Smokeless tobacco: Never Used    Tobacco comment: states she quit but family living with her states she smokes   Substance Use Topics    Alcohol use: Never     Frequency: Never     Comment: OCCASIONAL    Drug use: Never         Social History     Tobacco Use   Smoking Status Former Smoker    Packs/day: 1 00    Years: 40 00    Pack years: 40 00    Types: Cigarettes    Last attempt to quit: 2017    Years since quittin 8   Smokeless Tobacco Never Used   Tobacco Comment    states she quit but family living with her states she smokes       Family History:  Family History   Problem Relation Age of Onset    Diabetes Mother     No Known Problems Father     Throat cancer Daughter     No Known Problems Maternal Grandmother     No Known Problems Maternal Grandfather     No Known Problems Paternal Grandmother     No Known Problems Paternal Grandfather    :     Review of Systems:     General: Fever, chills, or night sweats: negative  Cardiac: Negative for chest pain  Pulmonary: Negative for shortness of breath  Gastrointestinal: Abdominal pain positive  Nausea, vomiting, or diarrhea negative,  Genitourinary: See HPI above  Patient does not have hematuria  All other systems were queried and were negative except as listed above in HPI and here in the ROS  Physical Exam:   /61 (BP Location: Left arm)   Pulse 91   Temp 99 1 °F (37 3 °C) (Temporal)   Resp 22   LMP 1990 (Within Years)   SpO2 94% Temp (24hrs), Av 8 °F (37 7 °C), Min:98 6 °F (37 °C), Max:102 2 °F (39 °C)  current; Temperature: 99 1 °F (37 3 °C)  I/O last 24 hours: In: -   Out: 800 [Urine:800]  General: Patient is angry and cantankerous    Cardiac: Peripheral edema: negative, peripheral pulses are present     Pulmonary: some increased work of breathing    Abdomen: Soft, non-tender, non-distended  Genitourinary: Negative CVA tenderness, negative suprapubic tenderness  Neurological: Cranial nerves II-XII are intact, no sensory deficits, no neurological deficits    Musculoskeletal: Extremities are warm, ROM is limited    Psychiatric: The patient's train of thought is linear and logical, mood and affect are abnormal and irritable, keeps talking about leaving AMA  Lymphatic: There is not adenopathy in the abdominal region      Skin: changes of aging and tobacco abuse are present    GUAN: none  no clots      Labs:     Lab Results   Component Value Date    HGB 9 3 (L) 2019    HCT 29 7 (L) 2019    WBC 16 98 (H) 2019     2019   ]    Lab Results   Component Value Date     2018    K 3 5 2019    CL 99 (L) 2019    CO2 25 2019    BUN 23 2019    CREATININE 1 70 (H) 2019    CALCIUM 8 0 (L) 2019    GLUCOSE 136 (H) 2018   ]    Imaging:   I personally reviewed the images and report of the following studies, and reviewed them with the patient:    CT Abdomen/Pelvis: Reviewed with the patient her CT scan findings of no obstructing stones, also reviewed previous film showing a renal mass on the right, this has been undergoing extensive workup on an outpatient basis by multiple members of our team, most recently Dr Carina Santo      ASSESSMENT:     81 y o  old female with  a medical history as listed above, right renal mass, admitted with positive blood cultures and pyelonephritis, no evidence of obstructing stone and no indication for renal unit decompression at this time  Discussed these findings with the patient in her native language of Faroese, answered all questions and concerns that she had today  Cherie Chambers PLAN:     Continued management per primary team     There is no plan for ureteral stent placement at this time  She will need ongoing workup of her likely metastatic renal cell carcinoma and she will require interventional Radiology performed biopsy as ordered previously by my colleague Dr Carina Santo    Urology will be available as needed for further questions in consultation, right now she requires further antibiosis and follow-up of urine cultures, agree with the Infectious Disease consultation that has also been ordered  The following portions of the patient's history were reviewed and updated as appropriate: allergies, current medications, past family history, past medical history, past social history, past surgical history and problem list     Portions of the above record have been created with voice recognition software  Occasional wrong word or "sound alike" substitution may have occurred due to the inherent limitations of voice recognition software  Read the chart carefully and recognize, using context, where substitution may have occurred  Thank you for allowing me to participate in this patients care  Please do not hesitate to call with any additional questions    Jez Sanchez MD

## 2019-11-03 NOTE — ASSESSMENT & PLAN NOTE
Creat 1 9, baseline varies, appears to be 0 9-1 4  Gentle IV hydration (h/o CHF)  Avoid hypotension and nephrotoxic agents  Monitor serum creatinine

## 2019-11-03 NOTE — ASSESSMENT & PLAN NOTE
Acute metabolic encephalopathy due to sepsis; h/o dementia     Monitor for improvement with tx of bacteremia

## 2019-11-03 NOTE — ED ATTENDING ATTESTATION
11/2/2019  I, [facility], saw and evaluated the patient  I have discussed the patient with the resident/non-physician practitioner and agree with the resident's/non-physician practitioner's findings, Plan of Care, and MDM as documented in the resident's/non-physician practitioner's note, except where noted  All available labs and Radiology studies were reviewed  I was present for key portions of any procedure(s) performed by the resident/non-physician practitioner and I was immediately available to provide assistance  At this point I agree with the current assessment done in the Emergency Department  I have conducted an independent evaluation of this patient a history and physical is as follows: This is an 59-year-old female presents to emergency department after receiving a call back return emergency department for 1 of her blood cultures was positive for gram-positive rods  Patient was seen evaluated yesterday and signed out against medical advice  Patient did have a documented temperature and a septic workup took place  Please physician assistant's note regarding specifics

## 2019-11-03 NOTE — ASSESSMENT & PLAN NOTE
· CT: Stable nonobstructing bilateral renal calculi measuring up to 10mm at the right ureteropelvic junction  · Ho recurrent renal stones and hematuria, s/p cystoscopy, B/L retrograde pyelography, laser lithotripsy, B/L ureteroscopy with stone extraction, B/L double-J stents placed by Dr Silvia Ramos 9/26/19   Stents removed Oct 9  · Gentle IV hydration due to h/o CHF  · Monitor I&O  · NPO for possible urology intervention  · PRN analgesics  · Continue tamsulosin  · Urology consult

## 2019-11-03 NOTE — PLAN OF CARE
Problem: METABOLIC, FLUID AND ELECTROLYTES - ADULT  Goal: Electrolytes maintained within normal limits  Description  INTERVENTIONS:  - Monitor labs and assess patient for signs and symptoms of electrolyte imbalances  - Administer electrolyte replacement as ordered  - Monitor response to electrolyte replacements, including repeat lab results as appropriate  - Instruct patient on fluid and nutrition as appropriate  Outcome: Progressing  Goal: Fluid balance maintained  Description  INTERVENTIONS:  - Monitor labs   - Monitor I/O and WT  - Instruct patient on fluid and nutrition as appropriate  - Assess for signs & symptoms of volume excess or deficit  Outcome: Progressing  Goal: Glucose maintained within target range  Description  INTERVENTIONS:  - Monitor Blood Glucose as ordered  - Assess for signs and symptoms of hyperglycemia and hypoglycemia  - Administer ordered medications to maintain glucose within target range  - Assess nutritional intake and initiate nutrition service referral as needed  Outcome: Progressing     Problem: SKIN/TISSUE INTEGRITY - ADULT  Goal: Skin integrity remains intact  Description  INTERVENTIONS  - Identify patients at risk for skin breakdown  - Assess and monitor skin integrity  - Assess and monitor nutrition and hydration status  - Monitor labs (i e  albumin)  - Assess for incontinence   - Turn and reposition patient  - Assist with mobility/ambulation  - Relieve pressure over bony prominences  - Avoid friction and shearing  - Provide appropriate hygiene as needed including keeping skin clean and dry  - Evaluate need for skin moisturizer/barrier cream  - Collaborate with interdisciplinary team (i e  Nutrition, Rehabilitation, etc )   - Patient/family teaching  Outcome: Progressing  Goal: Incision(s), wounds(s) or drain site(s) healing without S/S of infection  Description  INTERVENTIONS  - Assess and document risk factors for skin impairment   - Assess and document dressing, incision, wound bed, drain sites and surrounding tissue  - Consider nutrition services referral as needed  - Oral mucous membranes remain intact  - Provide patient/ family education  Outcome: Progressing  Goal: Oral mucous membranes remain intact  Description  INTERVENTIONS  - Assess oral mucosa and hygiene practices  - Implement preventative oral hygiene regimen  - Implement oral medicated treatments as ordered  - Initiate Nutrition services referral as needed  Outcome: Progressing     Problem: HEMATOLOGIC - ADULT  Goal: Maintains hematologic stability  Description  INTERVENTIONS  - Assess for signs and symptoms of bleeding or hemorrhage  - Monitor labs  - Administer supportive blood products/factors as ordered and appropriate  Outcome: Progressing     Problem: Potential for Falls  Goal: Patient will remain free of falls  Description  INTERVENTIONS:  - Assess patient frequently for physical needs  -  Identify cognitive and physical deficits and behaviors that affect risk of falls    -  [place] fall precautions as indicated by assessment   - Educate patient/family on patient safety including physical limitations  - Instruct patient to call for assistance with activity based on assessment  - Modify environment to reduce risk of injury  - Consider OT/PT consult to assist with strengthening/mobility  Outcome: Progressing

## 2019-11-03 NOTE — ASSESSMENT & PLAN NOTE
Transferred from [facility] for Urology intervention due to 10mm renal stone and severe sepsis  Met severe sepsis criteria with fever, tachypnea, significant leukocytosis (WBC 22), EULOGIO and lactic acidosis; blood cx with gram neg rods; suspect urinary source, possible pyelonephritis  CT: Stable nonobstructing b/l renal calculi measuring up to 10mm at R ureteropelvic junction  Pyelonephritis not excluded    UA ordered  Will start IV Rocephin  IV Hydration  Prn antipyretics  ID consult

## 2019-11-03 NOTE — ASSESSMENT & PLAN NOTE
s/p PCI 7/2019 showed multivessel disease,  no focal critical stenosis; recommended med mgmt ASA/plavix/B-Blocker/statin  Off plavix due to hematuria

## 2019-11-03 NOTE — PLAN OF CARE
Problem: METABOLIC, FLUID AND ELECTROLYTES - ADULT  Goal: Electrolytes maintained within normal limits  Description  INTERVENTIONS:  - Monitor labs and assess patient for signs and symptoms of electrolyte imbalances  - Administer electrolyte replacement as ordered  - Monitor response to electrolyte replacements, including repeat lab results as appropriate  - Instruct patient on fluid and nutrition as appropriate  Outcome: Progressing  Goal: Fluid balance maintained  Description  INTERVENTIONS:  - Monitor labs   - Monitor I/O and WT  - Instruct patient on fluid and nutrition as appropriate  - Assess for signs & symptoms of volume excess or deficit  Outcome: Progressing  Goal: Glucose maintained within target range  Description  INTERVENTIONS:  - Monitor Blood Glucose as ordered  - Assess for signs and symptoms of hyperglycemia and hypoglycemia  - Administer ordered medications to maintain glucose within target range  - Assess nutritional intake and initiate nutrition service referral as needed  Outcome: Progressing     Problem: SKIN/TISSUE INTEGRITY - ADULT  Goal: Skin integrity remains intact  Description  INTERVENTIONS  - Identify patients at risk for skin breakdown  - Assess and monitor skin integrity  - Assess and monitor nutrition and hydration status  - Monitor labs (i e  albumin)  - Assess for incontinence   - Turn and reposition patient  - Assist with mobility/ambulation  - Relieve pressure over bony prominences  - Avoid friction and shearing  - Provide appropriate hygiene as needed including keeping skin clean and dry  - Evaluate need for skin moisturizer/barrier cream  - Collaborate with interdisciplinary team (i e  Nutrition, Rehabilitation, etc )   - Patient/family teaching  Outcome: Progressing  Goal: Incision(s), wounds(s) or drain site(s) healing without S/S of infection  Description  INTERVENTIONS  - Assess and document risk factors for skin impairment   - Assess and document dressing, incision, wound bed, drain sites and surrounding tissue  - Consider nutrition services referral as needed  - Oral mucous membranes remain intact  - Provide patient/ family education  Outcome: Progressing  Goal: Oral mucous membranes remain intact  Description  INTERVENTIONS  - Assess oral mucosa and hygiene practices  - Implement preventative oral hygiene regimen  - Implement oral medicated treatments as ordered  - Initiate Nutrition services referral as needed  Outcome: Progressing     Problem: HEMATOLOGIC - ADULT  Goal: Maintains hematologic stability  Description  INTERVENTIONS  - Assess for signs and symptoms of bleeding or hemorrhage  - Monitor labs  - Administer supportive blood products/factors as ordered and appropriate  Outcome: Progressing

## 2019-11-03 NOTE — ASSESSMENT & PLAN NOTE
· CT: Stable nonobstructing bilateral renal calculi measuring up to 10mm at the right ureteropelvic junction  · Ho recurrent renal stones and hematuria, s/p cystoscopy, B/L retrograde pyelography, laser lithotripsy, B/L ureteroscopy with stone extraction, B/L double-J stents placed by Dr Consuelo Dsouza 9/26/19   Stents removed Oct 9    Appreciate Urology recommendations

## 2019-11-03 NOTE — ED NOTES
Pt sleeping  O2 dropped to 92%  Provider informed  Patient placed back on NC 2L  Stat at 97%       [facility]  11/02/19 2057

## 2019-11-03 NOTE — ASSESSMENT & PLAN NOTE
Transferred from [facility]  She does meet severe sepsis criteria with fever, tachypnea, significant leukocytosis (WBC 22), EULOGIO and lactic acidosis; blood cx with gram neg rods; suspect urinary source, possible pyelonephritis    CT: Stable nonobstructing b/l renal calculi measuring up to 10mm at R ureteropelvic junction  Pyelonephritis not excluded      Rocephin 2 gram every 24 hours - Dose increased due to bacteriemia  ID consulted as well as Urology - Per Urology no need for intervention and no obstruction

## 2019-11-03 NOTE — ASSESSMENT & PLAN NOTE
Creat 1 9 on admission, baseline varies, appears to be 0 9-1 4  Avoid hypotension and renally dose all medications  - Improved to 1 70  BMP for the AM

## 2019-11-03 NOTE — ASSESSMENT & PLAN NOTE
Wt Readings from Last 3 Encounters:   11/02/19 70 8 kg (156 lb 1 6 oz)   11/02/19 70 8 kg (156 lb 1 4 oz)   11/01/19 68 4 kg (150 lb 12 7 oz)     Euvolemic - No exacerbation  ECHO: LVEF 75%, hyperdynamic left ventricular systolic function  Daily wt, I&O

## 2019-11-03 NOTE — PROGRESS NOTES
Progress Note - Christine Carter 1935, 81 y o  female MRN: [id number]    Unit/Bed#: E5 -01 Encounter: [id number]    Primary Care Provider: Arely Rodriguez MD   Date and time admitted to hospital: 11/2/2019 10:25 PM        * Severe sepsis with acute organ dysfunction [facility])  Assessment & Plan  Transferred from [facility] [place]  She does meet severe sepsis criteria with fever, tachypnea, significant leukocytosis (WBC 22), EULOGIO and lactic acidosis; blood cx with gram neg rods; suspect urinary source, possible pyelonephritis    CT: Stable nonobstructing b/l renal calculi measuring up to 10mm at R ureteropelvic junction  Pyelonephritis not excluded  Rocephin 2 gram every 24 hours - Dose increased due to bacteriemia  ID consulted as well as Urology - Per Urology no need for intervention and no obstruction    Bilateral kidney stones  Assessment & Plan  · CT: Stable nonobstructing bilateral renal calculi measuring up to 10mm at the right ureteropelvic junction  · Ho recurrent renal stones and hematuria, s/p cystoscopy, B/L retrograde pyelography, laser lithotripsy, B/L ureteroscopy with stone extraction, B/L double-J stents placed by Dr Silvia Ramos 9/26/19  Stents removed Oct 9    Appreciate Urology recommendations    Type 2 diabetes mellitus with hyperglycemia, with long-term current use of insulin [facility])  Assessment & Plan  Lab Results   Component Value Date    HGBA1C 7 7 (H) 06/04/2019     ·     Recent Labs     11/02/19  2347 11/03/19  0540 11/03/19  1202   POCGLU 188* 180* 192*       Blood Sugar Average: Last 72 hrs:  (P) 308 [id number]     · Home regimen Januvia, Tresiba and Novolog  · Continue long acting insulin at decreased dose and SSI  · Diabetic diet    Bacteremia due to Gram-negative bacteria  Assessment & Plan  Blood cx shows E coli, sensitivity pending  Suspected urinary source      Acute kidney injury superimposed on chronic kidney disease ([facility] [address] )  Assessment & Plan  Creat 1 9 on admission, baseline varies, appears to be 0 9-1 4  Avoid hypotension and renally dose all medications  - Improved to 1 70  BMP for the AM    Chronic diastolic heart failure (HCC)  Assessment & Plan  Wt Readings from Last 3 Encounters:   11/02/19 70 8 kg (156 lb 1 6 oz)   11/02/19 70 8 kg (156 lb 1 4 oz)   11/01/19 68 4 kg (150 lb 12 7 oz)     Euvolemic - No exacerbation  ECHO: LVEF 75%, hyperdynamic left ventricular systolic function  Daily wt, I&O    Drug-induced constipation  Assessment & Plan    Enema given    Monitor BM- Add bowel regimen    Coronary artery disease involving native coronary artery of native heart without angina pectoris  Assessment & Plan  s/p PCI 7/2019 showed multivessel disease,  no focal critical stenosis; recommended med mgmt ASA/plavix/B-Blocker/statin  Off plavix due to hematuria - Resumed beta blocker    Acute encephalopathy  Assessment & Plan  Improved    Anemiaresolved as of 11/3/2019  Assessment & Plan  Hg/Hct 10/32, at baseline  Monitor [address] Internal Medicine Progress Note  Patient: Montrell Montes 81 y o  female   MRN: [id number]  PCP: Priyanka Jung MD  Unit/Bed#: E5 -69 Encounter: [id number]  Date Of Visit: 11/03/19    Assessment:    Principal Problem:    Severe sepsis with acute organ dysfunction ([facility] [address] )  Active Problems:    Type 2 diabetes mellitus with hyperglycemia, with long-term current use of insulin ([facility] [address] )    Bilateral kidney stones    Bacteremia due to Gram-negative bacteria    Acute kidney injury superimposed on chronic kidney disease (HCC)    Chronic diastolic heart failure (HCC)    Drug-induced constipation    Coronary artery disease involving native coronary artery of native heart without angina pectoris    Acute encephalopathy      Plan:    · Continue current antibiotics  · Infectious disease and neurology consultation  · Await cultures and sensitivities for bacteremia  ·        VTE Pharmacologic Prophylaxis:   Pharmacologic: Heparin  Mechanical VTE Prophylaxis in Place: Yes    Patient Centered Rounds: I have performed bedside rounds with nursing staff today  Discussions with Specialists or Other Care Team Provider:  Patient's daughter    Education and Discussions with Family / Patient:  Patient    Time Spent for Care: 30 minutes  More than 50% of total time spent on counseling and coordination of care as described above  Current Length of Stay: 1 day(s)    Current Patient Status: Inpatient   Certification Statement: The patient will continue to require additional inpatient hospital stay due to Bacteremia requiring intravenous antibiotics    Discharge Plan / Estimated Discharge Date:  48 hours    Code Status: Level 1 - Full Code      Subjective:   Patient seen and examined, translation services provided via daughter at bedside  , she has no chest pain shortness of breath or difficulty breathing  She does report fevers and feels unwell  She does have a history of nephrolithiasis, and has had previous stone number treatment  Denies any abdominal pain during my examination, no diarrhea    A complete and comprehensive 14 point organ system review has been performed and all other systems are negative other than stated above  Objective:     Vitals:   Temp (24hrs), Av 9 °F (37 2 °C), Min:97 5 °F (36 4 °C), Max:99 6 °F (37 6 °C)    Temp:  [97 5 °F (36 4 °C)-99 6 °F (37 6 °C)] 97 5 °F (36 4 °C)  HR:  [64-91] 76  Resp:  [14-22] 18  BP: ()/(52-61) 97/52  SpO2:  [93 %-100 %] 96 %  There is no height or weight on file to calculate BMI  Input and Output Summary (last 24 hours):        Intake/Output Summary (Last 24 hours) at 11/3/2019 1727  Last data filed at 11/3/2019 1201  Gross per 24 hour   Intake 360 ml   Output 800 ml   Net -440 ml       Physical Exam:     General: well appearing, no acute distress  HEENT: atraumatic, PERRLA, moist mucosa, normal pharynx, normal tonsils and adenoids, normal tongue, no fluid in sinuses  Neck: Trachea midline, no carotid bruit, no masses  Respiratory: normal chest wall expansion, CTA B, no r/r/w, no rubs  Cardiovascular: RRR, no m/r/g, Normal S1 and S2  Abdomen: Soft, non-tender, non-distended, normal bowel sounds in all quadrants, no hepatosplenomegaly, no tympany, notable CVA tenderness  Rectal: deferred  Musculoskeletal: normal ROM in upper and lower extremities  Integumentary: warm, dry, and pink, with no rash, purpura, or petechia  Heme/Lymph: no lymphadenopathy, no bruises  Neurological: Cranial Nerves II-XII grossly intact, no tics, normal sensation to pressure and light touch  Psychiatric: cooperative with normal mood, affect, and cognition      Additional Data:     Labs:    Results from last 7 days   Lab Units 11/03/19  0557   WBC Thousand/uL 16 98*   HEMOGLOBIN g/dL 9 3*   HEMATOCRIT % 29 7*   PLATELETS Thousands/uL 210   NEUTROS PCT % 85*   LYMPHS PCT % 6*   MONOS PCT % 8   EOS PCT % 0     Results from last 7 days   Lab Units 11/03/19  0557 11/02/19  1734   POTASSIUM mmol/L 3 5 3 9   CHLORIDE mmol/L 99* 93*   CO2 mmol/L 25 29   BUN mg/dL 23 25   CREATININE mg/dL 1 70* 1 98*   CALCIUM mg/dL 8 0* 9 1   ALK PHOS U/L  --  128*   ALT U/L  --  20   AST U/L  --  23     Results from last 7 days   Lab Units 11/02/19  1734   INR  1 08       * I Have Reviewed All Lab Data Listed Above  * Additional Pertinent Lab Tests Reviewed: [facility]    Imaging:    Reviewed previous imaging    Recent Cultures (last 7 days):     Results from last 7 days   Lab Units 11/01/19  1044 11/01/19  1043   BLOOD CULTURE  No Growth at 48 hrs   Escherichia coli*   GRAM STAIN RESULT   --  Gram negative rods*       Last 24 Hours Medication List:     Current Facility-Administered Medications:  acetaminophen 650 mg Oral Q6H PRN VELASQUEZ Estes    amLODIPine 5 mg Oral Daily VELASQUEZ Estes    aspirin 81 mg Oral Daily VELASQUEZ Estes    atorvastatin 40 mg Oral Daily With [employer], VELASQUEZ    bisacodyl 10 mg Oral Daily VELASQUEZ Mustafa    brimonidine 1 drop Both Eyes BID VELASQUEZ Mustafa    [START ON 11/4/2019] cefTRIAXone 2,000 mg Intravenous Q24H Joao Matt DO    docusate sodium 100 mg Oral BID VELASQUEZ Mustafa    heparin (porcine) 5,000 Units Subcutaneous [facility] VELASQUEZ Mustafa    insulin glargine 15 Units Subcutaneous HS VELASQUEZ Mustafa    insulin lispro 1-5 Units Subcutaneous Q6H [address], VELASQUEZ    ipratropium 2 puff Inhalation 4x Daily VELASQUEZ Mustafa    latanoprost 1 drop Both Eyes HS VELASQUEZ Mustafa    metoprolol tartrate 25 mg Oral Q12H [address] [facility], VELASQUEZ    multi-electrolyte 50 mL/hr Intravenous Continuous VELASQUEZ Mustafa Last Rate: 50 mL/hr (11/03/19 0102)   ondansetron 4 mg Intravenous Q6H PRN VELASQUEZ Mustafa    oxyCODONE 10 mg Oral HS PRN VELASQUEZ Mustafa    polyethylene glycol 17 g Oral Daily VELASQUEZ Mustafa    pregabalin 100 mg Oral Daily VELASQUEZ Mustafa    saccharomyces boulardii 250 mg Oral BID Joao Matt DO    tamsulosin 0 4 mg Oral QPM VELASQUEZ Mustafa         Today, Patient Was Seen By: Julio C Martino DO    ** Please Note: This note has been constructed using a voice recognition system   **

## 2019-11-03 NOTE — ASSESSMENT & PLAN NOTE
Lab Results   Component Value Date    HGBA1C 7 7 (H) 06/04/2019     ·     Recent Labs     11/02/19  2347 11/03/19  0540 11/03/19  1202   POCGLU 188* 180* 192*       Blood Sugar Average: Last 72 hrs:  (P) 482 [id number]     · Home regimen Januvia, Tresiba and Novolog  · Continue long acting insulin at decreased dose and SSI  · Diabetic diet

## 2019-11-03 NOTE — ASSESSMENT & PLAN NOTE
s/p PCI 7/2019 showed multivessel disease,  no focal critical stenosis; recommended med mgmt ASA/plavix/B-Blocker/statin  Off plavix due to hematuria - Resumed beta blocker

## 2019-11-03 NOTE — ASSESSMENT & PLAN NOTE
Blood cx shows gram-negative rods; sensitivity pending  Suspect urinary source    UA pending  IV Rocephin ordered; see above plan severe sepsis

## 2019-11-03 NOTE — H&P
H&P- Nickmerrill Rack 1935, 81 y o  female MRN: [id number]    Unit/Bed#: E5 -01 Encounter: [id number]    Primary Care Provider: Kang Shelley MD   Date and time admitted to hospital: 11/2/2019 10:25 PM      * Severe sepsis with acute organ dysfunction [facility])  Assessment & Plan  Transferred from [facility] for Urology intervention due to 10mm renal stone and severe sepsis  Met severe sepsis criteria with fever, tachypnea, significant leukocytosis (WBC 22), EULOGIO and lactic acidosis; blood cx with gram neg rods; suspect urinary source, possible pyelonephritis  CT: Stable nonobstructing b/l renal calculi measuring up to 10mm at R ureteropelvic junction  Pyelonephritis not excluded  UA ordered  Will start IV Rocephin  IV Hydration  Prn antipyretics  ID consult    Bacteremia due to Gram-negative bacteria  Assessment & Plan  Blood cx shows gram-negative rods; sensitivity pending  Suspect urinary source  UA pending  IV Rocephin ordered; see above plan severe sepsis    Bilateral kidney stones  Assessment & Plan  · CT: Stable nonobstructing bilateral renal calculi measuring up to 10mm at the right ureteropelvic junction  · Ho recurrent renal stones and hematuria, s/p cystoscopy, B/L retrograde pyelography, laser lithotripsy, B/L ureteroscopy with stone extraction, B/L double-J stents placed by Dr Adrianna Zhong 9/26/19  Stents removed Oct 9  · Gentle IV hydration due to h/o CHF  · Monitor I&O  · NPO for possible urology intervention  · PRN analgesics  · Continue tamsulosin  · Urology consult    Acute kidney injury superimposed on chronic kidney disease ([address] )  Assessment & Plan  Creat 1 9, baseline varies, appears to be 0 9-1 4  Gentle IV hydration (h/o CHF)  Avoid hypotension and nephrotoxic agents  Monitor serum creatinine    Acute encephalopathy  Assessment & Plan  Acute metabolic encephalopathy due to sepsis; h/o dementia     Monitor for improvement with tx of bacteremia    Coronary artery disease involving native coronary artery of native heart without angina pectoris  Assessment & Plan  s/p PCI 7/2019 showed multivessel disease,  no focal critical stenosis; recommended med mgmt ASA/plavix/B-Blocker/statin  Off plavix due to hematuria    Anemia  Assessment & Plan  Hg/Hct 10/32, at baseline  Monitor CBC    Drug-induced constipation  Assessment & Plan  Bowel regimen  Enema x1  Monitor BM    Chronic diastolic heart failure (HCC)  Assessment & Plan  Wt Readings from Last 3 Encounters:   11/02/19 70 8 kg (156 lb 1 6 oz)   11/02/19 70 8 kg (156 lb 1 4 oz)   11/01/19 68 4 kg (150 lb 12 7 oz)     Euvolemic  ECHO: LVEF 75%, hyperdynamic left ventricular systolic function  Daily wt, I&O  Low Na diet     Type 2 diabetes mellitus with hyperglycemia, with long-term current use of insulin (HCC)  Assessment & Plan  Lab Results   Component Value Date    HGBA1C 7 7 (H) 06/04/2019     · Home regimen Januvia, Tresiba and Novolog; hold [place] [facility] [place] while inpatient  · Hold Novolog while NPO  · Continue long acting insulin at decreased dose  · SSI  · ADA diet     No results for input(s): POCGLU in the last 72 hours  Blood Sugar Average: Last 72 hrs:      VTE Prophylaxis: Heparin  / sequential compression device   Code Status: FC  POLST: POLST is not applicable to this patient  Discussion with family:     Anticipated Length of Stay:  Patient will be admitted on an Inpatient basis with an anticipated length of stay of  > 2 midnights  Justification for Hospital Stay: severe sepsis    Total Time for Visit, including Counseling / Coordination of Care: 1 hour  Greater than 50% of this total time spent on direct patient counseling and coordination of care  Chief Complaint:   "fell terrible"    History of Present Illness:    Mallory Medina is a 81 y o  female transferred from [facility] due to 10mm renal stone  Patient presents with c/o feeling ill, reports pelvic pain and fever   Patient is a fair historian, AAOx1 to person only, is agitated when asked questions  States she is having difficulty passing urine although denies oliguria, dysuria, frequency or hematuria  C/o constipation, does not move bowels for up to 2 weeks at a time; she is unsure if she is on laxatives or opioids  Unable to obtain further information from patient  Patient denies any other symptoms, ROS negative for chest pain, SOB, abdominal pain, nausea, emesis or diarrhea  Review of Systems:    Review of Systems   Constitutional: Positive for fever  Respiratory: Positive for cough  Negative for shortness of breath  Cardiovascular: Negative  Gastrointestinal: Positive for constipation  Negative for abdominal pain, diarrhea, nausea and vomiting  Genitourinary: Positive for difficulty urinating, flank pain and pelvic pain  Negative for decreased urine volume, dysuria, frequency, hematuria and urgency  Neurological: Negative          Past Medical and Surgical History:     Past Medical History:   Diagnosis Date    Anemia     Arthritis     Cancer ([facility] [place] [facility] )     kidney, liver    [facility]est[facility]     Chronic pain disorder     low back    COPD (chronic obstructive pulmonary disease) [facility]     Coronary artery disease     Diabetes mellitus ([facility] [place] [facility] )     Glaucoma     Hematuria 2019    Hyperlipidemia     Hypertension     Kidney stones     Lumbar stenosis     Myocardial infarction ([facility] [facility] )     Osteoporosis     PAD (peripheral artery disease) ([facility]     Peripheral neuropathy     Shortness of breath        Past Surgical History:   Procedure Laterality Date    APPENDECTOMY      CARDIAC CATHETERIZATION  2019    CT EPIDURAL STEROID INJECTION (RYAN LUMBAR)  8/20/2019    CT EPIDURAL STEROID INJECTION (RYAN LUMBAR)  10/8/2019    CYSTOSCOPY      HAND SURGERY Right     HYSTERECTOMY      age 29    INCISION AND DRAINAGE OF WOUND Left 1/5/2019    Procedure: INCISION AND DRAINAGE (I&D) EXTREMITY;  Surgeon: Abigail Mosquera MD;  [facility] MAIN [facility];  Service: General    IR ABDOMINAL ANGIOGRAPHY / INTERVENTION  9/19/2019    LITHOTRIPSY      MASS EXCISION      [place] of back wall mass    UT CYSTO/URETERO W/LITHOTRIPSY &INDWELL STENT INSRT Bilateral 9/26/2019    Procedure: cystoscopy, bilateral retrograde pyelography, bilateral ureteroscopy with stone extraction / Melody Herter lithotripsy;  Surgeon: Yoni Lewis MD;  Location[facility] MAIN OR;  Service: Urology    TONSILLECTOMY      TONSILLECTOMY         Meds/Allergies:    Prior to Admission medications    Medication Sig Start Date End Date Taking? Authorizing Provider   amLODIPine (NORVASC) 5 mg tablet Take 1 tablet (5 mg total) by mouth daily 8/5/19   Arely Rodriguez MD   aspirin 81 mg chewable tablet Chew 1 tablet (81 mg total) daily 7/10/19   Conchita Eugene MD   atorvastatin (LIPITOR) 40 mg tablet Take 1 tablet (40 mg total) by mouth every 24 hours 9/24/19   Arely Rodriguez MD   brimonidine tartrate 0 2 % ophthalmic solution INSTILL 1 DROP INTO BOTH EYES 2 TIMES PER DAY 5/31/19   Historical Provider, MD   calcitonin, salmon, (MIACALCIN) 200 units/act nasal spray 1 spray into each nostril daily 7/3/19   Arely Rodriguez MD   dorzolamide (TRUSOPT) 2 % ophthalmic solution Administer 1 drop to both eyes 3 (three) times a day 6/11/19   Arely Rodriguez MD   insulin degludec (TRESIBA FLEXTOUCH) 100 units/mL injection pen To use 25 U at bedtime   6/11/19   Arely Rodriguez MD   ipratropium (ATROVENT HFA) 17 mcg/act inhaler Inhale 2 puffs 4 (four) times a day Ninety day supplies please 6/11/19   Arely Rodriguez MD   Lancets (FREESTYLE) lancets Check blood sugar three times a day 7/17/19   Arely Rodriguez MD   latanoprost (XALATAN) 0 005 % ophthalmic solution ADMINISTER 1 DROP TO BOTH EYES DAILY AT BEDTIME 8/11/19   Historical Provider, MD   LINZESS 290 MCG CAPS TAKE ONE CAPSULE BY MOUTH DAILY ROSITA TOMASA CAPSULA POR VIA ORAL Pam Null 7/1/19   Historical Provider, MD   LINZESS 290 MCG CAPS TAKE ONE CAPSULE BY MOUTH DAILYTOMAR TOMASA CAPSULA POR VIA ORAL DIARIAMENTE 10/8/19   Chinedu Powell MD   metoprolol tartrate (LOPRESSOR) 25 mg tablet Take 1 tablet (25 mg total) by mouth every 12 (twelve) hours 8/5/19   Chinedu Powell MD   NOVOLOG FLEXPEN 100 units/mL injection pen 12 UNITS 3 TIMES A DAY WITH MEALS Ninety day supplies please 6/11/19   Chinedu Powell MD   oxyCODONE (OxyCONTIN) 10 mg 12 hr tablet Take 1 tablet (10 mg total) by mouth daily at bedtimeMax Daily Amount: 10 mg 10/28/19   Chinedu Powell MD   polyethylene glycol (MIRALAX) 17 g packet Take 17 g by mouth daily 10/28/19   Chinedu Powell MD   pregabalin (LYRICA) 100 mg capsule Take 1 capsule (100 mg total) by mouth 2 (two) times a day for 100 days 6/11/19 9/19/19  Chinedu Powell MD   pregabalin (LYRICA) 100 mg capsule TAKE ONE CAPSULE BY MOUTH TWICE DAILY ROSITA TOMASA CAPSULA POR VIA ORAL DOS VECES AL MAC 9/27/19   Historical Provider, MD   sitaGLIPtin (JANUVIA) 50 mg tablet Take 1 tablet (50 mg total) by mouth daily 6/11/19   Chinedu Powell MD   Sodium Phosphates (ENEMA READY-TO-USE) 7-19 GM/118ML ENEM Insert 1 Bottle into the rectum daily 10/28/19   Chinedu Powell MD   tamsulosin [facility]) 0 4 mg Take 1 capsule (0 4 mg total) by mouth every evening 10/24/19   VELASQUEZ Ramírez     I have reviewed home medications using allscripts  Allergies:    Allergies   Allergen Reactions    Morphine And Related Vomiting    Tramadol Vomiting and Abdominal Pain     Other       Social History:     Marital Status: Single   Occupation: Retired  Patient Pre-hospital Living Situation: resides at home w daughter  Patient Pre-hospital Level of Mobility: cane  Patient Pre-hospital Diet Restrictions:   Substance Use History:   Social History     Substance and Sexual Activity   Alcohol Use Never    Frequency: Never    Comment: OCCASIONAL     Social History     Tobacco Use   Smoking Status Former Smoker    Packs/day: 1 00    Years: 40 00    Pack years: 40 00    Types: Cigarettes  Last attempt to quit: 2017    Years since quittin 8   Smokeless Tobacco Never Used   Tobacco Comment    states she quit but family living with her states she smokes     Social History     Substance and Sexual Activity   Drug Use Never       Family History:    Family History   Problem Relation Age of Onset    Diabetes Mother     No Known Problems Father     Throat cancer Daughter     No Known Problems Maternal Grandmother     No Known Problems Maternal Grandfather     No Known Problems Paternal Grandmother     No Known Problems Paternal Grandfather        Physical Exam:     Vitals:   Blood Pressure: 110/58 (19)  Pulse: 73 (19)  Respirations: 18 (19)  SpO2: 99 % (19)    Physical Exam   Constitutional: She appears well-developed  No distress  HENT:   Head: Normocephalic and atraumatic  Eyes: No scleral icterus  Neck: Neck supple  Cardiovascular: Normal rate, regular rhythm and intact distal pulses  No murmur heard  Pulmonary/Chest: Effort normal  No stridor  No respiratory distress  She has no wheezes  She has no rales  Decreased breath sounds   Abdominal: Soft  Bowel sounds are normal  She exhibits no distension and no mass  There is no tenderness  There is no guarding  Musculoskeletal: She exhibits no edema  Neurological: She is alert  AAOx1 to person only   Skin: Skin is warm and dry  She is not diaphoretic  No pallor  Psychiatric:   Irritable     Additional Data:     Lab Results: I have personally reviewed pertinent reports        Results from last 7 days   Lab Units 19  1734   WBC Thousand/uL 22 60*   HEMOGLOBIN g/dL 10 5*   HEMATOCRIT % 32 5*   PLATELETS Thousands/uL 282   BANDS PCT % 1   LYMPHO PCT % 8*   MONO PCT % 9   EOS PCT % 1     Results from last 7 days   Lab Units 19  1734   SODIUM mmol/L 135*   POTASSIUM mmol/L 3 9   CHLORIDE mmol/L 93*   CO2 mmol/L 29   BUN mg/dL 25   CREATININE mg/dL 1 98*   ANION GAP mmol/L 13   CALCIUM mg/dL 9 1   ALBUMIN g/dL 3 8   TOTAL BILIRUBIN mg/dL 0 80   ALK PHOS U/L 128*   ALT U/L 20   AST U/L 23   GLUCOSE RANDOM mg/dL 162*     Results from last 7 days   Lab Units 11/02/19  1734   INR  1 08     Results from last 7 days   Lab Units 11/02/19  2347   POC GLUCOSE mg/dl 188*         Results from last 7 days   Lab Units 11/02/19  1935 11/02/19  1734 11/01/19  1044   LACTIC ACID mmol/L 1 0 2 4* 1 2       Imaging: I have personally reviewed pertinent reports  No orders to display       EKG, Pathology, and Other Studies Reviewed on Admission:   · CT    Allscripts / Epic Records Reviewed: Yes     ** Please Note: This note has been constructed using a voice recognition system   **

## 2019-11-04 PROBLEM — E87.1 HYPONATREMIA: Status: ACTIVE | Noted: 2019-11-04

## 2019-11-04 LAB
ANION GAP SERPL CALCULATED.3IONS-SCNC: 7 MMOL/L (ref 4–13)
BACTERIA BLD CULT: ABNORMAL
BACTERIA UR CULT: NORMAL
BASOPHILS # BLD AUTO: 0.05 THOUSANDS/ΜL (ref 0–0.1)
BASOPHILS NFR BLD AUTO: 1 % (ref 0–1)
BUN SERPL-MCNC: 17 MG/DL (ref 5–25)
CALCIUM SERPL-MCNC: 7.8 MG/DL (ref 8.3–10.1)
CHLORIDE SERPL-SCNC: 99 MMOL/L (ref 100–108)
CO2 SERPL-SCNC: 25 MMOL/L (ref 21–32)
CREAT SERPL-MCNC: 1.36 MG/DL (ref 0.6–1.3)
EOSINOPHIL # BLD AUTO: 0.07 THOUSAND/ΜL (ref 0–0.61)
EOSINOPHIL NFR BLD AUTO: 1 % (ref 0–6)
ERYTHROCYTE [DISTWIDTH] IN BLOOD BY AUTOMATED COUNT: 16.5 % (ref 11.6–15.1)
GFR SERPL CREATININE-BSD FRML MDRD: 36 ML/MIN/1.73SQ M
GLUCOSE SERPL-MCNC: 159 MG/DL (ref 65–140)
GLUCOSE SERPL-MCNC: 166 MG/DL (ref 65–140)
GLUCOSE SERPL-MCNC: 168 MG/DL (ref 65–140)
GLUCOSE SERPL-MCNC: 202 MG/DL (ref 65–140)
GLUCOSE SERPL-MCNC: 231 MG/DL (ref 65–140)
GRAM STN SPEC: ABNORMAL
HCT VFR BLD AUTO: 28.1 % (ref 34.8–46.1)
HGB BLD-MCNC: 8.7 G/DL (ref 11.5–15.4)
IMM GRANULOCYTES # BLD AUTO: 0.05 THOUSAND/UL (ref 0–0.2)
IMM GRANULOCYTES NFR BLD AUTO: 1 % (ref 0–2)
LYMPHOCYTES # BLD AUTO: 1.26 THOUSANDS/ΜL (ref 0.6–4.47)
LYMPHOCYTES NFR BLD AUTO: 12 % (ref 14–44)
MCH RBC QN AUTO: 26.4 PG (ref 26.8–34.3)
MCHC RBC AUTO-ENTMCNC: 31 G/DL (ref 31.4–37.4)
MCV RBC AUTO: 85 FL (ref 82–98)
MONOCYTES # BLD AUTO: 1.04 THOUSAND/ΜL (ref 0.17–1.22)
MONOCYTES NFR BLD AUTO: 10 % (ref 4–12)
NEUTROPHILS # BLD AUTO: 8.53 THOUSANDS/ΜL (ref 1.85–7.62)
NEUTS SEG NFR BLD AUTO: 75 % (ref 43–75)
NRBC BLD AUTO-RTO: 0 /100 WBCS
PLATELET # BLD AUTO: 221 THOUSANDS/UL (ref 149–390)
PMV BLD AUTO: 10.6 FL (ref 8.9–12.7)
POTASSIUM SERPL-SCNC: 3.5 MMOL/L (ref 3.5–5.3)
PROCALCITONIN SERPL-MCNC: 2.57 NG/ML
RBC # BLD AUTO: 3.29 MILLION/UL (ref 3.81–5.12)
SODIUM SERPL-SCNC: 131 MMOL/L (ref 136–145)
WBC # BLD AUTO: 11 THOUSAND/UL (ref 4.31–10.16)

## 2019-11-04 PROCEDURE — 99233 SBSQ HOSP IP/OBS HIGH 50: CPT | Performed by: INTERNAL MEDICINE

## 2019-11-04 PROCEDURE — 82948 REAGENT STRIP/BLOOD GLUCOSE: CPT

## 2019-11-04 PROCEDURE — 99232 SBSQ HOSP IP/OBS MODERATE 35: CPT | Performed by: INTERNAL MEDICINE

## 2019-11-04 PROCEDURE — 85025 COMPLETE CBC W/AUTO DIFF WBC: CPT | Performed by: INTERNAL MEDICINE

## 2019-11-04 PROCEDURE — 84145 PROCALCITONIN (PCT): CPT | Performed by: INTERNAL MEDICINE

## 2019-11-04 PROCEDURE — 80048 BASIC METABOLIC PNL TOTAL CA: CPT | Performed by: INTERNAL MEDICINE

## 2019-11-04 RX ORDER — CEFAZOLIN SODIUM 2 G/50ML
2000 SOLUTION INTRAVENOUS EVERY 12 HOURS
Status: DISCONTINUED | OUTPATIENT
Start: 2019-11-04 | End: 2019-11-05

## 2019-11-04 RX ADMIN — Medication 250 MG: at 08:46

## 2019-11-04 RX ADMIN — HEPARIN SODIUM 5000 UNITS: 5000 INJECTION INTRAVENOUS; SUBCUTANEOUS at 14:12

## 2019-11-04 RX ADMIN — HEPARIN SODIUM 5000 UNITS: 5000 INJECTION INTRAVENOUS; SUBCUTANEOUS at 06:09

## 2019-11-04 RX ADMIN — ATORVASTATIN CALCIUM 40 MG: 40 TABLET, FILM COATED ORAL at 15:45

## 2019-11-04 RX ADMIN — DOCUSATE SODIUM 100 MG: 100 CAPSULE, LIQUID FILLED ORAL at 08:46

## 2019-11-04 RX ADMIN — Medication 250 MG: at 18:05

## 2019-11-04 RX ADMIN — PREGABALIN 100 MG: 50 CAPSULE ORAL at 08:46

## 2019-11-04 RX ADMIN — DOCUSATE SODIUM 100 MG: 100 CAPSULE, LIQUID FILLED ORAL at 18:05

## 2019-11-04 RX ADMIN — IPRATROPIUM BROMIDE 2 PUFF: 17 AEROSOL, METERED RESPIRATORY (INHALATION) at 12:10

## 2019-11-04 RX ADMIN — INSULIN LISPRO 1 UNITS: 100 INJECTION, SOLUTION INTRAVENOUS; SUBCUTANEOUS at 06:20

## 2019-11-04 RX ADMIN — BRIMONIDINE TARTRATE 1 DROP: 1.5 SOLUTION OPHTHALMIC at 18:05

## 2019-11-04 RX ADMIN — IPRATROPIUM BROMIDE 2 PUFF: 17 AEROSOL, METERED RESPIRATORY (INHALATION) at 18:05

## 2019-11-04 RX ADMIN — TAMSULOSIN HYDROCHLORIDE 0.4 MG: 0.4 CAPSULE ORAL at 18:05

## 2019-11-04 RX ADMIN — LATANOPROST 1 DROP: 50 SOLUTION OPHTHALMIC at 22:26

## 2019-11-04 RX ADMIN — BRIMONIDINE TARTRATE 1 DROP: 1.5 SOLUTION OPHTHALMIC at 08:46

## 2019-11-04 RX ADMIN — ASPIRIN 81 MG: 81 TABLET, COATED ORAL at 08:46

## 2019-11-04 RX ADMIN — CEFTRIAXONE 2000 MG: 2 INJECTION, POWDER, FOR SOLUTION INTRAMUSCULAR; INTRAVENOUS at 00:50

## 2019-11-04 RX ADMIN — INSULIN LISPRO 1 UNITS: 100 INJECTION, SOLUTION INTRAVENOUS; SUBCUTANEOUS at 18:05

## 2019-11-04 RX ADMIN — CEFAZOLIN SODIUM 2000 MG: 2 SOLUTION INTRAVENOUS at 12:10

## 2019-11-04 RX ADMIN — POLYETHYLENE GLYCOL 3350 17 G: 17 POWDER, FOR SOLUTION ORAL at 08:47

## 2019-11-04 RX ADMIN — INSULIN LISPRO 2 UNITS: 100 INJECTION, SOLUTION INTRAVENOUS; SUBCUTANEOUS at 12:13

## 2019-11-04 RX ADMIN — BISACODYL 10 MG: 5 TABLET, COATED ORAL at 08:46

## 2019-11-04 RX ADMIN — INSULIN GLARGINE 15 UNITS: 100 INJECTION, SOLUTION SUBCUTANEOUS at 22:26

## 2019-11-04 RX ADMIN — INSULIN LISPRO 1 UNITS: 100 INJECTION, SOLUTION INTRAVENOUS; SUBCUTANEOUS at 00:53

## 2019-11-04 RX ADMIN — IPRATROPIUM BROMIDE 2 PUFF: 17 AEROSOL, METERED RESPIRATORY (INHALATION) at 08:48

## 2019-11-04 NOTE — ASSESSMENT & PLAN NOTE
Transferred from [facility]  She does meet severe sepsis criteria with fever, tachypnea, significant leukocytosis (WBC 22), EULOGIO and lactic acidosis; blood cx with gram neg rods; suspect urinary source, possible pyelonephritis    CT: Stable nonobstructing b/l renal calculi measuring up to 10mm at R ureteropelvic junction  Pyelonephritis not excluded    -infectious Disease follow-up appreciated, lactic acidosis resolved, repeat procalcitonin pending, repeat blood cultures negative after 24 hours  - Per Urology no need for intervention and no obstruction

## 2019-11-04 NOTE — ASSESSMENT & PLAN NOTE
Likely secondary to UTI  -urinalysis consistent with UTI however culture not collected until after 1st dose of antibiotics, culture results pending  -will follow up repeat blood cultures  -continue cefazolin

## 2019-11-04 NOTE — ASSESSMENT & PLAN NOTE
Lab Results   Component Value Date    HGBA1C 7 7 (H) 06/04/2019     ·     Recent Labs     11/04/19  0052 11/04/19  0619 11/04/19  1212 11/04/19  1623   POCGLU 168* 159* 231* 202*       Blood Sugar Average: Last 72 hrs:  (P) 189     · Home regimen Januvia, Tresiba and Novolog  · Continue long acting insulin at decreased dose and SSI  · Diabetic diet

## 2019-11-04 NOTE — ASSESSMENT & PLAN NOTE
Patient with right renal mass  -urology follow-up appreciated, patient will need ongoing workup for likely med metastatic renal cell carcinoma and will eventrally require intervention Radiology performed biopsy  -will continue with treatment of bacteremia and UTI at this point  -will need outpatient urology follow-up

## 2019-11-04 NOTE — PROGRESS NOTES
Progress Note - Infectious Disease   Andrew Lu 81 y o  female MRN: [id number]  Unit/Bed#: E5 -01 Encounter: [id number]      Impression/Plan:  1  Severe sepsis  POA  Fever, leukocytosis, and lactic acidosis  Appears to be secondary to E coli bacteremia from a UTI  Formal urine culture is pending  No other clear sources appreciated  The patient remains hemodynamically stable and nontoxic despite her systemic illness  Her lactic acidosis has resolved  Fever curve and WBC count have trended down  Procalcitonin was elevated at 4 91, a repeat procalcitonin is pending  Repeat blood cultures are negative after 24 hours    -antibiotic as below  -check CBC and BMP tomorrow  -follow up repeat blood cultures  -follow-up pending procalcitonin  -trend procalcitonin again tomorrow am  -monitor vitals  -supportive care     2  E coli bacteremia  Likely secondary to UTI  Patient has notable  symptoms, a renal mass, and stones without obstruction  The urinalysis is consistent with UTI, however the urine culture was not collected until well after the 1st dose of antibiotics  Culture results are pending  Repeat blood cultures are negative after 24 hours   -antibiotics as above  -check CBC tomorrow  -follow up repeat blood cultures  -no additional ID workup for now     3  Presumptive urinary tract infection  Likely gram-negative based upon the blood culture results  Formal urine cultures pending however it was collected after patient already received antibiotics  This is all in the setting of a right renal mass and nonobstructing nephrolithiasis  -antibiotics as above  -monitor symptomatology  -follow up urine culture  -continue follow-up with Urology     4  Right renal mass  Patient has been scheduled for an outpatient IR biopsy  Apparent high likelihood of renal cell carcinoma with liver metastasis based upon the radiographic findings    -eventual IR biopsy of the mass  -continue follow-up with Urology    5  Acute kidney injury  On CKD  Upon review of patient's past medical records it appears that her baseline creatinine is 0 9-1 4  Suspect all pre renal issue  No obstruction seen on imaging  Creatinine is 1 36 today   -monitor BMP  -dose adjust antibiotics for renal function as needed  -volume management  -avoid nephrotoxins    6  Type 2 diabetes mellitus with hyperglycemia and with long-term insulin use  Patient's last hemoglobin A1c was 7 7% on 2019  Recommend commitment to tighter glycemic control for overall health, especially in the setting of infection above   -blood glucose management per primary service     Above plan was discussed in detail with patient at the bedside  Kenneth Somali interpretation service utilized during my visit  Above plan was discussed in detail with SL[facility] attending, Dr Shawna Gonzalez  Antibiotics:  Ceftriaxone - stop  Cefazolin 1  Antibiotics 3    Subjective:  Patient was upset when I entered the room  She could not figure out how to call for help to use the restroom  I did grab the PCA and we assisted her into the restroom where she was able to void  After she was back in bed I did give her her call bell and reviewed the call button with her  She denies active dysuria  She denies fever, chills, sweats, shakes; no nausea, vomiting, abdominal pain, or diarrhea; no cough, shortness of breath, or chest pain  She tells me she feels sleepy  No new symptoms  Objective:  Vitals:  Temp:  [97 °F (36 1 °C)-99 3 °F (37 4 °C)] 97 °F (36 1 °C)  HR:  [64-81] 64  Resp:  [16-18] 16  BP: ()/(52-61) 109/61  SpO2:  [96 %-97 %] 97 %  Temp (24hrs), Av 2 °F (36 8 °C), Min:97 °F (36 1 °C), Max:99 3 °F (37 4 °C)  Current: Temperature: (!) 97 °F (36 1 °C)    Physical Exam:   General Appearance:  Alert, interactive, nontoxic, no acute distress  Throat: Oropharynx moist without lesions      Lungs:   Clear to auscultation bilaterally; no wheezes, rhonchi or rales; respirations unlabored   Heart:  RRR; no murmur, rub or gallop   Abdomen:   Soft, non-tender, non-distended, positive bowel sounds  Back: No CVA tenderness   Extremities: No clubbing or cyanosis, no edema   Skin: No new rashes, lesions, or draining wounds noted on exposed skin  Labs, Imaging, & Other studies:   All pertinent labs and imaging studies were personally reviewed  Results from last 7 days   Lab Units 11/04/19  0614 11/03/19  0557 11/02/19  1734   WBC Thousand/uL 11 00* 16 98* 22 60*   HEMOGLOBIN g/dL 8 7* 9 3* 10 5*   PLATELETS Thousands/uL 221 210 282     Results from last 7 days   Lab Units 11/04/19  0613  11/02/19  1734 11/01/19  1043   POTASSIUM mmol/L 3 5   < > 3 9 4 2   CHLORIDE mmol/L 99*   < > 93* 92*   CO2 mmol/L 25   < > 29 32*   BUN mg/dL 17   < > 25 20   CREATININE mg/dL 1 36*   < > 1 98* 1 49*   EGFR ml/min/1 73sq m 36   < > 23* 32*   CALCIUM mg/dL 7 8*   < > 9 1 9 2   AST U/L  --   --  23 24   ALT U/L  --   --  20 18   ALK PHOS U/L  --   --  128* 149*    < > = values in this interval not displayed  Results from last 7 days   Lab Units 11/02/19  1734 11/01/19  1044 11/01/19  1043   BLOOD CULTURE  No Growth at 24 hrs  No Growth at 24 hrs  No Growth at 48 hrs   Escherichia coli*   GRAM STAIN RESULT   --   --  Gram negative rods*     Results from last 7 days   Lab Units 11/02/19  1734   PROCALCITONIN ng/ml 4 91*

## 2019-11-04 NOTE — ASSESSMENT & PLAN NOTE
· CT: Stable nonobstructing bilateral renal calculi measuring up to 10mm at the right ureteropelvic junction  · Ho recurrent renal stones and hematuria, s/p cystoscopy, B/L retrograde pyelography, laser lithotripsy, B/L ureteroscopy with stone extraction, B/L double-J stents placed by Dr Clifford Jimenez 9/26/19   Stents removed Oct 9    Appreciate Urology recommendations

## 2019-11-04 NOTE — PROGRESS NOTES
Progress Note - Bernardo Frank 1935, 81 y o  female MRN: [id number]    Unit/Bed#: E5 -01 Encounter: [id number]    Primary Care Provider: Lela Solano MD   Date and time admitted to hospital: 11/2/2019 10:25 PM        * Severe sepsis with acute organ dysfunction [facility])  Assessment & Plan  Transferred from [facility]  She does meet severe sepsis criteria with fever, tachypnea, significant leukocytosis (WBC 22), EULOGIO and lactic acidosis; blood cx with gram neg rods; suspect urinary source, possible pyelonephritis    CT: Stable nonobstructing b/l renal calculi measuring up to 10mm at R ureteropelvic junction  Pyelonephritis not excluded    -infectious Disease follow-up appreciated, lactic acidosis resolved, repeat procalcitonin pending, repeat blood cultures negative after 24 hours  - Per Urology no need for intervention and no obstruction    Bacteremia due to Gram-negative bacteria  Assessment & Plan  Likely secondary to UTI  -urinalysis consistent with UTI however culture not collected until after 1st dose of antibiotics, culture results pending  -will follow up repeat blood cultures  -continue cefazolin    Acute kidney injury superimposed on chronic kidney disease (HCC)  Assessment & Plan  Creat 1 9 on admission, baseline varies, appears to be 0 9-1 4  Avoid hypotension and renally dose all medications  - Improved to 1 36  BMP for the AM    Hyponatremia  Assessment & Plan  -sodium 131 from 133, will discontinue IV fluids and monitor sodium    Acute encephalopathy  Assessment & Plan  Improved    Renal mass  Assessment & Plan  Patient with right renal mass  -urology follow-up appreciated, patient will need ongoing workup for likely med metastatic renal cell carcinoma and will eventrally require intervention Radiology performed biopsy  -will continue with treatment of bacteremia and UTI at this point  -will need outpatient urology follow-up    Bilateral kidney stones  Assessment & Plan  · CT: Stable nonobstructing bilateral renal calculi measuring up to 10mm at the right ureteropelvic junction  · Ho recurrent renal stones and hematuria, s/p cystoscopy, B/L retrograde pyelography, laser lithotripsy, B/L ureteroscopy with stone extraction, B/L double-J stents placed by Dr Mauro Pang 9/26/19  Stents removed Oct 9    Appreciate Urology recommendations    Coronary artery disease involving native coronary artery of native heart without angina pectoris  Assessment & Plan  s/p PCI 7/2019 showed multivessel disease,  no focal critical stenosis; recommended med mgmt ASA/plavix/B-Blocker/statin  Off plavix due to hematuria - Resumed beta blocker    Drug-induced constipation  Assessment & Plan    Monitor BM- Add bowel regimen    Chronic diastolic heart failure (HCC)  Assessment & Plan  Wt Readings from Last 3 Encounters:   11/04/19 70 2 kg (154 lb 12 2 oz)   11/02/19 70 8 kg (156 lb 1 6 oz)   11/02/19 70 8 kg (156 lb 1 4 oz)     Euvolemic - No exacerbation  ECHO: LVEF 75%, hyperdynamic left ventricular systolic function  Daily wt, I&O    Type 2 diabetes mellitus with hyperglycemia, with long-term current use of insulin [facility])  Assessment & Plan  Lab Results   Component Value Date    HGBA1C 7 7 (H) 06/04/2019     ·     Recent Labs     11/04/19  0052 11/04/19  0619 11/04/19  1212 11/04/19  1623   POCGLU 168* 159* 231* 202*       Blood Sugar Average: Last 72 hrs:  (P) 189     · Home regimen Januvia, Tresiba and Novolog  · Continue long acting insulin at decreased dose and SSI  · Diabetic diet        VTE Pharmacologic Prophylaxis:   Pharmacologic:  Heparin    Patient Centered Rounds: I have performed bedside rounds with nursing staff today  Education and Discussions with Family / Patient:  Caretaker, at bedside    Time Spent for Care: 20 minutes  More than 50% of total time spent on counseling and coordination of care as described above      Current Length of Stay: 2 day(s)    Current Patient Status: Inpatient Certification Statement: The patient will continue to require additional inpatient hospital stay due to Bacteremia, sepsis    Discharge Plan / Estimated Discharge Date:  24-48 hours    Code Status: Level 1 - Full Code      Subjective:   Patient seen and examined at bedside, denies any abdominal pain, nausea, vomiting    Objective:     Vitals:   Temp (24hrs), Av 8 °F (36 6 °C), Min:97 °F (36 1 °C), Max:99 3 °F (37 4 °C)    Temp:  [97 °F (36 1 °C)-99 3 °F (37 4 °C)] 97 °F (36 1 °C)  HR:  [64-81] 69  Resp:  [16-18] 18  BP: (109-119)/(56-64) 119/64  SpO2:  [96 %-99 %] 99 %  Body mass index is 26 57 kg/m²  Input and Output Summary (last 24 hours): Intake/Output Summary (Last 24 hours) at 2019 1755  Last data filed at 2019 0900  Gross per 24 hour   Intake 240 ml   Output    Net 240 ml       Physical Exam:    Constitutional: Patient is in no acute distress  HEENT:  Normocephalic, atraumatic, EOMI, PERRLA, no scleral icterus, no pallor, moist oral mucosa  Neck:  Supple, no masses, no thyromegaly, no bruits Normal range of motion  Lymph nodes:  No lymphadenopathy  Cardiovascular: Normal S1S2, RRR, No murmurs/rubs/gallops appreciated  Pulmonary:  Bilateral air entry, No rhonchi/rales/wheezing appreciated  Abdominal: Soft, Bowel sounds present, Non-tender, Non-distended, No rebound/guarding, no hepatomegaly   Musculoskeletal: No tenderness/abnormality   Extremities:  No cyanosis, clubbing or edema  Peripheral pulses palpable and equal bilaterally  Neurological: Cranial nerves II-XII grossly intact, sensation intact, otherwise no focal neurological symptoms  Skin: Skin is warm and dry, no rashes      Additional Data:     Labs:    Results from last 7 days   Lab Units 19  0614   WBC Thousand/uL 11 00*   HEMOGLOBIN g/dL 8 7*   HEMATOCRIT % 28 1*   PLATELETS Thousands/uL 221   NEUTROS PCT % 75   LYMPHS PCT % 12*   MONOS PCT % 10   EOS PCT % 1     Results from last 7 days   Lab Units 11/04/19  0613  11/02/19  1734   POTASSIUM mmol/L 3 5   < > 3 9   CHLORIDE mmol/L 99*   < > 93*   CO2 mmol/L 25   < > 29   BUN mg/dL 17   < > 25   CREATININE mg/dL 1 36*   < > 1 98*   CALCIUM mg/dL 7 8*   < > 9 1   ALK PHOS U/L  --   --  128*   ALT U/L  --   --  20   AST U/L  --   --  23    < > = values in this interval not displayed  Results from last 7 days   Lab Units 11/02/19  1734   INR  1 08        I Have Reviewed All Lab Data Listed Above  Recent Cultures (last 7 days):     Results from last 7 days   Lab Units 11/03/19  0432 11/02/19  1734 11/01/19  1044 11/01/19  1043   BLOOD CULTURE   --  No Growth at 24 hrs  No Growth at 24 hrs  No Growth at 72 hrs   Escherichia coli*   GRAM STAIN RESULT   --   --   --  Gram negative rods*   URINE CULTURE  <10,000 cfu/ml   --   --   --        Last 24 Hours Medication List:     Current Facility-Administered Medications:  acetaminophen 650 mg Oral Q6H PRN VELASQUEZ Kuo    amLODIPine 5 mg Oral Daily VELASQUEZ Kuo    aspirin 81 mg Oral Daily VELASQUEZ Kuo    atorvastatin 40 mg Oral Daily With [employer], VELASQUEZ    bisacodyl 10 mg Oral Daily VELASQUEZ Kuo    brimonidine 1 drop Both Eyes BID VELASQUEZ Kuo    cefazolin 2,000 mg Intravenous [address], CRNP Last Rate: 2,000 mg (11/04/19 1210)   docusate sodium 100 mg Oral BID VELASQUEZ Kuo    heparin (porcine) 5,000 Units Subcutaneous [facility] VELASQUEZ Kuo    insulin glargine 15 Units Subcutaneous HS VELASQUEZ Kuo    [facility] 1-5 [facility]H [facility]    ipratropium 2 puff Inhalation 4x Daily VELASQUEZ Kuo    latanoprost 1 drop Both Eyes HS VELASQUEZ Kuo    metoprolol tartrate 25 mg Oral Q12H [facility], CRNP    ondansetron 4 mg Intravenous Q6H PRN VELASQUEZ Kuo    oxyCODONE 10 mg Oral HS PRN VELASQUEZ Kuo polyethylene glycol 17 g Oral Daily VELASQUEZ Rousseau    pregabalin 100 mg Oral Daily VELASQUEZ Rousseau    saccharomyces boulardii 250 mg Oral BID Joao Matt DO    tamsulosin 0 4 mg Oral QPM VELASQUEZ Rousseau         Today, Patient Was Seen By: Jetta Cogan, MD

## 2019-11-04 NOTE — PLAN OF CARE
Problem: METABOLIC, FLUID AND ELECTROLYTES - ADULT  Goal: Electrolytes maintained within normal limits  Description  INTERVENTIONS:  - Monitor labs and assess patient for signs and symptoms of electrolyte imbalances  - Administer electrolyte replacement as ordered  - Monitor response to electrolyte replacements, including repeat lab results as appropriate  - Instruct patient on fluid and nutrition as appropriate  Outcome: Progressing  Goal: Fluid balance maintained  Description  INTERVENTIONS:  - Monitor labs   - Monitor I/O and WT  - Instruct patient on fluid and nutrition as appropriate  - Assess for signs & symptoms of volume excess or deficit  Outcome: Progressing  Goal: Glucose maintained within target range  Description  INTERVENTIONS:  - Monitor Blood Glucose as ordered  - Assess for signs and symptoms of hyperglycemia and hypoglycemia  - Administer ordered medications to maintain glucose within target range  - Assess nutritional intake and initiate nutrition service referral as needed  Outcome: Progressing     Problem: SKIN/TISSUE INTEGRITY - ADULT  Goal: Skin integrity remains intact  Description  INTERVENTIONS  - Identify patients at risk for skin breakdown  - Assess and monitor skin integrity  - Assess and monitor nutrition and hydration status  - Monitor labs (i e  albumin)  - Assess for incontinence   - Turn and reposition patient  - Assist with mobility/ambulation  - Relieve pressure over bony prominences  - Avoid friction and shearing  - Provide appropriate hygiene as needed including keeping skin clean and dry  - Evaluate need for skin moisturizer/barrier cream  - Collaborate with interdisciplinary team (i e  Nutrition, Rehabilitation, etc )   - Patient/family teaching  Outcome: Progressing  Goal: Incision(s), wounds(s) or drain site(s) healing without S/S of infection  Description  INTERVENTIONS  - Assess and document risk factors for skin impairment   - Assess and document dressing, incision, wound bed, drain sites and surrounding tissue  - Consider nutrition services referral as needed  - Oral mucous membranes remain intact  - Provide patient/ family education  Outcome: Progressing  Goal: Oral mucous membranes remain intact  Description  INTERVENTIONS  - Assess oral mucosa and hygiene practices  - Implement preventative oral hygiene regimen  - Implement oral medicated treatments as ordered  - Initiate Nutrition services referral as needed  Outcome: Progressing     Problem: HEMATOLOGIC - ADULT  Goal: Maintains hematologic stability  Description  INTERVENTIONS  - Assess for signs and symptoms of bleeding or hemorrhage  - Monitor labs  - Administer supportive blood products/factors as ordered and appropriate  Outcome: Progressing     Problem: Potential for Falls  Goal: Patient will remain free of falls  Description  INTERVENTIONS:  - Assess patient frequently for physical needs  -  Identify cognitive and physical deficits and behaviors that affect risk of falls    -  [place] fall precautions as indicated by assessment   - Educate patient/family on patient safety including physical limitations  - Instruct patient to call for assistance with activity based on assessment  - Modify environment to reduce risk of injury  - Consider OT/PT consult to assist with strengthening/mobility  Outcome: Progressing

## 2019-11-04 NOTE — ASSESSMENT & PLAN NOTE
Creat 1 9 on admission, baseline varies, appears to be 0 9-1 4  Avoid hypotension and renally dose all medications  - Improved to 1 36  BMP for the AM

## 2019-11-05 VITALS
DIASTOLIC BLOOD PRESSURE: 70 MMHG | HEART RATE: 65 BPM | OXYGEN SATURATION: 98 % | RESPIRATION RATE: 20 BRPM | TEMPERATURE: 97.5 F | BODY MASS INDEX: 26.57 KG/M2 | SYSTOLIC BLOOD PRESSURE: 122 MMHG | WEIGHT: 154.76 LBS

## 2019-11-05 LAB
ANION GAP SERPL CALCULATED.3IONS-SCNC: 7 MMOL/L (ref 4–13)
BASOPHILS # BLD AUTO: 0.03 THOUSANDS/ΜL (ref 0–0.1)
BASOPHILS NFR BLD AUTO: 0 % (ref 0–1)
BUN SERPL-MCNC: 11 MG/DL (ref 5–25)
CALCIUM SERPL-MCNC: 8.1 MG/DL (ref 8.3–10.1)
CHLORIDE SERPL-SCNC: 102 MMOL/L (ref 100–108)
CO2 SERPL-SCNC: 27 MMOL/L (ref 21–32)
CREAT SERPL-MCNC: 1.18 MG/DL (ref 0.6–1.3)
EOSINOPHIL # BLD AUTO: 0.14 THOUSAND/ΜL (ref 0–0.61)
EOSINOPHIL NFR BLD AUTO: 2 % (ref 0–6)
ERYTHROCYTE [DISTWIDTH] IN BLOOD BY AUTOMATED COUNT: 16.7 % (ref 11.6–15.1)
GFR SERPL CREATININE-BSD FRML MDRD: 43 ML/MIN/1.73SQ M
GLUCOSE SERPL-MCNC: 135 MG/DL (ref 65–140)
GLUCOSE SERPL-MCNC: 145 MG/DL (ref 65–140)
GLUCOSE SERPL-MCNC: 169 MG/DL (ref 65–140)
HCT VFR BLD AUTO: 31.7 % (ref 34.8–46.1)
HGB BLD-MCNC: 9.9 G/DL (ref 11.5–15.4)
IMM GRANULOCYTES # BLD AUTO: 0.06 THOUSAND/UL (ref 0–0.2)
IMM GRANULOCYTES NFR BLD AUTO: 1 % (ref 0–2)
LYMPHOCYTES # BLD AUTO: 1.11 THOUSANDS/ΜL (ref 0.6–4.47)
LYMPHOCYTES NFR BLD AUTO: 16 % (ref 14–44)
MCH RBC QN AUTO: 26.5 PG (ref 26.8–34.3)
MCHC RBC AUTO-ENTMCNC: 31.2 G/DL (ref 31.4–37.4)
MCV RBC AUTO: 85 FL (ref 82–98)
MONOCYTES # BLD AUTO: 0.55 THOUSAND/ΜL (ref 0.17–1.22)
MONOCYTES NFR BLD AUTO: 8 % (ref 4–12)
NEUTROPHILS # BLD AUTO: 5.16 THOUSANDS/ΜL (ref 1.85–7.62)
NEUTS SEG NFR BLD AUTO: 73 % (ref 43–75)
NRBC BLD AUTO-RTO: 0 /100 WBCS
PLATELET # BLD AUTO: 279 THOUSANDS/UL (ref 149–390)
PMV BLD AUTO: 10.8 FL (ref 8.9–12.7)
POTASSIUM SERPL-SCNC: 3.5 MMOL/L (ref 3.5–5.3)
PROCALCITONIN SERPL-MCNC: 1.46 NG/ML
RBC # BLD AUTO: 3.73 MILLION/UL (ref 3.81–5.12)
SODIUM SERPL-SCNC: 136 MMOL/L (ref 136–145)
WBC # BLD AUTO: 7.05 THOUSAND/UL (ref 4.31–10.16)

## 2019-11-05 PROCEDURE — 82948 REAGENT STRIP/BLOOD GLUCOSE: CPT

## 2019-11-05 PROCEDURE — 84145 PROCALCITONIN (PCT): CPT | Performed by: NURSE PRACTITIONER

## 2019-11-05 PROCEDURE — 80048 BASIC METABOLIC PNL TOTAL CA: CPT | Performed by: NURSE PRACTITIONER

## 2019-11-05 PROCEDURE — 99239 HOSP IP/OBS DSCHRG MGMT >30: CPT | Performed by: INTERNAL MEDICINE

## 2019-11-05 PROCEDURE — 85025 COMPLETE CBC W/AUTO DIFF WBC: CPT | Performed by: NURSE PRACTITIONER

## 2019-11-05 PROCEDURE — 99232 SBSQ HOSP IP/OBS MODERATE 35: CPT | Performed by: INTERNAL MEDICINE

## 2019-11-05 RX ORDER — CEPHALEXIN 500 MG/1
500 CAPSULE ORAL EVERY 6 HOURS SCHEDULED
Status: DISCONTINUED | OUTPATIENT
Start: 2019-11-05 | End: 2019-11-05 | Stop reason: HOSPADM

## 2019-11-05 RX ORDER — SACCHAROMYCES BOULARDII 250 MG
250 CAPSULE ORAL 2 TIMES DAILY
Qty: 20 CAPSULE | Refills: 0 | Status: SHIPPED | OUTPATIENT
Start: 2019-11-05 | End: 2020-01-03 | Stop reason: ALTCHOICE

## 2019-11-05 RX ORDER — CEPHALEXIN 500 MG/1
500 CAPSULE ORAL EVERY 6 HOURS SCHEDULED
Qty: 24 CAPSULE | Refills: 0 | Status: SHIPPED | OUTPATIENT
Start: 2019-11-05 | End: 2019-11-12

## 2019-11-05 RX ADMIN — INSULIN LISPRO 1 UNITS: 100 INJECTION, SOLUTION INTRAVENOUS; SUBCUTANEOUS at 08:44

## 2019-11-05 RX ADMIN — CEFAZOLIN SODIUM 2000 MG: 2 SOLUTION INTRAVENOUS at 00:22

## 2019-11-05 RX ADMIN — CEPHALEXIN 500 MG: 500 CAPSULE ORAL at 11:55

## 2019-11-05 RX ADMIN — AMLODIPINE BESYLATE 5 MG: 5 TABLET ORAL at 08:41

## 2019-11-05 RX ADMIN — Medication 250 MG: at 08:42

## 2019-11-05 RX ADMIN — ASPIRIN 81 MG: 81 TABLET, COATED ORAL at 08:41

## 2019-11-05 RX ADMIN — METOPROLOL TARTRATE 25 MG: 25 TABLET, FILM COATED ORAL at 08:41

## 2019-11-05 RX ADMIN — BISACODYL 10 MG: 5 TABLET, COATED ORAL at 08:41

## 2019-11-05 RX ADMIN — INSULIN LISPRO 1 UNITS: 100 INJECTION, SOLUTION INTRAVENOUS; SUBCUTANEOUS at 00:24

## 2019-11-05 RX ADMIN — CEPHALEXIN 500 MG: 500 CAPSULE ORAL at 09:19

## 2019-11-05 RX ADMIN — PREGABALIN 100 MG: 50 CAPSULE ORAL at 08:42

## 2019-11-05 RX ADMIN — DOCUSATE SODIUM 100 MG: 100 CAPSULE, LIQUID FILLED ORAL at 08:41

## 2019-11-05 RX ADMIN — BRIMONIDINE TARTRATE 1 DROP: 1.5 SOLUTION OPHTHALMIC at 08:44

## 2019-11-05 NOTE — PROGRESS NOTES
Progress Note - Infectious Disease   Sisi Quintero 81 y o  female MRN: [id number]  Unit/Bed#: E5 -01 Encounter: [id number]      Impression/Plan:  1  Severe sepsis  POA   Fever, leukocytosis, and lactic acidosis   Appears to be secondary to E coli bacteremia from a UTI  Formal urine culture was unfortunately drawn after administration of antibiotics and only grew mixed contaminants   No other clear sources appreciated   The patient remains hemodynamically stable and nontoxic despite her systemic illness  Her lactic acidosis has resolved  Fever curve and WBC count have trended down  Procalcitonin has trended down  Repeat blood cultures are negative after 48 hours    -antibiotic as below  -monitor CBC and BMP  -follow up repeat blood cultures  -monitor vitals  -supportive care     2  E coli bacteremia  Likely secondary to UTI  Patient has notable  symptoms, a renal mass, and stones without obstruction  The urinalysis is consistent with UTI, however the urine culture was not collected until well after the 1st dose of antibiotics  Culture results only showed mixed contaminants  Repeat blood cultures are negative after 48 hours  Patient was transitioned IV cefazolin yesterday and has been tolerating without difficulty  Given her continued clinical improvement I recommend transitioning her to an oral antibiotic regimen today   -stop IV cefazolin  -transition to PO Keflex, 500 mg q 6 hours, through 11/11/2019 for total of 10 days of antibiotic treatment  -monitor CBC  -follow up repeat blood cultures  -no additional ID workup for now     3  Presumptive urinary tract infection  Likely gram-negative based upon the blood culture results  Formal urine culture only grew mixed contaminants  This is all in the setting of a right renal mass and nonobstructing nephrolithiasis  -antibiotics as above  -monitor symptomatology  -continue follow-up with Urology     4  Right renal mass    Patient has been scheduled for an outpatient IR biopsy   Apparent high likelihood of renal cell carcinoma with liver metastasis based upon the radiographic findings  -eventual IR biopsy of the mass  -continue follow-up with Urology       5  Acute kidney injury  On CKD  Upon review of patient's past medical records it appears that her baseline creatinine is 0 9-1 4  Suspect all pre renal issue   No obstruction seen on imaging  Creatinine is 1 18 today   -monitor BMP  -dose adjust antibiotics for renal function as needed  -volume management  -avoid nephrotoxins     6  Type 2 diabetes mellitus with hyperglycemia and with long-term insulin use  Patient's last hemoglobin A1c was 7 7% on 2019  Recommend commitment to tighter glycemic control for overall health, especially in the setting of infection above   -blood glucose management per primary service     [facility] interpretation services utilized for my visit  Above plan was discussed in detail with patient at the bedside  Antibiotics:  Cefazolin - stop  Keflex 1  Antibiotics 4    Subjective:  Patient seen slightly confused this morning  Seems suppression she is in the hospital   She has no acute complaints an asks me for her daughter  Daughter has not arrived yet this morning  She cannot tell me if she has had any fevers but denies chills, sweats, and shakes; she has no nausea, vomiting, abdominal pain, back pain, diarrhea, or dysuria; she denies trouble breathing  No new symptoms  Objective:  Vitals:  Temp:  [97 °F (36 1 °C)-97 5 °F (36 4 °C)] 97 5 °F (36 4 °C)  HR:  [65-69] 65  Resp:  [18-20] 20  BP: (119-122)/(64-70) 122/70  SpO2:  [98 %-99 %] 98 %  Temp (24hrs), Av 3 °F (36 3 °C), Min:97 °F (36 1 °C), Max:97 5 °F (36 4 °C)  Current: Temperature: 97 5 °F (36 4 °C)    Physical Exam:   General Appearance:  Alert, interactive but slightly confused, nontoxic, no acute distress     Neuro: Oriented to person, I reoriented her to being hospitalized; she follows all commands; symmetric facial movement   Throat: Oropharynx moist without lesions  Lungs:   Clear to auscultation bilaterally; no wheezes, rhonchi or rales; respirations unlabored   Heart:  RRR; no murmur, rub or gallop   Abdomen:   Soft, non-tender, non-distended, positive bowel sounds  Back: No CVA tenderness, ROM normal   Extremities: No clubbing or cyanosis, no edema   Skin: No new rashes, lesions, or draining wounds noted on exposed skin  Labs, Imaging, & Other studies:   All pertinent labs and imaging studies were personally reviewed  Results from last 7 days   Lab Units 11/05/19  0641 11/04/19  0614 11/03/19  0557   WBC Thousand/uL 7 05 11 00* 16 98*   HEMOGLOBIN g/dL 9 9* 8 7* 9 3*   PLATELETS Thousands/uL 279 221 210     Results from last 7 days   Lab Units 11/05/19  0641  11/02/19  1734 11/01/19  1043   POTASSIUM mmol/L 3 5   < > 3 9 4 2   CHLORIDE mmol/L 102   < > 93* 92*   CO2 mmol/L 27   < > 29 32*   BUN mg/dL 11   < > 25 20   CREATININE mg/dL 1 18   < > 1 98* 1 49*   EGFR ml/min/1 73sq m 43   < > 23* 32*   CALCIUM mg/dL 8 1*   < > 9 1 9 2   AST U/L  --   --  23 24   ALT U/L  --   --  20 18   ALK PHOS U/L  --   --  128* 149*    < > = values in this interval not displayed  Results from last 7 days   Lab Units 11/03/19  0432 11/02/19  1734 11/01/19  1044 11/01/19  1043   BLOOD CULTURE   --  No Growth at 48 hrs  No Growth at 48 hrs  No Growth at 72 hrs   Escherichia coli*   GRAM STAIN RESULT   --   --   --  Gram negative rods*   URINE CULTURE  <10,000 cfu/ml   --   --   --      Results from last 7 days   Lab Units 11/04/19  0613 11/02/19  1734   PROCALCITONIN ng/ml 2 57* 4 91*

## 2019-11-05 NOTE — SOCIAL WORK
Attending physician discussed isabel eand follow up medical with patient's granddaughter, Jeff Yancey  Patient scheduled for discharge today  This worker was informed by patient's granddaughter patient is currently active with [facility],  [facility] care was contact and informed patient is not uin their active caseload  Search for home care providers and found [facility]  Mentioned provider was contact, patient is active for skilled RN and PT , they are not in [facility] ,l they requested for discharge  Summary to be faxed to [phone]  This worker to follow  Assigned RN informed

## 2019-11-05 NOTE — ASSESSMENT & PLAN NOTE
· CT: Stable nonobstructing bilateral renal calculi measuring up to 10mm at the right ureteropelvic junction  · Ho recurrent renal stones and hematuria, s/p cystoscopy, B/L retrograde pyelography, laser lithotripsy, B/L ureteroscopy with stone extraction, B/L double-J stents placed by Dr Aniket Lay 9/26/19   Stents removed Oct 9    Appreciate Urology recommendations

## 2019-11-05 NOTE — ASSESSMENT & PLAN NOTE
Lab Results   Component Value Date    HGBA1C 7 7 (H) 06/04/2019     ·     Recent Labs     11/04/19  1212 11/04/19  1623 11/05/19  0023 11/05/19  1157   POCGLU 231* 202* 169* 145*       Blood Sugar Average: Last 72 hrs:  (P) 182 6     · Home regimen Januvia, Tresiba and Novolog  · Continue long acting insulin at decreased dose and SSI  · Diabetic diet

## 2019-11-05 NOTE — DISCHARGE SUMMARY
Discharge- Sisi Quintero 1935, 81 y o  female MRN: [id number]    Unit/Bed#: E5 -01 Encounter: [id number]    Primary Care Provider: Ovidio Meckel, MD   Date and time admitted to hospital: 11/2/2019 10:25 PM        * Severe sepsis with acute organ dysfunction [facility])  Assessment & Plan  Transferred from [facility]  She does meet severe sepsis criteria with fever, tachypnea, significant leukocytosis (WBC 22), EULOGIO and lactic acidosis; blood cx with gram neg rods; suspect urinary source, possible pyelonephritis    CT: Stable nonobstructing b/l renal calculi measuring up to 10mm at R ureteropelvic junction  Pyelonephritis not excluded    -infectious Disease follow-up appreciated, lactic acidosis resolved, repeat procalcitonin pending, repeat blood cultures negative after 24 hours  - Per Urology no need for intervention and no obstruction    Bacteremia due to Gram-negative bacteria  Assessment & Plan  Likely secondary to UTI  -urinalysis consistent with UTI however culture not collected until after 1st dose of antibiotics, culture results pending  -infectious Disease follow-up appreciated, continue with Keflex 500 milligram p o  Q 6 hours to complete 10 days of therapy through 11/11/2019    Acute kidney injury superimposed on chronic kidney disease ([facility] [address] )  Assessment & Plan  Creat 1 9 on admission, baseline varies, appears to be 0 9-1 4  Avoid hypotension and renally dose all medications  - Improved to 1 18  BMP for the AM    Renal mass  Assessment & Plan  Patient with right renal mass  -urology follow-up appreciated, patient will need ongoing workup for likely med metastatic renal cell carcinoma and will eventrally require intervention Radiology performed biopsy  -will continue with treatment of bacteremia and UTI at this point  -will need outpatient urology follow-up, as per patient's granddaughter, Yoon Memory patient is scheduled for biopsy on 11/12/2019    Bilateral kidney stones  Assessment & Plan  · CT: Stable nonobstructing bilateral renal calculi measuring up to 10mm at the right ureteropelvic junction  · Ho recurrent renal stones and hematuria, s/p cystoscopy, B/L retrograde pyelography, laser lithotripsy, B/L ureteroscopy with stone extraction, B/L double-J stents placed by Dr Deshawn Bond 9/26/19   Stents removed Oct 9    Coronary artery disease involving native coronary artery of native heart without angina pectoris  Assessment & Plan  s/p PCI 7/2019 showed multivessel disease,  no focal critical stenosis; recommended med mgmt ASA/plavix/B-Blocker/statin    Chronic diastolic heart failure (HCC)  Assessment & Plan  Wt Readings from Last 3 Encounters:   11/04/19 70 2 kg (154 lb 12 2 oz)   11/02/19 70 8 kg (156 lb 1 6 oz)   11/02/19 70 8 kg (156 lb 1 4 oz)     Euvolemic - No exacerbation  ECHO: LVEF 75%, hyperdynamic left ventricular systolic function    Type 2 diabetes mellitus with hyperglycemia, with long-term current use of insulin [facility])  Assessment & Plan  Lab Results   Component Value Date    HGBA1C 7 7 (H) 06/04/2019     ·     Recent Labs     11/04/19  1212 11/04/19  1623 11/05/19  0023 11/05/19  1157   POCGLU 231* 202* 169* 145*       Blood Sugar Average: Last 72 hrs:  (P) 182 6     · Home regimen Januvia, Tresiba and Novolog  · Diabetic diet      Transition of Care Discharge Summary - [place] [address] Internal Medicine    Patient Information: Rae  81 y o  female MRN: [id number]  Unit/Bed#: E5 -01 Encounter: [id number]    Discharging Physician / Practitioner: James Vang MD  PCP: Cleatus Severance, MD  Admission Date: 11/2/2019  Discharge Date: 11/05/19    Disposition:      Other: home    For Discharges to [facility] SNF:   · Not Applicable to this Patient - Not Applicable to this Patient    Reason for Admission: transfer from [facility] for renal stone    Discharge Diagnoses:     Principal Problem:    Severe sepsis with acute organ dysfunction [facility])  Active Problems: Bacteremia due to Gram-negative bacteria    Acute kidney injury superimposed on chronic kidney disease (HCC)    Type 2 diabetes mellitus with hyperglycemia, with long-term current use of insulin (HCC)    Chronic diastolic heart failure (HCC)    Drug-induced constipation    Coronary artery disease involving native coronary artery of native heart without angina pectoris    Bilateral kidney stones    Renal mass    Acute encephalopathy    Hyponatremia  Resolved Problems:    Anemia      Consultations During Hospital Stay:  · Urology  · ID    Medication Adjustments and Discharge Medications:  · Medication Dosing Tapers - Please refer to Discharge Medication List for details on any medication dosing tapers (if applicable to patient)  · Discharge Medication List: See after visit summary for reconciled discharge medications  Wound Care Recommendations:  When applicable, please see wound care section of After Visit Summary  Diet Recommendations at Discharge:  Diet -        Diet Orders   (From admission, onward)             Start     Ordered    11/04/19 1346  Room Service  Once     Question:  Type of Service  Answer:  Room Service-Appropriate    11/04/19 1345    11/03/19 1018  Diet Van/CHO Controlled; Consistent Carbohydrate Diet Level 2 (5 carb servings/75 grams CHO/meal)  Diet effective now     Question Answer Comment   Diet Type Van/CHO Controlled    Van/CHO Controlled Consistent Carbohydrate Diet Level 2 (5 carb servings/75 grams CHO/meal)    RD to adjust diet per protocol? Yes        11/03/19 1018              Fluid Restriction - No Fluid Restriction at Discharge  Significant Findings / Test Results:     · CXR: negative  CT A/P:Stable nonobstructing bilateral renal calculi measuring up to 10 mm at the right ureteropelvic junction  Pyelonephritis not excluded  2   No evidence of bowel obstruction, colitis or diverticulitis  · 3    Stable L4-5 paracentral disc protrusion resulting in severe central canal stenosis and bilateral neural foraminal narrowing  Hospital Course:     Lindy Emerson is a 81 y o  female patient who originally presented to the hospital on 11/2/2019 due to transferred from [facility] due to renal stone  Patient presented with complaints of feeling ill, pelvic pain, fevers found to be septic secondary to UTI and bacteremia started on antibiotics, urology infectious Disease input appreciated  Patient is hemodynamically stable, cleared by Infectious Disease for discharge on oral antibiotics  Patient will follow with Urology outpatient for further workup for her right renal mass  Discussed with patient at bedside and patient's granddaughter, [place] Rajput via telephone  Please see above problem list for further details    Condition at Discharge: good     Discharge Day Visit / Exam:     Subjective:  Patient seen and examined at bed, currently denies any complaints    Vitals: Blood Pressure: 122/70 (11/05/19 0700)  Pulse: 65 (11/05/19 0700)  Temperature: 97 5 °F (36 4 °C) (11/05/19 0700)  Temp Source: Temporal (11/05/19 0700)  Respirations: 20 (11/05/19 0700)  Weight - Scale: 70 2 kg (154 lb 12 2 oz) (11/04/19 0600)  SpO2: 98 % (11/05/19 0700)    Physical Exam:    Constitutional: Patient is in no acute distress  HEENT:  Normocephalic, atraumatic, EOMI, PERRLA, no scleral icterus, no pallor, moist oral mucosa  Neck:  Supple, no masses, no thyromegaly, no bruits Normal range of motion  Lymph nodes:  No lymphadenopathy  Cardiovascular: Normal S1S2, RRR, No murmurs/rubs/gallops appreciated  Pulmonary:  Bilateral air entry, No rhonchi/rales/wheezing appreciated  Abdominal: Soft, Bowel sounds present, Non-tender, Non-distended, No rebound/guarding, no hepatomegaly   Musculoskeletal: No tenderness/abnormality   Extremities:  No cyanosis, clubbing or edema   Peripheral pulses palpable and equal bilaterally  Neurological: Cranial nerves II-XII grossly intact, sensation intact, otherwise no focal neurological symptoms  Skin: Skin is warm and dry, no rashes  Discharge instructions/Information to patient and family:   See after visit summary section titled Discharge Instructions for information provided to patient and family  Planned Readmission: no      Discharge Statement:  I spent 30 minutes discharging the patient  This time was spent on the day of discharge  I had direct contact with the patient on the day of discharge  Greater than 50% of the total time was spent examining patient, answering all patient questions, arranging and discussing plan of care with patient as well as directly providing post-discharge instructions  Additional time then spent on discharge activities      ** Please Note: This note has been constructed using a voice recognition system **

## 2019-11-05 NOTE — ASSESSMENT & PLAN NOTE
Transferred from [facility]  She does meet severe sepsis criteria with fever, tachypnea, significant leukocytosis (WBC 22), EULOGIO and lactic acidosis; blood cx with gram neg rods; suspect urinary source, possible pyelonephritis    CT: Stable nonobstructing b/l renal calculi measuring up to 10mm at R ureteropelvic junction  Pyelonephritis not excluded    -infectious Disease follow-up appreciated, lactic acidosis resolved, repeat procalcitonin pending, repeat blood cultures negative after 24 hours  - Per Urology no need for intervention and no obstruction

## 2019-11-05 NOTE — PLAN OF CARE
Problem: METABOLIC, FLUID AND ELECTROLYTES - ADULT  Goal: Electrolytes maintained within normal limits  Description  INTERVENTIONS:  - Monitor labs and assess patient for signs and symptoms of electrolyte imbalances  - Administer electrolyte replacement as ordered  - Monitor response to electrolyte replacements, including repeat lab results as appropriate  - Instruct patient on fluid and nutrition as appropriate  Outcome: Progressing  Goal: Fluid balance maintained  Description  INTERVENTIONS:  - Monitor labs   - Monitor I/O and WT  - Instruct patient on fluid and nutrition as appropriate  - Assess for signs & symptoms of volume excess or deficit  Outcome: Progressing  Goal: Glucose maintained within target range  Description  INTERVENTIONS:  - Monitor Blood Glucose as ordered  - Assess for signs and symptoms of hyperglycemia and hypoglycemia  - Administer ordered medications to maintain glucose within target range  - Assess nutritional intake and initiate nutrition service referral as needed  Outcome: Progressing     Problem: SKIN/TISSUE INTEGRITY - ADULT  Goal: Skin integrity remains intact  Description  INTERVENTIONS  - Identify patients at risk for skin breakdown  - Assess and monitor skin integrity  - Assess and monitor nutrition and hydration status  - Monitor labs (i e  albumin)  - Assess for incontinence   - Turn and reposition patient  - Assist with mobility/ambulation  - Relieve pressure over bony prominences  - Avoid friction and shearing  - Provide appropriate hygiene as needed including keeping skin clean and dry  - Evaluate need for skin moisturizer/barrier cream  - Collaborate with interdisciplinary team (i e  Nutrition, Rehabilitation, etc )   - Patient/family teaching  Outcome: Progressing  Goal: Incision(s), wounds(s) or drain site(s) healing without S/S of infection  Description  INTERVENTIONS  - Assess and document risk factors for skin impairment   - Assess and document dressing, incision, wound bed, drain sites and surrounding tissue  - Consider nutrition services referral as needed  - Oral mucous membranes remain intact  - Provide patient/ family education  Outcome: Progressing  Goal: Oral mucous membranes remain intact  Description  INTERVENTIONS  - Assess oral mucosa and hygiene practices  - Implement preventative oral hygiene regimen  - Implement oral medicated treatments as ordered  - Initiate Nutrition services referral as needed  Outcome: Progressing     Problem: HEMATOLOGIC - ADULT  Goal: Maintains hematologic stability  Description  INTERVENTIONS  - Assess for signs and symptoms of bleeding or hemorrhage  - Monitor labs  - Administer supportive blood products/factors as ordered and appropriate  Outcome: Progressing     Problem: Potential for Falls  Goal: Patient will remain free of falls  Description  INTERVENTIONS:  - Assess patient frequently for physical needs  -  Identify cognitive and physical deficits and behaviors that affect risk of falls    -  [place] fall precautions as indicated by assessment   - Educate patient/family on patient safety including physical limitations  - Instruct patient to call for assistance with activity based on assessment  - Modify environment to reduce risk of injury  - Consider OT/PT consult to assist with strengthening/mobility  Outcome: Progressing     Problem: Prexisting or High Potential for Compromised Skin Integrity  Goal: Skin integrity is maintained or improved  Description  INTERVENTIONS:  - Identify patients at risk for skin breakdown  - Assess and monitor skin integrity  - Assess and monitor nutrition and hydration status  - Monitor labs   - Assess for incontinence   - Turn and reposition patient  - Assist with mobility/ambulation  - Relieve pressure over bony prominences  - Avoid friction and shearing  - Provide appropriate hygiene as needed including keeping skin clean and dry  - Evaluate need for skin moisturizer/barrier cream  - Collaborate with interdisciplinary team   - Patient/family teaching  - Consider wound care consult   Outcome: Progressing

## 2019-11-05 NOTE — SOCIAL WORK
LOS 3  Admission: not a readmission/bundle patient, patient transferred from [address] [place] [address]  Reason for admission: kidney stone 10 mm  Met patient at bedside in order to complete an assessment  Patient is Guatem[place] speaking only  Patient has a history of dementia, oriented to person  Patient reports feeling overwhelmed and admits having difficulties with memory  Patient mentioned lives with her daughter who and according to patient under medical intervention "critical care"  This worker attempted to contact family members leaving message on voice mail  SW/CM to follow

## 2019-11-05 NOTE — ASSESSMENT & PLAN NOTE
Wt Readings from Last 3 Encounters:   11/04/19 70 2 kg (154 lb 12 2 oz)   11/02/19 70 8 kg (156 lb 1 6 oz)   11/02/19 70 8 kg (156 lb 1 4 oz)     Euvolemic - No exacerbation  ECHO: LVEF 75%, hyperdynamic left ventricular systolic function  Daily wt, I&O

## 2019-11-05 NOTE — ASSESSMENT & PLAN NOTE
-sodium 131 from 133, will discontinue IV fluids and monitor sodium Graft Donor Site: right preauricular

## 2019-11-06 ENCOUNTER — TRANSITIONAL CARE MANAGEMENT (OUTPATIENT)
Dept: FAMILY MEDICINE CLINIC | Facility: CLINIC | Age: 84
End: 2019-11-06

## 2019-11-06 ENCOUNTER — TELEPHONE (OUTPATIENT)
Dept: RADIOLOGY | Facility: HOSPITAL | Age: 84
End: 2019-11-06

## 2019-11-06 LAB — BACTERIA BLD CULT: NORMAL

## 2019-11-06 RX ORDER — SODIUM CHLORIDE 9 MG/ML
75 INJECTION, SOLUTION INTRAVENOUS CONTINUOUS
Status: CANCELLED | OUTPATIENT
Start: 2019-11-06

## 2019-11-07 LAB
BACTERIA BLD CULT: NORMAL
BACTERIA BLD CULT: NORMAL

## 2019-11-08 ENCOUNTER — TELEPHONE (OUTPATIENT)
Dept: UROLOGY | Facility: MEDICAL CENTER | Age: 84
End: 2019-11-08

## 2019-11-08 NOTE — TELEPHONE ENCOUNTER
Patient calling to speak with Dr Ryan Zapien staff in regards to results of latest CT       She can be reached at [phone]

## 2019-11-08 NOTE — TELEPHONE ENCOUNTER
Patient needs to proceed with IR biopsy as scheduled, followup with medical oncology for evaluation of treatment options and once biopsy pathology has returned, can come back to see me to consolidate care

## 2019-11-11 ENCOUNTER — TELEPHONE (OUTPATIENT)
Dept: SURGERY | Facility: HOSPITAL | Age: 84
End: 2019-11-11

## 2019-11-11 RX ORDER — CLOPIDOGREL BISULFATE 75 MG/1
75 TABLET ORAL DAILY
Refills: 3 | COMMUNITY
Start: 2019-10-28 | End: 2019-11-12 | Stop reason: ALTCHOICE

## 2019-11-11 RX ORDER — BISACODYL 5 MG
TABLET, DELAYED RELEASE (ENTERIC COATED) ORAL
Refills: 0 | COMMUNITY
Start: 2019-10-24 | End: 2020-01-03 | Stop reason: SDUPTHER

## 2019-11-12 ENCOUNTER — HOSPITAL ENCOUNTER (OUTPATIENT)
Dept: CT IMAGING | Facility: HOSPITAL | Age: 84
Discharge: HOME | End: 2019-11-12
Attending: UROLOGY
Payer: MEDICARE

## 2019-11-12 VITALS
HEIGHT: 64 IN | RESPIRATION RATE: 16 BRPM | BODY MASS INDEX: 26.29 KG/M2 | WEIGHT: 154 LBS | TEMPERATURE: 97.4 F | HEART RATE: 64 BPM | OXYGEN SATURATION: 97 % | DIASTOLIC BLOOD PRESSURE: 69 MMHG | SYSTOLIC BLOOD PRESSURE: 144 MMHG

## 2019-11-12 DIAGNOSIS — K76.9 LIVER LESION: ICD-10-CM

## 2019-11-12 DIAGNOSIS — N28.89 RENAL MASS: ICD-10-CM

## 2019-11-12 DIAGNOSIS — C77.2 METASTASIS TO RETROPERITONEAL LYMPH NODE (HCC): ICD-10-CM

## 2019-11-12 PROCEDURE — 50200 RENAL BIOPSY PERQ: CPT

## 2019-11-12 PROCEDURE — 99152 MOD SED SAME PHYS/QHP 5/>YRS: CPT

## 2019-11-12 PROCEDURE — 88333 PATH CONSLTJ SURG CYTO XM 1: CPT | Performed by: PATHOLOGY

## 2019-11-12 PROCEDURE — 88334 PATH CONSLTJ SURG CYTO XM EA: CPT | Performed by: PATHOLOGY

## 2019-11-12 PROCEDURE — 88305 TISSUE EXAM BY PATHOLOGIST: CPT | Performed by: PATHOLOGY

## 2019-11-12 PROCEDURE — 77012 CT SCAN FOR NEEDLE BIOPSY: CPT | Performed by: RADIOLOGY

## 2019-11-12 PROCEDURE — 99153 MOD SED SAME PHYS/QHP EA: CPT

## 2019-11-12 PROCEDURE — 99152 MOD SED SAME PHYS/QHP 5/>YRS: CPT | Performed by: RADIOLOGY

## 2019-11-12 PROCEDURE — 50200 RENAL BIOPSY PERQ: CPT | Performed by: RADIOLOGY

## 2019-11-12 RX ORDER — FENTANYL CITRATE 50 UG/ML
INJECTION, SOLUTION INTRAMUSCULAR; INTRAVENOUS CODE/TRAUMA/SEDATION MEDICATION
Status: COMPLETED | OUTPATIENT
Start: 2019-11-12 | End: 2019-11-12

## 2019-11-12 RX ORDER — MIDAZOLAM HYDROCHLORIDE 2 MG/2ML
INJECTION, SOLUTION INTRAMUSCULAR; INTRAVENOUS CODE/TRAUMA/SEDATION MEDICATION
Status: COMPLETED | OUTPATIENT
Start: 2019-11-12 | End: 2019-11-12

## 2019-11-12 RX ORDER — SODIUM CHLORIDE 9 MG/ML
75 INJECTION, SOLUTION INTRAVENOUS CONTINUOUS
Status: DISCONTINUED | OUTPATIENT
Start: 2019-11-12 | End: 2019-11-13 | Stop reason: HOSPADM

## 2019-11-12 RX ADMIN — FENTANYL CITRATE 50 MCG: 50 INJECTION INTRAMUSCULAR; INTRAVENOUS at 08:52

## 2019-11-12 RX ADMIN — MIDAZOLAM 1 MG: 1 INJECTION INTRAMUSCULAR; INTRAVENOUS at 08:52

## 2019-11-12 RX ADMIN — MIDAZOLAM 0.5 MG: 1 INJECTION INTRAMUSCULAR; INTRAVENOUS at 09:20

## 2019-11-12 RX ADMIN — SODIUM CHLORIDE 75 ML/HR: 0.9 INJECTION, SOLUTION INTRAVENOUS at 08:20

## 2019-11-12 RX ADMIN — FENTANYL CITRATE 25 MCG: 50 INJECTION INTRAMUSCULAR; INTRAVENOUS at 09:20

## 2019-11-12 RX ADMIN — MIDAZOLAM 0.5 MG: 1 INJECTION INTRAMUSCULAR; INTRAVENOUS at 09:00

## 2019-11-12 RX ADMIN — FENTANYL CITRATE 25 MCG: 50 INJECTION INTRAMUSCULAR; INTRAVENOUS at 09:00

## 2019-11-12 NOTE — DISCHARGE INSTRUCTIONS
POST BIOPSY    Care after your procedure:    1  Limit your activities for 24 hours after your biopsy  2  No driving day of biopsy  3  Return to your normal diet  Small sips of flat soda will help with mild nausea  4  Remove band-aid or dressing 24 hours after procedure  Contact Interventional Radiology at [phone] Katheryn PATIENTS: Contact Interventional Radiology at [phone]) Chrystal Panda PATIENTS: Contact Interventional Radiology at [phone]) if:    1  Difficulty breathing, nausea or vomiting  2  Chills or fever above 101 degrees F      3  Pain at biopsy site not relieved by medication  4  Develop any redness, swelling, heat, unusual drainage, heavy bruising or bleeding from biopsy site

## 2019-11-12 NOTE — BRIEF OP NOTE (RAD/CATH)
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

## 2019-11-13 ENCOUNTER — TELEPHONE (OUTPATIENT)
Dept: UROLOGY | Facility: CLINIC | Age: 84
End: 2019-11-13

## 2019-11-13 DIAGNOSIS — E11.65 TYPE 2 DIABETES MELLITUS WITH HYPERGLYCEMIA, WITH LONG-TERM CURRENT USE OF INSULIN (HCC): ICD-10-CM

## 2019-11-13 DIAGNOSIS — Z79.4 TYPE 2 DIABETES MELLITUS WITH HYPERGLYCEMIA, WITH LONG-TERM CURRENT USE OF INSULIN (HCC): ICD-10-CM

## 2019-11-13 RX ORDER — PREGABALIN 100 MG/1
CAPSULE ORAL
Qty: 60 CAPSULE | Refills: 0 | Status: SHIPPED | OUTPATIENT
Start: 2019-11-13 | End: 2019-12-09 | Stop reason: SDUPTHER

## 2019-11-13 NOTE — TELEPHONE ENCOUNTER
Spoke with patients alba Cosby and she will accompany patient to visit with Dr Jimena Farfan on 12/20/19 at 10:30 at [facility] office

## 2019-11-13 NOTE — RESULT ENCOUNTER NOTE
Patient is scheduled at [facility] with Dr Anthony Ovalles on 12/20/19 at 10:30  Spoke with valerio Becker and she will be present for appointment

## 2019-11-14 RX ORDER — FUROSEMIDE 40 MG/1
TABLET ORAL
Refills: 5 | COMMUNITY
Start: 2019-11-09 | End: 2019-12-11 | Stop reason: ALTCHOICE

## 2019-11-15 ENCOUNTER — OFFICE VISIT (OUTPATIENT)
Dept: FAMILY MEDICINE CLINIC | Facility: CLINIC | Age: 84
End: 2019-11-15
Payer: MEDICARE

## 2019-11-15 ENCOUNTER — APPOINTMENT (OUTPATIENT)
Dept: LAB | Facility: HOSPITAL | Age: 84
End: 2019-11-15
Payer: MEDICARE

## 2019-11-15 VITALS
HEIGHT: 64 IN | OXYGEN SATURATION: 96 % | TEMPERATURE: 98 F | DIASTOLIC BLOOD PRESSURE: 60 MMHG | SYSTOLIC BLOOD PRESSURE: 120 MMHG | HEART RATE: 75 BPM | BODY MASS INDEX: 25.68 KG/M2 | WEIGHT: 150.4 LBS

## 2019-11-15 DIAGNOSIS — E11.65 TYPE 2 DIABETES MELLITUS WITH HYPERGLYCEMIA, WITH LONG-TERM CURRENT USE OF INSULIN (HCC): ICD-10-CM

## 2019-11-15 DIAGNOSIS — Z76.89 ENCOUNTER FOR SUPPORT AND COORDINATION OF TRANSITION OF CARE: Primary | ICD-10-CM

## 2019-11-15 DIAGNOSIS — R89.9 ABNORMAL LABORATORY TEST RESULT: ICD-10-CM

## 2019-11-15 DIAGNOSIS — Z23 NEED FOR PNEUMOCOCCAL VACCINATION: ICD-10-CM

## 2019-11-15 DIAGNOSIS — Z79.4 TYPE 2 DIABETES MELLITUS WITH HYPERGLYCEMIA, WITH LONG-TERM CURRENT USE OF INSULIN (HCC): ICD-10-CM

## 2019-11-15 LAB
ALBUMIN SERPL BCP-MCNC: 3.6 G/DL (ref 3–5.2)
ALP SERPL-CCNC: 143 U/L (ref 43–122)
ALT SERPL W P-5'-P-CCNC: 22 U/L (ref 9–52)
ANION GAP SERPL CALCULATED.3IONS-SCNC: 8 MMOL/L (ref 5–14)
ANISOCYTOSIS BLD QL SMEAR: PRESENT
AST SERPL W P-5'-P-CCNC: 15 U/L (ref 14–36)
BASOPHILS # BLD AUTO: 0 THOUSANDS/ΜL (ref 0–0.1)
BASOPHILS NFR BLD AUTO: 1 % (ref 0–1)
BILIRUB SERPL-MCNC: 0.6 MG/DL
BUN SERPL-MCNC: 9 MG/DL (ref 5–25)
BURR CELLS BLD QL SMEAR: PRESENT
CALCIUM SERPL-MCNC: 9 MG/DL (ref 8.4–10.2)
CHLORIDE SERPL-SCNC: 99 MMOL/L (ref 97–108)
CO2 SERPL-SCNC: 30 MMOL/L (ref 22–30)
CREAT SERPL-MCNC: 1.24 MG/DL (ref 0.6–1.2)
EOSINOPHIL # BLD AUTO: 0.2 THOUSAND/ΜL (ref 0–0.4)
EOSINOPHIL NFR BLD AUTO: 2 % (ref 0–6)
ERYTHROCYTE [DISTWIDTH] IN BLOOD BY AUTOMATED COUNT: 18.3 %
GFR SERPL CREATININE-BSD FRML MDRD: 40 ML/MIN/1.73SQ M
GLUCOSE P FAST SERPL-MCNC: 164 MG/DL (ref 70–99)
HCT VFR BLD AUTO: 32.2 % (ref 36–46)
HGB BLD-MCNC: 10.4 G/DL (ref 12–16)
LYMPHOCYTES # BLD AUTO: 1.7 THOUSANDS/ΜL (ref 0.5–4)
LYMPHOCYTES NFR BLD AUTO: 19 % (ref 25–45)
MCH RBC QN AUTO: 25.8 PG (ref 26–34)
MCHC RBC AUTO-ENTMCNC: 32.4 G/DL (ref 31–36)
MCV RBC AUTO: 80 FL (ref 80–100)
MICROCYTES BLD QL AUTO: PRESENT
MONOCYTES # BLD AUTO: 0.6 THOUSAND/ΜL (ref 0.2–0.9)
MONOCYTES NFR BLD AUTO: 7 % (ref 1–10)
NEUTROPHILS # BLD AUTO: 6.6 THOUSANDS/ΜL (ref 1.8–7.8)
NEUTS SEG NFR BLD AUTO: 72 % (ref 45–65)
PLATELET # BLD AUTO: 355 THOUSANDS/UL (ref 150–450)
PLATELET BLD QL SMEAR: ADEQUATE
PMV BLD AUTO: 8.5 FL (ref 8.9–12.7)
POIKILOCYTOSIS BLD QL SMEAR: PRESENT
POTASSIUM SERPL-SCNC: 4.4 MMOL/L (ref 3.6–5)
PROT SERPL-MCNC: 6.8 G/DL (ref 5.9–8.4)
RBC # BLD AUTO: 4.04 MILLION/UL (ref 4–5.2)
RBC MORPH BLD: NORMAL
SODIUM SERPL-SCNC: 137 MMOL/L (ref 137–147)
WBC # BLD AUTO: 9.1 THOUSAND/UL (ref 4.5–11)

## 2019-11-15 PROCEDURE — 80053 COMPREHEN METABOLIC PANEL: CPT

## 2019-11-15 PROCEDURE — 85025 COMPLETE CBC W/AUTO DIFF WBC: CPT

## 2019-11-15 PROCEDURE — 99495 TRANSJ CARE MGMT MOD F2F 14D: CPT | Performed by: NURSE PRACTITIONER

## 2019-11-15 PROCEDURE — 36415 COLL VENOUS BLD VENIPUNCTURE: CPT

## 2019-11-15 RX ORDER — PREGABALIN 100 MG/1
CAPSULE ORAL
Qty: 60 CAPSULE | Refills: 0 | Status: SHIPPED | OUTPATIENT
Start: 2019-11-15 | End: 2019-12-11 | Stop reason: SDUPTHER

## 2019-11-15 NOTE — ASSESSMENT & PLAN NOTE
-Pt was in hospital for sepsis and kidney stones secondary to UTI  Patient was on keflex which she completed  Also was found to have a renal mass that was biopsied on 11/12/19  Patient it to continue on current medication regimen as ordred and follow up with urology on 11/22  On renal mass biopsy Urologist recommended she see Dr Adalgisa Zaragoza for possible repeat of biopsy as he felt findings looked like mRCC on initial presentation  To follow up in 1 month

## 2019-11-15 NOTE — PROGRESS NOTES
Assessment/Plan:    Encounter for support and coordination of transition of care  -Pt was in hospital for sepsis and kidney stones secondary to UTI  Patient was on keflex which she completed  Also was found to have a renal mass that was biopsied on 11/12/19  Patient it to continue on current medication regimen as ordred and follow up with urology on 11/22  On renal mass biopsy Urologist recommended she see Dr Yissel Diaz for possible repeat of biopsy as he felt findings looked like mRCC on initial presentation  To follow up in 1 month  Diagnoses and all orders for this visit:    Encounter for support and coordination of transition of care    Need for pneumococcal vaccination  -     PNEUMOCOCCAL CONJUGATE VACCINE 13-VALENT GREATER THAN 6 MONTHS    Abnormal laboratory test result  -     CBC and differential; Future  -     Comprehensive metabolic panel; Future          Subjective:      Patient ID: Kasey Rivera is a 81 y o  female  81year old female patient here for transition of care  Home attendant states that she was in the hospital on 11/2/19, she had kidney stones and sepsis per home attnedent  Has b/l renal calculi measuring up to 10mm at R ureteropelvic junction  Per Urology there was no obstruction  Likely secondary to UTI-urinalysis consistent with UTI however culture not collected until after 1st dose of antibiotics  Was on Keflex 500 milligram p o  Q 6 hours to complete 10 days of therapy through 11/11/2019  Also was found to have a renal mass and had a renal biopsy on 11/12/19, pending results  Under care of urologist currently referred her to Dr Yissel Diaz for possible repeat biopsy as his findings were mRCC on initial presentation  Follow up with Dr Yissel Diaz 12/20    and Urologist on 11/22        The following portions of the patient's history were reviewed and updated as appropriate: allergies, current medications, past family history, past medical history, past social history, past surgical history and problem list     Review of Systems   Constitutional: Positive for appetite change and fatigue  Negative for fever  HENT: Negative  Negative for congestion  Eyes: Negative  Respiratory: Negative  Negative for apnea, cough, choking, wheezing and stridor  Cardiovascular: Negative  Negative for chest pain and palpitations  Gastrointestinal: Negative  Negative for abdominal distention and anal bleeding  Endocrine: Negative  Genitourinary: Positive for difficulty urinating, frequency and urgency  Musculoskeletal: Negative  Negative for arthralgias and gait problem  Skin: Negative  Allergic/Immunologic: Negative  Neurological: Negative  Negative for dizziness and headaches  Hematological: Negative  Negative for adenopathy  Does not bruise/bleed easily  Psychiatric/Behavioral: Negative  Objective:      /60 (BP Location: Left arm, Patient Position: Sitting, Cuff Size: Adult)   Pulse 75   Temp 98 °F (36 7 °C) (Oral)   Ht 5' 4" (1 626 m)   Wt 68 2 kg (150 lb 6 4 oz)   LMP 01/01/1990 (Within Years)   SpO2 96%   BMI 25 82 kg/m²          Physical Exam   Constitutional: She is oriented to person, place, and time  She appears well-developed and well-nourished  No distress  HENT:   Head: Normocephalic and atraumatic  Eyes: Pupils are equal, round, and reactive to light  EOM are normal    Neck: Normal range of motion  Neck supple  Cardiovascular: Normal rate, regular rhythm and normal heart sounds  No pedal edema noted   Pulmonary/Chest: Effort normal and breath sounds normal  No respiratory distress  She has no wheezes  She has no rales  Abdominal: Soft  Bowel sounds are normal    Musculoskeletal: Normal range of motion  Lymphadenopathy:     She has no cervical adenopathy  Neurological: She is alert and oriented to person, place, and time  She has normal reflexes  Skin: Skin is warm and dry  Capillary refill takes less than 2 seconds   She is not diaphoretic  Psychiatric: She has a normal mood and affect  Thought content normal    Slightly withdrawn aspect   Nursing note and vitals reviewed  TCM Call (since 10/15/2019)     Date and time call was made  11/6/2019  2:14 PM    Hospital care reviewed  Records reviewed    Patient was hospitialized at  [facility] - CLOSED    Date of Admission  11/02/19    Date of discharge  11/05/19    Diagnosis  Bilateral kidney stones    Disposition  Home    Current Symptoms  None      TCM Call (since 10/15/2019)     Post hospital issues  None    Should patient be enrolled in anticoag monitoring? No    Scheduled for follow up?   Yes    Did you obtain your prescribed medications  Yes    Do you need help managing your prescriptions or medications  No    Is transportation to your appointment needed  No    I have advised the patient to call PCP with any new or worsening symptoms  Anne-Marie Barber, Practice Administrator

## 2019-11-16 DIAGNOSIS — E11.65 TYPE 2 DIABETES MELLITUS WITH HYPERGLYCEMIA, WITH LONG-TERM CURRENT USE OF INSULIN (HCC): ICD-10-CM

## 2019-11-16 DIAGNOSIS — Z79.4 TYPE 2 DIABETES MELLITUS WITH HYPERGLYCEMIA, WITH LONG-TERM CURRENT USE OF INSULIN (HCC): ICD-10-CM

## 2019-11-17 RX ORDER — PEN NEEDLE, DIABETIC 33 GX5/32"
NEEDLE, DISPOSABLE MISCELLANEOUS
Qty: 300 EACH | Refills: 0 | Status: SHIPPED | OUTPATIENT
Start: 2019-11-17 | End: 2020-01-03 | Stop reason: SDUPTHER

## 2019-11-18 DIAGNOSIS — I73.9 PAD (PERIPHERAL ARTERY DISEASE) (HCC): ICD-10-CM

## 2019-11-20 RX ORDER — PENTOXIFYLLINE 400 MG/1
TABLET, EXTENDED RELEASE ORAL
Qty: 270 TABLET | Refills: 0 | OUTPATIENT
Start: 2019-11-20

## 2019-11-22 ENCOUNTER — CONSULT (OUTPATIENT)
Dept: HEMATOLOGY ONCOLOGY | Facility: CLINIC | Age: 84
End: 2019-11-22
Payer: MEDICARE

## 2019-11-22 ENCOUNTER — APPOINTMENT (OUTPATIENT)
Dept: LAB | Facility: HOSPITAL | Age: 84
End: 2019-11-22
Attending: INTERNAL MEDICINE
Payer: MEDICARE

## 2019-11-22 VITALS
HEIGHT: 64 IN | DIASTOLIC BLOOD PRESSURE: 68 MMHG | TEMPERATURE: 98.7 F | BODY MASS INDEX: 26.46 KG/M2 | HEART RATE: 70 BPM | RESPIRATION RATE: 16 BRPM | WEIGHT: 155 LBS | SYSTOLIC BLOOD PRESSURE: 108 MMHG

## 2019-11-22 DIAGNOSIS — N28.89 RENAL MASS: ICD-10-CM

## 2019-11-22 DIAGNOSIS — C77.2 METASTASIS TO RETROPERITONEAL LYMPH NODE (HCC): ICD-10-CM

## 2019-11-22 DIAGNOSIS — D64.9 ANEMIA, UNSPECIFIED TYPE: Primary | ICD-10-CM

## 2019-11-22 DIAGNOSIS — K59.03 DRUG-INDUCED CONSTIPATION: ICD-10-CM

## 2019-11-22 DIAGNOSIS — D64.9 ANEMIA, UNSPECIFIED TYPE: ICD-10-CM

## 2019-11-22 DIAGNOSIS — K76.9 LIVER LESION: ICD-10-CM

## 2019-11-22 DIAGNOSIS — S32.050A COMPRESSION FRACTURE OF L5 LUMBAR VERTEBRA, CLOSED, INITIAL ENCOUNTER (HCC): ICD-10-CM

## 2019-11-22 DIAGNOSIS — R11.0 NAUSEA: ICD-10-CM

## 2019-11-22 DIAGNOSIS — I73.9 PAD (PERIPHERAL ARTERY DISEASE) (HCC): ICD-10-CM

## 2019-11-22 LAB
ALBUMIN SERPL BCP-MCNC: 3.7 G/DL (ref 3–5.2)
ALP SERPL-CCNC: 134 U/L (ref 43–122)
ALT SERPL W P-5'-P-CCNC: 22 U/L (ref 9–52)
ANION GAP SERPL CALCULATED.3IONS-SCNC: 7 MMOL/L (ref 5–14)
APTT PPP: 26 SECONDS (ref 25–32)
AST SERPL W P-5'-P-CCNC: 17 U/L (ref 14–36)
BASOPHILS # BLD AUTO: 0.1 THOUSANDS/ΜL (ref 0–0.1)
BASOPHILS NFR BLD AUTO: 1 % (ref 0–1)
BILIRUB SERPL-MCNC: 0.5 MG/DL
BUN SERPL-MCNC: 15 MG/DL (ref 5–25)
CALCIUM SERPL-MCNC: 9.5 MG/DL (ref 8.4–10.2)
CHLORIDE SERPL-SCNC: 100 MMOL/L (ref 97–108)
CO2 SERPL-SCNC: 32 MMOL/L (ref 22–30)
CREAT SERPL-MCNC: 1.42 MG/DL (ref 0.6–1.2)
CRP SERPL QL: 33.7 MG/L
DAT POLY-SP REAG RBC QL: NEGATIVE
EOSINOPHIL # BLD AUTO: 0.1 THOUSAND/ΜL (ref 0–0.4)
EOSINOPHIL NFR BLD AUTO: 2 % (ref 0–6)
ERYTHROCYTE [DISTWIDTH] IN BLOOD BY AUTOMATED COUNT: 18.8 %
ERYTHROCYTE [SEDIMENTATION RATE] IN BLOOD: 33 MM/HOUR (ref 1–20)
FERRITIN SERPL-MCNC: 24 NG/ML (ref 8–388)
GFR SERPL CREATININE-BSD FRML MDRD: 34 ML/MIN/1.73SQ M
GLUCOSE SERPL-MCNC: 84 MG/DL (ref 70–99)
HCT VFR BLD AUTO: 31.4 % (ref 36–46)
HGB BLD-MCNC: 10.1 G/DL (ref 12–16)
IGA SERPL-MCNC: 252 MG/DL (ref 70–400)
IGG SERPL-MCNC: 722 MG/DL (ref 700–1600)
IGM SERPL-MCNC: 80 MG/DL (ref 40–230)
INR PPP: 0.98 (ref 0.91–1.09)
IRON SATN MFR SERPL: 9 %
IRON SERPL-MCNC: 27 UG/DL (ref 50–170)
LDH SERPL-CCNC: 1082 U/L (ref 313–618)
LYMPHOCYTES # BLD AUTO: 2 THOUSANDS/ΜL (ref 0.5–4)
LYMPHOCYTES NFR BLD AUTO: 27 % (ref 25–45)
MAGNESIUM SERPL-MCNC: 1.7 MG/DL (ref 1.6–2.3)
MCH RBC QN AUTO: 25.6 PG (ref 26–34)
MCHC RBC AUTO-ENTMCNC: 32.1 G/DL (ref 31–36)
MCV RBC AUTO: 80 FL (ref 80–100)
MONOCYTES # BLD AUTO: 0.7 THOUSAND/ΜL (ref 0.2–0.9)
MONOCYTES NFR BLD AUTO: 10 % (ref 1–10)
NEUTROPHILS # BLD AUTO: 4.5 THOUSANDS/ΜL (ref 1.8–7.8)
NEUTS SEG NFR BLD AUTO: 61 % (ref 45–65)
PLATELET # BLD AUTO: 279 THOUSANDS/UL (ref 150–450)
PMV BLD AUTO: 8.9 FL (ref 8.9–12.7)
POTASSIUM SERPL-SCNC: 4.1 MMOL/L (ref 3.6–5)
PROT SERPL-MCNC: 6.9 G/DL (ref 5.9–8.4)
PROTHROMBIN TIME: 10.9 SECONDS (ref 9.8–12)
RBC # BLD AUTO: 3.93 MILLION/UL (ref 4–5.2)
SODIUM SERPL-SCNC: 139 MMOL/L (ref 137–147)
TIBC SERPL-MCNC: 300 UG/DL (ref 250–450)
TSH SERPL DL<=0.05 MIU/L-ACNC: 2.21 UIU/ML (ref 0.47–4.68)
URATE SERPL-MCNC: 8.4 MG/DL (ref 2.7–7.5)
VIT B12 SERPL-MCNC: 502 PG/ML (ref 100–900)
WBC # BLD AUTO: 7.4 THOUSAND/UL (ref 4.5–11)

## 2019-11-22 PROCEDURE — 80053 COMPREHEN METABOLIC PANEL: CPT

## 2019-11-22 PROCEDURE — 83735 ASSAY OF MAGNESIUM: CPT | Performed by: INTERNAL MEDICINE

## 2019-11-22 PROCEDURE — 82607 VITAMIN B-12: CPT

## 2019-11-22 PROCEDURE — 84165 PROTEIN E-PHORESIS SERUM: CPT | Performed by: PATHOLOGY

## 2019-11-22 PROCEDURE — 86880 COOMBS TEST DIRECT: CPT

## 2019-11-22 PROCEDURE — 84165 PROTEIN E-PHORESIS SERUM: CPT

## 2019-11-22 PROCEDURE — 86140 C-REACTIVE PROTEIN: CPT

## 2019-11-22 PROCEDURE — 99205 OFFICE O/P NEW HI 60 MIN: CPT | Performed by: INTERNAL MEDICINE

## 2019-11-22 PROCEDURE — 82728 ASSAY OF FERRITIN: CPT

## 2019-11-22 PROCEDURE — 83010 ASSAY OF HAPTOGLOBIN QUANT: CPT

## 2019-11-22 PROCEDURE — 83550 IRON BINDING TEST: CPT

## 2019-11-22 PROCEDURE — 83615 LACTATE (LD) (LDH) ENZYME: CPT

## 2019-11-22 PROCEDURE — 85610 PROTHROMBIN TIME: CPT

## 2019-11-22 PROCEDURE — 84155 ASSAY OF PROTEIN SERUM: CPT

## 2019-11-22 PROCEDURE — 36415 COLL VENOUS BLD VENIPUNCTURE: CPT

## 2019-11-22 PROCEDURE — 85730 THROMBOPLASTIN TIME PARTIAL: CPT

## 2019-11-22 PROCEDURE — 85025 COMPLETE CBC W/AUTO DIFF WBC: CPT

## 2019-11-22 PROCEDURE — 85652 RBC SED RATE AUTOMATED: CPT

## 2019-11-22 PROCEDURE — 84443 ASSAY THYROID STIM HORMONE: CPT

## 2019-11-22 PROCEDURE — 82784 ASSAY IGA/IGD/IGG/IGM EACH: CPT

## 2019-11-22 PROCEDURE — 83540 ASSAY OF IRON: CPT

## 2019-11-22 PROCEDURE — 84550 ASSAY OF BLOOD/URIC ACID: CPT

## 2019-11-22 PROCEDURE — 86334 IMMUNOFIX E-PHORESIS SERUM: CPT

## 2019-11-22 NOTE — PROGRESS NOTES
Hematology/Oncology Outpatient Consult  Sisi Quintero 81 y o  female 1935 [id number]    Date:  11/22/2019        Assessment and Plan:  1  Renal mass  I had a lengthy discussion with the patient through her daughter who was the interpreting the whole visit  The patient was told that the possibly she has a malignant process starting from the right kidney or the right renal pelvis with potential malignant spread to the lymph node and liver  We reviewed multiple imaging studies including the recent CT scan of the chest abdomen pelvis  The patient seems to have subcapsular liver mass measuring about 3 cm which should be accessible for core biopsy by the interventional radiology team   This is most likely going to provide the diagnosis  We will then discuss the pathology and treatment options accordingly  2  Liver lesion  Biopsy was scheduled to be done by the interventional radiology team    - IR image guided biopsy/aspiration liver; Future    3  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 ([address] )  The patient seems to have significant pain in the lower back which could be related to the right kidney mass versus the spinal stenosis she has  I did advise her to follow up with her primary care team and to consider palliative care consultation for evaluation and treatment  - Ambulatory referral to Palliative Care; Future    4  Anemia, unspecified type  Her anemia will be worked up prior to her next visit  - CBC and differential; Future  - Comprehensive metabolic panel; Future  - LD,Blood; Future  - C-reactive protein; Future  - Sedimentation rate, automated; Future  - Vitamin B12; Future  - Magnesium  - Iron Panel (Includes Ferritin, Iron Sat%, Iron, and TIBC); Future  - Ferritin; Future  - Direct antiglobulin test; Future  - Haptoglobin; Future  - TSH, 3rd generation with Free T4 reflex; Future  - IgG, IgA, IgM; Future  - Protein electrophoresis, serum; Future  - APTT;  Future  - Protime-INR; Future        HPI:  This is an 26-year-old female with multiple comorbid conditions including history of arthritis, CHF, COPD, diabetes mellitus, coronary artery disease, hypertension, myocardial infarction, osteoporosis, peripheral neuropathy, severe lower back pain, etc   The patient apparently had multiple symptoms lately including her severe lower back pain radiating to lower hip area, fatigue  She was evaluated in the emergency room on multiple occasions  She also had multiple imaging studies including a CT scan of the abdomen pelvis without contrast from February of 2019 which showed bilateral renal stones with left ureteral stone  Another CT scan of the abdomen pelvis was done on the 31st July 2019 which showed mild left-sided hydroureteronephrosis with the 4 mm calculus within the distal left ureter  The patient started to complain about significant hematuria within the last 2 months which show was evaluated again with multiple imaging studies including ultrasound of the kidneys/abdomen and another CT scan of the abdomen pelvis on the 18th September 2019, this time she was thought to have a mass in the right possibly in the pelvis of the right kidney  A new indeterminate hypoattenuation nodule within the liver was also described  Her most recent CT scan of the abdomen pelvis again without contrast was on 11/2/2019 which showed:     IMPRESSION:        1  Stable nonobstructing bilateral renal calculi measuring up to 10 mm at the right ureteropelvic junction  Pyelonephritis not excluded  2   No evidence of bowel obstruction, colitis or diverticulitis  3   Stable L4-5 paracentral disc protrusion resulting in severe central canal stenosis and bilateral neural foraminal narrowing  CT scan of the chest on 10/29/2019 also was done which showed:   IMPRESSION:  1  7 mm right lower lobe and 6 mm right middle lobe pulmonary nodules   Based on current Fleischner Society 2017 Guidelines on incidental pulmonary nodule, patients with a known malignancy are at increased risk of metastasis and should receive initial   three month follow-up chest CT  2  Moderate emphysematous changes  3  10 x 10 mm hypodense structure abutting or arising from the ascending aorta proximal to the takeoff of the right subclavian artery  Differential considerations include fluid or hypodense node in the anterior mediastinum versus small aneurysm  Consider   further evaluation with contrast-enhanced chest CT for confirmation  4  Ill-defined hypodense lesion in the right lobe the liver and right para-aortic lymph nodes are redemonstrated suspicious for metastatic disease in this patient with findings suspicious for renal cancer  She had a cystoscopy on the 20/6 of September 2019 which showed the atypical urothelial cells without obvious malignant cells  On 11/12/2019 she had a core biopsy from the right kidney which showed was negative for malignant process with benign renal parenchyma  Interval history:  The patient was sent to us for further evaluation of what it seems to be a malignant process possibly originating from the right kidney with potential metastatic disease to the lymph node and liver  The patient complained today about significant pain in her flank on both sides  She is taking OxyContin 10 mg at night only  She also complained about hematuria on and off for the last 2 months  She denies bleeding from any other sites  Her most recent blood work on 11/15 showed hemoglobin of 10 4 her white cells and platelets are within normal range  Her creatinine was 1 1   ROS: Review of Systems   Constitutional: Positive for activity change, appetite change and fatigue  Negative for chills, diaphoresis, fever and unexpected weight change  HENT: Negative for congestion, dental problem, ear discharge, ear pain, facial swelling, hearing loss, mouth sores, nosebleeds, postnasal drip, sore throat, tinnitus and trouble swallowing  Eyes: Negative for discharge, redness, itching and visual disturbance  Respiratory: Positive for cough and shortness of breath  Negative for chest tightness and wheezing  Cardiovascular: Negative for chest pain, palpitations and leg swelling  Gastrointestinal: Positive for constipation and nausea  Negative for abdominal distention, abdominal pain, anal bleeding, blood in stool, diarrhea and vomiting  Genitourinary: Positive for hematuria  Negative for difficulty urinating, dysuria, flank pain, frequency and urgency  Musculoskeletal: Positive for arthralgias and back pain  Negative for gait problem, joint swelling, myalgias and neck pain  Skin: Negative for color change, pallor, rash and wound  Neurological: Negative for dizziness, syncope, speech difficulty, weakness, light-headedness, numbness and headaches  Hematological: Negative for adenopathy  Does not bruise/bleed easily  Psychiatric/Behavioral: Positive for sleep disturbance  Negative for agitation, behavioral problems and confusion         Past Medical History:   Diagnosis Date    Anemia     Arthritis     [facility] )     kidney, liver    [facility]est[facility]     Chronic pain disorder     low back    COPD (chronic obstructive pulmonary disease) ([facility])     Coronary artery disease     Diabetes mellitus [facility]     Glaucoma     Hematuria 2019    Hyperlipidemia     Hypertension     Kidney stones     Lumbar stenosis     My[facility] infarction [facility]     Osteoporosis     PAD (peripheral artery disease) ([facility])     Peripheral neuropathy     Shortness of breath        Past Surgical History:   Procedure Laterality Date    APPENDECTOMY      CARDIAC CATHETERIZATION  2019    CT EPIDURAL STEROID INJECTION (RYAN LUMBAR)  8/20/2019    CT EPIDURAL STEROID INJECTION (RYAN LUMBAR)  10/8/2019    CYSTOSCOPY      HAND SURGERY Right     HYSTERECTOMY      age 29    INCISION AND DRAINAGE OF WOUND Left 1/5/2019    Procedure: INCISION AND DRAINAGE (I&D) EXTREMITY;  Surgeon: Bladimir Thomas MD;  Location:  MAIN OR;  Service: General    IR ABDOMINAL ANGIOGRAPHY / INTERVENTION  2019    IR IMAGE GUIDED BIOPSY/ASPIRATION KIDNEY  2019    LITHOTRIPSY      MASS EXCISION      [place] of back wall mass    MS CYSTO/URETERO W/LITHOTRIPSY &INDWELL STENT INSRT Bilateral 2019    Procedure: cystoscopy, bilateral retrograde pyelography, bilateral ureteroscopy with stone extraction / Layla Elizondo lithotripsy;  Surgeon: Juliane Cronin MD;  Location: [address] [place] [address] MAIN OR;  Service: Urology    TONSILLECTOMY      TONSILLECTOMY         Social History     Socioeconomic History    Marital status: Single     Spouse name: None    Number of children: None    Years of education: None    Highest education level: None   Occupational History    None   Social Needs    Financial resource strain: None    Food insecurity:     Worry: None     Inability: None    Transportation needs:     Medical: None     Non-medical: None   Tobacco Use    Smoking status: Former Smoker     Packs/day: 1 00     Years: 40 00     Pack years: 40 00     Types: Cigarettes     Last attempt to quit: 2017     Years since quittin 8    Smokeless tobacco: Never Used    Tobacco comment: states she quit but family living with her states she smokes   Substance and Sexual Activity    Alcohol use: Never     Frequency: Never     Comment: OCCASIONAL    Drug use: Never    Sexual activity: None   Lifestyle    Physical activity:     Days per week: None     Minutes per session: None    Stress: None   Relationships    Social connections:     Talks on phone: None     Gets together: None     Attends Voodoo service: None     Active member of club or organization: None     Attends meetings of clubs or organizations: None     Relationship status: None    Intimate partner violence:     Fear of current or ex partner: None     Emotionally abused: None     Physically abused: None Forced sexual activity: None   Other Topics Concern    None   Social History Narrative    None       Family History   Problem Relation Age of Onset    Diabetes Mother     No Known Problems Father     Throat cancer Daughter     No Known Problems Maternal Grandmother     No Known Problems Maternal Grandfather     No Known Problems Paternal Grandmother     No Known Problems Paternal Grandfather        Allergies   Allergen Reactions    Morphine And Related Vomiting    Tramadol Vomiting and Abdominal Pain     Other         Current Outpatient Medications:     amLODIPine (NORVASC) 5 mg tablet, Take 1 tablet (5 mg total) by mouth daily, Disp: 90 tablet, Rfl: 3    atorvastatin (LIPITOR) 40 mg tablet, Take 1 tablet (40 mg total) by mouth every 24 hours, Disp: 90 tablet, Rfl: 3    BISACODYL 5 MG EC tablet, TAKE ONE TABLET BY MOUTH DAILY AS NEEDED FOR CONSTIPATION ROSITA 1 TABLETA POR VIA ORAL DIARIAMENTE RHEA SEA NECESARIO FOR CONSTIPATION, Disp: , Rfl: 0    brimonidine tartrate 0 2 % ophthalmic solution, INSTILL 1 DROP INTO BOTH EYES 2 TIMES PER DAY, Disp: , Rfl: 6    calcitonin, salmon, (MIACALCIN) 200 units/act nasal spray, 1 spray into each nostril daily, Disp: 1 mL, Rfl: 3    CLEVER CHOICE COMFORT EZ 33G X 4 MM MISC, USE AS DIRECTEDUSE RHEA INDICADO, Disp: 300 each, Rfl: 0    dorzolamide (TRUSOPT) 2 % ophthalmic solution, Administer 1 drop to both eyes 3 (three) times a day, Disp: 5 mL, Rfl: 5    furosemide (LASIX) 40 mg tablet, TAKE ONE TABLET BY MOUTH DAILY ROSITA 1 TABLETA POR VIA ORAL DIARIAMENTE, Disp: , Rfl: 5    insulin degludec (TRESIBA FLEXTOUCH) 100 units/mL injection pen, To use 15 U at bedtime  , Disp: 9 pen, Rfl: 0    ipratropium (ATROVENT HFA) 17 mcg/act inhaler, Inhale 2 puffs 4 (four) times a day Ninety day supplies please, Disp: 3 Inhaler, Rfl: 3    Lancets (FREESTYLE) lancets, Check blood sugar three times a day, Disp: 100 each, Rfl: 5    latanoprost (XALATAN) 0 005 % ophthalmic solution, ADMINISTER 1 DROP TO BOTH EYES DAILY AT BEDTIME, Disp: , Rfl: 5    LINZESS 290 MCG CAPS, TAKE ONE CAPSULE BY MOUTH DAILYTOMAR TOMASA CAPSULA POR VIA ORAL DIARIAMENTE, Disp: 30 capsule, Rfl: 5    metoprolol tartrate (LOPRESSOR) 25 mg tablet, Take 1 tablet (25 mg total) by mouth every 12 (twelve) hours, Disp: 180 tablet, Rfl: 3    NOVOLOG FLEXPEN 100 units/mL injection pen, 12 UNITS 3 TIMES A DAY WITH MEALS Ninety day supplies please, Disp: 25 pen, Rfl: 3    oxyCODONE (OxyCONTIN) 10 mg 12 hr tablet, Take 1 tablet (10 mg total) by mouth daily at bedtimeMax Daily Amount: 10 mg, Disp: 14 tablet, Rfl: 0    polyethylene glycol (MIRALAX) 17 g packet, Take 17 g by mouth daily, Disp: 30 each, Rfl: 0    pregabalin (LYRICA) 100 mg capsule, TAKE ONE CAPSULE BY MOUTH TWICE DAILYTOMAR TOMASA CAPSULA POR VIA ORAL DOS VECES AL MAC, Disp: 60 capsule, Rfl: 0    pregabalin (LYRICA) 100 mg capsule, TAKE ONE CAPSULE BY MOUTH TWICE DAILYTOMAR TOMASA CAPSULA POR VIA ORAL DOS VECES AL MAC, Disp: 60 capsule, Rfl: 0    saccharomyces boulardii (FLORASTOR) 250 mg capsule, Take 1 capsule (250 mg total) by mouth 2 (two) times a day, Disp: 20 capsule, Rfl: 0    sitaGLIPtin (JANUVIA) 50 mg tablet, Take 1 tablet (50 mg total) by mouth daily, Disp: 30 tablet, Rfl: 5    Sodium Phosphates (ENEMA READY-TO-USE) 7-19 GM/118ML ENEM, Insert 1 Bottle into the rectum daily, Disp: 2 Bottle, Rfl: 0    tamsulosin (FLOMAX) 0 4 mg, Take 1 capsule (0 4 mg total) by mouth every evening, Disp: 90 capsule, Rfl: 1      Physical Exam:  /68 (BP Location: Left arm, Patient Position: Sitting, Cuff Size: Adult)   Pulse 70   Temp 98 7 °F (37 1 °C) (Tympanic)   Resp 16   Ht 5' 4" (1 626 m)   Wt 70 3 kg (155 lb)   LMP 01/01/1990 (Within Years)   BMI 26 61 kg/m²     Physical Exam   Constitutional: She is oriented to person, place, and time  She appears well-developed and well-nourished  No distress  HENT:   Head: Normocephalic and atraumatic  Nose: Nose normal    Mouth/Throat: Oropharynx is clear and moist    Eyes: Pupils are equal, round, and reactive to light  Conjunctivae and EOM are normal  Right eye exhibits no discharge  Left eye exhibits no discharge  No scleral icterus  Neck: Normal range of motion  Neck supple  No JVD present  No tracheal deviation present  No thyromegaly present  Cardiovascular: Normal rate, regular rhythm and normal heart sounds  Exam reveals no friction rub  No murmur heard  Pulmonary/Chest: Effort normal and breath sounds normal  No stridor  No respiratory distress  She has no wheezes  She has no rales  She exhibits no tenderness  Abdominal: Soft  Bowel sounds are normal  She exhibits distension  She exhibits no mass  There is no hepatosplenomegaly, splenomegaly or hepatomegaly  There is tenderness (On mild palpation in the flank area on both sides)  There is no rebound and no guarding  Obese abdomen   Musculoskeletal: Normal range of motion  She exhibits no edema, tenderness or deformity  Lymphadenopathy:     She has no cervical adenopathy  Neurological: She is alert and oriented to person, place, and time  She has normal reflexes  No cranial nerve deficit  Coordination normal    Skin: Skin is warm and dry  No rash noted  She is not diaphoretic  No erythema  No pallor  Psychiatric: She has a normal mood and affect   Her behavior is normal  Judgment and thought content normal          Labs:  Lab Results   Component Value Date    WBC 9 10 11/15/2019    HGB 10 4 (L) 11/15/2019    HCT 32 2 (L) 11/15/2019    MCV 80 11/15/2019     11/15/2019     Lab Results   Component Value Date     06/19/2018    K 4 4 11/15/2019    CL 99 11/15/2019    CO2 30 11/15/2019    ANIONGAP 9 06/19/2018    BUN 9 11/15/2019    CREATININE 1 24 (H) 11/15/2019    GLUCOSE 136 (H) 06/19/2018    GLUF 164 (H) 11/15/2019    CALCIUM 9 0 11/15/2019    AST 15 11/15/2019    ALT 22 11/15/2019    ALKPHOS 143 (H) 11/15/2019    PROT 5 5 (L) 04/18/2018    BILITOT 0 3 04/18/2018    EGFR 40 (L) 11/15/2019     No results found for: TSH    Patient voiced understanding and agreement in the above discussion  Aware to contact our office with questions/symptoms in the interim

## 2019-11-22 NOTE — LETTER
November 22, 2019     Micah Disla MD  [address]  [address]    Patient: Nelson Medrano   YOB: 1935   Date of Visit: 11/22/2019       Dear Dr Sprague Arm:    Thank you for referring Nelson Medrano to me for evaluation  Below are my notes for this consultation  If you have questions, please do not hesitate to call me  I look forward to following your patient along with you  Sincerely,        Adam Jolley MD        CC: No Recipients  Adam Jolley MD  11/22/2019  3:02 PM  Signed  Hematology/Oncology Outpatient Consult  Nelson Medrano 81 y o  female 1935 [id number]    Date:  11/22/2019        Assessment and Plan:  1  Renal mass  I had a lengthy discussion with the patient through her daughter who was the interpreting the whole visit  The patient was told that the possibly she has a malignant process starting from the right kidney or the right renal pelvis with potential malignant spread to the lymph node and liver  We reviewed multiple imaging studies including the recent CT scan of the chest abdomen pelvis  The patient seems to have subcapsular liver mass measuring about 3 cm which should be accessible for core biopsy by the interventional radiology team   This is most likely going to provide the diagnosis  We will then discuss the pathology and treatment options accordingly  2  Liver lesion  Biopsy was scheduled to be done by the interventional radiology team    - IR image guided biopsy/aspiration liver; Future    3  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 ([address] )  The patient seems to have significant pain in the lower back which could be related to the right kidney mass versus the spinal stenosis she has  I did advise her to follow up with her primary care team and to consider palliative care consultation for evaluation and treatment  - Ambulatory referral to Palliative Care; Future    4   Anemia, unspecified type  Her anemia will be worked up prior to her next visit  - CBC and differential; Future  - Comprehensive metabolic panel; Future  - LD,Blood; Future  - C-reactive protein; Future  - Sedimentation rate, automated; Future  - Vitamin B12; Future  - Magnesium  - Iron Panel (Includes Ferritin, Iron Sat%, Iron, and TIBC); Future  - Ferritin; Future  - Direct antiglobulin test; Future  - Haptoglobin; Future  - TSH, 3rd generation with Free T4 reflex; Future  - IgG, IgA, IgM; Future  - Protein electrophoresis, serum; Future  - APTT; Future  - Protime-INR; Future        HPI:  This is an 16-year-old female with multiple comorbid conditions including history of arthritis, CHF, COPD, diabetes mellitus, coronary artery disease, hypertension, myocardial infarction, osteoporosis, peripheral neuropathy, severe lower back pain, etc   The patient apparently had multiple symptoms lately including her severe lower back pain radiating to lower hip area, fatigue  She was evaluated in the emergency room on multiple occasions  She also had multiple imaging studies including a CT scan of the abdomen pelvis without contrast from February of 2019 which showed bilateral renal stones with left ureteral stone  Another CT scan of the abdomen pelvis was done on the 31st July 2019 which showed mild left-sided hydroureteronephrosis with the 4 mm calculus within the distal left ureter  The patient started to complain about significant hematuria within the last 2 months which show was evaluated again with multiple imaging studies including ultrasound of the kidneys/abdomen and another CT scan of the abdomen pelvis on the 18th September 2019, this time she was thought to have a mass in the right possibly in the pelvis of the right kidney  A new indeterminate hypoattenuation nodule within the liver was also described  Her most recent CT scan of the abdomen pelvis again without contrast was on 11/2/2019 which showed:     IMPRESSION:        1    Stable nonobstructing bilateral renal calculi measuring up to 10 mm at the right ureteropelvic junction  Pyelonephritis not excluded  2   No evidence of bowel obstruction, colitis or diverticulitis  3   Stable L4-5 paracentral disc protrusion resulting in severe central canal stenosis and bilateral neural foraminal narrowing  CT scan of the chest on 10/29/2019 also was done which showed:   IMPRESSION:  1  7 mm right lower lobe and 6 mm right middle lobe pulmonary nodules  Based on current Fleischner Society 2017 Guidelines on incidental pulmonary nodule, patients with a known malignancy are at increased risk of metastasis and should receive initial   three month follow-up chest CT  2  Moderate emphysematous changes  3  10 x 10 mm hypodense structure abutting or arising from the ascending aorta proximal to the takeoff of the right subclavian artery  Differential considerations include fluid or hypodense node in the anterior mediastinum versus small aneurysm  Consider   further evaluation with contrast-enhanced chest CT for confirmation  4  Ill-defined hypodense lesion in the right lobe the liver and right para-aortic lymph nodes are redemonstrated suspicious for metastatic disease in this patient with findings suspicious for renal cancer  She had a cystoscopy on the 20/6 of September 2019 which showed the atypical urothelial cells without obvious malignant cells  On 11/12/2019 she had a core biopsy from the right kidney which showed was negative for malignant process with benign renal parenchyma  Interval history:  The patient was sent to us for further evaluation of what it seems to be a malignant process possibly originating from the right kidney with potential metastatic disease to the lymph node and liver  The patient complained today about significant pain in her flank on both sides  She is taking OxyContin 10 mg at night only  She also complained about hematuria on and off for the last 2 months    She denies bleeding from any other sites  Her most recent blood work on 11/15 showed hemoglobin of 10 4 her white cells and platelets are within normal range  Her creatinine was 1 1   ROS: Review of Systems   Constitutional: Positive for activity change, appetite change and fatigue  Negative for chills, diaphoresis, fever and unexpected weight change  HENT: Negative for congestion, dental problem, ear discharge, ear pain, facial swelling, hearing loss, mouth sores, nosebleeds, postnasal drip, sore throat, tinnitus and trouble swallowing  Eyes: Negative for discharge, redness, itching and visual disturbance  Respiratory: Positive for cough and shortness of breath  Negative for chest tightness and wheezing  Cardiovascular: Negative for chest pain, palpitations and leg swelling  Gastrointestinal: Positive for constipation and nausea  Negative for abdominal distention, abdominal pain, anal bleeding, blood in stool, diarrhea and vomiting  Genitourinary: Positive for hematuria  Negative for difficulty urinating, dysuria, flank pain, frequency and urgency  Musculoskeletal: Positive for arthralgias and back pain  Negative for gait problem, joint swelling, myalgias and neck pain  Skin: Negative for color change, pallor, rash and wound  Neurological: Negative for dizziness, syncope, speech difficulty, weakness, light-headedness, numbness and headaches  Hematological: Negative for adenopathy  Does not bruise/bleed easily  Psychiatric/Behavioral: Positive for sleep disturbance  Negative for agitation, behavioral problems and confusion         Past Medical History:   Diagnosis Date    Anemia     Arthritis     Cancer ([facility] )     kidney, liver    [facility]     Chronic pain disorder     low back    COPD [facility]chronic obstructive pulmonary disease) [facility])     Coronary artery disease     Diabetes mellitus ([facility] )     Glaucoma     Hematuria 2019    Hyperlipidemia     Hypertension     Kidney stones     Lumbar stenosis     [facility]     Osteoporosis     [facility]     Peripheral neuropathy     Shortness of breath        Past Surgical History:   Procedure Laterality Date    APPENDECTOMY      CARDIAC CATHETERIZATION      CT EPIDURAL STEROID INJECTION (RYAN LUMBAR)  2019    CT EPIDURAL STEROID INJECTION (RYAN LUMBAR)  10/8/2019    CYSTOSCOPY      HAND SURGERY Right     HYSTERECTOMY      age 29    INCISION AND DRAINAGE OF WOUND Left 2019    Procedure: INCISION AND DRAINAGE (I&D) EXTREMITY;  Surgeon: Vania Jolly MD;  Location:  MAIN OR;  Service: General    IR ABDOMINAL ANGIOGRAPHY / INTERVENTION  2019    IR IMAGE GUIDED BIOPSY/ASPIRATION KIDNEY  2019    LITHOTRIPSY      MASS EXCISION      remova of back wall mass    HI CYSTO/URETERO W/LITHOTRIPSY &INDWELL STENT INSRT Bilateral 2019    Procedure: cystoscopy, bilateral retrograde pyelography, bilateral ureteroscopy with stone extraction / Maite Nickels lithotripsy;  Surgeon: Olegario Hamlin MD;  Location: [address] [place] [address] MAIN OR;  Service: Urology    TONSILLECTOMY      TONSILLECTOMY         Social History     Socioeconomic History    Marital status: Single     Spouse name: None    Number of children: None    Years of education: None    Highest education level: None   Occupational History    None   Social Needs    Financial resource strain: None    Food insecurity:     Worry: None     Inability: None    Transportation needs:     Medical: None     Non-medical: None   Tobacco Use    Smoking status: Former Smoker     Packs/day: 1 00     Years: 40 00     Pack years: 40 00     Types: Cigarettes     Last attempt to quit: 2017     Years since quittin 8    Smokeless tobacco: Never Used    Tobacco comment: states she quit but family living with her states she smokes   Substance and Sexual Activity    Alcohol use: Never     Frequency: Never     Comment: OCCASIONAL    Drug use: Never    Sexual activity: None   Lifestyle    Physical activity:     Days per week: None     Minutes per session: None    Stress: None   Relationships    Social connections:     Talks on phone: None     Gets together: None     Attends Latter day service: None     Active member of club or organization: None     Attends meetings of clubs or organizations: None     Relationship status: None    Intimate partner violence:     Fear of current or ex partner: None     Emotionally abused: None     Physically abused: None     Forced sexual activity: None   Other Topics Concern    None   Social History Narrative    None       Family History   Problem Relation Age of Onset    Diabetes Mother     No Known Problems Father     Throat cancer Daughter     No Known Problems Maternal Grandmother     No Known Problems Maternal Grandfather     No Known Problems Paternal Grandmother     No Known Problems Paternal Grandfather        Allergies   Allergen Reactions    Morphine And Related Vomiting    Tramadol Vomiting and Abdominal Pain     Other         Current Outpatient Medications:     amLODIPine (NORVASC) 5 mg tablet, Take 1 tablet (5 mg total) by mouth daily, Disp: 90 tablet, Rfl: 3    atorvastatin (LIPITOR) 40 mg tablet, Take 1 tablet (40 mg total) by mouth every 24 hours, Disp: 90 tablet, Rfl: 3    BISACODYL 5 MG EC tablet, TAKE ONE TABLET BY MOUTH DAILY AS NEEDED FOR CONSTIPATION ROSITA 1 TABLETA POR VIA ORAL DIARIAMENTE RHEA SEA NECESARIO FOR CONSTIPATION, Disp: , Rfl: 0    brimonidine tartrate 0 2 % ophthalmic solution, INSTILL 1 DROP INTO BOTH EYES 2 TIMES PER DAY, Disp: , Rfl: 6    calcitonin, salmon, (MIACALCIN) 200 units/act nasal spray, 1 spray into each nostril daily, Disp: 1 mL, Rfl: 3    CLEVER CHOICE COMFORT EZ 33G X 4 MM MISC, USE AS DIRECTEDUSE RHEA INDICADO, Disp: 300 each, Rfl: 0    dorzolamide (TRUSOPT) 2 % ophthalmic solution, Administer 1 drop to both eyes 3 (three) times a day, Disp: 5 mL, Rfl: 5    furosemide (LASIX) 40 mg tablet, TAKE ONE TABLET BY MOUTH DAILY ROSITA 1 TABLETA POR VIA ORAL DIARIAMENTE, Disp: , Rfl: 5    insulin degludec (TRESIBA FLEXTOUCH) 100 units/mL injection pen, To use 15 U at bedtime  , Disp: 9 pen, Rfl: 0    ipratropium (ATROVENT HFA) 17 mcg/act inhaler, Inhale 2 puffs 4 (four) times a day Ninety day supplies please, Disp: 3 Inhaler, Rfl: 3    Lancets (FREESTYLE) lancets, Check blood sugar three times a day, Disp: 100 each, Rfl: 5    latanoprost (XALATAN) 0 005 % ophthalmic solution, ADMINISTER 1 DROP TO BOTH EYES DAILY AT BEDTIME, Disp: , Rfl: 5    LINZESS 290 MCG CAPS, TAKE ONE CAPSULE BY MOUTH DAILYTOMAR TOMASA CAPSULA POR VIA ORAL DIARIAMENTE, Disp: 30 capsule, Rfl: 5    metoprolol tartrate (LOPRESSOR) 25 mg tablet, Take 1 tablet (25 mg total) by mouth every 12 (twelve) hours, Disp: 180 tablet, Rfl: 3    NOVOLOG FLEXPEN 100 units/mL injection pen, 12 UNITS 3 TIMES A DAY WITH MEALS Ninety day supplies please, Disp: 25 pen, Rfl: 3    oxyCODONE (OxyCONTIN) 10 mg 12 hr tablet, Take 1 tablet (10 mg total) by mouth daily at bedtimeMax Daily Amount: 10 mg, Disp: 14 tablet, Rfl: 0    polyethylene glycol (MIRALAX) 17 g packet, Take 17 g by mouth daily, Disp: 30 each, Rfl: 0    pregabalin (LYRICA) 100 mg capsule, TAKE ONE CAPSULE BY MOUTH TWICE DAILYTOMAR TOMASA CAPSULA POR VIA ORAL DOS VECES AL MAC, Disp: 60 capsule, Rfl: 0    pregabalin (LYRICA) 100 mg capsule, TAKE ONE CAPSULE BY MOUTH TWICE DAILYTOMAR TOMASA CAPSULA POR VIA ORAL DOS VECES AL MAC, Disp: 60 capsule, Rfl: 0    saccharomyces boulardii (FLORASTOR) 250 mg capsule, Take 1 capsule (250 mg total) by mouth 2 (two) times a day, Disp: 20 capsule, Rfl: 0    sitaGLIPtin (JANUVIA) 50 mg tablet, Take 1 tablet (50 mg total) by mouth daily, Disp: 30 tablet, Rfl: 5    Sodium Phosphates (ENEMA READY-TO-USE) 7-19 GM/118ML ENEM, Insert 1 Bottle into the rectum daily, Disp: 2 Bottle, Rfl: 0    tamsulosin (FLOMAX) 0 4 mg, Take 1 capsule (0 4 mg total) by mouth every evening, Disp: 90 capsule, Rfl: 1      Physical Exam:  /68 (BP Location: Left arm, Patient Position: Sitting, Cuff Size: Adult)   Pulse 70   Temp 98 7 °F (37 1 °C) (Tympanic)   Resp 16   Ht 5' 4" (1 626 m)   Wt 70 3 kg (155 lb)   LMP 01/01/1990 (Within Years)   BMI 26 61 kg/m²      Physical Exam   Constitutional: She is oriented to person, place, and time  She appears well-developed and well-nourished  No distress  HENT:   Head: Normocephalic and atraumatic  Nose: Nose normal    Mouth/Throat: Oropharynx is clear and moist    Eyes: Pupils are equal, round, and reactive to light  Conjunctivae and EOM are normal  Right eye exhibits no discharge  Left eye exhibits no discharge  No scleral icterus  Neck: Normal range of motion  Neck supple  No JVD present  No tracheal deviation present  No thyromegaly present  Cardiovascular: Normal rate, regular rhythm and normal heart sounds  Exam reveals no friction rub  No murmur heard  Pulmonary/Chest: Effort normal and breath sounds normal  No stridor  No respiratory distress  She has no wheezes  She has no rales  She exhibits no tenderness  Abdominal: Soft  Bowel sounds are normal  She exhibits distension  She exhibits no mass  There is no hepatosplenomegaly, splenomegaly or hepatomegaly  There is tenderness (On mild palpation in the flank area on both sides)  There is no rebound and no guarding  Obese abdomen   Musculoskeletal: Normal range of motion  She exhibits no edema, tenderness or deformity  Lymphadenopathy:     She has no cervical adenopathy  Neurological: She is alert and oriented to person, place, and time  She has normal reflexes  No cranial nerve deficit  Coordination normal    Skin: Skin is warm and dry  No rash noted  She is not diaphoretic  No erythema  No pallor  Psychiatric: She has a normal mood and affect   Her behavior is normal  Judgment and thought content normal          Labs:  Lab Results   Component Value Date    WBC 9 10 11/15/2019    HGB 10 4 (L) 11/15/2019    HCT 32 2 (L) 11/15/2019    MCV 80 11/15/2019     11/15/2019     Lab Results   Component Value Date     06/19/2018    K 4 4 11/15/2019    CL 99 11/15/2019    CO2 30 11/15/2019    ANIONGAP 9 06/19/2018    BUN 9 11/15/2019    CREATININE 1 24 (H) 11/15/2019    GLUCOSE 136 (H) 06/19/2018    GLUF 164 (H) 11/15/2019    CALCIUM 9 0 11/15/2019    AST 15 11/15/2019    ALT 22 11/15/2019    ALKPHOS 143 (H) 11/15/2019    PROT 5 5 (L) 04/18/2018    BILITOT 0 3 04/18/2018    EGFR 40 (L) 11/15/2019     No results found for: TSH    Patient voiced understanding and agreement in the above discussion  Aware to contact our office with questions/symptoms in the interim

## 2019-11-23 LAB — HAPTOGLOB SERPL-MCNC: 346 MG/DL (ref 34–200)

## 2019-11-25 DIAGNOSIS — D64.9 ANEMIA, UNSPECIFIED TYPE: Primary | ICD-10-CM

## 2019-11-25 DIAGNOSIS — Z79.4 TYPE 2 DIABETES MELLITUS WITH HYPERGLYCEMIA, WITH LONG-TERM CURRENT USE OF INSULIN (HCC): ICD-10-CM

## 2019-11-25 DIAGNOSIS — E11.65 TYPE 2 DIABETES MELLITUS WITH HYPERGLYCEMIA, WITH LONG-TERM CURRENT USE OF INSULIN (HCC): ICD-10-CM

## 2019-11-25 LAB
ALBUMIN SERPL ELPH-MCNC: 3.19 G/DL (ref 3.5–5)
ALBUMIN SERPL ELPH-MCNC: 50.7 % (ref 52–65)
ALPHA1 GLOB SERPL ELPH-MCNC: 0.55 G/DL (ref 0.1–0.4)
ALPHA1 GLOB SERPL ELPH-MCNC: 8.7 % (ref 2.5–5)
ALPHA2 GLOB SERPL ELPH-MCNC: 1.01 G/DL (ref 0.4–1.2)
ALPHA2 GLOB SERPL ELPH-MCNC: 16 % (ref 7–13)
BETA GLOB ABNORMAL SERPL ELPH-MCNC: 0.43 G/DL (ref 0.4–0.8)
BETA1 GLOB SERPL ELPH-MCNC: 6.8 % (ref 5–13)
BETA2 GLOB SERPL ELPH-MCNC: 6.3 % (ref 2–8)
BETA2+GAMMA GLOB SERPL ELPH-MCNC: 0.4 G/DL (ref 0.2–0.5)
GAMMA GLOB ABNORMAL SERPL ELPH-MCNC: 0.72 G/DL (ref 0.5–1.6)
GAMMA GLOB SERPL ELPH-MCNC: 11.5 % (ref 12–22)
IGG/ALB SER: 1.03 {RATIO} (ref 1.1–1.8)
INTERPRETATION UR IFE-IMP: NORMAL
PROT PATTERN SERPL ELPH-IMP: ABNORMAL
PROT SERPL-MCNC: 6.3 G/DL (ref 6.4–8.2)

## 2019-11-25 RX ORDER — PENTOXIFYLLINE 400 MG/1
TABLET, EXTENDED RELEASE ORAL
Qty: 270 TABLET | Refills: 0 | Status: SHIPPED | OUTPATIENT
Start: 2019-11-25 | End: 2019-12-11 | Stop reason: ALTCHOICE

## 2019-11-25 RX ORDER — FERROUS SULFATE TAB EC 324 MG (65 MG FE EQUIVALENT) 324 (65 FE) MG
324 TABLET DELAYED RESPONSE ORAL
Qty: 60 TABLET | Refills: 5 | Status: SHIPPED | OUTPATIENT
Start: 2019-11-25 | End: 2020-01-03 | Stop reason: SDUPTHER

## 2019-11-25 RX ORDER — CALCITONIN SALMON 200 [IU]/.09ML
SPRAY, METERED NASAL
Qty: 3.7 ML | Refills: 0 | Status: SHIPPED | OUTPATIENT
Start: 2019-11-25 | End: 2019-12-11 | Stop reason: ALTCHOICE

## 2019-11-25 RX ORDER — SITAGLIPTIN 50 MG/1
TABLET, FILM COATED ORAL
Qty: 30 TABLET | Refills: 0 | Status: SHIPPED | OUTPATIENT
Start: 2019-11-25 | End: 2020-01-03 | Stop reason: SDUPTHER

## 2019-11-25 RX ORDER — ONDANSETRON 4 MG/1
TABLET, FILM COATED ORAL
Qty: 20 TABLET | Refills: 0 | Status: SHIPPED | OUTPATIENT
Start: 2019-11-25 | End: 2019-12-11 | Stop reason: ALTCHOICE

## 2019-11-25 RX ORDER — SODIUM PHOSPHATE, DIBASIC AND SODIUM PHOSPHATE, MONOBASIC 7; 19 G/133ML; G/133ML
ENEMA RECTAL
Qty: 266 ML | Refills: 0 | Status: SHIPPED | OUTPATIENT
Start: 2019-11-25 | End: 2019-12-11 | Stop reason: ALTCHOICE

## 2019-11-26 RX ORDER — SODIUM CHLORIDE 9 MG/ML
75 INJECTION, SOLUTION INTRAVENOUS CONTINUOUS
Status: CANCELLED | OUTPATIENT
Start: 2019-11-26

## 2019-11-27 ENCOUNTER — TELEPHONE (OUTPATIENT)
Dept: RADIOLOGY | Facility: HOSPITAL | Age: 84
End: 2019-11-27

## 2019-11-29 LAB — MISCELLANEOUS LAB TEST RESULT: NORMAL

## 2019-12-02 ENCOUNTER — TELEPHONE (OUTPATIENT)
Dept: SURGERY | Facility: HOSPITAL | Age: 84
End: 2019-12-02

## 2019-12-03 ENCOUNTER — HOSPITAL ENCOUNTER (OUTPATIENT)
Dept: RADIOLOGY | Facility: HOSPITAL | Age: 84
Discharge: HOME | End: 2019-12-03
Attending: INTERNAL MEDICINE | Admitting: RADIOLOGY
Payer: MEDICARE

## 2019-12-03 VITALS
BODY MASS INDEX: 30.43 KG/M2 | TEMPERATURE: 98 F | OXYGEN SATURATION: 97 % | WEIGHT: 155 LBS | DIASTOLIC BLOOD PRESSURE: 67 MMHG | HEIGHT: 60 IN | RESPIRATION RATE: 18 BRPM | HEART RATE: 80 BPM | SYSTOLIC BLOOD PRESSURE: 152 MMHG

## 2019-12-03 DIAGNOSIS — K76.9 LIVER LESION: ICD-10-CM

## 2019-12-03 PROCEDURE — 99152 MOD SED SAME PHYS/QHP 5/>YRS: CPT

## 2019-12-03 PROCEDURE — 88333 PATH CONSLTJ SURG CYTO XM 1: CPT | Performed by: PATHOLOGY

## 2019-12-03 PROCEDURE — 88342 IMHCHEM/IMCYTCHM 1ST ANTB: CPT | Performed by: PATHOLOGY

## 2019-12-03 PROCEDURE — 88313 SPECIAL STAINS GROUP 2: CPT | Performed by: PATHOLOGY

## 2019-12-03 PROCEDURE — 47000 NEEDLE BIOPSY OF LIVER PERQ: CPT | Performed by: RADIOLOGY

## 2019-12-03 PROCEDURE — 99024 POSTOP FOLLOW-UP VISIT: CPT | Performed by: RADIOLOGY

## 2019-12-03 PROCEDURE — 99152 MOD SED SAME PHYS/QHP 5/>YRS: CPT | Performed by: RADIOLOGY

## 2019-12-03 PROCEDURE — 88341 IMHCHEM/IMCYTCHM EA ADD ANTB: CPT | Performed by: PATHOLOGY

## 2019-12-03 PROCEDURE — 76942 ECHO GUIDE FOR BIOPSY: CPT

## 2019-12-03 PROCEDURE — 47000 NEEDLE BIOPSY OF LIVER PERQ: CPT

## 2019-12-03 PROCEDURE — 88307 TISSUE EXAM BY PATHOLOGIST: CPT | Performed by: PATHOLOGY

## 2019-12-03 PROCEDURE — 99153 MOD SED SAME PHYS/QHP EA: CPT

## 2019-12-03 PROCEDURE — 76942 ECHO GUIDE FOR BIOPSY: CPT | Performed by: RADIOLOGY

## 2019-12-03 RX ORDER — FENTANYL CITRATE 50 UG/ML
INJECTION, SOLUTION INTRAMUSCULAR; INTRAVENOUS CODE/TRAUMA/SEDATION MEDICATION
Status: COMPLETED | OUTPATIENT
Start: 2019-12-03 | End: 2019-12-03

## 2019-12-03 RX ORDER — SODIUM CHLORIDE 9 MG/ML
75 INJECTION, SOLUTION INTRAVENOUS CONTINUOUS
Status: DISCONTINUED | OUTPATIENT
Start: 2019-12-03 | End: 2019-12-03 | Stop reason: HOSPADM

## 2019-12-03 RX ORDER — MIDAZOLAM HYDROCHLORIDE 2 MG/2ML
INJECTION, SOLUTION INTRAMUSCULAR; INTRAVENOUS CODE/TRAUMA/SEDATION MEDICATION
Status: COMPLETED | OUTPATIENT
Start: 2019-12-03 | End: 2019-12-03

## 2019-12-03 RX ADMIN — FENTANYL CITRATE 50 MCG: 50 INJECTION, SOLUTION INTRAMUSCULAR; INTRAVENOUS at 11:29

## 2019-12-03 RX ADMIN — SODIUM CHLORIDE 75 ML/HR: 0.9 INJECTION, SOLUTION INTRAVENOUS at 09:44

## 2019-12-03 RX ADMIN — MIDAZOLAM 1 MG: 1 INJECTION INTRAMUSCULAR; INTRAVENOUS at 10:55

## 2019-12-03 RX ADMIN — FENTANYL CITRATE 50 MCG: 50 INJECTION, SOLUTION INTRAMUSCULAR; INTRAVENOUS at 10:56

## 2019-12-03 NOTE — H&P
Interventional Radiology  History and Physical 12/3/2019     History of Present Illness:  81year old female with a subcapsular liver lesion is referred for biopsy      Past Medical History:   Diagnosis Date    Anemia     Arthritis     Cancer ([address] )     kidney, liver    CHF (congestive heart failure) (HCC)     Chronic pain disorder     low back    COPD (chronic obstructive pulmonary disease) (HCC)     Coronary artery disease     Diabetes mellitus ([address] )     Glaucoma     Hematuria     Hyperlipidemia     Hypertension     Kidney stones     Lumbar stenosis     [facility]     Osteoporosis     PAD (peripheral artery disease) (HCC)     Peripheral neuropathy     Shortness of breath         Past Surgical History:   Procedure Laterality Date    APPENDECTOMY      CARDIAC CATHETERIZATION      CT EPIDURAL STEROID INJECTION (RYAN LUMBAR)  2019    CT EPIDURAL STEROID INJECTION (RYAN LUMBAR)  10/8/2019    CYSTOSCOPY      HAND SURGERY Right     HYSTERECTOMY      age 29    INCISION AND DRAINAGE OF WOUND Left 2019    Procedure: INCISION AND DRAINAGE (I&D) EXTREMITY;  Surgeon: Josiane Gerard MD;  Location:  MAIN OR;  Service: General    IR ABDOMINAL ANGIOGRAPHY / INTERVENTION  2019    IR IMAGE GUIDED BIOPSY/ASPIRATION KIDNEY  2019    LITHOTRIPSY      MASS EXCISION      remova of back wall mass    OH CYSTO/URETERO W/LITHOTRIPSY &INDWELL STENT INSRT Bilateral 2019    Procedure: cystoscopy, bilateral retrograde pyelography, bilateral ureteroscopy with stone extraction / Marsha Beryl lithotripsy;  Surgeon: Heather Mendoza MD;  Location: [facility] MAIN OR;  Service: Urology    TONSILLECTOMY      TONSILLECTOMY          Social History     Tobacco Use   Smoking Status Former Smoker    Packs/day: 1 00    Years: 40 00    Pack years: 40 00    Types: Cigarettes    Last attempt to quit: 2017    Years since quittin 9   Smokeless Tobacco Never Used   Tobacco Comment    states she quit but family living with her states she smokes        Social History     Substance and Sexual Activity   Alcohol Use Never    Frequency: Never    Comment: OCCASIONAL        Social History     Substance and Sexual Activity   Drug Use Never        Allergies   Allergen Reactions    Morphine And Related Vomiting    Tramadol Vomiting and Abdominal Pain     Other       Current Facility-Administered Medications   Medication Dose Route Frequency Provider Last Rate Last Dose    sodium chloride 0 9 % infusion  75 mL/hr Intravenous Continuous Jocelyn Siegel MD 75 mL/hr at 12/03/19 0944 75 mL/hr at 12/03/19 0944        Objective:    Vitals:    12/03/19 0900   BP: 131/61   BP Location: Left arm   Pulse: 76   Resp: 18   Temp: 98 °F (36 7 °C)   TempSrc: Oral   SpO2: 96%   Weight: 70 3 kg (155 lb)   Height: 5' (1 524 m)        Physical Exam    Gen: NAD  Abd: soft    Lab Results   Component Value Date    WBC 7 40 11/22/2019    HGB 10 1 (L) 11/22/2019    HCT 31 4 (L) 11/22/2019    MCV 80 11/22/2019     11/22/2019     Lab Results   Component Value Date    INR 0 98 11/22/2019    INR 1 08 11/02/2019    INR 1 06 11/01/2019    PROTIME 10 9 11/22/2019    PROTIME 12 0 11/02/2019    PROTIME 11 6 11/01/2019     Lab Results   Component Value Date    PTT 26 11/22/2019     I have personally reviewed pertinent imaging and laboratory results  Assessment/Plan:  - Right liver lesion biopsy  This procedure has been fully reviewed with the patient and written informed consent has been obtained

## 2019-12-03 NOTE — DISCHARGE INSTRUCTIONS
Percutaneous Liver Biopsy   WHAT YOU NEED TO KNOW:   A PLB is a procedure to remove a sample of tissue from your liver  The sample can be sent to a lab and tested for liver disease, cancer, or infection  After the procedure you may have pain and bruising at the biopsy site  You may also have pain in your right shoulder  These symptoms should get better in 48 to 72 hours  DISCHARGE INSTRUCTIONS:     [facility] patients,  Contact Interventional Radiology at [phone] PATIENTS: Contact Interventional Radiology at [phone]   [facility] PATIENTS: Contact Interventional Radiology at [phone] if:    · Fever greater than 101 or chills  · You have severe pain in your abdomen  · Your abdomen is larger than usual and feels hard  · Your neck is more swollen and you have trouble swallowing  · You feel weak or dizzy  · Your heart is beating faster than usual    · Your pain does not get better after you take pain medicine  · Your wound is red, swollen, or draining pus  · You have nausea or are vomiting  · Your skin is itchy, swollen, or you have a rash  · You have questions or concerns about your condition or care  Medicines:   · Acetaminophen decreases pain and fever  It is available without a doctor's order  Acetaminophen can cause liver damage if not taken correctly  · Take your home medicine as directed  · Resume your normal diet  Small sips of flat soda will help with mild nausea  Self-care:   · Rest as directed  Do not play sports, exercise, or lift anything heavier than 5 pounds for up to 1 week  · Apply firm, steady pressure if bleeding occurs  A small amount of bleeding from your wound is possible  Apply pressure with a clean gauze or towel for 5 to 10 minutes  Call 911 if bleeding becomes heavy or does not stop  · Ask your healthcare provider when to take your blood thinner or antiplatelet medicine   You may need to wait 24 to 72 hours to take your medicine  This will prevent bleeding  Follow up with your healthcare provider as directed: Write down your questions so you remember to ask them during your visits

## 2019-12-03 NOTE — BRIEF OP NOTE (RAD/CATH)
IR IMAGE GUIDED BIOPSY/ASPIRATION LIVER Procedure Note    PATIENT NAME: Raymundo Franklin  : 1935  MRN: [id number]    Pre-op Diagnosis:   1  Liver lesion      Post-op Diagnosis:   1  Liver lesion        Surgeon:   Jolene Tariq MD    Estimated Blood Loss: 1 mL    Findings: Successful right subdiaphragmatic liver lesion biopsy  Specimens: 4 core needle samples placed into formalin      Complications:  None    Anesthesia: Conscious sedation and Local    Jolene Tariq MD     Date: 12/3/2019  Time: 11:24 AM

## 2019-12-04 DIAGNOSIS — R10.11 RIGHT UPPER QUADRANT ABDOMINAL PAIN: ICD-10-CM

## 2019-12-06 DIAGNOSIS — Z79.4 TYPE 2 DIABETES MELLITUS WITH HYPERGLYCEMIA, WITH LONG-TERM CURRENT USE OF INSULIN (HCC): ICD-10-CM

## 2019-12-06 DIAGNOSIS — E11.65 TYPE 2 DIABETES MELLITUS WITH HYPERGLYCEMIA, WITH LONG-TERM CURRENT USE OF INSULIN (HCC): ICD-10-CM

## 2019-12-06 RX ORDER — OXYCODONE HCL 10 MG/1
10 TABLET, FILM COATED, EXTENDED RELEASE ORAL
Qty: 14 TABLET | Refills: 0 | Status: SHIPPED | OUTPATIENT
Start: 2019-12-06 | End: 2020-01-03 | Stop reason: SDUPTHER

## 2019-12-06 NOTE — TELEPHONE ENCOUNTER
Sending controlled meds, she has a follow up appt with me in 1 week and she is on pain after procedure  I reviewed [address] and is consistent with my records

## 2019-12-09 DIAGNOSIS — Z79.4 TYPE 2 DIABETES MELLITUS WITH HYPERGLYCEMIA, WITH LONG-TERM CURRENT USE OF INSULIN (HCC): ICD-10-CM

## 2019-12-09 DIAGNOSIS — E11.65 TYPE 2 DIABETES MELLITUS WITH HYPERGLYCEMIA, WITH LONG-TERM CURRENT USE OF INSULIN (HCC): ICD-10-CM

## 2019-12-09 RX ORDER — LANCETS 30 GAUGE
EACH MISCELLANEOUS
Qty: 100 EACH | Refills: 0 | Status: SHIPPED | OUTPATIENT
Start: 2019-12-09 | End: 2020-01-03 | Stop reason: SDUPTHER

## 2019-12-10 DIAGNOSIS — M81.0 AGE-RELATED OSTEOPOROSIS WITHOUT CURRENT PATHOLOGICAL FRACTURE: Primary | ICD-10-CM

## 2019-12-10 RX ORDER — PREGABALIN 100 MG/1
CAPSULE ORAL
Qty: 60 CAPSULE | Refills: 5 | Status: SHIPPED | OUTPATIENT
Start: 2019-12-10 | End: 2020-01-03 | Stop reason: SDUPTHER

## 2019-12-11 ENCOUNTER — OFFICE VISIT (OUTPATIENT)
Dept: FAMILY MEDICINE CLINIC | Facility: CLINIC | Age: 84
End: 2019-12-11
Payer: MEDICARE

## 2019-12-11 ENCOUNTER — OFFICE VISIT (OUTPATIENT)
Dept: PALLIATIVE MEDICINE | Facility: CLINIC | Age: 84
End: 2019-12-11
Payer: MEDICARE

## 2019-12-11 VITALS
WEIGHT: 152 LBS | TEMPERATURE: 98 F | HEIGHT: 64 IN | OXYGEN SATURATION: 94 % | BODY MASS INDEX: 25.95 KG/M2 | HEART RATE: 85 BPM | SYSTOLIC BLOOD PRESSURE: 130 MMHG | RESPIRATION RATE: 16 BRPM | DIASTOLIC BLOOD PRESSURE: 70 MMHG

## 2019-12-11 VITALS
HEART RATE: 80 BPM | WEIGHT: 155 LBS | BODY MASS INDEX: 30.27 KG/M2 | TEMPERATURE: 97.4 F | SYSTOLIC BLOOD PRESSURE: 130 MMHG | OXYGEN SATURATION: 97 % | DIASTOLIC BLOOD PRESSURE: 68 MMHG | RESPIRATION RATE: 14 BRPM

## 2019-12-11 DIAGNOSIS — I50.32 CHRONIC DIASTOLIC HEART FAILURE (HCC): ICD-10-CM

## 2019-12-11 DIAGNOSIS — Z79.4 TYPE 2 DIABETES MELLITUS WITH HYPERGLYCEMIA, WITH LONG-TERM CURRENT USE OF INSULIN (HCC): ICD-10-CM

## 2019-12-11 DIAGNOSIS — G89.29 CHRONIC BILATERAL LOW BACK PAIN WITH BILATERAL SCIATICA: ICD-10-CM

## 2019-12-11 DIAGNOSIS — Z71.89 ADVANCED CARE PLANNING/COUNSELING DISCUSSION: Primary | ICD-10-CM

## 2019-12-11 DIAGNOSIS — H54.7: ICD-10-CM

## 2019-12-11 DIAGNOSIS — M54.41 CHRONIC BILATERAL LOW BACK PAIN WITH BILATERAL SCIATICA: ICD-10-CM

## 2019-12-11 DIAGNOSIS — E11.65 TYPE 2 DIABETES MELLITUS WITH HYPERGLYCEMIA, WITH LONG-TERM CURRENT USE OF INSULIN (HCC): ICD-10-CM

## 2019-12-11 DIAGNOSIS — N18.9 ACUTE KIDNEY INJURY SUPERIMPOSED ON CHRONIC KIDNEY DISEASE (HCC): ICD-10-CM

## 2019-12-11 DIAGNOSIS — M54.42 CHRONIC BILATERAL LOW BACK PAIN WITH BILATERAL SCIATICA: ICD-10-CM

## 2019-12-11 DIAGNOSIS — N17.9 ACUTE KIDNEY INJURY SUPERIMPOSED ON CHRONIC KIDNEY DISEASE (HCC): ICD-10-CM

## 2019-12-11 DIAGNOSIS — E08.3299: ICD-10-CM

## 2019-12-11 DIAGNOSIS — M46.97 INFLAMMATORY SPONDYLOPATHY OF LUMBOSACRAL REGION (HCC): ICD-10-CM

## 2019-12-11 DIAGNOSIS — M81.0 AGE-RELATED OSTEOPOROSIS WITHOUT CURRENT PATHOLOGICAL FRACTURE: Primary | ICD-10-CM

## 2019-12-11 DIAGNOSIS — K76.9 LIVER LESION: ICD-10-CM

## 2019-12-11 PROCEDURE — 96372 THER/PROPH/DIAG INJ SC/IM: CPT | Performed by: FAMILY MEDICINE

## 2019-12-11 PROCEDURE — 99214 OFFICE O/P EST MOD 30 MIN: CPT | Performed by: FAMILY MEDICINE

## 2019-12-11 PROCEDURE — 99215 OFFICE O/P EST HI 40 MIN: CPT | Performed by: INTERNAL MEDICINE

## 2019-12-11 RX ORDER — OMEPRAZOLE 20 MG/1
CAPSULE, DELAYED RELEASE ORAL
Refills: 3 | COMMUNITY
Start: 2019-12-09 | End: 2020-01-03 | Stop reason: SDUPTHER

## 2019-12-11 NOTE — PROGRESS NOTES
Palliative and Supportive Care   Oral Estrada 81 y o  female [id number]    Assessment/Plan:  1  Advanced care planning/counseling discussion    2  Chronic bilateral low back pain with bilateral sciatica    3  Liver lesion      - 5 Wishes document provided in Japanese, will follow up on next visit  - chronic low back pain from spinal stenosis, not related to possible malignancy  No evidence of osseous mets either  Management as per PCP, ortho  - awaiting result of liver biopsy and further available treatment plans per urology/oncology    Requested Prescriptions      No prescriptions requested or ordered in this encounter     There are no discontinued medications  Representatives have queried the patient's controlled substance dispensing history in the Prescription Drug Monitoring Program in compliance with regulations before I have prescribed any controlled substances  The prescription history is consistent with prescribed therapy and our practice policies  45 minutes were spent face to face with her adult caretaker with greater than 50% of the time spent in counseling or coordination of care including discussions of etiology of diagnosis, pathogenesis of diagnosis, follow up requirements, patient and family counseling/involvement in care and advanced care planning  All of the patient's questions were answered during this discussion  No follow-ups on file  Subjective:   Chief Complaint  New consultation for:  goal of care assessment and decisional support, advance care planning  \A [facility]ology of [place] [facility]\""     Oral Estrada is a 81 y o  female with PMHx significant for chronic low back pain with sciatica from severe spinal stenosis, bilateral nephrolithiasis, status post bilateral ureteroscopy with laser lithotripsy and subsequent stent removal, liver mass and a renal mass  PCS consulted for Maranda 65      She has had issues with kidney stones in the past  On 7/2019, she was hospitalized with renal colic from a nonobstructing kidney stone  She was managed conservatively and followed up as an OP and was scheduled for a stent placement on 9/2019  On 8/2019, she experienced gross hematuria  On 9/2019, she was admitted to the hospital for abdominal pain  CT AP done showed blood products filling and distending the right mid to lower pole calyx and renal pelvis raising suspicion for underlying occult mass  A new indeterminate hypoattenuating nodule within the liver was also noted  The renal and liver masses were thought to be metastatic renal cancer  She was eventually discharged with OP follow up with oncology  She had a cystoscopy on September 2019 which showed the atypical urothelial cells without obvious malignant cells  On 11/12/2019 she had a core biopsy from the right kidney which showed was negative for malignant process with benign renal parenchyma  As malignancy is still highly suspicious, a liver biopsy was done on 12/3  She has an appointment today with Dr Daria Everett to discuss results  Of note, she had been having chronic low back pain  Review of charts and imaging showed that she has severe spinal stenosis and has been to see ortho, and had received epidural injections  Today, she was seen with her caregiver in the room  Our MA served as   We discussed her family and support system  She said she has 3 children - 2 are local, the other 1 is in [facility]  She lives with one of the 2 who lives locally, but this daughter has cancer and is on treatment  The other daughter lives separately but she works most times  She is waiting for the result of the biopsy  If this turns out to be malignant, she said that she will "leave it up to God" and that she will not give up for her and her daughter's sake   When asked whom she wanted to speak on her behalf in case she can't speak for herself, she said "the middle one" but this child is the one in [place]      She denies any abdominal pain or any other pain except for her chronic low back pain with radiation to her legs  We spoke about her spinal stenosis  Her most recent CT A/P in 11/2/2019 did not show any evidence of osseous mets  Instead,  Stable L4-5 paracentral disc protrusion resulting in severe central canal stenosis and bilateral neural foraminal narrowing were again noted  I provided her a 5 wishes document  I explained to her caregiver what this document is about and she is kind enough to discuss this with her  She has an upcoming appointment with oncology as well as urology, likely to discuss further cares  The following portions of the medical history were reviewed: past medical history, problem list, medication list, and social history  Current Outpatient Medications:     amLODIPine (NORVASC) 5 mg tablet, Take 1 tablet (5 mg total) by mouth daily, Disp: 90 tablet, Rfl: 3    atorvastatin (LIPITOR) 40 mg tablet, Take 1 tablet (40 mg total) by mouth every 24 hours, Disp: 90 tablet, Rfl: 3    BISACODYL 5 MG EC tablet, TAKE ONE TABLET BY MOUTH DAILY AS NEEDED FOR CONSTIPATION ROSITA 1 TABLETA POR VIA ORAL DIARIAMENTE RHEA SEA NECESARIO FOR CONSTIPATION, Disp: , Rfl: 0    brimonidine tartrate 0 2 % ophthalmic solution, INSTILL 1 DROP INTO BOTH EYES 2 TIMES PER DAY, Disp: , Rfl: 6    CLEVER CHOICE COMFORT EZ 33G X 4 MM MISC, USE AS DIRECTEDUSE RHEA INDICADO, Disp: 300 each, Rfl: 0    denosumab (PROLIA) 60 mg/mL, Inject 1 mL (60 mg total) under the skin once for 1 dose, Disp: 1 mL, Rfl: 0    dorzolamide (TRUSOPT) 2 % ophthalmic solution, Administer 1 drop to both eyes 3 (three) times a day, Disp: 5 mL, Rfl: 5    ferrous sulfate 324 (65 Fe) mg, Take 1 tablet (324 mg total) by mouth 2 (two) times a day before meals, Disp: 60 tablet, Rfl: 5    GLOBAL INJECT EASE LANCETS 30G MISC, CHECK BLOOD SUGAR THREE TIMES A DAY, Disp: 100 each, Rfl: 0    insulin degludec (TRESIBA FLEXTOUCH) 100 units/mL injection pen, To use 15 U at bedtime  , Disp: 9 pen, Rfl: 0    ipratropium (ATROVENT HFA) 17 mcg/act inhaler, Inhale 2 puffs 4 (four) times a day Ninety day supplies please, Disp: 3 Inhaler, Rfl: 3    latanoprost (XALATAN) 0 005 % ophthalmic solution, ADMINISTER 1 DROP TO BOTH EYES DAILY AT BEDTIME, Disp: , Rfl: 5    LINZESS 290 MCG CAPS, TAKE ONE CAPSULE BY MOUTH DAILYTOMAR TOMASA CAPSULA POR VIA ORAL DIARIAMENTE, Disp: 30 capsule, Rfl: 5    NOVOLOG FLEXPEN 100 units/mL injection pen, 12 UNITS 3 TIMES A DAY WITH MEALS Ninety day supplies please, Disp: 25 pen, Rfl: 3    oxyCODONE (OxyCONTIN) 10 mg 12 hr tablet, Take 1 tablet (10 mg total) by mouth daily at bedtimeMax Daily Amount: 10 mg, Disp: 14 tablet, Rfl: 0    polyethylene glycol (MIRALAX) 17 g packet, Take 17 g by mouth daily, Disp: 30 each, Rfl: 0    saccharomyces boulardii (FLORASTOR) 250 mg capsule, Take 1 capsule (250 mg total) by mouth 2 (two) times a day, Disp: 20 capsule, Rfl: 0    JANUVIA 50 MG tablet, TAKE ONE TABLET BY MOUTH DAILYTOMAR 1 TABLETA POR VIA ORAL DIARIAMENTE (Patient not taking: Reported on 12/3/2019), Disp: 30 tablet, Rfl: 0    omeprazole (PriLOSEC) 20 mg delayed release capsule, TAKE ONE CAPSULE BY MOUTH DAILY ROSITA TOMASA CAPSULA POR VIA ORAL DIARIAMENTE, Disp: , Rfl: 3    pregabalin (LYRICA) 100 mg capsule, TAKE ONE CAPSULE BY MOUTH TWICE DAILYTOMAR TOMASA CAPSULA POR VIA ORAL DOS VECES AL MAC, Disp: 60 capsule, Rfl: 5  No current facility-administered medications for this visit  Review of Systems   Respiratory: Negative  Cardiovascular: Negative  Gastrointestinal: Negative  Genitourinary: Positive for hematuria  Negative for difficulty urinating, dysuria, flank pain and pelvic pain  Musculoskeletal: Positive for back pain  All other systems reviewed and are negative        All other systems negative    Objective:  Vital Signs  /68 (BP Location: Right arm, Cuff Size: Standard)   Pulse 80   Temp (!) 97 4 °F (36 3 °C) (Tympanic)   Resp 14   Wt 70 3 kg (155 lb)   LMP 01/01/1990 (Within Years)   SpO2 97%   BMI 30 27 kg/m²    Physical Exam   Constitutional: She is oriented to person, place, and time  She appears well-developed and well-nourished  Non-toxic appearance  She does not appear ill  No distress  HENT:   Head: Normocephalic and atraumatic  Mouth/Throat: Oropharynx is clear and moist    Eyes: Pupils are equal, round, and reactive to light  EOM are normal    Cardiovascular: Normal rate, normal heart sounds and intact distal pulses  Pulmonary/Chest: Effort normal and breath sounds normal  No accessory muscle usage  No respiratory distress  Abdominal: Soft  Bowel sounds are normal  She exhibits no distension  There is no tenderness  Musculoskeletal:        Right lower leg: She exhibits no edema  Left lower leg: She exhibits no edema  Neurological: She is alert and oriented to person, place, and time  No cranial nerve deficit  Skin: Skin is warm and dry  Psychiatric: She has a normal mood and affect     Became tearful talking about her daughter       Rachell Verduzco MD  [address] [facility]  Internal Medicine  Available via [facility] Text  Office: [phone]  Fax: [phone]

## 2019-12-11 NOTE — PROGRESS NOTES
Assessment/Plan:    Acute kidney injury superimposed on chronic kidney disease ([facility] [address] )  Lab Results   Component Value Date/Time    CREATININE 1 42 (H) 11/22/2019 03:05 PM    CREATININE 1 18 03/04/2014 06:00 AM     Lab Results   Component Value Date/Time    EGFR 34 (L) 11/22/2019 03:05 PM    EGFR 40 (L) 11/15/2019 09:32 AM    EGFR 43 11/05/2019 06:41 AM    EGFR 36 11/04/2019 06:13 AM    EGFR 27 11/03/2019 05:57 AM    EGFR 23 (L) 11/02/2019 05:34 PM    EGFR 32 (L) 11/01/2019 10:43 AM    EGFR 49 (L) 09/27/2019 09:01 AM    EGFR 26 (L) 09/24/2019 10:26 AM    EGFR 56 09/21/2019 05:36 AM    EGFR 43 09/19/2019 10:34 AM    EGFR 31 (L) 09/18/2019 09:42 AM    EGFR 35 (L) 09/13/2019 10:34 AM    EGFR 37 (L) 07/31/2019 12:11 PM    EGFR 34 (L) 07/17/2019 10:40 AM    EGFR 33 07/08/2019 06:25 AM    EGFR 36 07/07/2019 05:53 AM    EGFR 33 07/06/2019 03:51 AM    EGFR 27 (L) 07/05/2019 10:29 AM    EGFR 27 (L) 06/14/2019 09:52 AM    EGFR 23 (L) 06/04/2019 10:38 AM    EGFR 36 (L) 03/26/2019 11:33 AM    EGFR 38 02/02/2019 04:05 PM    EGFR 44 01/07/2019 06:34 AM    EGFR 43 01/06/2019 05:01 AM    EGFR 34 01/05/2019 10:55 AM    EGFR 41 01/02/2019 06:28 AM    EGFR 36 12/31/2018 11:56 AM    EGFR 40 12/31/2018 03:54 AM    EGFR 63 12/30/2018 08:09 PM    EGFR 31 12/30/2018 01:15 PM    EGFR 44 (L) 12/20/2018 01:52 PM    EGFR 46 (L) 07/08/2018 03:42 PM    EGFR 38 12/22/2017 08:50 AM    EGFR 45 12/21/2017 04:57 AM    EGFR 45 12/20/2017 03:02 PM    EGFR 42 12/20/2017 02:54 PM   Above is GFR and creatinine from previous visit  Seen at the the main end-stage three kidney disease since 2017    The goal is to control hypertension and diabetes to reasonable numbers and appropriate for her age    Chronic diastolic heart failure ([address] )  Wt Readings from Last 3 Encounters:   12/11/19 68 9 kg (152 lb)   12/11/19 70 3 kg (155 lb)   12/03/19 70 3 kg (155 lb)     Condition is stable with current treatment, to  continue the same          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 ([facility] [address] )    Lab Results   Component Value Date    HGBA1C 7 7 (H) 06/04/2019     I explained to patient the goals of blood sugar levels  fasting  should be bellow 130 and after 2 hrs after meals 150 or less  Education given verbally and with written materials  Change of life style modification again stressed  The vascular complications secondary to diabetes poor control were explained with details  The renal function protection and the prevention of major vascular disease with the use of  ARB's and statins respectively and exercise/diet was also discussed  Inflammatory spondylopathy of lumbosacral region [facility])  As main cause of her pain, this is a chronic problem  This is not cancer related  I spoke with Dr Veronia Primrose, from palliative care regarding patient's care and the pain will be continuing being treated by me  I discussed with patient and patient's caregiver about the pain medication, be careful about dosing and hours that she take them, also about the use of naloxone in case of accidental overdose  Patient is giving the medication at home instead her taking it by herself when she has pain  Explained to patient the medication is given for severe pain for her lower back, is not intended to control a every pain and her body  Is not intended to be use for mild to moderate pain, when she can use Tylenol  Pain Score:   6         Diagnoses and all orders for this visit:    Age-related osteoporosis without current pathological fracture  -     denosumab (PROLIA) subcutaneous injection 60 mg    Type 2 diabetes mellitus with hyperglycemia, with long-term current use of insulin ([facility])  -     CBC and differential; Future  -     Lipid panel; Future  -     HEMOGLOBIN A1C W/ EAG ESTIMATION;  Future    Acute kidney injury superimposed on chronic kidney disease ([facility] [address] )    [facility]    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 ([address] )    Inflammatory spondylopathy of lumbosacral region [facility])          Subjective:      Patient ID: Omar Castle is a 81 y o  female  After history of hematuria and the present of urine dip he has these, patient was found having a mass in the right he needs which biopsy was reported no neoplastic done by urologist on November 12, 2019  Subsequently a CT diet biopsy of a liver lesion was performed and found having a carcinoma, metastatic  These procedure was performed on December 2, 2019  Patient is aware of this diagnosis  Patient does not want to proceed with any type of surgery procedures or chemotherapy  She wants to control pain in diabetes  She is here today for pain medication  Patient has been trying the past pain management, NSAID, low dose narcotic as tramadol, and has being seeing better results with the treatment of oxycodone that she takes on hourly basis and being supervised by her home care aide, Lena Maddox,  that she stent 8 hours with her every day  The pain that she is coming for today is localized in the right lower back  Back Pain   This is a chronic problem  The current episode started more than 1 year ago  The problem occurs daily  The problem has been gradually improving (Improved under treatment with oxycodone ) since onset  The pain is present in the gluteal  The quality of the pain is described as burning, shooting and stabbing  The pain is at a severity of 7/10  The pain is severe  The pain is worse during the day  The symptoms are aggravated by bending and twisting  Pertinent negatives include no abdominal pain, chest pain, fever or headaches  Risk factors include lack of exercise and sedentary lifestyle  She has tried heat, home exercises, analgesics, muscle relaxant and NSAIDs for the symptoms     Diabetes   Pertinent negatives for hypoglycemia include no dizziness, headaches or nervousness/anxiousness  Pertinent negatives for diabetes include no chest pain and no fatigue  Hypertension   Pertinent negatives include no chest pain, headaches, palpitations or shortness of breath  The following portions of the patient's history were reviewed and updated as appropriate: allergies, current medications, past family history, past medical history, past social history, past surgical history and problem list     Review of Systems   Constitutional: Negative for diaphoresis, fatigue, fever and unexpected weight change  Respiratory: Negative for cough, chest tightness, shortness of breath and wheezing  Cardiovascular: Negative for chest pain, palpitations and leg swelling  Gastrointestinal: Negative for abdominal pain, blood in stool and constipation  Musculoskeletal: Positive for back pain  Neurological: Negative for dizziness, syncope, light-headedness and headaches  Hematological: Does not bruise/bleed easily  Psychiatric/Behavioral: Negative for behavioral problems, self-injury and sleep disturbance  The patient is not nervous/anxious  Objective:      /70 (BP Location: Left arm, Patient Position: Sitting, Cuff Size: Standard)   Pulse 85   Temp 98 °F (36 7 °C) (Oral)   Resp 16   Ht 5' 4" (1 626 m)   Wt 68 9 kg (152 lb)   LMP 01/01/1990 (Within Years)   SpO2 94%   BMI 26 09 kg/m²          Physical Exam   HENT:   Head: Normocephalic  Eyes: Pupils are equal, round, and reactive to light  EOM are normal    Neck: Neck supple  Cardiovascular: Normal rate and regular rhythm  Pulmonary/Chest: Effort normal    Abdominal: Soft  Musculoskeletal: She exhibits tenderness  Lumbar back: She exhibits decreased range of motion and bony tenderness     The back exam: there is Paraspinous muscles , there is  Sacroiliac (SI) joint, Iliac crests positive, Range of Motion (ROM)  rotation to the right, Strength Testing: Normal 5/5 strength bilateral lower extremity limbs  Left Hip Flexion {Numbers; 1-5 (OUT OF 5): 19471)  Left Hip Extension {Numbers; 1-5 (OUT OF 5): 88894)  Right Hip Flexion {Numbers; 1-5 (OUT OF 5): 08507)  Right Hip Extension {Numbers; 1-5 (OUT OF 5): 15986)  Left Knee Flexion {Numbers; 1-5 (OUT OF 5): 40754)  Left Knee Extension {Numbers; 1-5 (OUT OF 5): 57020)  Right Knee Flexion {Numbers; 1-5 (OUT OF 5): 25242)  Right Knee Extension {Numbers; 1-5 (OUT OF 5): 81218)  Left Foot Dorsiflexion {Numbers; 1-5 (OUT OF 5): 83381)  Left Foot Plantar Flexion {Numbers; 1-5 (OUT OF 5): 60798)  Right Foot Dorsiflexion {Numbers; 1-5 (OUT OF 5): 70254)  Right Foot Plantar Flexion {Numbers; 1-5 (OUT OF 5): 85895) , Toe walking (gastrocnemius soleus muscles; S1): Asked the patient to walk a few steps on the toes  , Resisted great toe dorsiflexion (L5): Asked the patient to sit and lift the big toe up against your resistance, pressing down on the top of the toe , Neurologic Exam: Exam of Sensation  abnormal  Exam of Deep Tendon  abnormal  Test Coordination  normal  Exam of Gait and Station  normal, Special Tests : Stork test (one-leg standing hyperextension test): (Having  the patient hyperextend the back while standing on one leg  This position was positive to aggravate pain associated with spondylolysis, spondylolisthesis or SI joint dysfunction  and Julio Cesar's or the flexion, abduction, and external rotation (RAFAEL) test: (Placing the patient's hip and leg into the figure-of-four position (flexion, abduction, and external rotation)  This position was positive TO aggravate SI joint pain  Neurological: She is alert  Skin: Skin is warm and dry  Psychiatric: She has a normal mood and affect   Her behavior is normal

## 2019-12-17 PROBLEM — F03.90 DEMENTIA (HCC): Status: RESOLVED | Noted: 2019-09-26 | Resolved: 2019-12-17

## 2019-12-17 PROBLEM — N17.9 AKI (ACUTE KIDNEY INJURY) (HCC): Status: RESOLVED | Noted: 2019-09-26 | Resolved: 2019-12-17

## 2019-12-17 NOTE — ASSESSMENT & PLAN NOTE
Wt Readings from Last 3 Encounters:   12/11/19 68 9 kg (152 lb)   12/11/19 70 3 kg (155 lb)   12/03/19 70 3 kg (155 lb)     Condition is stable with current treatment, to  continue the same

## 2019-12-17 NOTE — ASSESSMENT & PLAN NOTE
Lab Results   Component Value Date/Time    CREATININE 1 42 (H) 11/22/2019 03:05 PM    CREATININE 1 18 03/04/2014 06:00 AM     Lab Results   Component Value Date/Time    EGFR 34 (L) 11/22/2019 03:05 PM    EGFR 40 (L) 11/15/2019 09:32 AM    EGFR 43 11/05/2019 06:41 AM    EGFR 36 11/04/2019 06:13 AM    EGFR 27 11/03/2019 05:57 AM    EGFR 23 (L) 11/02/2019 05:34 PM    EGFR 32 (L) 11/01/2019 10:43 AM    EGFR 49 (L) 09/27/2019 09:01 AM    EGFR 26 (L) 09/24/2019 10:26 AM    EGFR 56 09/21/2019 05:36 AM    EGFR 43 09/19/2019 10:34 AM    EGFR 31 (L) 09/18/2019 09:42 AM    EGFR 35 (L) 09/13/2019 10:34 AM    EGFR 37 (L) 07/31/2019 12:11 PM    EGFR 34 (L) 07/17/2019 10:40 AM    EGFR 33 07/08/2019 06:25 AM    EGFR 36 07/07/2019 05:53 AM    EGFR 33 07/06/2019 03:51 AM    EGFR 27 (L) 07/05/2019 10:29 AM    EGFR 27 (L) 06/14/2019 09:52 AM    EGFR 23 (L) 06/04/2019 10:38 AM    EGFR 36 (L) 03/26/2019 11:33 AM    EGFR 38 02/02/2019 04:05 PM    EGFR 44 01/07/2019 06:34 AM    EGFR 43 01/06/2019 05:01 AM    EGFR 34 01/05/2019 10:55 AM    EGFR 41 01/02/2019 06:28 AM    EGFR 36 12/31/2018 11:56 AM    EGFR 40 12/31/2018 03:54 AM    EGFR 63 12/30/2018 08:09 PM    EGFR 31 12/30/2018 01:15 PM    EGFR 44 (L) 12/20/2018 01:52 PM    EGFR 46 (L) 07/08/2018 03:42 PM    EGFR 38 12/22/2017 08:50 AM    EGFR 45 12/21/2017 04:57 AM    EGFR 45 12/20/2017 03:02 PM    EGFR 42 12/20/2017 02:54 PM   Above is GFR and creatinine from previous visit  Seen at the the main end-stage three kidney disease since 2017    The goal is to control hypertension and diabetes to reasonable numbers and appropriate for her age

## 2019-12-17 NOTE — ASSESSMENT & PLAN NOTE
As main cause of her pain, this is a chronic problem  This is not cancer related  I spoke with Dr Jelena Jessica, from palliative care regarding patient's care and the pain will be continuing being treated by me  I discussed with patient and patient's caregiver about the pain medication, be careful about dosing and hours that she take them, also about the use of naloxone in case of accidental overdose  Patient is giving the medication at home instead her taking it by herself when she has pain  Explained to patient the medication is given for severe pain for her lower back, is not intended to control a every pain and her body  Is not intended to be use for mild to moderate pain, when she can use Tylenol    Pain Score:   6

## 2019-12-18 ENCOUNTER — OFFICE VISIT (OUTPATIENT)
Dept: HEMATOLOGY ONCOLOGY | Facility: CLINIC | Age: 84
End: 2019-12-18
Payer: MEDICARE

## 2019-12-18 ENCOUNTER — LAB (OUTPATIENT)
Dept: LAB | Facility: HOSPITAL | Age: 84
End: 2019-12-18
Payer: MEDICARE

## 2019-12-18 VITALS
RESPIRATION RATE: 18 BRPM | HEART RATE: 88 BPM | OXYGEN SATURATION: 95 % | TEMPERATURE: 97.9 F | BODY MASS INDEX: 25.92 KG/M2 | SYSTOLIC BLOOD PRESSURE: 145 MMHG | HEIGHT: 64 IN | WEIGHT: 151.8 LBS | DIASTOLIC BLOOD PRESSURE: 64 MMHG

## 2019-12-18 DIAGNOSIS — D50.9 IRON DEFICIENCY ANEMIA, UNSPECIFIED IRON DEFICIENCY ANEMIA TYPE: ICD-10-CM

## 2019-12-18 DIAGNOSIS — Z79.4 TYPE 2 DIABETES MELLITUS WITH HYPERGLYCEMIA, WITH LONG-TERM CURRENT USE OF INSULIN (HCC): ICD-10-CM

## 2019-12-18 DIAGNOSIS — C64.9 RENAL CELL CARCINOMA, UNSPECIFIED LATERALITY (HCC): Primary | ICD-10-CM

## 2019-12-18 DIAGNOSIS — E11.65 TYPE 2 DIABETES MELLITUS WITH HYPERGLYCEMIA, WITH LONG-TERM CURRENT USE OF INSULIN (HCC): ICD-10-CM

## 2019-12-18 PROBLEM — D64.9 ANEMIA, UNSPECIFIED: Status: ACTIVE | Noted: 2019-12-18

## 2019-12-18 LAB
ANISOCYTOSIS BLD QL SMEAR: PRESENT
BASOPHILS # BLD AUTO: 0.1 THOUSANDS/ΜL (ref 0–0.1)
BASOPHILS NFR BLD AUTO: 1 % (ref 0–1)
BURR CELLS BLD QL SMEAR: PRESENT
CHOLEST SERPL-MCNC: 109 MG/DL
EOSINOPHIL # BLD AUTO: 0.1 THOUSAND/ΜL (ref 0–0.4)
EOSINOPHIL NFR BLD AUTO: 1 % (ref 0–6)
ERYTHROCYTE [DISTWIDTH] IN BLOOD BY AUTOMATED COUNT: 19 %
EST. AVERAGE GLUCOSE BLD GHB EST-MCNC: 131 MG/DL
HBA1C MFR BLD: 6.2 % (ref 4.2–6.3)
HCT VFR BLD AUTO: 29.2 % (ref 36–46)
HDLC SERPL-MCNC: 37 MG/DL
HGB BLD-MCNC: 9.3 G/DL (ref 12–16)
HYPERCHROMIA BLD QL SMEAR: PRESENT
LDLC SERPL CALC-MCNC: 53 MG/DL
LYMPHOCYTES # BLD AUTO: 1.5 THOUSANDS/ΜL (ref 0.5–4)
LYMPHOCYTES NFR BLD AUTO: 15 % (ref 25–45)
MCH RBC QN AUTO: 24.8 PG (ref 26.8–34.3)
MCHC RBC AUTO-ENTMCNC: 31.9 G/DL (ref 31.4–37.4)
MCV RBC AUTO: 78 FL (ref 80–100)
MONOCYTES # BLD AUTO: 0.7 THOUSAND/ΜL (ref 0.2–0.9)
MONOCYTES NFR BLD AUTO: 7 % (ref 1–10)
NEUTROPHILS # BLD AUTO: 7.6 THOUSANDS/ΜL (ref 1.8–7.8)
NEUTS SEG NFR BLD AUTO: 76 % (ref 45–65)
NONHDLC SERPL-MCNC: 72 MG/DL
PLATELET # BLD AUTO: 374 THOUSANDS/UL (ref 150–450)
PLATELET BLD QL SMEAR: ADEQUATE
PMV BLD AUTO: 8.1 FL (ref 8.9–12.7)
POIKILOCYTOSIS BLD QL SMEAR: PRESENT
RBC # BLD AUTO: 3.76 MILLION/UL (ref 4–5.2)
RBC MORPH BLD: NORMAL
TRIGL SERPL-MCNC: 97 MG/DL
WBC # BLD AUTO: 10 THOUSAND/UL (ref 4.31–10.16)

## 2019-12-18 PROCEDURE — 36415 COLL VENOUS BLD VENIPUNCTURE: CPT

## 2019-12-18 PROCEDURE — 85025 COMPLETE CBC W/AUTO DIFF WBC: CPT

## 2019-12-18 PROCEDURE — 99215 OFFICE O/P EST HI 40 MIN: CPT | Performed by: INTERNAL MEDICINE

## 2019-12-18 PROCEDURE — 83036 HEMOGLOBIN GLYCOSYLATED A1C: CPT

## 2019-12-18 PROCEDURE — 80061 LIPID PANEL: CPT

## 2019-12-18 NOTE — PROGRESS NOTES
Hematology/Oncology Outpatient Follow-up  Garrison Fernandez 81 y o  female 1935 [id number]    Date:  12/18/2019        Assessment and Plan:  1  Renal cell carcinoma, unspecified laterality ([facility] [address] )  The patient and her family were educated about the new finding  We discussed the pathology results of the liver lesion which was biopsied recently  This seems to be compatible with metastatic renal cell carcinoma  She continues to have intermittent hematuria  The pathologist was not able to confirm if it is clear renal cell carcinoma versus non clear renal cell carcinoma  We discussed the difference treatment strategy for metastatic renal cell carcinoma  The patient is definitely not a candidate for any type of surgical approach due to comorbidities and poor performance status  However, she would be a candidate for immunotherapy based treatment like combination of 2 immunotherapy including nivolumab and ipilimumab versus Keytruda plus Axitinib  We have also other multiple options in the single agent versus combination  The family seems to be interested in getting a 2nd opinion which is very appropriate given the new diagnosis  Palliative radiation to the right kidney could be entertained if she continues to have hematuria  2  Iron deficiency anemia, unspecified iron deficiency anemia type  She seems to be iron deficient which will need to be replaced with IV treatment in the near future  HPI:  This is an 52-year-old female with multiple comorbid conditions including history of arthritis, CHF, COPD, diabetes mellitus, coronary artery disease, hypertension, myocardial infarction, osteoporosis, peripheral neuropathy, severe lower back pain, etc   The patient apparently had multiple symptoms lately including her severe lower back pain radiating to lower hip area, fatigue  She was evaluated in the emergency room on multiple occasions    She also had multiple imaging studies including a CT scan of the abdomen pelvis without contrast from February of 2019 which showed bilateral renal stones with left ureteral stone  Another CT scan of the abdomen pelvis was done on the 31st July 2019 which showed mild left-sided hydroureteronephrosis with the 4 mm calculus within the distal left ureter  The patient started to complain about significant hematuria within the last 2 months which was evaluated again with multiple imaging studies including ultrasound of the kidneys/abdomen and another CT scan of the abdomen pelvis on the 18th September 2019, this time she was thought to have a mass in the right possibly in the pelvis of the right kidney  A new indeterminate hypoattenuation nodule within the liver was also described  Her most recent CT scan of the abdomen pelvis again without contrast was on 11/2/2019 which showed:     IMPRESSION:        1   Stable nonobstructing bilateral renal calculi measuring up to 10 mm at the right ureteropelvic junction   Pyelonephritis not excluded  2   No evidence of bowel obstruction, colitis or diverticulitis  3   Stable L4-5 paracentral disc protrusion resulting in severe central canal stenosis and bilateral neural foraminal narrowing  CT scan of the chest on 10/29/2019 also was done which showed:   IMPRESSION:  1  7 mm right lower lobe and 6 mm right middle lobe pulmonary nodules  Based on current Fleischner Society 2017 Guidelines on incidental pulmonary nodule, patients with a known malignancy are at increased risk of metastasis and should receive initial   three month follow-up chest CT  2  Moderate emphysematous changes  3  10 x 10 mm hypodense structure abutting or arising from the ascending aorta proximal to the takeoff of the right subclavian artery  Differential considerations include fluid or hypodense node in the anterior mediastinum versus small aneurysm  Consider   further evaluation with contrast-enhanced chest CT for confirmation    4  Ill-defined hypodense lesion in the right lobe the liver and right para-aortic lymph nodes are redemonstrated suspicious for metastatic disease in this patient with findings suspicious for renal cancer  She had a cystoscopy on the 20/6 of September 2019 which showed the atypical urothelial cells without obvious malignant cells  On 11/12/2019 she had a core biopsy from the right kidney which showed was negative for malignant process with benign renal parenchyma  Interval history:  The patient came today for a follow-up visit  She had a biopsy of the liver lesion on the 12/03/2019 which showed pathology compatible with metastatic renal cell carcinoma  I did discuss the pathology with Dr Natalia Bolaños who read the biopsy result  She stated that she can not confirm if it is clear renal cell carcinoma versus non clear renal cell  The patient continues to complain about the occasional hematuria and lower back pain  She seems to be dependent on wheelchair for ambulation with significant fatigue  The workup for her anemia revealed iron deficiency  Her hemoglobin level was 10 1 on the 22nd of November  Creatinine 1 4 with high LDH  Her vitamin B12 was 502  Haptoglobin of 346 with negative direct Nawaf test   TSH was normal   The iron panel showed saturation of 9% and ferritin of 24  Uric acid was 8 4     ROS: Review of Systems   Constitutional: Positive for appetite change, fatigue and unexpected weight change  Negative for activity change, chills, diaphoresis and fever  HENT: Negative for congestion, dental problem, ear discharge, ear pain, facial swelling, hearing loss, mouth sores, nosebleeds, postnasal drip, sore throat, tinnitus and trouble swallowing  Eyes: Negative for discharge, redness, itching and visual disturbance  Respiratory: Positive for cough  Negative for chest tightness, shortness of breath and wheezing  Cardiovascular: Negative for chest pain, palpitations and leg swelling     Gastrointestinal: Positive for constipation and vomiting  Negative for abdominal distention, abdominal pain, anal bleeding, blood in stool, diarrhea and nausea  Genitourinary: Positive for dysuria and hematuria  Negative for difficulty urinating, flank pain, frequency and urgency  Musculoskeletal: Negative for arthralgias, back pain, gait problem, joint swelling, myalgias and neck pain  Skin: Negative for color change, pallor, rash and wound  Neurological: Positive for weakness  Negative for dizziness, syncope, speech difficulty, light-headedness, numbness and headaches  Hematological: Negative for adenopathy  Does not bruise/bleed easily  Psychiatric/Behavioral: Negative for agitation, behavioral problems, confusion and sleep disturbance         Past Medical History:   Diagnosis Date    Anemia     Arthritis     Cancer ([facility] )     kidney, liver    [facility]est[facility]     Chronic pain disorder     low back    COPD (chronic obstructive pulmonary disease) ([facility])     Coronary artery disease     Diabetes mellitus ([place] [address] )     Glaucoma     Hematuria 2019    Hyperlipidemia     Hypertension     Kidney stones     Lumbar stenosis     Myocardial infarction ([place] [address] )     Osteoporosis     PAD (peripheral artery disease) ([facility])     Peripheral neuropathy     Shortness of breath        Past Surgical History:   Procedure Laterality Date    APPENDECTOMY      CARDIAC CATHETERIZATION  2019    CT EPIDURAL STEROID INJECTION (RYAN LUMBAR)  8/20/2019    CT EPIDURAL STEROID INJECTION (RYAN LUMBAR)  10/8/2019    CYSTOSCOPY      HAND SURGERY Right     HYSTERECTOMY      age 29    INCISION AND DRAINAGE OF WOUND Left 1/5/2019    Procedure: INCISION AND DRAINAGE (I&D) EXTREMITY;  Surgeon: Makayla Chapman MD;  [facility]: [facility];  Service: General    IR ABDOMINAL ANGIOGRAPHY / INTERVENTION  9/19/2019    IR IMAGE GUIDED [id number] KIDNEY  11/12/2019    IR IMAGE GUIDED BIOPSY/ASPIRATION LIVER  12/3/2019    LITHOTRIPSY  MASS EXCISION      [place] of back wall mass    DE CYSTO/URETERO W/LITHOTRIPSY &INDWELL STENT INSRT Bilateral 2019    Procedure: cystoscopy, bilateral retrograde pyelography, bilateral ureteroscopy with stone extraction / Manito Ora lithotripsy;  Surgeon: Bonnie Dave MD;  Location: [address] [place] [address];  Service: Urology    TONSILLECTOMY      TONSILLECTOMY         Social History     Socioeconomic History    Marital status: Single     Spouse name: None    Number of children: None    Years of education: None    Highest education level: None   Occupational History    None   Social Needs    Financial resource strain: None    Food insecurity:     Worry: None     Inability: None    Transportation needs:     Medical: None     Non-medical: None   Tobacco Use    Smoking status: Former Smoker     Packs/day: 1 00     Years: 40 00     Pack years: 40 00     Types: Cigarettes     Last attempt to quit: 2017     Years since quittin 9    Smokeless tobacco: Never Used    Tobacco comment: states she quit but family living with her states she smokes   Substance and Sexual Activity    Alcohol use: Never     Frequency: Never     Comment: OCCASIONAL    Drug use: Never    Sexual activity: Not Currently     Partners: Male   Lifestyle    Physical activity:     Days per week: None     Minutes per session: None    Stress: None   Relationships    Social connections:     Talks on phone: None     Gets together: None     Attends Confucianist service: None     Active member of club or organization: None     Attends meetings of clubs or organizations: None     Relationship status: None    Intimate partner violence:     Fear of current or ex partner: None     Emotionally abused: None     Physically abused: None     Forced sexual activity: None   Other Topics Concern    None   Social History Narrative    None       Family History   Problem Relation Age of Onset    Diabetes Mother     No Known Problems Father     Throat cancer Daughter     No Known Problems Maternal Grandmother     No Known Problems Maternal Grandfather     No Known Problems Paternal Grandmother     No Known Problems Paternal Grandfather        Allergies   Allergen Reactions    Morphine And Related Vomiting    Tramadol Vomiting and Abdominal Pain     Other         Current Outpatient Medications:     amLODIPine (NORVASC) 5 mg tablet, Take 1 tablet (5 mg total) by mouth daily, Disp: 90 tablet, Rfl: 3    atorvastatin (LIPITOR) 40 mg tablet, Take 1 tablet (40 mg total) by mouth every 24 hours, Disp: 90 tablet, Rfl: 3    BISACODYL 5 MG EC tablet, TAKE ONE TABLET BY MOUTH DAILY AS NEEDED FOR CONSTIPATION ROSITA 1 TABLETA POR VIA ORAL DIARIAMENTE RHEA SEA NECESARIO FOR CONSTIPATION, Disp: , Rfl: 0    brimonidine tartrate 0 2 % ophthalmic solution, INSTILL 1 DROP INTO BOTH EYES 2 TIMES PER DAY, Disp: , Rfl: 6    CLEVER CHOICE COMFORT EZ 33G X 4 MM MISC, USE AS DIRECTEDUSE RHEA INDICADO, Disp: 300 each, Rfl: 0    dorzolamide (TRUSOPT) 2 % ophthalmic solution, Administer 1 drop to both eyes 3 (three) times a day, Disp: 5 mL, Rfl: 5    ferrous sulfate 324 (65 Fe) mg, Take 1 tablet (324 mg total) by mouth 2 (two) times a day before meals, Disp: 60 tablet, Rfl: 5    GLOBAL INJECT EASE LANCETS 30G MISC, CHECK BLOOD SUGAR THREE TIMES A DAY, Disp: 100 each, Rfl: 0    insulin degludec (TRESIBA FLEXTOUCH) 100 units/mL injection pen, To use 15 U at bedtime  , Disp: 9 pen, Rfl: 0    ipratropium (ATROVENT HFA) 17 mcg/act inhaler, Inhale 2 puffs 4 (four) times a day Ninety day supplies please, Disp: 3 Inhaler, Rfl: 3    latanoprost (XALATAN) 0 005 % ophthalmic solution, ADMINISTER 1 DROP TO BOTH EYES DAILY AT BEDTIME, Disp: , Rfl: 5    LINZESS 290 MCG CAPS, TAKE ONE CAPSULE BY MOUTH DAILYTOMAR TOMASA CAPSULA POR VIA ORAL DIARIAMENTE, Disp: 30 capsule, Rfl: 5    NOVOLOG FLEXPEN 100 units/mL injection pen, 12 UNITS 3 TIMES A DAY WITH MEALS Ninety day supplies please, Disp: 25 pen, Rfl: 3    omeprazole (PriLOSEC) 20 mg delayed release capsule, TAKE ONE CAPSULE BY MOUTH DAILY ROSITA TOMASA CAPSULA POR VIA ORAL DIARIAMENTE, Disp: , Rfl: 3    oxyCODONE (OxyCONTIN) 10 mg 12 hr tablet, Take 1 tablet (10 mg total) by mouth daily at bedtimeMax Daily Amount: 10 mg, Disp: 14 tablet, Rfl: 0    polyethylene glycol (MIRALAX) 17 g packet, Take 17 g by mouth daily, Disp: 30 each, Rfl: 0    pregabalin (LYRICA) 100 mg capsule, TAKE ONE CAPSULE BY MOUTH TWICE DAILYTOMAR TOMASA CAPSULA POR VIA ORAL DOS VECES AL MAC, Disp: 60 capsule, Rfl: 5    saccharomyces boulardii (FLORASTOR) 250 mg capsule, Take 1 capsule (250 mg total) by mouth 2 (two) times a day, Disp: 20 capsule, Rfl: 0    denosumab (PROLIA) 60 mg/mL, Inject 1 mL (60 mg total) under the skin once for 1 dose, Disp: 1 mL, Rfl: 0    JANUVIA 50 MG tablet, TAKE ONE TABLET BY MOUTH DAILYTOMAR 1 TABLETA POR VIA ORAL DIARIAMENTE (Patient not taking: Reported on 12/3/2019), Disp: 30 tablet, Rfl: 0      Physical Exam:  /64 (BP Location: Left arm, Cuff Size: Adult)   Pulse 88   Temp 97 9 °F (36 6 °C) (Tympanic)   Resp 18   Ht 5' 4" (1 626 m)   Wt 68 9 kg (151 lb 12 8 oz)   LMP 01/01/1990 (Within Years)   SpO2 95%   BMI 26 06 kg/m²     Physical Exam   Constitutional: She is oriented to person, place, and time  She appears well-developed and well-nourished  No distress  HENT:   Head: Normocephalic and atraumatic  Nose: Nose normal    Mouth/Throat: Oropharynx is clear and moist    Eyes: Pupils are equal, round, and reactive to light  Conjunctivae and EOM are normal  Right eye exhibits no discharge  Left eye exhibits no discharge  No scleral icterus  Neck: Normal range of motion  Neck supple  No JVD present  No tracheal deviation present  No thyromegaly present  Cardiovascular: Normal rate, regular rhythm and normal heart sounds  Exam reveals no friction rub  No murmur heard    Pulmonary/Chest: Effort normal and breath sounds normal  No stridor  No respiratory distress  She has no wheezes  She has no rales  She exhibits no tenderness  Abdominal: Soft  Bowel sounds are normal  She exhibits no distension and no mass  There is no hepatosplenomegaly, splenomegaly or hepatomegaly  There is no tenderness  There is no rebound and no guarding  Musculoskeletal: Normal range of motion  She exhibits no edema, tenderness or deformity  Sitting in a wheelchair   Lymphadenopathy:     She has no cervical adenopathy  Neurological: She is alert and oriented to person, place, and time  She has normal reflexes  No cranial nerve deficit  Coordination normal    Skin: Skin is warm and dry  No rash noted  She is not diaphoretic  No erythema  No pallor  Psychiatric: She has a normal mood and affect  Her behavior is normal  Judgment and thought content normal          Labs:  Lab Results   Component Value Date    WBC 10 00 12/18/2019    HGB 9 3 (L) 12/18/2019    HCT 29 2 (L) 12/18/2019    MCV 78 (L) 12/18/2019     12/18/2019     Lab Results   Component Value Date     06/19/2018    K 4 1 11/22/2019     11/22/2019    CO2 32 (H) 11/22/2019    ANIONGAP 9 06/19/2018    BUN 15 11/22/2019    CREATININE 1 42 (H) 11/22/2019    GLUCOSE 136 (H) 06/19/2018    GLUF 164 (H) 11/15/2019    CALCIUM 9 5 11/22/2019    AST 17 11/22/2019    ALT 22 11/22/2019    ALKPHOS 134 (H) 11/22/2019    PROT 5 5 (L) 04/18/2018    BILITOT 0 3 04/18/2018    EGFR 34 (L) 11/22/2019     No results found for: TSH    Patient voiced understanding and agreement in the above discussion  Aware to contact our office with questions/symptoms in the interim

## 2019-12-20 ENCOUNTER — OFFICE VISIT (OUTPATIENT)
Dept: UROLOGY | Facility: CLINIC | Age: 84
End: 2019-12-20
Payer: MEDICARE

## 2019-12-20 VITALS
HEIGHT: 64 IN | WEIGHT: 151 LBS | BODY MASS INDEX: 25.78 KG/M2 | DIASTOLIC BLOOD PRESSURE: 80 MMHG | HEART RATE: 90 BPM | SYSTOLIC BLOOD PRESSURE: 132 MMHG

## 2019-12-20 DIAGNOSIS — E11.65 TYPE 2 DIABETES MELLITUS WITH HYPERGLYCEMIA, WITH LONG-TERM CURRENT USE OF INSULIN (HCC): ICD-10-CM

## 2019-12-20 DIAGNOSIS — K59.03 DRUG-INDUCED CONSTIPATION: ICD-10-CM

## 2019-12-20 DIAGNOSIS — C64.1 RENAL CELL CARCINOMA OF RIGHT KIDNEY (HCC): Primary | ICD-10-CM

## 2019-12-20 DIAGNOSIS — Z79.4 TYPE 2 DIABETES MELLITUS WITH HYPERGLYCEMIA, WITH LONG-TERM CURRENT USE OF INSULIN (HCC): ICD-10-CM

## 2019-12-20 PROCEDURE — 99214 OFFICE O/P EST MOD 30 MIN: CPT | Performed by: UROLOGY

## 2019-12-20 NOTE — PROGRESS NOTES
[address] FOLLOW UP NOTE     CHIEF COMPLAINT   Shagufta De La Garza is a 81 y o  female with a complaint of   Chief Complaint   Patient presents with    Renal Cell Carcinoma       History of Present Illness:     81 y o   female, who originally presented to the [facility] Emergency Department for vague right upper quadrant pain and fatigue and overall feeling unwell  She underwent ultrasound in a workup for possible liver gallbladder source, and eventually had a CT which showed hyperdense material consistent with hemorrhage in blood products in the right mid calyx, no ureteral obstruction, with the possibility of underlying renal mass of the right kidney existing  Contrast was not given given her acute kidney injury      She was transferred to the [facility], IR consultation was obtained  She had renal artery angiogram with attempted embolization, there were no actively extravasating vessels and embolization was not performed      Her pre-hospital hemoglobin was in the 11-13 range, down to the mid 9 that original presentation with hemoglobin of 8 3 at its lowest early this morning  Currently holding steady at a 0 5 throughout the day today  Her acute kidney injury has improved from 1 5 for admission to 1 18 this morning  Underwent cystoscopy with bilateral ureteroscopy on 9/26/19 with Dr Adrianna Zhong  RIGHT ureteroscopy demonstrated what appeared to be a renal parenchymal lesion invading into collecting system  LEFT sided ureteroscopy was for treatment of stone  At the request of Dr Sonu Connor,  Patient is seeing me today to discuss further her CT findings and the pathology from her recent ureteroscopy  She presents with her granddaughter and her age  These family member speak [place]  At their report, they  Were aware that the patient underwent a biopsy but that the pathology was negative  The patient's primary care doctor has been directing her care up until this point         after lengthy discussion at our 1st visit, the patient was referred for IR biopsy of the kidney mass  This unfortunate was nondiagnostic  She saw the Medical Oncology Service and underwent biopsy of her liver lesion which does returned positive for metastatic renal cell carcinoma  They could not determine whether this was clear cell or another variant  The patient had a lengthy discussion with Medical Oncology offering potential systemic therapies  The caregiver reports that she was also referred to palliative care and has an appointment upcoming  They are considering seeking out a 2nd Medical Oncology appointment  They return today for discussion  She has with her grandson who is translating        Past Medical History:     Past Medical History:   Diagnosis Date    Anemia     Arthritis     Cancer ([facility] [address] )     kidney, liver    CHF (congestive heart failure) (HCC)     Chronic pain disorder     low back    COPD (chronic obstructive pulmonary disease) (HCC)     Coronary artery disease     Diabetes mellitus ([facility] [address] [facility] )     Glaucoma     Hematuria 2019    Hyperlipidemia     Hypertension     Kidney stones     Lumbar stenosis     Myocardial infarction ([facility] [address] )     Osteoporosis     PAD (peripheral artery disease) ([facility] [address] )     Peripheral neuropathy     Shortness of breath        PAST SURGICAL HISTORY:     Past Surgical History:   Procedure Laterality Date    APPENDECTOMY      CARDIAC CATHETERIZATION  2019    CT EPIDURAL STEROID INJECTION (RYAN LUMBAR)  8/20/2019    CT EPIDURAL STEROID INJECTION (RYAN LUMBAR)  10/8/2019    CYSTOSCOPY      HAND SURGERY Right     HYSTERECTOMY      age 29    INCISION AND DRAINAGE OF WOUND Left 1/5/2019    Procedure: INCISION AND DRAINAGE (I&D) EXTREMITY;  Surgeon: Makayla Chapman MD;  [facility]: [facility];  Service: General    IR ABDOMINAL ANGIOGRAPHY / INTERVENTION  9/19/2019    IR IMAGE GUIDED BIOPSY/ASPIRATION KIDNEY  11/12/2019    IR IMAGE GUIDED BIOPSY/ASPIRATION LIVER 12/3/2019    LITHOTRIPSY      MASS EXCISION      remova of back wall mass    CA CYSTO/URETERO W/LITHOTRIPSY &INDWELL STENT INSRT Bilateral 9/26/2019    Procedure: cystoscopy, bilateral retrograde pyelography, bilateral ureteroscopy with stone extraction / laser lithotripsy;  Surgeon: Jaskaran Suarez MD;  Location: [address] [place] [address];  Service: Urology    TONSILLECTOMY      TONSILLECTOMY         CURRENT MEDICATIONS:     Current Outpatient Medications   Medication Sig Dispense Refill    amLODIPine (NORVASC) 5 mg tablet Take 1 tablet (5 mg total) by mouth daily 90 tablet 3    atorvastatin (LIPITOR) 40 mg tablet Take 1 tablet (40 mg total) by mouth every 24 hours 90 tablet 3    BISACODYL 5 MG EC tablet TAKE ONE TABLET BY MOUTH DAILY AS NEEDED FOR CONSTIPATION ROSITA 1 TABLETA POR VIA ORAL DIARIAMENTE RHEA SEA NECESARIO FOR CONSTIPATION  0    brimonidine tartrate 0 2 % ophthalmic solution INSTILL 1 DROP INTO BOTH EYES 2 TIMES PER DAY  6    CLEVER CHOICE COMFORT EZ 33G X 4 MM MISC USE AS DIRECTEDUSE RHEA INDICADO 300 each 0    dorzolamide (TRUSOPT) 2 % ophthalmic solution Administer 1 drop to both eyes 3 (three) times a day 5 mL 5    ferrous sulfate 324 (65 Fe) mg Take 1 tablet (324 mg total) by mouth 2 (two) times a day before meals 60 tablet 5    GLOBAL INJECT EASE LANCETS 30G MISC CHECK BLOOD SUGAR THREE TIMES A  each 0    insulin degludec (TRESIBA FLEXTOUCH) 100 units/mL injection pen To use 15 U at bedtime   9 pen 0    ipratropium (ATROVENT HFA) 17 mcg/act inhaler Inhale 2 puffs 4 (four) times a day Ninety day supplies please 3 Inhaler 3    JANUVIA 50 MG tablet TAKE ONE TABLET BY MOUTH DAILYTOMAR 1 TABLETA POR VIA ORAL DIARIAMENTE 30 tablet 0    latanoprost (XALATAN) 0 005 % ophthalmic solution ADMINISTER 1 DROP TO BOTH EYES DAILY AT BEDTIME  5    LINZESS 290 MCG CAPS TAKE ONE CAPSULE BY MOUTH DAILYTOMAR TOMASA CAPSULA POR VIA ORAL DIARIAMENTE 30 capsule 5    NOVOLOG FLEXPEN 100 units/mL injection pen 12 UNITS 3 TIMES A DAY WITH MEALS Ninety day supplies please 25 pen 3    omeprazole (PriLOSEC) 20 mg delayed release capsule TAKE ONE CAPSULE BY MOUTH DAILY ROSITA TOMASA CAPSULA POR VIA ORAL DIARIAMENTE  3    oxyCODONE (OxyCONTIN) 10 mg 12 hr tablet Take 1 tablet (10 mg total) by mouth daily at bedtimeMax Daily Amount: 10 mg 14 tablet 0    polyethylene glycol (MIRALAX) 17 g packet Take 17 g by mouth daily 30 each 0    pregabalin (LYRICA) 100 mg capsule TAKE ONE CAPSULE BY MOUTH TWICE DAILYTOMAR TOMASA CAPSULA POR VIA ORAL DOS VECES AL MAC 60 capsule 5    saccharomyces boulardii (FLORASTOR) 250 mg capsule Take 1 capsule (250 mg total) by mouth 2 (two) times a day 20 capsule 0    denosumab (PROLIA) 60 mg/mL Inject 1 mL (60 mg total) under the skin once for 1 dose 1 mL 0     No current facility-administered medications for this visit          ALLERGIES:     Allergies   Allergen Reactions    Morphine And Related Vomiting    Tramadol Vomiting and Abdominal Pain     Other       SOCIAL HISTORY:     Social History     Socioeconomic History    Marital status: Single     Spouse name: None    Number of children: None    Years of education: None    Highest education level: None   Occupational History    None   Social Needs    Financial resource strain: None    Food insecurity:     Worry: None     Inability: None    Transportation needs:     Medical: None     Non-medical: None   Tobacco Use    Smoking status: Former Smoker     Packs/day: 1 00     Years: 40 00     Pack years: 40 00     Types: Cigarettes     Last attempt to quit: 2017     Years since quittin 9    Smokeless tobacco: Never Used    Tobacco comment: states she quit but family living with her states she smokes   Substance and Sexual Activity    Alcohol use: Never     Frequency: Never     Comment: OCCASIONAL    Drug use: Never    Sexual activity: Not Currently     Partners: Male   Lifestyle    Physical activity:     Days per week: None Minutes per session: None    Stress: None   Relationships    Social connections:     Talks on phone: None     Gets together: None     Attends [employer] service: None     Active member of club or organization: None     Attends meetings of clubs or organizations: None     Relationship status: None    Intimate partner violence:     Fear of current or ex partner: None     Emotionally abused: None     Physically abused: None     Forced sexual activity: None   Other Topics Concern    None   Social History Narrative    None       SOCIAL HISTORY:     Family History   Problem Relation Age of Onset    Diabetes Mother     No Known Problems Father     Throat cancer Daughter     No Known Problems Maternal Grandmother     No Known Problems Maternal Grandfather     No Known Problems Paternal Grandmother     No Known Problems Paternal Grandfather        REVIEW OF SYSTEMS:     Review of Systems   Constitutional: Positive for fatigue  Respiratory: Negative  Cardiovascular: Negative  Gastrointestinal: Negative  Genitourinary: Positive for hematuria (  Resolved currently)  Musculoskeletal: Positive for gait problem  Skin: Negative  Psychiatric/Behavioral: Negative  PHYSICAL EXAM:     /80   Pulse 90   Ht 5' 4" (1 626 m)   Wt 68 5 kg (151 lb)   LMP 01/01/1990 (Within Years)   BMI 25 92 kg/m²     General:   Elderly female in no acute distress  They have a normal affect  There is not appear to be any gross neurologic defects or abnormalities  Hard of hearing  HEENT:  Normocephalic, atraumatic  Neck is supple without any palpable lymphadenopathy  Cardiovascular:  Patient has normal palpable distal radial pulses  There is no significant peripheral edema  No JVD is noted  Respiratory:  Patient has unlabored respirations  There is no audible wheeze or rhonchi  Abdomen:    Abdomen is soft and nontender  There is no tympany    Inguinal and umbilical hernia are not appreciated  Musculoskeletal:   Wheelchair-bound  Dermatologic:  Patient has no skin abnormalities or rashes  LABS:     CBC:   Lab Results   Component Value Date    WBC 10 00 2019    HGB 9 3 (L) 2019    HCT 29 2 (L) 2019    MCV 78 (L) 2019     2019       BMP:   Lab Results   Component Value Date    GLUCOSE 136 (H) 2018    CALCIUM 9 5 2019     2018    K 4 1 2019    CO2 32 (H) 2019     2019    BUN 15 2019    CREATININE 1 42 (H) 2019       IMAGIN/20/19  CT ABDOMEN AND PELVIS WITH AND WITHOUT IV CONTRAST     INDICATION:   Hematuria        COMPARISON:  Renal arteriogram 2019, CT abdomen pelvis 2019, CT abdomen pelvis 2019     TECHNIQUE: Initial CT of the abdomen and pelvis was performed without intravenous contrast   Subsequent dynamic CT evaluation of the abdomen and pelvis was performed after the administration of intravenous contrast in both nephrographic and delayed   phases after the administration of intravenous contrast   Axial, sagittal, and coronal 2D reformatted images were created from the source data and submitted for interpretation       Radiation dose length product (DLP) for this visit:  1740 mGy-cm   This examination, like all CT scans performed in the [facility], was performed utilizing techniques to minimize radiation dose exposure, including the use of iterative   reconstruction and automated exposure control      IV Contrast:  100 mL of iodixanol (VISIPAQUE)  Enteric Contrast:  Enteric contrast was not administered      FINDINGS:     ABDOMEN     RIGHT KIDNEY AND URETER:  Calculus is again seen within extrarenal pelvis as well as within the midpole  Extrarenal pelvis dilatation has decreased and has been resolution of blood products within the extrarenal pelvis   There is a heterogeneously enhancing bilobed lesion within   the mid to lower pole with areas of hyperattenuation on precontrast images, consistent with hemorrhage  There is a portion of the lesion that appears to be within the parenchyma of the lower pole (series 601 image 92)  The largest portion of the lesion   is centered within the hilum replacing the renal sinus fat and appears to communicate with the collecting system, best seen on series 604 image 87 and also evidenced by the fact that the extrarenal pelvis previously contained blood products  The   parenchymal and hilar components as a conglomerate measures approximately 5 9 cm craniocaudal by 6 0 cm transverse by 5 6 cm anterior posterior  The renal vein is patent      The ureter is not opacified on delayed images      There is a 7 mm nodule within the upper pole of the right kidney that appears to enhance (series 3 image 33)        LEFT KIDNEY AND URETER:  No solid renal mass  No detectable urothelial mass  Simple cyst is present within the upper pole  No hydronephrosis or hydroureter  No urinary tract calculi  No perinephric collection      URINARY BLADDER:  No bladder wall mass  No calculi         LOWER CHEST:  No clinically significant abnormality identified in the visualized lower chest      LIVER/BILIARY TREE:  There is a 1 3 x 1 1 cm ill-defined lesion within segment 8 of the liver corresponding to findings on recent CT  This lesion does appear to have measurable enhancement as well as washout on delayed images, suggestive of metastatic   lesion      GALLBLADDER:  No calcified gallstones   No pericholecystic inflammatory change      SPLEEN:  Unremarkable      PANCREAS:  Unremarkable      ADRENAL GLANDS:  1 5 x 1 1 cm right adrenal nodule is stable dating back to 8/25/2016, suggesting benign lesion      STOMACH AND BOWEL:  There is colonic diverticulosis without evidence of acute diverticulitis      ABDOMINOPELVIC CAVITY:  2 8 x 1 8 cm necrotic aortocaval lymph node has increased in size from the prior CT of 7/31/2019, previously measuring 1 7 x 0 9 cm      VESSELS:  Unremarkable for patient's age      PELVIS     REPRODUCTIVE ORGANS:  Unremarkable for patient's age      APPENDIX: No findings to suggest appendicitis      ABDOMINAL WALL/INGUINAL REGIONS:  Unremarkable      OSSEOUS STRUCTURES:  No acute fracture or destructive osseous lesion      IMPRESSION:        1  Heterogeneous lobulated lesion within the mid to lower pole the right kidney with the portion that appears parenchymal and dominant portion that is centered within the hilum replacing the renal sinus fat and with evidence of communication with the   collecting system  Blood products within dilated renal pelvis have resolved with decreased dilatation of extrarenal pelvis  Differential considerations include renal cell carcinoma with invasion into the collecting system versus transitional cell   carcinoma with invasion into the parenchyma  Further evaluation with cystoscopy for tissue sampling is recommended         2  Enhancing nodule within the upper pole of the right kidney, suspicious for synchronous neoplasm      3  Increase in size of aortocaval lymph node, consistent with regional metastasis      4  Ill-defined enhancing lesion within segment 8 of the liver, consistent with metastasis      5  Right adrenal nodule, stable dating back to 8/25/2016 suggesting benign  CYTOLOGY:     9/26/19  Final Diagnosis   A  Renal Washing, Right, :  Atypical urothelial cells (AUC) - see comment  Clusters of atypical urothelium present with associated background degenerative changes and occasional inflammatory cells      Satisfactory for evaluation          B  Brushing, Right Renal :  Negative for high grade urothelial carcinoma ([address])    - see comment  Clusters of benign urothelium present      Satisfactory for evaluation  PATHOLOGY:     9/26/19  Final Diagnosis   A  Urinary Bladder, Bladder Stones:  - Consistent with calculus, pending chemical analysis at [address] [place] [address] (gross only)       B  Kidney, Right, Right Renal Pelvic Lesion:  - One fragment of benign urothelial mucosa and two separate fragments of necrotic debris  See comment   - No evidence of malignancy  ASSESSMENT:     81 y o  female with metastatic renal lesion, likely RCC given negative urothelial brushing and biopsy    PLAN:       Patient has metastatic renal cell carcinoma  Given her age and performance status she is not a candidate for cytoreductive nephrectomy nor do I think this would benefit her longevity or quality of life  I have discussed this at length with the patient and her family  Medical Oncology as offered her options for systemic therapy and palliative care has been ordered  Patient has some occasional intermittent hematuria from her renal mass and again I would not recommend palliative surgery to control this  There is discussion of palliative radiation if this were to progress  The hematuria is low level and asymptomatic at this point  The patient is strongly considering having no additional systemic therapy and I discussed with her that this is not unreasonable  This would make a stronger case for palliative care involvement  At this point time I have offered the patient and her family follow-up in my office intermittently to ensure that she does not have any additional needs  They understand and agree with the plan against any surgical intervention at this point time

## 2019-12-23 DIAGNOSIS — S32.050A COMPRESSION FRACTURE OF L5 LUMBAR VERTEBRA, CLOSED, INITIAL ENCOUNTER (HCC): ICD-10-CM

## 2019-12-23 RX ORDER — SODIUM PHOSPHATE, DIBASIC AND SODIUM PHOSPHATE, MONOBASIC 7; 19 G/133ML; G/133ML
ENEMA RECTAL
Qty: 266 ML | Refills: 0 | Status: SHIPPED | OUTPATIENT
Start: 2019-12-23 | End: 2020-01-10 | Stop reason: SDUPTHER

## 2019-12-23 RX ORDER — CALCITONIN SALMON 200 [IU]/.09ML
SPRAY, METERED NASAL
Qty: 3.7 ML | Refills: 0 | OUTPATIENT
Start: 2019-12-23

## 2019-12-23 RX ORDER — SITAGLIPTIN 50 MG/1
TABLET, FILM COATED ORAL
Qty: 30 TABLET | Refills: 0 | Status: SHIPPED | OUTPATIENT
Start: 2019-12-23 | End: 2020-01-03 | Stop reason: SDUPTHER

## 2019-12-24 DIAGNOSIS — S32.050A COMPRESSION FRACTURE OF L5 LUMBAR VERTEBRA, CLOSED, INITIAL ENCOUNTER (HCC): ICD-10-CM

## 2019-12-26 RX ORDER — CALCITONIN SALMON 200 [IU]/.09ML
SPRAY, METERED NASAL
Qty: 3.7 ML | Refills: 0 | OUTPATIENT
Start: 2019-12-26

## 2020-01-02 ENCOUNTER — TELEPHONE (OUTPATIENT)
Dept: FAMILY MEDICINE CLINIC | Facility: CLINIC | Age: 85
End: 2020-01-02

## 2020-01-02 NOTE — TELEPHONE ENCOUNTER
Patient daughter has  and the family will like to know if anything can be given to help her with her nerves

## 2020-01-03 DIAGNOSIS — M54.16 LUMBAR RADICULOPATHY: ICD-10-CM

## 2020-01-03 DIAGNOSIS — E08.3299: ICD-10-CM

## 2020-01-03 DIAGNOSIS — E11.65 TYPE 2 DIABETES MELLITUS WITH HYPERGLYCEMIA, WITH LONG-TERM CURRENT USE OF INSULIN (HCC): ICD-10-CM

## 2020-01-03 DIAGNOSIS — H54.7: ICD-10-CM

## 2020-01-03 DIAGNOSIS — D64.9 ANEMIA, UNSPECIFIED TYPE: ICD-10-CM

## 2020-01-03 DIAGNOSIS — K59.03 DRUG-INDUCED CONSTIPATION: ICD-10-CM

## 2020-01-03 DIAGNOSIS — J44.9 COPD MIXED TYPE (HCC): ICD-10-CM

## 2020-01-03 DIAGNOSIS — H40.53X4: Primary | ICD-10-CM

## 2020-01-03 DIAGNOSIS — Z79.4 TYPE 2 DIABETES MELLITUS WITH HYPERGLYCEMIA, WITH LONG-TERM CURRENT USE OF INSULIN (HCC): ICD-10-CM

## 2020-01-03 DIAGNOSIS — I10 ESSENTIAL HYPERTENSION, BENIGN: ICD-10-CM

## 2020-01-03 DIAGNOSIS — R10.11 RIGHT UPPER QUADRANT ABDOMINAL PAIN: ICD-10-CM

## 2020-01-03 DIAGNOSIS — I21.4 NSTEMI (NON-ST ELEVATED MYOCARDIAL INFARCTION) (HCC): ICD-10-CM

## 2020-01-03 RX ORDER — LATANOPROST 50 UG/ML
1 SOLUTION/ DROPS OPHTHALMIC
Qty: 4.5 ML | Refills: 0 | Status: SHIPPED | OUTPATIENT
Start: 2020-01-03 | End: 2020-04-02

## 2020-01-03 RX ORDER — OMEPRAZOLE 20 MG/1
20 CAPSULE, DELAYED RELEASE ORAL DAILY
Qty: 90 CAPSULE | Refills: 3 | Status: SHIPPED | OUTPATIENT
Start: 2020-01-03

## 2020-01-03 RX ORDER — ATORVASTATIN CALCIUM 40 MG/1
40 TABLET, FILM COATED ORAL EVERY 24 HOURS
Qty: 90 TABLET | Refills: 3 | Status: SHIPPED | OUTPATIENT
Start: 2020-01-03

## 2020-01-03 RX ORDER — DORZOLAMIDE HCL 20 MG/ML
1 SOLUTION/ DROPS OPHTHALMIC 3 TIMES DAILY
Qty: 15 ML | Refills: 3 | Status: SHIPPED | OUTPATIENT
Start: 2020-01-03

## 2020-01-03 RX ORDER — PEN NEEDLE, DIABETIC 33 GX5/32"
NEEDLE, DISPOSABLE MISCELLANEOUS
Qty: 300 EACH | Refills: 0 | Status: SHIPPED | OUTPATIENT
Start: 2020-01-03

## 2020-01-03 RX ORDER — AMLODIPINE BESYLATE 5 MG/1
5 TABLET ORAL DAILY
Qty: 90 TABLET | Refills: 3 | Status: SHIPPED | OUTPATIENT
Start: 2020-01-03

## 2020-01-03 RX ORDER — FERROUS SULFATE TAB EC 324 MG (65 MG FE EQUIVALENT) 324 (65 FE) MG
324 TABLET DELAYED RESPONSE ORAL
Qty: 180 TABLET | Refills: 0 | Status: SHIPPED | OUTPATIENT
Start: 2020-01-03

## 2020-01-03 RX ORDER — LANCETS 30 GAUGE
EACH MISCELLANEOUS
Qty: 300 EACH | Refills: 0 | Status: SHIPPED | OUTPATIENT
Start: 2020-01-03 | End: 2020-01-07

## 2020-01-03 RX ORDER — PREGABALIN 100 MG/1
100 CAPSULE ORAL 2 TIMES DAILY
Qty: 180 CAPSULE | Refills: 3 | Status: SHIPPED | OUTPATIENT
Start: 2020-01-03

## 2020-01-03 RX ORDER — OXYCODONE HCL 10 MG/1
10 TABLET, FILM COATED, EXTENDED RELEASE ORAL EVERY 12 HOURS SCHEDULED
Qty: 60 TABLET | Refills: 0 | Status: SHIPPED | OUTPATIENT
Start: 2020-01-03 | End: 2020-02-02

## 2020-01-03 RX ORDER — BRIMONIDINE TARTRATE 2 MG/ML
1 SOLUTION/ DROPS OPHTHALMIC EVERY 8 HOURS
Qty: 15 ML | Refills: 0 | Status: SHIPPED | OUTPATIENT
Start: 2020-01-03

## 2020-01-03 RX ORDER — BISACODYL 5 MG
5 TABLET, DELAYED RELEASE (ENTERIC COATED) ORAL DAILY PRN
Qty: 90 TABLET | Refills: 0 | Status: SHIPPED | OUTPATIENT
Start: 2020-01-03 | End: 2020-04-02

## 2020-01-03 RX ORDER — INSULIN ASPART 100 [IU]/ML
INJECTION, SOLUTION INTRAVENOUS; SUBCUTANEOUS
Qty: 25 PEN | Refills: 3 | Status: SHIPPED | OUTPATIENT
Start: 2020-01-03

## 2020-01-06 ENCOUNTER — TELEPHONE (OUTPATIENT)
Dept: HEMATOLOGY ONCOLOGY | Facility: CLINIC | Age: 85
End: 2020-01-06

## 2020-01-06 NOTE — TELEPHONE ENCOUNTER
Ritesh Cruz, pt's grand child, called to reschedule pt's f/u apt on 01/08 as the pt will not be in the area until February  Pt is rescheduled for Mon 02/03 at 10:00

## 2020-01-07 DIAGNOSIS — Z79.4 TYPE 2 DIABETES MELLITUS WITH HYPERGLYCEMIA, WITH LONG-TERM CURRENT USE OF INSULIN (HCC): ICD-10-CM

## 2020-01-07 DIAGNOSIS — E11.65 TYPE 2 DIABETES MELLITUS WITH HYPERGLYCEMIA, WITH LONG-TERM CURRENT USE OF INSULIN (HCC): ICD-10-CM

## 2020-01-07 RX ORDER — LANCETS 30 GAUGE
EACH MISCELLANEOUS
Qty: 100 EACH | Refills: 0 | Status: SHIPPED | OUTPATIENT
Start: 2020-01-07

## 2020-01-08 DIAGNOSIS — K59.03 DRUG-INDUCED CONSTIPATION: ICD-10-CM

## 2020-01-10 RX ORDER — SODIUM PHOSPHATE, DIBASIC AND SODIUM PHOSPHATE, MONOBASIC 7; 19 G/133ML; G/133ML
ENEMA RECTAL
Qty: 266 ML | Refills: 0 | Status: SHIPPED | OUTPATIENT
Start: 2020-01-10

## 2020-01-13 RX ORDER — FUROSEMIDE 40 MG/1
TABLET ORAL
COMMUNITY
Start: 2020-01-02

## 2020-01-14 NOTE — ASSESSMENT & PLAN NOTE
Possibly related to gross hematuria as above and CT findings  This is improved  UA bland from infectious stand point    Continue analgesics - - -

## 2020-01-27 DIAGNOSIS — K59.03 DRUG-INDUCED CONSTIPATION: ICD-10-CM

## 2020-01-29 RX ORDER — BISACODYL 5 MG
5 TABLET, DELAYED RELEASE (ENTERIC COATED) ORAL DAILY PRN
Qty: 90 TABLET | Refills: 0 | OUTPATIENT
Start: 2020-01-29 | End: 2020-04-28

## 2022-04-27 NOTE — PATIENT INSTRUCTIONS
Conteo básico de carbohidratos   CUIDADO AMBULATORIO:   El conteo de carbohidratos  es [place] de planificar indu comidas contando la cantidad de carbohidratos de los alimentos  [place] [employer] son los azúcares, almidones y fibras que se encuentran en las frutas, granos, verduras y productos lácteos  Los carbohidratos [employer] niveles de azúcar en la kris  El conteo de carbohidratos puede ayudarle a comer la cantidad [place] de carbohidratos para mantener indu niveles de glucosa (azúcar) en kris bajo control  Lo que necesita saber sobre la planificación de las comidas utilizando el conteo de carbohidratos:  · Un dietista o un médico le ayudará a desarrollar un plan alimenticio saludable que trabajará mejor para usted  Le enseñarán cuántos carbohidratos debe consumir o beber en cada comida o merienda  [place] plan alimenticio estará basado en [place] edad, peso, dieta usual y [facility]  Si usted tiene diabetes, también incluirá [place] nivel de azúcar en kris y medicamentos para la diabetes  Cuando usted está informado de la cantidad de carbohidratos que debe consumir, entonces puede decidir los tipos de alimentos que quiere comer  · Necesitará saber qué alimentos contienen carbohidratos y cuántos contienen  Lleva la cuenta de la cantidad de carbohidratos en alimentos y meriendas para poder seguir [place] plan alimenticio  No evite los carbohidratos ni omita comidas  Si usted no consume suficiente cantidad de carbohidratos u omite comidas, los niveles de azúcar en [place] kris pueden bajar excesivamente    Alimentos que contienen carbohidratos:   · Panes:  Cada porción de comida en la lista siguiente contiene cerca de 15 g de carbohidratos     ¨ 1 rebanada de pan (1 onza) o 1 tortilla de harina o maíz (6 pulgadas)    ¨ La ½ de pan para hamburguesa o ¼ de ghazal rosquilla varsha (aproximadamente 1 onza)    ¨ 1 panqueque (4 pulgadas en diámetro y ¼ de [place])    · Cereales y granos:  [address] Devang Vivas is here today for BP follow-up. Her pressure in clinic today was normal.    She also mentioned a rash on her chest, left elbow and knee. The rash was mentioned at the last well exam in March. Appearance today consistent with molluscum. Discussed benign neglect, Canthrone or Dermatology referral.  The family will consider. porciones de cereales listos para comer pueden variar  Abby el tamaño de la porción y la cantidad de carbohidratos alistados en la etiqueta alimenticia  Cada porción de comida en la lista siguiente contiene cerca de 15 g de carbohidratos     ¨ ¾ taza de cereal seco, sin endulzar, listo para comer o ¼ taza de granola baja en grasa     ¨ ½ taza de gautam u otros cereales cocidos     ¨ ? taza de arroz o pasta cocida    · Frijoles y vegetales con almidón:  Cada porción de comida en la lista siguiente contiene cerca de 15 g de carbohidratos     ¨ ½ de taza de maíz, chícharos verdes, camote o puré de papa    ¨ ¼ de ghazal papa varsha horneada    ¨ ½ taza de frijoles, lentejas y guisantes (yarely, gene, rony, rowley, partido, de jennifer rosi)    · Gilbert y meriendas:  Cada porción de comida en la lista siguiente contiene cerca de 15 g de carbohidratos     ¨ 3 galletas de harina cuadradas u 8 galletas en forma de animalitos     ¨ 6 galletas saladas    ¨ 3 tazas de palomitas de [place] o ¾ onzas de pretzels, jennie fritas o totopos    · Frutas:  Cada porción de comida en la lista siguiente contiene cerca de 15 g de carbohidratos     ¨ 1 pieza pequeña (4 onzas) de fruta fresca o ¾ a 1 taza de fruta fresca    ¨ ½ taza de fruta enlatada o congelada, envasada en jugo natural    ¨ ½ taza (4 onzas) de [place] de fruta sin azúcar    ¨ 2 cucharadas de fruta seca    · Alimentos azucarados o postres:  Cada porción de comida en la lista siguiente contiene cerca de 15 g de carbohidratos     ¨ 1 connor de 2 pulgadas de pastel sin glaseado o brownie     ¨ 2 galletas dulces pequeñas    ¨ ½ taza de helado, yogur congelado o yogur congelado sin lactosa    ¨ ¼ de taza de sorbete    ¨ 1 cucharada de [place] regular, mermelada o [place]    ¨ 2 cucharadas de jarabe ligero    · [place] y yogur:  Los alimentos derivados de la leche contienen cerca de 12 g de carbohidratos por ración      ¨ 1 taza de leche sin grasa o baja en grasa    ¨ [address] soja    ¨ 1 taza de yogur sin grasa que ha sido endulzado con endulzante artificial    · Verduras sin almidón:  Cada porción de comida contiene cerca de 5 g de carbohidratos Marcelino porciones de vegetales sin almidón cuentan dank 1 porción de carbohidratos  ¨ ½ de taza de verduras cocidas o 1 taza de verduras crudas [place] incluyen remolachas, bróculi, repollo, coliflor, pepino, champiñones, tomates y calabaza  ¨ ½ taza de jugo de verduras  Cómo utilizar el conteo de carbohidratos para planificar las comidas:   · [employer] las cantidades de carbohidratos usando el tamaño de las porciones:      ¨ Ejemplo de ghazal jaspal de pasta:  Planifica comer pasta, ensalada mixta y un vaso de 8 onzas de [place]  Gomez médico le dice que puede consumir 4 porciones de carbohidratos para la jaspal  Ghazal porción de carbohidratos de pasta es ? de taza  Ghazal taza de pasta será igual a 3 porciones de carbohidratos  Un vaso de 8 onzas de leche se cuenta dank 1 porción de carbohidratos  Estas cantidades de alimentos sería igual a 4 porciones de carbohidratos  Ghaazl taza de ensalada mixta no cuenta para las porciones de carbohidratos  · Cuente la cantidad de carbohidratos utilizando las etiquetas alimenticias:  Busque la cantidad total de los carbohidratos en los alimentos envasados leyendo la etiqueta alimenticia  Las etiquetas alimenticias explican el tamaño de la porción del alimento y la cantidad total de los carbohidratos en cada porción  Busque el tamaño de la porción en la etiqueta alimenticia y después decida cuántas porciones comerá  Multiplique el número de porciones que planea comer por la cantidad de carbohidratos por porción  ¨ Ejemplo de ghazal merienda con ghazal catia de granola: Gomez plan de comidas le permite consumir 2 porciones de carbohidratos (30 gramos) dank bocadillo  Planea comer 1 paquete de barras de granola, el cual contiene 2 barras  Según la etiqueta alimenticia, el tamaño de la porción en deepak paquete es 1 catia   Cada porción (1 catia) contiene 25 gramos de carbohidratos  La cantidad total de carbohidratos en el paquete de barras de granola sería 50 gramos  Basándose en gomez plan alimenticio, usted debe de comer sólo 1 catia de granola  Acuda a indu consultas de control con gomez médico según le indicaron  Anote indu preguntas para que se acuerde de hacerlas elvis indu visitas  © 2017 [facility] Information is for End User's use only and may not be sold, redistributed or otherwise used for commercial purposes  All illustrations and images included in CareNotes® are the copyrighted property of [employer] , [employer]  or Kal Strong  Esta información es sólo para uso en educación  Gomez intención no es darle un consejo médico sobre enfermedades o tratamientos  Colsulte con gomez Murlene Lusty farmacéutico antes de seguir cualquier régimen médico para saber si es seguro y efectivo para usted

## 2022-09-29 NOTE — PROGRESS NOTES
Progress Note - Urology Progress  Oral Estrada 81 y o  female MRN: [id number]  Unit/Bed#: 7T [facility] 703-02 Encounter: [id number]    Assessment:  Stable POD#1 post bilateral ureteroscopy    Plan:  Home today    Subjective/Objective       Subjective: No complaints- she denies pain  Voiding with mild dysuria  Objective:     Blood pressure (!) 173/85, pulse 81, temperature 97 5 °F (36 4 °C), temperature source Temporal, resp  rate 18, weight 70 6 kg (155 lb 10 3 oz), last menstrual period 01/01/1990, SpO2 96 %, not currently breastfeeding  ,Body mass index is 26 72 kg/m²  Intake/Output Summary (Last 24 hours) at 9/27/2019 0740  Last data filed at 9/27/2019 0500  Gross per 24 hour   Intake 1000 ml   Output 600 ml   Net 400 ml       Invasive Devices     Peripheral Intravenous Line            Peripheral IV 09/26/19 Left Arm less than 1 day                Review of Systems    Physical Exam: General appearance: alert and oriented, in no acute distress  Head: Normocephalic, without obvious abnormality, atraumatic  Neck: supple, symmetrical, trachea midline  Back: symmetric, no curvature  ROM normal  No CVA tenderness  Lungs: normal effort  Heart: regular rate and rhythm  Abdomen: soft, non-tender; bowel sounds normal; no masses,  no organomegaly  Extremities: extremities normal, warm and well-perfused; no cyanosis, clubbing, or edema  Neurologic: Grossly normal    Lab, Imaging and other studies:I have personally reviewed pertinent lab results    , CBC: No results found for: WBC, HGB, HCT, MCV, PLT, ADJUSTEDWBC, MCH, MCHC, RDW, MPV, NRBC, CMP: No results found for: SODIUM, K, CL, CO2, ANIONGAP, BUN, CREATININE, GLUCOSE, CALCIUM, AST, ALT, ALKPHOS, PROT, BILITOT, EGFR      Seen with  Statement Selected

## 2023-01-12 NOTE — ASSESSMENT & PLAN NOTE
Wt Readings from Last 3 Encounters:   11/04/19 70 2 kg (154 lb 12 2 oz)   11/02/19 70 8 kg (156 lb 1 6 oz)   11/02/19 70 8 kg (156 lb 1 4 oz)     Euvolemic - No exacerbation  ECHO: LVEF 75%, hyperdynamic left ventricular systolic function  Daily wt, I&O 30

## 2023-04-26 NOTE — ANESTHESIA POSTPROCEDURE EVALUATION
04/26/23 1100   RN Monitoring of Pain, Safety, and Relapse   Safety Concerns Suicidal ideation   Types of Safety Concerns Had SI thoughts las night - due to drama on social media and small worries building up about friends and family   Physical Concerns no   Pain Concerns no   Use of Street Drugs or Alcohol No   Taking Medications as Prescribed Yes   Patient Response Attentive;Good eye contact;Interactive     Patient is using coping skills of calling a friend, watching tv, or listening to music.  She feels that these are working for her.  She denies having SI today but states it usually starts when she gets home and starts going on her phone.     Post-Op Assessment Note    CV Status:  Stable  Pain Score: 0    Pain management: adequate     Mental Status:  Combative and confused   Hydration Status:  Stable   PONV Controlled:  None   Airway Patency:  Patent   Post Op Vitals Reviewed: Yes      Staff: Anesthesiologist   Comments: pt wake up confused and combat giving 50mg propofal for transfer to PACU          /74 (09/26/19 1839)    Temp 97 8 °F (36 6 °C) (09/26/19 1839)    Pulse 74 (09/26/19 1839)   Resp 12 (09/26/19 1839)    SpO2 99 % (09/26/19 1839)
